# Patient Record
Sex: MALE | Race: WHITE | NOT HISPANIC OR LATINO | Employment: OTHER | URBAN - METROPOLITAN AREA
[De-identification: names, ages, dates, MRNs, and addresses within clinical notes are randomized per-mention and may not be internally consistent; named-entity substitution may affect disease eponyms.]

---

## 2018-01-26 LAB
BASOPHILS NFR BLD: 0.1 K/UL (ref 0–0.2)
BASOPHILS NFR BLD: 0.6 %
BUN SERPL-MCNC: 17 MG/DL (ref 8–20)
CALCIUM SERPL-MCNC: 8.9 MG/DL (ref 7.7–10.4)
CHLORIDE: 98 MMOL/L (ref 98–110)
CO2 SERPL-SCNC: 28.2 MMOL/L (ref 22.8–31.6)
CREATININE: 0.95 MG/DL (ref 0.6–1.4)
EOSINOPHIL NFR BLD: 0.6 K/UL (ref 0–0.7)
EOSINOPHIL NFR BLD: 3.6 %
ERYTHROCYTE [DISTWIDTH] IN BLOOD BY AUTOMATED COUNT: 12.8 % (ref 11.7–14.9)
GLUCOSE: 241 MG/DL (ref 70–99)
GRAN #: 10.1 K/UL (ref 1.4–6.5)
GRAN%: 63.6 %
HCT VFR BLD AUTO: 35.5 % (ref 39–55)
HGB BLD-MCNC: 12.1 G/DL (ref 14–16)
IMMATURE GRANS (ABS): 0.1 K/UL (ref 0–1)
IMMATURE GRANULOCYTES: 0.5 %
LYMPH #: 3.8 K/UL (ref 1.2–3.4)
LYMPH%: 23.7 %
MCH RBC QN AUTO: 29.7 PG (ref 25–35)
MCHC RBC AUTO-ENTMCNC: 34.1 G/DL (ref 31–36)
MCV RBC AUTO: 87.2 FL (ref 80–100)
MONO #: 1.3 K/UL (ref 0.1–0.6)
MONO%: 8 %
NUCLEATED RBCS: 0 %
PLATELET # BLD AUTO: 273 K/UL (ref 140–440)
PMV BLD AUTO: 9.4 FL (ref 8.8–12.7)
POTASSIUM SERPL-SCNC: 4.2 MMOL/L (ref 3.5–5)
RBC # BLD AUTO: 4.07 M/UL (ref 4.3–5.9)
SODIUM: 134 MMOL/L (ref 134–144)
WBC # BLD AUTO: 15.9 K/UL (ref 5–10)

## 2018-01-29 ENCOUNTER — TELEPHONE (OUTPATIENT)
Dept: ORTHOPEDICS | Facility: CLINIC | Age: 43
End: 2018-01-29

## 2018-01-29 NOTE — TELEPHONE ENCOUNTER
Called patient back advised of fee for office visit, and option to pay 1/3 of the fee, patient stated he shouldn't have to pay anything, and asked to cancel his appointment.

## 2018-01-29 NOTE — TELEPHONE ENCOUNTER
----- Message from Mindy Pack sent at 1/29/2018  8:12 AM CST -----  Contact: PATIENT  PATIENT UNABLE TO PAY $200 TODAY, WAS WANTING TO KNOW WHAT TO DO OR IF HE COULD MAKE PAYMENTS. PLEASE CALL HIM AT # [phone]

## 2018-09-28 ENCOUNTER — HOSPITAL ENCOUNTER (EMERGENCY)
Facility: HOSPITAL | Age: 43
Discharge: HOME | End: 2018-09-28
Attending: EMERGENCY MEDICINE

## 2018-09-28 VITALS
HEIGHT: 69 IN | TEMPERATURE: 98 F | OXYGEN SATURATION: 100 % | WEIGHT: 240 LBS | BODY MASS INDEX: 35.55 KG/M2 | DIASTOLIC BLOOD PRESSURE: 88 MMHG | SYSTOLIC BLOOD PRESSURE: 167 MMHG | RESPIRATION RATE: 16 BRPM | HEART RATE: 77 BPM

## 2018-09-28 DIAGNOSIS — M54.12 CERVICAL RADICULOPATHY: Primary | ICD-10-CM

## 2018-09-28 DIAGNOSIS — M62.838 TRAPEZIUS MUSCLE SPASM: ICD-10-CM

## 2018-09-28 PROCEDURE — 96372 THER/PROPH/DIAG INJ SC/IM: CPT

## 2018-09-28 PROCEDURE — 63600175 PHARM REV CODE 636 W HCPCS: Performed by: PHYSICIAN ASSISTANT

## 2018-09-28 PROCEDURE — 99283 EMERGENCY DEPT VISIT LOW MDM: CPT | Mod: 25

## 2018-09-28 RX ORDER — LISINOPRIL AND HYDROCHLOROTHIAZIDE 10; 12.5 MG/1; MG/1
1 TABLET ORAL DAILY
Status: ON HOLD | COMMUNITY
End: 2019-02-06 | Stop reason: HOSPADM

## 2018-09-28 RX ORDER — DICLOFENAC SODIUM 75 MG/1
75 TABLET, DELAYED RELEASE ORAL 2 TIMES DAILY PRN
Qty: 30 TABLET | Refills: 0 | Status: ON HOLD | OUTPATIENT
Start: 2018-09-28 | End: 2019-02-06 | Stop reason: HOSPADM

## 2018-09-28 RX ORDER — LIDOCAINE 50 MG/G
1 PATCH TOPICAL DAILY
Qty: 30 PATCH | Refills: 0 | Status: ON HOLD | OUTPATIENT
Start: 2018-09-28 | End: 2019-02-06 | Stop reason: HOSPADM

## 2018-09-28 RX ORDER — CYCLOBENZAPRINE HCL 10 MG
10 TABLET ORAL 3 TIMES DAILY PRN
Qty: 12 TABLET | Refills: 0 | Status: SHIPPED | OUTPATIENT
Start: 2018-09-28 | End: 2018-10-02

## 2018-09-28 RX ORDER — INSULIN GLARGINE 100 [IU]/ML
55 INJECTION, SOLUTION SUBCUTANEOUS DAILY
Status: ON HOLD | COMMUNITY
End: 2019-02-06 | Stop reason: HOSPADM

## 2018-09-28 RX ORDER — ORPHENADRINE CITRATE 30 MG/ML
60 INJECTION INTRAMUSCULAR; INTRAVENOUS
Status: COMPLETED | OUTPATIENT
Start: 2018-09-28 | End: 2018-09-28

## 2018-09-28 RX ORDER — KETOROLAC TROMETHAMINE 30 MG/ML
30 INJECTION, SOLUTION INTRAMUSCULAR; INTRAVENOUS
Status: COMPLETED | OUTPATIENT
Start: 2018-09-28 | End: 2018-09-28

## 2018-09-28 RX ADMIN — KETOROLAC TROMETHAMINE 30 MG: 30 INJECTION, SOLUTION INTRAMUSCULAR at 10:09

## 2018-09-28 RX ADMIN — ORPHENADRINE CITRATE 60 MG: 30 INJECTION INTRAMUSCULAR; INTRAVENOUS at 10:09

## 2018-09-28 NOTE — ED NOTES
Pt. complains of neck pain.  Equal hand  and strength to the upper extremeties.  Denies numbness now on the left arm but does complain of tingling (pins and needles)

## 2018-09-28 NOTE — ED PROVIDER NOTES
"Shai Date: 9/28/2018    SCRIBE #1 NOTE: ICarmen, am scribing for, and in the presence of, Angelina Helms PA-C.       History     Chief Complaint   Patient presents with    Neck Pain     left arm numbness / chronic        Time seen by provider: 10:00 AM on 09/28/2018    Gideon Ross is a 42 y.o. male with DM and HTN who presents to the ED with an onset of constant left-sided neck pain and intermittent left arm numbness and tingling sensations that have been present for a "while". He has taken Ibuprofen to help with the pain, but hasn't any taken any medication prior to arriving to the ED.  He explains he often drops items out of his left hand secondary to the numbness and tingling sensations. The patient denies headache, fever, back pain or any other symptoms at this time. He denies any recent injuries or falls. The pt hasn't seen any previous doctors to assess his symptoms. No pertinent SHx noted. No known drug allergies noted.      The history is provided by the patient.     Review of patient's allergies indicates:  No Known Allergies  Past Medical History:   Diagnosis Date    Diabetes mellitus     Hypertension      Past Surgical History:   Procedure Laterality Date    SKIN GRAFT       No family history on file.  Social History     Tobacco Use    Smoking status: Current Every Day Smoker     Packs/day: 1.00     Types: Cigarettes   Substance Use Topics    Alcohol use: Yes    Drug use: No     Review of Systems   Constitutional: Negative for chills and fever.   HENT: Negative for facial swelling and trouble swallowing.    Eyes: Negative for discharge.   Respiratory: Negative for cough, chest tightness, shortness of breath and wheezing.    Cardiovascular: Negative for chest pain and palpitations.   Gastrointestinal: Negative for abdominal pain, diarrhea, nausea and vomiting.   Genitourinary: Negative for dysuria and hematuria.   Musculoskeletal: Positive for neck pain (Constant). Negative " for arthralgias, back pain, joint swelling, myalgias and neck stiffness.   Skin: Negative for color change, pallor, rash and wound.   Neurological: Positive for numbness (Intermittent). Negative for dizziness, syncope, weakness, light-headedness and headaches.        Positive for intermittent tingling sensation.    Hematological: Does not bruise/bleed easily.   Psychiatric/Behavioral: The patient is not nervous/anxious.        Physical Exam     Initial Vitals [09/28/18 0939]   BP Pulse Resp Temp SpO2   (!) 167/88 77 16 98.2 °F (36.8 °C) 100 %      MAP       --         Physical Exam    Nursing note and vitals reviewed.  Constitutional: He appears well-developed and well-nourished. He is not diaphoretic. No distress.   HENT:   Head: Normocephalic and atraumatic.   Right Ear: External ear normal.   Left Ear: External ear normal.   Nose: Nose normal.   Mouth/Throat: Oropharynx is clear and moist.   Eyes: Conjunctivae and EOM are normal. Pupils are equal, round, and reactive to light.   Neck: Normal range of motion. Neck supple.   Cardiovascular: Normal rate, regular rhythm, normal heart sounds and intact distal pulses. Exam reveals no gallop and no friction rub.    No murmur heard.  Pulmonary/Chest: Breath sounds normal. No respiratory distress. He has no wheezes. He has no rhonchi. He has no rales.   Abdominal: Soft. He exhibits no distension and no mass. There is no tenderness.   Musculoskeletal: Normal range of motion. He exhibits tenderness. He exhibits no edema.   TTP noted to left cervical paraspinal muscles, extending into left trapezius with spasm noted.  No midline cervical spinous process tenderness noted.  No decreased ROM, decreased strength or loss of sensation to bilateral upper extremities.  Palpable 2+ radial pulses.    Lymphadenopathy:     He has no cervical adenopathy.   Neurological: He is alert and oriented to person, place, and time. He has normal strength. No cranial nerve deficit or sensory  deficit.   No focal neurological deficits noted.  Cranial nerves III-XII grossly intact.  Equal, rapid alternating movements noted to bilateral upper and lower extremities.      Skin: Skin is warm and dry. No rash and no abscess noted. No erythema.   Psychiatric: He has a normal mood and affect.         ED Course   Procedures  Labs Reviewed - No data to display       Imaging Results    None          Medical Decision Making:   History:   Old Medical Records: I decided to obtain old medical records.  Differential Diagnosis:   Fracture  Dislocation  Sprain  Contusion  Strain  Spasm           APC / Resident Notes:   Symptoms are most consistent with cervical radiculopathy.  Low suspicion for acute fracture or compression at this time and no need for emergent MRI.  He is given a dose of IM Toradol, Norflex and Lidoderm patch here in the ED.  He will be discharged home with a prescription for Motrin and Flexeril.  He is encouraged to follow-up with a PCP and/or pain management for re-evaluation and further treatment options.  He voices understanding and is agreeable to the plan.  She is given specific return precautions.          Scribe Attestation:   Scribe #1: I performed the above scribed service and the documentation accurately describes the services I performed. I attest to the accuracy of the note.    Attending Attestation:     Physician Attestation Statement for NP/PA:   I discussed this assessment and plan of this patient with the NP/PA, but I did not personally examine the patient. The face to face encounter was performed by the NP/PA.          I, Angelina Helms PA-C, personally performed the services described in this documentation. All medical record entries made by the scribe were at my direction and in my presence.  I have reviewed the chart and agree that the record reflects my personal performance and is accurate and complete. Angelina Helms PA-C.  12:08 PM 09/28/2018          ED Course as of Sep 28  1158   Fri Sep 28, 2018   0955 BP: (!) 167/88 [EF]   0955 Temp: 98.2 °F (36.8 °C) [EF]   0955 Temp src: Oral [EF]   0955 Pulse: 77 [EF]   0955 Resp: 16 [EF]   0955 SpO2: 100 % [EF]      ED Course User Index  [EF] Kin Rogers MD     Clinical Impression:   The primary encounter diagnosis was Cervical radiculopathy. A diagnosis of Trapezius muscle spasm was also pertinent to this visit.      Disposition:   Disposition: Discharged  Condition: Stable                        Angelina Helms PA-C  09/28/18 1208       Kin Rogers MD  09/28/18 5488

## 2019-01-31 ENCOUNTER — HOSPITAL ENCOUNTER (INPATIENT)
Facility: HOSPITAL | Age: 44
LOS: 6 days | Discharge: HOME | DRG: 561 | End: 2019-02-06
Attending: PHYSICAL MEDICINE & REHABILITATION | Admitting: PHYSICAL MEDICINE & REHABILITATION
Payer: MEDICAID

## 2019-01-31 DIAGNOSIS — Z89.612 S/P AKA (ABOVE KNEE AMPUTATION) UNILATERAL, LEFT: ICD-10-CM

## 2019-01-31 LAB — POCT GLUCOSE: 219 MG/DL (ref 70–110)

## 2019-01-31 PROCEDURE — 12800000 HC REHAB SEMI-PRIVATE ROOM

## 2019-01-31 PROCEDURE — 63600175 PHARM REV CODE 636 W HCPCS: Performed by: PHYSICAL MEDICINE & REHABILITATION

## 2019-01-31 PROCEDURE — S5571 INSULIN DISPOS PEN 3 ML: HCPCS | Performed by: PHYSICAL MEDICINE & REHABILITATION

## 2019-01-31 PROCEDURE — 25000003 PHARM REV CODE 250: Performed by: PHYSICAL MEDICINE & REHABILITATION

## 2019-01-31 RX ORDER — GABAPENTIN 300 MG/1
600 CAPSULE ORAL 3 TIMES DAILY
Status: DISCONTINUED | OUTPATIENT
Start: 2019-01-31 | End: 2019-02-06 | Stop reason: HOSPADM

## 2019-01-31 RX ORDER — LISINOPRIL 40 MG/1
40 TABLET ORAL DAILY
Status: DISCONTINUED | OUTPATIENT
Start: 2019-02-01 | End: 2019-02-06 | Stop reason: HOSPADM

## 2019-01-31 RX ORDER — INSULIN ASPART 100 [IU]/ML
15 INJECTION, SOLUTION INTRAVENOUS; SUBCUTANEOUS
Status: DISCONTINUED | OUTPATIENT
Start: 2019-02-01 | End: 2019-02-01

## 2019-01-31 RX ORDER — DIAZEPAM 5 MG/1
5 TABLET ORAL EVERY 12 HOURS PRN
Status: DISCONTINUED | OUTPATIENT
Start: 2019-01-31 | End: 2019-02-06 | Stop reason: HOSPADM

## 2019-01-31 RX ORDER — ASPIRIN 81 MG/1
81 TABLET ORAL DAILY
Status: DISCONTINUED | OUTPATIENT
Start: 2019-02-01 | End: 2019-02-06 | Stop reason: HOSPADM

## 2019-01-31 RX ORDER — POLYETHYLENE GLYCOL 3350 17 G/17G
17 POWDER, FOR SOLUTION ORAL DAILY
Status: DISCONTINUED | OUTPATIENT
Start: 2019-02-01 | End: 2019-02-01

## 2019-01-31 RX ORDER — IBUPROFEN 200 MG
1 TABLET ORAL DAILY
Status: DISCONTINUED | OUTPATIENT
Start: 2019-02-01 | End: 2019-02-06 | Stop reason: HOSPADM

## 2019-01-31 RX ORDER — OXYCODONE HYDROCHLORIDE 5 MG/1
10 TABLET ORAL EVERY 6 HOURS PRN
Status: DISCONTINUED | OUTPATIENT
Start: 2019-01-31 | End: 2019-02-01

## 2019-01-31 RX ORDER — TRAMADOL HYDROCHLORIDE 50 MG/1
50 TABLET ORAL EVERY 6 HOURS
Status: DISPENSED | OUTPATIENT
Start: 2019-01-31 | End: 2019-02-05

## 2019-01-31 RX ORDER — ATORVASTATIN CALCIUM 40 MG/1
80 TABLET, FILM COATED ORAL DAILY
Status: DISCONTINUED | OUTPATIENT
Start: 2019-02-01 | End: 2019-02-06 | Stop reason: HOSPADM

## 2019-01-31 RX ORDER — CARVEDILOL 6.25 MG/1
6.25 TABLET ORAL 2 TIMES DAILY
Status: DISCONTINUED | OUTPATIENT
Start: 2019-01-31 | End: 2019-02-06 | Stop reason: HOSPADM

## 2019-01-31 RX ORDER — HYDROCHLOROTHIAZIDE 12.5 MG/1
50 TABLET ORAL DAILY
Status: DISCONTINUED | OUTPATIENT
Start: 2019-02-01 | End: 2019-02-06 | Stop reason: HOSPADM

## 2019-01-31 RX ORDER — ENOXAPARIN SODIUM 100 MG/ML
40 INJECTION SUBCUTANEOUS NIGHTLY
Status: DISCONTINUED | OUTPATIENT
Start: 2019-01-31 | End: 2019-02-06 | Stop reason: HOSPADM

## 2019-01-31 RX ORDER — QUETIAPINE FUMARATE 100 MG/1
100 TABLET, FILM COATED ORAL NIGHTLY
Status: DISCONTINUED | OUTPATIENT
Start: 2019-01-31 | End: 2019-02-06 | Stop reason: HOSPADM

## 2019-01-31 RX ORDER — BACLOFEN 10 MG/1
10 TABLET ORAL 3 TIMES DAILY
Status: DISCONTINUED | OUTPATIENT
Start: 2019-01-31 | End: 2019-02-06 | Stop reason: HOSPADM

## 2019-01-31 RX ORDER — AMLODIPINE BESYLATE 5 MG/1
10 TABLET ORAL DAILY
Status: DISCONTINUED | OUTPATIENT
Start: 2019-02-01 | End: 2019-02-06 | Stop reason: HOSPADM

## 2019-01-31 RX ADMIN — QUETIAPINE FUMARATE 100 MG: 100 TABLET ORAL at 09:01

## 2019-01-31 RX ADMIN — DIAZEPAM 5 MG: 5 TABLET ORAL at 09:01

## 2019-01-31 RX ADMIN — TRAMADOL HYDROCHLORIDE 50 MG: 50 TABLET, FILM COATED ORAL at 06:01

## 2019-01-31 RX ADMIN — BACLOFEN 10 MG: 10 TABLET ORAL at 09:01

## 2019-01-31 RX ADMIN — ENOXAPARIN SODIUM 40 MG: 100 INJECTION SUBCUTANEOUS at 09:01

## 2019-01-31 RX ADMIN — INSULIN DETEMIR 70 UNITS: 100 INJECTION, SOLUTION SUBCUTANEOUS at 09:01

## 2019-01-31 RX ADMIN — CARVEDILOL 6.25 MG: 6.25 TABLET, FILM COATED ORAL at 09:01

## 2019-01-31 RX ADMIN — OXYCODONE HYDROCHLORIDE 10 MG: 5 TABLET ORAL at 06:01

## 2019-01-31 RX ADMIN — GABAPENTIN 600 MG: 300 CAPSULE ORAL at 09:01

## 2019-02-01 LAB
ALBUMIN SERPL BCP-MCNC: 3 G/DL
ALP SERPL-CCNC: 96 U/L
ALT SERPL W/O P-5'-P-CCNC: 26 U/L
ANION GAP SERPL CALC-SCNC: 10 MMOL/L
AST SERPL-CCNC: 19 U/L
BILIRUB SERPL-MCNC: 0.3 MG/DL
BILIRUB UR QL STRIP: NEGATIVE
BUN SERPL-MCNC: 25 MG/DL
CALCIUM SERPL-MCNC: 9.7 MG/DL
CHLORIDE SERPL-SCNC: 99 MMOL/L
CLARITY UR: CLEAR
CO2 SERPL-SCNC: 26 MMOL/L
COLOR UR: YELLOW
CREAT SERPL-MCNC: 1 MG/DL
ERYTHROCYTE [DISTWIDTH] IN BLOOD BY AUTOMATED COUNT: 15.6 %
EST. GFR  (AFRICAN AMERICAN): >60 ML/MIN/1.73 M^2
EST. GFR  (NON AFRICAN AMERICAN): >60 ML/MIN/1.73 M^2
GLUCOSE SERPL-MCNC: 238 MG/DL
GLUCOSE UR QL STRIP: NEGATIVE
HCT VFR BLD AUTO: 27.1 %
HGB BLD-MCNC: 8.9 G/DL
HGB UR QL STRIP: NEGATIVE
KETONES UR QL STRIP: NEGATIVE
LEUKOCYTE ESTERASE UR QL STRIP: NEGATIVE
MCH RBC QN AUTO: 28 PG
MCHC RBC AUTO-ENTMCNC: 32.8 G/DL
MCV RBC AUTO: 85 FL
NITRITE UR QL STRIP: NEGATIVE
PH UR STRIP: 6 [PH] (ref 5–8)
PLATELET # BLD AUTO: 712 K/UL
PMV BLD AUTO: 6.7 FL
POCT GLUCOSE: 191 MG/DL (ref 70–110)
POCT GLUCOSE: 220 MG/DL (ref 70–110)
POCT GLUCOSE: 302 MG/DL (ref 70–110)
POCT GLUCOSE: 76 MG/DL (ref 70–110)
POTASSIUM SERPL-SCNC: 4.8 MMOL/L
PROT SERPL-MCNC: 7.5 G/DL
PROT UR QL STRIP: NEGATIVE
RBC # BLD AUTO: 3.17 M/UL
SODIUM SERPL-SCNC: 135 MMOL/L
SP GR UR STRIP: 1.01 (ref 1–1.03)
URN SPEC COLLECT METH UR: NORMAL
UROBILINOGEN UR STRIP-ACNC: NEGATIVE EU/DL
WBC # BLD AUTO: 18.2 K/UL

## 2019-02-01 PROCEDURE — 63600175 PHARM REV CODE 636 W HCPCS: Performed by: PHYSICAL MEDICINE & REHABILITATION

## 2019-02-01 PROCEDURE — 97116 GAIT TRAINING THERAPY: CPT

## 2019-02-01 PROCEDURE — 25000003 PHARM REV CODE 250: Performed by: PHYSICAL MEDICINE & REHABILITATION

## 2019-02-01 PROCEDURE — 97110 THERAPEUTIC EXERCISES: CPT

## 2019-02-01 PROCEDURE — 12800000 HC REHAB SEMI-PRIVATE ROOM

## 2019-02-01 PROCEDURE — 36415 COLL VENOUS BLD VENIPUNCTURE: CPT

## 2019-02-01 PROCEDURE — 97530 THERAPEUTIC ACTIVITIES: CPT

## 2019-02-01 PROCEDURE — 80053 COMPREHEN METABOLIC PANEL: CPT

## 2019-02-01 PROCEDURE — 81003 URINALYSIS AUTO W/O SCOPE: CPT

## 2019-02-01 PROCEDURE — 97162 PT EVAL MOD COMPLEX 30 MIN: CPT

## 2019-02-01 PROCEDURE — 97165 OT EVAL LOW COMPLEX 30 MIN: CPT

## 2019-02-01 PROCEDURE — 85027 COMPLETE CBC AUTOMATED: CPT

## 2019-02-01 RX ORDER — OXYCODONE HYDROCHLORIDE 5 MG/1
5 TABLET ORAL EVERY 6 HOURS PRN
Status: DISCONTINUED | OUTPATIENT
Start: 2019-02-01 | End: 2019-02-02

## 2019-02-01 RX ORDER — POLYETHYLENE GLYCOL 3350 17 G/17G
17 POWDER, FOR SOLUTION ORAL DAILY PRN
Status: DISCONTINUED | OUTPATIENT
Start: 2019-02-02 | End: 2019-02-06 | Stop reason: HOSPADM

## 2019-02-01 RX ORDER — CIPROFLOXACIN 250 MG/1
500 TABLET, FILM COATED ORAL EVERY 12 HOURS
Status: COMPLETED | OUTPATIENT
Start: 2019-02-01 | End: 2019-02-05

## 2019-02-01 RX ORDER — INSULIN ASPART 100 [IU]/ML
18 INJECTION, SOLUTION INTRAVENOUS; SUBCUTANEOUS
Status: DISCONTINUED | OUTPATIENT
Start: 2019-02-01 | End: 2019-02-06 | Stop reason: HOSPADM

## 2019-02-01 RX ADMIN — CIPROFLOXACIN HYDROCHLORIDE 500 MG: 250 TABLET, FILM COATED ORAL at 09:02

## 2019-02-01 RX ADMIN — DIAZEPAM 5 MG: 5 TABLET ORAL at 10:02

## 2019-02-01 RX ADMIN — LISINOPRIL 40 MG: 40 TABLET ORAL at 10:02

## 2019-02-01 RX ADMIN — OXYCODONE HYDROCHLORIDE 10 MG: 5 TABLET ORAL at 12:02

## 2019-02-01 RX ADMIN — ENOXAPARIN SODIUM 40 MG: 100 INJECTION SUBCUTANEOUS at 09:02

## 2019-02-01 RX ADMIN — BACLOFEN 10 MG: 10 TABLET ORAL at 10:02

## 2019-02-01 RX ADMIN — GABAPENTIN 600 MG: 300 CAPSULE ORAL at 03:02

## 2019-02-01 RX ADMIN — HYDROCHLOROTHIAZIDE 50 MG: 12.5 TABLET ORAL at 10:02

## 2019-02-01 RX ADMIN — CARVEDILOL 6.25 MG: 6.25 TABLET, FILM COATED ORAL at 10:02

## 2019-02-01 RX ADMIN — TRAMADOL HYDROCHLORIDE 50 MG: 50 TABLET, FILM COATED ORAL at 06:02

## 2019-02-01 RX ADMIN — CIPROFLOXACIN HYDROCHLORIDE 500 MG: 250 TABLET, FILM COATED ORAL at 10:02

## 2019-02-01 RX ADMIN — BACLOFEN 10 MG: 10 TABLET ORAL at 09:02

## 2019-02-01 RX ADMIN — BACLOFEN 10 MG: 10 TABLET ORAL at 04:02

## 2019-02-01 RX ADMIN — OXYCODONE HYDROCHLORIDE 5 MG: 5 TABLET ORAL at 03:02

## 2019-02-01 RX ADMIN — INSULIN ASPART 18 UNITS: 100 INJECTION, SOLUTION INTRAVENOUS; SUBCUTANEOUS at 10:02

## 2019-02-01 RX ADMIN — ATORVASTATIN CALCIUM 80 MG: 40 TABLET, FILM COATED ORAL at 10:02

## 2019-02-01 RX ADMIN — DIAZEPAM 5 MG: 5 TABLET ORAL at 09:02

## 2019-02-01 RX ADMIN — TRAMADOL HYDROCHLORIDE 50 MG: 50 TABLET, FILM COATED ORAL at 12:02

## 2019-02-01 RX ADMIN — TRAMADOL HYDROCHLORIDE 50 MG: 50 TABLET, FILM COATED ORAL at 08:02

## 2019-02-01 RX ADMIN — OXYCODONE HYDROCHLORIDE 5 MG: 5 TABLET ORAL at 10:02

## 2019-02-01 RX ADMIN — GABAPENTIN 600 MG: 300 CAPSULE ORAL at 10:02

## 2019-02-01 RX ADMIN — AMLODIPINE BESYLATE 10 MG: 5 TABLET ORAL at 10:02

## 2019-02-01 RX ADMIN — INSULIN ASPART 18 UNITS: 100 INJECTION, SOLUTION INTRAVENOUS; SUBCUTANEOUS at 05:02

## 2019-02-01 RX ADMIN — ASPIRIN 81 MG: 81 TABLET, COATED ORAL at 10:02

## 2019-02-01 RX ADMIN — CARVEDILOL 6.25 MG: 6.25 TABLET, FILM COATED ORAL at 09:02

## 2019-02-01 RX ADMIN — OXYCODONE HYDROCHLORIDE 10 MG: 5 TABLET ORAL at 06:02

## 2019-02-01 RX ADMIN — GABAPENTIN 600 MG: 300 CAPSULE ORAL at 09:02

## 2019-02-01 RX ADMIN — QUETIAPINE FUMARATE 100 MG: 100 TABLET ORAL at 09:02

## 2019-02-01 NOTE — H&P
PHYSICAL MEDICINE AND REHABILITATION POST ADMISSION EVALUATION    ATTENDING PHYSICIAN:  Wolfgang Vogt MD    PHYSICIAN ADMISSION UPDATE AND COMMENTS REGARDING PREADMISSION SCREENING STATUS:    There are no substantial changes between the preadmission assessment and the   patient's current status.    APPROPRIATENESS FOR ADMISSION TO INPATIENT REHABILITATION FACILITY:  The   patient's condition is sufficiently stable to allow active participation in an   intensive inpatient rehabilitation program.  The patient would benefit from   inpatient rehabilitation due to the followin.  Three hours of therapy at least 5 days per week.    PLAN FOR COMMUNITY DISCHARGE:  The patient has good family support.  The patient   is motivated and willing to participate.  The patient is cognitively intact.    The patient has good premorbid functional status and there is a need for   interdisciplinary inpatient rehabilitation provided by a physician with rehab   focus nursing and need for PT and OT.    REHAB DIAGNOSIS:  Status post left AKA.    IMPAIRMENTS AND DISABILITIES:  Inability to ambulate safely and inability to   manage self ADLs.    REHABILITATION PRECAUTIONS AND RESTRICTIONS:  Fall.    POSSIBLE BARRIERS TO PATIENT'S PROGRESS AND DISCHARGE:  The patient's progress   may be impaired by the followin.  Poorly controlled diabetes.  2.  History of hypertension.  3.  Obesity.  4.  Pain.  5.  Gait disorder as a result of recent amputation.  6.  Surgical incision management.    ACTIVE PROBLEM LIST AND POTENTIAL COMPLICATION FROM PATIENT'S MEDICAL CONDITION   AND PLAN TO PREVENT AND ADDRESS THESE:  The patient remains at risk for fall,   DVT as well as pressure ulcer; however, likelihood of above will be decreased as   the patient will be engaged in therapeutic activities.    This patient's medical complexity requires close physician supervision and   24-hour rehabilitation nursing care to address the etiologic diagnosis,  which is   further complicated by following comorbidities:  1.  Anemia.  2.  Hypertension.  3.  Poorly controlled diabetes.  4.  Obesity.  5.  Gait disorder as a result of amputee.  6.  Pain.  7.  Leukocytosis with unknown etiology.    FUNCTIONAL GOALS TO BE ACHIEVED:  Standby assist to modified independent.    The patient requires intensive inpatient rehabilitation with care and treatment   by following disciplines:  1.  Medical supervision.  2.  A 24-hour rehabilitation nursing.  3.  Physical therapy.  4.  Occupational therapy.    PLAN OF CARE:  The interdisciplinary team will work collaboratively to address   the patient's problem as identified on the individualized interdisciplinary plan   of care.  The plan of care will be initiated and reviewed on a regular basis to   ensure that all appropriate concerns are being addressed throughout the entire   rehab stay.    The patient's expected level of improvement with inpatient rehabilitation   admission is good.    ANTICIPATED DESTINATION POST-DISCHARGE FROM INPATIENT REHABILITATION:  Home with   family.    INTERDISCIPLINARY CARE PLAN:  Reviewed and there are no changes indicated at   this time.    ESTIMATED LENGTH OF STAY:  Approximately 10 to 14 days.      AV/IN  dd: 02/01/2019 08:47:22 (CST)  td: 02/01/2019 11:56:45 (CST)  Doc ID   #[id number]  Job ID #[id number]    CC:

## 2019-02-01 NOTE — PLAN OF CARE
Problem: Adult Inpatient Plan of Care  Goal: Plan of Care Review  Outcome: Ongoing (interventions implemented as appropriate)  Valium effective. Calm and sleeping on rounds. Requested to be awaken for pain meds. Given. Scheduled ultram and 10mg of oxycodone.  Able to turn self in bed. Urinal given. Encouraged to call for any assist. Cont poc

## 2019-02-01 NOTE — PLAN OF CARE
Problem: Physical Therapy Goal  Goal: Physical Therapy Goal  Goals to be met by: 2019     Patient will increase functional independence with mobility by performin). Supine to sit with Modified [place]  2). Sit to supine with Modified [place]  3). Sit to stand transfer with Modified [place]  4). Bed to chair transfer with Modified [place]   5). Gait  x  > 150 feet with Modified [place] using Rolling Walker.   6). Wheelchair propulsion x > 300 feet with Modified [place]     Outcome: Ongoing (interventions implemented as appropriate)  Initial Evaluation completed.

## 2019-02-01 NOTE — NURSING
Pt with severe anxiety. Tearful and crying out loud. Stating cant stay here because he needs to get back to work or otherwise he will be loosing everything he has worked for. Earlier noted ambulating hallway with walker and girlfriend at his side. Explanation given to call for assist and not to do this by himself but with assist from staff. Noted dressing to left aka. Staples in place. Also noted staples to r leg. ([place] vein). Dry and healing well. Pt obese and has multiple tattoos  Did not see  Incision to left aka due to dressing. Pt also a little tachy. Encouraged to drink fluids.  Snacks given to prevent hypoglycemia. Got 70 units of detemir. No s/s given.  Encouraged to call for any assist.

## 2019-02-01 NOTE — PT/OT/SLP EVAL
"PhysicalTherapy   Evaluation    Gideon Ross   MRN: [id number]     PT Received On: 02/01/19  PT Start Time: 0905   2:05  PT Stop Time: 1000   2:40  PT Total Time (min): 90 min       Billable Minutes:  Evaluation 50, Gait [id number] and Therapeutic Exercise 30  Total Minutes: 90    Diagnosis:  -s/p L AKA    Past Medical History:   Diagnosis Date    Diabetes mellitus     Hypertension       Past Surgical History:   Procedure Laterality Date    SKIN GRAFT         Referring physician: Torie  Date referred to PT: 02/01/2019    General Precautions: Standard, fall  Orthopedic Precautions: LLE non weight bearing   Braces:   in place for p.m. Session    Patient History:  Lives With: significant other  Living Arrangements: house  Home Layout: Able to live on 1st floor  Equipment Currently Used at Home: none      Previous Level of Function:  Ambulation Skills: independent  Transfer Skills: independent  ADL Skills: independent  Work/Leisure Activity: independent(worked as cook)    Subjective:  Communicated with nurse and MD prior to session.    Patient goals: STG: ' go home"; LTG: get prosthesis, return to work  Family goals: none stated    Pain/Comfort  Pain Rating 1: 5/10  Location - Side 1: Left  Location 1: thigh  Pain Addressed 1: Reposition, Distraction, Cessation of Activity  Pain Rating Post-Intervention 1: 5/10    Objective:  Patient found seated in w/c, cooperative.       Cognitive Exam: A + O x 4    Upper Extremity Range of Motion:  Refer to OT evaluation for UE ROM.    Upper Extremity Strength:  Refer to OT evaluation for UE strength.    Lower Extremity Range of Motion:  Right Lower Extremity: WFL  Left Lower Extremity: WFL    Lower Extremity Strength:  Right Lower Extremity: WFL  Left Lower Extremity: ~ 3-/5     Functional Mobility:    Bed Mobility :   Supine to sit: Standby Assistance with siderail   Sit to supine: Standby Assistance   Rolling: Standby Assistance with siderail     Transfers:  Sit to " "stand:Contact Guard Assistance with No Assistive Device, Rolling Walker and Grab bars  x 8  Bed <> Chair:  Stand Pivot with Contact Guard Assistance with No Assistive Device to/from bed    Wheelchair: 200' with SBA and cues to lock brakes    Gait: 75' x 2, 90' x 2, 150' x 1 with RW with CGA and cues    Stairs: ascend/descend 6 4" steps with rails with min assist and cues      Balance:    Static Stand: FAIR+: Takes MINIMAL challenges from all directions with walker  Dynamic stand: FAIR: Needs CONTACT GUARD during gait with walker    Endurance deficit:  Mod limited    Additional Treatment:  Gait training with walker  STANDIN way hip arom x 10 reps x 2 sets each, emphasizing extension to stretch hip flexors  SciFit StepOne: L 3.0 x 10 min    Patient left up in chair with call button in reach and SO present.    Assessment:  Gideon Ross is a 43 y.o. male with a medical diagnosis of s/p left AKA. He presents with limited functional mobility. Will benefit from PT to address above deficits.    Rehab potential is good.    Activity tolerance: Fair    Plan: plan care intiated, bed mobility initiated, transfer training iniated, gait training, balance training, strengthening, neuromuscular re-education and wheelchair management/propulsion, preprosthetic training.    Discharge recommendations: other (see comments)(Home)     Equipment recommendations: other (see comments)(TBD)    GOALS:   Multidisciplinary Problems     Physical Therapy Goals        Problem: Physical Therapy Goal    Goal Priority Disciplines Outcome Goal Variances Interventions   Physical Therapy Goal     PT, PT/OT Ongoing (interventions implemented as appropriate)     Description:  Goals to be met by: 2019     Patient will increase functional independence with mobility by performin). Supine to sit with Modified [place]  2). Sit to supine with Modified [place]  3). Sit to stand transfer with Modified [place]  4). Bed to chair " transfer with Modified [place]   5). Gait  x  > 150 feet with Modified [place] using Rolling Walker.   6). Wheelchair propulsion x > 300 feet with Modified [place]                       PLAN:    Patient to be seen daily to address the above listed problems via gait training, therapeutic activities, therapeutic exercises, neuromuscular re-education, wheelchair management/training  Plan of Care expires: 02/16/19  Plan of Care reviewed with: patient          Joo HENDERSON Bhanu, PT 2/1/2019

## 2019-02-01 NOTE — H&P
"DATE OF ADMISSION:  01/31/2019.    ATTENDING PHYSICIAN:  Wolfgang Vogt M.D.    REFERRING FACILITY:  [facility], [place], [place].    CHIEF COMPLAINT:  "Unable to take care of myself."    HISTORY OF PRESENT ILLNESS:  Mr. Ross is a pleasant 43-year-old gentleman who   was initially admitted to [facility] in [place] on   01/05/2019 with a chief complaint of left leg pain.  The patient was admitted   for critical limb ischemia, the patient's workup included evaluation by multiple   disciplines including vascular surgeon whom had undergone bypass to left lower   extremity and soon after revealed a failed left lower extremity bypass, which   resulted in performing left BKA and soon after several days after his left BKA   essentially 12 days post of initial BKA.  The patient also had further   complications associated with his stump, which resulted in return to OR and   subsequent left AKA.  During the course of this hospitalization, the patient   also with experience of significant agitations and anxiety and depression and   suicidal ideation, which resulted in evaluation by psychiatrist.  As the   patient's acute medical issues were stabilized, the patient's medical record was   reviewed by Dr. Ruffin and recommended intensive inhouse rehabilitation prior to   consideration discharge to home.  Therefore, the patient was admitted to   [facility] inpatient rehab as the patient is residence of [place], [place].    PAST MEDICAL HISTORY:  Significant for poorly controlled diabetes as well as   hypertension.    PAST SURGICAL HISTORY:  Significant for left cardiac catheterization on   01/07/2019 and angiogram left lower extremity on 01/11/2019.    SOCIAL HISTORY:  The patient is single, works as a cook, he has a GED.    SUBSTANCE ABUSE HISTORY:  Significant for tobacco 1 pack per day and drinks one   to two beers every two week.  The patient denies recreational drug " use.    FAMILY HISTORY:  The patient has denied family history of anxiety, depression,   bipolar or boy or schizophrenia or suicidal attempt.    REVIEW OF SYSTEMS:  Denies chest pain, shortness of breath, abdominal   discomfort.    ALLERGIES:  No known drug allergies.    MEDICATIONS:  Amlodipine 10 mg 1 p.o. daily, aspirin 81 mg 1 p.o. daily,   atorvastatin 80 mg 1 p.o. daily, baclofen 10 mg p.o. t.i.d., carvedilol 6.25 mg   1 p.o. b.i.d., Lovenox 40 mg subQ at bedtime, gabapentin 600 mg p.o. t.i.d.,   hydrochlorothiazide 50 mg 1 p.o. daily, insulin aspartate 15 units subQ t.i.d.,   insulin detemir 7 units subQ b.i.d., lisinopril 40 mg p.o. daily, Nicoderm 1   patch Transderm 14 mg per 24 hour, polyethylene glycol 17 g p.o. daily,   quetiapine 100 mg p.o. at bedtime, tramadol 50 mg p.o. q. 6 hours, diazepam 5 mg   q. 12 hours p.r.n. anxiety and oxycodone 5 mg q. 6 hours p.r.n. pain.    LABORATORIES:  CBC on 02/01/2019 includes white blood cells of 18.2, hemoglobin   8.9, hematocrit 27.1 and platelets 712.  CMP pending, 01/31/2019 UA is negative.    PHYSICAL EXAMINATION:  VITAL SIGNS:  Temperature is 97.9, pulse is 115, respiration is 18, systolic   blood pressure is 137, diastolic blood pressure 76 and pulse ox is 100.  GENERAL:  Calm, cooperative, pleasant, in no acute distress.  HEENT:  Normocephalic, atraumatic.  Extraocular muscles are intact.  Sclerae is   nonicteric.  NECK:  Supple.  Throat is clear.  LUNGS:  Clear to auscultation bilateral.  HEART:  Regular rate and rhythm.  No murmur or gallops noted.  ABDOMEN:  Soft, obese, nontender, nondistended.  Positive bowel sounds.  EXTREMITIES:  No clubbing or cyanosis is noted.  SKIN:  Surgical incision involving left stump and medial left thigh are intact.    Surgical incision involving entire right lower extremity from his distal ankle   all the way to his groin are again dry, intact with staple in presents and   mildly erythematosus as he is essentially 2  weeks postop.  MANUAL MUSCLE STRENGTH:  Bilateral upper extremities are 5/5.  Right lower   extremity ankle dorsiflexion and plantar flexion is 3/5, knee flexion and   extension is 3+/5, straight leg raising is 3/5 and left hip flexion and   extension is 2+/5.  GAIT:  Deferred.  GENITAL:  Deferred.  RECTAL:  Deferred.    ASSESSMENT:  1.  Status post left AKA.  2.  Status post failed left BKA.  3.  Status post failed left lower extremity bypass.  4.  History of peripheral vascular disease.  5.  History of lower limb ischemia.  6.  History of hypertension.  7.  History of diabetes.  8.  History of tobacco abuse.  9.  History of anxiety and depression.    PLAN:  PT, OT eval, treat as indicated.  Social Service to arrange for post   discharge planning.  Rehab to nursing with emphasis on bowel and bladder   management, skin care and fall precautions.    PROGNOSIS:  Good.    ESTIMATED LENGTH OF STAY:  Approximately 10 to 14 days.  Additionally, we will   arrange for removal of every other staple from right lower extremity as well as   from left medial thigh.      BLAIR/NY  dd: 02/01/2019 08:42:23 (CST)  td: 02/01/2019 12:13:08 (CST)  Doc ID   #[id number]  Job ID #[id number]    CC:

## 2019-02-01 NOTE — PROGRESS NOTES
Spoke with patient at bedside to complete social assessment.  Patient provided he was independent w/adls, he was active and working and driving.  He reports he lives with his fiancee in a single story home with no steps to enter.  He states she is on disability but she drives and is able to assist hi at home.  Patient states he has blood glucose monitoring supplies at home and that he has received assistance for that through [email or web address] Application.  DME patient has is a [employer] and his grandmother has a walker he can use.  Requested patient to have family bring it so therapy can evaluate.  Denies HH.  States he has no PCP, but [facility] referred him to their primary care clinic.  Provided patient a copy of follow up appointments that were sent with his discharge papers from [place].  Pharmacy used is [facility] in [place].   Provided patient with information on applying for disability per his request.  Also provided patient with information on SNAP benefits.  Case managemant will continue to assist with discharge planning and needs.

## 2019-02-01 NOTE — PT/OT/SLP EVAL
Occupational Therapy  Evaluation    Gideon Ross   MRN: [id number]   Admitting Diagnosis: L AKA    OT Date of Treatment: 02/01/19   OT Start Time: 1008  OT Stop Time: 1255 (90 mins total)  OT Total Time (min): 90 mins total    Billable Minutes:  Evaluation 75 and Therapeutic Activity 15  Total Minutes: 90    Diagnosis: L AKA     Past Medical History:   Diagnosis Date    Diabetes mellitus     Hypertension       Past Surgical History:   Procedure Laterality Date    SKIN GRAFT         Referring physician: Torie  Date referred to OT: 2/1/19    General Precautions: Standard, fall  Orthopedic Precautions: (L AKA NWB)  Braces: N/A    Spiritual, Cultural Beliefs, Mormon Practices, Values that Affect Care: no     Patient History:  Living Environment  Lives With: significant other  Living Arrangements: house  Home Accessibility: other (see comments)(Threshold entrance and walk-in shower with small lip and glass door entry)  Home Layout: Able to live on 1st floor  Equipment Currently Used at Home: none    Prior level of function:    Bed Mobility/Transfers: independent  Grooming: independent  Bathing: independent  Upper Body Dressing: independent  Lower Body Dressing: independent  Toileting: independent  Homemaking Responsibilities: (Yes- shares with SO)  Driving License: Yes  Mode of Transportation: Car  Occupation: Full time employment  Type of Occupation:      Dominant hand: right    Subjective:  Communicated with nurse prior to session.    Chief Complaint: Pain of left residual limb and phantom limb pain   Patient/Family stated goals: Pain control     Pain/Comfort  Pain Rating 1: (unrated pain of L residual limb)  Pain Addressed 1: Reposition, Distraction, Cessation of Activity, Nurse notified, Other (see comments)(nurse providing pain medication - pt crying and yelling out in pain; however, pt stopped crying and yelling out approximately 10 mins after pain medication given)  Pain Rating Post-Intervention 1:  0/10    Objective:  Patient found with: bed alarm    Cognitive Exam:  Oriented to: Person, Place, Time and Situation  Follows Commands/attention: Follows multistep  commands  Communication: clear/fluent  Memory:  No Deficits noted  Safety awareness/insight to disability: impaired - pt somewhat impulsive  Coping skills/emotional control: Appropriate to situation    Visual/perceptual:  Intact    Physical Exam:  Postural examination/scapula alignment:    -       Rounded shoulders  Skin integrity: R LE incision and L residual limb staples intact  Edema: Mild R LE and Moderate L residual limb    Sensation:      -       Intact    Upper Extremity Range of Motion:  Right Upper Extremity: WFL  Left Upper Extremity: WFL    Upper Extremity Strength:  Right Upper Extremity: 4 to 5/5  Left Upper Extremity: 4 to 5/5   Strength: 5/5    Fine motor coordination:      -       Intact    Gross motor coordination: WFL    Functional Mobility:  Bed Mobility:   Supine to sit: Supervision or Set-up Assistance   Sit to supine: Supervision or Set-up Assistance   Rolling: Supervision or Set-up Assistance   Scooting: Supervision or Set-up Assistance    Transfers:  Sit to stand: Stand-by Assistance with Grab bars - verbal instruction for safety awareness (locking w/c brakes)  Bed <> Chair: Stand Pivot with Stand-by Assistance with No Assistive Device    Toilet Transfer:  Pt Stand Pivot with Contact Guard Assistance with Grab bars    Tub bench transfer Stand Pivot with Contact Guard Assistance with Grab bars - pt significant other present during shower transfer - OTR instructing on correct/safe transfer techniques as well as verbal instruction provided for transfer in home environment as pt with small lip to enter; SO reports there are 2 corner built in seats but that she owns a shower chair - OTR recommending use of shower chair - SO verbalizes/demonstrates understanding when appropriate    Feeding:  Modified Independent once Set-up with  meal     Grooming:  Set-up/Supervision     Bathing:  Patient performed bathing with Contact Guard Assistance with grab bar, Handheld shower head and tub bench at Shower    UE Dressing:  Supervision to don/doff t shirt from seated position    LE Dressing:  Pt don/doffed underwear and long pants with CGA to maintain midline in standing with unilateral UE support   Pt don/doffed R sock and tennis shoe with Set-up of items from both EOB and wheelchair    Toileting:  SBA from toilet    Additional Treatment:  - OTR providing education regarding OT role/POC, Inpatient Rehab Therapy Services and schedule as well as safety awareness, use of bed/wheelchair alarms, calling for assistance.   - OTR providing education regarding correct transfer sequence and techniques with proper hand placement, use of DME/AE, and adaptive techniques how to increase Knott with self-care tasks  - OTR also providing education/instruction regarding pain management and timing pain medication with therapy schedule - pt verbalizes understanding     Patient left up in chair with all lines intact, call button in reach and nurse notified    Assessment:  Gideon Ross is a 43 y.o. male with a medical diagnosis of L AKA and presents with performance deficits of physical skills including impaired balance, mobility, strength, dexterity, fine motor coordination, gross motor coordination and endurance.  These performance deficits have resulted in activity limitations including, but not limited to: bed mobility, transfers, ambulating short distances, navigating around obstacles during ambulation, transitional movement patterns ( kneeling, bending, reaching), upper body dressing, lower body dressing, grooming, toileting, bathing and carrying objects.   Pt's role as independent adult working full time as a  has been significantly affected.  Patient will benefit from skilled OT services to maximize level of independence with deficits listed  above.    Rehab potential is good    Activity tolerance: Good    Discharge recommendations: other (see comments)(home)     Equipment recommendations: other (see comments)(TBD; likely wheelchair, shower chair (SO already owns one) )     GOALS:   Multidisciplinary Problems     Occupational Therapy Goals        Problem: Occupational Therapy Goal    Goal Priority Disciplines Outcome Interventions   Occupational Therapy Goal     OT, PT/OT     Description:  Goals to be met by: 2/14/19     Patient will increase functional independence with ADLs by performing:    UE Dressing with [place].  LE Dressing with Modified [place] with DME/AE as needed.  Grooming while seated with Modified [place].  Toileting from toilet with Modified [place] for hygiene and clothing management with DME as needed.   Bathing from shower chair/bench with Modified [place] with DME/AE as needed.  Toilet transfer to toilet with Modified [place] with DME as needed.                      PLAN: Patient to be seen 6 x/week to address the above listed problems via self-care/home management, community/work re-entry, therapeutic activities, therapeutic exercises, therapeutic groups, wheelchair management/training  Plan of Care expires: 02/14/19  Plan of Care reviewed with: patient, significant other    FIGUEROA Kenny LOTR  02/01/2019

## 2019-02-02 LAB
BASOPHILS # BLD AUTO: 0.1 K/UL
BASOPHILS NFR BLD: 0.5 %
DIFFERENTIAL METHOD: ABNORMAL
EOSINOPHIL # BLD AUTO: 0.8 K/UL
EOSINOPHIL NFR BLD: 4.9 %
ERYTHROCYTE [DISTWIDTH] IN BLOOD BY AUTOMATED COUNT: 15.3 %
HCT VFR BLD AUTO: 27.7 %
HGB BLD-MCNC: 9 G/DL
LYMPHOCYTES # BLD AUTO: 3.1 K/UL
LYMPHOCYTES NFR BLD: 18.8 %
MCH RBC QN AUTO: 28 PG
MCHC RBC AUTO-ENTMCNC: 32.6 G/DL
MCV RBC AUTO: 86 FL
MONOCYTES # BLD AUTO: 1.4 K/UL
MONOCYTES NFR BLD: 8.6 %
NEUTROPHILS # BLD AUTO: 11 K/UL
NEUTROPHILS NFR BLD: 67.2 %
PLATELET # BLD AUTO: 726 K/UL
PMV BLD AUTO: 6.5 FL
POCT GLUCOSE: 199 MG/DL (ref 70–110)
POCT GLUCOSE: 267 MG/DL (ref 70–110)
POCT GLUCOSE: 338 MG/DL (ref 70–110)
POCT GLUCOSE: 65 MG/DL (ref 70–110)
RBC # BLD AUTO: 3.23 M/UL
WBC # BLD AUTO: 16.3 K/UL

## 2019-02-02 PROCEDURE — 97110 THERAPEUTIC EXERCISES: CPT

## 2019-02-02 PROCEDURE — 85025 COMPLETE CBC W/AUTO DIFF WBC: CPT

## 2019-02-02 PROCEDURE — 97535 SELF CARE MNGMENT TRAINING: CPT

## 2019-02-02 PROCEDURE — 25000003 PHARM REV CODE 250: Performed by: PHYSICAL MEDICINE & REHABILITATION

## 2019-02-02 PROCEDURE — 36415 COLL VENOUS BLD VENIPUNCTURE: CPT

## 2019-02-02 PROCEDURE — 12800000 HC REHAB SEMI-PRIVATE ROOM

## 2019-02-02 PROCEDURE — 63600175 PHARM REV CODE 636 W HCPCS: Performed by: PHYSICAL MEDICINE & REHABILITATION

## 2019-02-02 PROCEDURE — 97116 GAIT TRAINING THERAPY: CPT

## 2019-02-02 RX ORDER — OXYCODONE HYDROCHLORIDE 5 MG/1
5 TABLET ORAL EVERY 4 HOURS PRN
Status: DISCONTINUED | OUTPATIENT
Start: 2019-02-02 | End: 2019-02-03

## 2019-02-02 RX ADMIN — OXYCODONE HYDROCHLORIDE 5 MG: 5 TABLET ORAL at 12:02

## 2019-02-02 RX ADMIN — OXYCODONE HYDROCHLORIDE 5 MG: 5 TABLET ORAL at 08:02

## 2019-02-02 RX ADMIN — GABAPENTIN 600 MG: 300 CAPSULE ORAL at 09:02

## 2019-02-02 RX ADMIN — CIPROFLOXACIN HYDROCHLORIDE 500 MG: 250 TABLET, FILM COATED ORAL at 08:02

## 2019-02-02 RX ADMIN — ASPIRIN 81 MG: 81 TABLET, COATED ORAL at 09:02

## 2019-02-02 RX ADMIN — QUETIAPINE FUMARATE 100 MG: 100 TABLET ORAL at 08:02

## 2019-02-02 RX ADMIN — BACLOFEN 10 MG: 10 TABLET ORAL at 02:02

## 2019-02-02 RX ADMIN — ATORVASTATIN CALCIUM 80 MG: 40 TABLET, FILM COATED ORAL at 09:02

## 2019-02-02 RX ADMIN — INSULIN ASPART 18 UNITS: 100 INJECTION, SOLUTION INTRAVENOUS; SUBCUTANEOUS at 09:02

## 2019-02-02 RX ADMIN — LISINOPRIL 40 MG: 40 TABLET ORAL at 09:02

## 2019-02-02 RX ADMIN — TRAMADOL HYDROCHLORIDE 50 MG: 50 TABLET, FILM COATED ORAL at 05:02

## 2019-02-02 RX ADMIN — TRAMADOL HYDROCHLORIDE 50 MG: 50 TABLET, FILM COATED ORAL at 12:02

## 2019-02-02 RX ADMIN — CARVEDILOL 6.25 MG: 6.25 TABLET, FILM COATED ORAL at 09:02

## 2019-02-02 RX ADMIN — GABAPENTIN 600 MG: 300 CAPSULE ORAL at 02:02

## 2019-02-02 RX ADMIN — BACLOFEN 10 MG: 10 TABLET ORAL at 09:02

## 2019-02-02 RX ADMIN — CARVEDILOL 6.25 MG: 6.25 TABLET, FILM COATED ORAL at 08:02

## 2019-02-02 RX ADMIN — ENOXAPARIN SODIUM 40 MG: 100 INJECTION SUBCUTANEOUS at 08:02

## 2019-02-02 RX ADMIN — TRAMADOL HYDROCHLORIDE 50 MG: 50 TABLET, FILM COATED ORAL at 01:02

## 2019-02-02 RX ADMIN — OXYCODONE HYDROCHLORIDE 5 MG: 5 TABLET ORAL at 04:02

## 2019-02-02 RX ADMIN — GABAPENTIN 600 MG: 300 CAPSULE ORAL at 08:02

## 2019-02-02 RX ADMIN — BACLOFEN 10 MG: 10 TABLET ORAL at 08:02

## 2019-02-02 RX ADMIN — TRAMADOL HYDROCHLORIDE 50 MG: 50 TABLET, FILM COATED ORAL at 06:02

## 2019-02-02 RX ADMIN — HYDROCHLOROTHIAZIDE 50 MG: 12.5 TABLET ORAL at 09:02

## 2019-02-02 RX ADMIN — OXYCODONE HYDROCHLORIDE 5 MG: 5 TABLET ORAL at 05:02

## 2019-02-02 RX ADMIN — CIPROFLOXACIN HYDROCHLORIDE 500 MG: 250 TABLET, FILM COATED ORAL at 09:02

## 2019-02-02 RX ADMIN — INSULIN ASPART 18 UNITS: 100 INJECTION, SOLUTION INTRAVENOUS; SUBCUTANEOUS at 04:02

## 2019-02-02 NOTE — PT/OT/SLP PROGRESS
Physical Therapy         Treatment        Gideon Ross   MRN: [id number]     PT Received On: 19  Total Time (min): 90        Billable Minutes:  Gait [id number] and Therapeutic Exercise 65  Total Minutes: 90    Treatment Type: Treatment  PT/PTA: PT     PTA Visit Number: 0       General Precautions: Standard, fall  Orthopedic Precautions: Orthopedic Precautions : LLE non weight bearing     Subjective:  Communicated with nurse prior to session.    Pain/Comfort  Pain Rating 1: 5/10  Location - Side 1: Left  Location 1: thigh  Pain Addressed 1: Reposition, Distraction, Cessation of Activity  Pain Rating Post-Intervention 1: 5/10    Objective:  Patient found seated in w/c, with LLE  in place.      Functional Mobility:     Transfer Training:  Sit to stand:Contact Guard Assistance with Rolling Walker and Grab bars  x 10    Gait Trainin' x 2, 125' x 2, 75' x 2 with RW with CGA and cues    Additional Treatment:  STANDIN way hip arom x 40 reps x 2 sets each, emphasizing extension to stretch hip flexors  SciFit StepOne: L 3.0 x 11 min x 1, 16 min x 1  SEATED: RLE: LAQ and hip flexion with 5# x 25 reps each    Activity Tolerance:  Patient somewhat limited by fatigue and Patient limited by pain.    Patient left up in chair with call button in reach.    Rehab potential is good.    Activity tolerance: Good    Discharge recommendations: Discharge Facility/Level of Care Needs: other (see comments)(Home)     Equipment recommendations: Equipment Needed After Discharge: other (see comments)(TBD)     GOALS:   Multidisciplinary Problems     Physical Therapy Goals        Problem: Physical Therapy Goal    Goal Priority Disciplines Outcome Goal Variances Interventions   Physical Therapy Goal     PT, PT/OT Ongoing (interventions implemented as appropriate)     Description:  Goals to be met by: 2019     Patient will increase functional independence with mobility by performin). Supine to sit with Modified  [place]  2). Sit to supine with Modified [place]  3). Sit to stand transfer with Modified [place]  4). Bed to chair transfer with Modified [place]   5). Gait  x  > 150 feet with Modified [place] using Rolling Walker.   6). Wheelchair propulsion x > 300 feet with Modified [place]                       PLAN:    Patient to be seen daily  to address the above listed problems via gait training, therapeutic activities, therapeutic exercises, neuromuscular re-education, wheelchair management/training  Plan of Care expires: 02/16/19  Plan of Care reviewed with: patient         Joo T Bhanu, PT 2/2/2019

## 2019-02-02 NOTE — PLAN OF CARE
Problem: Occupational Therapy Goal  Goal: Occupational Therapy Goal  Goals to be met by: 2/14/19     Patient will increase functional independence with ADLs by performing:    UE Dressing with [place].  LE Dressing with Modified [place] with DME/AE as needed.  Grooming while seated with Modified [place].  Toileting from toilet with Modified [place] for hygiene and clothing management with DME as needed.   Bathing from shower chair/bench with Modified [place] with DME/AE as needed.  Toilet transfer to toilet with Modified [place] with DME as needed.     Outcome: Ongoing (interventions implemented as appropriate)  Goals remain appropriate.

## 2019-02-02 NOTE — PT/OT/SLP PROGRESS
Occupational Therapy  Treatment    Gideon Ross   MRN: [id number]   Admitting Diagnosis: L AKA    OT Date of Treatment: 02/02/19   Total Time (min): 90 min      Billable Minutes:  Self Care/Home Management 45 and Therapeutic Exercise 45  Total Minutes: 90    General Precautions: Standard, fall  Orthopedic Precautions: (L AKA)  Braces: N/A    Spiritual, Cultural Beliefs, Evangelical Practices, Values that Affect Care: no    Subjective:  Communicated with nurse prior to session.    Pain/Comfort  Pain Rating 1: 4/10  Location - Side 1: Left  Location - Orientation 1: generalized  Location 1: thigh  Pain Addressed 1: Distraction  Pain Rating Post-Intervention 1: 8/10(after donning  after shower.)    Objective:       Functional Mobility:  Bed Mobility:   Supine to sit: Activity did not occur   Sit to supine: Standby Assistance   Rolling: Standby Assistance   Scooting: Supervision or Set-up Assistance    Transfer Training:   Sit to stand:Stand-by Assistance with No Assistive Device   Bed <> Chair:  Stand Pivot with Stand-by Assistance with Grab bars   W/c <> shower chair SBA      Bathing:  Patient performed bathing with Minimal Assistance with grab bar, Handheld shower head and shower chair at Shower.    UE Dressing:  Patient performed UE Dressing with  Set-up Assistance.  LE Dressing:  Patient performed don/doffed adult brief with Stand-by Assistance and Patient performed don/doffed pants with Stand-by Assistance  Pt required total assist to bryan stump  after shower.  ADDITIONAL TREATMENT:    Weighted pulleys 50-60 pounds 3 sets 12 all planes. Isolated triceps with 12# dumbbell 3 sets 12.    Balance:   Static Sit: NORMAL: No deviations seen in posture held statically  Dynamic Sit:  NORMAL: No deviations seen in posture held dynamically  Static Stand: GOOD: Takes MODERATE challenges from all directions    Patient left HOB elevated with all lines intact, call button in reach and fiancee  present    ASSESSMENT:  Gideon Ross is a 43 y.o. male with a medical diagnosis of L AA and presents with performance deficits of physical skills including impaired balance, mobility, strength, gross motor coordination and endurance.  These performance deficits have resulted in activity limitations including, but not limited to: bed mobility, transfers, ambulating short distances, navigating around obstacles during ambulation, transitional movement patterns ( kneeling, bending, reaching),  lower body dressing, toileting, and bathing.   Pt's role as employed and independent adult has been significantly affected.  Patient will benefit from skilled OT services to maximize level of independence with deficits listed above.  .    Rehab potential is good    Activity tolerance: Good    Discharge recommendations: (home)     Equipment recommendations: (tbd)     GOALS:   Multidisciplinary Problems     Occupational Therapy Goals        Problem: Occupational Therapy Goal    Goal Priority Disciplines Outcome Interventions   Occupational Therapy Goal     OT, PT/OT Ongoing (interventions implemented as appropriate)    Description:  Goals to be met by: 2/14/19     Patient will increase functional independence with ADLs by performing:    UE Dressing with [place].  LE Dressing with Modified [place] with DME/AE as needed.  Grooming while seated with Modified [place].  Toileting from toilet with Modified [place] for hygiene and clothing management with DME as needed.   Bathing from shower chair/bench with Modified [place] with DME/AE as needed.  Toilet transfer to toilet with Modified [place] with DME as needed.                      Plan:  Patient to be seen 6 x/week to address the above listed problems via self-care/home management, community/work re-entry, therapeutic activities, therapeutic exercises, therapeutic groups, wheelchair management/training, sensory integration  Plan of Care expires:  02/14/19  Plan of Care reviewed with: patient, significant other         CARMELO Dee  02/02/2019

## 2019-02-02 NOTE — PLAN OF CARE
Problem: Adult Inpatient Plan of Care  Goal: Plan of Care Review  Outcome: Ongoing (interventions implemented as appropriate)  Patient is alert and oriented, he is trying to be pleasant, c/o severe pain at times, crying and moaning, pain medicine given as ordered on MAR per MD, ultram 50mg every 6 hours and Oxycodone 5mg every six hours, not often enough to prevent patients constant incisional pain and phantom pain. Patient also  Has Valium 5mg ordered for anxiety, but his is ordered every 12 hours.  Medications given as noted on patients MAR will continue to monitor  And observe.  Patient has stated that he doesn't want to live if the pain continues to hurt so bad, he can't stand it.

## 2019-02-02 NOTE — PLAN OF CARE
Problem: Adult Inpatient Plan of Care  Goal: Plan of Care Review  Outcome: Ongoing (interventions implemented as appropriate)  AAOx3 tolerating therapy well stump  in place incisions well approximated and healing staples intact LEI no signs of infection noted

## 2019-02-03 LAB
POCT GLUCOSE: 161 MG/DL (ref 70–110)
POCT GLUCOSE: 218 MG/DL (ref 70–110)
POCT GLUCOSE: 334 MG/DL (ref 70–110)
POCT GLUCOSE: 337 MG/DL (ref 70–110)

## 2019-02-03 PROCEDURE — 25000003 PHARM REV CODE 250: Performed by: PHYSICAL MEDICINE & REHABILITATION

## 2019-02-03 PROCEDURE — 97110 THERAPEUTIC EXERCISES: CPT

## 2019-02-03 PROCEDURE — 63600175 PHARM REV CODE 636 W HCPCS: Performed by: PHYSICAL MEDICINE & REHABILITATION

## 2019-02-03 PROCEDURE — 12800000 HC REHAB SEMI-PRIVATE ROOM

## 2019-02-03 PROCEDURE — 97116 GAIT TRAINING THERAPY: CPT

## 2019-02-03 RX ORDER — NORTRIPTYLINE HYDROCHLORIDE 25 MG/1
25 CAPSULE ORAL 3 TIMES DAILY
Status: DISCONTINUED | OUTPATIENT
Start: 2019-02-03 | End: 2019-02-06 | Stop reason: HOSPADM

## 2019-02-03 RX ORDER — OXYCODONE HYDROCHLORIDE 5 MG/1
5 TABLET ORAL EVERY 6 HOURS PRN
Status: DISCONTINUED | OUTPATIENT
Start: 2019-02-03 | End: 2019-02-06 | Stop reason: HOSPADM

## 2019-02-03 RX ADMIN — OXYCODONE HYDROCHLORIDE 5 MG: 5 TABLET ORAL at 04:02

## 2019-02-03 RX ADMIN — CARVEDILOL 6.25 MG: 6.25 TABLET, FILM COATED ORAL at 09:02

## 2019-02-03 RX ADMIN — HYDROCHLOROTHIAZIDE 50 MG: 12.5 TABLET ORAL at 10:02

## 2019-02-03 RX ADMIN — ASPIRIN 81 MG: 81 TABLET, COATED ORAL at 10:02

## 2019-02-03 RX ADMIN — OXYCODONE HYDROCHLORIDE 5 MG: 5 TABLET ORAL at 10:02

## 2019-02-03 RX ADMIN — ENOXAPARIN SODIUM 40 MG: 100 INJECTION SUBCUTANEOUS at 09:02

## 2019-02-03 RX ADMIN — TRAMADOL HYDROCHLORIDE 50 MG: 50 TABLET, FILM COATED ORAL at 05:02

## 2019-02-03 RX ADMIN — GABAPENTIN 600 MG: 300 CAPSULE ORAL at 10:02

## 2019-02-03 RX ADMIN — CARVEDILOL 6.25 MG: 6.25 TABLET, FILM COATED ORAL at 10:02

## 2019-02-03 RX ADMIN — CIPROFLOXACIN HYDROCHLORIDE 500 MG: 250 TABLET, FILM COATED ORAL at 10:02

## 2019-02-03 RX ADMIN — AMLODIPINE BESYLATE 10 MG: 5 TABLET ORAL at 10:02

## 2019-02-03 RX ADMIN — NORTRIPTYLINE HYDROCHLORIDE 25 MG: 25 CAPSULE ORAL at 09:02

## 2019-02-03 RX ADMIN — QUETIAPINE FUMARATE 100 MG: 100 TABLET ORAL at 09:02

## 2019-02-03 RX ADMIN — ATORVASTATIN CALCIUM 80 MG: 40 TABLET, FILM COATED ORAL at 10:02

## 2019-02-03 RX ADMIN — BACLOFEN 10 MG: 10 TABLET ORAL at 09:02

## 2019-02-03 RX ADMIN — GABAPENTIN 600 MG: 300 CAPSULE ORAL at 09:02

## 2019-02-03 RX ADMIN — OXYCODONE HYDROCHLORIDE 5 MG: 5 TABLET ORAL at 03:02

## 2019-02-03 RX ADMIN — INSULIN ASPART 18 UNITS: 100 INJECTION, SOLUTION INTRAVENOUS; SUBCUTANEOUS at 07:02

## 2019-02-03 RX ADMIN — TRAMADOL HYDROCHLORIDE 50 MG: 50 TABLET, FILM COATED ORAL at 12:02

## 2019-02-03 RX ADMIN — DIAZEPAM 5 MG: 5 TABLET ORAL at 05:02

## 2019-02-03 RX ADMIN — INSULIN ASPART 18 UNITS: 100 INJECTION, SOLUTION INTRAVENOUS; SUBCUTANEOUS at 05:02

## 2019-02-03 RX ADMIN — OXYCODONE HYDROCHLORIDE 5 MG: 5 TABLET ORAL at 12:02

## 2019-02-03 RX ADMIN — BACLOFEN 10 MG: 10 TABLET ORAL at 03:02

## 2019-02-03 RX ADMIN — CIPROFLOXACIN HYDROCHLORIDE 500 MG: 250 TABLET, FILM COATED ORAL at 09:02

## 2019-02-03 RX ADMIN — INSULIN ASPART 18 UNITS: 100 INJECTION, SOLUTION INTRAVENOUS; SUBCUTANEOUS at 12:02

## 2019-02-03 RX ADMIN — LISINOPRIL 40 MG: 40 TABLET ORAL at 10:02

## 2019-02-03 RX ADMIN — BACLOFEN 10 MG: 10 TABLET ORAL at 10:02

## 2019-02-03 RX ADMIN — GABAPENTIN 600 MG: 300 CAPSULE ORAL at 03:02

## 2019-02-03 RX ADMIN — DIAZEPAM 5 MG: 5 TABLET ORAL at 07:02

## 2019-02-03 RX ADMIN — TRAMADOL HYDROCHLORIDE 50 MG: 50 TABLET, FILM COATED ORAL at 09:02

## 2019-02-03 NOTE — PROGRESS NOTES
[facility]  Physical Medicine & Rehab  Progress Note    Patient Name: Gideon Ross  MRN: [id number]  Patient Class: IP- Rehab   Admission Date: 1/31/2019  Length of Stay: 2 days  Attending Physician: Wolfgang Vogt MD    Subjective:     Principal Problem: s/p left  BKA    Interval History: 2/2/2019:  Patient is seen for follow-up rehab evaluation and recommendations: pt is 43 y.o male s/p left BKA, participating with therapy    HPI, Past Medical, Family, and Social History remains the same as documented in the initial encounter.    Scheduled Medications:    amLODIPine  10 mg Oral Daily    aspirin  81 mg Oral Daily    atorvastatin  80 mg Oral Daily    baclofen  10 mg Oral TID    carvedilol  6.25 mg Oral BID    ciprofloxacin HCl  500 mg Oral Q12H    enoxaparin  40 mg Subcutaneous QHS    gabapentin  600 mg Oral TID    hydroCHLOROthiazide  50 mg Oral Daily    insulin aspart U-100  18 Units Subcutaneous TIDWM    insulin detemir U-100  35 Units Subcutaneous BID    lisinopril  40 mg Oral Daily    nicotine  1 patch Transdermal Daily    quetiapine  100 mg Oral QHS    traMADol  50 mg Oral Q6H       PRN Medications: diazePAM, oxyCODONE, polyethylene glycol    Review of Systems   Constitutional: Negative.    HENT: Negative.    Eyes: Negative.    Respiratory: Negative.    Cardiovascular: Negative.    Gastrointestinal: Negative.    Endocrine: Negative.    Genitourinary: Negative.    Musculoskeletal: Negative.    Skin: Negative.    Allergic/Immunologic: Negative.    Neurological: Negative.    Hematological: Negative.    Psychiatric/Behavioral: Negative.      Objective:     Vital Signs (Most Recent):  Temp: 99.3 °F (37.4 °C) (02/02/19 1943)  Pulse: (!) 120 (02/02/19 1943)  Resp: 18 (02/02/19 1943)  BP: 134/77 (02/02/19 1943)  SpO2: (!) 94 % (02/02/19 0924)    Vital Signs (24h Range):  Temp:  [96.7 °F (35.9 °C)-99.3 °F (37.4 °C)] 99.3 °F (37.4 °C)  Pulse:  [100-128] 120  Resp:  [18] 18  SpO2:  [94  %-98 %] 94 %  BP: (134-139)/(66-77) 134/77     Physical Exam   Constitutional: He is oriented to person, place, and time. He appears well-developed and well-nourished. No distress.   HENT:   Head: Normocephalic and atraumatic.   Right Ear: External ear normal.   Left Ear: External ear normal.   Nose: Nose normal.   Mouth/Throat: Oropharynx is clear and moist. No oropharyngeal exudate.   Eyes: Conjunctivae and EOM are normal. Pupils are equal, round, and reactive to light. Right eye exhibits no discharge. Left eye exhibits no discharge. No scleral icterus.   Neck: Normal range of motion. Neck supple. No JVD present. No tracheal deviation present. No thyromegaly present.   Cardiovascular: Normal rate, regular rhythm, normal heart sounds and intact distal pulses. Exam reveals no gallop and no friction rub.   No murmur heard.  Pulmonary/Chest: Effort normal and breath sounds normal. No stridor. No respiratory distress. He has no wheezes. He has no rales. He exhibits no tenderness.   Abdominal: Soft. Bowel sounds are normal. He exhibits no distension. There is no tenderness. There is no rebound and no guarding.   Genitourinary:   Genitourinary Comments: deferred   Musculoskeletal: Normal range of motion. He exhibits edema ( right foreleg). He exhibits no tenderness or deformity.   Lymphadenopathy:     He has no cervical adenopathy.   Neurological: He is alert and oriented to person, place, and time. No cranial nerve deficit. He exhibits normal muscle tone. Coordination normal.   Skin: No rash noted. He is not diaphoretic. No erythema. No pallor.   Mild erythema perincision   Psychiatric: He has a normal mood and affect. His behavior is normal.   Anxiety improved with current med     NEUROLOGICAL EXAMINATION:     MENTAL STATUS   Oriented to person, place, and time.     CRANIAL NERVES     CN III, IV, VI   Pupils are equal, round, and reactive to light.  Extraocular motions are normal.       Assessment/Plan:      Gideon  Cody is a 43 y.o. male admitted to inpatient rehabilitation on 1/31/2019 for <principal problem not specified> with impaired mobility and ADLs. Patient remains appropriate for PT, OT, and as required Speech therapy. Patient continues to require 24 hour nursing care as well as daily Physician assessment.    Active Diagnoses:    Diagnosis Date Noted POA    S/P AKA (above knee amputation) unilateral, left [Z89.612] 01/31/2019 Not Applicable      Problems Resolved During this Admission:     S/p left BKA, cont therapy  Right Leg edema, apply teds  Leg pain, dec frequency of pain med  Anxiety managed with current med  HTN, vitals noted, cont current med  Anticoagulation, cont asa   DVT prophylaxis, cont sq Lovenox  DM, accu check noted, cont current med  Leukocytosis, monitor cbc , cont cipro  DISCHARGE PLANNING:  Tentative Discharge Date:     No future appointments.    Wolfgang Vogt MD  Department of Physical Medicine & Rehab  [facility]

## 2019-02-03 NOTE — PLAN OF CARE
Problem: Fall Injury Risk  Goal: Absence of Fall and Fall-Related Injury  Outcome: Ongoing (interventions implemented as appropriate)  No incident of injury or fall.

## 2019-02-03 NOTE — PLAN OF CARE
Problem: Adult Inpatient Plan of Care  Goal: Plan of Care Review  Outcome: Ongoing (interventions implemented as appropriate)  Pain is fair to poor in control. Much phantom pains, anxious and labile emotionally. Slept well despite. Safety maintained. No other incident or issues overnight.

## 2019-02-03 NOTE — PT/OT/SLP PROGRESS
"Physical Therapy         Treatment        Gideon Ross   MRN: [id number]     PT Received On: 19  Total Time (min): 45        Billable Minutes:  Gait Training 15 and Therapeutic Exercise 30  Total Minutes: 45    Treatment Type: Treatment  PT/PTA: PT     PTA Visit Number: 0       General Precautions: Standard, fall  Orthopedic Precautions: Orthopedic Precautions : LLE non weight bearing     Subjective:  Communicated with nurse prior to session.    Pain/Comfort  Pain Rating 1: 10  Location - Side 1: Left  Location 1: ("nub")  Pain Addressed 1: Reposition, Nurse notified, Distraction, Cessation of Activity  Pain Rating Post-Intervention 1: 8/10    Objective:  Patient found seated in w/c, in mild distress appparently due to pain.       Functional Mobility:    Transfer Training:  Sit to stand:Contact Guard Assistance with Rolling Walker   x 7  Bed <> Chair:  Squat Pivot with Contact Guard Assistance with No Assistive Device to/from [employer] stepper    Wheelchair Training: mod I    Gait Trainin' x 2, 125' x 1 with RW with min assist on one occasion due to LOB (pt using standard size rolling walker today, had been using oversized previously)    Additional Treatment:  STANDIN way hip arom x 20 reps, emphasizing extension to stretch hip flexors  SciFit StepOne: L 3.0 x 11 min x 1, 16 min x 1  SEATED: RLE: LAQ and hip flexion with 5#, R knee flexion with blue tband, x 25 reps each     Activity Tolerance:  Patient limited by pain    Patient left up in chair with call button in reach.  .  Rehab potential is good.    Activity tolerance: Fair    Discharge recommendations: Discharge Facility/Level of Care Needs: other (see comments)(Home)     Equipment recommendations: Equipment Needed After Discharge: other (see comments)(TBD)     GOALS:   Multidisciplinary Problems     Physical Therapy Goals        Problem: Physical Therapy Goal    Goal Priority Disciplines Outcome Goal Variances Interventions   Physical Therapy Goal "     PT, PT/OT Ongoing (interventions implemented as appropriate)     Description:  Goals to be met by: 2019     Patient will increase functional independence with mobility by performin). Supine to sit with Modified [place]  2). Sit to supine with Modified [place]  3). Sit to stand transfer with Modified [place]  4). Bed to chair transfer with Modified [place]   5). Gait  x  > 150 feet with Modified [place] using Rolling Walker.   6). Wheelchair propulsion x > 300 feet with Modified [place]                       PLAN:    Patient to be seen daily  to address the above listed problems via gait training, therapeutic activities, therapeutic exercises, neuromuscular re-education, wheelchair management/training  Plan of Care expires: 19  Plan of Care reviewed with: patient         Joo Drummond, PT 2/3/2019

## 2019-02-04 LAB
BASOPHILS # BLD AUTO: 0.4 K/UL
BASOPHILS NFR BLD: 3 %
DIFFERENTIAL METHOD: ABNORMAL
EOSINOPHIL # BLD AUTO: 0.7 K/UL
EOSINOPHIL NFR BLD: 5.2 %
ERYTHROCYTE [DISTWIDTH] IN BLOOD BY AUTOMATED COUNT: 15.3 %
HCT VFR BLD AUTO: 29.4 %
HGB BLD-MCNC: 9.8 G/DL
LYMPHOCYTES # BLD AUTO: 3.4 K/UL
LYMPHOCYTES NFR BLD: 24.6 %
MCH RBC QN AUTO: 28.3 PG
MCHC RBC AUTO-ENTMCNC: 33.3 G/DL
MCV RBC AUTO: 85 FL
MONOCYTES # BLD AUTO: 1.2 K/UL
MONOCYTES NFR BLD: 8.9 %
NEUTROPHILS # BLD AUTO: 8.1 K/UL
NEUTROPHILS NFR BLD: 58.3 %
PLATELET # BLD AUTO: 658 K/UL
PMV BLD AUTO: 6.9 FL
POCT GLUCOSE: 117 MG/DL (ref 70–110)
POCT GLUCOSE: 241 MG/DL (ref 70–110)
POCT GLUCOSE: 268 MG/DL (ref 70–110)
POCT GLUCOSE: 379 MG/DL (ref 70–110)
POCT GLUCOSE: 48 MG/DL (ref 70–110)
POCT GLUCOSE: 71 MG/DL (ref 70–110)
RBC # BLD AUTO: 3.46 M/UL
WBC # BLD AUTO: 13.9 K/UL

## 2019-02-04 PROCEDURE — 97110 THERAPEUTIC EXERCISES: CPT

## 2019-02-04 PROCEDURE — 97116 GAIT TRAINING THERAPY: CPT

## 2019-02-04 PROCEDURE — 85025 COMPLETE CBC W/AUTO DIFF WBC: CPT

## 2019-02-04 PROCEDURE — 12800000 HC REHAB SEMI-PRIVATE ROOM

## 2019-02-04 PROCEDURE — 25000003 PHARM REV CODE 250: Performed by: PHYSICAL MEDICINE & REHABILITATION

## 2019-02-04 PROCEDURE — 97535 SELF CARE MNGMENT TRAINING: CPT

## 2019-02-04 PROCEDURE — 63600175 PHARM REV CODE 636 W HCPCS: Performed by: PHYSICAL MEDICINE & REHABILITATION

## 2019-02-04 PROCEDURE — 36415 COLL VENOUS BLD VENIPUNCTURE: CPT

## 2019-02-04 RX ADMIN — INSULIN ASPART 18 UNITS: 100 INJECTION, SOLUTION INTRAVENOUS; SUBCUTANEOUS at 08:02

## 2019-02-04 RX ADMIN — TRAMADOL HYDROCHLORIDE 50 MG: 50 TABLET, FILM COATED ORAL at 06:02

## 2019-02-04 RX ADMIN — BACLOFEN 10 MG: 10 TABLET ORAL at 02:02

## 2019-02-04 RX ADMIN — GABAPENTIN 600 MG: 300 CAPSULE ORAL at 02:02

## 2019-02-04 RX ADMIN — DIAZEPAM 5 MG: 5 TABLET ORAL at 10:02

## 2019-02-04 RX ADMIN — BACLOFEN 10 MG: 10 TABLET ORAL at 08:02

## 2019-02-04 RX ADMIN — OXYCODONE HYDROCHLORIDE 5 MG: 5 TABLET ORAL at 03:02

## 2019-02-04 RX ADMIN — INSULIN ASPART 18 UNITS: 100 INJECTION, SOLUTION INTRAVENOUS; SUBCUTANEOUS at 11:02

## 2019-02-04 RX ADMIN — AMLODIPINE BESYLATE 10 MG: 5 TABLET ORAL at 08:02

## 2019-02-04 RX ADMIN — ENOXAPARIN SODIUM 40 MG: 100 INJECTION SUBCUTANEOUS at 08:02

## 2019-02-04 RX ADMIN — NORTRIPTYLINE HYDROCHLORIDE 25 MG: 25 CAPSULE ORAL at 08:02

## 2019-02-04 RX ADMIN — ASPIRIN 81 MG: 81 TABLET, COATED ORAL at 08:02

## 2019-02-04 RX ADMIN — GABAPENTIN 600 MG: 300 CAPSULE ORAL at 08:02

## 2019-02-04 RX ADMIN — OXYCODONE HYDROCHLORIDE 5 MG: 5 TABLET ORAL at 10:02

## 2019-02-04 RX ADMIN — QUETIAPINE FUMARATE 100 MG: 100 TABLET ORAL at 08:02

## 2019-02-04 RX ADMIN — LISINOPRIL 40 MG: 40 TABLET ORAL at 08:02

## 2019-02-04 RX ADMIN — CARVEDILOL 6.25 MG: 6.25 TABLET, FILM COATED ORAL at 08:02

## 2019-02-04 RX ADMIN — INSULIN ASPART 18 UNITS: 100 INJECTION, SOLUTION INTRAVENOUS; SUBCUTANEOUS at 05:02

## 2019-02-04 RX ADMIN — NORTRIPTYLINE HYDROCHLORIDE 25 MG: 25 CAPSULE ORAL at 02:02

## 2019-02-04 RX ADMIN — ATORVASTATIN CALCIUM 80 MG: 40 TABLET, FILM COATED ORAL at 08:02

## 2019-02-04 RX ADMIN — CIPROFLOXACIN HYDROCHLORIDE 500 MG: 250 TABLET, FILM COATED ORAL at 08:02

## 2019-02-04 RX ADMIN — OXYCODONE HYDROCHLORIDE 5 MG: 5 TABLET ORAL at 01:02

## 2019-02-04 RX ADMIN — HYDROCHLOROTHIAZIDE 50 MG: 12.5 TABLET ORAL at 08:02

## 2019-02-04 RX ADMIN — OXYCODONE HYDROCHLORIDE 5 MG: 5 TABLET ORAL at 08:02

## 2019-02-04 RX ADMIN — TRAMADOL HYDROCHLORIDE 50 MG: 50 TABLET, FILM COATED ORAL at 11:02

## 2019-02-04 RX ADMIN — TRAMADOL HYDROCHLORIDE 50 MG: 50 TABLET, FILM COATED ORAL at 05:02

## 2019-02-04 NOTE — PT/OT/SLP PROGRESS
"Physical Therapy         Treatment        Gideon Ross   MRN: [id number]     PT Received On: 19  Total Time (min): 90        Billable Minutes:  Gait [id number] and Therapeutic Exercise 70  Total Minutes: 90    Treatment Type: Treatment  PT/PTA: PT     PTA Visit Number: 0       General Precautions: Standard, fall  Orthopedic Precautions: Orthopedic Precautions : LLE non weight bearing     Subjective:  Communicated with nurse prior to session.    Pain/Comfort  Pain Rating 1: 5/10  Location - Side 1: Right  Location 1: thigh  Pain Addressed 1: Reposition, Distraction, Cessation of Activity, Pre-medicate for activity  Pain Rating Post-Intervention 1: 510    Objective:  Patient found seated in w/c, cooperative.       Functional Mobility:  Bed Mobility:   Supine to sit: Modified Independent   Sit to supine: Modified Independent   Rolling: Modified Independent     Transfer Training:  Sit to stand:Stand-by Assistance with No Assistive Device, Rolling Walker and Grab bars  x 10  Bed <> Chair:  Stand Pivot with Stand-by Assistance with No Assistive Device and Rolling Walker to/from    Wheelchair Training: > 300' mod I    Gait Trainin' x 1, 160' x 1, 100' x 1, with RW with SBA    Stair Training: ascend/descend 4 6" steps x 2 with SBA    Additional Treatment:  STANDIN way hip arom x 30 reps x 2 sets each, emphasizing extension to stretch hip flexors  SciFit StepOne: L 4.5 x 10 min x 1, 15 min x 1  SEATED: RLE: LAQ and hip flexion with 5#, R knee flexion with blue tband, x 30 reps x 2 sets each    Reassessed function and LE strength.    Activity Tolerance:  Patient limited by fatigue    Patient left up in chair with call button in reach.    Rehab potential is good.    Activity tolerance: Fair    Discharge recommendations: Discharge Facility/Level of Care Needs: other (see comments)(Home)     Equipment recommendations: Equipment Needed After Discharge: other (see comments)(TBD)     GOALS:   Multidisciplinary " Problems     Physical Therapy Goals        Problem: Physical Therapy Goal    Goal Priority Disciplines Outcome Goal Variances Interventions   Physical Therapy Goal     PT, PT/OT Ongoing (interventions implemented as appropriate)     Description:  Goals to be met by: 2019     Patient will increase functional independence with mobility by performin). Supine to sit with Modified [place]  2). Sit to supine with Modified [place]  3). Sit to stand transfer with Modified [place]  4). Bed to chair transfer with Modified [place]   5). Gait  x  > 150 feet with Modified [place] using Rolling Walker.   6). Wheelchair propulsion x > 300 feet with Modified [place]                       PLAN:    Patient to be seen daily  to address the above listed problems via gait training, therapeutic activities, therapeutic exercises, neuromuscular re-education  Plan of Care expires: 19  Plan of Care reviewed with: patient         Joo SANTIAGO Drummond, PT 2019

## 2019-02-04 NOTE — PATIENT CARE CONFERENCE
Overall Plan of Care      Gideon Ross   MRN: [id number]  Admit Date/Time: 1/31/2019  5:12 PM   Admitting Diagnosis: s/p left AKA  Estimated Length of Stay (days): 10-14 days     1.  Medical Prognosis:     I have reviewed each team member's Treatment Plan and the current list of barriers/impairments and limitations for this patient.  I approve the goals and recommendations outlined in the Transdisciplinary Plan of Care     1. Assessment:      Medical Prognosis       [ x  ]  Improvement expected in admitting condition, with associated             Functional improvement       [   ] Medical condition is chronic or deteriorating, but the patient          Should have functional improvement       [   ]  Other:        Medical/Nursing Interventions:    [ x ]  Fall Prevention Program                                 [ x]  Adequate Nutrition/Hydration    [ x ]  Monitor Skin Condition                                   [ x ]  Adaptive Equipment    [ x ]  Implement Rehab Therapy Goals                  [x  ]  Bowel and Bladder Program    [ x ]  Monitor Surgical Site Healing                         [  ]  Manage Swallowing disorder    [  ]  Manage Swallowing Disorder                         [ x ]  Pain Management    [ x ]  Effectiveness of Medications                          [x  ]  Patient / Family Education    [  ]  Manage Risk of UTI ([place]ber Care)                   [  x]  Diabetic Teaching, Blood Sugar                                                                                      Checks / Insulin Administration    [  ]  Peg Tube Feeds                                             [ x ]  Frequent Vitals/Neuro                                                                                       Assessments and/or Changes     [  ] Trach Care/Education           [  ]  Ostomy Care/Education      Therapy Plan:    Physical Therapy:  Intensity (hrs/day): 1-1.5  Frequency (day/wk:) 7  Estimated Discharge Date: 02/16/19     PT Treatment  Plan:  Plan: gait training, therapeutic activities, therapeutic exercises, neuromuscular re-education, wheelchair management/training      Occupational Therapy:  Intensity (hrs/day): 1-1.5  Frequency (day/wk:) 7  Estimated Discharge Date:  02/14/19    OT Treatment Plan:  OT Plan: self-care/home management, community/work re-entry, therapeutic activities, therapeutic exercises, therapeutic groups, wheelchair management/training, sensory integration    SLP:  Intensity (hrs/day): 1-1.5  Frequency (day/wk:) 5  Estimated Discharge Date:      SLP Treatment Plan:  SLP Plan:        Therapy Recommendations:    Therapy Discharge Recommendations: (home)     Therapy Equipment Recommendations: (tbd)          Anticipated Functional Outcome:    [  ]  Independent    [x  ]  Modified Independent    [  ]  Requiring Supervision / Assistance      Discharge Planning:    Discharge Disposition:    [  ]  Home with Home Health    [x  ]  Home with Oupatient    [  ]  Assisted Living Facility      Team Goal:    To enable the patient's safe return to the home or a community based environment upon discharge.       Wolfgang Vogt MD  02/04/2019  8:49 AM

## 2019-02-04 NOTE — PLAN OF CARE
Problem: Occupational Therapy Goal  Goal: Occupational Therapy Goal  Goals to be met by: 2/14/19     Patient will increase functional independence with ADLs by performing:    UE Dressing with [place].  LE Dressing with Modified [place] with DME/AE as needed.  Grooming while seated with Modified [place].  Toileting from toilet with Modified [place] for hygiene and clothing management with DME as needed.   Bathing from shower chair/bench with Modified [place] with DME/AE as needed.  Toilet transfer to toilet with Modified [place] with DME as needed.     Outcome: Ongoing (interventions implemented as appropriate)  Goals remain appropriate.

## 2019-02-04 NOTE — PROGRESS NOTES
[facility]  Physical Medicine & Rehab  Progress Note    Patient Name: Gideon Ross  MRN: [id number]  Patient Class: IP- Rehab   Admission Date: 1/31/2019  Length of Stay: 4 days  Attending Physician: Wolfgang Vogt MD    Subjective:     Principal Problem: s/p left AKA    Interval History: 2/4/2019:  Patient is seen for follow-up rehab evaluation and recommendations: pt is 43 y.o male s/p  Left AKA, participating with therapy    HPI, Past Medical, Family, and Social History remains the same as documented in the initial encounter.    Scheduled Medications:    amLODIPine  10 mg Oral Daily    aspirin  81 mg Oral Daily    atorvastatin  80 mg Oral Daily    baclofen  10 mg Oral TID    carvedilol  6.25 mg Oral BID    ciprofloxacin HCl  500 mg Oral Q12H    enoxaparin  40 mg Subcutaneous QHS    gabapentin  600 mg Oral TID    hydroCHLOROthiazide  50 mg Oral Daily    insulin aspart U-100  18 Units Subcutaneous TIDWM    insulin detemir U-100  35 Units Subcutaneous BID    lisinopril  40 mg Oral Daily    nicotine  1 patch Transdermal Daily    nortriptyline  25 mg Oral TID    quetiapine  100 mg Oral QHS    traMADol  50 mg Oral Q6H       PRN Medications: diazePAM, oxyCODONE, polyethylene glycol    Review of Systems   Constitutional: Negative.    HENT: Negative.    Eyes: Negative.    Respiratory: Negative.    Cardiovascular: Negative.    Gastrointestinal: Negative.    Endocrine: Negative.    Genitourinary: Negative.    Musculoskeletal: Negative.    Skin: Negative.    Allergic/Immunologic: Negative.    Neurological: Negative.    Hematological: Negative.    Psychiatric/Behavioral: Negative.      Objective:     Vital Signs (Most Recent):  Temp: 97.6 °F (36.4 °C) (02/04/19 0606)  Pulse: (!) 123 (02/04/19 0812)  Resp: 20 (02/04/19 0606)  BP: 136/80 (02/04/19 0812)  SpO2: 100 % (02/04/19 0606)    Vital Signs (24h Range):  Temp:  [97.6 °F (36.4 °C)-98.6 °F (37 °C)] 97.6 °F (36.4 °C)  Pulse:  [109-123]  123  Resp:  [20] 20  SpO2:  [100 %] 100 %  BP: (129-138)/(68-82) 136/80     Physical Exam   Constitutional: He is oriented to person, place, and time. He appears well-developed and well-nourished. No distress.   HENT:   Head: Normocephalic and atraumatic.   Right Ear: External ear normal.   Left Ear: External ear normal.   Nose: Nose normal.   Mouth/Throat: Oropharynx is clear and moist. No oropharyngeal exudate.   Eyes: Conjunctivae and EOM are normal. Pupils are equal, round, and reactive to light. Right eye exhibits no discharge. Left eye exhibits no discharge. No scleral icterus.   Neck: Normal range of motion. Neck supple. No JVD present. No tracheal deviation present. No thyromegaly present.   Cardiovascular: Normal rate, regular rhythm, normal heart sounds and intact distal pulses. Exam reveals no gallop and no friction rub.   No murmur heard.  Pulmonary/Chest: Effort normal and breath sounds normal. No stridor. No respiratory distress. He has no wheezes. He has no rales. He exhibits no tenderness.   Abdominal: Soft. Bowel sounds are normal. He exhibits no distension. There is no tenderness. There is no rebound and no guarding.   Genitourinary:   Genitourinary Comments: deferred   Musculoskeletal: Normal range of motion. He exhibits no edema, tenderness or deformity.   Lymphadenopathy:     He has no cervical adenopathy.   Neurological: He is alert and oriented to person, place, and time. No cranial nerve deficit or sensory deficit. He exhibits normal muscle tone. Coordination normal.   Skin: No rash noted. He is not diaphoretic. No erythema. No pallor.   Incision stable    Psychiatric: He has a normal mood and affect. His behavior is normal.     NEUROLOGICAL EXAMINATION:     MENTAL STATUS   Oriented to person, place, and time.     CRANIAL NERVES     CN III, IV, VI   Pupils are equal, round, and reactive to light.  Extraocular motions are normal.       Assessment/Plan:      Gideon Ross is a 43 y.o. male  admitted to inpatient rehabilitation on 1/31/2019 for <principal problem not specified> with impaired mobility and ADLs. Patient remains appropriate for PT, OT, and as required Speech therapy. Patient continues to require 24 hour nursing care as well as daily Physician assessment.    Active Diagnoses:    Diagnosis Date Noted POA    S/P AKA (above knee amputation) unilateral, left [Z89.612] 01/31/2019 Not Applicable      Problems Resolved During this Admission:     S/p left AKA, cont therapy  Pain, managed with current med  HTN, vitals noted, cont current med  DVT prophylaxis, cont sq Lovenox  DM, accu check noted, cont current med    DISCHARGE PLANNING:  Tentative Discharge Date:     No future appointments.    Wolfgang Vogt MD  Department of Physical Medicine & Rehab  [facility]

## 2019-02-04 NOTE — PLAN OF CARE
Problem: Adult Inpatient Plan of Care  Goal: Plan of Care Review  Pain is better but still poorly controlled. Slept some, but still overall sleepless. No other issues or events. Safety maintained.

## 2019-02-04 NOTE — PROGRESS NOTES
[facility]  Physical Medicine & Rehab  Progress Note    Patient Name: Gideon Ross  MRN: [id number]  Patient Class: IP- Rehab   Admission Date: 1/31/2019  Length of Stay: 3 days  Attending Physician: Wolfgang Vogt MD    Subjective:     Principal Problem: s/p right AKA    Interval History: 2/3/2019:  Patient is seen for follow-up rehab evaluation and recommendations: pt is 43 y.o male s/p right AKA, participating with therapy    HPI, Past Medical, Family, and Social History remains the same as documented in the initial encounter.    Scheduled Medications:    amLODIPine  10 mg Oral Daily    aspirin  81 mg Oral Daily    atorvastatin  80 mg Oral Daily    baclofen  10 mg Oral TID    carvedilol  6.25 mg Oral BID    ciprofloxacin HCl  500 mg Oral Q12H    enoxaparin  40 mg Subcutaneous QHS    gabapentin  600 mg Oral TID    hydroCHLOROthiazide  50 mg Oral Daily    insulin aspart U-100  18 Units Subcutaneous TIDWM    insulin detemir U-100  35 Units Subcutaneous BID    lisinopril  40 mg Oral Daily    nicotine  1 patch Transdermal Daily    nortriptyline  25 mg Oral TID    quetiapine  100 mg Oral QHS    traMADol  50 mg Oral Q6H       PRN Medications: diazePAM, oxyCODONE, polyethylene glycol    Review of Systems   Constitutional: Negative.    HENT: Negative.    Eyes: Negative.    Respiratory: Negative.    Cardiovascular: Negative.    Gastrointestinal: Negative.    Endocrine: Negative.    Genitourinary: Negative.    Musculoskeletal: Negative.         Phantom pain   Skin: Negative.    Allergic/Immunologic: Negative.    Neurological: Negative.    Hematological: Negative.    Psychiatric/Behavioral: Negative.      Objective:     Vital Signs (Most Recent):  Temp: 98.5 °F (36.9 °C) (02/03/19 0757)  Pulse: (!) 113 (02/03/19 0757)  Resp: 17 (02/03/19 0757)  BP: (!) 141/76 (02/03/19 0757)  SpO2: 97 % (02/03/19 0757)    Vital Signs (24h Range):  Temp:  [98.5 °F (36.9 °C)-99.3 °F (37.4 °C)] 98.5 °F (36.9  °C)  Pulse:  [113-120] 113  Resp:  [17-18] 17  SpO2:  [97 %-99 %] 97 %  BP: (134-141)/(76-78) 141/76     Physical Exam   Constitutional: He is oriented to person, place, and time. He appears well-developed and well-nourished. No distress.   HENT:   Head: Normocephalic and atraumatic.   Right Ear: External ear normal.   Left Ear: External ear normal.   Nose: Nose normal.   Mouth/Throat: No oropharyngeal exudate.   Eyes: Conjunctivae and EOM are normal. Pupils are equal, round, and reactive to light. Right eye exhibits no discharge. Left eye exhibits no discharge. No scleral icterus.   Neck: Normal range of motion. Neck supple. No JVD present. No tracheal deviation present. No thyromegaly present.   Cardiovascular: Normal rate, regular rhythm, normal heart sounds and intact distal pulses. Exam reveals no gallop and no friction rub.   No murmur heard.  Pulmonary/Chest: Effort normal and breath sounds normal. No stridor. No respiratory distress. He has no wheezes. He has no rales. He exhibits no tenderness.   Abdominal: Soft. Bowel sounds are normal. He exhibits no distension. There is no tenderness. There is no rebound and no guarding.   Genitourinary:   Genitourinary Comments: deferred   Musculoskeletal: Normal range of motion. He exhibits no edema, tenderness or deformity.   Lymphadenopathy:     He has no cervical adenopathy.   Neurological: He is alert and oriented to person, place, and time. No cranial nerve deficit. He exhibits normal muscle tone. Coordination normal.   Skin: No rash noted. He is not diaphoretic. No erythema. No pallor.   Psychiatric: He has a normal mood and affect. His behavior is normal.     NEUROLOGICAL EXAMINATION:     MENTAL STATUS   Oriented to person, place, and time.     CRANIAL NERVES     CN III, IV, VI   Pupils are equal, round, and reactive to light.  Extraocular motions are normal.       Assessment/Plan:      Gideon Ross is a 43 y.o. male admitted to inpatient rehabilitation on  1/31/2019 for <principal problem not specified> with impaired mobility and ADLs. Patient remains appropriate for PT, OT, and as required Speech therapy. Patient continues to require 24 hour nursing care as well as daily Physician assessment.    Active Diagnoses:    Diagnosis Date Noted POA    S/P AKA (above knee amputation) unilateral, left [Z89.612] 01/31/2019 Not Applicable      Problems Resolved During this Admission:     S/p right AKA, cont therapy  Phantom pain, initiae [place]  HTN, vitals noted, cont current med  DM, accuc noted, cont current med  DVT prophylaxis, cont sq Lovenox  Pain, managed with current med      DISCHARGE PLANNING:  Tentative Discharge Date:     No future appointments.    Wolfgang Vogt MD  Department of Physical Medicine & Rehab  [facility]

## 2019-02-04 NOTE — PROGRESS NOTES
Patient had scheduled dose of insulin, then refused to eat lunch. Patient became sweaty and feeling bad. Blood sugar 48. Given juices and cereal with milk and sugar. Blood sugar re-checked with result of 71. Patient feeling better now. Dr. Vogt notified  Of this. New orders noted.

## 2019-02-04 NOTE — PT/OT/SLP PROGRESS
Occupational Therapy  Treatment    Gideon Ross   MRN: [id number]   Admitting Diagnosis: L AKA        OT Date of Treatment: 02/04/19   Total Time (min): 90 min      Billable Minutes:  Self Care/Home Management 55 and Therapeutic Exercise 35  Total Minutes: 90    General Precautions: Standard, fall  Orthopedic Precautions: LLE non weight bearing  Braces: N/A    Spiritual, Cultural Beliefs, Episcopal Practices, Values that Affect Care: no    Subjective:  Communicated with nurse Pat prior to session.    Pain/Comfort  Pain Rating 1: 0/10  Pain Rating Post-Intervention 1: 0/10    Objective:  Patient seated in w/c in room with family present.    Functional Mobility:  Bed Mobility:   Supine to sit: Activity did not occur   Sit to supine: Modified Independent   Rolling: Activity did not occur   Scooting: Modified Independent    Transfer Training:  Wheelchair > Bed Transfer: Supervision via stand pivot with no AD  Shower Bench Transfer: Supervision via stand pivot using grab bar     Grooming:  Patient performed shaving with Modified Independent at sitting at sink.    Bathing:  Patient performed bathing with Supervision or Set-up Assistance with grab bar, Handheld shower head and shower chair at Shower.    UE Dressing:  Patient donned/doffed shirt with [place] while seated.      LE Dressing:  Patient don/doffed socks with Modified [place] while seated in w/c.  Patient doffed shoes with Modified [place] while seated in w/c.  Patient doffed shorts while seated on shower chair with Supervision using grab bar for balance. Pt also donned shorts in w/c with CGA. Minimal LOB noted when pt attempted to don pants in standing position requiring min A to regain balance.        Balance:   Static Sit: GOOD: Takes MODERATE challenges from all directions  Dynamic Sit:  GOOD-: Incosistently Maintains balance through MODERATE excursions of active trunk movement,     Static Stand: FAIR: Maintains without assist but unable to take  "challenges  Dynamic stand: FAIR: Needs CONTACT GUARD during gait    Additional Treatment:  Patient performed BUE excercises while seated in w/c using pulley weight machine in the gym:  *Bicep curls - 3 x 10 reps @ 30lbs.  *Horizontal Adduction(across midline) - 3 x 10 reps @ 10lbs.  *Shldr flex/ext - 3 x 10 @ 10lbs.    While seated on shower chair, pt c/o of feeling "clammy" and stating his blood sugar was low. Upon completing shower, dressing, and returning to EOB, RN checked pt's blood sugar. He was at a 48. Pt was provided cereal with sugar, well as rice pudding to increase blood sugar. Pt began to feel better after several minutes.       Patient left supine with call button in reach and nurse notified    ASSESSMENT:  Gideon Ross is a 43 y.o. male. Pt demonstrated good activity tolerance with UBE and increased independence with showering, dressing, and transfers. At this time, pt will still require supervision when standing to perform LB dressing as pt's demonstrates minimal LOB during this activity. Pt is modified independent with hygiene tasks at sink. Pt is progressing very well with therapy.      Rehab potential is good    Activity tolerance: Good    Discharge recommendations: (home)     Equipment recommendations: (tbd)     GOALS:   Multidisciplinary Problems     Occupational Therapy Goals        Problem: Occupational Therapy Goal    Goal Priority Disciplines Outcome Interventions   Occupational Therapy Goal     OT, PT/OT Ongoing (interventions implemented as appropriate)    Description:  Goals to be met by: 2/14/19     Patient will increase functional independence with ADLs by performing:    UE Dressing with [place].  LE Dressing with Modified [place] with DME/AE as needed.  Grooming while seated with Modified [place].  Toileting from toilet with Modified [place] for hygiene and clothing management with DME as needed.   Bathing from shower chair/bench with Modified [place] with " DME/AE as needed.  Toilet transfer to toilet with Modified [place] with DME as needed.                      Plan:  Patient to be seen 6 x/week to address the above listed problems via self-care/home management, community/work re-entry, therapeutic activities, therapeutic exercises, wheelchair management/training, sensory integration  Plan of Care expires: 02/14/19  Plan of Care reviewed with: patient         Abdias VARELA Kevin, OT  02/04/2019

## 2019-02-05 PROBLEM — E66.9 OBESITY: Status: ACTIVE | Noted: 2019-02-05

## 2019-02-05 LAB
POCT GLUCOSE: 177 MG/DL (ref 70–110)
POCT GLUCOSE: 225 MG/DL (ref 70–110)
POCT GLUCOSE: 245 MG/DL (ref 70–110)
POCT GLUCOSE: 93 MG/DL (ref 70–110)

## 2019-02-05 PROCEDURE — 63600175 PHARM REV CODE 636 W HCPCS: Performed by: PHYSICAL MEDICINE & REHABILITATION

## 2019-02-05 PROCEDURE — 97530 THERAPEUTIC ACTIVITIES: CPT

## 2019-02-05 PROCEDURE — 97110 THERAPEUTIC EXERCISES: CPT

## 2019-02-05 PROCEDURE — 12800000 HC REHAB SEMI-PRIVATE ROOM

## 2019-02-05 PROCEDURE — 97116 GAIT TRAINING THERAPY: CPT

## 2019-02-05 PROCEDURE — 25000003 PHARM REV CODE 250: Performed by: PHYSICAL MEDICINE & REHABILITATION

## 2019-02-05 RX ADMIN — CARVEDILOL 6.25 MG: 6.25 TABLET, FILM COATED ORAL at 10:02

## 2019-02-05 RX ADMIN — ENOXAPARIN SODIUM 40 MG: 100 INJECTION SUBCUTANEOUS at 08:02

## 2019-02-05 RX ADMIN — DIAZEPAM 5 MG: 5 TABLET ORAL at 04:02

## 2019-02-05 RX ADMIN — TRAMADOL HYDROCHLORIDE 50 MG: 50 TABLET, FILM COATED ORAL at 11:02

## 2019-02-05 RX ADMIN — NORTRIPTYLINE HYDROCHLORIDE 25 MG: 25 CAPSULE ORAL at 10:02

## 2019-02-05 RX ADMIN — OXYCODONE HYDROCHLORIDE 5 MG: 5 TABLET ORAL at 07:02

## 2019-02-05 RX ADMIN — BACLOFEN 10 MG: 10 TABLET ORAL at 08:02

## 2019-02-05 RX ADMIN — INSULIN ASPART 18 UNITS: 100 INJECTION, SOLUTION INTRAVENOUS; SUBCUTANEOUS at 04:02

## 2019-02-05 RX ADMIN — GABAPENTIN 600 MG: 300 CAPSULE ORAL at 08:02

## 2019-02-05 RX ADMIN — GABAPENTIN 600 MG: 300 CAPSULE ORAL at 03:02

## 2019-02-05 RX ADMIN — HYDROCHLOROTHIAZIDE 50 MG: 12.5 TABLET ORAL at 10:02

## 2019-02-05 RX ADMIN — QUETIAPINE FUMARATE 100 MG: 100 TABLET ORAL at 08:02

## 2019-02-05 RX ADMIN — NORTRIPTYLINE HYDROCHLORIDE 25 MG: 25 CAPSULE ORAL at 03:02

## 2019-02-05 RX ADMIN — ASPIRIN 81 MG: 81 TABLET, COATED ORAL at 08:02

## 2019-02-05 RX ADMIN — INSULIN ASPART 18 UNITS: 100 INJECTION, SOLUTION INTRAVENOUS; SUBCUTANEOUS at 08:02

## 2019-02-05 RX ADMIN — TRAMADOL HYDROCHLORIDE 50 MG: 50 TABLET, FILM COATED ORAL at 06:02

## 2019-02-05 RX ADMIN — CIPROFLOXACIN HYDROCHLORIDE 500 MG: 250 TABLET, FILM COATED ORAL at 08:02

## 2019-02-05 RX ADMIN — ATORVASTATIN CALCIUM 80 MG: 40 TABLET, FILM COATED ORAL at 08:02

## 2019-02-05 RX ADMIN — OXYCODONE HYDROCHLORIDE 5 MG: 5 TABLET ORAL at 08:02

## 2019-02-05 RX ADMIN — CARVEDILOL 6.25 MG: 6.25 TABLET, FILM COATED ORAL at 08:02

## 2019-02-05 RX ADMIN — LISINOPRIL 40 MG: 40 TABLET ORAL at 10:02

## 2019-02-05 RX ADMIN — NORTRIPTYLINE HYDROCHLORIDE 25 MG: 25 CAPSULE ORAL at 08:02

## 2019-02-05 RX ADMIN — AMLODIPINE BESYLATE 10 MG: 5 TABLET ORAL at 10:02

## 2019-02-05 RX ADMIN — BACLOFEN 10 MG: 10 TABLET ORAL at 03:02

## 2019-02-05 NOTE — PT/OT/SLP PROGRESS
Occupational Therapy  Treatment    Gideon Ross   MRN: [id number]   Admitting Diagnosis: L AKA    OT Date of Treatment: 02/05/19   Total Time (min): 105 min      Billable Minutes:  Therapeutic Activity 25 and Therapeutic Exercise 80  Total Minutes: 105    General Precautions: Standard, fall  Orthopedic Precautions: N/A  Braces: N/A    Spiritual, Cultural Beliefs, Gnosticism Practices, Values that Affect Care: no    Subjective:  Communicated with nursing prior to session.    Pain/Comfort  Pain Rating 1: 0/10    Objective:   Patient was found sitting in his wheelchair near the nursing station.     Transfer Training:   Sit to stand:Supervision or Set-up Assistance with Grab bars and using an elevated table surface for support during standing time. .    Balance:   Dynamic stand: GOOD: Needs SUPERVISION only during gait and able to self right with moderate LOB    Additional Treatment:  BUE exercising completed as follows: Bilat José Miguel used for 4 sets of 5 mins each using 8 lbs, rest breaks given, 8 lbs thera-bar used in all planes with 225 reps completed, 150 reps completed on the green flex bar.  Standing activity completed at an elevated table surface, challenged to assemble an over sized puzzle, longest standing time was 15 mins.    Patient left up in chair with all lines intact, chair alarm on and nursing notified    ASSESSMENT:  Gideon Ross is a 43 y.o. male with a medical diagnosis of L AKA.    Rehab potential is excellent    Activity tolerance: Excellent      GOALS:   Multidisciplinary Problems     Occupational Therapy Goals        Problem: Occupational Therapy Goal    Goal Priority Disciplines Outcome Interventions   Occupational Therapy Goal     OT, PT/OT Ongoing (interventions implemented as appropriate)    Description:  Goals to be met by: 2/14/19     Patient will increase functional independence with ADLs by performing:    UE Dressing with [place].  LE Dressing with Modified [place] with DME/AE as  needed.  Grooming while seated with Modified [place].  Toileting from toilet with Modified [place] for hygiene and clothing management with DME as needed.   Bathing from shower chair/bench with Modified [place] with DME/AE as needed.  Toilet transfer to toilet with Modified [place] with DME as needed.                      Plan:  Patient to be seen 6 x/week to address the above listed problems via self-care/home management, community/work re-entry, therapeutic activities, therapeutic exercises, wheelchair management/training, sensory integration  Plan of Care expires: 02/14/19  Plan of Care reviewed with: patient         Artie LEI Khalil  02/05/2019

## 2019-02-05 NOTE — PT/OT/SLP PROGRESS
"Physical Therapy         Treatment/D/C Summary        Gideon Ross   MRN: [id number]     PT Received On: 19  Total Time (min): 90        Billable Minutes:  Gait [id number], Therapeutic Activity 10 and Therapeutic Exercise 60  Total Minutes: 90    Treatment Type: Treatment  PT/PTA: PT     PTA Visit Number: 0       General Precautions: Standard, fall  Orthopedic Precautions: Orthopedic Precautions : LLE non weight bearing     Subjective:  Communicated with nurse prior to session.    Pain/Comfort  Pain Rating 1: 5/10  Location - Side 1: Left  Location 1: thigh  Pain Addressed 1: Reposition, Distraction, Cessation of Activity  Pain Rating Post-Intervention 1: 510    Objective:  Patient found seated in w/c. Patient found with: (LLE  in place)    Functional Mobility:  Bed Mobility:   Supine to sit: Modified Independent   Sit to supine: Modified Independent   Rolling: Modified Independent     Transfer Training:  Sit to stand:Modified Independent with Rolling Walker and Grab bars  x 10  Bed <> Chair:  Stand Pivot with Modified Independent with No Assistive Device to/from  Car Transfer:  Stand Pivot with Modified Independent with No Assistive Device to/from    Wheelchair Training: > 300' mod I    Gait Trainin' with RW with mod I    Stair Training: ascend/descend 4 6" steps with SBA     ascend/descend4" curb with RW with SBA and cues    Additional Treatment:  STANDIN way hip arom x 30 reps x 2 sets each, emphasizing extension to stretch hip flexors  SciFit StepOne: L 4.5 x 15 min   SEATED: RLE: LAQ and hip flexion with 5#, R knee flexion with blue tband, x 30 reps each    Pt instructed to cont 2-3/daily above exercise program, emphasize abductor strengthening, and prevent hip flexion conracture, begin prone lying as regan.    Activity Tolerance:  Patient somewhat limited by fatigue and Patient limited by pain.    Patient left up in chair with call button in reach.    Rehab potential is good.    Activity " tolerance: Good    Discharge recommendations: Discharge Facility/Level of Care Needs: other (see comments)(Home) outpatient PT    Equipment recommendations: wheelchair, rolling walker for short distances only    GOALS:   Multidisciplinary Problems     Physical Therapy Goals        Problem: Physical Therapy Goal    Goal Priority Disciplines Outcome Goal Variances Interventions   Physical Therapy Goal     PT, PT/OT Ongoing (interventions implemented as appropriate)     Description:  Goals to be met by: 2019     Patient will increase functional independence with mobility by performin). Supine to sit with Modified Tama  2). Sit to supine with Modified Tama  3). Sit to stand transfer with Modified Tama  4). Bed to chair transfer with Modified Tama   5). Gait  x  > 150 feet with Modified Tama using Rolling Walker.   6). Wheelchair propulsion x > 300 feet with Modified Tama     ALL GOALS ACHIEVED                      PLAN:  Review HEP, safety.    Plan of Care expires: 19  Plan of Care reviewed with: patient         Joo HENDERSON Bhanu, PT 2019

## 2019-02-05 NOTE — CONSULTS
"  [facility]  Adult Nutrition  Consult Note    SUMMARY     Recommendations: 1) Continue consistent carbohydrate diet (4-5 carb choices) 2) Add Beneprotein, tid   Intervention: Modified carbohydrate diet.  Nutrition education.   Goals: Pt will consume >=85% EEN during admit  Nutrition Goal Status: new  Communication of RD Recs: discussed on rounds(POC)    Reason for Assessment    Reason For Assessment: consult  Diagnosis: diabetes diagnosis/complications(Debility s/p left AKA)  Relevant Medical History: DM, HTN  Interdisciplinary Rounds: attended  General Information Comments: Good appetite. Reports weight loss PTA.  No weight hx except 19 just before left AKA.  NFPE reveals mild fat loss in upper arm. Obese with no other muscle/fat loss noted. Education re diabetic nutrition and protein needs provided.  Pt receptive. Explained protein supplements.    Nutrition Discharge Planning: Diabetic 2000 calorie diet    Nutrition Risk Screen    Nutrition Risk Screen: no indicators present    Nutrition/Diet History    Patient Reported Diet/Restrictions/Preferences: general  Typical Food/Fluid Intake: 1 meal a day, sometimes 2.  Works as a cook in a hamburger restaurant.  Food Preferences: sweets, fruit juice (states he knows he needs to cut back or omit these items)  Spiritual, Cultural Beliefs, Holiness Practices, Values that Affect Care: no  Food Allergies: NKFA(or intolerances)  Factors Affecting Nutritional Intake: None identified at this time    Anthropometrics    Temp: 96.5 °F (35.8 °C)  Height Method: Stated  Height: 5' 9"  Height (inches): 69 in  Weight Method: Bed Scale  Weight: 108.9 kg (240 lb 1.3 oz)(Last available weight was prior to AKA. )  Weight (lb): 240.08 lb  Ideal Body Weight (IBW), Male: 160 lb  % Ideal Body Weight, Male (lb): 150.05 lb  BMI (Calculated): 35.5  Usual Body Weight (UBW), k.2 kg(19 prior to AKA)  % Usual Body Weight: 98.14  % Weight Change From Usual Weight: " -2.07 %  Amputation %: (Adjusted ; Adjusted current weight s/p  kg; BMI 39)       Lab/Procedures/Meds    Pertinent Labs Reviewed: reviewed  Pertinent Labs Comments: POCT 48, 71, 379, 268.  No A1c available. Albumin 3.0, WBC 13.9  Pertinent Medications Reviewed: reviewed  Pertinent Medications Comments: DM, HTN    Physical Findings/Assessment         Estimated/Assessed Needs    Weight Used For Calorie Calculations: 108.9 kg (240 lb 1.3 oz)  Energy Calorie Requirements (kcal): 1984 (No AF with obesity)  Energy Need Method: Ray-St Jeor  Protein Requirements:  (obesity, healing needs)  Weight Used For Protein Calculations: 108.9 kg (240 lb 1.3 oz)     Estimated Fluid Requirement Method: RDA Method(or per MD)  RDA Method (mL): 1984  CHO Requirement: 45-50% EEN      Nutrition Prescription Ordered    Current Diet Order: Consistent Carbohydrate, cardiac    Evaluation of Received Nutrient/Fluid Intake    Energy Calories Required: meeting needs  Protein Required: not meeting needs  Fluid Required: not meeting needs  Tolerance: tolerating  % Intake of Estimated Energy Needs: 75 - 100 %  % Meal Intake: 75 - 100 %    Nutrition Risk    Level of Risk/Frequency of Follow-up: (1 x wkly)     Assessment and Plan    Obesity    Contributing Nutrition Diagnosis  Excessive oral intake    Related to (etiology):   Lack of monitoring food intake    Signs and Symptoms (as evidenced by):   BMI adjusted for AKA 39    Interventions/Recommendations (treatment strategy):  1) Consistent carb diet limiting calories 2) Diabetic nutrition education provided    Nutrition Diagnosis Status:   New            Monitor and Evaluation    Food and Nutrient Intake: energy intake  Food and Nutrient Adminstration: diet order  Anthropometric Measurements: weight, weight change  Biochemical Data, Medical Tests and Procedures: glucose/endocrine profile, inflammatory profile  Nutrition-Focused Physical Findings: overall appearance, skin      Malnutrition Assessment  Malnutrition Type: (none identified)  Skin (Micronutrient): (Robles score 19, incision)                       Subcutaneous Fat Loss (Final Summary): well nourished  Muscle Loss Evaluation (Final Summary): well nourished         Nutrition Follow-Up  yes

## 2019-02-05 NOTE — ASSESSMENT & PLAN NOTE
Contributing Nutrition Diagnosis  Excessive oral intake    Related to (etiology):   Lack of monitoring food intake    Signs and Symptoms (as evidenced by):   BMI adjusted for AKA 39    Interventions/Recommendations (treatment strategy):  1) Consistent carb diet limiting calories 2) Diabetic nutrition education provided    Nutrition Diagnosis Status:   New

## 2019-02-05 NOTE — NURSING
Patient up in wheelchair. Stated pain woke him. Offered prn pain medication . Stated he wants something for anxiety, patient tearful . Prn Valium given. Will continue to monitor.

## 2019-02-05 NOTE — PLAN OF CARE
Problem: Adult Inpatient Plan of Care  Goal: Plan of Care Review  Outcome: Ongoing (interventions implemented as appropriate)  Plan of care reviewed with patient. Patient AA&O . Complained of phantom pain during night. Medicated for pain twice this shift with scheduled & prn pain medication , moderate relief obtained. L. AKA with stump  in place. R leg incision with staples clean, dry & intact. HS bg 268, covered with scheduled Levemir as per orders, no ss ordered at this time.Transferred from bed to wheelchair with standby assistance, tolerated well. Remains free from falls/injury. Instructed to call for assistance as needed during night. Verbalized understanding. Call light in reach. Bed alarm in use. Bed in low & locked position.

## 2019-02-05 NOTE — PROGRESS NOTES
Team conference attended and patient care discussed.  Met with patient and significant other to update on progress and discharge date.  Patient's preference for OP is [facility] in [place].  Referral sent to them awaiting response if patient's insurance is accepted.  Reviewed medications with patient to determine his prescription needs.  Patient requested assistance with setting up appointment with PCP.  First preference is Dr. Riggins.  Appointment scheduled with Dr. Riggins for 2/27/19 at 10:30.      DME order for w/c sent to [facility].  Will follow for discharge planning.

## 2019-02-05 NOTE — PROGRESS NOTES
Spoke with Rivka at [facility], w/c order received.  Order is processing.    Spoke with Nena at [facility], she they can accept patient however patient will have to pay up front and then send bill to insurance co.  Informed patient and he requested to have outpatient set up here at [facility].  Referral made to [facility] for PT, appointment scheduled for 2/12/19 at 10:00am.

## 2019-02-05 NOTE — PLAN OF CARE
Verbalizes willingness and ability to better manage diabetes now that he has medicaid.  Written information given on diabetes self management and resources available.

## 2019-02-05 NOTE — PROGRESS NOTES
[facility]  Physical Medicine & Rehab  Progress Note    Patient Name: Gideon Ross  MRN: [id number]  Patient Class: IP- Rehab   Admission Date: 1/31/2019  Length of Stay: 5 days  Attending Physician: Wolfgang Vogt MD    Subjective:     Principal Problem: s/p left AKA  Interval History: 2/5/2019:  Patient is seen for follow-up rehab evaluation and recommendations: pt is 43 y.o male ith dx of left AKA, participating with therapy    HPI, Past Medical, Family, and Social History remains the same as documented in the initial encounter.    Scheduled Medications:    amLODIPine  10 mg Oral Daily    aspirin  81 mg Oral Daily    atorvastatin  80 mg Oral Daily    baclofen  10 mg Oral TID    carvedilol  6.25 mg Oral BID    ciprofloxacin HCl  500 mg Oral Q12H    enoxaparin  40 mg Subcutaneous QHS    gabapentin  600 mg Oral TID    hydroCHLOROthiazide  50 mg Oral Daily    insulin aspart U-100  18 Units Subcutaneous TIDWM    insulin detemir U-100  35 Units Subcutaneous BID    lisinopril  40 mg Oral Daily    nicotine  1 patch Transdermal Daily    nortriptyline  25 mg Oral TID    quetiapine  100 mg Oral QHS    traMADol  50 mg Oral Q6H       PRN Medications: diazePAM, oxyCODONE, polyethylene glycol    Review of Systems   Constitutional: Negative.    HENT: Negative.    Eyes: Negative.    Respiratory: Negative.    Cardiovascular: Negative.    Gastrointestinal: Negative.    Endocrine: Negative.    Genitourinary: Negative.    Musculoskeletal: Negative.    Skin: Negative.    Allergic/Immunologic: Negative.    Neurological: Negative.    Hematological: Negative.    Psychiatric/Behavioral: Negative.      Objective:     Vital Signs (Most Recent):  Temp: 97 °F (36.1 °C) (02/05/19 0436)  Pulse: 110 (02/05/19 0436)  Resp: 18 (02/05/19 0436)  BP: 132/72 (02/05/19 0436)  SpO2: 100 % (02/05/19 0436)    Vital Signs (24h Range):  Temp:  [96 °F (35.6 °C)-97 °F (36.1 °C)] 97 °F (36.1 °C)  Pulse:  [110-126]  110  Resp:  [18] 18  SpO2:  [99 %-100 %] 100 %  BP: (124-132)/(71-72) 132/72     Physical Exam   Constitutional: He is oriented to person, place, and time. He appears well-developed and well-nourished. No distress.   HENT:   Head: Normocephalic and atraumatic.   Right Ear: External ear normal.   Left Ear: External ear normal.   Nose: Nose normal.   Mouth/Throat: Oropharynx is clear and moist. No oropharyngeal exudate.   Eyes: Conjunctivae and EOM are normal. Pupils are equal, round, and reactive to light. Right eye exhibits no discharge. Left eye exhibits no discharge. No scleral icterus.   Neck: Normal range of motion. Neck supple. No JVD present. No tracheal deviation present. No thyromegaly present.   Cardiovascular: Normal rate, regular rhythm, normal heart sounds and intact distal pulses. Exam reveals no gallop and no friction rub.   No murmur heard.  Pulmonary/Chest: Effort normal and breath sounds normal. No stridor. No respiratory distress. He has no wheezes. He has no rales. He exhibits no tenderness.   Abdominal: Soft. Bowel sounds are normal. He exhibits no distension. There is no tenderness. There is no rebound and no guarding.   Genitourinary:   Genitourinary Comments: deferred   Musculoskeletal: Normal range of motion. He exhibits no edema, tenderness or deformity.   Lymphadenopathy:     He has no cervical adenopathy.   Neurological: He is alert and oriented to person, place, and time. No cranial nerve deficit. He exhibits normal muscle tone. Coordination normal.   Skin: No rash noted. He is not diaphoretic. No erythema. No pallor.   Psychiatric: He has a normal mood and affect. His behavior is normal.     NEUROLOGICAL EXAMINATION:     MENTAL STATUS   Oriented to person, place, and time.     CRANIAL NERVES     CN III, IV, VI   Pupils are equal, round, and reactive to light.  Extraocular motions are normal.       Assessment/Plan:      Gideon Ross is a 43 y.o. male admitted to inpatient rehabilitation  on 1/31/2019 for <principal problem not specified> with impaired mobility and ADLs. Patient remains appropriate for PT, OT, and as required Speech therapy. Patient continues to require 24 hour nursing care as well as daily Physician assessment.    Active Diagnoses:    Diagnosis Date Noted POA    S/P AKA (above knee amputation) unilateral, left [Z89.612] 01/31/2019 Not Applicable      Problems Resolved During this Admission:     S/p right AKA, cont therapy  HTN, vitals noted, cont current med  DM, accu check noted, cont current med  DVT prophylaxis, cont sq Lovenox  Check lab   DISCHARGE PLANNING:  Tentative Discharge Date:     No future appointments.    Wolfgang Vogt MD  Department of Physical Medicine & Rehab  [facility]

## 2019-02-05 NOTE — PATIENT CARE CONFERENCE
Weekly Staffing Report      Date Admitted: 1/31/2019 :   Staffing Date: 2/5/2019     Patient Active Problem List   Diagnosis    S/P AKA (above knee amputation) unilateral, left          Team Members Present:  Physician Team Member: Dr Vogt  Nursing Team Member: Yaya Vasquez RN  Case Management Team Member: Disha Galaviz RN  PT Team Member: Joo Drummond PT  OT Team Member: Tiffany García OT  Other (Discipline and Name): Cleopatra Ventura RD    Nursing  Skin: Intact, incisions healing   Bowel: Continent  Bladder:continent  Last BM: 2-2-19  Diet: consistent carb   Appetite: good   Pain Level: 5/10    Physical Therapy  Supine to Sit:  modified independent  Sit to Stand: modified independent  Gait: 150-175 feet standy by assistance Rolling walker  Wheelchair Mobility: > 300 feet modified independent  ROM: within normal limits  Stairs:4 six inch steps  standy by assistance with 2 rails   Strength: left 3-/5, right wnl     Occupational Therapy  Feeding: modified independent  Grooming:modified independent  UED: independent  LED: standy by assistance  Bathing:contact guard   Toileting:contact guard  Toilet Transfer:  standy by assistance  Tub Transfer:  standy by assistance  Strength: bilateral 4 to 5/5          Summary of Progress:  good    Barriers to Progress/Discharge:      Tolerates 3 hours of therapy: Yes    Comments:      Estimated Length of Stay: 2-6-19

## 2019-02-06 VITALS
OXYGEN SATURATION: 100 % | WEIGHT: 240.06 LBS | BODY MASS INDEX: 35.56 KG/M2 | HEIGHT: 69 IN | SYSTOLIC BLOOD PRESSURE: 134 MMHG | DIASTOLIC BLOOD PRESSURE: 71 MMHG | HEART RATE: 112 BPM | TEMPERATURE: 98 F | RESPIRATION RATE: 18 BRPM

## 2019-02-06 LAB
ALBUMIN SERPL BCP-MCNC: 3.3 G/DL
ALP SERPL-CCNC: 96 U/L
ALT SERPL W/O P-5'-P-CCNC: 28 U/L
ANION GAP SERPL CALC-SCNC: 9 MMOL/L
AST SERPL-CCNC: 23 U/L
BASOPHILS # BLD AUTO: 0.2 K/UL
BASOPHILS NFR BLD: 1.2 %
BILIRUB SERPL-MCNC: 0.4 MG/DL
BUN SERPL-MCNC: 36 MG/DL
CALCIUM SERPL-MCNC: 9.8 MG/DL
CHLORIDE SERPL-SCNC: 99 MMOL/L
CO2 SERPL-SCNC: 25 MMOL/L
CREAT SERPL-MCNC: 1.1 MG/DL
DIFFERENTIAL METHOD: ABNORMAL
EOSINOPHIL # BLD AUTO: 0.7 K/UL
EOSINOPHIL NFR BLD: 5.4 %
ERYTHROCYTE [DISTWIDTH] IN BLOOD BY AUTOMATED COUNT: 15.6 %
EST. GFR  (AFRICAN AMERICAN): >60 ML/MIN/1.73 M^2
EST. GFR  (NON AFRICAN AMERICAN): >60 ML/MIN/1.73 M^2
GLUCOSE SERPL-MCNC: 282 MG/DL
HCT VFR BLD AUTO: 28.9 %
HGB BLD-MCNC: 9.6 G/DL
LYMPHOCYTES # BLD AUTO: 2.6 K/UL
LYMPHOCYTES NFR BLD: 18.6 %
MCH RBC QN AUTO: 28.6 PG
MCHC RBC AUTO-ENTMCNC: 33.3 G/DL
MCV RBC AUTO: 86 FL
MONOCYTES # BLD AUTO: 1.3 K/UL
MONOCYTES NFR BLD: 9.4 %
NEUTROPHILS # BLD AUTO: 9.1 K/UL
NEUTROPHILS NFR BLD: 65.4 %
PLATELET # BLD AUTO: 566 K/UL
PMV BLD AUTO: 7.1 FL
POCT GLUCOSE: 122 MG/DL (ref 70–110)
POCT GLUCOSE: 291 MG/DL (ref 70–110)
POTASSIUM SERPL-SCNC: 4.6 MMOL/L
PROT SERPL-MCNC: 7.5 G/DL
RBC # BLD AUTO: 3.36 M/UL
SODIUM SERPL-SCNC: 133 MMOL/L
WBC # BLD AUTO: 13.9 K/UL

## 2019-02-06 PROCEDURE — 25000003 PHARM REV CODE 250: Performed by: PHYSICAL MEDICINE & REHABILITATION

## 2019-02-06 PROCEDURE — 80053 COMPREHEN METABOLIC PANEL: CPT

## 2019-02-06 PROCEDURE — 36415 COLL VENOUS BLD VENIPUNCTURE: CPT

## 2019-02-06 PROCEDURE — 85025 COMPLETE CBC W/AUTO DIFF WBC: CPT

## 2019-02-06 RX ORDER — OXYCODONE HYDROCHLORIDE 5 MG/1
5 TABLET ORAL EVERY 12 HOURS PRN
Qty: 30 TABLET | Refills: 0 | Status: SHIPPED | OUTPATIENT
Start: 2019-02-06

## 2019-02-06 RX ORDER — DIAZEPAM 5 MG/1
5 TABLET ORAL EVERY 12 HOURS PRN
Qty: 30 TABLET | Refills: 0 | Status: SHIPPED | OUTPATIENT
Start: 2019-02-06 | End: 2019-03-08

## 2019-02-06 RX ORDER — NORTRIPTYLINE HYDROCHLORIDE 25 MG/1
25 CAPSULE ORAL 3 TIMES DAILY
Qty: 30 CAPSULE | Refills: 1 | Status: SHIPPED | OUTPATIENT
Start: 2019-02-06 | End: 2020-02-06

## 2019-02-06 RX ORDER — GABAPENTIN 300 MG/1
600 CAPSULE ORAL 3 TIMES DAILY
Qty: 90 CAPSULE | Refills: 1 | Status: SHIPPED | OUTPATIENT
Start: 2019-02-06 | End: 2020-02-06

## 2019-02-06 RX ORDER — CARVEDILOL 6.25 MG/1
6.25 TABLET ORAL 2 TIMES DAILY
Qty: 60 TABLET | Refills: 1 | Status: SHIPPED | OUTPATIENT
Start: 2019-02-06 | End: 2020-02-06

## 2019-02-06 RX ORDER — LISINOPRIL 40 MG/1
40 TABLET ORAL DAILY
Qty: 30 TABLET | Refills: 1 | Status: SHIPPED | OUTPATIENT
Start: 2019-02-06 | End: 2020-02-06

## 2019-02-06 RX ORDER — HYDROCHLOROTHIAZIDE 50 MG/1
50 TABLET ORAL DAILY
Qty: 30 TABLET | Refills: 1 | Status: SHIPPED | OUTPATIENT
Start: 2019-02-06 | End: 2020-02-06

## 2019-02-06 RX ORDER — BACLOFEN 10 MG/1
10 TABLET ORAL 3 TIMES DAILY
Qty: 90 TABLET | Refills: 1 | Status: SHIPPED | OUTPATIENT
Start: 2019-02-06 | End: 2020-02-06

## 2019-02-06 RX ORDER — INSULIN ASPART 100 [IU]/ML
18 INJECTION, SOLUTION INTRAVENOUS; SUBCUTANEOUS 3 TIMES DAILY
Qty: 10 ML | Refills: 1 | Status: SHIPPED | OUTPATIENT
Start: 2019-02-06 | End: 2020-02-06

## 2019-02-06 RX ORDER — POLYETHYLENE GLYCOL 3350 17 G/17G
17 POWDER, FOR SOLUTION ORAL DAILY PRN
Qty: 30 EACH | Refills: 1 | Status: SHIPPED | OUTPATIENT
Start: 2019-02-06

## 2019-02-06 RX ORDER — ASPIRIN 81 MG/1
81 TABLET ORAL DAILY
Qty: 30 TABLET | Refills: 1 | Status: SHIPPED | OUTPATIENT
Start: 2019-02-06 | End: 2020-02-06

## 2019-02-06 RX ORDER — AMLODIPINE BESYLATE 10 MG/1
10 TABLET ORAL DAILY
Qty: 30 TABLET | Refills: 1 | Status: SHIPPED | OUTPATIENT
Start: 2019-02-06 | End: 2020-02-06

## 2019-02-06 RX ORDER — IBUPROFEN 200 MG
1 TABLET ORAL DAILY
Qty: 15 PATCH | Refills: 0 | Status: SHIPPED | OUTPATIENT
Start: 2019-02-06

## 2019-02-06 RX ORDER — ATORVASTATIN CALCIUM 80 MG/1
80 TABLET, FILM COATED ORAL DAILY
Qty: 30 TABLET | Refills: 1 | Status: SHIPPED | OUTPATIENT
Start: 2019-02-06 | End: 2020-02-06

## 2019-02-06 RX ORDER — QUETIAPINE FUMARATE 100 MG/1
100 TABLET, FILM COATED ORAL NIGHTLY
Qty: 30 TABLET | Refills: 1 | Status: SHIPPED | OUTPATIENT
Start: 2019-02-06 | End: 2020-02-06

## 2019-02-06 RX ADMIN — CARVEDILOL 6.25 MG: 6.25 TABLET, FILM COATED ORAL at 09:02

## 2019-02-06 RX ADMIN — HYDROCHLOROTHIAZIDE 50 MG: 12.5 TABLET ORAL at 09:02

## 2019-02-06 RX ADMIN — GABAPENTIN 600 MG: 300 CAPSULE ORAL at 09:02

## 2019-02-06 RX ADMIN — ATORVASTATIN CALCIUM 80 MG: 40 TABLET, FILM COATED ORAL at 09:02

## 2019-02-06 RX ADMIN — AMLODIPINE BESYLATE 10 MG: 5 TABLET ORAL at 09:02

## 2019-02-06 RX ADMIN — OXYCODONE HYDROCHLORIDE 5 MG: 5 TABLET ORAL at 09:02

## 2019-02-06 RX ADMIN — INSULIN ASPART 18 UNITS: 100 INJECTION, SOLUTION INTRAVENOUS; SUBCUTANEOUS at 09:02

## 2019-02-06 RX ADMIN — LISINOPRIL 40 MG: 40 TABLET ORAL at 09:02

## 2019-02-06 RX ADMIN — NORTRIPTYLINE HYDROCHLORIDE 25 MG: 25 CAPSULE ORAL at 09:02

## 2019-02-06 RX ADMIN — DIAZEPAM 5 MG: 5 TABLET ORAL at 06:02

## 2019-02-06 RX ADMIN — OXYCODONE HYDROCHLORIDE 5 MG: 5 TABLET ORAL at 02:02

## 2019-02-06 RX ADMIN — BACLOFEN 10 MG: 10 TABLET ORAL at 09:02

## 2019-02-06 RX ADMIN — ASPIRIN 81 MG: 81 TABLET, COATED ORAL at 09:02

## 2019-02-06 NOTE — PLAN OF CARE
Problem: Adult Inpatient Plan of Care  Goal: Plan of Care Review  Outcome: Ongoing (interventions implemented as appropriate)  Patient awake/alert. Medicated x 2 this shift for pain. Free from falls. Plan of care continued

## 2019-02-06 NOTE — PROGRESS NOTES
Call received from Ramy at [facility]pe has been approved and will be delivered to patient's hospital room today.  She has noted the request to deliver by noon.

## 2019-02-06 NOTE — PROGRESS NOTES
Reviewed follow up appointments with patient and significant other.  Reviewed outpatient PT appointment, PCP appointment, neuro appointment at [facility] and need to schedule follow up with vascular surgeon at [facility]. Patient and significant other verbalized understanding and patient stated he would schedule his appointment with vascular surgeon, he did not want case management to schedule for him.

## 2019-02-06 NOTE — DISCHARGE INSTRUCTIONS
You have been referred to [facility] for Physical Therapy.  Your first appointment is on Tuesday 2/12/19 at 10:00am.  Please arrive 15 minutes early for your first appointment.  Their contact number is [phone].    [facility] will deliver your wheelchair.  Their contact number is [phone].      Call and make appointment with [facility] vascular surgery for follow up appointment today, notify vascular department immediately for any redness swelling drainage or fever. Take medications to physician appointments, smoking cessation encouraged,

## 2019-02-07 NOTE — DISCHARGE SUMMARY
DATE OF ADMISSION:  01/31/2019    DATE OF DISCHARGE:  02/06/2019    ATTENDING PHYSICIAN:  Dr. Vogt.    ADMISSION DIAGNOSES:  1.  Status post left below knee amputation.  2.  Status post failed left below knee amputation.  3.  Status post failed lower extremity bypass.  4.  History of peripheral vascular disease.  5.  History of lower limb ischemia.  6.  History of hypertension.  7.  History of diabetes.  8.  History of tobacco abuse.  9.  History of anxiety.  10.  History of depression.    DISCHARGE DIAGNOSES:  As above.    CONDITION:  Stable.    ACTIVITY:  As tolerated, fall precaution.    HOSPITAL COURSE:  Mr. Ross is a pleasant 43-year-old gentleman whom during   the course of this hospitalization has overall remained motivated, has   participated in all aspects of his therapy and has made significant progress,   although the patient has the opportunity to stay for further inpatient   rehabilitation; however, he dismissed that, he wished to return to his community   environment at home as he is ambitious to get back with his life.  However, the   patient will be arranged for outpatient therapy as he continues to benefit from   further treatment.  Otherwise, his surgical incision is healing well, half of   every other incisions were removed.  The patient declined removal of the   remaining staples as he intends to follow up with surgeon for further evaluation   and removal of the remaining staples.  He is continent of bowel and bladder.    His diet is consistent of carb.  His appetite is good.  Pain is 5/10.  He is at   modified independent for supine to sit, modified independent for sit to stand.    He is ambulating 150-175 feet at standby assist with use of rolling walker.  He   is able to propel his wheelchair for greater than 300 feet at modified   independent level.  His range of motion is within normal limits. He is able to   negotiate four 6 inches steps at standby assist with use of 2 rails.  His    strengths for left lower extremity is 3-/5, for right lower extremity is within   normal limits.  He is at modified independent for feeding and grooming,   independent for upper body dressing, standby assist for lower body dressing,   contact guard assist for bathing and toileting, standby assist for toilet   transfer and tub transfer.  His strengths for bilateral upper extremities are 4   to 5/5.    CURRENT MEDICATIONS:  Include amlodipine 10 mg 1 p.o. daily, aspirin 81 mg 1   p.o. daily, atorvastatin 80 mg 1 p.o. daily, baclofen 10 mg one p.o. t.i.d.,   carvedilol 6.25 mg 1 p.o. b.i.d., gabapentin 600 mg 1 p.o. t.i.d.,   hydrochlorothiazide 50 mg 1 p.o. daily, insulin aspart 18 units subQ t.i.d.,   insulin detemir 35 units subQ b.i.d., lisinopril 40 mg 1 p.o. daily, Nicoderm 14   mg for 24 hour 1 patch transdermal daily, nortriptyline 25 mg 1 p.o. t.i.d.,   quetiapine 100 mg one p.o. at bedtime, diazepam 5 mg p.o. q. 12 hours p.r.n.   anxiety, oxycodone 5 mg 1 p.o. q. 12 hours p.r.n. pain and polyethylene glycol   17 g p.o. daily p.r.n. constipation.    FOLLOWUP:  1.  Primary care M.D.  2.  Surgeon.  3.  Endocrine.  D/c destination : home with family    AV/HN  dd: 02/06/2019 09:21:02 (CST)  td: 02/06/2019 19:28:17 (CST)  Doc ID   #[id number]  Job ID #[id number]    CC:

## 2019-02-27 ENCOUNTER — HOSPITAL ENCOUNTER (EMERGENCY)
Facility: HOSPITAL | Age: 44
Discharge: HOME | End: 2019-02-27
Attending: EMERGENCY MEDICINE
Payer: MEDICAID

## 2019-02-27 VITALS
SYSTOLIC BLOOD PRESSURE: 126 MMHG | BODY MASS INDEX: 35.44 KG/M2 | WEIGHT: 240 LBS | OXYGEN SATURATION: 100 % | DIASTOLIC BLOOD PRESSURE: 63 MMHG | TEMPERATURE: 99 F | HEART RATE: 100 BPM | RESPIRATION RATE: 18 BRPM

## 2019-02-27 DIAGNOSIS — G89.18 POSTOPERATIVE PAIN: Primary | ICD-10-CM

## 2019-02-27 PROCEDURE — 99283 EMERGENCY DEPT VISIT LOW MDM: CPT

## 2019-02-27 RX ORDER — CEPHALEXIN 500 MG/1
500 CAPSULE ORAL 4 TIMES DAILY
Qty: 28 CAPSULE | Refills: 0 | Status: SHIPPED | OUTPATIENT
Start: 2019-02-27 | End: 2019-03-06

## 2019-02-27 NOTE — ED PROVIDER NOTES
Encounter Date: 2/27/2019    SCRIBE #1 NOTE: I, Cally Alejo, am scribing for, and in the presence of, Dr. Joseph.       History     Chief Complaint   Patient presents with    Surgical wound opening up       Time seen by provider: 2:12 PM on 02/27/2019    Gideon Ross is a 43 y.o. male with a SHx of a recent AKA who presents to the ED complaining of a surgical wound infection. Pt states that he fell on his surgical staples 4-5 days ago. Pt notes that the wound is becoming more painful and has developed a smell. He saw his PCP, Dr. Riggins, who referred him to the ED for antibiotics. According to vascular surgery note by Dr. Amaro 1 week ago (2/19/2019), pt initially presented to [facility] with ischemia to his lower left extremity and was treated with thrombolysis, which was complicated by an embolectomy, followed up by a feb-tibial bypass which subsequently thrombosed requiring a BKA which did not heal, followed by an AKA. Pt is scheduled for a surgical follow-up in 2 weeks. The patient presently denies fever, or any other symptoms at this time. Other PMHx includes DM and HTN. Pt is a current smoker.      The history is provided by the patient and medical records.     Review of patient's allergies indicates:  No Known Allergies  Past Medical History:   Diagnosis Date    Diabetes mellitus     Hypertension      Past Surgical History:   Procedure Laterality Date    SKIN GRAFT       History reviewed. No pertinent family history.  Social History     Tobacco Use    Smoking status: Current Every Day Smoker     Packs/day: 1.00     Types: Cigarettes   Substance Use Topics    Alcohol use: Yes    Drug use: No     Review of Systems   Constitutional: Negative for fever.   HENT: Negative for sore throat.    Respiratory: Negative for shortness of breath.    Cardiovascular: Negative for chest pain.   Gastrointestinal: Negative for nausea.   Genitourinary: Negative for dysuria.   Musculoskeletal: Negative for back pain.    Skin: Positive for wound (surgical wound, LLE). Negative for rash.   Neurological: Negative for weakness.   Hematological: Does not bruise/bleed easily.       Physical Exam     Initial Vitals [02/27/19 1346]   BP Pulse Resp Temp SpO2   126/63 100 18 98.8 °F (37.1 °C) 100 %      MAP       --         Physical Exam    Nursing note and vitals reviewed.  Constitutional: He appears well-developed and well-nourished. He is not diaphoretic. No distress.   HENT:   Head: Normocephalic and atraumatic.   Mouth/Throat: Oropharynx is clear and moist.   Eyes: Conjunctivae are normal.   Neck: Neck supple.   Cardiovascular: Normal rate, regular rhythm, normal heart sounds and intact distal pulses. Exam reveals no gallop and no friction rub.    No murmur heard.  Pulmonary/Chest: Breath sounds normal. He has no wheezes. He has no rhonchi. He has no rales.   Abdominal: Soft. He exhibits no distension. There is no tenderness.   Musculoskeletal: Normal range of motion. He exhibits no tenderness.   Intact stapled left AKA.   Neurological: He is alert and oriented to person, place, and time.   Skin: No rash noted. There is erythema.   Focal area of erythema overlying steri-strips with intact staples. No overlying drainage. No tenderness, crepius, or fluctuance.          ED Course   Procedures  Labs Reviewed - No data to display       Imaging Results    None          Medical Decision Making:   History:   Old Medical Records: I decided to obtain old medical records.            Scribe Attestation:   Scribe #1: I performed the above scribed service and the documentation accurately describes the services I performed. I attest to the accuracy of the note.    I, Dr. Delfino Joseph, personally performed the services described in this documentation. All medical record entries made by the scribe were at my direction and in my presence.  I have reviewed the chart and agree that the record reflects my personal performance and is accurate and  complete. Delfino Joseph MD.  10:29 PM 02/27/2019    Gideon Ross is a 43 y.o. male presenting with postoperative pain to intact left AKA site.  There is no sign of dehiscence as suggested in triage note.  He has minimal erythema and no drainage.  Possibility of infection discussed here.  Patient did leave against medical advice prior to my ability to talk to his surgeons who stated they would coordinate close follow up by calling him.  I had already discussed close follow-up this week with prescription for antibiotics pending close follow-up as I had ultimately discussed with the surgeon after his departure.    Advised patient that they need to remain for further workup the.  Patient refuses to remain.  Patient is alert and oriented to person, place, and situation.  I explained the risks of worsening condition possibly leading to death in layman's terms to the patient.  Patient voices these risks back to me.   Patient is not altered and is not under the influence.  Patient is welcome to return to the hospital at any time if he chooses to do so.  I will discharge the patient AGAINST MEDICAL ADVICE.        ED Course as of Feb 27 2230 Wed Feb 27, 2019   1423 Awaiting return call from vascular surgery [facility] in [place], Dr. Beltran team.  [MR]   1507 Patient updated after AMA process regarding close f/u d/w vascular surgery to be coordinated.  Patient already has departed.  Agree with d/c and abx for now pending follow up.  No requests for additional ED tests or transfer.  [MR]      ED Course User Index  [MR] Delfino Joseph MD     Clinical Impression:       ICD-10-CM ICD-9-CM   1. Postoperative pain G89.18 338.18         Disposition:   Disposition: AMA                        Delfino Joseph MD  02/27/19 2230

## 2019-02-27 NOTE — DISCHARGE INSTRUCTIONS
It is possible although unclear if your wound is infected.  You have chosen to leave before your evaluation is complete.

## 2019-02-27 NOTE — ED NOTES
Pt presents to ED POV from home with c/o infection to his surgical wound. Pt reports that he had a left above the knee amputation done at [facility] on 1/17/19. Pt now reports redness and foul smell. Redness noted. Pt is AAOx4. Skin warm, dry to touch. Respirations even, nonlabored. NAD noted. Pt is calm, cooperative. Family at bedside.

## 2019-02-27 NOTE — LETTER
Patient: ANSHUL Ross  YOB: 1975  Date: 2/27/2019 Time: 3:01 PM  Location: [facility]    Leaving the [facility] Against Medical Advice    Chart #:[id number]    This will certify that I, the undersigned,    ______________________________________________________________________    A patient in the above named medical center, having requested discharge and removal from the medical [place] against the advice of my attending physician(s), hereby release [facility], its physicians, officers and employees, severally and individually, from any and all liability of any nature whatsoever for any injury or harm or complication of any kind that may result directly or indirectly, by reason of my terminating my stay as a patient at [facility] and my departure from [facility], and hereby waive any and all rights of action I may now have or later acquire as a result of my voluntary departure from [facility] and the termination of my stay as a patient therein.    This release is made with the full knowledge of the danger that may result from the action which I am taking.      Date:_______________________                         ___________________________                                                                                    Patient/Legal Representative    Witness:        ____________________________                          ___________________________  Nurse                                                                        Physician

## 2019-02-27 NOTE — ED NOTES
AMA form signed by pt, myself, and Dr. Joseph. Risks of leaving AMA explained to pt by Dr. Joseph.

## 2019-08-06 ENCOUNTER — HOSPITAL ENCOUNTER (EMERGENCY)
Facility: HOSPITAL | Age: 44
Discharge: OTHER INSTITUTION | End: 2019-08-07
Attending: EMERGENCY MEDICINE
Payer: MEDICAID

## 2019-08-06 DIAGNOSIS — R60.9 SWELLING: ICD-10-CM

## 2019-08-06 DIAGNOSIS — I70.90 ARTERIAL OCCLUSION: Primary | ICD-10-CM

## 2019-08-06 PROCEDURE — 96372 THER/PROPH/DIAG INJ SC/IM: CPT | Mod: 59

## 2019-08-06 PROCEDURE — 99284 EMERGENCY DEPT VISIT MOD MDM: CPT | Mod: 25

## 2019-08-06 PROCEDURE — 96365 THER/PROPH/DIAG IV INF INIT: CPT

## 2019-08-06 PROCEDURE — 96375 TX/PRO/DX INJ NEW DRUG ADDON: CPT

## 2019-08-07 VITALS
BODY MASS INDEX: 37.03 KG/M2 | RESPIRATION RATE: 18 BRPM | OXYGEN SATURATION: 97 % | DIASTOLIC BLOOD PRESSURE: 60 MMHG | HEIGHT: 69 IN | SYSTOLIC BLOOD PRESSURE: 111 MMHG | TEMPERATURE: 98 F | HEART RATE: 104 BPM | WEIGHT: 250 LBS

## 2019-08-07 LAB
ALBUMIN SERPL BCP-MCNC: 4.1 G/DL (ref 3.5–5.2)
ALP SERPL-CCNC: 93 U/L (ref 55–135)
ALT SERPL W/O P-5'-P-CCNC: 37 U/L (ref 10–44)
ANION GAP SERPL CALC-SCNC: 10 MMOL/L (ref 8–16)
AST SERPL-CCNC: 19 U/L (ref 10–40)
BASOPHILS # BLD AUTO: 0.11 K/UL (ref 0–0.2)
BASOPHILS NFR BLD: 0.8 % (ref 0–1.9)
BILIRUB SERPL-MCNC: 0.6 MG/DL (ref 0.1–1)
BUN SERPL-MCNC: 33 MG/DL (ref 6–20)
CALCIUM SERPL-MCNC: 9.4 MG/DL (ref 8.7–10.5)
CHLORIDE SERPL-SCNC: 101 MMOL/L (ref 95–110)
CO2 SERPL-SCNC: 22 MMOL/L (ref 23–29)
CREAT SERPL-MCNC: 1.2 MG/DL (ref 0.5–1.4)
DIFFERENTIAL METHOD: ABNORMAL
EOSINOPHIL # BLD AUTO: 0.7 K/UL (ref 0–0.5)
EOSINOPHIL NFR BLD: 5 % (ref 0–8)
ERYTHROCYTE [DISTWIDTH] IN BLOOD BY AUTOMATED COUNT: 14.4 % (ref 11.5–14.5)
EST. GFR  (AFRICAN AMERICAN): >60 ML/MIN/1.73 M^2
EST. GFR  (NON AFRICAN AMERICAN): >60 ML/MIN/1.73 M^2
GLUCOSE SERPL-MCNC: 179 MG/DL (ref 70–110)
HCT VFR BLD AUTO: 38.5 % (ref 40–54)
HGB BLD-MCNC: 13 G/DL (ref 14–18)
IMM GRANULOCYTES # BLD AUTO: 0.07 K/UL (ref 0–0.04)
IMM GRANULOCYTES NFR BLD AUTO: 0.5 % (ref 0–0.5)
INR PPP: 1
LYMPHOCYTES # BLD AUTO: 4 K/UL (ref 1–4.8)
LYMPHOCYTES NFR BLD: 29.4 % (ref 18–48)
MCH RBC QN AUTO: 29.3 PG (ref 27–31)
MCHC RBC AUTO-ENTMCNC: 33.8 G/DL (ref 32–36)
MCV RBC AUTO: 87 FL (ref 82–98)
MONOCYTES # BLD AUTO: 1.2 K/UL (ref 0.3–1)
MONOCYTES NFR BLD: 8.8 % (ref 4–15)
NEUTROPHILS # BLD AUTO: 7.6 K/UL (ref 1.8–7.7)
NEUTROPHILS NFR BLD: 55.5 % (ref 38–73)
NRBC BLD-RTO: 0 /100 WBC
PLATELET # BLD AUTO: 305 K/UL (ref 150–350)
PMV BLD AUTO: 10 FL (ref 9.2–12.9)
POTASSIUM SERPL-SCNC: 3.9 MMOL/L (ref 3.5–5.1)
PROT SERPL-MCNC: 8 G/DL (ref 6–8.4)
PROTHROMBIN TIME: 12.5 SEC (ref 11.7–14)
RBC # BLD AUTO: 4.44 M/UL (ref 4.6–6.2)
SODIUM SERPL-SCNC: 133 MMOL/L (ref 136–145)
WBC # BLD AUTO: 13.72 K/UL (ref 3.9–12.7)

## 2019-08-07 PROCEDURE — 63600175 PHARM REV CODE 636 W HCPCS: Performed by: EMERGENCY MEDICINE

## 2019-08-07 PROCEDURE — 85025 COMPLETE CBC W/AUTO DIFF WBC: CPT

## 2019-08-07 PROCEDURE — 80053 COMPREHEN METABOLIC PANEL: CPT

## 2019-08-07 PROCEDURE — 85610 PROTHROMBIN TIME: CPT

## 2019-08-07 RX ORDER — ACETAMINOPHEN 10 MG/ML
1000 INJECTION, SOLUTION INTRAVENOUS ONCE
Status: DISCONTINUED | OUTPATIENT
Start: 2019-08-07 | End: 2019-08-07

## 2019-08-07 RX ORDER — ACETAMINOPHEN 10 MG/ML
1000 INJECTION, SOLUTION INTRAVENOUS EVERY 8 HOURS
Status: DISCONTINUED | OUTPATIENT
Start: 2019-08-07 | End: 2019-08-07

## 2019-08-07 RX ORDER — MORPHINE SULFATE 4 MG/ML
4 INJECTION, SOLUTION INTRAMUSCULAR; INTRAVENOUS
Status: COMPLETED | OUTPATIENT
Start: 2019-08-07 | End: 2019-08-07

## 2019-08-07 RX ORDER — ENOXAPARIN SODIUM 100 MG/ML
1 INJECTION SUBCUTANEOUS
Status: COMPLETED | OUTPATIENT
Start: 2019-08-07 | End: 2019-08-07

## 2019-08-07 RX ORDER — ACETAMINOPHEN 10 MG/ML
1000 INJECTION, SOLUTION INTRAVENOUS ONCE
Status: COMPLETED | OUTPATIENT
Start: 2019-08-07 | End: 2019-08-07

## 2019-08-07 RX ADMIN — MORPHINE SULFATE 4 MG: 4 INJECTION INTRAVENOUS at 01:08

## 2019-08-07 RX ADMIN — ACETAMINOPHEN 1000 MG: 10 INJECTION, SOLUTION INTRAVENOUS at 12:08

## 2019-08-07 RX ADMIN — ENOXAPARIN SODIUM 110 MG: 30 INJECTION SUBCUTANEOUS at 01:08

## 2019-08-07 NOTE — ED PROVIDER NOTES
Encounter Date: 8/6/2019       History     Chief Complaint   Patient presents with    Leg Pain    Numbness     43-year-old male presents complaining of lower extremity swelling on the right as well as pain patient describes pain as sharp and rates it as 8/10 patient localizes pain to the calf and lower leg, patient has a history of blood clots, patient is status post above the Accutane should at the left leg, patient lost this leg secondary to extensive blood clots.  Patient has a history of diabetes and hypertension patient has no other acute complaints at this time        Review of patient's allergies indicates:  No Known Allergies  Past Medical History:   Diagnosis Date    Diabetes mellitus     Hypertension      Past Surgical History:   Procedure Laterality Date    SKIN GRAFT       No family history on file.  Social History     Tobacco Use    Smoking status: Current Every Day Smoker     Packs/day: 1.00     Types: Cigarettes   Substance Use Topics    Alcohol use: Yes    Drug use: No     Review of Systems   Constitutional: Negative for fever.   HENT: Negative for congestion, rhinorrhea, sore throat and trouble swallowing.    Eyes: Negative for visual disturbance.   Respiratory: Negative for cough, chest tightness, shortness of breath and wheezing.    Cardiovascular: Negative for chest pain, palpitations and leg swelling.   Gastrointestinal: Negative for abdominal distention, abdominal pain, constipation, diarrhea, nausea and vomiting.   Genitourinary: Negative for difficulty urinating, dysuria, flank pain and frequency.   Musculoskeletal: Negative for arthralgias, back pain, joint swelling and neck pain.        Lower leg pain on the right   Skin: Negative for color change and rash.   Neurological: Negative for dizziness, syncope, speech difficulty, weakness, numbness and headaches.   All other systems reviewed and are negative.      Physical Exam     Initial Vitals [08/06/19 2128]   BP Pulse Resp Temp SpO2    128/74 (!) 116 18 98.3 °F (36.8 °C) 99 %      MAP       --         Physical Exam    Nursing note and vitals reviewed.  Constitutional: He appears well-developed and well-nourished. He is not diaphoretic. No distress.   HENT:   Head: Normocephalic and atraumatic.   Right Ear: External ear normal.   Left Ear: External ear normal.   Nose: Nose normal.   Mouth/Throat: Oropharynx is clear and moist. No oropharyngeal exudate.   Eyes: Conjunctivae and EOM are normal. Pupils are equal, round, and reactive to light. Right eye exhibits no discharge. Left eye exhibits no discharge. No scleral icterus.   Neck: Normal range of motion. Neck supple. No thyromegaly present. No tracheal deviation present. No JVD present.   Cardiovascular: Normal rate, regular rhythm, normal heart sounds and intact distal pulses. Exam reveals no gallop and no friction rub.    No murmur heard.  Pulmonary/Chest: Breath sounds normal. No stridor. No respiratory distress. He has no wheezes. He has no rhonchi. He has no rales. He exhibits no tenderness.   Abdominal: Soft. Bowel sounds are normal. He exhibits no distension and no mass. There is no tenderness. There is no rebound and no guarding.   Musculoskeletal: Normal range of motion. He exhibits tenderness. He exhibits no edema.   Patient has tenderness and swelling to the right lower extremity, 1+ pulses, full range of motion noted, cap refill less 3 sec   Lymphadenopathy:     He has no cervical adenopathy.   Neurological: He is alert and oriented to person, place, and time. He has normal strength and normal reflexes. He displays normal reflexes. No cranial nerve deficit or sensory deficit.   Skin: Skin is warm and dry. No rash noted. No erythema.         ED Course   Procedures  Labs Reviewed   CBC W/ AUTO DIFFERENTIAL - Abnormal; Notable for the following components:       Result Value    WBC 13.72 (*)     RBC 4.44 (*)     Hemoglobin 13.0 (*)     Hematocrit 38.5 (*)     Immature Grans (Abs) 0.07  (*)     Mono # 1.2 (*)     Eos # 0.7 (*)     All other components within normal limits   COMPREHENSIVE METABOLIC PANEL - Abnormal; Notable for the following components:    Sodium 133 (*)     CO2 22 (*)     Glucose 179 (*)     BUN, Bld 33 (*)     All other components within normal limits   PROTIME-INR          Imaging Results          US Lower Extremity Veins Right (In process)                US Lower Extremity Arteries Right (In process)                  Medical Decision Making:   History:   Old Medical Records: I decided to obtain old medical records.  Initial Assessment:   Emergent evaluation of a 43 year old male presenting with right lower extremity swelling and pain differential diagnosis includes DVT, infection, soft tissue injury              Attending Attestation:             Attending ED Notes:   Patient found to have occlusions of multiple arterial vessels in the right lower extremity, posterior tibial artery is patent and patient does have 1+ pulses noted here, patient's pain is controlled patient is started on a course of Lovenox and patient consult it for transfer to [facility] where his surgeons are.  Patient accepted for transfer by doctor Nix.               Clinical Impression:       ICD-10-CM ICD-9-CM   1. Arterial occlusion I70.90 444.9   2. Swelling R60.9 782.3                                Tomi Sandoval MD  08/07/19 0317

## 2019-08-07 NOTE — ED NOTES
"Pt demanding to go outside to smoke, pt informed he is not allowed to go outside w/ IV in place pt demand to remove his IV, pt educated on the importance to stop smoking and that smoking is causing the blood clots, pt is agitated does not want to listen instead yelling at nurse stating "They will not take my other leg!" MD informed  "

## 2019-08-07 NOTE — ED NOTES
"42 y/o M c/o pain and numbness to radiating down R leg, reporting "it feeling the same as feeling that caused him to have his other leg amputated" pt denies headache, blurred vision, fever, chills, N/V/D  "

## 2019-09-27 ENCOUNTER — LAB VISIT (OUTPATIENT)
Dept: LAB | Facility: HOSPITAL | Age: 44
End: 2019-09-27
Attending: INTERNAL MEDICINE
Payer: MEDICAID

## 2019-09-27 DIAGNOSIS — I43 DILATED CARDIOMYOPATHY SECONDARY TO ELECTROLYTE DEFICIENCY: Primary | ICD-10-CM

## 2019-09-27 DIAGNOSIS — E87.8 DILATED CARDIOMYOPATHY SECONDARY TO ELECTROLYTE DEFICIENCY: Primary | ICD-10-CM

## 2019-09-27 LAB
ALBUMIN SERPL BCP-MCNC: 4.1 G/DL (ref 3.5–5.2)
ALP SERPL-CCNC: 107 U/L (ref 55–135)
ALT SERPL W/O P-5'-P-CCNC: 36 U/L (ref 10–44)
ANION GAP SERPL CALC-SCNC: 10 MMOL/L (ref 8–16)
AST SERPL-CCNC: 17 U/L (ref 10–40)
BASOPHILS # BLD AUTO: 0.1 K/UL (ref 0–0.2)
BASOPHILS NFR BLD: 0.8 % (ref 0–1.9)
BILIRUB SERPL-MCNC: 0.4 MG/DL (ref 0.1–1)
BUN SERPL-MCNC: 23 MG/DL (ref 6–20)
CALCIUM SERPL-MCNC: 9.9 MG/DL (ref 8.7–10.5)
CHLORIDE SERPL-SCNC: 102 MMOL/L (ref 95–110)
CHOLEST SERPL-MCNC: 122 MG/DL (ref 120–199)
CHOLEST/HDLC SERPL: 5.3 {RATIO} (ref 2–5)
CO2 SERPL-SCNC: 23 MMOL/L (ref 23–29)
CREAT SERPL-MCNC: 1.1 MG/DL (ref 0.5–1.4)
DIFFERENTIAL METHOD: ABNORMAL
EOSINOPHIL # BLD AUTO: 0.5 K/UL (ref 0–0.5)
EOSINOPHIL NFR BLD: 4 % (ref 0–8)
ERYTHROCYTE [DISTWIDTH] IN BLOOD BY AUTOMATED COUNT: 13.9 % (ref 11.5–14.5)
EST. GFR  (AFRICAN AMERICAN): >60 ML/MIN/1.73 M^2
EST. GFR  (NON AFRICAN AMERICAN): >60 ML/MIN/1.73 M^2
ESTIMATED AVG GLUCOSE: 189 MG/DL (ref 68–131)
GLUCOSE SERPL-MCNC: 77 MG/DL (ref 70–110)
HBA1C MFR BLD HPLC: 8.2 % (ref 4–5.6)
HCT VFR BLD AUTO: 34.4 % (ref 40–54)
HDLC SERPL-MCNC: 23 MG/DL (ref 40–75)
HDLC SERPL: 18.9 % (ref 20–50)
HGB BLD-MCNC: 11.5 G/DL (ref 14–18)
IMM GRANULOCYTES # BLD AUTO: 0.1 K/UL (ref 0–0.04)
LDLC SERPL CALC-MCNC: 70.2 MG/DL (ref 63–159)
LYMPHOCYTES # BLD AUTO: 4.2 K/UL (ref 1–4.8)
LYMPHOCYTES NFR BLD: 31.8 % (ref 18–48)
MCH RBC QN AUTO: 29.7 PG (ref 27–31)
MCHC RBC AUTO-ENTMCNC: 33.4 G/DL (ref 32–36)
MCV RBC AUTO: 89 FL (ref 82–98)
MONOCYTES # BLD AUTO: 1.4 K/UL (ref 0.3–1)
MONOCYTES NFR BLD: 10.4 % (ref 4–15)
NEUTROPHILS # BLD AUTO: 6.9 K/UL (ref 1.8–7.7)
NEUTROPHILS NFR BLD: 52.2 % (ref 38–73)
NONHDLC SERPL-MCNC: 99 MG/DL
NRBC BLD-RTO: 0 /100 WBC
PLATELET # BLD AUTO: 331 K/UL (ref 150–350)
PMV BLD AUTO: 9.9 FL (ref 9.2–12.9)
POTASSIUM SERPL-SCNC: 4 MMOL/L (ref 3.5–5.1)
PROT SERPL-MCNC: 8.1 G/DL (ref 6–8.4)
RBC # BLD AUTO: 3.87 M/UL (ref 4.6–6.2)
SODIUM SERPL-SCNC: 135 MMOL/L (ref 136–145)
TRIGL SERPL-MCNC: 144 MG/DL (ref 30–150)
WBC # BLD AUTO: 13.09 K/UL (ref 3.9–12.7)

## 2019-09-27 PROCEDURE — 80061 LIPID PANEL: CPT

## 2019-09-27 PROCEDURE — 80074 ACUTE HEPATITIS PANEL: CPT

## 2019-09-27 PROCEDURE — 85025 COMPLETE CBC W/AUTO DIFF WBC: CPT

## 2019-09-27 PROCEDURE — 83036 HEMOGLOBIN GLYCOSYLATED A1C: CPT

## 2019-09-27 PROCEDURE — 36415 COLL VENOUS BLD VENIPUNCTURE: CPT

## 2019-09-27 PROCEDURE — 82043 UR ALBUMIN QUANTITATIVE: CPT

## 2019-09-27 PROCEDURE — 80053 COMPREHEN METABOLIC PANEL: CPT

## 2019-09-28 LAB
ALBUMIN/CREAT UR: 5.2 UG/MG (ref 0–30)
CREAT UR-MCNC: 115 MG/DL (ref 23–375)
MICROALBUMIN UR DL<=1MG/L-MCNC: 6 UG/ML

## 2019-09-30 LAB
HAV IGM SERPL QL IA: NEGATIVE
HBV CORE IGM SERPL QL IA: NEGATIVE
HBV SURFACE AG SERPL QL IA: NEGATIVE
HCV AB SERPL QL IA: POSITIVE

## 2020-02-09 ENCOUNTER — HOSPITAL ENCOUNTER (EMERGENCY)
Facility: HOSPITAL | Age: 45
Discharge: ANOTHER HOSPITAL | End: 2020-02-09
Attending: EMERGENCY MEDICINE
Payer: MEDICAID

## 2020-02-09 VITALS
BODY MASS INDEX: 34.07 KG/M2 | RESPIRATION RATE: 17 BRPM | OXYGEN SATURATION: 97 % | TEMPERATURE: 99 F | SYSTOLIC BLOOD PRESSURE: 120 MMHG | HEART RATE: 129 BPM | HEIGHT: 69 IN | WEIGHT: 230 LBS | DIASTOLIC BLOOD PRESSURE: 58 MMHG

## 2020-02-09 DIAGNOSIS — R73.9 HYPERGLYCEMIA: ICD-10-CM

## 2020-02-09 DIAGNOSIS — T81.49XA WOUND INFECTION AFTER SURGERY: Primary | ICD-10-CM

## 2020-02-09 LAB
ANION GAP SERPL CALC-SCNC: 13 MMOL/L (ref 8–16)
B-OH-BUTYR BLD STRIP-SCNC: 0.1 MMOL/L (ref 0–0.5)
BASOPHILS # BLD AUTO: 0.08 K/UL (ref 0–0.2)
BASOPHILS NFR BLD: 0.3 % (ref 0–1.9)
BUN SERPL-MCNC: 28 MG/DL (ref 6–20)
CALCIUM SERPL-MCNC: 9.9 MG/DL (ref 8.7–10.5)
CHLORIDE SERPL-SCNC: 92 MMOL/L (ref 95–110)
CO2 SERPL-SCNC: 23 MMOL/L (ref 23–29)
CREAT SERPL-MCNC: 1.5 MG/DL (ref 0.5–1.4)
CRP SERPL-MCNC: 135.6 MG/L (ref 0–8.2)
DIFFERENTIAL METHOD: ABNORMAL
EOSINOPHIL # BLD AUTO: 0.3 K/UL (ref 0–0.5)
EOSINOPHIL NFR BLD: 1.1 % (ref 0–8)
ERYTHROCYTE [DISTWIDTH] IN BLOOD BY AUTOMATED COUNT: 14.1 % (ref 11.5–14.5)
EST. GFR  (AFRICAN AMERICAN): >60 ML/MIN/1.73 M^2
EST. GFR  (NON AFRICAN AMERICAN): 57 ML/MIN/1.73 M^2
GLUCOSE SERPL-MCNC: 567 MG/DL (ref 70–110)
HCT VFR BLD AUTO: 33.1 % (ref 40–54)
HGB BLD-MCNC: 10.8 G/DL (ref 14–18)
IMM GRANULOCYTES # BLD AUTO: 0.15 K/UL (ref 0–0.04)
LYMPHOCYTES # BLD AUTO: 2.9 K/UL (ref 1–4.8)
LYMPHOCYTES NFR BLD: 12.1 % (ref 18–48)
MCH RBC QN AUTO: 27.5 PG (ref 27–31)
MCHC RBC AUTO-ENTMCNC: 32.6 G/DL (ref 32–36)
MCV RBC AUTO: 84 FL (ref 82–98)
MONOCYTES # BLD AUTO: 2.6 K/UL (ref 0.3–1)
MONOCYTES NFR BLD: 10.8 % (ref 4–15)
NEUTROPHILS # BLD AUTO: 18.2 K/UL (ref 1.8–7.7)
NEUTROPHILS NFR BLD: 75.1 % (ref 38–73)
NRBC BLD-RTO: 0 /100 WBC
PLATELET # BLD AUTO: 437 K/UL (ref 150–350)
PMV BLD AUTO: 10.3 FL (ref 9.2–12.9)
POCT GLUCOSE: 477 MG/DL (ref 70–110)
POTASSIUM SERPL-SCNC: 4.9 MMOL/L (ref 3.5–5.1)
RBC # BLD AUTO: 3.93 M/UL (ref 4.6–6.2)
SODIUM SERPL-SCNC: 128 MMOL/L (ref 136–145)
WBC # BLD AUTO: 24.23 K/UL (ref 3.9–12.7)

## 2020-02-09 PROCEDURE — 80048 BASIC METABOLIC PNL TOTAL CA: CPT

## 2020-02-09 PROCEDURE — 99285 EMERGENCY DEPT VISIT HI MDM: CPT | Mod: 25

## 2020-02-09 PROCEDURE — 36415 COLL VENOUS BLD VENIPUNCTURE: CPT

## 2020-02-09 PROCEDURE — 82962 GLUCOSE BLOOD TEST: CPT

## 2020-02-09 PROCEDURE — 85025 COMPLETE CBC W/AUTO DIFF WBC: CPT

## 2020-02-09 PROCEDURE — 63600175 PHARM REV CODE 636 W HCPCS: Performed by: EMERGENCY MEDICINE

## 2020-02-09 PROCEDURE — 25000003 PHARM REV CODE 250: Performed by: EMERGENCY MEDICINE

## 2020-02-09 PROCEDURE — 82010 KETONE BODYS QUAN: CPT

## 2020-02-09 PROCEDURE — 96365 THER/PROPH/DIAG IV INF INIT: CPT

## 2020-02-09 PROCEDURE — 96366 THER/PROPH/DIAG IV INF ADDON: CPT

## 2020-02-09 PROCEDURE — 86140 C-REACTIVE PROTEIN: CPT

## 2020-02-09 PROCEDURE — 96375 TX/PRO/DX INJ NEW DRUG ADDON: CPT

## 2020-02-09 RX ORDER — MORPHINE SULFATE 10 MG/ML
10 INJECTION INTRAMUSCULAR; INTRAVENOUS; SUBCUTANEOUS
Status: COMPLETED | OUTPATIENT
Start: 2020-02-09 | End: 2020-02-09

## 2020-02-09 RX ORDER — MORPHINE SULFATE ORAL SOLUTION 10 MG/5ML
15 SOLUTION ORAL
Status: COMPLETED | OUTPATIENT
Start: 2020-02-09 | End: 2020-02-09

## 2020-02-09 RX ORDER — SODIUM CHLORIDE 9 MG/ML
1000 INJECTION, SOLUTION INTRAVENOUS
Status: COMPLETED | OUTPATIENT
Start: 2020-02-09 | End: 2020-02-09

## 2020-02-09 RX ORDER — VANCOMYCIN HCL IN 5 % DEXTROSE 1G/250ML
1000 PLASTIC BAG, INJECTION (ML) INTRAVENOUS
Status: COMPLETED | OUTPATIENT
Start: 2020-02-09 | End: 2020-02-09

## 2020-02-09 RX ADMIN — SODIUM CHLORIDE 1000 ML: 0.9 INJECTION, SOLUTION INTRAVENOUS at 09:02

## 2020-02-09 RX ADMIN — MORPHINE SULFATE 10 MG: 10 INJECTION, SOLUTION INTRAMUSCULAR; INTRAVENOUS at 10:02

## 2020-02-09 RX ADMIN — PIPERACILLIN AND TAZOBACTAM 4.5 G: 4; .5 INJECTION, POWDER, LYOPHILIZED, FOR SOLUTION INTRAVENOUS; PARENTERAL at 09:02

## 2020-02-09 RX ADMIN — MORPHINE SULFATE 15 MG: 10 SOLUTION ORAL at 07:02

## 2020-02-09 RX ADMIN — VANCOMYCIN HYDROCHLORIDE 1000 MG: 1 INJECTION, POWDER, LYOPHILIZED, FOR SOLUTION INTRAVENOUS at 09:02

## 2020-02-09 RX ADMIN — INSULIN HUMAN 10 UNITS: 100 INJECTION, SOLUTION PARENTERAL at 09:02

## 2020-02-10 NOTE — ED NOTES
Gideon Ross now accepted in transfer to [facility] ED by Dr. Palomino; call report to:  [phone].  Transfer Center arranging ASAP transport.

## 2020-02-10 NOTE — ED PROVIDER NOTES
Encounter Date: 2/9/2020    SCRIBE #1 NOTE: IJodi, am scribing for, and in the presence of, Dr. Vazquez.       History     Chief Complaint   Patient presents with    Leg Pain     S/P BKA 1 month at [facility].  No F/U.  Pain at Amputation site      2/9/2020  7:24 PM     The patient is a 44 y.o. male  who presents with leg pain. Pt had a right BKA 1 month ago at [facility]. Patient c/o severe, sharp right calf pain at the site of his amputation site which has been persistent for the past 5 days. Pt reports increasing pressure when he sits. Unable to sleep due to pain. He denies any other pain. No fevers, chills, nausea or vomiting. Pt ran out of his pain medication, 5 mg percocet, 1 week ago. He reports he had to double on his medication because the recommended dosage was not working for him because he was receiving dilaudid during his hospital admission. Pt had his follow up appointment with orthopedist last week but did not go because he did not have a ride. Pt states he is still smoking. Pt needs an updated teatnus shot. PMHx of DM, HTN. SHx of skin graft.    The history is provided by the patient.     Review of patient's allergies indicates:  No Known Allergies  Past Medical History:   Diagnosis Date    Diabetes mellitus     Hypertension      Past Surgical History:   Procedure Laterality Date    SKIN GRAFT       History reviewed. No pertinent family history.  Social History     Tobacco Use    Smoking status: Current Every Day Smoker     Packs/day: 1.00     Types: Cigarettes   Substance Use Topics    Alcohol use: Yes    Drug use: No     Review of Systems   Constitutional: Negative for appetite change, chills and fever.   HENT: Negative for congestion, rhinorrhea and sore throat.    Respiratory: Negative for cough and shortness of breath.    Cardiovascular: Negative for chest pain.   Gastrointestinal: Negative for abdominal pain, diarrhea, nausea and vomiting.   Genitourinary: Negative  for dysuria.   Musculoskeletal: Positive for arthralgias. Negative for back pain and myalgias.   Skin: Negative for rash.   Neurological: Negative for weakness and numbness.   Hematological: Does not bruise/bleed easily.       Physical Exam     Initial Vitals [02/09/20 1906]   BP Pulse Resp Temp SpO2   (!) 120/58 (!) 129 17 98.8 °F (37.1 °C) 97 %      MAP       --         Physical Exam    Nursing note and vitals reviewed.  Constitutional: No distress.   HENT:   Head: Normocephalic and atraumatic.   Mouth/Throat: Mucous membranes are normal.   Eyes: EOM are normal. Pupils are equal, round, and reactive to light.   Neck: Normal range of motion.   Cardiovascular: Regular rhythm and normal heart sounds. Tachycardia present.  Exam reveals no gallop and no friction rub.    No murmur heard.  Pulmonary/Chest: Breath sounds normal. He has no wheezes. He has no rhonchi. He has no rales.   Musculoskeletal: He exhibits no edema.   Left AKA.   Neurological: He is alert and oriented to person, place, and time.   Skin: Skin is warm and dry. No rash and no abscess noted.   Purulent drainage at the site of the stable marks of the right medial stub with mild calor, mild erythema, fluctuance and TTP.   Psychiatric: He has a normal mood and affect.         ED Course   Procedures  Labs Reviewed   BASIC METABOLIC PANEL - Abnormal; Notable for the following components:       Result Value    Sodium 128 (*)     Chloride 92 (*)     Glucose 567 (*)     BUN, Bld 28 (*)     Creatinine 1.5 (*)     eGFR if non  57 (*)     All other components within normal limits    Narrative:     Glucose  critical result(s) called and verbal readback obtained from   Eric Lopez RN. by MAR 02/09/2020 20:26   CBC W/ AUTO DIFFERENTIAL - Abnormal; Notable for the following components:    WBC 24.23 (*)     RBC 3.93 (*)     Hemoglobin 10.8 (*)     Hematocrit 33.1 (*)     Platelets 437 (*)     Gran # (ANC) 18.2 (*)     Immature Grans (Abs) 0.15 (*)      Mono # 2.6 (*)     Gran% 75.1 (*)     Lymph% 12.1 (*)     All other components within normal limits   C-REACTIVE PROTEIN - Abnormal; Notable for the following components:    .6 (*)     All other components within normal limits   BETA - HYDROXYBUTYRATE, SERUM          Imaging Results    None          Medical Decision Making:   History:   Old Medical Records: I decided to obtain old medical records.  Clinical Tests:   Lab Tests: Reviewed and Ordered  ED Management:  44-year-old male presents with a 3 day history of worsening stump pain 3 weeks status post BKA.  There is purulent drainage from the staples with fluctuance concerning for possible abscess.  He has marked leukocytosis with WBC of 11227.  He also has poorly controlled diabetes with a glucose of 560 without ketosis.  He is transferred to [facility] for evaluation by the surgical team for consideration of surgical drainage.            Scribe Attestation:   Scribe #1: I performed the above scribed service and the documentation accurately describes the services I performed. I attest to the accuracy of the note.     I, Dr. Marcelo Vazquez III, personally performed the services described in this documentation. All medical record entries made by the scribe were at my direction and in my presence.  I have reviewed the chart and agree that the record reflects my personal performance and is accurate and complete                      Clinical Impression:       ICD-10-CM ICD-9-CM   1. Wound infection after surgery T81.49XA 998.59   2. Hyperglycemia R73.9 790.29         Disposition:   Disposition: Transferred  Condition: Stable                     Marcelo Vazquez III, MD  02/09/20 4388

## 2020-05-19 DIAGNOSIS — B18.2 CHRONIC VIRAL HEPATITIS C: Primary | ICD-10-CM

## 2020-05-27 ENCOUNTER — HOSPITAL ENCOUNTER (OUTPATIENT)
Dept: RADIOLOGY | Facility: HOSPITAL | Age: 45
Discharge: HOME | End: 2020-05-27
Attending: SPECIALIST
Payer: MEDICAID

## 2020-05-27 DIAGNOSIS — B18.2 CHRONIC VIRAL HEPATITIS C: ICD-10-CM

## 2020-05-27 PROCEDURE — 76700 US EXAM ABDOM COMPLETE: CPT | Mod: TC,PO

## 2022-07-19 ENCOUNTER — HOSPITAL ENCOUNTER (EMERGENCY)
Facility: HOSPITAL | Age: 47
Discharge: HOME | End: 2022-07-20
Attending: EMERGENCY MEDICINE
Payer: MEDICAID

## 2022-07-19 DIAGNOSIS — R10.9 RIGHT FLANK PAIN: Primary | ICD-10-CM

## 2022-07-19 DIAGNOSIS — R73.9 HYPERGLYCEMIA: ICD-10-CM

## 2022-07-19 LAB
ALBUMIN SERPL BCP-MCNC: 4.3 G/DL (ref 3.5–5.2)
ALLENS TEST: ABNORMAL
ALP SERPL-CCNC: 150 U/L (ref 55–135)
ALT SERPL W/O P-5'-P-CCNC: 24 U/L (ref 10–44)
ANION GAP SERPL CALC-SCNC: 13 MMOL/L (ref 8–16)
AST SERPL-CCNC: 16 U/L (ref 10–40)
B-OH-BUTYR BLD STRIP-SCNC: 0.1 MMOL/L (ref 0–0.5)
BACTERIA #/AREA URNS HPF: NORMAL /HPF
BASOPHILS # BLD AUTO: 0.09 K/UL (ref 0–0.2)
BASOPHILS NFR BLD: 0.7 % (ref 0–1.9)
BILIRUB SERPL-MCNC: 1 MG/DL (ref 0.1–1)
BILIRUB UR QL STRIP: NEGATIVE
BUN SERPL-MCNC: 24 MG/DL (ref 6–20)
CALCIUM SERPL-MCNC: 10 MG/DL (ref 8.7–10.5)
CHLORIDE SERPL-SCNC: 83 MMOL/L (ref 95–110)
CLARITY UR: CLEAR
CO2 SERPL-SCNC: 27 MMOL/L (ref 23–29)
COLOR UR: YELLOW
CREAT SERPL-MCNC: 1 MG/DL (ref 0.5–1.4)
DELSYS: ABNORMAL
DIFFERENTIAL METHOD: ABNORMAL
EOSINOPHIL # BLD AUTO: 0.2 K/UL (ref 0–0.5)
EOSINOPHIL NFR BLD: 1.2 % (ref 0–8)
ERYTHROCYTE [DISTWIDTH] IN BLOOD BY AUTOMATED COUNT: 15.2 % (ref 11.5–14.5)
EST. GFR  (AFRICAN AMERICAN): >60 ML/MIN/1.73 M^2
EST. GFR  (NON AFRICAN AMERICAN): >60 ML/MIN/1.73 M^2
FIO2: 21
GLUCOSE SERPL-MCNC: 483 MG/DL (ref 70–110)
GLUCOSE SERPL-MCNC: 496 MG/DL (ref 70–110)
GLUCOSE SERPL-MCNC: 585 MG/DL (ref 70–110)
GLUCOSE UR QL STRIP: ABNORMAL
HCO3 UR-SCNC: 31.1 MMOL/L (ref 24–28)
HCT VFR BLD AUTO: 46.5 % (ref 40–54)
HCT VFR BLD CALC: 46 %PCV (ref 36–54)
HGB BLD-MCNC: 16.4 G/DL (ref 14–18)
HGB UR QL STRIP: NEGATIVE
IMM GRANULOCYTES # BLD AUTO: 0.07 K/UL (ref 0–0.04)
IMM GRANULOCYTES NFR BLD AUTO: 0.6 % (ref 0–0.5)
KETONES UR QL STRIP: NEGATIVE
LEUKOCYTE ESTERASE UR QL STRIP: NEGATIVE
LYMPHOCYTES # BLD AUTO: 2.6 K/UL (ref 1–4.8)
LYMPHOCYTES NFR BLD: 21.2 % (ref 18–48)
MCH RBC QN AUTO: 27.1 PG (ref 27–31)
MCHC RBC AUTO-ENTMCNC: 35.3 G/DL (ref 32–36)
MCV RBC AUTO: 77 FL (ref 82–98)
MICROSCOPIC COMMENT: NORMAL
MODE: ABNORMAL
MONOCYTES # BLD AUTO: 1.1 K/UL (ref 0.3–1)
MONOCYTES NFR BLD: 9.3 % (ref 4–15)
NEUTROPHILS # BLD AUTO: 8.1 K/UL (ref 1.8–7.7)
NEUTROPHILS NFR BLD: 67 % (ref 38–73)
NITRITE UR QL STRIP: NEGATIVE
NRBC BLD-RTO: 0 /100 WBC
PCO2 BLDA: 42.4 MMHG (ref 35–45)
PH SMN: 7.47 [PH] (ref 7.35–7.45)
PH UR STRIP: 6 [PH] (ref 5–8)
PLATELET # BLD AUTO: 372 K/UL (ref 150–450)
PMV BLD AUTO: 9.8 FL (ref 9.2–12.9)
PO2 BLDA: 28 MMHG (ref 40–60)
POC BE: 7 MMOL/L
POC IONIZED CALCIUM: 1.21 MMOL/L (ref 1.06–1.42)
POC SATURATED O2: 58 % (ref 95–100)
POC TCO2: 32 MMOL/L (ref 24–29)
POTASSIUM BLD-SCNC: 4.3 MMOL/L (ref 3.5–5.1)
POTASSIUM SERPL-SCNC: 4 MMOL/L (ref 3.5–5.1)
PROT SERPL-MCNC: 9.2 G/DL (ref 6–8.4)
PROT UR QL STRIP: ABNORMAL
RBC # BLD AUTO: 6.06 M/UL (ref 4.6–6.2)
RBC #/AREA URNS HPF: 1 /HPF (ref 0–4)
SAMPLE: ABNORMAL
SITE: ABNORMAL
SODIUM BLD-SCNC: 124 MMOL/L (ref 136–145)
SODIUM SERPL-SCNC: 123 MMOL/L (ref 136–145)
SP GR UR STRIP: <=1.005 (ref 1–1.03)
SP02: 100
SQUAMOUS #/AREA URNS HPF: 1 /HPF
URN SPEC COLLECT METH UR: ABNORMAL
UROBILINOGEN UR STRIP-ACNC: NEGATIVE EU/DL
WBC # BLD AUTO: 12.03 K/UL (ref 3.9–12.7)
YEAST URNS QL MICRO: NORMAL

## 2022-07-19 PROCEDURE — 82010 KETONE BODYS QUAN: CPT | Performed by: NURSE PRACTITIONER

## 2022-07-19 PROCEDURE — 96360 HYDRATION IV INFUSION INIT: CPT

## 2022-07-19 PROCEDURE — 25000003 PHARM REV CODE 250: Performed by: STUDENT IN AN ORGANIZED HEALTH CARE EDUCATION/TRAINING PROGRAM

## 2022-07-19 PROCEDURE — 82330 ASSAY OF CALCIUM: CPT

## 2022-07-19 PROCEDURE — 82962 GLUCOSE BLOOD TEST: CPT

## 2022-07-19 PROCEDURE — C9399 UNCLASSIFIED DRUGS OR BIOLOG: HCPCS | Performed by: STUDENT IN AN ORGANIZED HEALTH CARE EDUCATION/TRAINING PROGRAM

## 2022-07-19 PROCEDURE — 85014 HEMATOCRIT: CPT

## 2022-07-19 PROCEDURE — 99284 EMERGENCY DEPT VISIT MOD MDM: CPT | Mod: 25

## 2022-07-19 PROCEDURE — 96372 THER/PROPH/DIAG INJ SC/IM: CPT | Mod: 59 | Performed by: STUDENT IN AN ORGANIZED HEALTH CARE EDUCATION/TRAINING PROGRAM

## 2022-07-19 PROCEDURE — 81001 URINALYSIS AUTO W/SCOPE: CPT | Performed by: NURSE PRACTITIONER

## 2022-07-19 PROCEDURE — 99900031 HC PATIENT EDUCATION (STAT)

## 2022-07-19 PROCEDURE — 84132 ASSAY OF SERUM POTASSIUM: CPT

## 2022-07-19 PROCEDURE — 96361 HYDRATE IV INFUSION ADD-ON: CPT

## 2022-07-19 PROCEDURE — 85025 COMPLETE CBC W/AUTO DIFF WBC: CPT | Performed by: NURSE PRACTITIONER

## 2022-07-19 PROCEDURE — 63600175 PHARM REV CODE 636 W HCPCS: Performed by: STUDENT IN AN ORGANIZED HEALTH CARE EDUCATION/TRAINING PROGRAM

## 2022-07-19 PROCEDURE — 84295 ASSAY OF SERUM SODIUM: CPT | Mod: 59

## 2022-07-19 PROCEDURE — 82803 BLOOD GASES ANY COMBINATION: CPT

## 2022-07-19 PROCEDURE — 93010 ELECTROCARDIOGRAM REPORT: CPT | Mod: ,,, | Performed by: SPECIALIST

## 2022-07-19 PROCEDURE — 80053 COMPREHEN METABOLIC PANEL: CPT | Performed by: NURSE PRACTITIONER

## 2022-07-19 PROCEDURE — 99900035 HC TECH TIME PER 15 MIN (STAT)

## 2022-07-19 PROCEDURE — 93010 EKG 12-LEAD: ICD-10-PCS | Mod: ,,, | Performed by: SPECIALIST

## 2022-07-19 PROCEDURE — 94761 N-INVAS EAR/PLS OXIMETRY MLT: CPT

## 2022-07-19 PROCEDURE — 93005 ELECTROCARDIOGRAM TRACING: CPT | Performed by: SPECIALIST

## 2022-07-19 RX ORDER — DIAZEPAM 5 MG/1
5 TABLET ORAL
Status: COMPLETED | OUTPATIENT
Start: 2022-07-19 | End: 2022-07-19

## 2022-07-19 RX ADMIN — SODIUM CHLORIDE, SODIUM LACTATE, POTASSIUM CHLORIDE, AND CALCIUM CHLORIDE 1000 ML: .6; .31; .03; .02 INJECTION, SOLUTION INTRAVENOUS at 10:07

## 2022-07-19 RX ADMIN — INSULIN DETEMIR 35 UNITS: 100 INJECTION, SOLUTION SUBCUTANEOUS at 11:07

## 2022-07-19 RX ADMIN — SODIUM CHLORIDE, SODIUM LACTATE, POTASSIUM CHLORIDE, AND CALCIUM CHLORIDE 1000 ML: .6; .31; .03; .02 INJECTION, SOLUTION INTRAVENOUS at 08:07

## 2022-07-19 RX ADMIN — DIAZEPAM 5 MG: 5 TABLET ORAL at 09:07

## 2022-07-19 RX ADMIN — POTASSIUM BICARBONATE 20 MEQ: 391 TABLET, EFFERVESCENT ORAL at 09:07

## 2022-07-19 RX ADMIN — DIAZEPAM 5 MG: 5 TABLET ORAL at 08:07

## 2022-07-20 VITALS
DIASTOLIC BLOOD PRESSURE: 96 MMHG | WEIGHT: 220 LBS | SYSTOLIC BLOOD PRESSURE: 136 MMHG | OXYGEN SATURATION: 100 % | BODY MASS INDEX: 32.49 KG/M2 | HEART RATE: 130 BPM | TEMPERATURE: 98 F | RESPIRATION RATE: 19 BRPM

## 2022-07-20 LAB — GLUCOSE SERPL-MCNC: 270 MG/DL (ref 70–110)

## 2022-07-20 PROCEDURE — 82962 GLUCOSE BLOOD TEST: CPT

## 2022-07-20 RX ORDER — METHOCARBAMOL 750 MG/1
1500 TABLET, FILM COATED ORAL 3 TIMES DAILY
Qty: 30 TABLET | Refills: 0 | Status: SHIPPED | OUTPATIENT
Start: 2022-07-20 | End: 2022-07-25

## 2022-07-20 NOTE — FIRST PROVIDER EVALUATION
Medical screening exam completed.  I have conducted a focused provider triage encounter, findings are as follows:    Brief history of present illness:  Left flank pain, hyperglycemia at home (> 500). Denies urinary symptoms. Has not taken medications in over one year.     Vitals:    07/19/22 1911   BP: (!) 130/101   BP Location: Left arm   Patient Position: Sitting   Pulse: (!) 139   Resp: 18   Temp: 98.3 °F (36.8 °C)   TempSrc: Oral   SpO2: 100%   Weight: 99.8 kg (220 lb)       Pertinent physical exam:  In no distress, glucose 585    Brief workup plan:  Labs    Preliminary workup initiated; this workup will be continued and followed by the physician or advanced practice provider that is assigned to the patient when roomed.

## 2022-07-20 NOTE — RESPIRATORY THERAPY
07/19/22 2046   PRE-TX-O2   O2 Device (Oxygen Therapy) room air   Pulse Oximetry Type Continuous   $ Pulse Oximetry - Multiple Charge Pulse Oximetry - Multiple   Education   $ Education 15 min   Labs   $ Was an ABG obtained? ISTAT - Blood gas;ISTAT - Calcium;Venous Line;ISTAT - Hematocrit;ISTAT - Potassium;ISTAT - Sodium   $ Labs Tech Time 15 min   Critical Value Communication   Date Result Received 07/19/22   Time Result Received 2046   Resulting Department of Critical Value RESP   Who communicated critical value from resulting department? BMS   Critical Test #1 GLUCOSE   Critical Test #1 Result 486   Name of Notified Physician/Designee Dr. Lares   Date Notified 07/19/22   Time Notified 2046   Read Back Verification Yes   Respiratory Evaluation   $ Care Plan Tech Time 15 min

## 2022-07-20 NOTE — ED NOTES
Adult Physical Assessment  LOC: Gideon Ross, 46 y.o. male verified via two identifiers.  The patient is awake, alert, oriented and speaking appropriately at this time.  APPEARANCE: Patient resting comfortably and appears to be in no acute distress at this time. Patient is clean and well groomed, patient's clothing is properly fastened.  SKIN:The skin is warm and dry, color consistent with ethnicity, patient has normal skin turgor and moist mucus membranes, skin sores noted throughout bilateral extremities.  MUSCULOSKELETAL: Patient moving all extremities well. Bilateral amputee.   RESPIRATORY: Airway is open and patent, respirations are spontaneous, patient has a normal effort and rate, no accessory muscle use noted.  CARDIAC: Patient has a normal rate and rhythm, no periphreal edema noted in any extremity, capillary refill < 3 seconds in all extremities  ABDOMEN: Soft and non tender to palpation, no abdominal distention noted. Bowel sounds present in all four quadrants.  NEUROLOGIC: Eyes open spontaneously, behavior appropriate to situation, follows commands, facial expression symmetrical, bilateral hand grasp equal and even, purposeful motor response noted, normal sensation in all extremities when touched with a finger.  GENITOURINARY: R flank pain. Denies any other symptoms. States that he is concerned about being in kidney failure due to his uncontrolled diabetes and chronic meth use.

## 2022-07-20 NOTE — ED PROVIDER NOTES
Encounter Date: 7/19/2022       History     Chief Complaint   Patient presents with    Hyperglycemia     Non compliant with meds    Flank Pain     Right side     HPI   46-year-old man with a history of diabetes, hypertension, bilateral leg amputations related to some peripheral vascular disease presents for evaluation of right flank pain that began when he bent over and sneezed.  Experienced a spasming type pain. denies fever, chills, HA, changes in vision, CP, SOB, other abdominal pain, n/v, change in bowel or bladder habits, blood in urine or stool.  Reports not taking care of himself including not using his insulin appropriately over the past week.  Reports using some methamphetamine earlier today.  Smokes, no alcohol, drug use as above    Review of patient's allergies indicates:  No Known Allergies  Past Medical History:   Diagnosis Date    Diabetes mellitus     Hypertension      Past Surgical History:   Procedure Laterality Date    SKIN GRAFT       No family history on file.  Social History     Tobacco Use    Smoking status: Current Every Day Smoker     Packs/day: 1.00     Types: Cigarettes   Substance Use Topics    Alcohol use: Yes    Drug use: No     Review of Systems   Constitutional: Negative for chills and fever.   HENT: Negative for congestion and sore throat.    Respiratory: Negative for shortness of breath.    Cardiovascular: Negative for chest pain and palpitations.   Gastrointestinal: Negative for nausea and vomiting.   Genitourinary: Positive for flank pain. Negative for difficulty urinating and dysuria.   Musculoskeletal: Positive for back pain. Negative for myalgias.   Skin: Positive for wound. Negative for rash.   Neurological: Negative for weakness and headaches.       Physical Exam     Initial Vitals [07/19/22 1911]   BP Pulse Resp Temp SpO2   (!) 130/101 (!) 139 18 98.3 °F (36.8 °C) 100 %      MAP       --         Physical Exam    Nursing note reviewed.  Constitutional: He appears  well-developed and well-nourished.   Anxious appearing man in bed   HENT:   Head: Normocephalic and atraumatic.   Eyes: Right eye exhibits no discharge. Left eye exhibits no discharge.   Neck: No tracheal deviation present.   Cardiovascular: Regular rhythm.   Tachycardic   Pulmonary/Chest: Breath sounds normal. No stridor. No respiratory distress.   Abdominal: Abdomen is soft. Bowel sounds are normal. There is no abdominal tenderness.   Musculoskeletal:      Comments: Bilateral lower extremity amputations    He has an area of point tenderness in his right lumbar area     Neurological: He is alert and oriented to person, place, and time.   Skin: Skin is warm and dry.   Chronic well-healing wounds on bilateral legs and right shoulder         ED Course   Procedures  Labs Reviewed   CBC W/ AUTO DIFFERENTIAL - Abnormal; Notable for the following components:       Result Value    MCV 77 (*)     RDW 15.2 (*)     Immature Granulocytes 0.6 (*)     Gran # (ANC) 8.1 (*)     Immature Grans (Abs) 0.07 (*)     Mono # 1.1 (*)     All other components within normal limits   COMPREHENSIVE METABOLIC PANEL - Abnormal; Notable for the following components:    Sodium 123 (*)     Chloride 83 (*)     Glucose 496 (*)     BUN 24 (*)     Total Protein 9.2 (*)     Alkaline Phosphatase 150 (*)     All other components within normal limits    Narrative:     GLUCOSE AND SODIUM   critical result(s) called and verbal readback   obtained from MORIAH CASTRO RN ER. by [facility] 07/19/2022 21:26  GLUCOSE AND CALCIUM   critical result(s) called and verbal readback   obtained from MORIAH CASTRO RN ER. by [facility] 07/19/2022 21:25   POCT GLUCOSE - Abnormal; Notable for the following components:    POC Glucose 585 (*)     All other components within normal limits   ISTAT PROCEDURE - Abnormal; Notable for the following components:    POC PH 7.473 (*)     POC PO2 28 (*)     POC HCO3 31.1 (*)     POC SATURATED O2 58 (*)     POC Glucose 483 (*)     POC Sodium 124 (*)      POC TCO2 32 (*)     All other components within normal limits   BETA - HYDROXYBUTYRATE, SERUM   URINALYSIS, REFLEX TO URINE CULTURE   POCT GLUCOSE MONITORING CONTINUOUS          Imaging Results          X-Ray Chest AP Portable (Final result)  Result time 07/19/22 19:57:50    Final result by Nima Whitten MD (07/19/22 19:57:50)                 Narrative:    Chest single view    Clinical data:Hyperglycemia    FINDINGS: AP view of the chest demonstrates no cardiac, pulmonary, or osseous abnormalities.    IMPRESSION:  1. Normal chest single view.    Electronically signed by:  Nima Whitten MD  7/19/2022 7:57 PM CDT Workstation: [id number]                               Medications   lactated ringers bolus 1,000 mL (1,000 mLs Intravenous New Bag 7/19/22 2047)   diazePAM tablet 5 mg (5 mg Oral Given 7/19/22 2046)     Medical Decision Making:   History:   Old Medical Records: I decided to obtain old medical records.  Old Records Summarized: records from clinic visits and records from previous admission(s).       <> Summary of Records: Prior abscesses and amputations, poorly controlled diabetic  Initial Assessment:   46-year-old male with poorly controlled diabetes and methamphetamine use presents for evaluation of right flank pain that sounds very musculoskeletal.  Tachycardia, otherwise normal vitals.  Anxious and tremulous appears.  Physical exam is most consistent with muscle spasm.  Differential Diagnosis:   Arrhythmia, metabolic derangement, endocrine derangement including DKA, medication noncompliance, tox.  Do not feel as flank pain represents acute intra-abdominal pathology requiring advanced imaging at this time based on history and physical  Independently Interpreted Test(s):   I have ordered and independently interpreted X-rays - see summary below.       <> Summary of X-Ray Reading(s): No focal consolidation or pneumothorax  I have ordered and independently interpreted EKG Reading(s) - see summary  below       <> Summary of EKG Reading(s): Sinus, approximately 130, normal axis, normal intervals, no STEMI, no prior EKGs available for viewing the system  Clinical Tests:   Lab Tests: Ordered and Reviewed       <> Summary of Lab: Corrected sodium is 133, he is hyperglycemic but he is not acidotic, he does not have a gap, pH 7.47, beta hydroxy negative, white blood cell count of 12, no anemia.  Radiological Study: Ordered and Reviewed  Medical Tests: Ordered and Reviewed  ED Management:  Valium given for symptom control of muscle spasm as well as methamphetamine use, he has been rehydrated, after his potassium came back normal he was given some potassium and given his home dose of long-acting insulin.  He reports that he is always tachycardic, his tachycardia has improved with fluids but he remains tachycardic in the 110s.  He feels better.  He will get his home insulin, send urine and recheck blood glucose  William Phillips, HO3  U-             ED Course as of 07/19/22 2201 Tue Jul 19, 2022 2134 Corrected sodium 133 by Fransisca [IC]      ED Course User Index  [IC] William Phillips MD             Clinical Impression:   Final diagnoses:  [R73.9] Hyperglycemia                 William Phillips MD  Resident  07/19/22 2791

## 2023-02-28 DIAGNOSIS — G54.6 PHANTOM LIMB PAIN: Primary | ICD-10-CM

## 2023-04-28 DIAGNOSIS — Z96.659 PRESENCE OF ARTIFICIAL KNEE JOINT: Primary | ICD-10-CM

## 2023-05-22 ENCOUNTER — CLINICAL SUPPORT (OUTPATIENT)
Dept: REHABILITATION | Facility: HOSPITAL | Age: 48
End: 2023-05-22
Payer: MEDICAID

## 2023-05-22 DIAGNOSIS — Z89.612 S/P AKA (ABOVE KNEE AMPUTATION) UNILATERAL, LEFT: Primary | ICD-10-CM

## 2023-05-22 DIAGNOSIS — Z89.511 S/P BELOW KNEE AMPUTATION, RIGHT: ICD-10-CM

## 2023-05-22 PROCEDURE — 97161 PT EVAL LOW COMPLEX 20 MIN: CPT | Mod: PN

## 2023-05-22 NOTE — PATIENT INSTRUCTIONS
Triceps Activities: Push-Ups        Wheelchair in locked position, child pushes down on armrests to lift bottom off seat.  Lean on wall with straightened arms. Bend arms and touch nose to wall. Do not move feet.Stomach should not touch wall.Push away by straightening arms. Keep trunk straight. Do not let fingers point inward.    Copyright © I. All rights reserved.      (Home) Extension: Hip        With support under abdomen, tighten stomach. Lift right leg in line with body. Do not hyperextend. Alternate legs.  Repeat ____ times per set. Do ____ sets per session. Do ____ sessions per week.

## 2023-05-22 NOTE — PLAN OF CARE
"[facility]   Physical Therapy Initial Evaluation      Name: Gideon Ross  Clinic Number: [id number]    Therapy Diagnosis:   Encounter Diagnosis   Name Primary?    S/P AKA (above knee amputation) unilateral, left Yes        Physician: Jolanta Downey NP    Physician Orders: PT Eval and Treat   Medical Diagnosis from Referral: Z96.659 (ICD-10-CM) - Presence of artificial knee joint  Evaluation Date: 5/22/2023  Authorization Period Expiration: 12/31/23  Plan of Care Expiration: 8/7/23    Visit # / Visits authorized: 1/ 99   FOTO: 49/100    Precautions: Standard and Fall     Time In: 1115  Time Out: 1200  Total Appointment Time (timed & untimed codes): 45 minutes    Subjective     Date of onset: 4/28/23     (referral)    History of current condition - Gideon reports: vascular disease to lower legs that result in amputation of the left leg and then the right leg 2 years ago. No prosthetic fitted until 2 months ago. Referred to therapy for training.    Falls: a month ago. Hang up on the carpet while trying to use the artificial limbs.    Imaging: : none recent.    Prior Therapy: yes  Social History: lives with ad.   Occupation: used to be a cook  DME:  RW, wheelchair  Prior Level of Function: on wheelchair since after amputations.  Current Level of Function: wheelchair level independence with problem reaching far  and high, non-ambulatory    Pain:  Current 5/10, worst 8/10, best 0/10   Location: low back    Description: shooting, sharp  Aggravating Factors: doing a lot of activity  Easing Factors: lying down    Patients goals: "to be walking again."     Medical History:   Past Medical History:   Diagnosis Date    Diabetes mellitus     Hypertension        Surgical History:   Gideon Ross  has a past surgical history that includes Skin graft.    Medications:   Gideon has a current medication list which includes the following prescription(s): amlodipine, aspirin, atorvastatin, baclofen, " "carvedilol, diazepam, gabapentin, hydrochlorothiazide, insulin aspart u-100, insulin detemir u-100 (levemir), lisinopril, nicotine, nortriptyline, oxycodone, polyethylene glycol, quetiapine, and sofosbuvir-velpatasvir.    Allergies:   Review of patient's allergies indicates:  No Known Allergies     Objective    Posture: good sitting balance  Palpation: no tenderness or edema  Sensation: + "phantom limb"  Range of Motion: WFL  no joint limitations  Strength: 5/5 gross strength except:  Left hip flexion 4/5      extension 4+/5  Flexibility: Hip extension 20 deg  Gait: non-ambulatory  Analysis: able to take steps in //bars using bilateral prosthesis.  Bed Mobility: Independent  Transfers: modified independent scoot transfers  Balance: good dynamic sitting.    Limitation/Restriction for FOTO amputation Survey    Therapist reviewed FOTO scores for Gideon Ross on 5/22/2023.   FOTO documents entered into [employer] - see Media section.    Limitation Score: 51%         Treatment     Total Treatment time (time-based codes) separate from Evaluation: 15 minutes     Gideon received the treatments listed below:      therapeutic exercises to develop strength and establish home program for 15 minutes including:  Prone lying hip extension  Bridging on bolster  Resisted hip adduction, internal rotation   Armchair push ups      Patient Education and Home Exercises     Education provided:   - Discussed Plan of care; Home exercises instructions    Written Home Exercises Provided: yes. Exercises were reviewed and Gideon was able to demonstrate them prior to the end of the session.  Gideon demonstrated good  understanding of the education provided. See EMR under Patient Instructions for exercises provided during therapy sessions.    Assessment     Gideon is a 47 y.o. male referred to outpatient Physical Therapy with a medical diagnosis of presence of artificial limbs. Patient presents with bilateral loss of lower limbs: R BKA/L AKA, " non-ambulatory, recent fall. Good potential for ambulation with prosthesis. Will benefit from prosthetic training.     Patient prognosis is Good.   Patient will benefit from skilled outpatient Physical Therapy to address the deficits stated above and in the chart below, provide patient /family education, and to maximize patientt's level of independence.     Plan of care discussed with patient: Yes  Patient's spiritual, cultural and educational needs considered and patient is agreeable to the plan of care and goals as stated below:     Anticipated Barriers for therapy: transportation    Medical Necessity is demonstrated by the following  History  Co-morbidities and personal factors that may impact the plan of care [x] LOW: no personal factors / co-morbidities  [] MODERATE: 1-2 personal factors / co-morbidities  [] HIGH: 3+ personal factors / co-morbidities    Moderate / High Support Documentation: coping style, transportation     Examination  Body Structures and Functions, activity limitations and participation restrictions that may impact the plan of care [x] LOW: addressing 1-2 elements  [] MODERATE: 3+ elements  [] HIGH: 4+ elements (please support below)    Moderate / High Support Documentation: N/A     Clinical Presentation [x] LOW: stable  [] MODERATE: Evolving  [] HIGH: Unstable     Decision Making/ Complexity Score: low       Goals:  Short Term Goals: 3 weeks   Patient will report compliance with safety precautions, prosthetic wear and home program.  2.   Patient will ambulate with both prosthesis with min A // bars.  3.   Patient will acquire a better prosthetic fit of the socket.    Long Term Goals: 10 weeks   Patient will be modified independent with RW ambulation 50 ft household level.  Patient will tolerate prosthetic wear >4 hours.  Patient will be independent with donning/doffing prosthesis.  Patient will tolerate standing activities > 30 minutes on prosthesis.    Plan     Plan of care Certification:  5/22/2023 to 8/7/23.    Outpatient Physical Therapy 2 times weekly for 10 weeks to include the following interventions: Gait Training, Manual Therapy, Neuromuscular Re-ed, Therapeutic Activities, and Therapeutic Exercise.     Hoang Cantrell, PT

## 2023-05-31 ENCOUNTER — TELEPHONE (OUTPATIENT)
Dept: REHABILITATION | Facility: HOSPITAL | Age: 48
End: 2023-05-31
Payer: MEDICAID

## 2023-06-07 ENCOUNTER — TELEPHONE (OUTPATIENT)
Dept: REHABILITATION | Facility: HOSPITAL | Age: 48
End: 2023-06-07
Payer: MEDICAID

## 2023-06-12 ENCOUNTER — CLINICAL SUPPORT (OUTPATIENT)
Dept: REHABILITATION | Facility: HOSPITAL | Age: 48
End: 2023-06-12
Payer: MEDICAID

## 2023-06-12 DIAGNOSIS — Z89.511 S/P BELOW KNEE AMPUTATION, RIGHT: Primary | ICD-10-CM

## 2023-06-12 DIAGNOSIS — Z96.659 ARTIFICIAL KNEE JOINT PRESENT, UNSPECIFIED LATERALITY: ICD-10-CM

## 2023-06-12 DIAGNOSIS — Z89.612 S/P AKA (ABOVE KNEE AMPUTATION) UNILATERAL, LEFT: ICD-10-CM

## 2023-06-12 PROCEDURE — 97110 THERAPEUTIC EXERCISES: CPT | Mod: PN

## 2023-06-12 NOTE — PATIENT INSTRUCTIONS
Bridging      Modified: Use bolster under thigh and lift bottoms up.  Hold _2-5___ seconds. Repeat _20___ times. Do ____ sessions per day.    Copyright © I. All rights reserved.      Pelvic Squeeze / Protected Hip: Adduction / Abduction        Modified :keep both hip in neutral and squeeze pillow or ball.  Hold  3-5___ seconds. Repeat _20__ times. Do __1-2_ sessions per day.    Copyright © I. All rights reserved.      Hip Internal / External Rotation, Hip / Knee Straight        Rotate left leg inward using hip motion, knee straight.  Repeat sequence ____ times per session. Do ____ sessions per week.

## 2023-06-12 NOTE — PROGRESS NOTES
"[facility]   Physical Therapy Treatment Note      Name: Gideon Ross  Clinic Number: [id number]    Therapy Diagnosis:   Encounter Diagnoses   Name Primary?    S/P below knee amputation, right Yes    Artificial knee joint present, unspecified laterality     S/P AKA (above knee amputation) unilateral, left      Physician: Jolanta Downey NP    Visit Date: 6/12/2023     Physician Orders: PT Eval and Treat   Medical Diagnosis from Referral: Z96.659 (ICD-10-CM) - Presence of artificial knee joint  Evaluation Date: 5/22/2023  Authorization Period Expiration: 12/31/23  Plan of Care Expiration: 8/7/23     Visit # / Visits authorized: 1/12     Precautions: Standard and Fall     Time In: 1404  Time Out: 1440  Total Billable Time: 36 minutes    Subjective     Pt reports: that he has a lot of things going on about him. "My mind is not right"..  He was compliant with home exercise program.  Response to previous treatment: on initial visit.  Functional change: unable to fit on right prosthesis.    Pain: 5/10  Location: right knee      Objective      Objective Measures updated at progress report unless specified.     Treatment     Gideon received the treatments listed below:      therapeutic exercises/procedures to develop strength, flexibility, and function for 35 minutes including:  Iliopsoas stretch  ARROM B hip adduction/internal rotation with manual resist  Bridging with bolster underneath thigh 20  Donning/doffing prosthesis both residual limbs.  Standing/weight bearing  Ace wrap stump shrinking right residual limb.    Patient Education and Home Exercises       Education provided:   -Ace bandaging  Home exercises to residual limb  Stump desensitization.    Written Home Exercises Provided: yes. Exercises were reviewed and Gideon was able to demonstrate them prior to the end of the session.  Gideon demonstrated good  understanding of the education provided. See EMR under Patient Instructions for " exercises provided during therapy sessions    Assessment     Gideon unable to make the pin click in place to right stump. Admits having no opportunities to keep them on at home. Appears to have tissue bulk makes the fit uncomfortable and tight. Not able to tolerate the weight bearing from increased pain.  Instructed on wearing  and if not use bandaging to keep stump shrunk.  Tolerated Rx fait    Gideon Is not progressing well towards his goals.   Pt prognosis is Good.     Pt will continue to benefit from skilled outpatient physical therapy to address the deficits listed in the problem list box on initial evaluation, provide pt/family education and to maximize pt's level of independence in the home and community environment.     Pt's spiritual, cultural and educational needs considered and pt agreeable to plan of care and goals.     Anticipated barriers to physical therapy: no follow through at home.    Short Term Goals: 3 weeks   Patient will report compliance with safety precautions, prosthetic wear and home program.  2.   Patient will ambulate with both prosthesis with min A // bars.  3.   Patient will acquire a better prosthetic fit of the socket.     Long Term Goals: 10 weeks   Patient will be modified independent with RW ambulation 50 ft household level.  Patient will tolerate prosthetic wear >4 hours.  Patient will be independent with donning/doffing prosthesis.  Patient will tolerate standing activities > 30 minutes on prosthesis    Plan     Prosthetic training  Stump care  Gait training  Continue Plan of care.    Hoang Cantrell, PT

## 2023-06-19 ENCOUNTER — TELEPHONE (OUTPATIENT)
Dept: REHABILITATION | Facility: HOSPITAL | Age: 48
End: 2023-06-19
Payer: MEDICAID

## 2023-06-21 ENCOUNTER — CLINICAL SUPPORT (OUTPATIENT)
Dept: REHABILITATION | Facility: HOSPITAL | Age: 48
End: 2023-06-21
Payer: MEDICAID

## 2023-06-21 DIAGNOSIS — Z89.511 S/P BELOW KNEE AMPUTATION, RIGHT: Primary | ICD-10-CM

## 2023-06-21 PROCEDURE — 97110 THERAPEUTIC EXERCISES: CPT | Mod: PN,CQ

## 2023-06-21 NOTE — PROGRESS NOTES
[facility]   Physical Therapy Treatment Note      Name: Gideon Ross  Clinic Number: [id number]    Therapy Diagnosis:   Encounter Diagnosis   Name Primary?    S/P below knee amputation, right Yes     Physician: Jolanta Downey NP    Visit Date: 6/21/2023     Physician Orders: PT Eval and Treat   Medical Diagnosis from Referral: Z96.659 (ICD-10-CM) - Presence of artificial knee joint  Evaluation Date: 5/22/2023  Authorization Period Expiration: 12/31/23  Plan of Care Expiration: 8/7/23    Visit # / Visits authorized: 2/12     Precautions: Standard and Fall     Time In: 1310 (late arrival)  Time Out: 1345  Total Billable Time: 35 minutes    Subjective     Pt reports: pain in Right residual limb that increases with weight bear, reports unable to put on prosthesis correctly.  He was compliant with home exercise program.  Response to previous treatment: no problems stated  Functional change: ongoing    Pain: 8/10  Location: right knee      Objective      Objective Measures updated at progress report unless specified.     Treatment     Gideon received the treatments listed below:      therapeutic exercises/procedures to develop strength, and function for 15 minutes including:    Supine:  Hip flexion 20 times Right Left   Side lying hip abduction 20 times Right Left with verbal cues / tactile cues for proper alignment    Prone hip extension 20 times Right Left with verbal cues / tactile cues for proper alignment    Prosthetic training to develop independence with donning/doffing and mobility for 20 minutes:    Donning Bilateral prosthesis seated with moderate assistance to provide force     Donning of Left prosthesis with stand x1 and assist for alignment    Donning of prosthesis Right Left with verbal cues to use a solid surface with knees extended to allow for force and alignment (x2)    [place] Bilateral prosthesis independently    Gait in parallel bars 12 feet x2     Gait from parallel  bars with rolling walker contact guard assistance 5 feet to total assist after loss of balance, stood with contact guard assistance with rolling walker until Wheelchair was brought to allow for sitting with assist for direction.     Patient Education and Home Exercises       Education provided:   - Patient provided with verbal and demonstrative instruction for all activities performed in today's session.   - Patient instructed to make an appointment with prosthetist to address issues of pain of Right residual limb with weight bear   - Practice putting prosthetics on and leaving on for prolonged period before taking off.    Written Home Exercises Provided: Patient instructed to cont prior HEP. See EMR under Patient Instructions for exercises provided during therapy sessions    Assessment     Gideon provided fair/good participation and effort during today's session with treatment focused on prosthetic training and lower extremity exercises with proper form. Gideon tolerated repetitive donning and doffing of Bilateral prosthesis with problem solving to allow for independent donning and was able to get prosthesis to click into place with knee extended and pushing into a solid surface for Right and Left. Gideon stated he could use the doorframe at home for this purpose.  Gideon did not tolerate standing and had loss of balance after walking a short distance with rolling walker and stated increased pain in Right residual limb caused him to lose his balance. Gideon was  able to maintain balance with assistance to contact guard assistance before chair was retrieved and was returned safely to Wheelchair.    Gideon Is progressing towards his goals.   Pt prognosis is Good.     Pt will continue to benefit from skilled outpatient physical therapy to address the deficits listed in the problem list box on initial evaluation, provide pt/family education and to maximize pt's level of independence in the home and community environment.      Pt's spiritual, cultural and educational needs considered and pt agreeable to plan of care and goals.     Anticipated barriers to physical therapy: no follow through at home.    Short Term Goals: 3 weeks   Patient will report compliance with safety precautions, prosthetic wear and home program. (Ongoing)  2.   Patient will ambulate with both prosthesis with min A // bars. (Progressing)  3.   Patient will acquire a better prosthetic fit of the socket. (Ongoing)     Long Term Goals: 10 weeks   Patient will be modified independent with RW ambulation 50 ft household level. (Ongoing)  Patient will tolerate prosthetic wear >4 hours. (Ongoing)  Patient will be independent with donning/doffing prosthesis. (Ongoing)  Patient will tolerate standing activities > 30 minutes on prosthesis (ongoing)    Plan     Prosthetic training  Stump care  Gait training  Continue Plan of care.    Charlette Hendrix, PTA

## 2023-06-28 ENCOUNTER — CLINICAL SUPPORT (OUTPATIENT)
Dept: REHABILITATION | Facility: HOSPITAL | Age: 48
End: 2023-06-28
Payer: MEDICAID

## 2023-06-28 ENCOUNTER — DOCUMENTATION ONLY (OUTPATIENT)
Dept: REHABILITATION | Facility: HOSPITAL | Age: 48
End: 2023-06-28
Payer: MEDICAID

## 2023-06-28 DIAGNOSIS — Z89.511 S/P BELOW KNEE AMPUTATION, RIGHT: Primary | ICD-10-CM

## 2023-06-28 PROCEDURE — 97110 THERAPEUTIC EXERCISES: CPT | Mod: PN,CQ

## 2023-06-28 NOTE — PROGRESS NOTES
PT/PTA met face to face to discuss pt's treatment plan and progress towards established goals. Pt will be seen by a physical therapist minimally every 6th visit or every 30 days.    Charlette Hendrix PTA

## 2023-06-28 NOTE — PROGRESS NOTES
"[facility]   Physical Therapy Treatment Note      Name: Gideon Ross  Clinic Number: [id number]    Therapy Diagnosis:   Encounter Diagnosis   Name Primary?    S/P below knee amputation, right Yes     Physician: Jolanta Downey NP    Visit Date: 6/28/2023     Physician Orders: PT Eval and Treat   Medical Diagnosis from Referral: Z96.659 (ICD-10-CM) - Presence of artificial knee joint  Evaluation Date: 5/22/2023  Authorization Period Expiration: 12/31/23  Plan of Care Expiration: 8/7/23  Visit # / Visits authorized: 3/12     Precautions: Standard and Fall     Time In: 1315 (late arrival)  Time Out: 1345  Total Billable Time: 30 minutes    Subjective     Pt reports: Doing better today, went to prosthetist where corrections were made to Right prosthesis. Patient reporting Bilateral Lower Extremities "are holding fluid". Patient reports having stump  for Bilateral residual limbs, reports was taught how to wrap with ace wrap and "can just leave sleeves on".  He reports was compliant with home exercise program where family agreed, reported donning prosthesis where family disagreed  Response to previous treatment: no problems stated  Functional change: ongoing    Pain: 0/10  Location: Not Applicable     Objective      Objective Measures updated at progress report unless specified.     Treatment     Gideon received the treatments listed below:      therapeutic exercises/procedures to develop strength, and function for 15 minutes including:    Prone:  Hip extension 2 sets of 20 Right Left   Hip abduction 2 sets of 20 Right Left with verbal cues / tactile cues to maintain prone position    therapeutic activities to improve functional performance for 15  minutes, including:    Donning Bilateral prosthesis with minimal assistance and verbal cues for increased independence, unable to secure prosthesis    Parallel bars:  Sit to stand moderate assistance with moderate assistance and patient " using Bilateral Upper Extremities   Weight shifting Right Left to secure prosthesis where Right prosthesis was able to click and Left unable  Sit to stand with Bilateral Upper Extremities and Right weight bear minimal assistance for attempt to secure Left prosthesis without success  Doffed Bilateral prosthesis standby assistance     Wheelchair <> Mat supervision with supervision for set up    Wheelchair mobility 2x130 feet Modified Independent     Patient Education and Home Exercises       Education provided:   - Patient provided with verbal and demonstrative instruction for all activities performed in today's session.    - Wear Bilateral stump  or wrap Bilateral residual limbs with ace wrap as per instructed to keep Bilateral Lower Extremities from holding fluid    Written Home Exercises Provided: Patient instructed to cont prior HEP. See EMR under Patient Instructions for exercises provided during therapy sessions    Assessment     Gideon provided fair participation and effort during today's session with treatment focused on Bilateral Lower Extremity strengthening and mobility after unable to use prosthesis today for ambulation.  Gideon was upset about inability to get prosthesis on and was instructed in daily wear of Bilateral  or wrapping with ace wrap if not going to wear sleeves, patient reported understanding.    Gideon Is progressing towards his goals.   Pt prognosis is Good.     Pt will continue to benefit from skilled outpatient physical therapy to address the deficits listed in the problem list box on initial evaluation, provide pt/family education and to maximize pt's level of independence in the home and community environment.     Pt's spiritual, cultural and educational needs considered and pt agreeable to plan of care and goals.     Anticipated barriers to physical therapy: no follow through at home.    Short Term Goals: 3 weeks   Patient will report compliance with safety precautions,  prosthetic wear and home program. (Ongoing)  2.   Patient will ambulate with both prosthesis with min A // bars. (Progressing)  3.   Patient will acquire a better prosthetic fit of the socket. (Ongoing)     Long Term Goals: 10 weeks   Patient will be modified independent with RW ambulation 50 ft household level. (Ongoing)  Patient will tolerate prosthetic wear >4 hours. (Ongoing)  Patient will be independent with donning/doffing prosthesis. (Ongoing)  Patient will tolerate standing activities > 30 minutes on prosthesis (ongoing)    Plan     Continue with plan of care for ambulation with rolling walker and Bilateral lower extremity prosthesis.    Charlette Hendrix, PTA

## 2023-07-03 ENCOUNTER — TELEPHONE (OUTPATIENT)
Dept: REHABILITATION | Facility: HOSPITAL | Age: 48
End: 2023-07-03
Payer: MEDICAID

## 2023-07-05 ENCOUNTER — TELEPHONE (OUTPATIENT)
Dept: REHABILITATION | Facility: HOSPITAL | Age: 48
End: 2023-07-05
Payer: MEDICAID

## 2023-07-11 ENCOUNTER — DOCUMENTATION ONLY (OUTPATIENT)
Dept: REHABILITATION | Facility: HOSPITAL | Age: 48
End: 2023-07-11
Payer: MEDICAID

## 2023-07-12 ENCOUNTER — TELEPHONE (OUTPATIENT)
Dept: REHABILITATION | Facility: HOSPITAL | Age: 48
End: 2023-07-12
Payer: MEDICAID

## 2023-07-12 NOTE — TELEPHONE ENCOUNTER
Called regarding missed visit, Tricia stated Gideon was hospitalized that she would let Gideon know to call the  about the rest of his schedule.

## 2023-07-17 ENCOUNTER — CLINICAL SUPPORT (OUTPATIENT)
Dept: REHABILITATION | Facility: HOSPITAL | Age: 48
End: 2023-07-17
Payer: MEDICAID

## 2023-07-17 DIAGNOSIS — Z89.511 S/P BELOW KNEE AMPUTATION, RIGHT: Primary | ICD-10-CM

## 2023-07-17 DIAGNOSIS — R26.9 GAIT DIFFICULTY: ICD-10-CM

## 2023-07-17 DIAGNOSIS — Z89.612 S/P AKA (ABOVE KNEE AMPUTATION) UNILATERAL, LEFT: ICD-10-CM

## 2023-07-17 PROBLEM — Z96.659 ARTIFICIAL KNEE JOINT PRESENT: Status: ACTIVE | Noted: 2023-07-17

## 2023-07-17 PROCEDURE — 97110 THERAPEUTIC EXERCISES: CPT | Mod: PN

## 2023-07-17 NOTE — PROGRESS NOTES
[facility]   Physical Therapy Treatment Note      Name: Gideon Ross  Clinic Number: [id number]    Therapy Diagnosis:   Encounter Diagnoses   Name Primary?    S/P below knee amputation, right Yes    Gait difficulty     S/P AKA (above knee amputation) unilateral, left      Physician: Jolanta Downey NP    Visit Date: 7/17/2023     Physician Orders: PT Eval and Treat   Medical Diagnosis from Referral: Z96.659 (ICD-10-CM) - Presence of artificial knee joint  Evaluation Date: 5/22/2023  Authorization Period Expiration: 12/31/23  Plan of Care Expiration: 8/7/23     Visit # / Visits authorized: 4/12  (5)     Precautions: Standard and Fall     Time In: 1301  Time Out: 1345  Total Billable Time: 44 minutes    Subjective     Pt reports: right shoulder pain ; Right stump end pain on weight bearing   He was compliant with home exercise program.  Response to previous treatment: unable to use prosthesis to fit for ambulation  Functional change:ambulates with RW    Pain: a .5/10        b. 6/10  Location:  a. right shoulder;      b. R stump on on weight bearing    Objective      Presents to facility donning both prostheses,    Treatment     Gideon received the treatments listed below:      therapeutic exercises/procedures to develop strength, flexibility, and function for 44 minutes including:  Iliopsoas stretch  ARROM B hip adduction/internal rotation with manual resist  Bridging with bolster underneath thigh 20  Standing/weight bearing  Ambulated // bars 8 ft; 16ft x 2  Ambulated with RW 65 ft    Patient Education and Home Exercises       Education provided:   Stump desensitization.    Written Home Exercises Provided: yes. Exercises were reviewed and Gideon was able to demonstrate them prior to the end of the session.  Gideon demonstrated good  understanding of the education provided. See EMR under Patient Instructions for exercises provided during therapy sessions    Assessment     Gideon increased  pain after weight bearing to right stump end. Noted loose prosthesis right stump.  Patient ks doing much better this visit.  Tolerated Rx well    Gideon Is not progressing well towards his goals.   Pt prognosis is Good.     Pt will continue to benefit from skilled outpatient physical therapy to address the deficits listed in the problem list box on initial evaluation, provide pt/family education and to maximize pt's level of independence in the home and community environment.     Pt's spiritual, cultural and educational needs considered and pt agreeable to plan of care and goals.     Anticipated barriers to physical therapy: no follow through at home.    Short Term Goals:   Patient will report compliance with safety precautions, prosthetic wear and home program. (Ongoing)  2.   Patient will ambulate with both prosthesis with min A // bars. (Met)  3.   Patient will acquire a better prosthetic fit of the socket. (Ongoing)     Long Term Goals: 10 weeks   Patient will be modified independent with RW ambulation 50 ft household level. (Ongoing)  Patient will tolerate prosthetic wear >4 hours. (Ongoing)  Patient will be independent with donning/doffing prosthesis. (Ongoing)  Patient will tolerate standing activities > 30 minutes on prosthesis   (ongoing)    Plan     Prosthetic training  Stump care  Gait training  Continue Plan of care.    Hoang Cantrell, PT

## 2023-07-31 ENCOUNTER — CLINICAL SUPPORT (OUTPATIENT)
Dept: REHABILITATION | Facility: HOSPITAL | Age: 48
End: 2023-07-31
Payer: MEDICAID

## 2023-07-31 DIAGNOSIS — R26.9 GAIT DIFFICULTY: ICD-10-CM

## 2023-07-31 DIAGNOSIS — Z89.511 S/P BELOW KNEE AMPUTATION, RIGHT: Primary | ICD-10-CM

## 2023-07-31 DIAGNOSIS — Z89.612 S/P AKA (ABOVE KNEE AMPUTATION) UNILATERAL, LEFT: ICD-10-CM

## 2023-07-31 PROCEDURE — 97110 THERAPEUTIC EXERCISES: CPT | Mod: PN

## 2023-07-31 NOTE — PROGRESS NOTES
[facility]  Physical Therapy Discharge Note    Name: Gideon Ross  [facility] Number: [id number]    Therapy Diagnosis:   Encounter Diagnoses   Name Primary?    S/P below knee amputation, right Yes    Gait difficulty     S/P AKA (above knee amputation) unilateral, left      Physician: Jolanta Downey NP    Physician Orders: PT eval and treat  Medical Diagnosis: Z96.659 (ICD-10-CM) - Presence of artificial knee joint  Evaluation Date: 5/22/23      Date of Last visit: 7/31/23  Total Visits Received: 6    Today's Visit:  Time IN: 1305  Time Out: 1345  Total time in minutes: 40    Treatment: Thera Ex 40  Includes:  Crutch mat push ups 20  Iliopsoas stretch  Bridging using bolster on thigh 20  Hip adduction manual resist 20/20  Hip flexion manual resist 10/10  Hip internal rotation manual resist 20/20  Ambulated with prosthetic limb 40 ft using RW.  Reviewed Home exercises.      ASSESSMENT    Patient seen x 6 visits in therapy. Had made good progress the last few sessions.. Had been missing visit secondary to transport problem. Prosthesis need readjustment and patient does not want to continue in therapy until readjusted. Request to DC from therapy and wants to pursue ambulation with family support.    Discharge reason: Patient requested discharge    Discharge FOTO Score: 57/100  Goals:  Short Term Goals:   Patient will report compliance with safety precautions, prosthetic wear and home program. (Met)  2.   Patient will ambulate with both prosthesis with min A // bars. (Met)  3.   Patient will acquire a better prosthetic fit of the socket. (Unmet- need follow up with prosthetist)     Long Term Goals:   Patient will be modified independent with RW ambulation 50 ft household level. (Unmet-needs CGA)  Patient will tolerate prosthetic wear >4 hours.(Reportedly met)  Patient will be independent with donning/doffing prosthesis.(Met)  Patient will tolerate standing activities > 30 minutes on  prosthesis. (Partially met)       PLAN   This patient is discharged from Physical Therapy  Wants to continue independently with family support at home.    Hoang Cantrell, PT

## 2023-08-08 ENCOUNTER — TELEPHONE (OUTPATIENT)
Dept: FAMILY MEDICINE | Facility: CLINIC | Age: 48
End: 2023-08-08
Payer: MEDICAID

## 2023-08-08 NOTE — TELEPHONE ENCOUNTER
----- Message from Delfino Herrera sent at 8/8/2023  1:40 PM CDT -----  Regarding: appt  Type:  Sooner Apoointment Request      Name of Caller:pt    When is the first available appointment?dept booked    Symptoms:      Best Call Back Number:[phone]      Additional Information: pt is looking to est care. please call to discuss.

## 2023-10-09 ENCOUNTER — TELEPHONE (OUTPATIENT)
Dept: HEMATOLOGY/ONCOLOGY | Facility: CLINIC | Age: 48
End: 2023-10-09
Payer: MEDICAID

## 2023-10-09 NOTE — NURSING
Returned pt's debra's call. Pt is needing to get set up to see a gastrologist. Informed pt's wife this is the cancer center, but I could forward the call the the [facility] in [place], and provide her with the main [facility] number to schedule. Pt's finance stated they wanted [place], provided her with [facility] [place] number [phone] to get scheduled.

## 2023-10-09 NOTE — TELEPHONE ENCOUNTER
----- Message from Ronald Herrmann sent at 10/9/2023 11:40 AM CDT -----  Type: Need Medical Advice   Who Called: Debra mckeon patient  Best callback number: [phone]  Additional Information: debra of patient called to schedule an appointment for the patient to see a gastro Dr. He has an appointment scheduled in Jan but its too far away for the appointment and the travel   Please call to further assist, Thanks.

## 2023-10-11 ENCOUNTER — TELEPHONE (OUTPATIENT)
Dept: GASTROENTEROLOGY | Facility: CLINIC | Age: 48
End: 2023-10-11
Payer: MEDICAID

## 2025-01-09 ENCOUNTER — HOSPITAL ENCOUNTER (INPATIENT)
Facility: HOSPITAL | Age: 50
LOS: 17 days | Discharge: HOME | DRG: 291 | End: 2025-01-26
Attending: EMERGENCY MEDICINE | Admitting: STUDENT IN AN ORGANIZED HEALTH CARE EDUCATION/TRAINING PROGRAM
Payer: MEDICAID

## 2025-01-09 ENCOUNTER — CLINICAL SUPPORT (OUTPATIENT)
Dept: CARDIOLOGY | Facility: HOSPITAL | Age: 50
End: 2025-01-09
Attending: INTERNAL MEDICINE
Payer: MEDICAID

## 2025-01-09 DIAGNOSIS — F11.20 HEROIN ADDICTION: ICD-10-CM

## 2025-01-09 DIAGNOSIS — I50.21 ACUTE SYSTOLIC CONGESTIVE HEART FAILURE: ICD-10-CM

## 2025-01-09 DIAGNOSIS — R07.9 CHEST PAIN: Primary | ICD-10-CM

## 2025-01-09 DIAGNOSIS — I21.4 NSTEMI (NON-ST ELEVATED MYOCARDIAL INFARCTION): ICD-10-CM

## 2025-01-09 DIAGNOSIS — I50.9 CONGESTIVE HEART FAILURE, UNSPECIFIED HF CHRONICITY, UNSPECIFIED HEART FAILURE TYPE: ICD-10-CM

## 2025-01-09 DIAGNOSIS — Z13.6 SCREENING FOR CARDIOVASCULAR CONDITION: ICD-10-CM

## 2025-01-09 DIAGNOSIS — Z89.612 S/P AKA (ABOVE KNEE AMPUTATION) UNILATERAL, LEFT: ICD-10-CM

## 2025-01-09 PROBLEM — R79.89 ELEVATED TROPONIN: Status: ACTIVE | Noted: 2025-01-09

## 2025-01-09 PROBLEM — N50.89 SCROTAL EDEMA: Status: ACTIVE | Noted: 2025-01-09

## 2025-01-09 PROBLEM — E11.9 DIABETES MELLITUS: Status: ACTIVE | Noted: 2025-01-09

## 2025-01-09 LAB
ALBUMIN SERPL BCP-MCNC: 2.5 G/DL (ref 3.5–5.2)
ALP SERPL-CCNC: 94 U/L (ref 55–135)
ALT SERPL W/O P-5'-P-CCNC: 10 U/L (ref 10–44)
AMPHET+METHAMPHET UR QL: ABNORMAL
ANION GAP SERPL CALC-SCNC: 6 MMOL/L (ref 8–16)
AORTIC ROOT ANNULUS: 2.5 CM
AORTIC VALVE CUSP SEPERATION: 2 CM
APICAL FOUR CHAMBER EJECTION FRACTION: 35 %
APICAL TWO CHAMBER EJECTION FRACTION: 12 %
APTT PPP: 28.4 SEC (ref 21–32)
APTT PPP: 45.2 SEC (ref 21–32)
AST SERPL-CCNC: 17 U/L (ref 10–40)
AV INDEX (PROSTH): 0.83
AV MEAN GRADIENT: 2 MMHG
AV PEAK GRADIENT: 2.6 MMHG
AV VALVE AREA BY VELOCITY RATIO: 2.7 CM²
AV VALVE AREA: 2.6 CM²
AV VELOCITY RATIO: 0.88
BACTERIA #/AREA URNS HPF: ABNORMAL /HPF
BARBITURATES UR QL SCN>200 NG/ML: NEGATIVE
BASOPHILS # BLD AUTO: 0.08 K/UL (ref 0–0.2)
BASOPHILS NFR BLD: 0.8 % (ref 0–1.9)
BENZODIAZ UR QL SCN>200 NG/ML: NEGATIVE
BILIRUB SERPL-MCNC: 0.7 MG/DL (ref 0.1–1)
BILIRUB UR QL STRIP: NEGATIVE
BNP SERPL-MCNC: 3449 PG/ML (ref 0–99)
BUN SERPL-MCNC: 22 MG/DL (ref 6–20)
BZE UR QL SCN: NEGATIVE
CALCIUM SERPL-MCNC: 7.7 MG/DL (ref 8.7–10.5)
CANNABINOIDS UR QL SCN: ABNORMAL
CHLORIDE SERPL-SCNC: 106 MMOL/L (ref 95–110)
CLARITY UR: ABNORMAL
CO2 SERPL-SCNC: 24 MMOL/L (ref 23–29)
COLOR UR: YELLOW
CREAT SERPL-MCNC: 1 MG/DL (ref 0.5–1.4)
CREAT UR-MCNC: 157.6 MG/DL (ref 23–375)
CV ECHO LV RWT: 0.45 CM
DIFFERENTIAL METHOD BLD: ABNORMAL
DOP CALC AO PEAK VEL: 0.8 M/S
DOP CALC AO VTI: 11.1 CM
DOP CALC LVOT AREA: 3.1 CM2
DOP CALC LVOT DIAMETER: 2 CM
DOP CALC LVOT PEAK VEL: 0.7 M/S
DOP CALC LVOT STROKE VOLUME: 28.9 CM3
DOP CALC MV VTI: 18.7 CM
DOP CALCLVOT PEAK VEL VTI: 9.2 CM
E WAVE DECELERATION TIME: 151 MSEC
E/E' RATIO: 16.5 M/S
ECHO LV POSTERIOR WALL: 1.3 CM (ref 0.6–1.1)
EOSINOPHIL # BLD AUTO: 0.7 K/UL (ref 0–0.5)
EOSINOPHIL NFR BLD: 6.4 % (ref 0–8)
ERYTHROCYTE [DISTWIDTH] IN BLOOD BY AUTOMATED COUNT: 14.6 % (ref 11.5–14.5)
EST. GFR  (NO RACE VARIABLE): >60 ML/MIN/1.73 M^2
FRACTIONAL SHORTENING: 8.6 % (ref 28–44)
GLUCOSE SERPL-MCNC: 185 MG/DL (ref 70–110)
GLUCOSE UR QL STRIP: ABNORMAL
HCT VFR BLD AUTO: 37.6 % (ref 40–54)
HGB BLD-MCNC: 11.9 G/DL (ref 14–18)
HGB UR QL STRIP: ABNORMAL
HYALINE CASTS #/AREA URNS LPF: 4 /LPF
IMM GRANULOCYTES # BLD AUTO: 0.04 K/UL (ref 0–0.04)
IMM GRANULOCYTES NFR BLD AUTO: 0.4 % (ref 0–0.5)
INR PPP: 1.1 (ref 0.8–1.2)
INTERVENTRICULAR SEPTUM: 1 CM (ref 0.6–1.1)
IVC DIAMETER: 2.41 CM
KETONES UR QL STRIP: NEGATIVE
LDH SERPL L TO P-CCNC: 1.03 MMOL/L (ref 0.5–2.2)
LEFT INTERNAL DIMENSION IN SYSTOLE: 5.3 CM (ref 2.1–4)
LEFT VENTRICLE DIASTOLIC VOLUME: 166 ML
LEFT VENTRICLE END DIASTOLIC VOLUME APICAL 2 CHAMBER: 130 ML
LEFT VENTRICLE END DIASTOLIC VOLUME APICAL 4 CHAMBER: 138 ML
LEFT VENTRICLE SYSTOLIC VOLUME: 137 ML
LEFT VENTRICULAR INTERNAL DIMENSION IN DIASTOLE: 5.8 CM (ref 3.5–6)
LEFT VENTRICULAR MASS: 280.4 G
LEUKOCYTE ESTERASE UR QL STRIP: NEGATIVE
LV LATERAL E/E' RATIO: 12.38 M/S
LV SEPTAL E/E' RATIO: 24.75 M/S
LVED V (TEICH): 166 ML
LVES V (TEICH): 137 ML
LVOT MG: 1 MMHG
LVOT MV: 0.46 CM/S
LYMPHOCYTES # BLD AUTO: 1.7 K/UL (ref 1–4.8)
LYMPHOCYTES NFR BLD: 15.8 % (ref 18–48)
MAGNESIUM SERPL-MCNC: 1.7 MG/DL (ref 1.6–2.6)
MCH RBC QN AUTO: 26.4 PG (ref 27–31)
MCHC RBC AUTO-ENTMCNC: 31.6 G/DL (ref 32–36)
MCV RBC AUTO: 84 FL (ref 82–98)
MICROSCOPIC COMMENT: ABNORMAL
MONOCYTES # BLD AUTO: 0.9 K/UL (ref 0.3–1)
MONOCYTES NFR BLD: 8.8 % (ref 4–15)
MV MEAN GRADIENT: 3 MMHG
MV PEAK E VEL: 0.99 M/S
MV PEAK GRADIENT: 4 MMHG
MV VALVE AREA BY CONTINUITY EQUATION: 1.54 CM2
NEUTROPHILS # BLD AUTO: 7.1 K/UL (ref 1.8–7.7)
NEUTROPHILS NFR BLD: 67.8 % (ref 38–73)
NITRITE UR QL STRIP: NEGATIVE
NRBC BLD-RTO: 0 /100 WBC
OHS LV EJECTION FRACTION SIMPSONS BIPLANE MOD: 25 %
OPIATES UR QL SCN: ABNORMAL
PCP UR QL SCN>25 NG/ML: NEGATIVE
PH UR STRIP: 6 [PH] (ref 5–8)
PISA MRMAX VEL: 3.53 M/S
PISA TR MAX VEL: 3.18 M/S
PLATELET # BLD AUTO: 251 K/UL (ref 150–450)
PMV BLD AUTO: 10.3 FL (ref 9.2–12.9)
POCT GLUCOSE: 163 MG/DL (ref 70–110)
POCT GLUCOSE: 174 MG/DL (ref 70–110)
POTASSIUM SERPL-SCNC: 3.7 MMOL/L (ref 3.5–5.1)
PROT SERPL-MCNC: 5.5 G/DL (ref 6–8.4)
PROT UR QL STRIP: ABNORMAL
PROTHROMBIN TIME: 12.3 SEC (ref 9–12.5)
PV MV: 0.69 M/S
PV PEAK GRADIENT: 4 MMHG
PV PEAK VELOCITY: 0.98 M/S
RA PRESSURE ESTIMATED: 15 MMHG
RBC # BLD AUTO: 4.5 M/UL (ref 4.6–6.2)
RBC #/AREA URNS HPF: 7 /HPF (ref 0–4)
RV TB RVSP: 18 MMHG
RV TISSUE DOPPLER FREE WALL SYSTOLIC VELOCITY 1 (APICAL 4 CHAMBER VIEW): 7.62 CM/S
SAMPLE: NORMAL
SODIUM SERPL-SCNC: 136 MMOL/L (ref 136–145)
SP GR UR STRIP: 1.02 (ref 1–1.03)
SQUAMOUS #/AREA URNS HPF: 0 /HPF
TDI LATERAL: 0.08 M/S
TDI SEPTAL: 0.04 M/S
TDI: 0.06 M/S
TOXICOLOGY INFORMATION: ABNORMAL
TR MAX PG: 40 MMHG
TRICUSPID ANNULAR PLANE SYSTOLIC EXCURSION: 1.73 CM
TROPONIN I SERPL HS-MCNC: 3343 PG/ML (ref 0–14.9)
TROPONIN I SERPL HS-MCNC: 3369.7 PG/ML (ref 0–14.9)
TV REST PULMONARY ARTERY PRESSURE: 55 MMHG
URN SPEC COLLECT METH UR: ABNORMAL
UROBILINOGEN UR STRIP-ACNC: ABNORMAL EU/DL
WBC # BLD AUTO: 10.51 K/UL (ref 3.9–12.7)
WBC #/AREA URNS HPF: 4 /HPF (ref 0–5)

## 2025-01-09 PROCEDURE — 63600175 PHARM REV CODE 636 W HCPCS: Performed by: STUDENT IN AN ORGANIZED HEALTH CARE EDUCATION/TRAINING PROGRAM

## 2025-01-09 PROCEDURE — 87040 BLOOD CULTURE FOR BACTERIA: CPT | Mod: 59 | Performed by: EMERGENCY MEDICINE

## 2025-01-09 PROCEDURE — 25000003 PHARM REV CODE 250: Performed by: EMERGENCY MEDICINE

## 2025-01-09 PROCEDURE — 94761 N-INVAS EAR/PLS OXIMETRY MLT: CPT

## 2025-01-09 PROCEDURE — 36415 COLL VENOUS BLD VENIPUNCTURE: CPT | Performed by: EMERGENCY MEDICINE

## 2025-01-09 PROCEDURE — 93005 ELECTROCARDIOGRAM TRACING: CPT | Performed by: INTERNAL MEDICINE

## 2025-01-09 PROCEDURE — 81001 URINALYSIS AUTO W/SCOPE: CPT | Performed by: EMERGENCY MEDICINE

## 2025-01-09 PROCEDURE — 25000003 PHARM REV CODE 250: Performed by: STUDENT IN AN ORGANIZED HEALTH CARE EDUCATION/TRAINING PROGRAM

## 2025-01-09 PROCEDURE — 93010 ELECTROCARDIOGRAM REPORT: CPT | Mod: ,,, | Performed by: INTERNAL MEDICINE

## 2025-01-09 PROCEDURE — 83880 ASSAY OF NATRIURETIC PEPTIDE: CPT | Performed by: EMERGENCY MEDICINE

## 2025-01-09 PROCEDURE — 84484 ASSAY OF TROPONIN QUANT: CPT | Performed by: EMERGENCY MEDICINE

## 2025-01-09 PROCEDURE — 83735 ASSAY OF MAGNESIUM: CPT | Performed by: EMERGENCY MEDICINE

## 2025-01-09 PROCEDURE — 85025 COMPLETE CBC W/AUTO DIFF WBC: CPT | Performed by: EMERGENCY MEDICINE

## 2025-01-09 PROCEDURE — 63600175 PHARM REV CODE 636 W HCPCS: Performed by: EMERGENCY MEDICINE

## 2025-01-09 PROCEDURE — 21000000 HC CCU ICU ROOM CHARGE

## 2025-01-09 PROCEDURE — 80053 COMPREHEN METABOLIC PANEL: CPT | Performed by: EMERGENCY MEDICINE

## 2025-01-09 PROCEDURE — 85610 PROTHROMBIN TIME: CPT | Performed by: EMERGENCY MEDICINE

## 2025-01-09 PROCEDURE — 80307 DRUG TEST PRSMV CHEM ANLYZR: CPT | Performed by: EMERGENCY MEDICINE

## 2025-01-09 PROCEDURE — 85730 THROMBOPLASTIN TIME PARTIAL: CPT | Performed by: EMERGENCY MEDICINE

## 2025-01-09 PROCEDURE — 93306 TTE W/DOPPLER COMPLETE: CPT

## 2025-01-09 PROCEDURE — 99223 1ST HOSP IP/OBS HIGH 75: CPT | Mod: ,,, | Performed by: INTERNAL MEDICINE

## 2025-01-09 PROCEDURE — 85730 THROMBOPLASTIN TIME PARTIAL: CPT | Mod: 91 | Performed by: STUDENT IN AN ORGANIZED HEALTH CARE EDUCATION/TRAINING PROGRAM

## 2025-01-09 RX ORDER — IBUPROFEN 200 MG
24 TABLET ORAL
Status: DISCONTINUED | OUTPATIENT
Start: 2025-01-09 | End: 2025-01-26 | Stop reason: HOSPADM

## 2025-01-09 RX ORDER — CARVEDILOL 3.12 MG/1
3.12 TABLET ORAL 2 TIMES DAILY
Status: DISCONTINUED | OUTPATIENT
Start: 2025-01-09 | End: 2025-01-09

## 2025-01-09 RX ORDER — MORPHINE SULFATE 4 MG/ML
4 INJECTION, SOLUTION INTRAMUSCULAR; INTRAVENOUS
Status: COMPLETED | OUTPATIENT
Start: 2025-01-09 | End: 2025-01-09

## 2025-01-09 RX ORDER — IBUPROFEN 200 MG
16 TABLET ORAL
Status: DISCONTINUED | OUTPATIENT
Start: 2025-01-09 | End: 2025-01-26 | Stop reason: HOSPADM

## 2025-01-09 RX ORDER — LANOLIN ALCOHOL/MO/W.PET/CERES
800 CREAM (GRAM) TOPICAL
Status: DISCONTINUED | OUTPATIENT
Start: 2025-01-09 | End: 2025-01-26 | Stop reason: HOSPADM

## 2025-01-09 RX ORDER — HYDROCODONE BITARTRATE AND ACETAMINOPHEN 5; 325 MG/1; MG/1
1 TABLET ORAL EVERY 6 HOURS PRN
Status: DISCONTINUED | OUTPATIENT
Start: 2025-01-09 | End: 2025-01-09

## 2025-01-09 RX ORDER — GLUCAGON 1 MG
1 KIT INJECTION
Status: DISCONTINUED | OUTPATIENT
Start: 2025-01-09 | End: 2025-01-26 | Stop reason: HOSPADM

## 2025-01-09 RX ORDER — ASPIRIN 325 MG
325 TABLET ORAL
Status: COMPLETED | OUTPATIENT
Start: 2025-01-09 | End: 2025-01-09

## 2025-01-09 RX ORDER — FUROSEMIDE 10 MG/ML
40 INJECTION INTRAMUSCULAR; INTRAVENOUS EVERY 12 HOURS
Status: DISCONTINUED | OUTPATIENT
Start: 2025-01-09 | End: 2025-01-10

## 2025-01-09 RX ORDER — ACETAMINOPHEN 325 MG/1
650 TABLET ORAL EVERY 4 HOURS PRN
Status: DISCONTINUED | OUTPATIENT
Start: 2025-01-09 | End: 2025-01-10

## 2025-01-09 RX ORDER — SODIUM,POTASSIUM PHOSPHATES 280-250MG
2 POWDER IN PACKET (EA) ORAL
Status: DISCONTINUED | OUTPATIENT
Start: 2025-01-09 | End: 2025-01-26 | Stop reason: HOSPADM

## 2025-01-09 RX ORDER — GABAPENTIN 300 MG/1
600 CAPSULE ORAL 3 TIMES DAILY
Status: DISCONTINUED | OUTPATIENT
Start: 2025-01-09 | End: 2025-01-12

## 2025-01-09 RX ORDER — LISINOPRIL 20 MG/1
40 TABLET ORAL DAILY
Status: DISCONTINUED | OUTPATIENT
Start: 2025-01-09 | End: 2025-01-09

## 2025-01-09 RX ORDER — DIAZEPAM 5 MG/1
5 TABLET ORAL EVERY 12 HOURS PRN
Status: DISCONTINUED | OUTPATIENT
Start: 2025-01-09 | End: 2025-01-18

## 2025-01-09 RX ORDER — NALOXONE HCL 0.4 MG/ML
0.02 VIAL (ML) INJECTION
Status: DISCONTINUED | OUTPATIENT
Start: 2025-01-09 | End: 2025-01-26 | Stop reason: HOSPADM

## 2025-01-09 RX ORDER — LISINOPRIL 10 MG/1
10 TABLET ORAL DAILY
Status: DISCONTINUED | OUTPATIENT
Start: 2025-01-10 | End: 2025-01-10

## 2025-01-09 RX ORDER — ONDANSETRON HYDROCHLORIDE 2 MG/ML
4 INJECTION, SOLUTION INTRAVENOUS EVERY 6 HOURS PRN
Status: DISCONTINUED | OUTPATIENT
Start: 2025-01-09 | End: 2025-01-26 | Stop reason: HOSPADM

## 2025-01-09 RX ORDER — BACLOFEN 5 MG/1
10 TABLET ORAL 3 TIMES DAILY
Status: DISCONTINUED | OUTPATIENT
Start: 2025-01-09 | End: 2025-01-12

## 2025-01-09 RX ORDER — INSULIN GLARGINE 100 [IU]/ML
30 INJECTION, SOLUTION SUBCUTANEOUS NIGHTLY
Status: ON HOLD | COMMUNITY
End: 2025-01-26 | Stop reason: HOSPADM

## 2025-01-09 RX ORDER — MICONAZOLE NITRATE 2 G/100G
POWDER TOPICAL 2 TIMES DAILY
Status: DISCONTINUED | OUTPATIENT
Start: 2025-01-09 | End: 2025-01-26 | Stop reason: HOSPADM

## 2025-01-09 RX ORDER — ACETAMINOPHEN 325 MG/1
650 TABLET ORAL EVERY 8 HOURS PRN
Status: DISCONTINUED | OUTPATIENT
Start: 2025-01-09 | End: 2025-01-10

## 2025-01-09 RX ORDER — ALUMINUM HYDROXIDE, MAGNESIUM HYDROXIDE, AND SIMETHICONE 1200; 120; 1200 MG/30ML; MG/30ML; MG/30ML
30 SUSPENSION ORAL 4 TIMES DAILY PRN
Status: DISCONTINUED | OUTPATIENT
Start: 2025-01-09 | End: 2025-01-26 | Stop reason: HOSPADM

## 2025-01-09 RX ORDER — ONDANSETRON HYDROCHLORIDE 2 MG/ML
4 INJECTION, SOLUTION INTRAVENOUS
Status: COMPLETED | OUTPATIENT
Start: 2025-01-09 | End: 2025-01-09

## 2025-01-09 RX ORDER — ATORVASTATIN CALCIUM 40 MG/1
80 TABLET, FILM COATED ORAL DAILY
Status: DISCONTINUED | OUTPATIENT
Start: 2025-01-09 | End: 2025-01-26 | Stop reason: HOSPADM

## 2025-01-09 RX ORDER — MORPHINE SULFATE 2 MG/ML
1 INJECTION, SOLUTION INTRAMUSCULAR; INTRAVENOUS EVERY 6 HOURS PRN
Status: DISCONTINUED | OUTPATIENT
Start: 2025-01-09 | End: 2025-01-10

## 2025-01-09 RX ORDER — SODIUM CHLORIDE 0.9 % (FLUSH) 0.9 %
10 SYRINGE (ML) INJECTION EVERY 12 HOURS PRN
Status: DISCONTINUED | OUTPATIENT
Start: 2025-01-09 | End: 2025-01-26 | Stop reason: HOSPADM

## 2025-01-09 RX ORDER — MUPIROCIN 20 MG/G
OINTMENT TOPICAL 2 TIMES DAILY
Status: COMPLETED | OUTPATIENT
Start: 2025-01-09 | End: 2025-01-14

## 2025-01-09 RX ORDER — HYDROCODONE BITARTRATE AND ACETAMINOPHEN 10; 325 MG/1; MG/1
1 TABLET ORAL EVERY 6 HOURS PRN
Status: DISCONTINUED | OUTPATIENT
Start: 2025-01-09 | End: 2025-01-10

## 2025-01-09 RX ORDER — ASPIRIN 81 MG/1
81 TABLET ORAL DAILY
Status: DISCONTINUED | OUTPATIENT
Start: 2025-01-10 | End: 2025-01-26 | Stop reason: HOSPADM

## 2025-01-09 RX ORDER — TALC
6 POWDER (GRAM) TOPICAL NIGHTLY PRN
Status: DISCONTINUED | OUTPATIENT
Start: 2025-01-09 | End: 2025-01-26 | Stop reason: HOSPADM

## 2025-01-09 RX ORDER — HEPARIN SODIUM,PORCINE/D5W 25000/250
0-40 INTRAVENOUS SOLUTION INTRAVENOUS CONTINUOUS
Status: DISCONTINUED | OUTPATIENT
Start: 2025-01-09 | End: 2025-01-10

## 2025-01-09 RX ORDER — SODIUM CHLORIDE 0.9 % (FLUSH) 0.9 %
10 SYRINGE (ML) INJECTION
Status: DISCONTINUED | OUTPATIENT
Start: 2025-01-09 | End: 2025-01-26 | Stop reason: HOSPADM

## 2025-01-09 RX ORDER — HYDROCHLOROTHIAZIDE 25 MG/1
25 TABLET ORAL DAILY
Status: DISCONTINUED | OUTPATIENT
Start: 2025-01-10 | End: 2025-01-10

## 2025-01-09 RX ORDER — AMLODIPINE BESYLATE 5 MG/1
10 TABLET ORAL DAILY
Status: DISCONTINUED | OUTPATIENT
Start: 2025-01-09 | End: 2025-01-09

## 2025-01-09 RX ORDER — FUROSEMIDE 10 MG/ML
40 INJECTION INTRAMUSCULAR; INTRAVENOUS
Status: COMPLETED | OUTPATIENT
Start: 2025-01-09 | End: 2025-01-09

## 2025-01-09 RX ORDER — INSULIN ASPART 100 [IU]/ML
0-5 INJECTION, SOLUTION INTRAVENOUS; SUBCUTANEOUS
Status: DISCONTINUED | OUTPATIENT
Start: 2025-01-09 | End: 2025-01-10

## 2025-01-09 RX ORDER — CARVEDILOL 3.12 MG/1
6.25 TABLET ORAL 2 TIMES DAILY
Status: DISCONTINUED | OUTPATIENT
Start: 2025-01-09 | End: 2025-01-09

## 2025-01-09 RX ORDER — AMOXICILLIN 250 MG
1 CAPSULE ORAL DAILY PRN
Status: DISCONTINUED | OUTPATIENT
Start: 2025-01-09 | End: 2025-01-26 | Stop reason: HOSPADM

## 2025-01-09 RX ORDER — HYDROCHLOROTHIAZIDE 25 MG/1
50 TABLET ORAL DAILY
Status: DISCONTINUED | OUTPATIENT
Start: 2025-01-09 | End: 2025-01-09

## 2025-01-09 RX ADMIN — ATORVASTATIN CALCIUM 80 MG: 40 TABLET, FILM COATED ORAL at 02:01

## 2025-01-09 RX ADMIN — MUPIROCIN 1 G: 20 OINTMENT TOPICAL at 09:01

## 2025-01-09 RX ADMIN — FUROSEMIDE 40 MG: 10 INJECTION, SOLUTION INTRAMUSCULAR; INTRAVENOUS at 07:01

## 2025-01-09 RX ADMIN — MORPHINE SULFATE 1 MG: 2 INJECTION, SOLUTION INTRAMUSCULAR; INTRAVENOUS at 11:01

## 2025-01-09 RX ADMIN — MORPHINE SULFATE 4 MG: 4 INJECTION, SOLUTION INTRAMUSCULAR; INTRAVENOUS at 07:01

## 2025-01-09 RX ADMIN — MICONAZOLE NITRATE: 20 POWDER TOPICAL at 11:01

## 2025-01-09 RX ADMIN — GABAPENTIN 600 MG: 300 CAPSULE ORAL at 02:01

## 2025-01-09 RX ADMIN — BACLOFEN 10 MG: 10 TABLET ORAL at 09:01

## 2025-01-09 RX ADMIN — VANCOMYCIN HYDROCHLORIDE 2000 MG: 500 INJECTION, POWDER, LYOPHILIZED, FOR SOLUTION INTRAVENOUS at 10:01

## 2025-01-09 RX ADMIN — MORPHINE SULFATE 1 MG: 2 INJECTION, SOLUTION INTRAMUSCULAR; INTRAVENOUS at 02:01

## 2025-01-09 RX ADMIN — GABAPENTIN 600 MG: 300 CAPSULE ORAL at 09:01

## 2025-01-09 RX ADMIN — FUROSEMIDE 40 MG: 10 INJECTION, SOLUTION INTRAMUSCULAR; INTRAVENOUS at 09:01

## 2025-01-09 RX ADMIN — AMLODIPINE BESYLATE 10 MG: 5 TABLET ORAL at 02:01

## 2025-01-09 RX ADMIN — PIPERACILLIN SODIUM AND TAZOBACTAM SODIUM 3.38 G: 3; .375 INJECTION, POWDER, FOR SOLUTION INTRAVENOUS at 09:01

## 2025-01-09 RX ADMIN — HYDROCHLOROTHIAZIDE 50 MG: 25 TABLET ORAL at 02:01

## 2025-01-09 RX ADMIN — ONDANSETRON 4 MG: 2 INJECTION INTRAMUSCULAR; INTRAVENOUS at 07:01

## 2025-01-09 RX ADMIN — BACLOFEN 10 MG: 10 TABLET ORAL at 02:01

## 2025-01-09 RX ADMIN — HEPARIN SODIUM 12 UNITS/KG/HR: 10000 INJECTION, SOLUTION INTRAVENOUS at 10:01

## 2025-01-09 RX ADMIN — DIAZEPAM 5 MG: 5 TABLET ORAL at 04:01

## 2025-01-09 RX ADMIN — ASPIRIN 325 MG ORAL TABLET 325 MG: 325 PILL ORAL at 10:01

## 2025-01-09 RX ADMIN — LISINOPRIL 40 MG: 20 TABLET ORAL at 02:01

## 2025-01-09 NOTE — ED PROVIDER NOTES
Encounter Date: 1/9/2025       History     Chief Complaint   Patient presents with    Testicle Pain     And swelling x1 week with scrotal discharge      Patient presents complaining of testicular pain and swelling.  Patient has noticed increased pain and swelling for the last 1 week as well as foul smell.  Patient has a history of bilateral above-the-knee and below-the-knee amputation.  He is a diabetic.  At the worst symptoms are moderate.  Patient complains of mild shortness of breath no chest pain.      Review of patient's allergies indicates:  No Known Allergies  Past Medical History:   Diagnosis Date    Diabetes mellitus     Hypertension      Past Surgical History:   Procedure Laterality Date    SKIN GRAFT       No family history on file.  Social History     Tobacco Use    Smoking status: Every Day     Current packs/day: 1.00     Types: Cigarettes   Substance Use Topics    Alcohol use: Yes    Drug use: No     Review of Systems   All other systems reviewed and are negative.      Physical Exam     Initial Vitals [01/09/25 0645]   BP Pulse Resp Temp SpO2   129/77 98 20 97.6 °F (36.4 °C) 95 %      MAP       --         Physical Exam    Nursing note and vitals reviewed.  Constitutional: He appears well-developed and well-nourished. He is not diaphoretic. No distress.   Pleasant, polite.   HENT:   Head: Normocephalic and atraumatic.   Eyes: EOM are normal.   Neck: Neck supple.   Normal range of motion.  Cardiovascular:  Normal rate, regular rhythm and normal heart sounds.           Pulmonary/Chest: No respiratory distress.   Abdominal:   There is bilateral fluid and fluid wave in the abdomen   Genitourinary:    Genitourinary Comments: Testicular exam reveals bilateral edema that is markedly impressive measuring the size of a softball.  There is mild surrounding erythema.  There is a foul-smelling odor.     Musculoskeletal:      Cervical back: Normal range of motion and neck supple.      Comments: There is bilateral  below-the-knee amputation and above-the-knee amputation     Neurological: He is alert.   Skin: Skin is warm and dry.   Psychiatric: He has a normal mood and affect. His behavior is normal. Judgment and thought content normal.         ED Course   Procedures  Labs Reviewed   CBC W/ AUTO DIFFERENTIAL - Abnormal       Result Value    WBC 10.51      RBC 4.50 (*)     Hemoglobin 11.9 (*)     Hematocrit 37.6 (*)     MCV 84      MCH 26.4 (*)     MCHC 31.6 (*)     RDW 14.6 (*)     Platelets 251      MPV 10.3      Immature Granulocytes 0.4      Gran # (ANC) 7.1      Immature Grans (Abs) 0.04      Lymph # 1.7      Mono # 0.9      Eos # 0.7 (*)     Baso # 0.08      nRBC 0      Gran % 67.8      Lymph % 15.8 (*)     Mono % 8.8      Eosinophil % 6.4      Basophil % 0.8      Differential Method Automated     COMPREHENSIVE METABOLIC PANEL - Abnormal    Sodium 136      Potassium 3.7      Chloride 106      CO2 24      Glucose 185 (*)     BUN 22 (*)     Creatinine 1.0      Calcium 7.7 (*)     Total Protein 5.5 (*)     Albumin 2.5 (*)     Total Bilirubin 0.7      Alkaline Phosphatase 94      AST 17      ALT 10      eGFR >60.0      Anion Gap 6 (*)    TROPONIN I HIGH SENSITIVITY - Abnormal    Troponin I High Sensitivity 3343.0 (*)    B-TYPE NATRIURETIC PEPTIDE - Abnormal    BNP 3,449 (*)    CULTURE, BLOOD   CULTURE, BLOOD   MAGNESIUM    Magnesium 1.7     URINALYSIS, REFLEX TO URINE CULTURE   TROPONIN I HIGH SENSITIVITY   APTT   PROTIME-INR   ISTAT LACTATE    POC Lactate 1.03      Sample VENOUS     POCT LACTATE        ECG Results              EKG 12-lead (In process)        Collection Time Result Time QRS Duration [facility] QTC Calculation    01/09/25 07:39:44 01/09/25 08:33:52 124 457                     In process by Interface, Lab In [facility] (01/09/25 08:33:56)                   Narrative:    Test Reason : R07.9,    Vent. Rate : 104 BPM     Atrial Rate : 104 BPM     P-R Int : 168 ms          QRS Dur : 124 ms      QT Int : 348 ms       P-R-T  Axes :  68 104  57 degrees    QTcB Int : 457 ms    Sinus tachycardia  Right bundle branch block  Abnormal ECG  No previous ECGs available    Referred By: AAAREFERRAL SELF           Confirmed By:                                   Imaging Results              X-Ray Chest AP Portable (Final result)  Result time 01/09/25 08:09:27      Final result by Manuel Snow MD (01/09/25 08:09:27)                   Impression:      Top-normal size heart and findings of mild pulmonary vascular congestion/trace interstitial edema.      Electronically signed by: Manuel Snow  Date:    01/09/2025  Time:    08:09               Narrative:    CLINICAL HISTORY:  ([id number])48 y/o  (1975) M    Chest Pain;    TECHNIQUE:  (A#[id number], exam time 1/9/2025 7:54)    XR CHEST AP PORTABLE [id number]    COMPARISON:  Radiograph from 07/19/2022    FINDINGS:  Mild perihilar pulmonary vascular prominence with slightly increased central hilar interstitial lung markings bilaterally suggestive of mild pulmonary vascular congestion/trace edema. No pneumothorax is identified. The heart is top normal in size.  Osseous structures show degenerative changes in the spine. The visualized upper abdomen is unremarkable.                                       Medications   piperacillin-tazobactam (ZOSYN) 3.375 g in D5W 100 mL IVPB (MB+) (has no administration in time range)   vancomycin - pharmacy to dose (has no administration in time range)   miconazole NITRATE 2 % top powder (has no administration in time range)   aspirin tablet 325 mg (has no administration in time range)   vancomycin (VANCOCIN) 2,000 mg in 0.9% NaCl 500 mL IVPB (has no administration in time range)   heparin 25,000 units in dextrose 5% (100 units/ml) IV bolus from bag LOW INTENSITY nomogram - [facility] (has no administration in time range)   heparin 25,000 units in dextrose 5% 250 mL (100 units/mL) infusion LOW INTENSITY nomogram - [facility] (has no administration in time range)   heparin  25,000 units in dextrose 5% (100 units/ml) IV bolus from bag LOW INTENSITY nomogram - [facility] (has no administration in time range)   heparin 25,000 units in dextrose 5% (100 units/ml) IV bolus from bag LOW INTENSITY nomogram - [facility] (has no administration in time range)   furosemide injection 40 mg (40 mg Intravenous Given 1/9/25 0743)   morphine injection 4 mg (4 mg Intravenous Given 1/9/25 0744)   ondansetron injection 4 mg (4 mg Intravenous Given 1/9/25 0743)     Medical Decision Making  Patient presenting with scrotal edema    Considerations include volume overload, congestive heart failure, scrotal cellulitis, acute kidney injury, dehydration, electrolyte abnormalities, renal failure    MDM    Patient presents for emergent evaluation of acute scrotal edema that poses a possible threat to life and/or bodily function.    In the ED patient found to have acute congestive heart failure, NSTEMI.     Amount and/or complexity of data reviewed  I ordered labs and personally reviewed them.  Labs significant for high sensitivity troponin greater than 3000 and BNP greater than 3000.    I ordered X-rays and personally reviewed them and reviewed the radiologist interpretation.  Xray significant for pulmonary edema present.    I ordered EKG and personally reviewed it.  EKG significant for regular rate and rhythm with T-wave changes with no ST elevation.        I did review prior medical records.    Social determinants of health - none    Admission MDM and Risk  I discussed the patient presentation labs, ekg, X-rays, CT findings with the consultant(s) hospitalist and cardiologist who recommends heparin Dr. Workman   Patient was managed in the ED with IV Lasix, morphine, Zofran, antibiotics, heparin.    The response to treatment was unchanged.    Shared decision-making with the patient regarding diagnosis, treatment, and plan was well received.    Patient required emergent consultation to hospitalist for admission.    Attending  Critical Care:   Critical Care Times:   Direct Patient Care (initial evaluation, reassessments, and time considering the case)................................................................10 minutes.   Additional History from reviewing old medical records or taking additional history from the family, EMS, PCP, etc.......................10 minutes.   Ordering, Reviewing, and Interpreting Diagnostic Studies...............................................................................................................10 minutes.   Documentation..................................................................................................................................................................................5 minutes.   ==============================================================  · Total Critical Care Time - exclusive of procedural time: 35 minutes.  ==============================================================  Critical care was necessary to treat or prevent imminent or life-threatening deterioration of the following conditions:  NSTEMI.   Critical care was time spent personally by me on the following activities: obtaining history from patient or relative, examination of patient, review of x-rays / CT sent with the patient, ordering lab, x-rays, and/or EKG, development of treatment plan with patient or relative, ordering and performing treatments and interventions, interpretation of cardiac measurements and re-evaluation of patient's condition.   Critical Care Condition: potentially life-threatening     Amount and/or Complexity of Data Reviewed  Labs: ordered. Decision-making details documented in ED Course.  Radiology: ordered.    Risk  OTC drugs.  Prescription drug management.  Decision regarding hospitalization.               ED Course as of 01/09/25 0836   Thu Jan 09, 2025   0820 BNP(!): 3,449 [AP]   0821 WBC: 10.51 [AP]   0821 Hemoglobin(!): 11.9 [AP]   0821 Hematocrit(!): 37.6 [AP]   0821  Calcium(!): 7.7 [AP]   0821 BUN(!): 22 [AP]   0821 PROTEIN TOTAL(!): 5.5 [AP]   0821 Albumin(!): 2.5 [AP]   0821 Glucose(!): 185 [AP]   0821 Sodium: 136 [AP]   0821 Potassium: 3.7 [AP]   0821 Chloride: 106 [AP]   0821 CO2: 24 [AP]   0821 Anion Gap(!): 6 [AP]   0821 eGFR: >60.0 [AP]   0821 ALT: 10 [AP]   0821 AST: 17 [AP]   0821 ALP: 94 [AP]   0822 Troponin I High Sensitivity(!!): 3343.0 [AP]   0822 BNP(!): 3,449 [AP]      ED Course User Index  [AP] Toim Seymour MD                           Clinical Impression:  Final diagnoses:  [R07.9] Chest pain (Primary)  [Z13.6] Screening for cardiovascular condition  [I50.9] Congestive heart failure, unspecified HF chronicity, unspecified heart failure type  [I21.4] NSTEMI (non-ST elevated myocardial infarction)          ED Disposition Condition    Admit Stable                Tomi Seymour MD  01/09/25 0898

## 2025-01-09 NOTE — ASSESSMENT & PLAN NOTE
BNP 3500  Has a diagnosis of heart failure, no echo on file  Echo pending  No pulmonary vascular congestion at this time, no shortness of breath  Bilateral lower extremity edema to the abdomen   Significant scrotal edema  Cardiology has seen and evaluated the patient, appreciate their recommendations   Continue Lasix diuresis

## 2025-01-09 NOTE — HPI
Mr. Ross is a 49 yom w/pmh significant for PID status post bilateral above-the-knee amputations, diabetes mellitus who presented for significant bilateral lower extremity edema and scrotal swelling, as well as intractable pain secondary to scrotal swelling.  Labs largely unremarkable.  BNP 3500, troponin 3300.  Patient was started on IV diuresis and pain control.  Cardiology was consulted.  Patient was initially started on heparin infusion.  On exam he has significant pain and tenderness due to significant scrotal swelling.   done

## 2025-01-09 NOTE — PLAN OF CARE
Dc assessment completed at bedside. Pt confirms demographics- lives with significant other Tricia who will provide transportation home at [place]. Pt states only dme used is a shower chair. Pt confirms he is a diabetic.     [facility] - Emergency Dept  Initial Discharge Assessment       Primary Care Provider: Jennifer, Primary Doctor    Admission Diagnosis: Chest pain [R07.9]    Admission Date: 1/9/2025  Expected Discharge Date: 1/10/2025    Transition of Care Barriers: None    Payor: MEDICAID / Plan: [employer] [facility]) / Product Type: Managed Medicaid /     Extended Emergency Contact Information  Primary Emergency Contact: Tricia Lanier  Address: [id number] [place] [address] [id number] [place] [address]  Home Phone: [phone]  Mobile Phone: [phone]  Relation: Significant other  Preferred language: English   needed? No  Secondary Emergency Contact: Jolanta Ross   [place]  Mobile Phone: [phone]  Relation: Mother    Discharge Plan A: Home  Discharge Plan B: Home with family      [facility] [place] - [address] [facility]  [address] [facility] [place] [address]  Phone: [phone] Fax: [phone]      Initial Assessment (most recent)       Adult Discharge Assessment - 01/09/25 1545          Discharge Assessment    Assessment Type Discharge Planning Assessment     Confirmed/corrected address, phone number and insurance Yes     Confirmed Demographics Correct on Facesheet     Source of Information patient     Communicated KATHERINE with patient/caregiver Date not available/Unable to determine     Reason For Admission chest pain     People in Home significant other     Do you expect to return to your current living situation? Yes     Do you have help at home or someone to help you manage your care at home? Yes     Who are your caregiver(s) and their phone number(s)? sig other     Prior to hospitilization cognitive status:  Alert/Oriented     Current cognitive status: Alert/Oriented     Equipment Currently Used at Home shower chair     Readmission within 30 days? No     Patient currently being followed by outpatient case management? No     Do you currently have service(s) that help you manage your care at home? No     Do you take prescription medications? Yes     Do you have prescription coverage? Yes     Coverage medicaid     Do you have any problems affording any of your prescribed medications? No     Is the patient taking medications as prescribed? yes     Who is going to help you get home at discharge? sig other- michael     How do you get to doctors appointments? family or friend will provide     Are you on dialysis? No     Do you take coumadin? No     Discharge Plan A Home     Discharge Plan B Home with family     DME Needed Upon Discharge  none     Discharge Plan discussed with: Patient     Transition of Care Barriers None

## 2025-01-09 NOTE — Clinical Note
Diagnosis: Chest pain [099825]   Future Attending Provider: MAGUE BURNETT [[id number]]   Place in Observation: [facility] [5702]   Special Needs:: No Special Needs [1]

## 2025-01-09 NOTE — CONSULTS
[facility] - Emergency Dept  Department of Cardiology  Consult Note      PATIENT NAME: Gideon Ross    MRN: [id number]  TODAY'S DATE: 01/09/2025  ADMIT DATE: 1/9/2025                          CONSULT REQUESTED BY: Wayne Jones DO    SUBJECTIVE     PRINCIPAL PROBLEM: <principal problem not specified>    HPI:    Patient is a 49-year-old male who presented to the emergency room with complaints of testicular pain and swelling over the past 1 week.  Patient is a known diabetic and has a history of PID with bilateral above the knee amputations.  Patient also had some complaints of mild shortness of breath but denied any chest pain.  Patient was noted to have significantly elevated high sensitivity troponin level on arrival.  High sensitivity troponin level was 3343 on arrival and is now 3369.  Patient was started on heparin drip.  Patient does have a known history of drug abuse and is positive for opiates, amphetamine, and marijuana metabolites in his UDS.  Patient reports his last use of methamphetamine was a couple of weeks ago.        REASON FOR CONSULT:  From Hospitalist H&P: Patient presents complaining of testicular pain and swelling.  Patient has noticed increased pain and swelling for the last 1 week as well as foul smell.  Patient has a history of bilateral above-the-knee and below-the-knee amputation.  He is a diabetic.  At the worst symptoms are moderate.  Patient complains of mild shortness of breath no chest pain.            Review of patient's allergies indicates:  No Known Allergies    Past Medical History:   Diagnosis Date    Diabetes mellitus     Hypertension      Past Surgical History:   Procedure Laterality Date    SKIN GRAFT       Social History     Tobacco Use    Smoking status: Every Day     Current packs/day: 1.00     Types: Cigarettes   Substance Use Topics    Alcohol use: Yes    Drug use: No        REVIEW OF SYSTEMS  Per HPI    OBJECTIVE     VITAL SIGNS (Most Recent)  Temp: 97.6  °F (36.4 °C) (01/09/25 0645)  Pulse: 98 (01/09/25 0645)  Resp: 20 (01/09/25 0744)  BP: 129/77 (01/09/25 0645)  SpO2: 95 % (01/09/25 0645)    VENTILATION STATUS  Resp: 20 (01/09/25 0744)  SpO2: 95 % (01/09/25 0645)           I & O (Last 24H):  Intake/Output Summary (Last 24 hours) at 1/9/2025 1201  Last data filed at 1/9/2025 1006  Gross per 24 hour   Intake 101.01 ml   Output --   Net 101.01 ml       WEIGHTS  Wt Readings from Last 1 Encounters:   01/09/25 0824 108.9 kg (240 lb)       PHYSICAL EXAM  CONSTITUTIONAL: No fever, no chills  HEENT: Normocephalic, atraumatic,pupils reactive to light                 NECK:  No JVD no carotid bruit  CVS: S1S2+, RRR  LUNGS: Clear  ABDOMEN: Soft, NT, BS+  EXTREMITIES: No cyanosis, edema  : No valle catheter  NEURO: AAO X 3  PSY: Normal affect      HOME MEDICATIONS:  No current facility-administered medications on file prior to encounter.     Current Outpatient Medications on File Prior to Encounter   Medication Sig Dispense Refill    amLODIPine (NORVASC) 10 MG tablet Take 1 tablet (10 mg total) by mouth once daily. 30 tablet 1    aspirin (ECOTRIN) 81 MG EC tablet Take 1 tablet (81 mg total) by mouth once daily. 30 tablet 1    atorvastatin (LIPITOR) 80 MG tablet Take 1 tablet (80 mg total) by mouth once daily. 30 tablet 1    baclofen (LIORESAL) 10 MG tablet Take 1 tablet (10 mg total) by mouth 3 (three) times daily. 90 tablet 1    carvedilol (COREG) 6.25 MG tablet Take 1 tablet (6.25 mg total) by mouth 2 (two) times daily. 60 tablet 1    diazePAM (VALIUM) 5 MG tablet Take 1 tablet (5 mg total) by mouth every 12 (twelve) hours as needed for Anxiety (anxiety.). 30 tablet 0    gabapentin (NEURONTIN) 300 MG capsule Take 2 capsules (600 mg total) by mouth 3 (three) times daily. 90 capsule 1    hydroCHLOROthiazide (HYDRODIURIL) 50 MG tablet Take 1 tablet (50 mg total) by mouth once daily. 30 tablet 1    insulin aspart U-100 (NOVOLOG) 100 unit/mL InPn pen Inject 18 Units into the skin  3 (three) times daily. (Patient taking differently: Inject into the skin 3 (three) times daily.) 10 mL 1    insulin detemir U-100 (LEVEMIR FLEXTOUCH) 100 unit/mL (3 mL) SubQ InPn pen Inject 35 Units into the skin 2 (two) times daily. 10 mL 1    insulin glargine U-100, Lantus, 100 unit/mL injection Inject 30 Units into the skin every evening. (Patient not taking: Reported on 1/9/2025)      lisinopril (PRINIVIL,ZESTRIL) 40 MG tablet Take 1 tablet (40 mg total) by mouth once daily. 30 tablet 1    nicotine (NICODERM CQ) 14 mg/24 hr Place 1 patch onto the skin once daily. (Patient not taking: Reported on 1/9/2025) 15 patch 0    nortriptyline (PAMELOR) 25 MG capsule Take 1 capsule (25 mg total) by mouth 3 (three) times daily. 30 capsule 1    oxyCODONE (ROXICODONE) 5 MG immediate release tablet Take 1 tablet (5 mg total) by mouth every 12 (twelve) hours as needed. (Patient not taking: Reported on 1/9/2025) 30 tablet 0    polyethylene glycol (GLYCOLAX) 17 gram PwPk Take 17 g by mouth daily as needed. (Patient not taking: Reported on 1/9/2025) 30 each 1    QUEtiapine (SEROQUEL) 100 MG Tab Take 1 tablet (100 mg total) by mouth every evening. 30 tablet 1    sofosbuvir-velpatasvir (EPCLUSA) 400-100 mg Tab Take 1 tablet daily. (Patient not taking: Reported on 1/9/2025) 28 tablet 2       SCHEDULED MEDS:   miconazole NITRATE 2 %   Topical (Top) BID    vancomycin (VANCOCIN) IV (PEDS and ADULTS)  20 mg/kg Intravenous Once       CONTINUOUS INFUSIONS:   heparin (porcine) in D5W  0-40 Units/kg/hr Intravenous Continuous 13.1 mL/hr at 01/09/25 1050 12 Units/kg/hr at 01/09/25 1050       PRN MEDS:  Current Facility-Administered Medications:     acetaminophen, 650 mg, Oral, Q8H PRN    acetaminophen, 650 mg, Oral, Q4H PRN    aluminum-magnesium hydroxide-simethicone, 30 mL, Oral, QID PRN    dextrose 50%, 12.5 g, Intravenous, PRN    dextrose 50%, 25 g, Intravenous, PRN    glucagon (human recombinant), 1 mg, Intramuscular, PRN    glucose, 16 g,  "Oral, PRN    glucose, 24 g, Oral, PRN    heparin (PORCINE), 36.7 Units/kg, Intravenous, PRN    heparin (PORCINE), 30 Units/kg, Intravenous, PRN    HYDROcodone-acetaminophen, 1 tablet, Oral, Q6H PRN    insulin aspart U-100, 0-5 Units, Subcutaneous, QID (AC + HS) PRN    magnesium oxide, 800 mg, Oral, PRN    magnesium oxide, 800 mg, Oral, PRN    melatonin, 6 mg, Oral, Nightly PRN    naloxone, 0.02 mg, Intravenous, PRN    ondansetron, 4 mg, Intravenous, Q6H PRN    potassium bicarbonate, 35 mEq, Oral, PRN    potassium bicarbonate, 50 mEq, Oral, PRN    potassium bicarbonate, 60 mEq, Oral, PRN    potassium, sodium phosphates, 2 packet, Oral, PRN    potassium, sodium phosphates, 2 packet, Oral, PRN    potassium, sodium phosphates, 2 packet, Oral, PRN    senna-docusate 8.6-50 mg, 1 tablet, Oral, Daily PRN    sodium chloride 0.9%, 10 mL, Intravenous, Q12H PRN    Pharmacy to dose Vancomycin consult, , , Once **AND** vancomycin - pharmacy to dose, , Intravenous, pharmacy to manage frequency    LABS AND DIAGNOSTICS     CBC LAST 3 DAYS  Recent Labs   Lab 01/09/25  0709   WBC 10.51   RBC 4.50*   HGB 11.9*   HCT 37.6*   MCV 84   MCH 26.4*   MCHC 31.6*   RDW 14.6*      MPV 10.3   GRAN 67.8  7.1   LYMPH 15.8*  1.7   MONO 8.8  0.9   BASO 0.08   NRBC 0       COAGULATION LAST 3 DAYS  Recent Labs   Lab 01/09/25  0918   INR 1.1   APTT 28.4       CHEMISTRY LAST 3 DAYS  Recent Labs   Lab 01/09/25  0709      K 3.7      CO2 24   ANIONGAP 6*   BUN 22*   CREATININE 1.0   *   CALCIUM 7.7*   MG 1.7   ALBUMIN 2.5*   PROT 5.5*   ALKPHOS 94   ALT 10   AST 17   BILITOT 0.7       CARDIAC PROFILE LAST 3 DAYS  Recent Labs   Lab 01/09/25  0709 01/09/25  0918   BNP 3,449*  --    TROPONINIHS 3343.0* 3369.7*       ENDOCRINE LAST 3 DAYS  No results for input(s): "TSH", "PROCAL" in the last 168 hours.    LAST ARTERIAL BLOOD GAS  ABG  No results for input(s): "PH", "PO2", "PCO2", "HCO3", "BE" in the last 168 hours.    LAST 7 DAYS " MICROBIOLOGY   Microbiology Results (last 7 days)       Procedure Component Value Units Date/Time    Blood culture x two cultures. Draw prior to antibiotics. [[id number]] Collected: 01/09/25 0859    Order Status: Sent Specimen: Blood Updated: 01/09/25 0930    Blood culture x two cultures. Draw prior to antibiotics. [[id number]] Collected: 01/09/25 0859    Order Status: Sent Specimen: Blood Updated: 01/09/25 0930            MOST RECENT IMAGING  X-Ray Chest AP Portable  Narrative: CLINICAL HISTORY:  ([id number])50 y/o  (1975) M    Chest Pain;    TECHNIQUE:  (A#[id number], exam time 1/9/2025 7:54)    XR CHEST AP PORTABLE [id number]    COMPARISON:  Radiograph from 07/19/2022    FINDINGS:  Mild perihilar pulmonary vascular prominence with slightly increased central hilar interstitial lung markings bilaterally suggestive of mild pulmonary vascular congestion/trace edema. No pneumothorax is identified. The heart is top normal in size.  Osseous structures show degenerative changes in the spine. The visualized upper abdomen is unremarkable.  Impression: Top-normal size heart and findings of mild pulmonary vascular congestion/trace interstitial edema.    Electronically signed by: Manuel Snow  Date:    01/09/2025  Time:    08:09      ECHOCARDIOGRAM RESULTS (last 5)  No results found for this or any previous visit.      CURRENT/PREVIOUS VISIT EKG  Results for orders placed or performed during the hospital encounter of 01/09/25   EKG 12-lead    Collection Time: 01/09/25  7:39 AM   Result Value Ref Range    QRS Duration 124 ms    OHS QTC Calculation 457 ms    Narrative    Test Reason : R07.9,    Vent. Rate : 104 BPM     Atrial Rate : 104 BPM     P-R Int : 168 ms          QRS Dur : 124 ms      QT Int : 348 ms       P-R-T Axes :  68 104  57 degrees    QTcB Int : 457 ms    Sinus tachycardia  Right bundle branch block  Abnormal ECG  No previous ECGs available    Referred By: AAAREFERRAL SELF           Confirmed By:             ASSESSMENT/PLAN:     There are no active hospital problems to display for this patient.      ASSESSMENT & PLAN:     Elevated troponin  HF with unknown EF  Testicular pain and swelling   Type II DM  + drug abuse: amphetamines, marijuana  PAD  Hx of bilateral AKA      RECOMMENDATIONS:    Troponin levels are significantly elevated but flat. No chest pain reported. Can change to lovenox for now.   Echo pending.   Agree with diuresis. Strict I&O. Trend labs.   Advised on the concern for certain medications that can interact with amphetamines. Patient verbalized understanding.   Further recommendations based on hospital course.         Bhumi Sanchez NP  Date of Service: 01/09/2025  12:01 PM

## 2025-01-09 NOTE — PLAN OF CARE
Received patient from ED4. Bedside report received. Heparin infusing to left FA peripheral IV site. Patient denies chest pain.

## 2025-01-09 NOTE — ASSESSMENT & PLAN NOTE
Trop 3300  No chest pain or shortness of breath   Likely secondary to congestive heart failure  Was initially on heparin infusion, has been transitioned to Lovenox

## 2025-01-09 NOTE — PLAN OF CARE
Problem: Adult Inpatient Plan of Care  Goal: Plan of Care Review  Outcome: Progressing  Goal: Patient-Specific Goal (Individualized)  Outcome: Progressing  Goal: Absence of Hospital-Acquired Illness or Injury  Outcome: Progressing  Goal: Optimal Comfort and Wellbeing  Outcome: Progressing  Goal: Readiness for Transition of Care  Outcome: Progressing     Problem: Heart Failure  Goal: Optimal Coping  Outcome: Progressing  Goal: Optimal Cardiac Output  Outcome: Progressing  Goal: Stable Heart Rate and Rhythm  Outcome: Progressing  Goal: Optimal Functional Ability  Outcome: Progressing  Goal: Fluid and Electrolyte Balance  Outcome: Progressing  Goal: Improved Oral Intake  Outcome: Progressing  Goal: Effective Oxygenation and Ventilation  Outcome: Progressing  Goal: Effective Breathing Pattern During Sleep  Outcome: Progressing     Problem: Fall Injury Risk  Goal: Absence of Fall and Fall-Related Injury  Outcome: Progressing     Problem: Urinary Retention  Goal: Effective Urinary Elimination  Outcome: Progressing         Plan of care and expectation from staff discussed with patient. Patient received PRN pain medication and PO diazepam for anxiety. Relief obtained to a tolerable level. Patient on heparin continuous infusion. Aptt sent to lab, pending results. Weber care done. Patient has 4+ edema to penis and scrotum. Bedside echo done. Patient verbalizes understanding on fluid restriction. Wound care consult pending for scrotum and buttock wounds. Cardiology consult completed.

## 2025-01-09 NOTE — ASSESSMENT & PLAN NOTE
"Patient's FSGs are controlled on current medication regimen.  Last A1c reviewed-   Lab Results   Component Value Date    HGBA1C 8.2 (H) 09/27/2019     Most recent fingerstick glucose reviewed- No results for input(s): "POCTGLUCOSE" in the last 24 hours.  Current correctional scale  Low  Maintain anti-hyperglycemic dose as follows-   Antihyperglycemics (From admission, onward)      Start     Stop Route Frequency Ordered    01/09/25 0959  insulin aspart U-100 pen 0-5 Units         -- SubQ Before meals & nightly PRN 01/09/25 0906          Hold Oral hypoglycemics while patient is in the hospital.  "

## 2025-01-09 NOTE — SUBJECTIVE & OBJECTIVE
Past Medical History:   Diagnosis Date    Diabetes mellitus     Hypertension        Past Surgical History:   Procedure Laterality Date    SKIN GRAFT         Review of patient's allergies indicates:  No Known Allergies    No current facility-administered medications on file prior to encounter.     Current Outpatient Medications on File Prior to Encounter   Medication Sig    amLODIPine (NORVASC) 10 MG tablet Take 1 tablet (10 mg total) by mouth once daily.    aspirin (ECOTRIN) 81 MG EC tablet Take 1 tablet (81 mg total) by mouth once daily.    atorvastatin (LIPITOR) 80 MG tablet Take 1 tablet (80 mg total) by mouth once daily.    baclofen (LIORESAL) 10 MG tablet Take 1 tablet (10 mg total) by mouth 3 (three) times daily.    carvedilol (COREG) 6.25 MG tablet Take 1 tablet (6.25 mg total) by mouth 2 (two) times daily.    diazePAM (VALIUM) 5 MG tablet Take 1 tablet (5 mg total) by mouth every 12 (twelve) hours as needed for Anxiety (anxiety.).    gabapentin (NEURONTIN) 300 MG capsule Take 2 capsules (600 mg total) by mouth 3 (three) times daily.    hydroCHLOROthiazide (HYDRODIURIL) 50 MG tablet Take 1 tablet (50 mg total) by mouth once daily.    insulin aspart U-100 (NOVOLOG) 100 unit/mL InPn pen Inject 18 Units into the skin 3 (three) times daily. (Patient taking differently: Inject into the skin 3 (three) times daily.)    insulin detemir U-100 (LEVEMIR FLEXTOUCH) 100 unit/mL (3 mL) SubQ InPn pen Inject 35 Units into the skin 2 (two) times daily.    insulin glargine U-100, Lantus, 100 unit/mL injection Inject 30 Units into the skin every evening. (Patient not taking: Reported on 1/9/2025)    lisinopril (PRINIVIL,ZESTRIL) 40 MG tablet Take 1 tablet (40 mg total) by mouth once daily.    nicotine (NICODERM CQ) 14 mg/24 hr Place 1 patch onto the skin once daily. (Patient not taking: Reported on 1/9/2025)    nortriptyline (PAMELOR) 25 MG capsule Take 1 capsule (25 mg total) by mouth 3 (three) times daily.    oxyCODONE  (ROXICODONE) 5 MG immediate release tablet Take 1 tablet (5 mg total) by mouth every 12 (twelve) hours as needed. (Patient not taking: Reported on 1/9/2025)    polyethylene glycol (GLYCOLAX) 17 gram PwPk Take 17 g by mouth daily as needed. (Patient not taking: Reported on 1/9/2025)    QUEtiapine (SEROQUEL) 100 MG Tab Take 1 tablet (100 mg total) by mouth every evening.    sofosbuvir-velpatasvir (EPCLUSA) 400-100 mg Tab Take 1 tablet daily. (Patient not taking: Reported on 1/9/2025)     Family History    None       Tobacco Use    Smoking status: Every Day     Current packs/day: 1.00     Types: Cigarettes    Smokeless tobacco: Not on file   Substance and Sexual Activity    Alcohol use: Yes    Drug use: No    Sexual activity: Not on file     Review of Systems   Constitutional:  Positive for unexpected weight change.   Respiratory:  Negative for shortness of breath.    Cardiovascular:  Positive for leg swelling. Negative for chest pain.   Gastrointestinal:  Positive for abdominal distention.   Genitourinary:  Positive for scrotal swelling and testicular pain.   All other systems reviewed and are negative.    Objective:     Vital Signs (Most Recent):  Temp: 98 °F (36.7 °C) (01/09/25 1500)  Pulse: 105 (01/09/25 1521)  Resp: 14 (01/09/25 1521)  BP: 106/66 (01/09/25 1500)  SpO2: 97 % (01/09/25 1521) Vital Signs (24h Range):  Temp:  [97.6 °F (36.4 °C)-98 °F (36.7 °C)] 98 °F (36.7 °C)  Pulse:  [] 105  Resp:  [14-20] 14  SpO2:  [95 %-100 %] 97 %  BP: (106-135)/(66-85) 106/66     Weight: 108.9 kg (240 lb)  Body mass index is 35.44 kg/m².     Physical Exam  Vitals and nursing note reviewed.   Constitutional:       Appearance: He is well-developed. He is ill-appearing.   HENT:      Head: Normocephalic and atraumatic.      Right Ear: External ear normal.      Left Ear: External ear normal.      Nose: Nose normal.   Eyes:      Extraocular Movements: EOM normal.      Conjunctiva/sclera: Conjunctivae normal.      Pupils: Pupils  are equal, round, and reactive to light.   Cardiovascular:      Rate and Rhythm: Normal rate and regular rhythm.      Heart sounds: Normal heart sounds.   Pulmonary:      Effort: Pulmonary effort is normal.      Breath sounds: Normal breath sounds.   Abdominal:      General: Bowel sounds are normal.      Palpations: Abdomen is soft.      Comments: Abdominal edema   Genitourinary:     Comments: Scrotal edema  Musculoskeletal:         General: Swelling present. Normal range of motion.      Cervical back: Normal range of motion and neck supple.   Skin:     General: Skin is dry.      Capillary Refill: Capillary refill takes 2 to 3 seconds.   Neurological:      Mental Status: He is alert and oriented to person, place, and time.   Psychiatric:         Behavior: Behavior normal.         Thought Content: Thought content normal.         Judgment: Judgment normal.              CRANIAL NERVES     CN III, IV, VI   Pupils are equal, round, and reactive to light.  Extraocular motions are normal.        Significant Labs: All pertinent labs within the past 24 hours have been reviewed.  CBC:   Recent Labs   Lab 01/09/25  0709   WBC 10.51   HGB 11.9*   HCT 37.6*        CMP:   Recent Labs   Lab 01/09/25  0709      K 3.7      CO2 24   *   BUN 22*   CREATININE 1.0   CALCIUM 7.7*   PROT 5.5*   ALBUMIN 2.5*   BILITOT 0.7   ALKPHOS 94   AST 17   ALT 10   ANIONGAP 6*     Cardiac Markers:   Recent Labs   Lab 01/09/25  0709   BNP 3,449*       Significant Imaging: I have reviewed all pertinent imaging results/findings within the past 24 hours.  Imaging Results              X-Ray Chest AP Portable (Final result)  Result time 01/09/25 08:09:27      Final result by Manuel Snow MD (01/09/25 08:09:27)                   Impression:      Top-normal size heart and findings of mild pulmonary vascular congestion/trace interstitial edema.      Electronically signed by: Manuel  Edy  Date:    01/09/2025  Time:    08:09               Narrative:    CLINICAL HISTORY:  ([id number] y/o  (1975) M    Chest Pain;    TECHNIQUE:  (A#[id number], exam time 1/9/2025 7:54)    XR CHEST AP PORTABLE [id number]    COMPARISON:  Radiograph from 07/19/2022    FINDINGS:  Mild perihilar pulmonary vascular prominence with slightly increased central hilar interstitial lung markings bilaterally suggestive of mild pulmonary vascular congestion/trace edema. No pneumothorax is identified. The heart is top normal in size.  Osseous structures show degenerative changes in the spine. The visualized upper abdomen is unremarkable.

## 2025-01-09 NOTE — H&P
[facility] - Emergency Dept  Hospital Medicine  History & Physical    Patient Name: Gideon Ross  MRN: [id number]  Patient Class: IP- Inpatient  Admission Date: 1/9/2025  Attending Physician: Wayne Jones DO  Primary Care Provider: Jennifer, Primary Doctor         Patient information was obtained from patient and ER records.     Subjective:     Principal Problem:Scrotal edema    Chief Complaint:   Chief Complaint   Patient presents with    Testicle Pain     And swelling x1 week with scrotal discharge         HPI: Mr. Ross is a 49 yom w/pmh significant for PID status post bilateral above-the-knee amputations, diabetes mellitus who presented for significant bilateral lower extremity edema and scrotal swelling, as well as intractable pain secondary to scrotal swelling.  Labs largely unremarkable.  BNP 3500, troponin 3300.  Patient was started on IV diuresis and pain control.  Cardiology was consulted.  Patient was initially started on heparin infusion.  On exam he has significant pain and tenderness due to significant scrotal swelling.    Past Medical History:   Diagnosis Date    Diabetes mellitus     Hypertension        Past Surgical History:   Procedure Laterality Date    SKIN GRAFT         Review of patient's allergies indicates:  No Known Allergies    No current facility-administered medications on file prior to encounter.     Current Outpatient Medications on File Prior to Encounter   Medication Sig    amLODIPine (NORVASC) 10 MG tablet Take 1 tablet (10 mg total) by mouth once daily.    aspirin (ECOTRIN) 81 MG EC tablet Take 1 tablet (81 mg total) by mouth once daily.    atorvastatin (LIPITOR) 80 MG tablet Take 1 tablet (80 mg total) by mouth once daily.    baclofen (LIORESAL) 10 MG tablet Take 1 tablet (10 mg total) by mouth 3 (three) times daily.    carvedilol (COREG) 6.25 MG tablet Take 1 tablet (6.25 mg total) by mouth 2 (two) times daily.    diazePAM (VALIUM) 5 MG tablet Take 1 tablet (5 mg  total) by mouth every 12 (twelve) hours as needed for Anxiety (anxiety.).    gabapentin (NEURONTIN) 300 MG capsule Take 2 capsules (600 mg total) by mouth 3 (three) times daily.    hydroCHLOROthiazide (HYDRODIURIL) 50 MG tablet Take 1 tablet (50 mg total) by mouth once daily.    insulin aspart U-100 (NOVOLOG) 100 unit/mL InPn pen Inject 18 Units into the skin 3 (three) times daily. (Patient taking differently: Inject into the skin 3 (three) times daily.)    insulin detemir U-100 (LEVEMIR FLEXTOUCH) 100 unit/mL (3 mL) SubQ InPn pen Inject 35 Units into the skin 2 (two) times daily.    insulin glargine U-100, Lantus, 100 unit/mL injection Inject 30 Units into the skin every evening. (Patient not taking: Reported on 1/9/2025)    lisinopril (PRINIVIL,ZESTRIL) 40 MG tablet Take 1 tablet (40 mg total) by mouth once daily.    nicotine (NICODERM CQ) 14 mg/24 hr Place 1 patch onto the skin once daily. (Patient not taking: Reported on 1/9/2025)    nortriptyline (PAMELOR) 25 MG capsule Take 1 capsule (25 mg total) by mouth 3 (three) times daily.    oxyCODONE (ROXICODONE) 5 MG immediate release tablet Take 1 tablet (5 mg total) by mouth every 12 (twelve) hours as needed. (Patient not taking: Reported on 1/9/2025)    polyethylene glycol (GLYCOLAX) 17 gram PwPk Take 17 g by mouth daily as needed. (Patient not taking: Reported on 1/9/2025)    QUEtiapine (SEROQUEL) 100 MG Tab Take 1 tablet (100 mg total) by mouth every evening.    sofosbuvir-velpatasvir (EPCLUSA) 400-100 mg Tab Take 1 tablet daily. (Patient not taking: Reported on 1/9/2025)     Family History    None       Tobacco Use    Smoking status: Every Day     Current packs/day: 1.00     Types: Cigarettes    Smokeless tobacco: Not on file   Substance and Sexual Activity    Alcohol use: Yes    Drug use: No    Sexual activity: Not on file     Review of Systems   Constitutional:  Positive for unexpected weight change.   Respiratory:  Negative for shortness of breath.     Cardiovascular:  Positive for leg swelling. Negative for chest pain.   Gastrointestinal:  Positive for abdominal distention.   Genitourinary:  Positive for scrotal swelling and testicular pain.   All other systems reviewed and are negative.    Objective:     Vital Signs (Most Recent):  Temp: 98 °F (36.7 °C) (01/09/25 1500)  Pulse: 105 (01/09/25 1521)  Resp: 14 (01/09/25 1521)  BP: 106/66 (01/09/25 1500)  SpO2: 97 % (01/09/25 1521) Vital Signs (24h Range):  Temp:  [97.6 °F (36.4 °C)-98 °F (36.7 °C)] 98 °F (36.7 °C)  Pulse:  [] 105  Resp:  [14-20] 14  SpO2:  [95 %-100 %] 97 %  BP: (106-135)/(66-85) 106/66     Weight: 108.9 kg (240 lb)  Body mass index is 35.44 kg/m².     Physical Exam  Vitals and nursing note reviewed.   Constitutional:       Appearance: He is well-developed. He is ill-appearing.   HENT:      Head: Normocephalic and atraumatic.      Right Ear: External ear normal.      Left Ear: External ear normal.      Nose: Nose normal.   Eyes:      Extraocular Movements: EOM normal.      Conjunctiva/sclera: Conjunctivae normal.      Pupils: Pupils are equal, round, and reactive to light.   Cardiovascular:      Rate and Rhythm: Normal rate and regular rhythm.      Heart sounds: Normal heart sounds.   Pulmonary:      Effort: Pulmonary effort is normal.      Breath sounds: Normal breath sounds.   Abdominal:      General: Bowel sounds are normal.      Palpations: Abdomen is soft.      Comments: Abdominal edema   Genitourinary:     Comments: Scrotal edema  Musculoskeletal:         General: Swelling present. Normal range of motion.      Cervical back: Normal range of motion and neck supple.   Skin:     General: Skin is dry.      Capillary Refill: Capillary refill takes 2 to 3 seconds.   Neurological:      Mental Status: He is alert and oriented to person, place, and time.   Psychiatric:         Behavior: Behavior normal.         Thought Content: Thought content normal.         Judgment: Judgment normal.               CRANIAL NERVES     CN III, IV, VI   Pupils are equal, round, and reactive to light.  Extraocular motions are normal.        Significant Labs: All pertinent labs within the past 24 hours have been reviewed.  CBC:   Recent Labs   Lab 01/09/25  0709   WBC 10.51   HGB 11.9*   HCT 37.6*        CMP:   Recent Labs   Lab 01/09/25  0709      K 3.7      CO2 24   *   BUN 22*   CREATININE 1.0   CALCIUM 7.7*   PROT 5.5*   ALBUMIN 2.5*   BILITOT 0.7   ALKPHOS 94   AST 17   ALT 10   ANIONGAP 6*     Cardiac Markers:   Recent Labs   Lab 01/09/25  0709   BNP 3,449*       Significant Imaging: I have reviewed all pertinent imaging results/findings within the past 24 hours.  Imaging Results              X-Ray Chest AP Portable (Final result)  Result time 01/09/25 08:09:27      Final result by Manuel Snow MD (01/09/25 08:09:27)                   Impression:      Top-normal size heart and findings of mild pulmonary vascular congestion/trace interstitial edema.      Electronically signed by: Manuel Snow  Date:    01/09/2025  Time:    08:09               Narrative:    CLINICAL HISTORY:  ([id number])50 y/o  (1975) M    Chest Pain;    TECHNIQUE:  (A#[id number], exam time 1/9/2025 7:54)    XR CHEST AP PORTABLE [id number]    COMPARISON:  Radiograph from 07/19/2022    FINDINGS:  Mild perihilar pulmonary vascular prominence with slightly increased central hilar interstitial lung markings bilaterally suggestive of mild pulmonary vascular congestion/trace edema. No pneumothorax is identified. The heart is top normal in size.  Osseous structures show degenerative changes in the spine. The visualized upper abdomen is unremarkable.                                      Assessment/Plan:     * Scrotal edema  BNP 3500  Has a diagnosis of heart failure, no echo on file  Echo pending  No pulmonary vascular congestion at this time, no shortness of breath  Bilateral lower extremity edema to the abdomen  "  Significant scrotal edema  Cardiology has seen and evaluated the patient, appreciate their recommendations   Continue Lasix diuresis    Diabetes mellitus  Patient's FSGs are controlled on current medication regimen.  Last A1c reviewed-   Lab Results   Component Value Date    HGBA1C 8.2 (H) 09/27/2019     Most recent fingerstick glucose reviewed- No results for input(s): "POCTGLUCOSE" in the last 24 hours.  Current correctional scale  Low  Maintain anti-hyperglycemic dose as follows-   Antihyperglycemics (From admission, onward)      Start     Stop Route Frequency Ordered    01/09/25 0959  insulin aspart U-100 pen 0-5 Units         -- SubQ Before meals & nightly PRN 01/09/25 0906          Hold Oral hypoglycemics while patient is in the hospital.    Elevated troponin  Trop 3300  No chest pain or shortness of breath   Likely secondary to congestive heart failure  Was initially on heparin infusion, has been transitioned to Lovenox        VTE Risk Mitigation (From admission, onward)           Ordered     IP VTE HIGH RISK PATIENT  Once         01/09/25 1326     Place sequential compression device  Until discontinued         01/09/25 1326     heparin 25,000 units in dextrose 5% (100 units/ml) IV bolus from bag LOW INTENSITY nomogram - [facility]  As needed (PRN)        Question:  Heparin Infusion Adjustment (DO NOT MODIFY ANSWER)  Answer:  \\[facility]\epic\Images\Pharmacy\HeparinInfusions\heparin LOW INTENSITY nomogram for [facility] [id number].pdf    01/09/25 0832     heparin 25,000 units in dextrose 5% (100 units/ml) IV bolus from bag LOW INTENSITY nomogram - [facility]  As needed (PRN)        Question:  Heparin Infusion Adjustment (DO NOT MODIFY ANSWER)  Answer:  \[employer]\epic\Images\Pharmacy\HeparinInfusions\heparin LOW INTENSITY nomogram for [facility] [id number].pdf    01/09/25 0832     Reason for no Mechanical VTE Prophylaxis  Once        Question:  Reasons:  Answer:  Physician Provided (leave comment)  Comment:  already on heparin infusion "    01/09/25 0906     heparin 25,000 units in dextrose 5% 250 mL (100 units/mL) infusion LOW INTENSITY nomogram - [facility]  Continuous        Question:  Begin at (units/kg/hr)  Answer:  12 01/09/25 0832                                    Wayne Jones DO  Department of Hospital Medicine  [facility] - Emergency Dept

## 2025-01-10 PROBLEM — I50.21 ACUTE SYSTOLIC CONGESTIVE HEART FAILURE: Status: ACTIVE | Noted: 2025-01-10

## 2025-01-10 PROBLEM — N17.9 AKI (ACUTE KIDNEY INJURY): Status: ACTIVE | Noted: 2025-01-10

## 2025-01-10 PROBLEM — F11.90 OPIOID USE DISORDER: Status: ACTIVE | Noted: 2025-01-10

## 2025-01-10 LAB
ALBUMIN SERPL BCP-MCNC: 2.3 G/DL (ref 3.5–5.2)
ALP SERPL-CCNC: 86 U/L (ref 55–135)
ALT SERPL W/O P-5'-P-CCNC: 8 U/L (ref 10–44)
ANION GAP SERPL CALC-SCNC: 5 MMOL/L (ref 8–16)
APTT PPP: 32.2 SEC (ref 21–32)
APTT PPP: 49.4 SEC (ref 21–32)
AST SERPL-CCNC: 10 U/L (ref 10–40)
BASOPHILS # BLD AUTO: 0.08 K/UL (ref 0–0.2)
BASOPHILS # BLD AUTO: 0.08 K/UL (ref 0–0.2)
BASOPHILS NFR BLD: 0.7 % (ref 0–1.9)
BASOPHILS NFR BLD: 0.7 % (ref 0–1.9)
BILIRUB SERPL-MCNC: 0.4 MG/DL (ref 0.1–1)
BUN SERPL-MCNC: 28 MG/DL (ref 6–20)
CALCIUM SERPL-MCNC: 7.5 MG/DL (ref 8.7–10.5)
CHLORIDE SERPL-SCNC: 106 MMOL/L (ref 95–110)
CO2 SERPL-SCNC: 26 MMOL/L (ref 23–29)
CREAT SERPL-MCNC: 1.6 MG/DL (ref 0.5–1.4)
DIFFERENTIAL METHOD BLD: ABNORMAL
DIFFERENTIAL METHOD BLD: ABNORMAL
EOSINOPHIL # BLD AUTO: 0.9 K/UL (ref 0–0.5)
EOSINOPHIL # BLD AUTO: 0.9 K/UL (ref 0–0.5)
EOSINOPHIL NFR BLD: 7.6 % (ref 0–8)
EOSINOPHIL NFR BLD: 7.6 % (ref 0–8)
ERYTHROCYTE [DISTWIDTH] IN BLOOD BY AUTOMATED COUNT: 14.7 % (ref 11.5–14.5)
ERYTHROCYTE [DISTWIDTH] IN BLOOD BY AUTOMATED COUNT: 14.7 % (ref 11.5–14.5)
EST. GFR  (NO RACE VARIABLE): 53.1 ML/MIN/1.73 M^2
ESTIMATED AVG GLUCOSE: 169 MG/DL (ref 68–131)
GLUCOSE SERPL-MCNC: 157 MG/DL (ref 70–110)
HBA1C MFR BLD: 7.5 % (ref 4.5–6.2)
HCT VFR BLD AUTO: 33.1 % (ref 40–54)
HCT VFR BLD AUTO: 33.1 % (ref 40–54)
HGB BLD-MCNC: 10.5 G/DL (ref 14–18)
HGB BLD-MCNC: 10.5 G/DL (ref 14–18)
IMM GRANULOCYTES # BLD AUTO: 0.05 K/UL (ref 0–0.04)
IMM GRANULOCYTES # BLD AUTO: 0.05 K/UL (ref 0–0.04)
IMM GRANULOCYTES NFR BLD AUTO: 0.4 % (ref 0–0.5)
IMM GRANULOCYTES NFR BLD AUTO: 0.4 % (ref 0–0.5)
LYMPHOCYTES # BLD AUTO: 1.9 K/UL (ref 1–4.8)
LYMPHOCYTES # BLD AUTO: 1.9 K/UL (ref 1–4.8)
LYMPHOCYTES NFR BLD: 16.7 % (ref 18–48)
LYMPHOCYTES NFR BLD: 16.7 % (ref 18–48)
MAGNESIUM SERPL-MCNC: 1.6 MG/DL (ref 1.6–2.6)
MCH RBC QN AUTO: 26.6 PG (ref 27–31)
MCH RBC QN AUTO: 26.6 PG (ref 27–31)
MCHC RBC AUTO-ENTMCNC: 31.7 G/DL (ref 32–36)
MCHC RBC AUTO-ENTMCNC: 31.7 G/DL (ref 32–36)
MCV RBC AUTO: 84 FL (ref 82–98)
MCV RBC AUTO: 84 FL (ref 82–98)
MONOCYTES # BLD AUTO: 1.3 K/UL (ref 0.3–1)
MONOCYTES # BLD AUTO: 1.3 K/UL (ref 0.3–1)
MONOCYTES NFR BLD: 10.9 % (ref 4–15)
MONOCYTES NFR BLD: 10.9 % (ref 4–15)
NEUTROPHILS # BLD AUTO: 7.4 K/UL (ref 1.8–7.7)
NEUTROPHILS # BLD AUTO: 7.4 K/UL (ref 1.8–7.7)
NEUTROPHILS NFR BLD: 63.7 % (ref 38–73)
NEUTROPHILS NFR BLD: 63.7 % (ref 38–73)
NRBC BLD-RTO: 0 /100 WBC
NRBC BLD-RTO: 0 /100 WBC
PLATELET # BLD AUTO: 244 K/UL (ref 150–450)
PLATELET # BLD AUTO: 244 K/UL (ref 150–450)
PMV BLD AUTO: 10.4 FL (ref 9.2–12.9)
PMV BLD AUTO: 10.4 FL (ref 9.2–12.9)
POCT GLUCOSE: 150 MG/DL (ref 70–110)
POCT GLUCOSE: 152 MG/DL (ref 70–110)
POCT GLUCOSE: 163 MG/DL (ref 70–110)
POCT GLUCOSE: 176 MG/DL (ref 70–110)
POCT GLUCOSE: 195 MG/DL (ref 70–110)
POTASSIUM SERPL-SCNC: 3.9 MMOL/L (ref 3.5–5.1)
PROT SERPL-MCNC: 4.8 G/DL (ref 6–8.4)
RBC # BLD AUTO: 3.95 M/UL (ref 4.6–6.2)
RBC # BLD AUTO: 3.95 M/UL (ref 4.6–6.2)
SODIUM SERPL-SCNC: 137 MMOL/L (ref 136–145)
WBC # BLD AUTO: 11.65 K/UL (ref 3.9–12.7)
WBC # BLD AUTO: 11.65 K/UL (ref 3.9–12.7)

## 2025-01-10 PROCEDURE — 12000002 HC ACUTE/MED SURGE SEMI-PRIVATE ROOM

## 2025-01-10 PROCEDURE — 80053 COMPREHEN METABOLIC PANEL: CPT | Performed by: STUDENT IN AN ORGANIZED HEALTH CARE EDUCATION/TRAINING PROGRAM

## 2025-01-10 PROCEDURE — 63600175 PHARM REV CODE 636 W HCPCS: Performed by: EMERGENCY MEDICINE

## 2025-01-10 PROCEDURE — 21400001 HC TELEMETRY ROOM

## 2025-01-10 PROCEDURE — 85025 COMPLETE CBC W/AUTO DIFF WBC: CPT | Performed by: EMERGENCY MEDICINE

## 2025-01-10 PROCEDURE — 25000003 PHARM REV CODE 250: Performed by: STUDENT IN AN ORGANIZED HEALTH CARE EDUCATION/TRAINING PROGRAM

## 2025-01-10 PROCEDURE — 99900031 HC PATIENT EDUCATION (STAT)

## 2025-01-10 PROCEDURE — 99233 SBSQ HOSP IP/OBS HIGH 50: CPT | Mod: ,,, | Performed by: INTERNAL MEDICINE

## 2025-01-10 PROCEDURE — 83036 HEMOGLOBIN GLYCOSYLATED A1C: CPT | Performed by: STUDENT IN AN ORGANIZED HEALTH CARE EDUCATION/TRAINING PROGRAM

## 2025-01-10 PROCEDURE — 27000221 HC OXYGEN, UP TO 24 HOURS

## 2025-01-10 PROCEDURE — 36415 COLL VENOUS BLD VENIPUNCTURE: CPT | Performed by: STUDENT IN AN ORGANIZED HEALTH CARE EDUCATION/TRAINING PROGRAM

## 2025-01-10 PROCEDURE — 85730 THROMBOPLASTIN TIME PARTIAL: CPT | Performed by: EMERGENCY MEDICINE

## 2025-01-10 PROCEDURE — 63600175 PHARM REV CODE 636 W HCPCS: Performed by: STUDENT IN AN ORGANIZED HEALTH CARE EDUCATION/TRAINING PROGRAM

## 2025-01-10 PROCEDURE — 94799 UNLISTED PULMONARY SVC/PX: CPT

## 2025-01-10 PROCEDURE — 25000003 PHARM REV CODE 250: Performed by: INTERNAL MEDICINE

## 2025-01-10 PROCEDURE — 82962 GLUCOSE BLOOD TEST: CPT

## 2025-01-10 PROCEDURE — 94761 N-INVAS EAR/PLS OXIMETRY MLT: CPT

## 2025-01-10 PROCEDURE — 83735 ASSAY OF MAGNESIUM: CPT | Performed by: STUDENT IN AN ORGANIZED HEALTH CARE EDUCATION/TRAINING PROGRAM

## 2025-01-10 PROCEDURE — 99900035 HC TECH TIME PER 15 MIN (STAT)

## 2025-01-10 PROCEDURE — 99222 1ST HOSP IP/OBS MODERATE 55: CPT | Mod: AF,HB,, | Performed by: NEUROMUSCULOSKELETAL MEDICINE & OMM

## 2025-01-10 RX ORDER — OXYCODONE AND ACETAMINOPHEN 5; 325 MG/1; MG/1
1 TABLET ORAL EVERY 4 HOURS PRN
Status: DISCONTINUED | OUTPATIENT
Start: 2025-01-10 | End: 2025-01-26 | Stop reason: HOSPADM

## 2025-01-10 RX ORDER — HYDRALAZINE HYDROCHLORIDE 20 MG/ML
5 INJECTION INTRAMUSCULAR; INTRAVENOUS EVERY 6 HOURS PRN
Status: DISCONTINUED | OUTPATIENT
Start: 2025-01-10 | End: 2025-01-26 | Stop reason: HOSPADM

## 2025-01-10 RX ORDER — BUPRENORPHINE AND NALOXONE 8; 2 MG/1; MG/1
1 FILM, SOLUBLE BUCCAL; SUBLINGUAL 2 TIMES DAILY
Status: DISCONTINUED | OUTPATIENT
Start: 2025-01-10 | End: 2025-01-26 | Stop reason: HOSPADM

## 2025-01-10 RX ORDER — MORPHINE SULFATE 4 MG/ML
4 INJECTION, SOLUTION INTRAMUSCULAR; INTRAVENOUS EVERY 4 HOURS PRN
Status: DISCONTINUED | OUTPATIENT
Start: 2025-01-10 | End: 2025-01-16

## 2025-01-10 RX ORDER — BUPRENORPHINE AND NALOXONE 8; 2 MG/1; MG/1
1 FILM, SOLUBLE BUCCAL; SUBLINGUAL DAILY
Status: DISCONTINUED | OUTPATIENT
Start: 2025-01-10 | End: 2025-01-10

## 2025-01-10 RX ORDER — INSULIN ASPART 100 [IU]/ML
0-10 INJECTION, SOLUTION INTRAVENOUS; SUBCUTANEOUS
Status: DISCONTINUED | OUTPATIENT
Start: 2025-01-10 | End: 2025-01-26 | Stop reason: HOSPADM

## 2025-01-10 RX ORDER — OXYCODONE AND ACETAMINOPHEN 10; 325 MG/1; MG/1
1 TABLET ORAL EVERY 4 HOURS PRN
Status: DISCONTINUED | OUTPATIENT
Start: 2025-01-10 | End: 2025-01-26 | Stop reason: HOSPADM

## 2025-01-10 RX ORDER — ACETAMINOPHEN 325 MG/1
650 TABLET ORAL EVERY 4 HOURS PRN
Status: DISCONTINUED | OUTPATIENT
Start: 2025-01-10 | End: 2025-01-26 | Stop reason: HOSPADM

## 2025-01-10 RX ORDER — ENOXAPARIN SODIUM 150 MG/ML
1 INJECTION SUBCUTANEOUS EVERY 12 HOURS
Status: DISCONTINUED | OUTPATIENT
Start: 2025-01-10 | End: 2025-01-22

## 2025-01-10 RX ADMIN — MICONAZOLE NITRATE: 20 POWDER TOPICAL at 08:01

## 2025-01-10 RX ADMIN — DIAZEPAM 5 MG: 5 TABLET ORAL at 09:01

## 2025-01-10 RX ADMIN — HYDROCHLOROTHIAZIDE 25 MG: 25 TABLET ORAL at 09:01

## 2025-01-10 RX ADMIN — ENOXAPARIN SODIUM 120 MG: 120 INJECTION SUBCUTANEOUS at 09:01

## 2025-01-10 RX ADMIN — BACLOFEN 10 MG: 10 TABLET ORAL at 09:01

## 2025-01-10 RX ADMIN — HEPARIN SODIUM 14 UNITS/KG/HR: 10000 INJECTION, SOLUTION INTRAVENOUS at 01:01

## 2025-01-10 RX ADMIN — Medication 800 MG: at 01:01

## 2025-01-10 RX ADMIN — BACLOFEN 10 MG: 10 TABLET ORAL at 03:01

## 2025-01-10 RX ADMIN — ATORVASTATIN CALCIUM 80 MG: 40 TABLET, FILM COATED ORAL at 09:01

## 2025-01-10 RX ADMIN — ASPIRIN 81 MG: 81 TABLET, COATED ORAL at 09:01

## 2025-01-10 RX ADMIN — MUPIROCIN 1 G: 20 OINTMENT TOPICAL at 08:01

## 2025-01-10 RX ADMIN — MORPHINE SULFATE 4 MG: 4 INJECTION, SOLUTION INTRAMUSCULAR; INTRAVENOUS at 01:01

## 2025-01-10 RX ADMIN — MORPHINE SULFATE 1 MG: 2 INJECTION, SOLUTION INTRAMUSCULAR; INTRAVENOUS at 05:01

## 2025-01-10 RX ADMIN — HYDROCODONE BITARTRATE AND ACETAMINOPHEN 1 TABLET: 10; 325 TABLET ORAL at 09:01

## 2025-01-10 RX ADMIN — BUPRENORPHINE AND NALOXONE 1 FILM: 8; 2 FILM BUCCAL; SUBLINGUAL at 08:01

## 2025-01-10 RX ADMIN — FUROSEMIDE 40 MG: 10 INJECTION, SOLUTION INTRAMUSCULAR; INTRAVENOUS at 09:01

## 2025-01-10 RX ADMIN — GABAPENTIN 600 MG: 300 CAPSULE ORAL at 03:01

## 2025-01-10 RX ADMIN — HYDROCODONE BITARTRATE AND ACETAMINOPHEN 1 TABLET: 10; 325 TABLET ORAL at 01:01

## 2025-01-10 RX ADMIN — BUPRENORPHINE AND NALOXONE 1 FILM: 8; 2 FILM BUCCAL; SUBLINGUAL at 03:01

## 2025-01-10 RX ADMIN — GABAPENTIN 600 MG: 300 CAPSULE ORAL at 09:01

## 2025-01-10 RX ADMIN — OXYCODONE HYDROCHLORIDE AND ACETAMINOPHEN 1 TABLET: 10; 325 TABLET ORAL at 03:01

## 2025-01-10 RX ADMIN — MUPIROCIN 1 G: 20 OINTMENT TOPICAL at 09:01

## 2025-01-10 RX ADMIN — BACLOFEN 10 MG: 10 TABLET ORAL at 08:01

## 2025-01-10 RX ADMIN — GABAPENTIN 600 MG: 300 CAPSULE ORAL at 08:01

## 2025-01-10 RX ADMIN — OXYCODONE HYDROCHLORIDE AND ACETAMINOPHEN 1 TABLET: 10; 325 TABLET ORAL at 09:01

## 2025-01-10 RX ADMIN — LISINOPRIL 10 MG: 10 TABLET ORAL at 09:01

## 2025-01-10 RX ADMIN — MICONAZOLE NITRATE: 20 POWDER TOPICAL at 09:01

## 2025-01-10 RX ADMIN — MORPHINE SULFATE 1 MG: 2 INJECTION, SOLUTION INTRAMUSCULAR; INTRAVENOUS at 11:01

## 2025-01-10 RX ADMIN — Medication 800 MG: at 09:01

## 2025-01-10 RX ADMIN — ENOXAPARIN SODIUM 120 MG: 120 INJECTION SUBCUTANEOUS at 08:01

## 2025-01-10 NOTE — ASSESSMENT & PLAN NOTE
Patient's FSGs are controlled on current medication regimen.  Last A1c reviewed-   Lab Results   Component Value Date    HGBA1C 8.2 (H) 09/27/2019     Most recent fingerstick glucose reviewed-   Recent Labs   Lab 01/09/25  2311 01/10/25  0617 01/10/25  0831 01/10/25  1131   POCTGLUCOSE 163* 163* 150* 152*     Current correctional scale  Low  Maintain anti-hyperglycemic dose as follows-   Antihyperglycemics (From admission, onward)    Start     Stop Route Frequency Ordered    01/10/25 1515  insulin aspart U-100 pen 0-10 Units         -- SubQ Before meals & nightly PRN 01/10/25 1415        Hold Oral hypoglycemics while patient is in the hospital.

## 2025-01-10 NOTE — ASSESSMENT & PLAN NOTE
MARLENE is likely due to pre-renal azotemia due to intravascular volume depletion. Baseline creatinine is  1 . Most recent creatinine and eGFR are listed below.  Recent Labs     01/09/25  0709 01/10/25  0721   CREATININE 1.0 1.6*   EGFRNORACEVR >60.0 53.1*      Plan  - MARLENE is worsening. Will adjust treatment as follows:  Hold diuretic  - Avoid nephrotoxins and renally dose meds for GFR listed above  - Monitor urine output, serial BMP, and adjust therapy as needed

## 2025-01-10 NOTE — ASSESSMENT & PLAN NOTE
Patient has Systolic (HFrEF) heart failure that is Acute. On presentation their CHF was decompensated. Evidence of decompensated CHF on presentation includes: edema. The etiology of their decompensation is likely substance abuse . Most recent BNP and echo results are listed below.  Recent Labs     01/09/25  0709   BNP 3,449*     Latest ECHO  Results for orders placed during the hospital encounter of 01/09/25    Echo    Interpretation Summary    Left Ventricle: The left ventricle is moderately dilated. Mildly increased wall thickness. There is mild asymmetric hypertrophy. Severe global hypokinesis present. There is severely reduced systolic function with a visually estimated ejection fraction of 25 - 30%.    Right Ventricle: Moderate right ventricular enlargement. Systolic function is mildly reduced.    Left Atrium: Left atrium is mildly dilated.    Tricuspid Valve: There is mild regurgitation.    IVC/SVC: Elevated venous pressure at 15 mmHg.    Current Heart Failure Medications       Plan  - Monitor strict I&Os and daily weights.    - Place on telemetry  - Low sodium diet  - Place on fluid restriction of 1.5 L.   - Cardiology has been consulted  - The patient's volume status is stable but not at their baseline as indicated by edema  Hold diuresis given MARLENE, resume diuresis once creatinine normal.  If kidneys do not normalize or worsen might need right heart catheterization per Cardiology  Hold lisinopril and hydrochlorothiazide

## 2025-01-10 NOTE — SUBJECTIVE & OBJECTIVE
Interval History:  Patient seen and examined this morning, continues to report scrotal swelling and discomfort.  Creatinine up today 1.6.      Review of Systems   Constitutional:  Negative for chills and diaphoresis.   Respiratory:  Negative for shortness of breath.    Cardiovascular:  Positive for leg swelling.   Genitourinary:  Positive for scrotal swelling.     Objective:     Vital Signs (Most Recent):  Temp: 98.1 °F (36.7 °C) (01/10/25 1100)  Pulse: 98 (01/10/25 1100)  Resp: 20 (01/10/25 1344)  BP: 138/73 (01/10/25 1100)  SpO2: 97 % (01/10/25 1100) Vital Signs (24h Range):  Temp:  [97.7 °F (36.5 °C)-98.7 °F (37.1 °C)] 98.1 °F (36.7 °C)  Pulse:  [] 98  Resp:  [13-24] 20  SpO2:  [89 %-100 %] 97 %  BP: (106-148)/(64-89) 138/73     Weight: 115.4 kg (254 lb 6.4 oz)  Body mass index is 36.5 kg/m².    Intake/Output Summary (Last 24 hours) at 1/10/2025 1418  Last data filed at 1/10/2025 1107  Gross per 24 hour   Intake 613.62 ml   Output 2500 ml   Net -1886.38 ml         Physical Exam  Constitutional:       General: He is in acute distress.   Cardiovascular:      Rate and Rhythm: Regular rhythm. Tachycardia present.   Pulmonary:      Effort: No respiratory distress.      Breath sounds: No wheezing.   Abdominal:      Palpations: Abdomen is soft.   Genitourinary:     Comments: Scrotal swelling  Musculoskeletal:      Right lower leg: Edema present.      Left lower leg: Edema present.      Comments: Bilateral AKA   Neurological:      General: No focal deficit present.      Mental Status: He is alert.             Significant Labs: All pertinent labs within the past 24 hours have been reviewed.  CBC:   Recent Labs   Lab 01/09/25  0709 01/10/25  0721   WBC 10.51 11.65  11.65   HGB 11.9* 10.5*  10.5*   HCT 37.6* 33.1*  33.1*    244  244     CMP:   Recent Labs   Lab 01/09/25  0709 01/10/25  0721    137   K 3.7 3.9    106   CO2 24 26   * 157*   BUN 22* 28*   CREATININE 1.0 1.6*   CALCIUM 7.7*  7.5*   PROT 5.5* 4.8*   ALBUMIN 2.5* 2.3*   BILITOT 0.7 0.4   ALKPHOS 94 86   AST 17 10   ALT 10 8*   ANIONGAP 6* 5*       Significant Imaging: I have reviewed all pertinent imaging results/findings within the past 24 hours.

## 2025-01-10 NOTE — ASSESSMENT & PLAN NOTE
Secondary to acute decompensated heart failure  Hold diuretics given MARLENE  Elevate scrotum  Follow up ultrasound

## 2025-01-10 NOTE — CONSULTS
"[facility]  Adult Nutrition   Consult Note (Initial Assessment)     SUMMARY     Recommendations  Add double protein portion all meals.   Attached fluid restriction diet education for print at discharge.   Encourage pt to monitor fluid intake.     Communication of RD Recs:  (plan of care)      Nutrition Goals: Meal consumption of >75% by RD follow up.  Nutrition Goal Status: new      Nutrition Interventions: Fluid modified diet, Sodium modified diet, Content related nutrition education, and Collaboration and Referral of Nutrition Care    Nutrition Diagnosis PES Statement: Food- and nutrition-related knowledge deficit related to lack of prior education as evidenced by statements per pt.      Nutrition Diagnosis Status:   New      Dietitian Rounds Brief  48 yo m admitted with scrotal edema. Pmhx: DM2, HTN, AKA. Wound & substance abuse. Pt endorses he is hungry. Pt agreeable to double protein portions. Pt reports strong desires for coke but ok with coke zero. Encouraged pt to monitor fluid intake. LBM 1/10.      Nutrition Related Social Determinants of Health:   SDOH: Unable to assess at this time.       Malnutrition Assessment  Pt does not meet criteria at this time.      Diet order:   Current Diet Order: Low sodium, 2 gm, 1500 mL       Evaluation of Received Nutrient/Fluid Intake  % Intake of Estimated Energy Needs: 75 - 100 %  % Meal Intake: 75 - 100 %      Intake/Output Summary (Last 24 hours) at 1/10/2025 1440  Last data filed at 1/10/2025 1107  Gross per 24 hour   Intake 613.62 ml   Output 2500 ml   Net -1886.38 ml        Anthropometrics  Temp: 98.1 °F (36.7 °C)  Height Method: Stated  Height: 5' 10" (177.8 cm)  Height (inches): 70 in  Weight Method: Bed Scale  Weight: 115.4 kg (254 lb 6.4 oz)  Weight (lb): 254.4 lb  Ideal Body Weight (IBW), Male: 166 lb  % Ideal Body Weight, Male (lb): 153.25 %  BMI (Calculated): 36.5  BMI Grade: 35 - 39.9 - obesity - grade II       Estimated/Assessed Needs  Weight " Used For Calorie Calculations: 115.4 kg (254 lb 6.6 oz)  Energy Calorie Requirements (kcal): 3503-2251 kcal daily  Energy Need Method: Kcal/kg (20-25)  Protein Requirements:  gm daily  Weight Used For Protein Calculations: 115.4 kg (254 lb 6.6 oz) (0.8-1.0 gm/kg)  Fluid Requirements (mL): 1 mL/kcal or per MD  Estimated Fluid Requirement Method: RDA Method  RDA Method (mL): 2308  CHO Requirement: 288 gm daily      Reason for Assessment  Reason For Assessment: consult (For education on fluid and salt restriction)  Diagnosis:  (scrotal edema)  Nutrition Discharge Planning: Pt to discharge on a low sodium diet with 1500 mL fluid restriction.    Final diagnoses:  [R07.9] Chest pain (Primary)  [Z13.6] Screening for cardiovascular condition  [I50.9] Congestive heart failure, unspecified HF chronicity, unspecified heart failure type  [I21.4] NSTEMI (non-ST elevated myocardial infarction)     Past Medical History:   Diagnosis Date    Diabetes mellitus     Hypertension         Nutrition/Diet History  Spiritual, Cultural Beliefs, [employer] Practices, Values that Affect Care: no  Food Allergies: NKFA  Factors Affecting Nutritional Intake: chewing difficulties/inability to chew food (no teeth)    Nutrition Risk Screen  MST Score: 0  Have you recently lost weight without trying?: No  Weight loss score: 0  Have you been eating poorly because of a decreased appetite?: No  Appetite score: 0       Weight History:  Wt Readings from Last 10 Encounters:   01/09/25 115.4 kg (254 lb 6.4 oz)   07/19/22 99.8 kg (220 lb)   02/09/20 104.3 kg (230 lb)   08/06/19 113.4 kg (250 lb)   02/27/19 108.9 kg (240 lb)   02/05/19 108.9 kg (240 lb 1.3 oz)   09/28/18 108.9 kg (240 lb)   06/03/18 106.6 kg (235 lb)   02/19/15 108.9 kg (240 lb)   09/02/14 108.9 kg (240 lb)        Lab/Procedures/Meds: Pertinent Labs/Meds Reviewed    Medications:Pertinent Medications Reviewed  Scheduled Meds:   aspirin  81 mg Oral Daily    atorvastatin  80 mg Oral Daily     baclofen  10 mg Oral TID    buprenorphine-naloxone 8-2 mg  1 Film Sublingual BID    enoxparin  1 mg/kg Subcutaneous Q12H (treatment, non-standard time)    gabapentin  600 mg Oral TID    miconazole NITRATE 2 %   Topical (Top) BID    mupirocin   Nasal BID       Labs: Pertinent Labs Reviewed  Clinical Chemistry:  Recent Labs   Lab 01/09/25  0709 01/10/25  0721    137   K 3.7 3.9    106   CO2 24 26   * 157*   BUN 22* 28*   CREATININE 1.0 1.6*   CALCIUM 7.7* 7.5*   PROT 5.5* 4.8*   ALBUMIN 2.5* 2.3*   BILITOT 0.7 0.4   ALKPHOS 94 86   AST 17 10   ALT 10 8*   ANIONGAP 6* 5*   MG 1.7 1.6       CBC:   Recent Labs   Lab 01/10/25  0721   WBC 11.65  11.65   RBC 3.95*  3.95*   HGB 10.5*  10.5*   HCT 33.1*  33.1*     244   MCV 84  84   MCH 26.6*  26.6*   MCHC 31.7*  31.7*       Cardiac Profile:  Recent Labs   Lab 01/09/25  0709   BNP 3,449*       Diabetes:  Recent Labs   Lab 01/10/25  0617 01/10/25  0831 01/10/25  1131   POCTGLUCOSE 163* 150* 152*       Monitor and Evaluation  Food and Nutrient Intake: energy intake  Food and Nutrient Adminstration: diet order  Knowledge/Beliefs/Attitudes: beliefs and attitudes  Physical Activity and Function: nutrition-related ADLs and IADLs  Anthropometric Measurements: weight change, weight  Biochemical Data, Medical Tests and Procedures: electrolyte and renal panel, gastrointestinal profile, inflammatory profile, glucose/endocrine profile, lipid profile  Nutrition-Focused Physical Findings: overall appearance       Nutrition Risk  Level of Risk/Frequency of Follow-up:  (weekly)       Nutrition Follow-Up  RD Follow-up?: Yes

## 2025-01-10 NOTE — PROGRESS NOTES
[facility]   Department of Cardiology  Progress Note      PATIENT NAME: Gideon Ross    MRN: [id number]  TODAY'S DATE: 01/10/2025  ADMIT DATE: 1/9/2025                          CONSULT REQUESTED BY: Liane Schmidt MD    SUBJECTIVE     PRINCIPAL PROBLEM: Scrotal edema    01/10/2025    Patient seen resting in bed this morning.  He was very upset.  He reports that he does use heroin on a daily basis and believes that he is likely detoxing currently.  Patient is not very optimistic for his outcome.    HPI:    Patient is a 49-year-old male who presented to the emergency room with complaints of testicular pain and swelling over the past 1 week.  Patient is a known diabetic and has a history of PID with bilateral above the knee amputations.  Patient also had some complaints of mild shortness of breath but denied any chest pain.  Patient was noted to have significantly elevated high sensitivity troponin level on arrival.  High sensitivity troponin level was 3343 on arrival and is now 3369.  Patient was started on heparin drip.  Patient does have a known history of drug abuse and is positive for opiates, amphetamine, and marijuana metabolites in his UDS.  Patient reports his last use of methamphetamine was a couple of weeks ago.        REASON FOR CONSULT:  From Hospitalist H&P: Patient presents complaining of testicular pain and swelling.  Patient has noticed increased pain and swelling for the last 1 week as well as foul smell.  Patient has a history of bilateral above-the-knee and below-the-knee amputation.  He is a diabetic.  At the worst symptoms are moderate.  Patient complains of mild shortness of breath no chest pain.            Review of patient's allergies indicates:  No Known Allergies    Past Medical History:   Diagnosis Date    Diabetes mellitus     Hypertension      Past Surgical History:   Procedure Laterality Date    SKIN GRAFT       Social History     Tobacco Use    Smoking status: Every Day      Current packs/day: 1.00     Types: Cigarettes   Substance Use Topics    Alcohol use: Yes    Drug use: No        REVIEW OF SYSTEMS  Per HPI    OBJECTIVE     VITAL SIGNS (Most Recent)  Temp: 98.1 °F (36.7 °C) (01/10/25 0430)  Pulse: 90 (01/10/25 0701)  Resp: 13 (01/10/25 0701)  BP: 116/70 (01/10/25 0701)  SpO2: 98 % (01/10/25 0701)    VENTILATION STATUS  Resp: 13 (01/10/25 0701)  SpO2: 98 % (01/10/25 0701)           I & O (Last 24H):  Intake/Output Summary (Last 24 hours) at 1/10/2025 0900  Last data filed at 1/10/2025 0430  Gross per 24 hour   Intake 821.98 ml   Output 2250 ml   Net -1428.02 ml       WEIGHTS  Wt Readings from Last 1 Encounters:   01/09/25 2239 115.4 kg (254 lb 6.4 oz)   01/09/25 0824 108.9 kg (240 lb)       PHYSICAL EXAM  CONSTITUTIONAL: No fever, no chills  HEENT: Normocephalic, atraumatic,pupils reactive to light                 NECK:  No JVD no carotid bruit  CVS: S1S2+, RRR  LUNGS: Clear  ABDOMEN: Soft, NT, BS+  EXTREMITIES: No cyanosis, edema  : No valle catheter  NEURO: AAO X 3  PSY: Normal affect      HOME MEDICATIONS:  No current facility-administered medications on file prior to encounter.     Current Outpatient Medications on File Prior to Encounter   Medication Sig Dispense Refill    amLODIPine (NORVASC) 10 MG tablet Take 1 tablet (10 mg total) by mouth once daily. 30 tablet 1    aspirin (ECOTRIN) 81 MG EC tablet Take 1 tablet (81 mg total) by mouth once daily. 30 tablet 1    atorvastatin (LIPITOR) 80 MG tablet Take 1 tablet (80 mg total) by mouth once daily. 30 tablet 1    baclofen (LIORESAL) 10 MG tablet Take 1 tablet (10 mg total) by mouth 3 (three) times daily. 90 tablet 1    carvedilol (COREG) 6.25 MG tablet Take 1 tablet (6.25 mg total) by mouth 2 (two) times daily. 60 tablet 1    diazePAM (VALIUM) 5 MG tablet Take 1 tablet (5 mg total) by mouth every 12 (twelve) hours as needed for Anxiety (anxiety.). 30 tablet 0    gabapentin (NEURONTIN) 300 MG capsule Take 2 capsules (600 mg  total) by mouth 3 (three) times daily. 90 capsule 1    hydroCHLOROthiazide (HYDRODIURIL) 50 MG tablet Take 1 tablet (50 mg total) by mouth once daily. 30 tablet 1    insulin aspart U-100 (NOVOLOG) 100 unit/mL InPn pen Inject 18 Units into the skin 3 (three) times daily. (Patient taking differently: Inject into the skin 3 (three) times daily.) 10 mL 1    insulin detemir U-100 (LEVEMIR FLEXTOUCH) 100 unit/mL (3 mL) SubQ InPn pen Inject 35 Units into the skin 2 (two) times daily. 10 mL 1    insulin glargine U-100, Lantus, 100 unit/mL injection Inject 30 Units into the skin every evening. (Patient not taking: Reported on 1/9/2025)      lisinopril (PRINIVIL,ZESTRIL) 40 MG tablet Take 1 tablet (40 mg total) by mouth once daily. 30 tablet 1    nicotine (NICODERM CQ) 14 mg/24 hr Place 1 patch onto the skin once daily. (Patient not taking: Reported on 1/9/2025) 15 patch 0    nortriptyline (PAMELOR) 25 MG capsule Take 1 capsule (25 mg total) by mouth 3 (three) times daily. 30 capsule 1    oxyCODONE (ROXICODONE) 5 MG immediate release tablet Take 1 tablet (5 mg total) by mouth every 12 (twelve) hours as needed. (Patient not taking: Reported on 1/9/2025) 30 tablet 0    polyethylene glycol (GLYCOLAX) 17 gram PwPk Take 17 g by mouth daily as needed. (Patient not taking: Reported on 1/9/2025) 30 each 1    QUEtiapine (SEROQUEL) 100 MG Tab Take 1 tablet (100 mg total) by mouth every evening. 30 tablet 1    sofosbuvir-velpatasvir (EPCLUSA) 400-100 mg Tab Take 1 tablet daily. (Patient not taking: Reported on 1/9/2025) 28 tablet 2       SCHEDULED MEDS:   aspirin  81 mg Oral Daily    atorvastatin  80 mg Oral Daily    baclofen  10 mg Oral TID    enoxparin  1 mg/kg Subcutaneous Q12H (treatment, non-standard time)    furosemide (LASIX) injection  40 mg Intravenous Q12H    gabapentin  600 mg Oral TID    hydroCHLOROthiazide  25 mg Oral Daily    lisinopriL  10 mg Oral Daily    miconazole NITRATE 2 %   Topical (Top) BID    mupirocin   Nasal  BID       CONTINUOUS INFUSIONS:        PRN MEDS:  Current Facility-Administered Medications:     acetaminophen, 650 mg, Oral, Q8H PRN    acetaminophen, 650 mg, Oral, Q4H PRN    aluminum-magnesium hydroxide-simethicone, 30 mL, Oral, QID PRN    dextrose 50%, 12.5 g, Intravenous, PRN    dextrose 50%, 25 g, Intravenous, PRN    diazePAM, 5 mg, Oral, Q12H PRN    glucagon (human recombinant), 1 mg, Intramuscular, PRN    glucose, 16 g, Oral, PRN    glucose, 24 g, Oral, PRN    HYDROcodone-acetaminophen, 1 tablet, Oral, Q6H PRN    insulin aspart U-100, 0-5 Units, Subcutaneous, QID (AC + HS) PRN    magnesium oxide, 800 mg, Oral, PRN    magnesium oxide, 800 mg, Oral, PRN    melatonin, 6 mg, Oral, Nightly PRN    morphine, 1 mg, Intravenous, Q6H PRN    naloxone, 0.02 mg, Intravenous, PRN    ondansetron, 4 mg, Intravenous, Q6H PRN    potassium bicarbonate, 35 mEq, Oral, PRN    potassium bicarbonate, 50 mEq, Oral, PRN    potassium bicarbonate, 60 mEq, Oral, PRN    potassium, sodium phosphates, 2 packet, Oral, PRN    potassium, sodium phosphates, 2 packet, Oral, PRN    potassium, sodium phosphates, 2 packet, Oral, PRN    senna-docusate 8.6-50 mg, 1 tablet, Oral, Daily PRN    sodium chloride 0.9%, 10 mL, Intravenous, Q12H PRN    sodium chloride 0.9%, 10 mL, Intravenous, PRN    LABS AND DIAGNOSTICS     CBC LAST 3 DAYS  Recent Labs   Lab 01/09/25  0709 01/10/25  0721   WBC 10.51 11.65  11.65   RBC 4.50* 3.95*  3.95*   HGB 11.9* 10.5*  10.5*   HCT 37.6* 33.1*  33.1*   MCV 84 84  84   MCH 26.4* 26.6*  26.6*   MCHC 31.6* 31.7*  31.7*   RDW 14.6* 14.7*  14.7*    244  244   MPV 10.3 10.4  10.4   GRAN 67.8  7.1 63.7  63.7  7.4  7.4   LYMPH 15.8*  1.7 16.7*  16.7*  1.9  1.9   MONO 8.8  0.9 10.9  10.9  1.3*  1.3*   BASO 0.08 0.08  0.08   NRBC 0 0  0       COAGULATION LAST 3 DAYS  Recent Labs   Lab 01/09/25  0918 01/09/25  1659 01/09/25  0749 01/10/25  0721   INR 1.1  --   --   --    APTT 28.4 45.2* 32.2* 49.4*  "      CHEMISTRY LAST 3 DAYS  Recent Labs   Lab 01/09/25  0709 01/10/25  0721    137   K 3.7 3.9    106   CO2 24 26   ANIONGAP 6* 5*   BUN 22* 28*   CREATININE 1.0 1.6*   * 157*   CALCIUM 7.7* 7.5*   MG 1.7 1.6   ALBUMIN 2.5* 2.3*   PROT 5.5* 4.8*   ALKPHOS 94 86   ALT 10 8*   AST 17 10   BILITOT 0.7 0.4       CARDIAC PROFILE LAST 3 DAYS  Recent Labs   Lab 01/09/25  0709 01/09/25  0918   BNP 3,449*  --    TROPONINIHS 3343.0* 3369.7*       ENDOCRINE LAST 3 DAYS  No results for input(s): "TSH", "PROCAL" in the last 168 hours.    LAST ARTERIAL BLOOD GAS  ABG  No results for input(s): "PH", "PO2", "PCO2", "HCO3", "BE" in the last 168 hours.    LAST 7 DAYS MICROBIOLOGY   Microbiology Results (last 7 days)       Procedure Component Value Units Date/Time    Clostridium difficile EIA [[id number]] Collected: 01/09/25 2223    Order Status: Canceled Specimen: Stool     Blood culture x two cultures. Draw prior to antibiotics. [[id number]] Collected: 01/09/25 0859    Order Status: Completed Specimen: Blood Updated: 01/09/25 1558     Blood Culture, Routine No Growth to date    Narrative:      Aerobic and anaerobic    Blood culture x two cultures. Draw prior to antibiotics. [[id number]] Collected: 01/09/25 0859    Order Status: Completed Specimen: Blood Updated: 01/09/25 1558     Blood Culture, Routine No Growth to date    Narrative:      Aerobic and anaerobic            MOST RECENT IMAGING  Echo    Left Ventricle: The left ventricle is moderately dilated. Mildly   increased wall thickness. There is mild asymmetric hypertrophy. Severe   global hypokinesis present. There is severely reduced systolic function   with a visually estimated ejection fraction of 25 - 30%.    Right Ventricle: Moderate right ventricular enlargement. Systolic   function is mildly reduced.    Left Atrium: Left atrium is mildly dilated.    Tricuspid Valve: There is mild regurgitation.    IVC/SVC: Elevated venous pressure at 15 mmHg.  X-Ray " Chest AP Portable  Narrative: CLINICAL HISTORY:  ([id number] y/o  (1975) M    Chest Pain;    TECHNIQUE:  (A#[id number], exam time 1/9/2025 7:54)    XR CHEST AP PORTABLE [id number]    COMPARISON:  Radiograph from 07/19/2022    FINDINGS:  Mild perihilar pulmonary vascular prominence with slightly increased central hilar interstitial lung markings bilaterally suggestive of mild pulmonary vascular congestion/trace edema. No pneumothorax is identified. The heart is top normal in size.  Osseous structures show degenerative changes in the spine. The visualized upper abdomen is unremarkable.  Impression: Top-normal size heart and findings of mild pulmonary vascular congestion/trace interstitial edema.    Electronically signed by: Manuel Snow  Date:    01/09/2025  Time:    08:09      ECHOCARDIOGRAM RESULTS (last 5)  No results found for this or any previous visit.      CURRENT/PREVIOUS VISIT EKG  Results for orders placed or performed during the hospital encounter of 01/09/25   EKG 12-lead    Collection Time: 01/09/25  7:39 AM   Result Value Ref Range    QRS Duration 124 ms    [facility] QTC Calculation 457 ms    Narrative    Test Reason : R07.9,    Vent. Rate : 104 BPM     Atrial Rate : 104 BPM     P-R Int : 168 ms          QRS Dur : 124 ms      QT Int : 348 ms       P-R-T Axes :  68 104  57 degrees    QTcB Int : 457 ms    Sinus tachycardia  Right bundle branch block  Abnormal ECG  No previous ECGs available    Referred By: AAAREFERRAL SELF           Confirmed By:            ASSESSMENT/PLAN:     Active Hospital Problems    Diagnosis    *Scrotal edema    Elevated troponin    Diabetes mellitus       ASSESSMENT & PLAN:     Elevated troponin  Acute HFrEF 25-30%  Testicular pain and swelling   Type II DM  + drug abuse: amphetamines, marijuana  PAD  Hx of bilateral AKA      RECOMMENDATIONS:    Patient with reduced ejection fraction 25-30%.  Recommend maximizing goal directed medical therapy as patient can tolerate.  Unfortunately  he was not very forthcoming about his recent methamphetamine use but has been warned of the potential risk of medication interactions and methamphetamines.  Continue to diurese with furosemide 40 mg IV q.12 hours.  Strict I&O.  Trend labs.  Renal function slightly bumped.  Blood pressure stable.  Will try to avoid inotropes if possible but he may require them for optimization.  Recommend life vest at discharge.  I have reached out to addiction medicine to see if they may be of help for resources as the patient has been advised it is prudent for him to completely cease any illicit drug use.  Patient states he feels as though he is currently experiencing heroin detox as he has been without heroin for 2 days.  Further recommendations based on hospital course.        Bhumi Sanchez NP  Date of Service: 01/10/2025  12:01 PM

## 2025-01-10 NOTE — CONSULTS
Bilateral hands small dry scabs, tattos hands are very dry rough    Bilateral buttock small open stage 2, small openings on each buttock, left buttock approx. 1cm round, x 2 pink wound bed,  red peeling  fold right gluteal small stage 2, patient unable to stay on side for long, increases pain in scrotum being on side.                  Scratches on legs, side of abdomen,         Right stump dry abrasion, thick callus in scarred healed incision.  patient states he pull a piece of dry skin, tattoos on each side of healed incision.  Skin is very dry and flakey bilateral stumps.             Cleaned bilateral groin and scrotum Triad applied nurse assisted   Scrotum red edema, painful, small ulcer at the base of scrotum, posterior scrotum denuded red raw.  Complete skin assessment.

## 2025-01-10 NOTE — PLAN OF CARE
Problem: Infection  Goal: Absence of Infection Signs and Symptoms  Outcome: Progressing     Problem: Adult Inpatient Plan of Care  Goal: Plan of Care Review  Outcome: Progressing  Goal: Patient-Specific Goal (Individualized)  Outcome: Progressing  Goal: Absence of Hospital-Acquired Illness or Injury  Outcome: Progressing  Goal: Optimal Comfort and Wellbeing  Outcome: Progressing  Goal: Readiness for Transition of Care  Outcome: Progressing     Problem: Fall Injury Risk  Goal: Absence of Fall and Fall-Related Injury  Outcome: Progressing     Problem: Heart Failure  Goal: Optimal Coping  Outcome: Progressing  Goal: Optimal Cardiac Output  Outcome: Progressing  Goal: Stable Heart Rate and Rhythm  Outcome: Progressing  Goal: Optimal Functional Ability  Outcome: Progressing  Goal: Fluid and Electrolyte Balance  Outcome: Progressing  Goal: Improved Oral Intake  Outcome: Progressing  Goal: Effective Oxygenation and Ventilation  Outcome: Progressing  Goal: Effective Breathing Pattern During Sleep  Outcome: Progressing     Problem: Urinary Retention  Goal: Effective Urinary Elimination  Outcome: Progressing     Problem: Diabetes Comorbidity  Goal: Blood Glucose Level Within Targeted Range  Outcome: Progressing     Problem: Wound  Goal: Optimal Coping  Outcome: Progressing  Goal: Optimal Functional Ability  Outcome: Progressing  Goal: Absence of Infection Signs and Symptoms  Outcome: Progressing  Goal: Improved Oral Intake  Outcome: Progressing  Goal: Optimal Pain Control and Function  Outcome: Progressing  Goal: Skin Health and Integrity  Outcome: Progressing  Goal: Optimal Wound Healing  Outcome: Progressing

## 2025-01-10 NOTE — PLAN OF CARE
Problem: Infection  Goal: Absence of Infection Signs and Symptoms  Outcome: Progressing     Problem: Adult Inpatient Plan of Care  Goal: Plan of Care Review  Outcome: Progressing  Goal: Patient-Specific Goal (Individualized)  Outcome: Progressing  Goal: Absence of Hospital-Acquired Illness or Injury  Outcome: Progressing  Goal: Optimal Comfort and Wellbeing  Outcome: Progressing  Goal: Readiness for Transition of Care  Outcome: Progressing     Problem: Fall Injury Risk  Goal: Absence of Fall and Fall-Related Injury  Outcome: Progressing     Problem: Heart Failure  Goal: Optimal Coping  Outcome: Progressing  Goal: Optimal Cardiac Output  Outcome: Progressing  Goal: Stable Heart Rate and Rhythm  Outcome: Progressing  Goal: Optimal Functional Ability  Outcome: Progressing  Goal: Fluid and Electrolyte Balance  Outcome: Progressing  Goal: Improved Oral Intake  Outcome: Progressing  Goal: Effective Oxygenation and Ventilation  Outcome: Progressing  Goal: Effective Breathing Pattern During Sleep  Outcome: Progressing     Problem: Urinary Retention  Goal: Effective Urinary Elimination  Outcome: Progressing     Problem: Diabetes Comorbidity  Goal: Blood Glucose Level Within Targeted Range  Outcome: Progressing     Problem: Wound  Goal: Optimal Coping  Outcome: Progressing  Goal: Optimal Functional Ability  Outcome: Progressing  Goal: Absence of Infection Signs and Symptoms  Outcome: Progressing  Goal: Improved Oral Intake  Outcome: Progressing  Goal: Optimal Pain Control and Function  Outcome: Progressing  Goal: Skin Health and Integrity  Outcome: Progressing  Goal: Optimal Wound Healing  Outcome: Progressing     Problem: Acute Kidney Injury/Impairment  Goal: Fluid and Electrolyte Balance  Outcome: Progressing  Goal: Improved Oral Intake  Outcome: Progressing  Goal: Effective Renal Function  Outcome: Progressing

## 2025-01-10 NOTE — PLAN OF CARE
Problem: Wound  Goal: Improved Oral Intake  Intervention: Promote and Optimize Oral Intake  Flowsheets (Taken 1/10/2025 2787)  Nutrition Interventions:   diet adjusted   supplemental foods provided

## 2025-01-10 NOTE — PROGRESS NOTES
[facility] Medicine  Progress Note    Patient Name: Gideon Ross  MRN: [id number]  Patient Class: IP- Inpatient   Admission Date: 1/9/2025  Length of Stay: 1 days  Attending Physician: Liane cShmidt MD  Primary Care Provider: Jennifer, Primary Doctor        Subjective     Principal Problem:Acute systolic congestive heart failure        HPI:  Mr. Ross is a 49 yom w/pmh significant for PID status post bilateral above-the-knee amputations, diabetes mellitus who presented for significant bilateral lower extremity edema and scrotal swelling, as well as intractable pain secondary to scrotal swelling.  Labs largely unremarkable.  BNP 3500, troponin 3300.  Patient was started on IV diuresis and pain control.  Cardiology was consulted.  Patient was initially started on heparin infusion.  On exam he has significant pain and tenderness due to significant scrotal swelling.    Overview/Hospital Course:  Patient with history of peripheral artery disease status post bilateral AKA, diabetes, substance abuse presents to the emergency room with complaints of lower extremity and scrotal edema.  BNP and troponin elevated.  Cardiology consulted.  Initially started on heparin drip but then transitioned to Lovenox.  Echocardiogram showed EF 25%.  Addiction Medicine consulted for heroin use.  Started on Suboxone.  Diuretics held given MARLENE.  Needs LifeVest on discharge.    Interval History:  Patient seen and examined this morning, continues to report scrotal swelling and discomfort.  Creatinine up today 1.6.      Review of Systems   Constitutional:  Negative for chills and diaphoresis.   Respiratory:  Negative for shortness of breath.    Cardiovascular:  Positive for leg swelling.   Genitourinary:  Positive for scrotal swelling.     Objective:     Vital Signs (Most Recent):  Temp: 98.1 °F (36.7 °C) (01/10/25 1100)  Pulse: 98 (01/10/25 1100)  Resp: 20 (01/10/25 1344)  BP: 138/73 (01/10/25 1100)  SpO2: 97 % (01/10/25 1100)  Vital Signs (24h Range):  Temp:  [97.7 °F (36.5 °C)-98.7 °F (37.1 °C)] 98.1 °F (36.7 °C)  Pulse:  [] 98  Resp:  [13-24] 20  SpO2:  [89 %-100 %] 97 %  BP: (106-148)/(64-89) 138/73     Weight: 115.4 kg (254 lb 6.4 oz)  Body mass index is 36.5 kg/m².    Intake/Output Summary (Last 24 hours) at 1/10/2025 1418  Last data filed at 1/10/2025 1107  Gross per 24 hour   Intake 613.62 ml   Output 2500 ml   Net -1886.38 ml         Physical Exam  Constitutional:       General: He is in acute distress.   Cardiovascular:      Rate and Rhythm: Regular rhythm. Tachycardia present.   Pulmonary:      Effort: No respiratory distress.      Breath sounds: No wheezing.   Abdominal:      Palpations: Abdomen is soft.   Genitourinary:     Comments: Scrotal swelling  Musculoskeletal:      Right lower leg: Edema present.      Left lower leg: Edema present.      Comments: Bilateral AKA   Neurological:      General: No focal deficit present.      Mental Status: He is alert.             Significant Labs: All pertinent labs within the past 24 hours have been reviewed.  CBC:   Recent Labs   Lab 01/09/25  0709 01/10/25  0721   WBC 10.51 11.65  11.65   HGB 11.9* 10.5*  10.5*   HCT 37.6* 33.1*  33.1*    244  244     CMP:   Recent Labs   Lab 01/09/25  0709 01/10/25  0721    137   K 3.7 3.9    106   CO2 24 26   * 157*   BUN 22* 28*   CREATININE 1.0 1.6*   CALCIUM 7.7* 7.5*   PROT 5.5* 4.8*   ALBUMIN 2.5* 2.3*   BILITOT 0.7 0.4   ALKPHOS 94 86   AST 17 10   ALT 10 8*   ANIONGAP 6* 5*       Significant Imaging: I have reviewed all pertinent imaging results/findings within the past 24 hours.    Assessment and Plan     * Acute systolic congestive heart failure  Patient has Systolic (HFrEF) heart failure that is Acute. On presentation their CHF was decompensated. Evidence of decompensated CHF on presentation includes: edema. The etiology of their decompensation is likely substance abuse . Most recent BNP and echo results  are listed below.  Recent Labs     01/09/25  0709   BNP 3,449*     Latest ECHO  Results for orders placed during the hospital encounter of 01/09/25    Echo    Interpretation Summary    Left Ventricle: The left ventricle is moderately dilated. Mildly increased wall thickness. There is mild asymmetric hypertrophy. Severe global hypokinesis present. There is severely reduced systolic function with a visually estimated ejection fraction of 25 - 30%.    Right Ventricle: Moderate right ventricular enlargement. Systolic function is mildly reduced.    Left Atrium: Left atrium is mildly dilated.    Tricuspid Valve: There is mild regurgitation.    IVC/SVC: Elevated venous pressure at 15 mmHg.    Current Heart Failure Medications       Plan  - Monitor strict I&Os and daily weights.    - Place on telemetry  - Low sodium diet  - Place on fluid restriction of 1.5 L.   - Cardiology has been consulted  - The patient's volume status is stable but not at their baseline as indicated by edema  Hold diuresis given MARLENE, resume diuresis once creatinine normal.  If kidneys do not normalize or worsen might need right heart catheterization per Cardiology  Hold lisinopril and hydrochlorothiazide    Opioid use disorder  Addiction medicine consulted   We will start patient on Suboxone  Per Addiction medicine:   For first dose of buprenorphine (at least 18 hours since last use of fentanyl) give one or two 8 mg strips (8-16 mg total).  Wait 1 hour and then...  If mild withdrawal, give another 8 mg strip.  If more significant withdrawal, give two more 8 mg strips.  If relaxed or calm, do not give any more.  Wait 1-2 more hours...  If still have withdrawal symptoms, take another 8 mg strip  If relaxed or calm, do not take any more  Try not to give more than 32 mg (4 strips) on day one.  On day 2, give 8 mg BID   Continue taking 8 mg BID until seeing me in clinic.    MARLENE (acute kidney injury)  MARLENE is likely due to pre-renal azotemia due to  intravascular volume depletion. Baseline creatinine is  1 . Most recent creatinine and eGFR are listed below.  Recent Labs     01/09/25  0709 01/10/25  0721   CREATININE 1.0 1.6*   EGFRNORACEVR >60.0 53.1*      Plan  - MARLENE is worsening. Will adjust treatment as follows:  Hold diuretic  - Avoid nephrotoxins and renally dose meds for GFR listed above  - Monitor urine output, serial BMP, and adjust therapy as needed    Diabetes mellitus  Patient's FSGs are controlled on current medication regimen.  Last A1c reviewed-   Lab Results   Component Value Date    HGBA1C 8.2 (H) 09/27/2019     Most recent fingerstick glucose reviewed-   Recent Labs   Lab 01/09/25  2311 01/10/25  0617 01/10/25  0831 01/10/25  1131   POCTGLUCOSE 163* 163* 150* 152*     Current correctional scale  Low  Maintain anti-hyperglycemic dose as follows-   Antihyperglycemics (From admission, onward)      Start     Stop Route Frequency Ordered    01/10/25 1515  insulin aspart U-100 pen 0-10 Units         -- SubQ Before meals & nightly PRN 01/10/25 1415          Hold Oral hypoglycemics while patient is in the hospital.    Elevated troponin  Troponin elevated but flat, cardiology following  No chest pain or shortness of breath   Likely secondary to congestive heart failure  Continue Lovenox      Scrotal edema  Secondary to acute decompensated heart failure  Hold diuretics given MARLENE  Elevate scrotum  Follow up ultrasound      VTE Risk Mitigation (From admission, onward)           Ordered     enoxaparin injection 120 mg  Every 12 hours         01/10/25 0724     IP VTE HIGH RISK PATIENT  Once         01/09/25 1326     Place sequential compression device  Until discontinued         01/09/25 1326     Reason for no Mechanical VTE Prophylaxis  Once        Question:  Reasons:  Answer:  Physician Provided (leave comment)  Comment:  already on heparin infusion    01/09/25 0906                    Discharge Planning   KATHERINE: 1/12/2025     Code Status: Full Code   Medical  Readiness for Discharge Date:   Discharge Plan A: Home                        Liane Schmidt MD  Department of Hospital Medicine   [facility]

## 2025-01-10 NOTE — ASSESSMENT & PLAN NOTE
Troponin elevated but flat, cardiology following  No chest pain or shortness of breath   Likely secondary to congestive heart failure  Continue Lovenox

## 2025-01-10 NOTE — HOSPITAL COURSE
Patient with history of peripheral artery disease status post bilateral AKA, diabetes, substance abuse presents to the emergency room with complaints of lower extremity and scrotal edema.  BNP and troponin elevated.  Cardiology consulted.  Initially started on heparin drip but then transitioned to Lovenox.  Echocardiogram showed EF 25%.  Addiction Medicine consulted for heroin use.  Started on Suboxone and reports improvement in symptoms.  Diuretics held  when he developed MARLENE.  He was started on dobutamine on 1/11/25, but overnight about 0200 he had Vtach about 50 beats and dobutamine turned off. He has a catheter for strict I&O with MARLENE, but is describing dysuria, there is purulent drainage at the end of the catheter so UA was obtained with concerns of UTI and pt placed on rocephin, catheter removed and he has been voiding. Urine culture with NGTD - will complete 3 days of Rocephin.  Nephrology has been consulted. Titrating lasix as tolerated along with albumin.  Cr up trending. Weber was placed by urology on 1/17. Patient is diuresing well on Lasix drip and transitioned to torsemide.  Patient was discharged with LifeVest.  Patient was discharged with Suboxone and referral was placed to Dr. Eric Langston.  Patient was also discharged with torsemide b.i.d..  Recommended to follow-up with all specialty including Cardiology, Nephrology and, pain management

## 2025-01-10 NOTE — CONSULTS
Inpatient Encounter    Addiction Medicine Consultation    SHO Langston DO  Board Certified - Addiction Medicine  Board Certified - Neuromusculoskeletal Medicine and [facility]    Date of Service: 01/10/2025     Assessment & Plan      ASSESSMENT / PLAN      Clinical Impression and Recommendations      Opioid withdrawal, uncomplicated  Recommend rotating patient onto Suboxone to treat both withdrawal and substance use disorder. See below for induction recommendations.  Consider adjuncts such as clonidine for restlessness, agitation, anxiety and other sympathetic symptoms. Benzodiazepines useful in short term only.  Most withdrawal symptoms should alleviate with Suboxone.    Opioid use disorder, Severe, Dependence  Rotate patient onto Suboxone. Will have patient follow up with me as outpatient after discharge. Patient also needs PCP after discharge.  Likely fentanyl in system since pure heroin is virtually non-existent.  Patient would do well with residential treatment but he is unwilling at present.  History of IVDU recommend checking for HIV, HCV, HBV if not already done so.    RECOMMENDATIONS FOR BUPRENORPHINE (SUBOXONE) for fentanyl users:    Note: if precipitated withdrawal presents, immediately give more buprenorphine.    Wait at least 18 hours since last use of fentanyl before taking first dose of buprenorphine.  For first dose of buprenorphine (at least 18 hours since last use of fentanyl) give one or two 8 mg strips (8-16 mg total).  Wait 1 hour and then...  If mild withdrawal, give another 8 mg strip.  If more significant withdrawal, give two more 8 mg strips.  If relaxed or calm, do not give any more.  Wait 1-2 more hours...  If still have withdrawal symptoms, take another 8 mg strip  If relaxed or calm, do not take any more  Try not to give more than 32 mg (4 strips) on day one.  On day 2, give 8 mg BID   Continue taking 8 mg BID until seeing me in clinic.    Feel free to adapt buprenorphine induction to your  "comfort.         I will follow the patient peripherally.    Thank you for allowing me to participate in the care of this patient.       Subjective      SUBJECTIVE     History of Present Illness      Patient seen and examined. Addiction medicine was consulted for opioid use.    Review of systems performed and found unremarkable except as stated below.     01/10/2025   HPI per primary service:  Mr. Ross is a 49 yom w/pmh significant for PID status post bilateral above-the-knee amputations, diabetes mellitus who presented for significant bilateral lower extremity edema and scrotal swelling, as well as intractable pain secondary to scrotal swelling. Labs largely unremarkable. BNP 3500, troponin 3300. Patient was started on IV diuresis and pain control. Cardiology was consulted. Patient was initially started on heparin infusion. On exam he has significant pain and tenderness due to significant scrotal swelling.     Addiction Medicine Consult (01/10/2025):  Patient evaluated for opioid use and stimulant use.     Imminent risks: The patient is not intoxicated. Last reported substance use was 2 days ago The patient is currently in withdrawal. COWS score at 1030 AM was 9. Patient admits to history of IV drug use. Last reported use of IV drugs was "years ago". The patient has significant medical problems that need to be managed in the acute setting.    Initiation and pattern of substance use: Starting using heroin regularly about 5 years ago. States he doesn't use meth but he figures it is frequently mixed in his supply. Patient is aware that his heroin likely has fentanyl in it. Patient prefers to smoke heroin. He doesn't like needles any more. He uses it multiple times per day. He has t    Effects/consequences of substance use: Uses it for depression, he assumes a lot of his health problems are due to heroin use and possible previous IVDU.    Previous treatment attempts include: intensive outpatient treatment (IOP) . " "Patient did not finish treatment. He is open to outpatient treatment with me. If that doesn't work he is willing to discuss residential programs.    Periods of abstinence: "not in a long time"    Patient's stated goal: Abstinence. Motivational interviewing provided. Patient wants to be on suboxone.         Objective      OBJECTIVE     COWS  01/10/2025    Clinical Opioid  Withdrawal Scale    This scale is for symptom management and monitoring only and is not a diagnostic tool.  Resting Pulse Rate 1 - pulse rate      Sweating 1 - subjective report of chills or flushing     Restlessness 1 - reports difficulty sitting still, but is able to do so     Pupil Size 0 - pupils pinned or normal size for room light     Bone or Joint Aches 1 - mild diffuse discomfort     Runny nose or tearing 2 - nose running or tearing     GI Upset 0 - no GI symptoms     Tremor 0 - no tremor     Yawning 1 - yawning once or twice during assessment     Anxiety or Irritability 2 - patient obviously irritable or anxious     Gooseflesh skin 0 - skin is smooth     TOTAL SCORE: 9     INTERPRETATION: (5-12) Score suggests mild opioid withdrawal.        DSM-5 Substance Use Disorder Criteria      01/10/2025     IMPAIRED CONTROL  [x] Often take in larger amounts or over a longer period of time than was intended.    [x] Persistent desire or unsuccessful efforts to cut down or control use.    [x] Great deal of time spent in activities necessary to obtain substance, use, or recover from effects.    [x] Craving/strong desire for substance or urge to use.    SOCIAL IMPAIRMENT  [] Use resulting in failure to fulfill major role obligations at home, work or school.    [] Social, occupational, recreational activities decreased because of use.    [x] Continued use despite having persistent or recurrent social or interpersonal problems cause or exacerbated by the substance.    RISKY USE  [x] Recurrent use in situations in which it is physically " hazardous.    [x] Use despite physical or psychological problems that are likely to have been caused or exacerbated by the substance.    PHYSICAL DEPENDENCE  [x] Tolerance, as defined by either of the following:  A need for markedly increased amounts of substance to achieve intoxication or desired effect.  -OR-   A markedly diminished effect with continued use of the same amount of substance.    [x] Withdrawal, as manifested by the following:  The characteristic withdrawal syndrome for substance.  -AND-  Substance is taken to relieve or avoid withdrawal symptoms.      KOKO CRITERIA DETERMINED BY:   [x] Patient report  [x] Clinical Impression  [] Collateral History    INTERPRETATION:  [] Mild (1-3)   [] Moderate 4-5)  [x] Severe (>=6)  [] Criteria not met  [] Physical dependence without evidence of KOKO.      Prescription Monitoring Program     The PDMP was checked today and there is no concern regarding current controlled substance prescription.       Physical Exam     Vitals:    01/10/25 0900 01/10/25 0913 01/10/25 1100 01/10/25 1154   BP: 121/78  138/73    Pulse: 90  98    Resp: 13 20 20 20   Temp:   98.1 °F (36.7 °C)    TempSrc:   Oral    SpO2: 99%  97%    Weight:       Height:            Physical Exam  Constitutional:       Appearance: Normal appearance. He is ill-appearing.   HENT:      Head: Normocephalic and atraumatic.      Nose: Rhinorrhea present.   Eyes:      General: No scleral icterus.     Extraocular Movements: Extraocular movements intact.      Conjunctiva/sclera: Conjunctivae normal.      Pupils: Pupils are equal, round, and reactive to light.   Pulmonary:      Effort: Pulmonary effort is normal. No respiratory distress.   Neurological:      Mental Status: He is alert and oriented to person, place, and time.   Psychiatric:         Attention and Perception: Attention normal. He does not perceive auditory or visual hallucinations.         Mood and Affect: Affect normal. Mood is anxious and depressed.          Speech: Speech normal.         Behavior: Behavior normal. Behavior is cooperative.            Labs (auto-populated)     POC Urine Drug Screen (*)   Toxicology Results in Chart      AMP * AMP *  (*)   BAR * BAR *  (*)   BUP * BUP *  (*)   BZO * BZO Negative (1/9/2025)   RAEGAN * RAEGAN Negative (1/9/2025)   METH * METH *  (*)   MOR * MOR Presumptive Positive (A) (1/9/2025)   MTD * MTD *  (*)   OXY * OXY *  (*)   THC * THC *  (*)      Fentanyl *  (*)    Alcohol Level *  (*)     Recent CBC, CMP, and other routine labs     WBC 11.65; 11.65;  (1/10/2025)  (1/10/2025)   RBC    3.95; 3.95 (L);  (L) (1/10/2025) K 3.9 (1/10/2025)   HGB 10.5; 10.5 (L);  (L) (1/10/2025) Cl 106 (1/10/2025)   HCT 33.1; 33.1 (L);  (L) (1/10/2025) CO2 26 (1/10/2025)   MCV 84; 84;  (1/10/2025) Glu 157 (H) (1/10/2025)   MCH 26.6; 26.6 (L);  (L) (1/10/2025) BUN 28 (H) (1/10/2025)   MCHC 31.7; 31.7 (L);  (L) (1/10/2025) Cr 1.6 (H) (1/10/2025)   RDW 14.7; 14.7 (H);  (H) (1/10/2025) Ca 7.5 (L) (1/10/2025)   ; 244;  (1/10/2025)AST Alb 2.3 (L) (1/10/2025)   MPV 10.4; 10.4;  (1/10/2025) TBili 0.4 (1/10/2025)   ANC 7.4; 7.4; 63.7; 63.7; ; ;  (1/10/2025) AlkPho 86 (1/10/2025)   ESR *  (*) AST 10 (1/10/2025)   CRP *  (*) ALT 8 (L) (1/10/2025)      CPK *  (*) GFR 53.1 (A) (1/10/2025)   HgA1c *  (*) TSH *  (*)   Trop I *  (*) FT4 *  (*)   Chol *  (*) uHCG *  (*)   TGs *  (*) HCV *  (*)   HDL *  (*) HIV *  (*)   LDL *  (*) HBV sAB *  (*)   ABDULLAHI *  (*) HBV sAG *  (*)   LIP *  (*) RPR *  (*)   INR 1.1 (1/9/2025) B1 *  (*)   GGT *  (*) B9 *  (*)   NH4 *  (*) B12 *  (*)   PETH *  (*) Vit D *  (*)            NOTE: Portions of this documentation were dictated using voice recognition software and  may contain dictation related errors in spelling / grammar / syntax not discovered on text review.              SHO Langston DO    Board Certified, Addiction Medicine  Board Certified, Neuromusculoskeletal Medicine and [facility]  Department of Psychiatry, [facility]      Method of Encounter   IN PERSON visit in Hospital   Type of Encounter   Inpatient or Observation, Initial encounter   E/M Code (+/- modifier)   49559: Inpatient or Observation, Initial, Level 2 (55-74 minutes)     Separate from E/M same visit   None   Total Mins  (01/10/2025) I spent a total of 55 minutes evaluating and managing the patient on the day of the service. This includes time reviewing the patient's chart, performing a history and physical examination, patient education and counseling, documentation, and care coordination.

## 2025-01-10 NOTE — CARE UPDATE
01/09/25 2103   Patient Assessment/Suction   Level of Consciousness (AVPU) alert   Respiratory Effort Unlabored   Expansion/Accessory Muscles/Retractions no use of accessory muscles   All Lung Fields Breath Sounds clear   Rhythm/Pattern, Respiratory no shortness of breath reported   Cough Frequency no cough   PRE-TX-O2   Device (Oxygen Therapy) room air   SpO2 (!) 94 %   Pulse Oximetry Type Continuous   $ Pulse Oximetry - Multiple Charge Pulse Oximetry - Multiple   Pulse 107   Resp 16   Positioning   Head of Bed (HOB) Positioning HOB elevated;HOB at 30 degrees

## 2025-01-10 NOTE — ASSESSMENT & PLAN NOTE
Addiction medicine consulted   We will start patient on Suboxone  Per Addiction medicine:   For first dose of buprenorphine (at least 18 hours since last use of fentanyl) give one or two 8 mg strips (8-16 mg total).  Wait 1 hour and then...  If mild withdrawal, give another 8 mg strip.  If more significant withdrawal, give two more 8 mg strips.  If relaxed or calm, do not give any more.  Wait 1-2 more hours...  If still have withdrawal symptoms, take another 8 mg strip  If relaxed or calm, do not take any more  Try not to give more than 32 mg (4 strips) on day one.  On day 2, give 8 mg BID   Continue taking 8 mg BID until seeing me in clinic.

## 2025-01-11 LAB
ALBUMIN SERPL BCP-MCNC: 2.3 G/DL (ref 3.5–5.2)
ALP SERPL-CCNC: 88 U/L (ref 55–135)
ALT SERPL W/O P-5'-P-CCNC: 7 U/L (ref 10–44)
ANION GAP SERPL CALC-SCNC: 8 MMOL/L (ref 8–16)
AST SERPL-CCNC: 9 U/L (ref 10–40)
BASOPHILS # BLD AUTO: 0.09 K/UL (ref 0–0.2)
BASOPHILS NFR BLD: 0.8 % (ref 0–1.9)
BILIRUB SERPL-MCNC: 0.3 MG/DL (ref 0.1–1)
BUN SERPL-MCNC: 34 MG/DL (ref 6–20)
CALCIUM SERPL-MCNC: 7.3 MG/DL (ref 8.7–10.5)
CHLORIDE SERPL-SCNC: 108 MMOL/L (ref 95–110)
CO2 SERPL-SCNC: 25 MMOL/L (ref 23–29)
CREAT SERPL-MCNC: 2.2 MG/DL (ref 0.5–1.4)
DIFFERENTIAL METHOD BLD: ABNORMAL
EOSINOPHIL # BLD AUTO: 0.9 K/UL (ref 0–0.5)
EOSINOPHIL NFR BLD: 7.2 % (ref 0–8)
ERYTHROCYTE [DISTWIDTH] IN BLOOD BY AUTOMATED COUNT: 14.8 % (ref 11.5–14.5)
EST. GFR  (NO RACE VARIABLE): 36.3 ML/MIN/1.73 M^2
GLUCOSE SERPL-MCNC: 154 MG/DL (ref 70–110)
HCT VFR BLD AUTO: 33.8 % (ref 40–54)
HGB BLD-MCNC: 10.4 G/DL (ref 14–18)
IMM GRANULOCYTES # BLD AUTO: 0.02 K/UL (ref 0–0.04)
IMM GRANULOCYTES NFR BLD AUTO: 0.2 % (ref 0–0.5)
LYMPHOCYTES # BLD AUTO: 2.2 K/UL (ref 1–4.8)
LYMPHOCYTES NFR BLD: 18.9 % (ref 18–48)
MAGNESIUM SERPL-MCNC: 1.7 MG/DL (ref 1.6–2.6)
MCH RBC QN AUTO: 26.3 PG (ref 27–31)
MCHC RBC AUTO-ENTMCNC: 30.8 G/DL (ref 32–36)
MCV RBC AUTO: 86 FL (ref 82–98)
MONOCYTES # BLD AUTO: 1.3 K/UL (ref 0.3–1)
MONOCYTES NFR BLD: 10.7 % (ref 4–15)
NEUTROPHILS # BLD AUTO: 7.3 K/UL (ref 1.8–7.7)
NEUTROPHILS NFR BLD: 62.2 % (ref 38–73)
NRBC BLD-RTO: 0 /100 WBC
PLATELET # BLD AUTO: 249 K/UL (ref 150–450)
PMV BLD AUTO: 10.5 FL (ref 9.2–12.9)
POCT GLUCOSE: 134 MG/DL (ref 70–110)
POCT GLUCOSE: 140 MG/DL (ref 70–110)
POCT GLUCOSE: 153 MG/DL (ref 70–110)
POCT GLUCOSE: 160 MG/DL (ref 70–110)
POTASSIUM SERPL-SCNC: 4 MMOL/L (ref 3.5–5.1)
PROT SERPL-MCNC: 5 G/DL (ref 6–8.4)
RBC # BLD AUTO: 3.95 M/UL (ref 4.6–6.2)
SODIUM SERPL-SCNC: 141 MMOL/L (ref 136–145)
WBC # BLD AUTO: 11.76 K/UL (ref 3.9–12.7)

## 2025-01-11 PROCEDURE — 27000221 HC OXYGEN, UP TO 24 HOURS

## 2025-01-11 PROCEDURE — 99900035 HC TECH TIME PER 15 MIN (STAT)

## 2025-01-11 PROCEDURE — 21400001 HC TELEMETRY ROOM

## 2025-01-11 PROCEDURE — 99900031 HC PATIENT EDUCATION (STAT)

## 2025-01-11 PROCEDURE — 94761 N-INVAS EAR/PLS OXIMETRY MLT: CPT

## 2025-01-11 PROCEDURE — 12000002 HC ACUTE/MED SURGE SEMI-PRIVATE ROOM

## 2025-01-11 PROCEDURE — 25000003 PHARM REV CODE 250: Performed by: STUDENT IN AN ORGANIZED HEALTH CARE EDUCATION/TRAINING PROGRAM

## 2025-01-11 PROCEDURE — 36415 COLL VENOUS BLD VENIPUNCTURE: CPT | Performed by: STUDENT IN AN ORGANIZED HEALTH CARE EDUCATION/TRAINING PROGRAM

## 2025-01-11 PROCEDURE — 80053 COMPREHEN METABOLIC PANEL: CPT | Performed by: STUDENT IN AN ORGANIZED HEALTH CARE EDUCATION/TRAINING PROGRAM

## 2025-01-11 PROCEDURE — 99233 SBSQ HOSP IP/OBS HIGH 50: CPT | Mod: ,,, | Performed by: GENERAL PRACTICE

## 2025-01-11 PROCEDURE — 63600175 PHARM REV CODE 636 W HCPCS: Performed by: STUDENT IN AN ORGANIZED HEALTH CARE EDUCATION/TRAINING PROGRAM

## 2025-01-11 PROCEDURE — 83735 ASSAY OF MAGNESIUM: CPT | Performed by: STUDENT IN AN ORGANIZED HEALTH CARE EDUCATION/TRAINING PROGRAM

## 2025-01-11 PROCEDURE — 63600175 PHARM REV CODE 636 W HCPCS: Performed by: NURSE PRACTITIONER

## 2025-01-11 PROCEDURE — 85025 COMPLETE CBC W/AUTO DIFF WBC: CPT | Performed by: STUDENT IN AN ORGANIZED HEALTH CARE EDUCATION/TRAINING PROGRAM

## 2025-01-11 RX ORDER — DOBUTAMINE HYDROCHLORIDE 400 MG/100ML
2.5 INJECTION INTRAVENOUS CONTINUOUS
Status: DISCONTINUED | OUTPATIENT
Start: 2025-01-11 | End: 2025-01-12

## 2025-01-11 RX ADMIN — GABAPENTIN 600 MG: 300 CAPSULE ORAL at 08:01

## 2025-01-11 RX ADMIN — BUPRENORPHINE AND NALOXONE 1 FILM: 8; 2 FILM BUCCAL; SUBLINGUAL at 08:01

## 2025-01-11 RX ADMIN — OXYCODONE HYDROCHLORIDE AND ACETAMINOPHEN 1 TABLET: 10; 325 TABLET ORAL at 08:01

## 2025-01-11 RX ADMIN — ENOXAPARIN SODIUM 120 MG: 120 INJECTION SUBCUTANEOUS at 09:01

## 2025-01-11 RX ADMIN — MICONAZOLE NITRATE: 20 POWDER TOPICAL at 08:01

## 2025-01-11 RX ADMIN — DOBUTAMINE HYDROCHLORIDE 2.5 MCG/KG/MIN: 400 INJECTION INTRAVENOUS at 04:01

## 2025-01-11 RX ADMIN — ASPIRIN 81 MG: 81 TABLET, COATED ORAL at 08:01

## 2025-01-11 RX ADMIN — ENOXAPARIN SODIUM 120 MG: 120 INJECTION SUBCUTANEOUS at 08:01

## 2025-01-11 RX ADMIN — DIAZEPAM 5 MG: 5 TABLET ORAL at 07:01

## 2025-01-11 RX ADMIN — MUPIROCIN 1 G: 20 OINTMENT TOPICAL at 08:01

## 2025-01-11 RX ADMIN — ATORVASTATIN CALCIUM 80 MG: 40 TABLET, FILM COATED ORAL at 08:01

## 2025-01-11 RX ADMIN — BACLOFEN 10 MG: 10 TABLET ORAL at 08:01

## 2025-01-11 RX ADMIN — MICONAZOLE NITRATE: 20 POWDER TOPICAL at 09:01

## 2025-01-11 RX ADMIN — MUPIROCIN: 20 OINTMENT TOPICAL at 09:01

## 2025-01-11 NOTE — PROGRESS NOTES
[facility] Medicine  Progress Note    Patient Name: Gideon Ross  MRN: [id number]  Patient Class: IP- Inpatient   Admission Date: 1/9/2025  Length of Stay: 2 days  Attending Physician: Wayne Jones DO  Primary Care Provider: Jennifer, Primary Doctor        Subjective     Principal Problem:Acute systolic congestive heart failure        HPI:  Mr. Ross is a 49 yom w/pmh significant for PID status post bilateral above-the-knee amputations, diabetes mellitus who presented for significant bilateral lower extremity edema and scrotal swelling, as well as intractable pain secondary to scrotal swelling.  Labs largely unremarkable.  BNP 3500, troponin 3300.  Patient was started on IV diuresis and pain control.  Cardiology was consulted.  Patient was initially started on heparin infusion.  On exam he has significant pain and tenderness due to significant scrotal swelling.    Overview/Hospital Course:  Patient with history of peripheral artery disease status post bilateral AKA, diabetes, substance abuse presents to the emergency room with complaints of lower extremity and scrotal edema.  BNP and troponin elevated.  Cardiology consulted.  Initially started on heparin drip but then transitioned to Lovenox.  Echocardiogram showed EF 25%.  Addiction Medicine consulted for heroin use.  Started on Suboxone and reports improvement in symptoms.  Diuretics held given MARLENE.  Needs LifeVest on discharge.    Interval History: renal function worsening, still overloaded. D/w cardiology and regimen will be adjusted as per their recs.    Review of Systems   Constitutional:  Positive for fatigue.   Respiratory:  Positive for shortness of breath.    Genitourinary:  Positive for scrotal swelling.   Neurological:  Positive for tremors.     Objective:     Vital Signs (Most Recent):  Temp: 98.6 °F (37 °C) (01/11/25 1231)  Pulse: 94 (01/11/25 1231)  Resp: 18 (01/11/25 0844)  BP: 100/63 (01/11/25 1231)  SpO2: 98 % (01/11/25  1231) Vital Signs (24h Range):  Temp:  [97.9 °F (36.6 °C)-98.6 °F (37 °C)] 98.6 °F (37 °C)  Pulse:  [] 94  Resp:  [11-20] 18  SpO2:  [96 %-99 %] 98 %  BP: (100-164)/(63-91) 100/63     Weight: 115.4 kg (254 lb 6.4 oz)  Body mass index is 36.5 kg/m².    Intake/Output Summary (Last 24 hours) at 1/11/2025 1551  Last data filed at 1/11/2025 1328  Gross per 24 hour   Intake 820 ml   Output 1025 ml   Net -205 ml         Physical Exam  Vitals and nursing note reviewed.   Constitutional:       Appearance: He is obese. He is ill-appearing.      Comments: Sleeping on arrival, awakened to voice and gentle touch   HENT:      Head: Normocephalic and atraumatic.      Nose: Nose normal.      Mouth/Throat:      Mouth: Mucous membranes are moist.      Pharynx: Oropharynx is clear.   Eyes:      Conjunctiva/sclera: Conjunctivae normal.      Pupils: Pupils are equal, round, and reactive to light.   Cardiovascular:      Rate and Rhythm: Normal rate and regular rhythm.   Pulmonary:      Breath sounds: Rhonchi and rales present.   Abdominal:      General: Bowel sounds are normal.      Palpations: Abdomen is soft.   Genitourinary:     Comments: Scrotal edema  Musculoskeletal:         General: Deformity present.      Cervical back: Normal range of motion and neck supple.      Comments: Right BKA and left AKA   Skin:     General: Skin is warm and dry.      Capillary Refill: Capillary refill takes 2 to 3 seconds.      Coloration: Skin is pale.   Neurological:      Mental Status: He is oriented to person, place, and time. Mental status is at baseline.      Comments: Intention tremors/jerks   Psychiatric:      Comments: Tearful, then angry and pushed his food away when he thought it didn't have meat, but explained it had 2 pieces of chicken then said oh I though that was rice.             Significant Labs: All pertinent labs within the past 24 hours have been reviewed.  CBC:   Recent Labs   Lab 01/10/25  0721 01/11/25  0509   WBC 11.65   11.65 11.76   HGB 10.5*  10.5* 10.4*   HCT 33.1*  33.1* 33.8*     244 249     CMP:   Recent Labs   Lab 01/10/25  0721 01/11/25  0509    141   K 3.9 4.0    108   CO2 26 25   * 154*   BUN 28* 34*   CREATININE 1.6* 2.2*   CALCIUM 7.5* 7.3*   PROT 4.8* 5.0*   ALBUMIN 2.3* 2.3*   BILITOT 0.4 0.3   ALKPHOS 86 88   AST 10 9*   ALT 8* 7*   ANIONGAP 5* 8       Significant Imaging: I have reviewed all pertinent imaging results/findings within the past 24 hours.  US Scrotum And Testicles    Result Date: 1/10/2025  EXAMINATION: US SCROTUM AND TESTICLES CLINICAL HISTORY: swelling; COMPARISON: None FINDINGS: Right testicle measures 27 x 41 x 31 mm, and left testicle measures 27 x 34 x 26 mm.  Bilateral testicles contain no intratesticular mass.  Mild heterogeneous echogenicity of left testicle is evident while homogeneous echogenicity of right testicle is noted.  The testicles contain normal vascular flow on color and spectral Doppler imaging. Bilateral epididymi are normal and without hyperemia. No hernia identified in the inguinal soft tissues with Valsalva maneuver. Diffuse scrotal soft tissue swelling/edema is present.     1. Diffuse scrotal soft tissue swelling/edema. 2. Slightly heterogeneous echogenicity of left testicle without hyperemia to suggest orchitis. Electronically signed by: Fredy Trejo Date:    01/10/2025 Time:    16:16    Echo    Result Date: 1/9/2025    Left Ventricle: The left ventricle is moderately dilated. Mildly increased wall thickness. There is mild asymmetric hypertrophy. Severe global hypokinesis present. There is severely reduced systolic function with a visually estimated ejection fraction of 25 - 30%.   Right Ventricle: Moderate right ventricular enlargement. Systolic function is mildly reduced.   Left Atrium: Left atrium is mildly dilated.   Tricuspid Valve: There is mild regurgitation.   IVC/SVC: Elevated venous pressure at 15 mmHg.     X-Ray Chest AP  Portable    Result Date: 1/9/2025  CLINICAL HISTORY: ([id number])48 y/o  (1975) M Chest Pain; TECHNIQUE: (A#[id number], exam time 1/9/2025 7:54) XR CHEST AP PORTABLE [id number] COMPARISON: Radiograph from 07/19/2022 FINDINGS: Mild perihilar pulmonary vascular prominence with slightly increased central hilar interstitial lung markings bilaterally suggestive of mild pulmonary vascular congestion/trace edema. No pneumothorax is identified. The heart is top normal in size.  Osseous structures show degenerative changes in the spine. The visualized upper abdomen is unremarkable.     Top-normal size heart and findings of mild pulmonary vascular congestion/trace interstitial edema. Electronically signed by: Manuel Snow Date:    01/09/2025 Time:    08:09       Assessment and Plan     * Acute systolic congestive heart failure  Patient has Systolic (HFrEF) heart failure that is Acute. On presentation their CHF was decompensated. Evidence of decompensated CHF on presentation includes: edema. The etiology of their decompensation is likely substance abuse . Most recent BNP and echo results are listed below.  Recent Labs     01/09/25  0709   BNP 3,449*       Latest ECHO  Results for orders placed during the hospital encounter of 01/09/25    Echo    Interpretation Summary    Left Ventricle: The left ventricle is moderately dilated. Mildly increased wall thickness. There is mild asymmetric hypertrophy. Severe global hypokinesis present. There is severely reduced systolic function with a visually estimated ejection fraction of 25 - 30%.    Right Ventricle: Moderate right ventricular enlargement. Systolic function is mildly reduced.    Left Atrium: Left atrium is mildly dilated.    Tricuspid Valve: There is mild regurgitation.    IVC/SVC: Elevated venous pressure at 15 mmHg.    Current Heart Failure Medications  hydrALAZINE injection 5 mg, Every 6 hours PRN, Intravenous  DOBUtamine 1000 mg in D5W 250 mL infusion, Continuous,  Intravenous    Plan  - Monitor strict I&Os and daily weights.    - Place on telemetry  - Low sodium diet  - Place on fluid restriction of 1.5 L.   - Cardiology has been consulted  - The patient's volume status is stable but not at their baseline as indicated by edema  Hold diuresis given MARLENE, resume diuresis once creatinine normal.  If kidneys do not normalize or worsen might need right heart catheterization per Cardiology  Hold lisinopril and hydrochlorothiazide    Opioid use disorder  Addiction medicine consulted   We will start patient on Suboxone  Per Addiction medicine:   For first dose of buprenorphine (at least 18 hours since last use of fentanyl) give one or two 8 mg strips (8-16 mg total).  Wait 1 hour and then...  If mild withdrawal, give another 8 mg strip.  If more significant withdrawal, give two more 8 mg strips.  If relaxed or calm, do not give any more.  Wait 1-2 more hours...  If still have withdrawal symptoms, take another 8 mg strip  If relaxed or calm, do not take any more  Try not to give more than 32 mg (4 strips) on day one.  On day 2, give 8 mg BID   Continue taking 8 mg BID until seeing me in clinic.    MARLENE (acute kidney injury)  MARLENE is likely due to pre-renal azotemia due to intravascular volume depletion. Baseline creatinine is  1 . Most recent creatinine and eGFR are listed below.  Recent Labs     01/09/25  0709 01/10/25  0721 01/11/25  0509   CREATININE 1.0 1.6* 2.2*   EGFRNORACEVR >60.0 53.1* 36.3*        Plan  - MARLENE is worsening. Will adjust treatment as follows:  Hold diuretic  - Avoid nephrotoxins and renally dose meds for GFR listed above  - Monitor urine output, serial BMP, and adjust therapy as needed    Diabetes mellitus  Patient's FSGs are controlled on current medication regimen.  Last A1c reviewed-   Lab Results   Component Value Date    HGBA1C 7.5 (H) 01/10/2025     Most recent fingerstick glucose reviewed-   Recent Labs   Lab 01/10/25  2004 01/11/25  0749 01/11/25  1235    POCTGLUCOSE 176* 153* 160*       Current correctional scale  Low  Maintain anti-hyperglycemic dose as follows-   Antihyperglycemics (From admission, onward)      Start     Stop Route Frequency Ordered    01/10/25 1515  insulin aspart U-100 pen 0-10 Units         -- SubQ Before meals & nightly PRN 01/10/25 1415          Hold Oral hypoglycemics while patient is in the hospital.    Elevated troponin  Troponin elevated but flat, cardiology following  No chest pain or shortness of breath   Likely secondary to congestive heart failure  Continue Lovenox      Scrotal edema  Secondary to acute decompensated heart failure  Hold diuretics given MARLENE  Elevate scrotum  Follow up ultrasound      VTE Risk Mitigation (From admission, onward)           Ordered     enoxaparin injection 120 mg  Every 12 hours         01/10/25 0724     IP VTE HIGH RISK PATIENT  Once         01/09/25 1326     Place sequential compression device  Until discontinued         01/09/25 1326     Reason for no Mechanical VTE Prophylaxis  Once        Question:  Reasons:  Answer:  Physician Provided (leave comment)  Comment:  already on heparin infusion    01/09/25 0906                    Discharge Planning   KATHERINE: 1/13/2025     Code Status: Full Code   Medical Readiness for Discharge Date:   Discharge Plan A: Home   Discharge Delays: None known at this time                    Mendy Campos NP  Department of Hospital Medicine   [facility]

## 2025-01-11 NOTE — SUBJECTIVE & OBJECTIVE
Interval History: renal function worsening, still overloaded. D/w cardiology and regimen will be adjusted as per their recs.    Review of Systems   Constitutional:  Positive for fatigue.   Respiratory:  Positive for shortness of breath.    Genitourinary:  Positive for scrotal swelling.   Neurological:  Positive for tremors.     Objective:     Vital Signs (Most Recent):  Temp: 98.6 °F (37 °C) (01/11/25 1231)  Pulse: 94 (01/11/25 1231)  Resp: 18 (01/11/25 0844)  BP: 100/63 (01/11/25 1231)  SpO2: 98 % (01/11/25 1231) Vital Signs (24h Range):  Temp:  [97.9 °F (36.6 °C)-98.6 °F (37 °C)] 98.6 °F (37 °C)  Pulse:  [] 94  Resp:  [11-20] 18  SpO2:  [96 %-99 %] 98 %  BP: (100-164)/(63-91) 100/63     Weight: 115.4 kg (254 lb 6.4 oz)  Body mass index is 36.5 kg/m².    Intake/Output Summary (Last 24 hours) at 1/11/2025 1551  Last data filed at 1/11/2025 1328  Gross per 24 hour   Intake 820 ml   Output 1025 ml   Net -205 ml         Physical Exam  Vitals and nursing note reviewed.   Constitutional:       Appearance: He is obese. He is ill-appearing.      Comments: Sleeping on arrival, awakened to voice and gentle touch   HENT:      Head: Normocephalic and atraumatic.      Nose: Nose normal.      Mouth/Throat:      Mouth: Mucous membranes are moist.      Pharynx: Oropharynx is clear.   Eyes:      Conjunctiva/sclera: Conjunctivae normal.      Pupils: Pupils are equal, round, and reactive to light.   Cardiovascular:      Rate and Rhythm: Normal rate and regular rhythm.   Pulmonary:      Breath sounds: Rhonchi and rales present.   Abdominal:      General: Bowel sounds are normal.      Palpations: Abdomen is soft.   Genitourinary:     Comments: Scrotal edema  Musculoskeletal:         General: Deformity present.      Cervical back: Normal range of motion and neck supple.      Comments: Right BKA and left AKA   Skin:     General: Skin is warm and dry.      Capillary Refill: Capillary refill takes 2 to 3 seconds.      Coloration:  Skin is pale.   Neurological:      Mental Status: He is oriented to person, place, and time. Mental status is at baseline.      Comments: Intention tremors/jerks   Psychiatric:      Comments: Tearful, then angry and pushed his food away when he thought it didn't have meat, but explained it had 2 pieces of chicken then said oh I though that was rice.             Significant Labs: All pertinent labs within the past 24 hours have been reviewed.  CBC:   Recent Labs   Lab 01/10/25  0721 01/11/25  0509   WBC 11.65  11.65 11.76   HGB 10.5*  10.5* 10.4*   HCT 33.1*  33.1* 33.8*     244 249     CMP:   Recent Labs   Lab 01/10/25  0721 01/11/25  0509    141   K 3.9 4.0    108   CO2 26 25   * 154*   BUN 28* 34*   CREATININE 1.6* 2.2*   CALCIUM 7.5* 7.3*   PROT 4.8* 5.0*   ALBUMIN 2.3* 2.3*   BILITOT 0.4 0.3   ALKPHOS 86 88   AST 10 9*   ALT 8* 7*   ANIONGAP 5* 8       Significant Imaging: I have reviewed all pertinent imaging results/findings within the past 24 hours.  US Scrotum And Testicles    Result Date: 1/10/2025  EXAMINATION: US SCROTUM AND TESTICLES CLINICAL HISTORY: swelling; COMPARISON: None FINDINGS: Right testicle measures 27 x 41 x 31 mm, and left testicle measures 27 x 34 x 26 mm.  Bilateral testicles contain no intratesticular mass.  Mild heterogeneous echogenicity of left testicle is evident while homogeneous echogenicity of right testicle is noted.  The testicles contain normal vascular flow on color and spectral Doppler imaging. Bilateral epididymi are normal and without hyperemia. No hernia identified in the inguinal soft tissues with Valsalva maneuver. Diffuse scrotal soft tissue swelling/edema is present.     1. Diffuse scrotal soft tissue swelling/edema. 2. Slightly heterogeneous echogenicity of left testicle without hyperemia to suggest orchitis. Electronically signed by: Fredy Trejo Date:    01/10/2025 Time:    16:16    Echo    Result Date: 1/9/2025    Left Ventricle: The  left ventricle is moderately dilated. Mildly increased wall thickness. There is mild asymmetric hypertrophy. Severe global hypokinesis present. There is severely reduced systolic function with a visually estimated ejection fraction of 25 - 30%.   Right Ventricle: Moderate right ventricular enlargement. Systolic function is mildly reduced.   Left Atrium: Left atrium is mildly dilated.   Tricuspid Valve: There is mild regurgitation.   IVC/SVC: Elevated venous pressure at 15 mmHg.     X-Ray Chest AP Portable    Result Date: 1/9/2025  CLINICAL HISTORY: ([id number])48 y/o  (1975) M Chest Pain; TECHNIQUE: (A#[id number], exam time 1/9/2025 7:54) XR CHEST AP PORTABLE [id number] COMPARISON: Radiograph from 07/19/2022 FINDINGS: Mild perihilar pulmonary vascular prominence with slightly increased central hilar interstitial lung markings bilaterally suggestive of mild pulmonary vascular congestion/trace edema. No pneumothorax is identified. The heart is top normal in size.  Osseous structures show degenerative changes in the spine. The visualized upper abdomen is unremarkable.     Top-normal size heart and findings of mild pulmonary vascular congestion/trace interstitial edema. Electronically signed by: Manuel Snow Date:    01/09/2025 Time:    08:09

## 2025-01-11 NOTE — ASSESSMENT & PLAN NOTE
MARLENE is likely due to pre-renal azotemia due to intravascular volume depletion. Baseline creatinine is  1 . Most recent creatinine and eGFR are listed below.  Recent Labs     01/09/25  0709 01/10/25  0721 01/11/25  0509   CREATININE 1.0 1.6* 2.2*   EGFRNORACEVR >60.0 53.1* 36.3*        Plan  - MARLENE is worsening. Will adjust treatment as follows:  Hold diuretic  - Avoid nephrotoxins and renally dose meds for GFR listed above  - Monitor urine output, serial BMP, and adjust therapy as needed

## 2025-01-11 NOTE — CARE UPDATE
01/11/25 0836   PRE-TX-O2   Device (Oxygen Therapy) nasal cannula   $ Is the patient on Low Flow Oxygen? Yes   Flow (L/min) (Oxygen Therapy) 1   SpO2 98 %   Pulse Oximetry Type Intermittent   $ Pulse Oximetry - Multiple Charge Pulse Oximetry - Multiple   Pulse 101   Resp 15   Respiratory Evaluation   $ Care Plan Tech Time 15 min

## 2025-01-11 NOTE — ASSESSMENT & PLAN NOTE
Patient's FSGs are controlled on current medication regimen.  Last A1c reviewed-   Lab Results   Component Value Date    HGBA1C 7.5 (H) 01/10/2025     Most recent fingerstick glucose reviewed-   Recent Labs   Lab 01/10/25  2004 01/11/25  0749 01/11/25  1235   POCTGLUCOSE 176* 153* 160*       Current correctional scale  Low  Maintain anti-hyperglycemic dose as follows-   Antihyperglycemics (From admission, onward)    Start     Stop Route Frequency Ordered    01/10/25 1515  insulin aspart U-100 pen 0-10 Units         -- SubQ Before meals & nightly PRN 01/10/25 1415        Hold Oral hypoglycemics while patient is in the hospital.

## 2025-01-11 NOTE — PLAN OF CARE
Problem: Infection  Goal: Absence of Infection Signs and Symptoms  Outcome: Progressing     Problem: Adult Inpatient Plan of Care  Goal: Plan of Care Review  Outcome: Progressing  Goal: Patient-Specific Goal (Individualized)  Outcome: Progressing  Goal: Absence of Hospital-Acquired Illness or Injury  Outcome: Progressing  Goal: Optimal Comfort and Wellbeing  Outcome: Progressing  Goal: Readiness for Transition of Care  Outcome: Progressing     Problem: Fall Injury Risk  Goal: Absence of Fall and Fall-Related Injury  Outcome: Progressing     Problem: Heart Failure  Goal: Optimal Coping  Outcome: Progressing  Goal: Optimal Cardiac Output  Outcome: Progressing  Goal: Stable Heart Rate and Rhythm  Outcome: Progressing  Goal: Optimal Functional Ability  Outcome: Progressing  Goal: Fluid and Electrolyte Balance  Outcome: Progressing  Goal: Improved Oral Intake  Outcome: Progressing  Goal: Effective Oxygenation and Ventilation  Outcome: Progressing  Goal: Effective Breathing Pattern During Sleep  Outcome: Progressing     Problem: Urinary Retention  Goal: Effective Urinary Elimination  Outcome: Progressing     Problem: Diabetes Comorbidity  Goal: Blood Glucose Level Within Targeted Range  Outcome: Progressing     Problem: Wound  Goal: Optimal Coping  Outcome: Progressing  Goal: Optimal Functional Ability  Outcome: Progressing  Goal: Absence of Infection Signs and Symptoms  Outcome: Progressing  Goal: Improved Oral Intake  Outcome: Progressing  Goal: Optimal Pain Control and Function  Outcome: Progressing  Goal: Skin Health and Integrity  Outcome: Progressing  Goal: Optimal Wound Healing  Outcome: Progressing     Problem: Acute Kidney Injury/Impairment  Goal: Fluid and Electrolyte Balance  Outcome: Progressing  Goal: Improved Oral Intake  Outcome: Progressing  Goal: Effective Renal Function  Outcome: Progressing     Problem: Skin Injury Risk Increased  Goal: Skin Health and Integrity  Outcome: Progressing

## 2025-01-11 NOTE — NURSING TRANSFER
Nursing Transfer Note      1/10/2025   9:34 PM    Nurse giving handoff:Jacqueline  Nurse receiving handoff:Giulia    Reason patient is being transferred: decrease level of care    Transfer To:   [facility]  Transfer via bed    Transfer with  to O2, cardiac monitoring    Transported by nurse    Transfer Vital Signs:  Blood Pressure:159/63  Heart Rate:97  O2:98  Temperature:98.4  Respirations:14    Telemetry: Box Number    Order for Tele Monitor? Yes    Additional Lines: Oxygen and Weber Catheter    Medicines sent: no    Any special needs or follow-up needed: no    Patient belongings transferred with patient: Yes    Chart send with patient: Yes    Notified: patient stated he will call family    Patient reassessed at: 1/10/25 @2125  1  Upon arrival to floor:

## 2025-01-11 NOTE — PROGRESS NOTES
Count includes the [facility]   Department of Cardiology  Progress Note      PATIENT NAME: Gideon Ross    MRN: [id number]  TODAY'S DATE: 01/11/2025  ADMIT DATE: 1/9/2025                          CONSULT REQUESTED BY: Wayne Jones DO    SUBJECTIVE     PRINCIPAL PROBLEM: Acute systolic congestive heart failure    01/11/2025    Patient very drowsy when seen today.  No acute distress noted.  He was asking for pain medicine when awakened.  Patient with worsened renal function today.    1/10/25    Patient seen resting in bed this morning.  He was very upset.  He reports that he does use heroin on a daily basis and believes that he is likely detoxing currently.  Patient is not very optimistic for his outcome.    HPI:    Patient is a 49-year-old male who presented to the emergency room with complaints of testicular pain and swelling over the past 1 week.  Patient is a known diabetic and has a history of PID with bilateral above the knee amputations.  Patient also had some complaints of mild shortness of breath but denied any chest pain.  Patient was noted to have significantly elevated high sensitivity troponin level on arrival.  High sensitivity troponin level was 3343 on arrival and is now 3369.  Patient was started on heparin drip.  Patient does have a known history of drug abuse and is positive for opiates, amphetamine, and marijuana metabolites in his UDS.  Patient reports his last use of methamphetamine was a couple of weeks ago.        REASON FOR CONSULT:  From Hospitalist H&P: Patient presents complaining of testicular pain and swelling.  Patient has noticed increased pain and swelling for the last 1 week as well as foul smell.  Patient has a history of bilateral above-the-knee and below-the-knee amputation.  He is a diabetic.  At the worst symptoms are moderate.  Patient complains of mild shortness of breath no chest pain.            Review of patient's allergies indicates:  No Known Allergies    Past Medical  History:   Diagnosis Date    Diabetes mellitus     Hypertension      Past Surgical History:   Procedure Laterality Date    SKIN GRAFT       Social History     Tobacco Use    Smoking status: Every Day     Current packs/day: 1.00     Types: Cigarettes   Substance Use Topics    Alcohol use: Yes    Drug use: No        REVIEW OF SYSTEMS  Per HPI    OBJECTIVE     VITAL SIGNS (Most Recent)  Temp: 98.6 °F (37 °C) (01/11/25 1231)  Pulse: 94 (01/11/25 1231)  Resp: 18 (01/11/25 0844)  BP: 100/63 (01/11/25 1231)  SpO2: 98 % (01/11/25 1231)    VENTILATION STATUS  Resp: 18 (01/11/25 0844)  SpO2: 98 % (01/11/25 1231)           I & O (Last 24H):  Intake/Output Summary (Last 24 hours) at 1/11/2025 1639  Last data filed at 1/11/2025 1328  Gross per 24 hour   Intake 820 ml   Output 1025 ml   Net -205 ml       WEIGHTS  Wt Readings from Last 1 Encounters:   01/09/25 2239 115.4 kg (254 lb 6.4 oz)   01/09/25 0824 108.9 kg (240 lb)       PHYSICAL EXAM  CONSTITUTIONAL: No fever, no chills  HEENT: Normocephalic, atraumatic,pupils reactive to light                 NECK:  No JVD no carotid bruit  CVS: S1S2+, RRR  LUNGS: Clear  ABDOMEN: Soft, NT, BS+  EXTREMITIES: No cyanosis, edema  : No valle catheter  NEURO: AAO X 3  PSY: Normal affect      HOME MEDICATIONS:  No current facility-administered medications on file prior to encounter.     Current Outpatient Medications on File Prior to Encounter   Medication Sig Dispense Refill    amLODIPine (NORVASC) 10 MG tablet Take 1 tablet (10 mg total) by mouth once daily. 30 tablet 1    aspirin (ECOTRIN) 81 MG EC tablet Take 1 tablet (81 mg total) by mouth once daily. 30 tablet 1    atorvastatin (LIPITOR) 80 MG tablet Take 1 tablet (80 mg total) by mouth once daily. 30 tablet 1    baclofen (LIORESAL) 10 MG tablet Take 1 tablet (10 mg total) by mouth 3 (three) times daily. 90 tablet 1    carvedilol (COREG) 6.25 MG tablet Take 1 tablet (6.25 mg total) by mouth 2 (two) times daily. 60 tablet 1    diazePAM  (VALIUM) 5 MG tablet Take 1 tablet (5 mg total) by mouth every 12 (twelve) hours as needed for Anxiety (anxiety.). 30 tablet 0    gabapentin (NEURONTIN) 300 MG capsule Take 2 capsules (600 mg total) by mouth 3 (three) times daily. 90 capsule 1    hydroCHLOROthiazide (HYDRODIURIL) 50 MG tablet Take 1 tablet (50 mg total) by mouth once daily. 30 tablet 1    insulin aspart U-100 (NOVOLOG) 100 unit/mL InPn pen Inject 18 Units into the skin 3 (three) times daily. (Patient taking differently: Inject into the skin 3 (three) times daily.) 10 mL 1    insulin detemir U-100 (LEVEMIR FLEXTOUCH) 100 unit/mL (3 mL) SubQ InPn pen Inject 35 Units into the skin 2 (two) times daily. 10 mL 1    insulin glargine U-100, Lantus, 100 unit/mL injection Inject 30 Units into the skin every evening. (Patient not taking: Reported on 1/9/2025)      lisinopril (PRINIVIL,ZESTRIL) 40 MG tablet Take 1 tablet (40 mg total) by mouth once daily. 30 tablet 1    nicotine (NICODERM CQ) 14 mg/24 hr Place 1 patch onto the skin once daily. (Patient not taking: Reported on 1/9/2025) 15 patch 0    nortriptyline (PAMELOR) 25 MG capsule Take 1 capsule (25 mg total) by mouth 3 (three) times daily. 30 capsule 1    oxyCODONE (ROXICODONE) 5 MG immediate release tablet Take 1 tablet (5 mg total) by mouth every 12 (twelve) hours as needed. (Patient not taking: Reported on 1/9/2025) 30 tablet 0    polyethylene glycol (GLYCOLAX) 17 gram PwPk Take 17 g by mouth daily as needed. (Patient not taking: Reported on 1/9/2025) 30 each 1    QUEtiapine (SEROQUEL) 100 MG Tab Take 1 tablet (100 mg total) by mouth every evening. 30 tablet 1    sofosbuvir-velpatasvir (EPCLUSA) 400-100 mg Tab Take 1 tablet daily. (Patient not taking: Reported on 1/9/2025) 28 tablet 2       SCHEDULED MEDS:   aspirin  81 mg Oral Daily    atorvastatin  80 mg Oral Daily    baclofen  10 mg Oral TID    buprenorphine-naloxone 8-2 mg  1 Film Sublingual BID    enoxparin  1 mg/kg Subcutaneous Q12H (treatment,  non-standard time)    gabapentin  600 mg Oral TID    miconazole NITRATE 2 %   Topical (Top) BID    mupirocin   Nasal BID       CONTINUOUS INFUSIONS:   DOBUTamine IV infusion (non-titrating)  2.5 mcg/kg/min Intravenous Continuous 4.3 mL/hr at 01/11/25 1621 2.5 mcg/kg/min at 01/11/25 1621         PRN MEDS:  Current Facility-Administered Medications:     acetaminophen, 650 mg, Oral, Q4H PRN    aluminum-magnesium hydroxide-simethicone, 30 mL, Oral, QID PRN    dextrose 50%, 12.5 g, Intravenous, PRN    dextrose 50%, 25 g, Intravenous, PRN    diazePAM, 5 mg, Oral, Q12H PRN    glucagon (human recombinant), 1 mg, Intramuscular, PRN    glucose, 16 g, Oral, PRN    glucose, 24 g, Oral, PRN    hydrALAZINE, 5 mg, Intravenous, Q6H PRN    insulin aspart U-100, 0-10 Units, Subcutaneous, QID (AC + HS) PRN    magnesium oxide, 800 mg, Oral, PRN    magnesium oxide, 800 mg, Oral, PRN    melatonin, 6 mg, Oral, Nightly PRN    morphine, 4 mg, Intravenous, Q4H PRN    naloxone, 0.02 mg, Intravenous, PRN    ondansetron, 4 mg, Intravenous, Q6H PRN    oxyCODONE-acetaminophen, 1 tablet, Oral, Q4H PRN    oxyCODONE-acetaminophen, 1 tablet, Oral, Q4H PRN    potassium bicarbonate, 35 mEq, Oral, PRN    potassium bicarbonate, 50 mEq, Oral, PRN    potassium bicarbonate, 60 mEq, Oral, PRN    potassium, sodium phosphates, 2 packet, Oral, PRN    potassium, sodium phosphates, 2 packet, Oral, PRN    potassium, sodium phosphates, 2 packet, Oral, PRN    senna-docusate 8.6-50 mg, 1 tablet, Oral, Daily PRN    sodium chloride 0.9%, 10 mL, Intravenous, Q12H PRN    sodium chloride 0.9%, 10 mL, Intravenous, PRN    LABS AND DIAGNOSTICS     CBC LAST 3 DAYS  Recent Labs   Lab 01/09/25  0709 01/10/25  0721 01/11/25  0509   WBC 10.51 11.65  11.65 11.76   RBC 4.50* 3.95*  3.95* 3.95*   HGB 11.9* 10.5*  10.5* 10.4*   HCT 37.6* 33.1*  33.1* 33.8*   MCV 84 84  84 86   MCH 26.4* 26.6*  26.6* 26.3*   MCHC 31.6* 31.7*  31.7* 30.8*   RDW 14.6* 14.7*  14.7* 14.8*   PLT  "251 244  244 249   MPV 10.3 10.4  10.4 10.5   GRAN 67.8  7.1 63.7  63.7  7.4  7.4 62.2  7.3   LYMPH 15.8*  1.7 16.7*  16.7*  1.9  1.9 18.9  2.2   MONO 8.8  0.9 10.9  10.9  1.3*  1.3* 10.7  1.3*   BASO 0.08 0.08  0.08 0.09   NRBC 0 0  0 0       COAGULATION LAST 3 DAYS  Recent Labs   Lab 01/09/25  0918 01/09/25  1659 01/09/25  2359 01/10/25  0721   INR 1.1  --   --   --    APTT 28.4 45.2* 32.2* 49.4*       CHEMISTRY LAST 3 DAYS  Recent Labs   Lab 01/09/25  0709 01/10/25  0721 01/11/25  0509    137 141   K 3.7 3.9 4.0    106 108   CO2 24 26 25   ANIONGAP 6* 5* 8   BUN 22* 28* 34*   CREATININE 1.0 1.6* 2.2*   * 157* 154*   CALCIUM 7.7* 7.5* 7.3*   MG 1.7 1.6 1.7   ALBUMIN 2.5* 2.3* 2.3*   PROT 5.5* 4.8* 5.0*   ALKPHOS 94 86 88   ALT 10 8* 7*   AST 17 10 9*   BILITOT 0.7 0.4 0.3       CARDIAC PROFILE LAST 3 DAYS  Recent Labs   Lab 01/09/25  0709 01/09/25  0918   BNP 3,449*  --    TROPONINIHS 3343.0* 3369.7*       ENDOCRINE LAST 3 DAYS  No results for input(s): "TSH", "PROCAL" in the last 168 hours.    LAST ARTERIAL BLOOD GAS  ABG  No results for input(s): "PH", "PO2", "PCO2", "HCO3", "BE" in the last 168 hours.    LAST 7 DAYS MICROBIOLOGY   Microbiology Results (last 7 days)       Procedure Component Value Units Date/Time    Blood culture x two cultures. Draw prior to antibiotics. [[id number]] Collected: 01/09/25 0859    Order Status: Completed Specimen: Blood Updated: 01/11/25 1032     Blood Culture, Routine No Growth to date      No Growth to date      No Growth to date    Narrative:      Aerobic and anaerobic    Blood culture x two cultures. Draw prior to antibiotics. [[id number]] Collected: 01/09/25 0859    Order Status: Completed Specimen: Blood Updated: 01/11/25 1032     Blood Culture, Routine No Growth to date      No Growth to date      No Growth to date    Narrative:      Aerobic and anaerobic    Clostridium difficile EIA [[id number]] Collected: 01/09/25 2223    Order " Status: Canceled Specimen: Stool             MOST RECENT IMAGING  US Scrotum And Testicles  Narrative: EXAMINATION:  US SCROTUM AND TESTICLES    CLINICAL HISTORY:  swelling;    COMPARISON:  None    FINDINGS:  Right testicle measures 27 x 41 x 31 mm, and left testicle measures 27 x 34 x 26 mm.  Bilateral testicles contain no intratesticular mass.  Mild heterogeneous echogenicity of left testicle is evident while homogeneous echogenicity of right testicle is noted.  The testicles contain normal vascular flow on color and spectral Doppler imaging.    Bilateral epididymi are normal and without hyperemia.    No hernia identified in the inguinal soft tissues with Valsalva maneuver.    Diffuse scrotal soft tissue swelling/edema is present.  Impression: 1. Diffuse scrotal soft tissue swelling/edema.  2. Slightly heterogeneous echogenicity of left testicle without hyperemia to suggest orchitis.    Electronically signed by: Fredy Trejo  Date:    01/10/2025  Time:    16:16      ECHOCARDIOGRAM RESULTS (last 5)  No results found for this or any previous visit.      CURRENT/PREVIOUS VISIT EKG  Results for orders placed or performed during the hospital encounter of 01/09/25   EKG 12-lead    Collection Time: 01/09/25  7:39 AM   Result Value Ref Range    QRS Duration 124 ms    OHS QTC Calculation 457 ms    Narrative    Test Reason : R07.9,    Vent. Rate : 104 BPM     Atrial Rate : 104 BPM     P-R Int : 168 ms          QRS Dur : 124 ms      QT Int : 348 ms       P-R-T Axes :  68 104  57 degrees    QTcB Int : 457 ms    Sinus tachycardia  Right bundle branch block  Abnormal ECG  No previous ECGs available    Referred By: AAAREFERRAL SELF           Confirmed By:            ASSESSMENT/PLAN:     Active Hospital Problems    Diagnosis    *Acute systolic congestive heart failure    MARLENE (acute kidney injury)    Opioid use disorder    Scrotal edema    Elevated troponin    Diabetes mellitus       ASSESSMENT & PLAN:     Elevated troponin  Acute  HFrEF 25-30%  Testicular pain and swelling   Type II DM  + drug abuse: amphetamines, marijuana  PAD  Hx of bilateral AKA      RECOMMENDATIONS:    Diuresis currently on hold.  Will start dobutamine drip at 2.5 mcg/kg/min. Monitor for worsened ectopy.   Consider renal consult if creatinine worsens.   Strict I&O.  Trend labs.  Recommend life vest at discharge.  Patient needs close monitoring.         Bhumi Sanchez NP  Date of Service: 01/11/2025  12:01 PM    I have personally interviewed and examined the patient, I have reviewed the Nurse Practitioner's history and physical, assessment, and plan. I have personally evaluated the patient at bedside and agree with the findings and made appropriate changes as necessary in recommendations.    PATIENT IS SLEEPING LETHARGIC REQUESTING PAIN MEDICATION  CREATININE IS ELEVATED WILL START A DOBUTAMINE DRIP AT LOW-DOSE  Kingsley Mosley MD  Department of Cardiology  [facility] hospital  01/11/2025 5:15 PM

## 2025-01-11 NOTE — CARE UPDATE
01/10/25 2012   Patient Assessment/Suction   Level of Consciousness (AVPU) alert   Respiratory Effort Normal   Expansion/Accessory Muscles/Retractions no use of accessory muscles   All Lung Fields Breath Sounds clear   Rhythm/Pattern, Respiratory unlabored   Cough Frequency no cough   Skin Integrity   $ Wound Care Tech Time 15 min   Area Observed Left;Right;Behind ear;Cheek;Nares   Skin Appearance without discoloration   PRE-TX-O2   Device (Oxygen Therapy) nasal cannula   $ Is the patient on Low Flow Oxygen? Yes   Flow (L/min) (Oxygen Therapy) 2   SpO2 99 %   Pulse Oximetry Type Continuous   $ Pulse Oximetry - Multiple Charge Pulse Oximetry - Multiple   Pulse 102   Resp 16   Education   $ Education Oxygen;15 min   Respiratory Evaluation   $ Care Plan Tech Time 15 min

## 2025-01-11 NOTE — PLAN OF CARE
01/11/25 1438   Post-Acute Status   Post-Acute Authorization HME   HME Status (!) Pending Delivery   Discharge Delays None known at this time   Discharge Plan   Discharge Plan A Home     Spoke with Marie with [employer], she verbalized that he was approved and will be getting fitted on Monday 1/13.

## 2025-01-11 NOTE — ASSESSMENT & PLAN NOTE
Patient has Systolic (HFrEF) heart failure that is Acute. On presentation their CHF was decompensated. Evidence of decompensated CHF on presentation includes: edema. The etiology of their decompensation is likely substance abuse . Most recent BNP and echo results are listed below.  Recent Labs     01/09/25  0709   BNP 3,449*       Latest ECHO  Results for orders placed during the hospital encounter of 01/09/25    Echo    Interpretation Summary    Left Ventricle: The left ventricle is moderately dilated. Mildly increased wall thickness. There is mild asymmetric hypertrophy. Severe global hypokinesis present. There is severely reduced systolic function with a visually estimated ejection fraction of 25 - 30%.    Right Ventricle: Moderate right ventricular enlargement. Systolic function is mildly reduced.    Left Atrium: Left atrium is mildly dilated.    Tricuspid Valve: There is mild regurgitation.    IVC/SVC: Elevated venous pressure at 15 mmHg.    Current Heart Failure Medications  hydrALAZINE injection 5 mg, Every 6 hours PRN, Intravenous  DOBUtamine 1000 mg in D5W 250 mL infusion, Continuous, Intravenous    Plan  - Monitor strict I&Os and daily weights.    - Place on telemetry  - Low sodium diet  - Place on fluid restriction of 1.5 L.   - Cardiology has been consulted  - The patient's volume status is stable but not at their baseline as indicated by edema  Hold diuresis given MARLENE, resume diuresis once creatinine normal.  If kidneys do not normalize or worsen might need right heart catheterization per Cardiology  Hold lisinopril and hydrochlorothiazide

## 2025-01-12 PROBLEM — T83.511A URINARY TRACT INFECTION ASSOCIATED WITH INDWELLING URETHRAL CATHETER: Status: ACTIVE | Noted: 2025-01-12

## 2025-01-12 PROBLEM — N39.0 URINARY TRACT INFECTION ASSOCIATED WITH INDWELLING URETHRAL CATHETER: Status: ACTIVE | Noted: 2025-01-12

## 2025-01-12 LAB
ALBUMIN SERPL BCP-MCNC: 2.4 G/DL (ref 3.5–5.2)
ALP SERPL-CCNC: 82 U/L (ref 55–135)
ALT SERPL W/O P-5'-P-CCNC: 7 U/L (ref 10–44)
AMORPH CRY URNS QL MICRO: ABNORMAL
ANION GAP SERPL CALC-SCNC: 6 MMOL/L (ref 8–16)
AST SERPL-CCNC: 9 U/L (ref 10–40)
BACTERIA #/AREA URNS HPF: ABNORMAL /HPF
BASOPHILS # BLD AUTO: 0.07 K/UL (ref 0–0.2)
BASOPHILS NFR BLD: 0.5 % (ref 0–1.9)
BILIRUB SERPL-MCNC: 0.4 MG/DL (ref 0.1–1)
BILIRUB UR QL STRIP: NEGATIVE
BUN SERPL-MCNC: 35 MG/DL (ref 6–20)
CALCIUM SERPL-MCNC: 7.8 MG/DL (ref 8.7–10.5)
CHLORIDE SERPL-SCNC: 106 MMOL/L (ref 95–110)
CLARITY UR: ABNORMAL
CO2 SERPL-SCNC: 26 MMOL/L (ref 23–29)
COLOR UR: ABNORMAL
CREAT SERPL-MCNC: 2.2 MG/DL (ref 0.5–1.4)
DIFFERENTIAL METHOD BLD: ABNORMAL
EOSINOPHIL # BLD AUTO: 0.9 K/UL (ref 0–0.5)
EOSINOPHIL NFR BLD: 6.3 % (ref 0–8)
ERYTHROCYTE [DISTWIDTH] IN BLOOD BY AUTOMATED COUNT: 14.8 % (ref 11.5–14.5)
EST. GFR  (NO RACE VARIABLE): 36.3 ML/MIN/1.73 M^2
GLUCOSE SERPL-MCNC: 129 MG/DL (ref 70–110)
GLUCOSE UR QL STRIP: NEGATIVE
HCT VFR BLD AUTO: 34.7 % (ref 40–54)
HGB BLD-MCNC: 10.7 G/DL (ref 14–18)
HGB UR QL STRIP: ABNORMAL
HYALINE CASTS #/AREA URNS LPF: 0 /LPF
IMM GRANULOCYTES # BLD AUTO: 0.05 K/UL (ref 0–0.04)
IMM GRANULOCYTES NFR BLD AUTO: 0.4 % (ref 0–0.5)
KETONES UR QL STRIP: NEGATIVE
LEUKOCYTE ESTERASE UR QL STRIP: ABNORMAL
LYMPHOCYTES # BLD AUTO: 1.7 K/UL (ref 1–4.8)
LYMPHOCYTES NFR BLD: 12.9 % (ref 18–48)
MAGNESIUM SERPL-MCNC: 1.8 MG/DL (ref 1.6–2.6)
MCH RBC QN AUTO: 26.1 PG (ref 27–31)
MCHC RBC AUTO-ENTMCNC: 30.8 G/DL (ref 32–36)
MCV RBC AUTO: 85 FL (ref 82–98)
MICROSCOPIC COMMENT: ABNORMAL
MONOCYTES # BLD AUTO: 1.4 K/UL (ref 0.3–1)
MONOCYTES NFR BLD: 10.2 % (ref 4–15)
NEUTROPHILS # BLD AUTO: 9.4 K/UL (ref 1.8–7.7)
NEUTROPHILS NFR BLD: 69.7 % (ref 38–73)
NITRITE UR QL STRIP: NEGATIVE
NRBC BLD-RTO: 0 /100 WBC
PH UR STRIP: 6 [PH] (ref 5–8)
PLATELET # BLD AUTO: 260 K/UL (ref 150–450)
PMV BLD AUTO: 11 FL (ref 9.2–12.9)
POCT GLUCOSE: 157 MG/DL (ref 70–110)
POCT GLUCOSE: 171 MG/DL (ref 70–110)
POCT GLUCOSE: 211 MG/DL (ref 70–110)
POCT GLUCOSE: 212 MG/DL (ref 70–110)
POTASSIUM SERPL-SCNC: 4.1 MMOL/L (ref 3.5–5.1)
PROT SERPL-MCNC: 5.2 G/DL (ref 6–8.4)
PROT UR QL STRIP: ABNORMAL
RBC # BLD AUTO: 4.1 M/UL (ref 4.6–6.2)
RBC #/AREA URNS HPF: >100 /HPF (ref 0–4)
SODIUM SERPL-SCNC: 138 MMOL/L (ref 136–145)
SP GR UR STRIP: 1.01 (ref 1–1.03)
SQUAMOUS #/AREA URNS HPF: 0 /HPF
URN SPEC COLLECT METH UR: ABNORMAL
UROBILINOGEN UR STRIP-ACNC: NEGATIVE EU/DL
WBC # BLD AUTO: 13.53 K/UL (ref 3.9–12.7)
WBC #/AREA URNS HPF: 18 /HPF (ref 0–5)

## 2025-01-12 PROCEDURE — 93005 ELECTROCARDIOGRAM TRACING: CPT | Performed by: GENERAL PRACTICE

## 2025-01-12 PROCEDURE — 87491 CHLMYD TRACH DNA AMP PROBE: CPT | Performed by: NURSE PRACTITIONER

## 2025-01-12 PROCEDURE — 83735 ASSAY OF MAGNESIUM: CPT | Performed by: STUDENT IN AN ORGANIZED HEALTH CARE EDUCATION/TRAINING PROGRAM

## 2025-01-12 PROCEDURE — 99900031 HC PATIENT EDUCATION (STAT)

## 2025-01-12 PROCEDURE — 80053 COMPREHEN METABOLIC PANEL: CPT | Performed by: STUDENT IN AN ORGANIZED HEALTH CARE EDUCATION/TRAINING PROGRAM

## 2025-01-12 PROCEDURE — 63600175 PHARM REV CODE 636 W HCPCS: Performed by: INTERNAL MEDICINE

## 2025-01-12 PROCEDURE — 27000221 HC OXYGEN, UP TO 24 HOURS

## 2025-01-12 PROCEDURE — 81001 URINALYSIS AUTO W/SCOPE: CPT | Performed by: NURSE PRACTITIONER

## 2025-01-12 PROCEDURE — 99900035 HC TECH TIME PER 15 MIN (STAT)

## 2025-01-12 PROCEDURE — 99233 SBSQ HOSP IP/OBS HIGH 50: CPT | Mod: ,,, | Performed by: GENERAL PRACTICE

## 2025-01-12 PROCEDURE — 21400001 HC TELEMETRY ROOM

## 2025-01-12 PROCEDURE — 94761 N-INVAS EAR/PLS OXIMETRY MLT: CPT

## 2025-01-12 PROCEDURE — 93010 ELECTROCARDIOGRAM REPORT: CPT | Mod: ,,, | Performed by: GENERAL PRACTICE

## 2025-01-12 PROCEDURE — 25000003 PHARM REV CODE 250: Performed by: STUDENT IN AN ORGANIZED HEALTH CARE EDUCATION/TRAINING PROGRAM

## 2025-01-12 PROCEDURE — 25000003 PHARM REV CODE 250: Performed by: NURSE PRACTITIONER

## 2025-01-12 PROCEDURE — 85025 COMPLETE CBC W/AUTO DIFF WBC: CPT | Performed by: STUDENT IN AN ORGANIZED HEALTH CARE EDUCATION/TRAINING PROGRAM

## 2025-01-12 PROCEDURE — 12000002 HC ACUTE/MED SURGE SEMI-PRIVATE ROOM

## 2025-01-12 PROCEDURE — 87086 URINE CULTURE/COLONY COUNT: CPT | Performed by: NURSE PRACTITIONER

## 2025-01-12 PROCEDURE — 63600175 PHARM REV CODE 636 W HCPCS: Performed by: NURSE PRACTITIONER

## 2025-01-12 PROCEDURE — 63600175 PHARM REV CODE 636 W HCPCS: Performed by: STUDENT IN AN ORGANIZED HEALTH CARE EDUCATION/TRAINING PROGRAM

## 2025-01-12 RX ORDER — MAGNESIUM SULFATE HEPTAHYDRATE 40 MG/ML
2 INJECTION, SOLUTION INTRAVENOUS ONCE
Status: COMPLETED | OUTPATIENT
Start: 2025-01-12 | End: 2025-01-12

## 2025-01-12 RX ORDER — GABAPENTIN 300 MG/1
300 CAPSULE ORAL 2 TIMES DAILY
Status: DISCONTINUED | OUTPATIENT
Start: 2025-01-12 | End: 2025-01-26 | Stop reason: HOSPADM

## 2025-01-12 RX ORDER — CEFTRIAXONE 2 G/1
2 INJECTION, POWDER, FOR SOLUTION INTRAMUSCULAR; INTRAVENOUS
Status: DISCONTINUED | OUTPATIENT
Start: 2025-01-12 | End: 2025-01-14

## 2025-01-12 RX ORDER — BACLOFEN 5 MG/1
5 TABLET ORAL 2 TIMES DAILY
Status: DISCONTINUED | OUTPATIENT
Start: 2025-01-12 | End: 2025-01-26 | Stop reason: HOSPADM

## 2025-01-12 RX ORDER — TAMSULOSIN HYDROCHLORIDE 0.4 MG/1
0.4 CAPSULE ORAL DAILY
Status: DISCONTINUED | OUTPATIENT
Start: 2025-01-12 | End: 2025-01-26 | Stop reason: HOSPADM

## 2025-01-12 RX ADMIN — MAGNESIUM SULFATE HEPTAHYDRATE 2 G: 40 INJECTION, SOLUTION INTRAVENOUS at 03:01

## 2025-01-12 RX ADMIN — INSULIN ASPART 4 UNITS: 100 INJECTION, SOLUTION INTRAVENOUS; SUBCUTANEOUS at 12:01

## 2025-01-12 RX ADMIN — GABAPENTIN 300 MG: 300 CAPSULE ORAL at 09:01

## 2025-01-12 RX ADMIN — MUPIROCIN 1 G: 20 OINTMENT TOPICAL at 08:01

## 2025-01-12 RX ADMIN — DIAZEPAM 5 MG: 5 TABLET ORAL at 08:01

## 2025-01-12 RX ADMIN — BACLOFEN 5 MG: 5 TABLET ORAL at 09:01

## 2025-01-12 RX ADMIN — ASPIRIN 81 MG: 81 TABLET, COATED ORAL at 09:01

## 2025-01-12 RX ADMIN — MICONAZOLE NITRATE: 20 POWDER TOPICAL at 08:01

## 2025-01-12 RX ADMIN — MICONAZOLE NITRATE: 20 POWDER TOPICAL at 11:01

## 2025-01-12 RX ADMIN — ATORVASTATIN CALCIUM 80 MG: 40 TABLET, FILM COATED ORAL at 09:01

## 2025-01-12 RX ADMIN — MORPHINE SULFATE 4 MG: 4 INJECTION, SOLUTION INTRAMUSCULAR; INTRAVENOUS at 02:01

## 2025-01-12 RX ADMIN — MORPHINE SULFATE 4 MG: 4 INJECTION, SOLUTION INTRAMUSCULAR; INTRAVENOUS at 04:01

## 2025-01-12 RX ADMIN — ENOXAPARIN SODIUM 120 MG: 120 INJECTION SUBCUTANEOUS at 08:01

## 2025-01-12 RX ADMIN — GABAPENTIN 300 MG: 300 CAPSULE ORAL at 08:01

## 2025-01-12 RX ADMIN — BACLOFEN 5 MG: 5 TABLET ORAL at 08:01

## 2025-01-12 RX ADMIN — MORPHINE SULFATE 4 MG: 4 INJECTION, SOLUTION INTRAMUSCULAR; INTRAVENOUS at 08:01

## 2025-01-12 RX ADMIN — BUPRENORPHINE AND NALOXONE 1 FILM: 8; 2 FILM BUCCAL; SUBLINGUAL at 09:01

## 2025-01-12 RX ADMIN — CEFTRIAXONE 2 G: 2 INJECTION, POWDER, FOR SOLUTION INTRAMUSCULAR; INTRAVENOUS at 03:01

## 2025-01-12 RX ADMIN — ENOXAPARIN SODIUM 120 MG: 120 INJECTION SUBCUTANEOUS at 09:01

## 2025-01-12 RX ADMIN — OXYCODONE HYDROCHLORIDE AND ACETAMINOPHEN 1 TABLET: 10; 325 TABLET ORAL at 02:01

## 2025-01-12 RX ADMIN — INSULIN ASPART 2 UNITS: 100 INJECTION, SOLUTION INTRAVENOUS; SUBCUTANEOUS at 09:01

## 2025-01-12 RX ADMIN — TAMSULOSIN HYDROCHLORIDE 0.4 MG: 0.4 CAPSULE ORAL at 11:01

## 2025-01-12 RX ADMIN — MUPIROCIN 1 G: 20 OINTMENT TOPICAL at 09:01

## 2025-01-12 RX ADMIN — MORPHINE SULFATE 4 MG: 4 INJECTION, SOLUTION INTRAMUSCULAR; INTRAVENOUS at 12:01

## 2025-01-12 NOTE — ASSESSMENT & PLAN NOTE
Catheter in place for severe acute decompensated heart failure and need for strict I&O with concern of cardiorenal syndrome   Start rocephin  Follow culture  Change catheter

## 2025-01-12 NOTE — SUBJECTIVE & OBJECTIVE
Interval History:  flomax added, valle will need to be changed. Added rocephin. Could not tolerate dobutamine. Scrotum is still quite enlarged. Significant anasarca    Review of Systems   Constitutional:  Positive for fatigue.   Respiratory:  Positive for shortness of breath.    Genitourinary:  Positive for scrotal swelling.   Neurological:  Positive for tremors.     Objective:     Vital Signs (Most Recent):  Temp: 98.1 °F (36.7 °C) (01/12/25 1106)  Pulse: 104 (01/12/25 1106)  Resp: 20 (01/12/25 1211)  BP: 129/71 (01/12/25 1106)  SpO2: 98 % (01/12/25 0753) Vital Signs (24h Range):  Temp:  [97.5 °F (36.4 °C)-98.6 °F (37 °C)] 98.1 °F (36.7 °C)  Pulse:  [] 104  Resp:  [15-20] 20  SpO2:  [96 %-98 %] 98 %  BP: (122-148)/(60-73) 129/71     Weight: 115.4 kg (254 lb 6.4 oz)  Body mass index is 36.5 kg/m².    Intake/Output Summary (Last 24 hours) at 1/12/2025 1510  Last data filed at 1/12/2025 0400  Gross per 24 hour   Intake 500 ml   Output 2350 ml   Net -1850 ml         Physical Exam  Vitals and nursing note reviewed.   Constitutional:       Appearance: He is obese. He is ill-appearing.      Comments: Sleeping on arrival, awakened to voice and gentle touch   HENT:      Head: Normocephalic and atraumatic.      Nose: Nose normal.      Mouth/Throat:      Mouth: Mucous membranes are moist.      Pharynx: Oropharynx is clear.   Eyes:      Conjunctiva/sclera: Conjunctivae normal.      Pupils: Pupils are equal, round, and reactive to light.   Cardiovascular:      Rate and Rhythm: Normal rate and regular rhythm.   Pulmonary:      Breath sounds: Rhonchi and rales present.   Abdominal:      General: Bowel sounds are normal.      Palpations: Abdomen is soft.   Genitourinary:     Comments: Scrotal edema  Musculoskeletal:         General: Deformity present.      Cervical back: Normal range of motion and neck supple.      Comments: Right BKA and left AKA   Skin:     General: Skin is warm and dry.      Capillary Refill: Capillary  refill takes 2 to 3 seconds.      Coloration: Skin is pale.   Neurological:      Mental Status: He is oriented to person, place, and time. Mental status is at baseline.      Comments: Intention tremors/jerks   Psychiatric:      Comments: Tearful, then angry and pushed his food away when he thought it didn't have meat, but explained it had 2 pieces of chicken then said oh I though that was rice.             Significant Labs: All pertinent labs within the past 24 hours have been reviewed.  CBC:   Recent Labs   Lab 01/11/25  0509 01/12/25 0207   WBC 11.76 13.53*   HGB 10.4* 10.7*   HCT 33.8* 34.7*    260     CMP:   Recent Labs   Lab 01/11/25  0509 01/12/25 0207    138   K 4.0 4.1    106   CO2 25 26   * 129*   BUN 34* 35*   CREATININE 2.2* 2.2*   CALCIUM 7.3* 7.8*   PROT 5.0* 5.2*   ALBUMIN 2.3* 2.4*   BILITOT 0.3 0.4   ALKPHOS 88 82   AST 9* 9*   ALT 7* 7*   ANIONGAP 8 6*       Significant Imaging: I have reviewed all pertinent imaging results/findings within the past 24 hours.  US Scrotum And Testicles    Result Date: 1/10/2025  EXAMINATION: US SCROTUM AND TESTICLES CLINICAL HISTORY: swelling; COMPARISON: None FINDINGS: Right testicle measures 27 x 41 x 31 mm, and left testicle measures 27 x 34 x 26 mm.  Bilateral testicles contain no intratesticular mass.  Mild heterogeneous echogenicity of left testicle is evident while homogeneous echogenicity of right testicle is noted.  The testicles contain normal vascular flow on color and spectral Doppler imaging. Bilateral epididymi are normal and without hyperemia. No hernia identified in the inguinal soft tissues with Valsalva maneuver. Diffuse scrotal soft tissue swelling/edema is present.     1. Diffuse scrotal soft tissue swelling/edema. 2. Slightly heterogeneous echogenicity of left testicle without hyperemia to suggest orchitis. Electronically signed by: Fredy Trejo Date:    01/10/2025 Time:    16:16    Echo    Result Date: 1/9/2025    Left  Ventricle: The left ventricle is moderately dilated. Mildly increased wall thickness. There is mild asymmetric hypertrophy. Severe global hypokinesis present. There is severely reduced systolic function with a visually estimated ejection fraction of 25 - 30%.   Right Ventricle: Moderate right ventricular enlargement. Systolic function is mildly reduced.   Left Atrium: Left atrium is mildly dilated.   Tricuspid Valve: There is mild regurgitation.   IVC/SVC: Elevated venous pressure at 15 mmHg.     X-Ray Chest AP Portable    Result Date: 1/9/2025  CLINICAL HISTORY: ([id number])48 y/o  (1975) M Chest Pain; TECHNIQUE: (A#[id number], exam time 1/9/2025 7:54) XR CHEST AP PORTABLE [id number] COMPARISON: Radiograph from 07/19/2022 FINDINGS: Mild perihilar pulmonary vascular prominence with slightly increased central hilar interstitial lung markings bilaterally suggestive of mild pulmonary vascular congestion/trace edema. No pneumothorax is identified. The heart is top normal in size.  Osseous structures show degenerative changes in the spine. The visualized upper abdomen is unremarkable.     Top-normal size heart and findings of mild pulmonary vascular congestion/trace interstitial edema. Electronically signed by: Manuel Snow Date:    01/09/2025 Time:    08:09

## 2025-01-12 NOTE — PROGRESS NOTES
[facility]  Progress Note    Patient Name: Gideon Ross  MRN: [id number]  Patient Class: IP- Inpatient   Admission Date: 1/9/2025  Length of Stay: 3 days  Attending Physician: Wayne Jones DO  Primary Care Provider: Jennifer, Primary Doctor        Subjective     Principal Problem:Acute systolic congestive heart failure        HPI:  Mr. Ross is a 49 yom w/pmh significant for PID status post bilateral above-the-knee amputations, diabetes mellitus who presented for significant bilateral lower extremity edema and scrotal swelling, as well as intractable pain secondary to scrotal swelling.  Labs largely unremarkable.  BNP 3500, troponin 3300.  Patient was started on IV diuresis and pain control.  Cardiology was consulted.  Patient was initially started on heparin infusion.  On exam he has significant pain and tenderness due to significant scrotal swelling.    Overview/Hospital Course:  Patient with history of peripheral artery disease status post bilateral AKA, diabetes, substance abuse presents to the emergency room with complaints of lower extremity and scrotal edema.  BNP and troponin elevated.  Cardiology consulted.  Initially started on heparin drip but then transitioned to Lovenox.  Echocardiogram showed EF 25%.  Addiction Medicine consulted for heroin use.  Started on Suboxone and reports improvement in symptoms.  Diuretics held given MARLENE.  Needs LifeVest on discharge. He was started on dobutamine on 1/11/25, but overnight about 0200 he had Vtach about 50 beats and dobutamine turned off. He has a catheter for strict I&O with MARLENE, but is describing dysuria, there is purulent drainage at the end of the catheter so UA was obtained with concerns of UTI and pt placed on rocephin and recommendations for catheter exchange giving continued MARLENE and need for strict I&O with decompensated heart failure.     Interval History:  flomax added, valle will need to be changed. Added rocephin. Could  not tolerate dobutamine. Scrotum is still quite enlarged. Significant anasarca    Review of Systems   Constitutional:  Positive for fatigue.   Respiratory:  Positive for shortness of breath.    Genitourinary:  Positive for scrotal swelling.   Neurological:  Positive for tremors.     Objective:     Vital Signs (Most Recent):  Temp: 98.1 °F (36.7 °C) (01/12/25 1106)  Pulse: 104 (01/12/25 1106)  Resp: 20 (01/12/25 1211)  BP: 129/71 (01/12/25 1106)  SpO2: 98 % (01/12/25 0753) Vital Signs (24h Range):  Temp:  [97.5 °F (36.4 °C)-98.6 °F (37 °C)] 98.1 °F (36.7 °C)  Pulse:  [] 104  Resp:  [15-20] 20  SpO2:  [96 %-98 %] 98 %  BP: (122-148)/(60-73) 129/71     Weight: 115.4 kg (254 lb 6.4 oz)  Body mass index is 36.5 kg/m².    Intake/Output Summary (Last 24 hours) at 1/12/2025 1510  Last data filed at 1/12/2025 0400  Gross per 24 hour   Intake 500 ml   Output 2350 ml   Net -1850 ml         Physical Exam  Vitals and nursing note reviewed.   Constitutional:       Appearance: He is obese. He is ill-appearing.      Comments: Sleeping on arrival, awakened to voice and gentle touch   HENT:      Head: Normocephalic and atraumatic.      Nose: Nose normal.      Mouth/Throat:      Mouth: Mucous membranes are moist.      Pharynx: Oropharynx is clear.   Eyes:      Conjunctiva/sclera: Conjunctivae normal.      Pupils: Pupils are equal, round, and reactive to light.   Cardiovascular:      Rate and Rhythm: Normal rate and regular rhythm.   Pulmonary:      Breath sounds: Rhonchi and rales present.   Abdominal:      General: Bowel sounds are normal.      Palpations: Abdomen is soft.   Genitourinary:     Comments: Scrotal edema  Musculoskeletal:         General: Deformity present.      Cervical back: Normal range of motion and neck supple.      Comments: Right BKA and left AKA   Skin:     General: Skin is warm and dry.      Capillary Refill: Capillary refill takes 2 to 3 seconds.      Coloration: Skin is pale.   Neurological:      Mental  Status: He is oriented to person, place, and time. Mental status is at baseline.      Comments: Intention tremors/jerks   Psychiatric:      Comments: Tearful, then angry and pushed his food away when he thought it didn't have meat, but explained it had 2 pieces of chicken then said oh I though that was rice.             Significant Labs: All pertinent labs within the past 24 hours have been reviewed.  CBC:   Recent Labs   Lab 01/11/25  0509 01/12/25  0207   WBC 11.76 13.53*   HGB 10.4* 10.7*   HCT 33.8* 34.7*    260     CMP:   Recent Labs   Lab 01/11/25  0509 01/12/25  0207    138   K 4.0 4.1    106   CO2 25 26   * 129*   BUN 34* 35*   CREATININE 2.2* 2.2*   CALCIUM 7.3* 7.8*   PROT 5.0* 5.2*   ALBUMIN 2.3* 2.4*   BILITOT 0.3 0.4   ALKPHOS 88 82   AST 9* 9*   ALT 7* 7*   ANIONGAP 8 6*       Significant Imaging: I have reviewed all pertinent imaging results/findings within the past 24 hours.  US Scrotum And Testicles    Result Date: 1/10/2025  EXAMINATION: US SCROTUM AND TESTICLES CLINICAL HISTORY: swelling; COMPARISON: None FINDINGS: Right testicle measures 27 x 41 x 31 mm, and left testicle measures 27 x 34 x 26 mm.  Bilateral testicles contain no intratesticular mass.  Mild heterogeneous echogenicity of left testicle is evident while homogeneous echogenicity of right testicle is noted.  The testicles contain normal vascular flow on color and spectral Doppler imaging. Bilateral epididymi are normal and without hyperemia. No hernia identified in the inguinal soft tissues with Valsalva maneuver. Diffuse scrotal soft tissue swelling/edema is present.     1. Diffuse scrotal soft tissue swelling/edema. 2. Slightly heterogeneous echogenicity of left testicle without hyperemia to suggest orchitis. Electronically signed by: Fredy Trejo Date:    01/10/2025 Time:    16:16    Echo    Result Date: 1/9/2025    Left Ventricle: The left ventricle is moderately dilated. Mildly increased wall thickness.  "There is mild asymmetric hypertrophy. Severe global hypokinesis present. There is severely reduced systolic function with a visually estimated ejection fraction of 25 - 30%.   Right Ventricle: Moderate right ventricular enlargement. Systolic function is mildly reduced.   Left Atrium: Left atrium is mildly dilated.   Tricuspid Valve: There is mild regurgitation.   IVC/SVC: Elevated venous pressure at 15 mmHg.     X-Ray Chest AP Portable    Result Date: 1/9/2025  CLINICAL HISTORY: ([id number])48 y/o  (1975) M Chest Pain; TECHNIQUE: (A#[id number], exam time 1/9/2025 7:54) XR CHEST AP PORTABLE [id number] COMPARISON: Radiograph from 07/19/2022 FINDINGS: Mild perihilar pulmonary vascular prominence with slightly increased central hilar interstitial lung markings bilaterally suggestive of mild pulmonary vascular congestion/trace edema. No pneumothorax is identified. The heart is top normal in size.  Osseous structures show degenerative changes in the spine. The visualized upper abdomen is unremarkable.     Top-normal size heart and findings of mild pulmonary vascular congestion/trace interstitial edema. Electronically signed by: Manuel Snow Date:    01/09/2025 Time:    08:09       Assessment and Plan     * Acute systolic congestive heart failure  Patient has Systolic (HFrEF) heart failure that is Acute. On presentation their CHF was decompensated. Evidence of decompensated CHF on presentation includes: edema. The etiology of their decompensation is likely substance abuse . Most recent BNP and echo results are listed below.  No results for input(s): "BNP" in the last 72 hours.    Latest ECHO  Results for orders placed during the hospital encounter of 01/09/25    Echo    Interpretation Summary    Left Ventricle: The left ventricle is moderately dilated. Mildly increased wall thickness. There is mild asymmetric hypertrophy. Severe global hypokinesis present. There is severely reduced systolic function with a visually " estimated ejection fraction of 25 - 30%.    Right Ventricle: Moderate right ventricular enlargement. Systolic function is mildly reduced.    Left Atrium: Left atrium is mildly dilated.    Tricuspid Valve: There is mild regurgitation.    IVC/SVC: Elevated venous pressure at 15 mmHg.    Current Heart Failure Medications  hydrALAZINE injection 5 mg, Every 6 hours PRN, Intravenous    Plan  - Monitor strict I&Os and daily weights.    - Place on telemetry  - Low sodium diet  - Place on fluid restriction of 1.5 L.   - Cardiology has been consulted  - The patient's volume status is stable but not at their baseline as indicated by edema  Hold diuresis given MARLENE, resume diuresis once creatinine normal.  If kidneys do not normalize or worsen might need right heart catheterization per Cardiology  Hold lisinopril and hydrochlorothiazide  -Could not tolerate dobutamine d/t ectopy     Urinary tract infection associated with indwelling urethral catheter  Catheter in place for severe acute decompensated heart failure and need for strict I&O with concern of cardiorenal syndrome   Start rocephin  Follow culture  Change catheter        Opioid use disorder  Addiction medicine consulted   We will start patient on Suboxone  Per Addiction medicine:   For first dose of buprenorphine (at least 18 hours since last use of fentanyl) give one or two 8 mg strips (8-16 mg total).  Wait 1 hour and then...  If mild withdrawal, give another 8 mg strip.  If more significant withdrawal, give two more 8 mg strips.  If relaxed or calm, do not give any more.  Wait 1-2 more hours...  If still have withdrawal symptoms, take another 8 mg strip  If relaxed or calm, do not take any more  Try not to give more than 32 mg (4 strips) on day one.  On day 2, give 8 mg BID   Continue taking 8 mg BID until seeing me in clinic.    MARLENE (acute kidney injury)  MARLENE is likely due to pre-renal azotemia due to intravascular volume depletion. Baseline creatinine is  1 . Most  recent creatinine and eGFR are listed below.  Recent Labs     01/10/25  0721 01/11/25  0509 01/12/25  0207   CREATININE 1.6* 2.2* 2.2*   EGFRNORACEVR 53.1* 36.3* 36.3*        Plan  - MARLENE is worsening. Will adjust treatment as follows:  Hold diuretic  - Avoid nephrotoxins and renally dose meds for GFR listed above  - Monitor urine output, serial BMP, and adjust therapy as needed    Diabetes mellitus  Patient's FSGs are controlled on current medication regimen.  Last A1c reviewed-   Lab Results   Component Value Date    HGBA1C 7.5 (H) 01/10/2025     Most recent fingerstick glucose reviewed-   Recent Labs   Lab 01/10/25  2004 01/11/25  0749 01/11/25  1235   POCTGLUCOSE 176* 153* 160*       Current correctional scale  Low  Maintain anti-hyperglycemic dose as follows-   Antihyperglycemics (From admission, onward)      Start     Stop Route Frequency Ordered    01/10/25 1515  insulin aspart U-100 pen 0-10 Units         -- SubQ Before meals & nightly PRN 01/10/25 1415          Hold Oral hypoglycemics while patient is in the hospital.    Elevated troponin  Troponin elevated but flat, cardiology following  No chest pain or shortness of breath   Likely secondary to congestive heart failure  Continue Lovenox      Scrotal edema  Secondary to acute decompensated heart failure  Hold diuretics given MARLENE  Elevate scrotum  Follow up ultrasound      VTE Risk Mitigation (From admission, onward)           Ordered     enoxaparin injection 120 mg  Every 12 hours         01/10/25 0724     IP VTE HIGH RISK PATIENT  Once         01/09/25 1326     Place sequential compression device  Until discontinued         01/09/25 1326     Reason for no Mechanical VTE Prophylaxis  Once        Question:  Reasons:  Answer:  Physician Provided (leave comment)  Comment:  already on heparin infusion    01/09/25 0906                    Discharge Planning   KATHERINE: 1/17/2025     Code Status: Full Code   Medical Readiness for Discharge Date:   Discharge Plan A:  Home   Discharge Delays: None known at this time                    Mendy Campos NP  Department of Hospital Medicine   [facility]

## 2025-01-12 NOTE — ASSESSMENT & PLAN NOTE
MARLENE is likely due to pre-renal azotemia due to intravascular volume depletion. Baseline creatinine is  1 . Most recent creatinine and eGFR are listed below.  Recent Labs     01/10/25  0721 01/11/25  0509 01/12/25  0207   CREATININE 1.6* 2.2* 2.2*   EGFRNORACEVR 53.1* 36.3* 36.3*        Plan  - MARLENE is worsening. Will adjust treatment as follows:  Hold diuretic  - Avoid nephrotoxins and renally dose meds for GFR listed above  - Monitor urine output, serial BMP, and adjust therapy as needed

## 2025-01-12 NOTE — PROGRESS NOTES
Formerly [facility]   Department of Cardiology  Progress Note      PATIENT NAME: Gideon Ross    MRN: [id number]  TODAY'S DATE: 01/12/2025  ADMIT DATE: 1/9/2025                          CONSULT REQUESTED BY: Wyane Jones DO SUBJECTIVE     PRINCIPAL PROBLEM: Acute systolic congestive heart failure    01/12/2025    Patient seen sitting up bed with no distress noted. Breathing is stable. Family/friends at bedside. Stable on telemetry but mildly tachycardic. Dobutamine was held due to ectopy on telemetry.     1/11/25    Patient very drowsy when seen today.  No acute distress noted.  He was asking for pain medicine when awakened.  Patient with worsened renal function today.    1/10/25    Patient seen resting in bed this morning.  He was very upset.  He reports that he does use heroin on a daily basis and believes that he is likely detoxing currently.  Patient is not very optimistic for his outcome.    HPI:    Patient is a 49-year-old male who presented to the emergency room with complaints of testicular pain and swelling over the past 1 week.  Patient is a known diabetic and has a history of PID with bilateral above the knee amputations.  Patient also had some complaints of mild shortness of breath but denied any chest pain.  Patient was noted to have significantly elevated high sensitivity troponin level on arrival.  High sensitivity troponin level was 3343 on arrival and is now 3369.  Patient was started on heparin drip.  Patient does have a known history of drug abuse and is positive for opiates, amphetamine, and marijuana metabolites in his UDS.  Patient reports his last use of methamphetamine was a couple of weeks ago.        REASON FOR CONSULT:  From Hospitalist H&P: Patient presents complaining of testicular pain and swelling.  Patient has noticed increased pain and swelling for the last 1 week as well as foul smell.  Patient has a history of bilateral above-the-knee and below-the-knee amputation.  He  is a diabetic.  At the worst symptoms are moderate.  Patient complains of mild shortness of breath no chest pain.            Review of patient's allergies indicates:  No Known Allergies    Past Medical History:   Diagnosis Date    Diabetes mellitus     Hypertension      Past Surgical History:   Procedure Laterality Date    SKIN GRAFT       Social History     Tobacco Use    Smoking status: Every Day     Current packs/day: 1.00     Types: Cigarettes   Substance Use Topics    Alcohol use: Yes    Drug use: No        REVIEW OF SYSTEMS  Per HPI    OBJECTIVE     VITAL SIGNS (Most Recent)  Temp: 98.1 °F (36.7 °C) (01/12/25 1106)  Pulse: 104 (01/12/25 1106)  Resp: 20 (01/12/25 1211)  BP: 129/71 (01/12/25 1106)  SpO2: 98 % (01/12/25 0753)    VENTILATION STATUS  Resp: 20 (01/12/25 1211)  SpO2: 98 % (01/12/25 0753)           I & O (Last 24H):  Intake/Output Summary (Last 24 hours) at 1/12/2025 1524  Last data filed at 1/12/2025 0400  Gross per 24 hour   Intake 500 ml   Output 2350 ml   Net -1850 ml       WEIGHTS  Wt Readings from Last 1 Encounters:   01/09/25 2239 115.4 kg (254 lb 6.4 oz)   01/09/25 0824 108.9 kg (240 lb)       PHYSICAL EXAM  CONSTITUTIONAL: No fever, no chills  HEENT: Normocephalic, atraumatic,pupils reactive to light                 NECK:  No JVD no carotid bruit  CVS: S1S2+, RRR  LUNGS: Clear  ABDOMEN: Soft, NT, BS+  EXTREMITIES: No cyanosis, edema  : No valle catheter  NEURO: AAO X 3  PSY: Normal affect      HOME MEDICATIONS:  No current facility-administered medications on file prior to encounter.     Current Outpatient Medications on File Prior to Encounter   Medication Sig Dispense Refill    amLODIPine (NORVASC) 10 MG tablet Take 1 tablet (10 mg total) by mouth once daily. 30 tablet 1    aspirin (ECOTRIN) 81 MG EC tablet Take 1 tablet (81 mg total) by mouth once daily. 30 tablet 1    atorvastatin (LIPITOR) 80 MG tablet Take 1 tablet (80 mg total) by mouth once daily. 30 tablet 1    baclofen (LIORESAL)  10 MG tablet Take 1 tablet (10 mg total) by mouth 3 (three) times daily. 90 tablet 1    carvedilol (COREG) 6.25 MG tablet Take 1 tablet (6.25 mg total) by mouth 2 (two) times daily. 60 tablet 1    diazePAM (VALIUM) 5 MG tablet Take 1 tablet (5 mg total) by mouth every 12 (twelve) hours as needed for Anxiety (anxiety.). 30 tablet 0    gabapentin (NEURONTIN) 300 MG capsule Take 2 capsules (600 mg total) by mouth 3 (three) times daily. 90 capsule 1    hydroCHLOROthiazide (HYDRODIURIL) 50 MG tablet Take 1 tablet (50 mg total) by mouth once daily. 30 tablet 1    insulin aspart U-100 (NOVOLOG) 100 unit/mL InPn pen Inject 18 Units into the skin 3 (three) times daily. (Patient taking differently: Inject into the skin 3 (three) times daily.) 10 mL 1    insulin detemir U-100 (LEVEMIR FLEXTOUCH) 100 unit/mL (3 mL) SubQ InPn pen Inject 35 Units into the skin 2 (two) times daily. 10 mL 1    insulin glargine U-100, Lantus, 100 unit/mL injection Inject 30 Units into the skin every evening. (Patient not taking: Reported on 1/9/2025)      lisinopril (PRINIVIL,ZESTRIL) 40 MG tablet Take 1 tablet (40 mg total) by mouth once daily. 30 tablet 1    nicotine (NICODERM CQ) 14 mg/24 hr Place 1 patch onto the skin once daily. (Patient not taking: Reported on 1/9/2025) 15 patch 0    nortriptyline (PAMELOR) 25 MG capsule Take 1 capsule (25 mg total) by mouth 3 (three) times daily. 30 capsule 1    oxyCODONE (ROXICODONE) 5 MG immediate release tablet Take 1 tablet (5 mg total) by mouth every 12 (twelve) hours as needed. (Patient not taking: Reported on 1/9/2025) 30 tablet 0    polyethylene glycol (GLYCOLAX) 17 gram PwPk Take 17 g by mouth daily as needed. (Patient not taking: Reported on 1/9/2025) 30 each 1    QUEtiapine (SEROQUEL) 100 MG Tab Take 1 tablet (100 mg total) by mouth every evening. 30 tablet 1    sofosbuvir-velpatasvir (EPCLUSA) 400-100 mg Tab Take 1 tablet daily. (Patient not taking: Reported on 1/9/2025) 28 tablet 2        SCHEDULED MEDS:   aspirin  81 mg Oral Daily    atorvastatin  80 mg Oral Daily    baclofen  5 mg Oral BID    buprenorphine-naloxone 8-2 mg  1 Film Sublingual BID    cefTRIAXone (Rocephin) IV (PEDS and ADULTS)  2 g Intravenous Q24H    enoxparin  1 mg/kg Subcutaneous Q12H (treatment, non-standard time)    gabapentin  300 mg Oral BID    miconazole NITRATE 2 %   Topical (Top) BID    mupirocin   Nasal BID    tamsulosin  0.4 mg Oral Daily       CONTINUOUS INFUSIONS:          PRN MEDS:  Current Facility-Administered Medications:     acetaminophen, 650 mg, Oral, Q4H PRN    aluminum-magnesium hydroxide-simethicone, 30 mL, Oral, QID PRN    dextrose 50%, 12.5 g, Intravenous, PRN    dextrose 50%, 25 g, Intravenous, PRN    diazePAM, 5 mg, Oral, Q12H PRN    glucagon (human recombinant), 1 mg, Intramuscular, PRN    glucose, 16 g, Oral, PRN    glucose, 24 g, Oral, PRN    hydrALAZINE, 5 mg, Intravenous, Q6H PRN    insulin aspart U-100, 0-10 Units, Subcutaneous, QID (AC + HS) PRN    magnesium oxide, 800 mg, Oral, PRN    magnesium oxide, 800 mg, Oral, PRN    melatonin, 6 mg, Oral, Nightly PRN    morphine, 4 mg, Intravenous, Q4H PRN    naloxone, 0.02 mg, Intravenous, PRN    ondansetron, 4 mg, Intravenous, Q6H PRN    oxyCODONE-acetaminophen, 1 tablet, Oral, Q4H PRN    oxyCODONE-acetaminophen, 1 tablet, Oral, Q4H PRN    potassium bicarbonate, 35 mEq, Oral, PRN    potassium bicarbonate, 50 mEq, Oral, PRN    potassium bicarbonate, 60 mEq, Oral, PRN    potassium, sodium phosphates, 2 packet, Oral, PRN    potassium, sodium phosphates, 2 packet, Oral, PRN    potassium, sodium phosphates, 2 packet, Oral, PRN    senna-docusate 8.6-50 mg, 1 tablet, Oral, Daily PRN    sodium chloride 0.9%, 10 mL, Intravenous, Q12H PRN    sodium chloride 0.9%, 10 mL, Intravenous, PRN    LABS AND DIAGNOSTICS     CBC LAST 3 DAYS  Recent Labs   Lab 01/10/25  0721 01/11/25  0509 01/12/25  0207   WBC 11.65  11.65 11.76 13.53*   RBC 3.95*  3.95* 3.95* 4.10*   HGB  "10.5*  10.5* 10.4* 10.7*   HCT 33.1*  33.1* 33.8* 34.7*   MCV 84  84 86 85   MCH 26.6*  26.6* 26.3* 26.1*   MCHC 31.7*  31.7* 30.8* 30.8*   RDW 14.7*  14.7* 14.8* 14.8*     244 249 260   MPV 10.4  10.4 10.5 11.0   GRAN 63.7  63.7  7.4  7.4 62.2  7.3 69.7  9.4*   LYMPH 16.7*  16.7*  1.9  1.9 18.9  2.2 12.9*  1.7   MONO 10.9  10.9  1.3*  1.3* 10.7  1.3* 10.2  1.4*   BASO 0.08  0.08 0.09 0.07   NRBC 0  0 0 0       COAGULATION LAST 3 DAYS  Recent Labs   Lab 01/09/25  0918 01/09/25  1659 01/09/25  2359 01/10/25  0721   INR 1.1  --   --   --    APTT 28.4 45.2* 32.2* 49.4*       CHEMISTRY LAST 3 DAYS  Recent Labs   Lab 01/10/25  0721 01/11/25  0509 01/12/25  0207    141 138   K 3.9 4.0 4.1    108 106   CO2 26 25 26   ANIONGAP 5* 8 6*   BUN 28* 34* 35*   CREATININE 1.6* 2.2* 2.2*   * 154* 129*   CALCIUM 7.5* 7.3* 7.8*   MG 1.6 1.7 1.8   ALBUMIN 2.3* 2.3* 2.4*   PROT 4.8* 5.0* 5.2*   ALKPHOS 86 88 82   ALT 8* 7* 7*   AST 10 9* 9*   BILITOT 0.4 0.3 0.4       CARDIAC PROFILE LAST 3 DAYS  Recent Labs   Lab 01/09/25  0709 01/09/25  0918   BNP 3,449*  --    TROPONINIHS 3343.0* 3369.7*       ENDOCRINE LAST 3 DAYS  No results for input(s): "TSH", "PROCAL" in the last 168 hours.    LAST ARTERIAL BLOOD GAS  ABG  No results for input(s): "PH", "PO2", "PCO2", "HCO3", "BE" in the last 168 hours.    LAST 7 DAYS MICROBIOLOGY   Microbiology Results (last 7 days)       Procedure Component Value Units Date/Time    Urine culture [[id number]] Collected: 01/12/25 1055    Order Status: No result Specimen: Urine Updated: 01/12/25 1341    Blood culture x two cultures. Draw prior to antibiotics. [[id number]] Collected: 01/09/25 0859    Order Status: Completed Specimen: Blood Updated: 01/12/25 1032     Blood Culture, Routine No Growth to date      No Growth to date      No Growth to date      No Growth to date    Narrative:      Aerobic and anaerobic    Blood culture x two cultures. Draw prior to " antibiotics. [[id number]] Collected: 01/09/25 0859    Order Status: Completed Specimen: Blood Updated: 01/12/25 1032     Blood Culture, Routine No Growth to date      No Growth to date      No Growth to date      No Growth to date    Narrative:      Aerobic and anaerobic    Clostridium difficile EIA [[id number]] Collected: 01/09/25 2223    Order Status: Canceled Specimen: Stool             MOST RECENT IMAGING  US Retroperitoneal Complete  Narrative: EXAMINATION:  US RETROPERITONEAL COMPLETE    CLINICAL HISTORY:  quinton, evaluate for hydronephrosis;    TECHNIQUE:  Grayscale and color Doppler sonographic analysis of the kidneys and retroperitoneum was performed.    FINDINGS:  Comparison to prior exams.  The exam is limited by poor acoustic window due to patient body habitus, and subsequent poor resolution.  The right kidney measures 12.6 cm in length, with normal cortical thickness and parenchymal echotexture, without echogenic calculi or hydronephrosis.    The left kidney measures 10.5 cm in length and has normal cortical thickness and parenchymal echotexture, without echogenic calculi or hydronephrosis.    The urinary bladder is collapsed and not evaluated.  The visualized abdominal aorta, aortic bifurcation, common iliac artery origins, and IVC are normal.  Impression: Limited exam, with no sonographic evidence of medical or surgical renal disease.    Electronically signed by: Eliseo Jo  Date:    01/12/2025  Time:    09:15      ECHOCARDIOGRAM RESULTS (last 5)  No results found for this or any previous visit.      CURRENT/PREVIOUS VISIT EKG  Results for orders placed or performed during the hospital encounter of 01/09/25   EKG 12-lead    Collection Time: 01/12/25  2:01 AM   Result Value Ref Range    QRS Duration 136 ms    [facility] QTC Calculation 511 ms    Narrative    Test Reason : R07.9,    Vent. Rate : 111 BPM     Atrial Rate : 111 BPM     P-R Int : 140 ms          QRS Dur : 136 ms      QT Int : 376 ms       P-R-T Axes  :  61 148   1 degrees    QTcB Int : 511 ms    Sinus tachycardia with occasional Premature ventricular complexes  Right bundle branch block  Left posterior fascicular block   Bifascicular block   Possible Inferior infarct ,age undetermined  Abnormal ECG  No previous ECGs available    Referred By: AAAREFERRAL SELF           Confirmed By:            ASSESSMENT/PLAN:     Active Hospital Problems    Diagnosis    *Acute systolic congestive heart failure    Urinary tract infection associated with indwelling urethral catheter    MARLENE (acute kidney injury)    Opioid use disorder    Scrotal edema    Elevated troponin    Diabetes mellitus       ASSESSMENT & PLAN:     Elevated troponin  Acute HFrEF 25-30%  Testicular pain and swelling   Type II DM  + drug abuse: amphetamines, marijuana  PAD  Hx of bilateral AKA      RECOMMENDATIONS:    Consult nephrology as he did not tolerate dobutamine and creatinine remains elevated.   He has diuresed some since admission but is noted to have salty snacks at bedside. Discussed low sodium diet recommendations.   Monitor closely.     Bhumi Sanchez NP  Date of Service: 01/12/2025  12:01 PM    I have personally interviewed and examined the patient, I have reviewed the Nurse Practitioner's history and physical, assessment, and plan. I have personally evaluated the patient at bedside and agree with the findings and made appropriate changes as necessary in recommendations.    Patient seen and evaluated  May need Lasix drip and albumin  Agree with Nephrology consult    Kingsley Mosley MD  Department of Cardiology  [facility]  01/12/2025 5:15 PM

## 2025-01-12 NOTE — CARE UPDATE
01/11/25 2110   Patient Assessment/Suction   Level of Consciousness (AVPU) alert   Respiratory Effort Normal;Unlabored   Expansion/Accessory Muscles/Retractions no use of accessory muscles   PRE-TX-O2   Device (Oxygen Therapy) nasal cannula   $ Is the patient on Low Flow Oxygen? Yes   Flow (L/min) (Oxygen Therapy) 2   SpO2 96 %   Pulse Oximetry Type Intermittent   $ Pulse Oximetry - Multiple Charge Pulse Oximetry - Multiple   Pulse 100   Resp 18   Positioning   Body Position position changed independently   Head of Bed (HOB) Positioning HOB elevated   Positioning/Transfer Devices pillows;in use   Education   $ Education Oxygen;15 min

## 2025-01-12 NOTE — ASSESSMENT & PLAN NOTE
"Patient has Systolic (HFrEF) heart failure that is Acute. On presentation their CHF was decompensated. Evidence of decompensated CHF on presentation includes: edema. The etiology of their decompensation is likely substance abuse . Most recent BNP and echo results are listed below.  No results for input(s): "BNP" in the last 72 hours.    Latest ECHO  Results for orders placed during the hospital encounter of 01/09/25    Echo    Interpretation Summary    Left Ventricle: The left ventricle is moderately dilated. Mildly increased wall thickness. There is mild asymmetric hypertrophy. Severe global hypokinesis present. There is severely reduced systolic function with a visually estimated ejection fraction of 25 - 30%.    Right Ventricle: Moderate right ventricular enlargement. Systolic function is mildly reduced.    Left Atrium: Left atrium is mildly dilated.    Tricuspid Valve: There is mild regurgitation.    IVC/SVC: Elevated venous pressure at 15 mmHg.    Current Heart Failure Medications  hydrALAZINE injection 5 mg, Every 6 hours PRN, Intravenous    Plan  - Monitor strict I&Os and daily weights.    - Place on telemetry  - Low sodium diet  - Place on fluid restriction of 1.5 L.   - Cardiology has been consulted  - The patient's volume status is stable but not at their baseline as indicated by edema  Hold diuresis given MARLENE, resume diuresis once creatinine normal.  If kidneys do not normalize or worsen might need right heart catheterization per Cardiology  Hold lisinopril and hydrochlorothiazide  -Could not tolerate dobutamine d/t ectopy   "

## 2025-01-12 NOTE — NURSING
Patients skin is taunt      pin point areas to buttock/perineum with oozing of sero-sang fluid .  Scrotum with 4+ edema.  New mepilex applied to sacrum.

## 2025-01-12 NOTE — CARE UPDATE
01/12/25 0753   PRE-TX-O2   Device (Oxygen Therapy) nasal cannula   $ Is the patient on Low Flow Oxygen? Yes   Flow (L/min) (Oxygen Therapy) 2.5   SpO2 98 %   Pulse Oximetry Type Intermittent   $ Pulse Oximetry - Multiple Charge Pulse Oximetry - Multiple   Pulse 95   Resp 15   Respiratory Evaluation   $ Care Plan Tech Time 15 min

## 2025-01-13 LAB
ALBUMIN SERPL BCP-MCNC: 2.5 G/DL (ref 3.5–5.2)
ALP SERPL-CCNC: 101 U/L (ref 55–135)
ALT SERPL W/O P-5'-P-CCNC: 8 U/L (ref 10–44)
ANION GAP SERPL CALC-SCNC: 7 MMOL/L (ref 8–16)
AST SERPL-CCNC: 10 U/L (ref 10–40)
BASOPHILS # BLD AUTO: 0.06 K/UL (ref 0–0.2)
BASOPHILS NFR BLD: 0.5 % (ref 0–1.9)
BILIRUB SERPL-MCNC: 0.3 MG/DL (ref 0.1–1)
BUN SERPL-MCNC: 43 MG/DL (ref 6–20)
CALCIUM SERPL-MCNC: 7.6 MG/DL (ref 8.7–10.5)
CHLORIDE SERPL-SCNC: 103 MMOL/L (ref 95–110)
CO2 SERPL-SCNC: 24 MMOL/L (ref 23–29)
CREAT SERPL-MCNC: 2.5 MG/DL (ref 0.5–1.4)
DIFFERENTIAL METHOD BLD: ABNORMAL
EOSINOPHIL # BLD AUTO: 1.3 K/UL (ref 0–0.5)
EOSINOPHIL NFR BLD: 10.5 % (ref 0–8)
ERYTHROCYTE [DISTWIDTH] IN BLOOD BY AUTOMATED COUNT: 14.9 % (ref 11.5–14.5)
EST. GFR  (NO RACE VARIABLE): 31.1 ML/MIN/1.73 M^2
GLUCOSE SERPL-MCNC: 217 MG/DL (ref 70–110)
HCT VFR BLD AUTO: 35 % (ref 40–54)
HGB BLD-MCNC: 10.6 G/DL (ref 14–18)
IMM GRANULOCYTES # BLD AUTO: 0.06 K/UL (ref 0–0.04)
IMM GRANULOCYTES NFR BLD AUTO: 0.5 % (ref 0–0.5)
LYMPHOCYTES # BLD AUTO: 1.9 K/UL (ref 1–4.8)
LYMPHOCYTES NFR BLD: 15.3 % (ref 18–48)
MAGNESIUM SERPL-MCNC: 2.1 MG/DL (ref 1.6–2.6)
MCH RBC QN AUTO: 25.7 PG (ref 27–31)
MCHC RBC AUTO-ENTMCNC: 30.3 G/DL (ref 32–36)
MCV RBC AUTO: 85 FL (ref 82–98)
MONOCYTES # BLD AUTO: 1.4 K/UL (ref 0.3–1)
MONOCYTES NFR BLD: 11.2 % (ref 4–15)
NEUTROPHILS # BLD AUTO: 7.7 K/UL (ref 1.8–7.7)
NEUTROPHILS NFR BLD: 62 % (ref 38–73)
NRBC BLD-RTO: 0 /100 WBC
OHS QRS DURATION: 124 MS
OHS QTC CALCULATION: 457 MS
PLATELET # BLD AUTO: 241 K/UL (ref 150–450)
PMV BLD AUTO: 10.5 FL (ref 9.2–12.9)
POCT GLUCOSE: 173 MG/DL (ref 70–110)
POCT GLUCOSE: 208 MG/DL (ref 70–110)
POCT GLUCOSE: 243 MG/DL (ref 70–110)
POCT GLUCOSE: 268 MG/DL (ref 70–110)
POTASSIUM SERPL-SCNC: 4.3 MMOL/L (ref 3.5–5.1)
PROT SERPL-MCNC: 5.6 G/DL (ref 6–8.4)
RBC # BLD AUTO: 4.13 M/UL (ref 4.6–6.2)
SODIUM SERPL-SCNC: 134 MMOL/L (ref 136–145)
WBC # BLD AUTO: 12.43 K/UL (ref 3.9–12.7)

## 2025-01-13 PROCEDURE — 99232 SBSQ HOSP IP/OBS MODERATE 35: CPT | Mod: AF,HB,, | Performed by: NEUROMUSCULOSKELETAL MEDICINE & OMM

## 2025-01-13 PROCEDURE — 99900031 HC PATIENT EDUCATION (STAT)

## 2025-01-13 PROCEDURE — 21400001 HC TELEMETRY ROOM

## 2025-01-13 PROCEDURE — 83735 ASSAY OF MAGNESIUM: CPT | Performed by: STUDENT IN AN ORGANIZED HEALTH CARE EDUCATION/TRAINING PROGRAM

## 2025-01-13 PROCEDURE — 80053 COMPREHEN METABOLIC PANEL: CPT | Performed by: STUDENT IN AN ORGANIZED HEALTH CARE EDUCATION/TRAINING PROGRAM

## 2025-01-13 PROCEDURE — 94761 N-INVAS EAR/PLS OXIMETRY MLT: CPT

## 2025-01-13 PROCEDURE — 99221 1ST HOSP IP/OBS SF/LOW 40: CPT | Mod: ,,, | Performed by: FAMILY MEDICINE

## 2025-01-13 PROCEDURE — 63600175 PHARM REV CODE 636 W HCPCS: Performed by: NURSE PRACTITIONER

## 2025-01-13 PROCEDURE — 63600175 PHARM REV CODE 636 W HCPCS: Performed by: STUDENT IN AN ORGANIZED HEALTH CARE EDUCATION/TRAINING PROGRAM

## 2025-01-13 PROCEDURE — 85025 COMPLETE CBC W/AUTO DIFF WBC: CPT | Performed by: STUDENT IN AN ORGANIZED HEALTH CARE EDUCATION/TRAINING PROGRAM

## 2025-01-13 PROCEDURE — 25000003 PHARM REV CODE 250: Performed by: STUDENT IN AN ORGANIZED HEALTH CARE EDUCATION/TRAINING PROGRAM

## 2025-01-13 PROCEDURE — 25000003 PHARM REV CODE 250: Performed by: NURSE PRACTITIONER

## 2025-01-13 RX ADMIN — MORPHINE SULFATE 4 MG: 4 INJECTION, SOLUTION INTRAMUSCULAR; INTRAVENOUS at 09:01

## 2025-01-13 RX ADMIN — MORPHINE SULFATE 4 MG: 4 INJECTION, SOLUTION INTRAMUSCULAR; INTRAVENOUS at 12:01

## 2025-01-13 RX ADMIN — BACLOFEN 5 MG: 5 TABLET ORAL at 08:01

## 2025-01-13 RX ADMIN — MORPHINE SULFATE 4 MG: 4 INJECTION, SOLUTION INTRAMUSCULAR; INTRAVENOUS at 05:01

## 2025-01-13 RX ADMIN — MICONAZOLE NITRATE: 20 POWDER TOPICAL at 12:01

## 2025-01-13 RX ADMIN — TAMSULOSIN HYDROCHLORIDE 0.4 MG: 0.4 CAPSULE ORAL at 08:01

## 2025-01-13 RX ADMIN — ENOXAPARIN SODIUM 120 MG: 120 INJECTION SUBCUTANEOUS at 08:01

## 2025-01-13 RX ADMIN — BUPRENORPHINE AND NALOXONE 1 FILM: 8; 2 FILM BUCCAL; SUBLINGUAL at 08:01

## 2025-01-13 RX ADMIN — ATORVASTATIN CALCIUM 80 MG: 40 TABLET, FILM COATED ORAL at 08:01

## 2025-01-13 RX ADMIN — MUPIROCIN 1 G: 20 OINTMENT TOPICAL at 08:01

## 2025-01-13 RX ADMIN — MORPHINE SULFATE 4 MG: 4 INJECTION, SOLUTION INTRAMUSCULAR; INTRAVENOUS at 01:01

## 2025-01-13 RX ADMIN — DIAZEPAM 5 MG: 5 TABLET ORAL at 08:01

## 2025-01-13 RX ADMIN — MICONAZOLE NITRATE: 20 POWDER TOPICAL at 09:01

## 2025-01-13 RX ADMIN — CEFTRIAXONE 2 G: 2 INJECTION, POWDER, FOR SOLUTION INTRAMUSCULAR; INTRAVENOUS at 05:01

## 2025-01-13 RX ADMIN — INSULIN ASPART 6 UNITS: 100 INJECTION, SOLUTION INTRAVENOUS; SUBCUTANEOUS at 05:01

## 2025-01-13 RX ADMIN — INSULIN ASPART 4 UNITS: 100 INJECTION, SOLUTION INTRAVENOUS; SUBCUTANEOUS at 08:01

## 2025-01-13 RX ADMIN — ASPIRIN 81 MG: 81 TABLET, COATED ORAL at 08:01

## 2025-01-13 RX ADMIN — GABAPENTIN 300 MG: 300 CAPSULE ORAL at 08:01

## 2025-01-13 RX ADMIN — MORPHINE SULFATE 4 MG: 4 INJECTION, SOLUTION INTRAMUSCULAR; INTRAVENOUS at 08:01

## 2025-01-13 NOTE — NURSING
Weber D/C'd per order.     Bleeding noted from urethra.    AVERY Campos NP notified.  Will continue to watch

## 2025-01-13 NOTE — CONSULTS
Nephrology Consult Note        Patient Name: Gideon Ross  MRN: [id number]    Patient Class: IP- Inpatient   Admission Date: 1/9/2025  Length of Stay: 4 days  Date of Service: 1/13/2025    Attending Physician: Pee Bolton MD  Primary Care Provider: Jennifer, Primary Doctor    Reason for Consult: marlene    SUBJECTIVE:     HPI: 49M with PID, bilateral BKA, DM presented for significant bilateral lower extremity edema, scrotal swelling, intractable pain. Labs largely unremarkable other than BNP 3,500, troponin 3,300. Patient was started on IV diuretics and pain control. Cardiology was consulted. Initially started on heparin drip but then transitioned to Lovenox. Echocardiogram showed EF 25%. Addiction Medicine consulted for heroin use. Started on Suboxone and reports improvement in symptoms. Diuretics held 2/2 worsening sCr and MARLENE. He was started on dobutamine on 1/11/25, but was stopped 2/2 tachycardia. He has a catheter for strict I&O with MARLENE, but is describing dysuria, there is purulent drainage at the end of the catheter so UA was obtained with concerns of UTI and pt placed on rocephin and recommendations for catheter exchange giving continued MARLENE and need for strict I&O with decompensated heart failure.      Review of Systems:  Constitutional:  Negative for chills, fever, malaise/fatigue and weight loss.   HENT:  Negative for hearing loss and nosebleeds.    Eyes:  Negative for blurred vision, double vision and photophobia.   Respiratory:  Negative for cough, shortness of breath and wheezing.    Cardiovascular:  Negative for chest pain, palpitations and leg swelling.   Gastrointestinal:  Negative for abdominal pain, constipation, diarrhea, heartburn, nausea and vomiting.   Genitourinary:  Negative for dysuria, frequency and urgency.   Musculoskeletal:  Negative for falls, joint pain and myalgias.   Skin:  Negative for itching and rash.   Neurological:  Negative for dizziness, speech change, focal weakness, loss of  consciousness and headaches.   Endo/Heme/Allergies:  Does not bruise/bleed easily.   Psychiatric/Behavioral:  Negative for depression and substance abuse. The patient is not nervous/anxious.      ASSESSMENT/PLAN:     MARLENE 2/2 ATN due to intravascular volume depletion from diuretics  Hypoalbuminemia with dependent edema due to third-spacing and immobility  HFrEF  PAD  DM  Anemia  CKD stage 2, sCr 1 om 1/9/2025  No NSAIDs, ACEI/ARB, IV contrast or other nephrotoxins.  Keep MAP > 60, SBP > 100.  Dose meds for GFR < 30 ml/min.  Renal diet - low K, low phos, low sodium.  Optimize nutrition, add protein supplement.  Judicious diuretic use, edema will not resolve with diuretics alone.  Hgb and HCT are acceptable. Monitor for now.    Thank you for allowing us to participate in the care of your patient!   We will follow the patient and provide recommendations as needed.         Laboratory:  Recent Labs   Lab 01/11/25  0509 01/12/25  0207 01/13/25  0520    138 134*   K 4.0 4.1 4.3    106 103   CO2 25 26 24   BUN 34* 35* 43*   CREATININE 2.2* 2.2* 2.5*   * 129* 217*       Recent Labs   Lab 01/11/25  0509 01/12/25  0207 01/13/25  0520   CALCIUM 7.3* 7.8* 7.6*   ALBUMIN 2.3* 2.4* 2.5*   MG 1.7 1.8 2.1             Recent Labs   Lab 01/11/25  0749 01/11/25  1235 01/11/25  1652 01/11/25  1943 01/12/25  0728 01/12/25  1200 01/12/25  1713 01/12/25  2008 01/13/25  0750 01/13/25  1126   POCTGLUCOSE 153* 160* 134* 140* 171* 212* 157* 211* 208* 173*       Recent Labs   Lab 01/10/25  0721   Hemoglobin A1C 7.5 H       Recent Labs   Lab 01/11/25  0509 01/12/25  0207 01/13/25  0520   WBC 11.76 13.53* 12.43   HGB 10.4* 10.7* 10.6*   HCT 33.8* 34.7* 35.0*    260 241   MCV 86 85 85   MCHC 30.8* 30.8* 30.3*   MONO 10.7  1.3* 10.2  1.4* 11.2  1.4*   EOSINOPHIL 7.2 6.3 10.5*       Recent Labs   Lab 01/11/25  0509 01/12/25  0207 01/13/25  0520   BILITOT 0.3 0.4 0.3   PROT 5.0* 5.2* 5.6*   ALBUMIN 2.3* 2.4* 2.5*   ALKPHOS  88 82 101   ALT 7* 7* 8*   AST 9* 9* 10       Recent Labs   Lab 07/19/22  2316 01/09/25  0743 01/12/25  1055   Color, UA Yellow Yellow Hudson A   Appearance, UA Clear Hazy A Hazy A   pH, UA 6.0 6.0 6.0   Specific Gravity, UA <=1.005 1.025 1.010   Protein, UA Trace A 3+ A 1+ A   Glucose, UA 4+ A Trace A Negative   Ketones, UA Negative Negative Negative   Urobilinogen, UA Negative 2.0-3.0 A Negative   Bilirubin (UA) Negative Negative Negative   Occult Blood UA Negative 2+ A 3+ A   Nitrite, UA Negative Negative Negative   RBC, UA 1 7 H >100 H   WBC, UA  --  4 18 H   Bacteria None Occasional Rare   Hyaline Casts, UA  --  4 A 0       Recent Labs   Lab 07/19/22  2046 01/09/25  0707   POC PH 7.473 H  --    POC PCO2 42.4  --    POC HCO3 31.1 H  --    POC PO2 28 L  --    POC SATURATED O2 58 L  --    POC BE 7  --    Sample VENOUS VENOUS       Microbiology Results (last 7 days)       Procedure Component Value Units Date/Time    Blood culture x two cultures. Draw prior to antibiotics. [[id number]] Collected: 01/09/25 0859    Order Status: Completed Specimen: Blood Updated: 01/13/25 1032     Blood Culture, Routine No Growth to date      No Growth to date      No Growth to date      No Growth to date      No Growth to date    Narrative:      Aerobic and anaerobic    Blood culture x two cultures. Draw prior to antibiotics. [[id number]] Collected: 01/09/25 0859    Order Status: Completed Specimen: Blood Updated: 01/13/25 1032     Blood Culture, Routine No Growth to date      No Growth to date      No Growth to date      No Growth to date      No Growth to date    Narrative:      Aerobic and anaerobic    Urine culture [[id number]] Collected: 01/12/25 1055    Order Status: Completed Specimen: Urine Updated: 01/13/25 0715     Urine Culture, Routine No growth to date    Narrative:      Specimen Source->Urine    Clostridium difficile EIA [[id number]] Collected: 01/09/25 2223    Order Status: Canceled Specimen: Stool             Review  of patient's allergies indicates:  No Known Allergies    Outpatient meds:  No current facility-administered medications on file prior to encounter.     Current Outpatient Medications on File Prior to Encounter   Medication Sig Dispense Refill    amLODIPine (NORVASC) 10 MG tablet Take 1 tablet (10 mg total) by mouth once daily. 30 tablet 1    aspirin (ECOTRIN) 81 MG EC tablet Take 1 tablet (81 mg total) by mouth once daily. 30 tablet 1    atorvastatin (LIPITOR) 80 MG tablet Take 1 tablet (80 mg total) by mouth once daily. 30 tablet 1    baclofen (LIORESAL) 10 MG tablet Take 1 tablet (10 mg total) by mouth 3 (three) times daily. 90 tablet 1    carvedilol (COREG) 6.25 MG tablet Take 1 tablet (6.25 mg total) by mouth 2 (two) times daily. 60 tablet 1    diazePAM (VALIUM) 5 MG tablet Take 1 tablet (5 mg total) by mouth every 12 (twelve) hours as needed for Anxiety (anxiety.). 30 tablet 0    gabapentin (NEURONTIN) 300 MG capsule Take 2 capsules (600 mg total) by mouth 3 (three) times daily. 90 capsule 1    hydroCHLOROthiazide (HYDRODIURIL) 50 MG tablet Take 1 tablet (50 mg total) by mouth once daily. 30 tablet 1    insulin aspart U-100 (NOVOLOG) 100 unit/mL InPn pen Inject 18 Units into the skin 3 (three) times daily. (Patient taking differently: Inject into the skin 3 (three) times daily.) 10 mL 1    insulin detemir U-100 (LEVEMIR FLEXTOUCH) 100 unit/mL (3 mL) SubQ InPn pen Inject 35 Units into the skin 2 (two) times daily. 10 mL 1    insulin glargine U-100, Lantus, 100 unit/mL injection Inject 30 Units into the skin every evening. (Patient not taking: Reported on 1/9/2025)      lisinopril (PRINIVIL,ZESTRIL) 40 MG tablet Take 1 tablet (40 mg total) by mouth once daily. 30 tablet 1    nicotine (NICODERM CQ) 14 mg/24 hr Place 1 patch onto the skin once daily. (Patient not taking: Reported on 1/9/2025) 15 patch 0    nortriptyline (PAMELOR) 25 MG capsule Take 1 capsule (25 mg total) by mouth 3 (three) times daily. 30 capsule  1    oxyCODONE (ROXICODONE) 5 MG immediate release tablet Take 1 tablet (5 mg total) by mouth every 12 (twelve) hours as needed. (Patient not taking: Reported on 1/9/2025) 30 tablet 0    polyethylene glycol (GLYCOLAX) 17 gram PwPk Take 17 g by mouth daily as needed. (Patient not taking: Reported on 1/9/2025) 30 each 1    QUEtiapine (SEROQUEL) 100 MG Tab Take 1 tablet (100 mg total) by mouth every evening. 30 tablet 1    sofosbuvir-velpatasvir (EPCLUSA) 400-100 mg Tab Take 1 tablet daily. (Patient not taking: Reported on 1/9/2025) 28 tablet 2       Scheduled meds:   aspirin  81 mg Oral Daily    atorvastatin  80 mg Oral Daily    baclofen  5 mg Oral BID    buprenorphine-naloxone 8-2 mg  1 Film Sublingual BID    cefTRIAXone (Rocephin) IV (PEDS and ADULTS)  2 g Intravenous Q24H    enoxparin  1 mg/kg Subcutaneous Q12H (treatment, non-standard time)    gabapentin  300 mg Oral BID    miconazole NITRATE 2 %   Topical (Top) BID    mupirocin   Nasal BID    tamsulosin  0.4 mg Oral Daily       Infusions:      PRN meds:    Current Facility-Administered Medications:     acetaminophen, 650 mg, Oral, Q4H PRN    aluminum-magnesium hydroxide-simethicone, 30 mL, Oral, QID PRN    dextrose 50%, 12.5 g, Intravenous, PRN    dextrose 50%, 25 g, Intravenous, PRN    diazePAM, 5 mg, Oral, Q12H PRN    glucagon (human recombinant), 1 mg, Intramuscular, PRN    glucose, 16 g, Oral, PRN    glucose, 24 g, Oral, PRN    hydrALAZINE, 5 mg, Intravenous, Q6H PRN    insulin aspart U-100, 0-10 Units, Subcutaneous, QID (AC + HS) PRN    magnesium oxide, 800 mg, Oral, PRN    magnesium oxide, 800 mg, Oral, PRN    melatonin, 6 mg, Oral, Nightly PRN    morphine, 4 mg, Intravenous, Q4H PRN    naloxone, 0.02 mg, Intravenous, PRN    ondansetron, 4 mg, Intravenous, Q6H PRN    oxyCODONE-acetaminophen, 1 tablet, Oral, Q4H PRN    oxyCODONE-acetaminophen, 1 tablet, Oral, Q4H PRN    potassium bicarbonate, 35 mEq, Oral, PRN    potassium bicarbonate, 50 mEq, Oral, PRN     potassium bicarbonate, 60 mEq, Oral, PRN    potassium, sodium phosphates, 2 packet, Oral, PRN    potassium, sodium phosphates, 2 packet, Oral, PRN    potassium, sodium phosphates, 2 packet, Oral, PRN    senna-docusate 8.6-50 mg, 1 tablet, Oral, Daily PRN    sodium chloride 0.9%, 10 mL, Intravenous, Q12H PRN    sodium chloride 0.9%, 10 mL, Intravenous, PRN    Past Medical History:   Diagnosis Date    Diabetes mellitus     Hypertension      Past Surgical History:   Procedure Laterality Date    SKIN GRAFT       No family history on file.  Social History     Tobacco Use    Smoking status: Every Day     Current packs/day: 1.00     Types: Cigarettes   Substance Use Topics    Alcohol use: Yes    Drug use: No       OBJECTIVE:     Vital Signs and IO:  Temp:  [98 °F (36.7 °C)-98.8 °F (37.1 °C)]   Pulse:  []   Resp:  [17-20]   BP: (111-159)/()   SpO2:  [94 %-97 %]   I/O last 3 completed shifts:  In: 1740 [P.O.:1740]  Out: 2310 [Urine:2310]  Wt Readings from Last 5 Encounters:   01/09/25 115.4 kg (254 lb 6.4 oz)   07/19/22 99.8 kg (220 lb)   02/09/20 104.3 kg (230 lb)   08/06/19 113.4 kg (250 lb)   02/27/19 108.9 kg (240 lb)     Body mass index is 36.5 kg/m².    Physical Exam  Constitutional:       General: Patient is not in acute distress.     Appearance: Patient is well-developed. She is not diaphoretic.   HENT:      Head: Normocephalic and atraumatic.      Mouth/Throat: Mucous membranes are moist.   Eyes:      General: No scleral icterus.     Pupils: Pupils are equal, round, and reactive to light.   Cardiovascular:      Rate and Rhythm: Normal rate and regular rhythm.   Pulmonary:      Effort: Pulmonary effort is normal. No respiratory distress.      Breath sounds: No stridor.   Abdominal:      General: There is no distension.      Palpations: Abdomen is soft.   Musculoskeletal:         General: No deformity. Normal range of motion.      Cervical back: Neck supple.   Skin:     General: Skin is warm and dry.       Findings: No rash present. No erythema.   Neurological:      Mental Status: Patient is alert and oriented to person, place, and time.      Cranial Nerves: No cranial nerve deficit.   Psychiatric:         Behavior: Behavior normal.          Patient care time was spent personally by me on the following activities:     Obtaining a history.  Examination of patient.  Providing medical care at the patients bedside.  Developing a treatment plan with patient or surrogate and bedside caregivers.  Ordering and reviewing laboratory studies, radiographic studies, pulse oximetry.  Ordering and performing treatments and interventions.  Evaluation of patient's response to treatment.  Discussions with consultants while on the unit and immediately available to the patient.  Re-evaluation of the patient's condition.  Documentation in the medical record.     Ubaldo Petersen MD    [facility]  [address] [place] [id number]    [phone] - tel  [phone] - fax    1/13/2025

## 2025-01-13 NOTE — PROGRESS NOTES
[facility]   Department of Cardiology  Progress Note      PATIENT NAME: Gideon Ross    MRN: [id number]  TODAY'S DATE: 01/13/2025  ADMIT DATE: 1/9/2025                          CONSULT REQUESTED BY: Pee Bolton MD    SUBJECTIVE     PRINCIPAL PROBLEM: Acute systolic congestive heart failure    01/13/2025  Resting in bed. NAD. RA.  Reports being thirsty.  Eating canned sausages.  Continues to report scrotal edema. Occasional PVCs noted on tele. Brief NSVT.  Not on dobutrex; Nephrology is following.    1/12/25    Patient seen sitting up bed with no distress noted. Breathing is stable. Family/friends at bedside. Stable on telemetry but mildly tachycardic. Dobutamine was held due to ectopy on telemetry.     1/11/25    Patient very drowsy when seen today.  No acute distress noted.  He was asking for pain medicine when awakened.  Patient with worsened renal function today.    1/10/25    Patient seen resting in bed this morning.  He was very upset.  He reports that he does use heroin on a daily basis and believes that he is likely detoxing currently.  Patient is not very optimistic for his outcome.    HPI:    Patient is a 49-year-old male who presented to the emergency room with complaints of testicular pain and swelling over the past 1 week.  Patient is a known diabetic and has a history of PID with bilateral above the knee amputations.  Patient also had some complaints of mild shortness of breath but denied any chest pain.  Patient was noted to have significantly elevated high sensitivity troponin level on arrival.  High sensitivity troponin level was 3343 on arrival and is now 3369.  Patient was started on heparin drip.  Patient does have a known history of drug abuse and is positive for opiates, amphetamine, and marijuana metabolites in his UDS.  Patient reports his last use of methamphetamine was a couple of weeks ago.        REASON FOR CONSULT:  From Hospitalist H&P: Patient presents complaining of  testicular pain and swelling.  Patient has noticed increased pain and swelling for the last 1 week as well as foul smell.  Patient has a history of bilateral above-the-knee and below-the-knee amputation.  He is a diabetic.  At the worst symptoms are moderate.  Patient complains of mild shortness of breath no chest pain.            Review of patient's allergies indicates:  No Known Allergies    Past Medical History:   Diagnosis Date    Diabetes mellitus     Hypertension      Past Surgical History:   Procedure Laterality Date    SKIN GRAFT       Social History     Tobacco Use    Smoking status: Every Day     Current packs/day: 1.00     Types: Cigarettes   Substance Use Topics    Alcohol use: Yes    Drug use: No        REVIEW OF SYSTEMS  Per HPI    OBJECTIVE     VITAL SIGNS (Most Recent)  Temp: 97.7 °F (36.5 °C) (01/13/25 1545)  Pulse: 101 (01/13/25 1545)  Resp: 19 (01/13/25 1545)  BP: (!) 150/92 (01/13/25 1545)  SpO2: 98 % (01/13/25 1545)    VENTILATION STATUS  Resp: 19 (01/13/25 1545)  SpO2: 98 % (01/13/25 1545)           I & O (Last 24H):  Intake/Output Summary (Last 24 hours) at 1/13/2025 1616  Last data filed at 1/13/2025 0905  Gross per 24 hour   Intake 1140 ml   Output 660 ml   Net 480 ml       WEIGHTS  Wt Readings from Last 1 Encounters:   01/09/25 2239 115.4 kg (254 lb 6.4 oz)   01/09/25 0824 108.9 kg (240 lb)       PHYSICAL EXAM  CONSTITUTIONAL: No fever, no chills obese  HEENT: Normocephalic, atraumatic,pupils reactive to light                 NECK:  No JVD no carotid bruit  CVS: S1S2+, RRR  LUNGS: crackles lower lobes; RA  ABDOMEN: Soft, NT, BS+  EXTREMITIES: No cyanosis, edema; right BKA and Left AKA  : No valle catheter  NEURO: AAO X 3  PSY: Normal affect      HOME MEDICATIONS:  No current facility-administered medications on file prior to encounter.     Current Outpatient Medications on File Prior to Encounter   Medication Sig Dispense Refill    amLODIPine (NORVASC) 10 MG tablet Take 1 tablet (10 mg  total) by mouth once daily. 30 tablet 1    aspirin (ECOTRIN) 81 MG EC tablet Take 1 tablet (81 mg total) by mouth once daily. 30 tablet 1    atorvastatin (LIPITOR) 80 MG tablet Take 1 tablet (80 mg total) by mouth once daily. 30 tablet 1    baclofen (LIORESAL) 10 MG tablet Take 1 tablet (10 mg total) by mouth 3 (three) times daily. 90 tablet 1    carvedilol (COREG) 6.25 MG tablet Take 1 tablet (6.25 mg total) by mouth 2 (two) times daily. 60 tablet 1    diazePAM (VALIUM) 5 MG tablet Take 1 tablet (5 mg total) by mouth every 12 (twelve) hours as needed for Anxiety (anxiety.). 30 tablet 0    gabapentin (NEURONTIN) 300 MG capsule Take 2 capsules (600 mg total) by mouth 3 (three) times daily. 90 capsule 1    hydroCHLOROthiazide (HYDRODIURIL) 50 MG tablet Take 1 tablet (50 mg total) by mouth once daily. 30 tablet 1    insulin aspart U-100 (NOVOLOG) 100 unit/mL InPn pen Inject 18 Units into the skin 3 (three) times daily. (Patient taking differently: Inject into the skin 3 (three) times daily.) 10 mL 1    insulin detemir U-100 (LEVEMIR FLEXTOUCH) 100 unit/mL (3 mL) SubQ InPn pen Inject 35 Units into the skin 2 (two) times daily. 10 mL 1    insulin glargine U-100, Lantus, 100 unit/mL injection Inject 30 Units into the skin every evening. (Patient not taking: Reported on 1/9/2025)      lisinopril (PRINIVIL,ZESTRIL) 40 MG tablet Take 1 tablet (40 mg total) by mouth once daily. 30 tablet 1    nicotine (NICODERM CQ) 14 mg/24 hr Place 1 patch onto the skin once daily. (Patient not taking: Reported on 1/9/2025) 15 patch 0    nortriptyline (PAMELOR) 25 MG capsule Take 1 capsule (25 mg total) by mouth 3 (three) times daily. 30 capsule 1    oxyCODONE (ROXICODONE) 5 MG immediate release tablet Take 1 tablet (5 mg total) by mouth every 12 (twelve) hours as needed. (Patient not taking: Reported on 1/9/2025) 30 tablet 0    polyethylene glycol (GLYCOLAX) 17 gram PwPk Take 17 g by mouth daily as needed. (Patient not taking: Reported on  1/9/2025) 30 each 1    QUEtiapine (SEROQUEL) 100 MG Tab Take 1 tablet (100 mg total) by mouth every evening. 30 tablet 1    sofosbuvir-velpatasvir (EPCLUSA) 400-100 mg Tab Take 1 tablet daily. (Patient not taking: Reported on 1/9/2025) 28 tablet 2       SCHEDULED MEDS:   aspirin  81 mg Oral Daily    atorvastatin  80 mg Oral Daily    baclofen  5 mg Oral BID    buprenorphine-naloxone 8-2 mg  1 Film Sublingual BID    cefTRIAXone (Rocephin) IV (PEDS and ADULTS)  2 g Intravenous Q24H    enoxparin  1 mg/kg Subcutaneous Q12H (treatment, non-standard time)    gabapentin  300 mg Oral BID    miconazole NITRATE 2 %   Topical (Top) BID    mupirocin   Nasal BID    tamsulosin  0.4 mg Oral Daily       CONTINUOUS INFUSIONS:          PRN MEDS:  Current Facility-Administered Medications:     acetaminophen, 650 mg, Oral, Q4H PRN    aluminum-magnesium hydroxide-simethicone, 30 mL, Oral, QID PRN    dextrose 50%, 12.5 g, Intravenous, PRN    dextrose 50%, 25 g, Intravenous, PRN    diazePAM, 5 mg, Oral, Q12H PRN    glucagon (human recombinant), 1 mg, Intramuscular, PRN    glucose, 16 g, Oral, PRN    glucose, 24 g, Oral, PRN    hydrALAZINE, 5 mg, Intravenous, Q6H PRN    insulin aspart U-100, 0-10 Units, Subcutaneous, QID (AC + HS) PRN    magnesium oxide, 800 mg, Oral, PRN    magnesium oxide, 800 mg, Oral, PRN    melatonin, 6 mg, Oral, Nightly PRN    morphine, 4 mg, Intravenous, Q4H PRN    naloxone, 0.02 mg, Intravenous, PRN    ondansetron, 4 mg, Intravenous, Q6H PRN    oxyCODONE-acetaminophen, 1 tablet, Oral, Q4H PRN    oxyCODONE-acetaminophen, 1 tablet, Oral, Q4H PRN    potassium bicarbonate, 35 mEq, Oral, PRN    potassium bicarbonate, 50 mEq, Oral, PRN    potassium bicarbonate, 60 mEq, Oral, PRN    potassium, sodium phosphates, 2 packet, Oral, PRN    potassium, sodium phosphates, 2 packet, Oral, PRN    potassium, sodium phosphates, 2 packet, Oral, PRN    senna-docusate 8.6-50 mg, 1 tablet, Oral, Daily PRN    sodium chloride 0.9%, 10 mL,  "Intravenous, Q12H PRN    sodium chloride 0.9%, 10 mL, Intravenous, PRN    LABS AND DIAGNOSTICS     CBC LAST 3 DAYS  Recent Labs   Lab 01/11/25  0509 01/12/25  0207 01/13/25  0520   WBC 11.76 13.53* 12.43   RBC 3.95* 4.10* 4.13*   HGB 10.4* 10.7* 10.6*   HCT 33.8* 34.7* 35.0*   MCV 86 85 85   MCH 26.3* 26.1* 25.7*   MCHC 30.8* 30.8* 30.3*   RDW 14.8* 14.8* 14.9*    260 241   MPV 10.5 11.0 10.5   GRAN 62.2  7.3 69.7  9.4* 62.0  7.7   LYMPH 18.9  2.2 12.9*  1.7 15.3*  1.9   MONO 10.7  1.3* 10.2  1.4* 11.2  1.4*   BASO 0.09 0.07 0.06   NRBC 0 0 0       COAGULATION LAST 3 DAYS  Recent Labs   Lab 01/09/25  0918 01/09/25  1659 01/09/25  2359 01/10/25  0721   INR 1.1  --   --   --    APTT 28.4 45.2* 32.2* 49.4*       CHEMISTRY LAST 3 DAYS  Recent Labs   Lab 01/11/25  0509 01/12/25  0207 01/13/25  0520    138 134*   K 4.0 4.1 4.3    106 103   CO2 25 26 24   ANIONGAP 8 6* 7*   BUN 34* 35* 43*   CREATININE 2.2* 2.2* 2.5*   * 129* 217*   CALCIUM 7.3* 7.8* 7.6*   MG 1.7 1.8 2.1   ALBUMIN 2.3* 2.4* 2.5*   PROT 5.0* 5.2* 5.6*   ALKPHOS 88 82 101   ALT 7* 7* 8*   AST 9* 9* 10   BILITOT 0.3 0.4 0.3       CARDIAC PROFILE LAST 3 DAYS  Recent Labs   Lab 01/09/25  0709 01/09/25  0918   BNP 3,449*  --    TROPONINIHS 3343.0* 3369.7*       ENDOCRINE LAST 3 DAYS  No results for input(s): "TSH", "PROCAL" in the last 168 hours.    LAST ARTERIAL BLOOD GAS  ABG  No results for input(s): "PH", "PO2", "PCO2", "HCO3", "BE" in the last 168 hours.    LAST 7 DAYS MICROBIOLOGY   Microbiology Results (last 7 days)       Procedure Component Value Units Date/Time    Blood culture x two cultures. Draw prior to antibiotics. [[id number]] Collected: 01/09/25 0859    Order Status: Completed Specimen: Blood Updated: 01/13/25 1032     Blood Culture, Routine No Growth to date      No Growth to date      No Growth to date      No Growth to date      No Growth to date    Narrative:      Aerobic and anaerobic    Blood culture x " two cultures. Draw prior to antibiotics. [[id number]] Collected: 01/09/25 0859    Order Status: Completed Specimen: Blood Updated: 01/13/25 1032     Blood Culture, Routine No Growth to date      No Growth to date      No Growth to date      No Growth to date      No Growth to date    Narrative:      Aerobic and anaerobic    Urine culture [[id number]] Collected: 01/12/25 1055    Order Status: Completed Specimen: Urine Updated: 01/13/25 0715     Urine Culture, Routine No growth to date    Narrative:      Specimen Source->Urine    Clostridium difficile EIA [[id number]] Collected: 01/09/25 2223    Order Status: Canceled Specimen: Stool             MOST RECENT IMAGING  US Retroperitoneal Complete  Narrative: EXAMINATION:  US RETROPERITONEAL COMPLETE    CLINICAL HISTORY:  quinton, evaluate for hydronephrosis;    TECHNIQUE:  Grayscale and color Doppler sonographic analysis of the kidneys and retroperitoneum was performed.    FINDINGS:  Comparison to prior exams.  The exam is limited by poor acoustic window due to patient body habitus, and subsequent poor resolution.  The right kidney measures 12.6 cm in length, with normal cortical thickness and parenchymal echotexture, without echogenic calculi or hydronephrosis.    The left kidney measures 10.5 cm in length and has normal cortical thickness and parenchymal echotexture, without echogenic calculi or hydronephrosis.    The urinary bladder is collapsed and not evaluated.  The visualized abdominal aorta, aortic bifurcation, common iliac artery origins, and IVC are normal.  Impression: Limited exam, with no sonographic evidence of medical or surgical renal disease.    Electronically signed by: Eliseo Jo  Date:    01/12/2025  Time:    09:15      ECHOCARDIOGRAM RESULTS (last 5)  No results found for this or any previous visit.      CURRENT/PREVIOUS VISIT EKG  Results for orders placed or performed during the hospital encounter of 01/09/25   EKG 12-lead    Collection Time: 01/12/25   2:01 AM   Result Value Ref Range    QRS Duration 136 ms    [facility] QTC Calculation 511 ms    Narrative    Test Reason : R07.9,    Vent. Rate : 111 BPM     Atrial Rate : 111 BPM     P-R Int : 140 ms          QRS Dur : 136 ms      QT Int : 376 ms       P-R-T Axes :  61 148   1 degrees    QTcB Int : 511 ms    Sinus tachycardia with occasional Premature ventricular complexes  Right bundle branch block  Left posterior fascicular block   Bifascicular block   Possible Inferior infarct ,age undetermined  Abnormal ECG  No previous ECGs available    Referred By: AAAREFERRAL SELF           Confirmed By:            ASSESSMENT/PLAN:     Active Hospital Problems    Diagnosis    *Acute systolic congestive heart failure    Urinary tract infection associated with indwelling urethral catheter    MARLENE (acute kidney injury)    Opioid use disorder    Scrotal edema    Elevated troponin    Diabetes mellitus       ASSESSMENT & PLAN:     Elevated troponin  Acute HFrEF 25-30%  Testicular pain and swelling   Type II DM  + drug abuse: amphetamines, marijuana  PAD  Hx of bilateral AKA      RECOMMENDATIONS:    Nephrology consulted.  Creatinine continues to worsen. Lasix on hold. Cannot tolerate dobutamine gtt due to NSVT- 50+ beats.  Will defer to Nephrology for management of diuresis.   Continues to have salty snacks at bedside. Discussed low sodium diet recommendations. Can allow 1.8 L fluid restriction.  Monitor closely.     Nina Alejandre NP  Date of Service: 01/13/2025      I have personally interviewed and examined the patient, I have reviewed the Nurse Practitioner's history and physical, assessment, and plan. I have personally evaluated the patient at bedside and agree with the findings and made appropriate changes as necessary in recommendations.    Rec renal consult ultrafiltration  mirinone  Kingsley Mosley MD  Department of Cardiology  [facility]  01/13/2025

## 2025-01-13 NOTE — ASSESSMENT & PLAN NOTE
MARLENE is likely due to pre-renal azotemia due to intravascular volume depletion. Baseline creatinine is  1 . Most recent creatinine and eGFR are listed below.  Recent Labs     01/11/25  0509 01/12/25  0207 01/13/25  0520   CREATININE 2.2* 2.2* 2.5*   EGFRNORACEVR 36.3* 36.3* 31.1*        Plan  - MARLENE is worsening. Will adjust treatment as follows:  Hold diuretic  - Avoid nephrotoxins and renally dose meds for GFR listed above  - Monitor urine output, serial BMP, and adjust therapy as needed  - nephrology consulted

## 2025-01-13 NOTE — PROGRESS NOTES
[facility] Medicine  Progress Note    Patient Name: Gideon Ross  MRN: [id number]  Patient Class: IP- Inpatient   Admission Date: 1/9/2025  Length of Stay: 4 days  Attending Physician: Pee Bolton MD  Primary Care Provider: Jennifer, Primary Doctor        Subjective     Principal Problem:Acute systolic congestive heart failure        HPI:  Mr. Ross is a 49 yom w/pmh significant for PID status post bilateral above-the-knee amputations, diabetes mellitus who presented for significant bilateral lower extremity edema and scrotal swelling, as well as intractable pain secondary to scrotal swelling.  Labs largely unremarkable.  BNP 3500, troponin 3300.  Patient was started on IV diuresis and pain control.  Cardiology was consulted.  Patient was initially started on heparin infusion.  On exam he has significant pain and tenderness due to significant scrotal swelling.    Overview/Hospital Course:  Patient with history of peripheral artery disease status post bilateral AKA, diabetes, substance abuse presents to the emergency room with complaints of lower extremity and scrotal edema.  BNP and troponin elevated.  Cardiology consulted.  Initially started on heparin drip but then transitioned to Lovenox.  Echocardiogram showed EF 25%.  Addiction Medicine consulted for heroin use.  Started on Suboxone and reports improvement in symptoms.  Diuretics held given MARLENE.  Needs LifeVest on discharge. He was started on dobutamine on 1/11/25, but overnight about 0200 he had Vtach about 50 beats and dobutamine turned off. He has a catheter for strict I&O with MARLENE, but is describing dysuria, there is purulent drainage at the end of the catheter so UA was obtained with concerns of UTI and pt placed on rocephin, catheter removed and he has been voiding. Urine culture with NGTD. Nephrology has been consulted.     Interval History:  flomax added, valle will need to be changed. Added rocephin. Could not tolerate  dobutamine. Scrotum is still quite enlarged. Significant anasarca    Review of Systems   Constitutional:  Positive for fatigue.   Respiratory:  Positive for shortness of breath.    Genitourinary:  Positive for scrotal swelling.   Neurological:  Positive for tremors.     Objective:     Vital Signs (Most Recent):  Temp: 98.8 °F (37.1 °C) (01/13/25 1124)  Pulse: 93 (01/13/25 1423)  Resp: 20 (01/13/25 1423)  BP: 133/87 (01/13/25 1124)  SpO2: 95 % (01/13/25 1423) Vital Signs (24h Range):  Temp:  [98 °F (36.7 °C)-98.8 °F (37.1 °C)] 98.8 °F (37.1 °C)  Pulse:  [] 93  Resp:  [17-20] 20  SpO2:  [95 %-97 %] 95 %  BP: (133-159)/() 133/87     Weight: 115.4 kg (254 lb 6.4 oz)  Body mass index is 36.5 kg/m².    Intake/Output Summary (Last 24 hours) at 1/13/2025 1534  Last data filed at 1/13/2025 0905  Gross per 24 hour   Intake 1140 ml   Output 660 ml   Net 480 ml         Physical Exam  Vitals and nursing note reviewed.   Constitutional:       Appearance: He is obese. He is ill-appearing.      Comments: Sleeping on arrival, awakened to voice and gentle touch   HENT:      Head: Normocephalic and atraumatic.      Nose: Nose normal.      Mouth/Throat:      Mouth: Mucous membranes are moist.      Pharynx: Oropharynx is clear.   Eyes:      Conjunctiva/sclera: Conjunctivae normal.      Pupils: Pupils are equal, round, and reactive to light.   Cardiovascular:      Rate and Rhythm: Normal rate and regular rhythm.   Pulmonary:      Breath sounds: Rhonchi and rales present.   Abdominal:      General: Bowel sounds are normal.      Palpations: Abdomen is soft.   Genitourinary:     Comments: Scrotal edema  Musculoskeletal:         General: Deformity present.      Cervical back: Normal range of motion and neck supple.      Comments: Right BKA and left AKA   Skin:     General: Skin is warm and dry.      Capillary Refill: Capillary refill takes 2 to 3 seconds.      Coloration: Skin is pale.   Neurological:      Mental Status: He is  oriented to person, place, and time. Mental status is at baseline.      Comments: Intention tremors/jerks   Psychiatric:      Comments: Tearful when talking about his heart failure             Significant Labs: All pertinent labs within the past 24 hours have been reviewed.  CBC:   Recent Labs   Lab 01/12/25 0207 01/13/25  0520   WBC 13.53* 12.43   HGB 10.7* 10.6*   HCT 34.7* 35.0*    241     CMP:   Recent Labs   Lab 01/12/25  0207 01/13/25  0520    134*   K 4.1 4.3    103   CO2 26 24   * 217*   BUN 35* 43*   CREATININE 2.2* 2.5*   CALCIUM 7.8* 7.6*   PROT 5.2* 5.6*   ALBUMIN 2.4* 2.5*   BILITOT 0.4 0.3   ALKPHOS 82 101   AST 9* 10   ALT 7* 8*   ANIONGAP 6* 7*       Significant Imaging: I have reviewed all pertinent imaging results/findings within the past 24 hours.  US Scrotum And Testicles    Result Date: 1/10/2025  EXAMINATION: US SCROTUM AND TESTICLES CLINICAL HISTORY: swelling; COMPARISON: None FINDINGS: Right testicle measures 27 x 41 x 31 mm, and left testicle measures 27 x 34 x 26 mm.  Bilateral testicles contain no intratesticular mass.  Mild heterogeneous echogenicity of left testicle is evident while homogeneous echogenicity of right testicle is noted.  The testicles contain normal vascular flow on color and spectral Doppler imaging. Bilateral epididymi are normal and without hyperemia. No hernia identified in the inguinal soft tissues with Valsalva maneuver. Diffuse scrotal soft tissue swelling/edema is present.     1. Diffuse scrotal soft tissue swelling/edema. 2. Slightly heterogeneous echogenicity of left testicle without hyperemia to suggest orchitis. Electronically signed by: Fredy Trejo Date:    01/10/2025 Time:    16:16    Echo    Result Date: 1/9/2025    Left Ventricle: The left ventricle is moderately dilated. Mildly increased wall thickness. There is mild asymmetric hypertrophy. Severe global hypokinesis present. There is severely reduced systolic function with a  "visually estimated ejection fraction of 25 - 30%.   Right Ventricle: Moderate right ventricular enlargement. Systolic function is mildly reduced.   Left Atrium: Left atrium is mildly dilated.   Tricuspid Valve: There is mild regurgitation.   IVC/SVC: Elevated venous pressure at 15 mmHg.     X-Ray Chest AP Portable    Result Date: 1/9/2025  CLINICAL HISTORY: ([id number])50 y/o  (1975) M Chest Pain; TECHNIQUE: (A#[id number], exam time 1/9/2025 7:54) XR CHEST AP PORTABLE [id number] COMPARISON: Radiograph from 07/19/2022 FINDINGS: Mild perihilar pulmonary vascular prominence with slightly increased central hilar interstitial lung markings bilaterally suggestive of mild pulmonary vascular congestion/trace edema. No pneumothorax is identified. The heart is top normal in size.  Osseous structures show degenerative changes in the spine. The visualized upper abdomen is unremarkable.     Top-normal size heart and findings of mild pulmonary vascular congestion/trace interstitial edema. Electronically signed by: Manuel Snow Date:    01/09/2025 Time:    08:09       Assessment and Plan     * Acute systolic congestive heart failure  Patient has Systolic (HFrEF) heart failure that is Acute. On presentation their CHF was decompensated. Evidence of decompensated CHF on presentation includes: edema. The etiology of their decompensation is likely substance abuse . Most recent BNP and echo results are listed below.  No results for input(s): "BNP" in the last 72 hours.    Latest ECHO  Results for orders placed during the hospital encounter of 01/09/25    Echo    Interpretation Summary    Left Ventricle: The left ventricle is moderately dilated. Mildly increased wall thickness. There is mild asymmetric hypertrophy. Severe global hypokinesis present. There is severely reduced systolic function with a visually estimated ejection fraction of 25 - 30%.    Right Ventricle: Moderate right ventricular enlargement. Systolic function is mildly " reduced.    Left Atrium: Left atrium is mildly dilated.    Tricuspid Valve: There is mild regurgitation.    IVC/SVC: Elevated venous pressure at 15 mmHg.    Current Heart Failure Medications  hydrALAZINE injection 5 mg, Every 6 hours PRN, Intravenous    Plan  - Monitor strict I&Os and daily weights.    - Place on telemetry  - Low sodium diet  - Place on fluid restriction of 1.5 L.   - Cardiology has been consulted  - The patient's volume status is stable but not at their baseline as indicated by edema  Hold diuresis given MARLENE, resume diuresis once creatinine normal.  If kidneys do not normalize or worsen might need right heart catheterization per Cardiology  Hold lisinopril and hydrochlorothiazide  -Could not tolerate dobutamine d/t ectopy     Urinary tract infection associated with indwelling urethral catheter  Catheter in place for severe acute decompensated heart failure and need for strict I&O with concern of cardiorenal syndrome   Start rocephin  Follow culture  Change catheter        Opioid use disorder  Addiction medicine consulted   We will start patient on Suboxone  Per Addiction medicine:   For first dose of buprenorphine (at least 18 hours since last use of fentanyl) give one or two 8 mg strips (8-16 mg total).  Wait 1 hour and then...  If mild withdrawal, give another 8 mg strip.  If more significant withdrawal, give two more 8 mg strips.  If relaxed or calm, do not give any more.  Wait 1-2 more hours...  If still have withdrawal symptoms, take another 8 mg strip  If relaxed or calm, do not take any more  Try not to give more than 32 mg (4 strips) on day one.  On day 2, give 8 mg BID   Continue taking 8 mg BID until seeing me in clinic.    MARLENE (acute kidney injury)  MARLENE is likely due to pre-renal azotemia due to intravascular volume depletion. Baseline creatinine is  1 . Most recent creatinine and eGFR are listed below.  Recent Labs     01/11/25  0509 01/12/25  0207 01/13/25  0520   CREATININE 2.2* 2.2*  2.5*   EGFRNORACEVR 36.3* 36.3* 31.1*        Plan  - MARLENE is worsening. Will adjust treatment as follows:  Hold diuretic  - Avoid nephrotoxins and renally dose meds for GFR listed above  - Monitor urine output, serial BMP, and adjust therapy as needed  - nephrology consulted    Diabetes mellitus  Patient's FSGs are controlled on current medication regimen.  Last A1c reviewed-   Lab Results   Component Value Date    HGBA1C 7.5 (H) 01/10/2025     Most recent fingerstick glucose reviewed-   Recent Labs   Lab 01/10/25  2004 01/11/25  0749 01/11/25  1235   POCTGLUCOSE 176* 153* 160*       Current correctional scale  Low  Maintain anti-hyperglycemic dose as follows-   Antihyperglycemics (From admission, onward)      Start     Stop Route Frequency Ordered    01/10/25 1515  insulin aspart U-100 pen 0-10 Units         -- SubQ Before meals & nightly PRN 01/10/25 1415          Hold Oral hypoglycemics while patient is in the hospital.    Elevated troponin  Troponin elevated but flat, cardiology following  No chest pain or shortness of breath   Likely secondary to congestive heart failure  Continue Lovenox      Scrotal edema  Secondary to acute decompensated heart failure  Hold diuretics given MARLENE  Elevate scrotum  Follow up ultrasound      VTE Risk Mitigation (From admission, onward)           Ordered     enoxaparin injection 120 mg  Every 12 hours         01/10/25 0724     IP VTE HIGH RISK PATIENT  Once         01/09/25 1326     Place sequential compression device  Until discontinued         01/09/25 1326     Reason for no Mechanical VTE Prophylaxis  Once        Question:  Reasons:  Answer:  Physician Provided (leave comment)  Comment:  already on heparin infusion    01/09/25 0906                    Discharge Planning   KATHERINE: 1/17/2025     Code Status: Full Code   Medical Readiness for Discharge Date:   Discharge Plan A: Home   Discharge Delays: None known at this time                    Mendy Campos NP  Department of  [facility] Medicine   Critical access hospital

## 2025-01-13 NOTE — PROGRESS NOTES
Inpatient Encounter    Addiction Medicine Consultation - Progress Note    SHO Langston DO  Board Certified - Addiction Medicine  Board Certified - Neuromusculoskeletal Medicine and [facility]    Date of Service: 01/13/2025     Assessment & Plan      ASSESSMENT / PLAN      Clinical Impression and Recommendations      Opioid use disorder, Severe, with acute pain  Continue buprenorphine. Consider increasing dose to 8 mg TID to help with patient's cravings and pain. Typical dosing for fentanyl users is 16-24 mg. Can go high as 32 mg per day safely but usually only needed if acute or chronic pain is part of the clinical picture.  Patient states he has refused evening dose of buprenorphine because he was told he couldn't have both buprenorphine and morphine. Note that: buprenorphine will likely block most of the effect of any short acting full agonist so it can still be considered appropriate to continue with PRN opioid medication, even with buprenorphine on board.   Continue to monitor for oversedation if giving concurrently with benzodiazepines.    Strategies for pain management for patients on buprenorphine include:   Split dosing with increased buprenorphine. Example: a patient taking 8 mg TID at home may need 8 mg TID scheduled with 4 mg Q 4-6 hr PRN pain. Monitor for over sedation and respiratory depression if pairing with benzodiazepines.  Decrease buprenorphine and add full opioid agonist. Example: For a patient taking 8 mg TID at home, the dose can be decreased to 4 mg daily and then full opioid agonists can be used to treat pain. Please note that due to both receptor blockade by buprenorphine and cross tolerance the patient may require higher opioid doses than general population. Titrate agonist to adequate pain control without oversedation. After acute pain managed patient can restart regular dose of buprenorphine.         I will follow the patient peripherally.    Thank you for allowing me to participate in the  "care of this patient.       Subjective      SUBJECTIVE      Patient seen and examined. Addiction medicine was consulted for OUD.    Review of systems performed and found unremarkable except as stated below.     01/13/2025   Overall patient doing well and not having any withdrawal symptoms. Patient states he has refused evening dose of buprenorphine on occasion because it doesn't help with pain and the morphine does. He states he was told he couldn't have both.  I think part his want for morphine is pain, but also likely some psychological dependence and possibly the need to titrate buprenorphine to a higher dose. I discussed with the patient that the buprenorphine is actually an opioid and can help with pain. He does not want to keep using opioids any longer. He plans to follow up with me as an outpatient and stay on buprenorphine. He admits that he has been using fentanyl and did not call it heroin today. He also admits to an overdose in the past. He is upset and states "I don't know why I did this to myself". I briefly discussed how substances can change our thinking and behavior and that we would discuss it more after he gets out of the hospital.      01/10/2025   HPI per primary service:  Mr. Ross is a 49 yom w/pmh significant for PID status post bilateral above-the-knee amputations, diabetes mellitus who presented for significant bilateral lower extremity edema and scrotal swelling, as well as intractable pain secondary to scrotal swelling. Labs largely unremarkable. BNP 3500, troponin 3300. Patient was started on IV diuresis and pain control. Cardiology was consulted. Patient was initially started on heparin infusion. On exam he has significant pain and tenderness due to significant scrotal swelling.      Addiction Medicine Consult (01/10/2025):  Patient evaluated for opioid use and stimulant use.      Imminent risks: The patient is not intoxicated. Last reported substance use was 2 days ago The patient is " "currently in withdrawal. COWS score at 1030 AM was 9. Patient admits to history of IV drug use. Last reported use of IV drugs was "years ago". The patient has significant medical problems that need to be managed in the acute setting.     Initiation and pattern of substance use: Starting using heroin regularly about 5 years ago. States he doesn't use meth but he figures it is frequently mixed in his supply. Patient is aware that his heroin likely has fentanyl in it. Patient prefers to smoke heroin. He doesn't like needles any more. He uses it multiple times per day. He has t     Effects/consequences of substance use: Uses it for depression, he assumes a lot of his health problems are due to heroin use and possible previous IVDU.     Previous treatment attempts include: intensive outpatient treatment (IOP) . Patient did not finish treatment. He is open to outpatient treatment with me. If that doesn't work he is willing to discuss residential programs.     Periods of abstinence: "not in a long time"     Patient's stated goal: Abstinence. Motivational interviewing provided. Patient wants to be on suboxone.          Objective      OBJECTIVE     Physical Exam     Vitals:    01/13/25 0343 01/13/25 0749 01/13/25 0809 01/13/25 1124   BP: 138/74 (!) 153/106  133/87   BP Location:  Right arm     Patient Position:  Lying     Pulse: 98 98  99   Resp: 18 18 20    Temp: 98 °F (36.7 °C) 98 °F (36.7 °C)  98.8 °F (37.1 °C)   TempSrc: Oral Oral  Oral   SpO2: 97% 97%  97%   Weight:       Height:            Physical Exam  Constitutional:       General: He is not in acute distress.     Appearance: Normal appearance. He is ill-appearing.   HENT:      Head: Normocephalic and atraumatic.   Eyes:      General: No scleral icterus.     Conjunctiva/sclera: Conjunctivae normal.      Pupils: Pupils are equal, round, and reactive to light.   Pulmonary:      Effort: Pulmonary effort is normal. No respiratory distress.   Neurological:      Mental " Status: He is alert and oriented to person, place, and time.   Psychiatric:         Attention and Perception: Attention normal.         Mood and Affect: Mood and affect normal.         Speech: Speech normal.         Behavior: Behavior normal. Behavior is cooperative.         Thought Content: Thought content normal.         Judgment: Judgment normal.          Labs (auto-populated)     POC Urine Drug Screen (*)   Toxicology Results in Chart      AMP * AMP *  (*)   BAR * BAR *  (*)   BUP * BUP *  (*)   BZO * BZO Negative (1/9/2025)   RAEGAN * RAEGAN Negative (1/9/2025)   METH * METH *  (*)   MOR * MOR Presumptive Positive (A) (1/9/2025)   MTD * MTD *  (*)   OXY * OXY *  (*)   THC * THC *  (*)      Fentanyl *  (*)    Alcohol Level *  (*)     Recent CBC, CMP, and other routine labs     WBC 12.43 (1/13/2025)  (L) (1/13/2025)   RBC    4.13 (L) (1/13/2025) K 4.3 (1/13/2025)   HGB 10.6 (L) (1/13/2025) Cl 103 (1/13/2025)   HCT 35.0 (L) (1/13/2025) CO2 24 (1/13/2025)   MCV 85 (1/13/2025) Glu 217 (H) (1/13/2025)   MCH 25.7 (L) (1/13/2025) BUN 43 (H) (1/13/2025)   MCHC 30.3 (L) (1/13/2025) Cr 2.5 (H) (1/13/2025)   RDW 14.9 (H) (1/13/2025) Ca 7.6 (L) (1/13/2025)    (1/13/2025)AST Alb 2.5 (L) (1/13/2025)   MPV 10.5 (1/13/2025) TBili 0.3 (1/13/2025)   ANC 7.7; 62.0;  (1/13/2025) AlkPho 101 (1/13/2025)   ESR *  (*) AST 10 (1/13/2025)   CRP *  (*) ALT 8 (L) (1/13/2025)      CPK *  (*) GFR 31.1 (A) (1/13/2025)   HgA1c 7.5 (H) (1/10/2025) TSH *  (*)   Trop I *  (*) FT4 *  (*)   Chol *  (*) uHCG *  (*)   TGs *  (*) HCV *  (*)   HDL *  (*) HIV *  (*)   LDL *  (*) HBV sAB *  (*)   ABDULLAHI *  (*) HBV sAG *  (*)   LIP *  (*) RPR *  (*)   INR 1.1 (1/9/2025) B1 *  (*)   GGT *  (*) B9 *  (*)   NH4 *  (*) B12 *  (*)   PETH *  (*) Vit D *  (*)            NOTE: Portions of this documentation were dictated using voice recognition software and  may contain dictation related errors in spelling / grammar / syntax not discovered on text review.               SHO Langston DO    Board Certified, Addiction Medicine  Board Certified, Neuromusculoskeletal Medicine and [facility]  Department of Psychiatry, [facility]      Method of Encounter   IN PERSON visit in Hospital   Type of Encounter   Inpatient or Observation, Subsequent encounter   E/M Code (+/- modifier)   31969: Inpatient or Observation, Subsequent, Level 2 (35-49 minutes)     Separate from E/M same visit   None   Total Mins  (01/13/2025) I spent a total of 40 minutes evaluating and managing the patient on the day of the service. This includes time reviewing the patient's chart, performing a history and physical examination, patient education and counseling, documentation, and care coordination.

## 2025-01-13 NOTE — SUBJECTIVE & OBJECTIVE
Interval History:  flomax added, valle will need to be changed. Added rocephin. Could not tolerate dobutamine. Scrotum is still quite enlarged. Significant anasarca    Review of Systems   Constitutional:  Positive for fatigue.   Respiratory:  Positive for shortness of breath.    Genitourinary:  Positive for scrotal swelling.   Neurological:  Positive for tremors.     Objective:     Vital Signs (Most Recent):  Temp: 98.8 °F (37.1 °C) (01/13/25 1124)  Pulse: 93 (01/13/25 1423)  Resp: 20 (01/13/25 1423)  BP: 133/87 (01/13/25 1124)  SpO2: 95 % (01/13/25 1423) Vital Signs (24h Range):  Temp:  [98 °F (36.7 °C)-98.8 °F (37.1 °C)] 98.8 °F (37.1 °C)  Pulse:  [] 93  Resp:  [17-20] 20  SpO2:  [95 %-97 %] 95 %  BP: (133-159)/() 133/87     Weight: 115.4 kg (254 lb 6.4 oz)  Body mass index is 36.5 kg/m².    Intake/Output Summary (Last 24 hours) at 1/13/2025 1534  Last data filed at 1/13/2025 0905  Gross per 24 hour   Intake 1140 ml   Output 660 ml   Net 480 ml         Physical Exam  Vitals and nursing note reviewed.   Constitutional:       Appearance: He is obese. He is ill-appearing.      Comments: Sleeping on arrival, awakened to voice and gentle touch   HENT:      Head: Normocephalic and atraumatic.      Nose: Nose normal.      Mouth/Throat:      Mouth: Mucous membranes are moist.      Pharynx: Oropharynx is clear.   Eyes:      Conjunctiva/sclera: Conjunctivae normal.      Pupils: Pupils are equal, round, and reactive to light.   Cardiovascular:      Rate and Rhythm: Normal rate and regular rhythm.   Pulmonary:      Breath sounds: Rhonchi and rales present.   Abdominal:      General: Bowel sounds are normal.      Palpations: Abdomen is soft.   Genitourinary:     Comments: Scrotal edema  Musculoskeletal:         General: Deformity present.      Cervical back: Normal range of motion and neck supple.      Comments: Right BKA and left AKA   Skin:     General: Skin is warm and dry.      Capillary Refill: Capillary  refill takes 2 to 3 seconds.      Coloration: Skin is pale.   Neurological:      Mental Status: He is oriented to person, place, and time. Mental status is at baseline.      Comments: Intention tremors/jerks   Psychiatric:      Comments: Tearful when talking about his heart failure             Significant Labs: All pertinent labs within the past 24 hours have been reviewed.  CBC:   Recent Labs   Lab 01/12/25 0207 01/13/25  0520   WBC 13.53* 12.43   HGB 10.7* 10.6*   HCT 34.7* 35.0*    241     CMP:   Recent Labs   Lab 01/12/25  0207 01/13/25  0520    134*   K 4.1 4.3    103   CO2 26 24   * 217*   BUN 35* 43*   CREATININE 2.2* 2.5*   CALCIUM 7.8* 7.6*   PROT 5.2* 5.6*   ALBUMIN 2.4* 2.5*   BILITOT 0.4 0.3   ALKPHOS 82 101   AST 9* 10   ALT 7* 8*   ANIONGAP 6* 7*       Significant Imaging: I have reviewed all pertinent imaging results/findings within the past 24 hours.  US Scrotum And Testicles    Result Date: 1/10/2025  EXAMINATION: US SCROTUM AND TESTICLES CLINICAL HISTORY: swelling; COMPARISON: None FINDINGS: Right testicle measures 27 x 41 x 31 mm, and left testicle measures 27 x 34 x 26 mm.  Bilateral testicles contain no intratesticular mass.  Mild heterogeneous echogenicity of left testicle is evident while homogeneous echogenicity of right testicle is noted.  The testicles contain normal vascular flow on color and spectral Doppler imaging. Bilateral epididymi are normal and without hyperemia. No hernia identified in the inguinal soft tissues with Valsalva maneuver. Diffuse scrotal soft tissue swelling/edema is present.     1. Diffuse scrotal soft tissue swelling/edema. 2. Slightly heterogeneous echogenicity of left testicle without hyperemia to suggest orchitis. Electronically signed by: Fredy Trejo Date:    01/10/2025 Time:    16:16    Echo    Result Date: 1/9/2025    Left Ventricle: The left ventricle is moderately dilated. Mildly increased wall thickness. There is mild asymmetric  hypertrophy. Severe global hypokinesis present. There is severely reduced systolic function with a visually estimated ejection fraction of 25 - 30%.   Right Ventricle: Moderate right ventricular enlargement. Systolic function is mildly reduced.   Left Atrium: Left atrium is mildly dilated.   Tricuspid Valve: There is mild regurgitation.   IVC/SVC: Elevated venous pressure at 15 mmHg.     X-Ray Chest AP Portable    Result Date: 1/9/2025  CLINICAL HISTORY: ([id number])50 y/o  (1975) M Chest Pain; TECHNIQUE: (A#[id number], exam time 1/9/2025 7:54) XR CHEST AP PORTABLE [id number] COMPARISON: Radiograph from 07/19/2022 FINDINGS: Mild perihilar pulmonary vascular prominence with slightly increased central hilar interstitial lung markings bilaterally suggestive of mild pulmonary vascular congestion/trace edema. No pneumothorax is identified. The heart is top normal in size.  Osseous structures show degenerative changes in the spine. The visualized upper abdomen is unremarkable.     Top-normal size heart and findings of mild pulmonary vascular congestion/trace interstitial edema. Electronically signed by: Manuel Snow Date:    01/09/2025 Time:    08:09

## 2025-01-14 LAB
ALBUMIN SERPL BCP-MCNC: 2.6 G/DL (ref 3.5–5.2)
ALP SERPL-CCNC: 113 U/L (ref 55–135)
ALT SERPL W/O P-5'-P-CCNC: 10 U/L (ref 10–44)
ANION GAP SERPL CALC-SCNC: 7 MMOL/L (ref 8–16)
AST SERPL-CCNC: 13 U/L (ref 10–40)
BACTERIA BLD CULT: NORMAL
BACTERIA BLD CULT: NORMAL
BASOPHILS # BLD AUTO: 0.06 K/UL (ref 0–0.2)
BASOPHILS NFR BLD: 0.6 % (ref 0–1.9)
BILIRUB SERPL-MCNC: 0.2 MG/DL (ref 0.1–1)
BUN SERPL-MCNC: 45 MG/DL (ref 6–20)
CALCIUM SERPL-MCNC: 7.6 MG/DL (ref 8.7–10.5)
CHLORIDE SERPL-SCNC: 104 MMOL/L (ref 95–110)
CO2 SERPL-SCNC: 23 MMOL/L (ref 23–29)
CREAT SERPL-MCNC: 2.4 MG/DL (ref 0.5–1.4)
DIFFERENTIAL METHOD BLD: ABNORMAL
EOSINOPHIL # BLD AUTO: 1.2 K/UL (ref 0–0.5)
EOSINOPHIL NFR BLD: 11.4 % (ref 0–8)
ERYTHROCYTE [DISTWIDTH] IN BLOOD BY AUTOMATED COUNT: 14.7 % (ref 11.5–14.5)
EST. GFR  (NO RACE VARIABLE): 32.7 ML/MIN/1.73 M^2
GLUCOSE SERPL-MCNC: 217 MG/DL (ref 70–110)
HCT VFR BLD AUTO: 32.7 % (ref 40–54)
HGB BLD-MCNC: 10.1 G/DL (ref 14–18)
IMM GRANULOCYTES # BLD AUTO: 0.05 K/UL (ref 0–0.04)
IMM GRANULOCYTES NFR BLD AUTO: 0.5 % (ref 0–0.5)
LYMPHOCYTES # BLD AUTO: 1.8 K/UL (ref 1–4.8)
LYMPHOCYTES NFR BLD: 17.1 % (ref 18–48)
MAGNESIUM SERPL-MCNC: 2 MG/DL (ref 1.6–2.6)
MCH RBC QN AUTO: 26.3 PG (ref 27–31)
MCHC RBC AUTO-ENTMCNC: 30.9 G/DL (ref 32–36)
MCV RBC AUTO: 85 FL (ref 82–98)
MONOCYTES # BLD AUTO: 1.3 K/UL (ref 0.3–1)
MONOCYTES NFR BLD: 11.8 % (ref 4–15)
NEUTROPHILS # BLD AUTO: 6.3 K/UL (ref 1.8–7.7)
NEUTROPHILS NFR BLD: 58.6 % (ref 38–73)
NRBC BLD-RTO: 0 /100 WBC
PLATELET # BLD AUTO: 251 K/UL (ref 150–450)
PMV BLD AUTO: 10.8 FL (ref 9.2–12.9)
POCT GLUCOSE: 158 MG/DL (ref 70–110)
POCT GLUCOSE: 207 MG/DL (ref 70–110)
POCT GLUCOSE: 208 MG/DL (ref 70–110)
POCT GLUCOSE: 212 MG/DL (ref 70–110)
POTASSIUM SERPL-SCNC: 4.4 MMOL/L (ref 3.5–5.1)
PROT SERPL-MCNC: 5.7 G/DL (ref 6–8.4)
RBC # BLD AUTO: 3.84 M/UL (ref 4.6–6.2)
SODIUM SERPL-SCNC: 134 MMOL/L (ref 136–145)
WBC # BLD AUTO: 10.76 K/UL (ref 3.9–12.7)

## 2025-01-14 PROCEDURE — 83735 ASSAY OF MAGNESIUM: CPT | Performed by: STUDENT IN AN ORGANIZED HEALTH CARE EDUCATION/TRAINING PROGRAM

## 2025-01-14 PROCEDURE — 82962 GLUCOSE BLOOD TEST: CPT

## 2025-01-14 PROCEDURE — 25000003 PHARM REV CODE 250: Performed by: STUDENT IN AN ORGANIZED HEALTH CARE EDUCATION/TRAINING PROGRAM

## 2025-01-14 PROCEDURE — 86703 HIV-1/HIV-2 1 RESULT ANTBDY: CPT | Performed by: INTERNAL MEDICINE

## 2025-01-14 PROCEDURE — 21400001 HC TELEMETRY ROOM

## 2025-01-14 PROCEDURE — 63600175 PHARM REV CODE 636 W HCPCS: Performed by: NURSE PRACTITIONER

## 2025-01-14 PROCEDURE — 85025 COMPLETE CBC W/AUTO DIFF WBC: CPT | Performed by: STUDENT IN AN ORGANIZED HEALTH CARE EDUCATION/TRAINING PROGRAM

## 2025-01-14 PROCEDURE — 87340 HEPATITIS B SURFACE AG IA: CPT | Performed by: INTERNAL MEDICINE

## 2025-01-14 PROCEDURE — 63600175 PHARM REV CODE 636 W HCPCS: Performed by: GENERAL PRACTICE

## 2025-01-14 PROCEDURE — 99233 SBSQ HOSP IP/OBS HIGH 50: CPT | Mod: ,,, | Performed by: GENERAL PRACTICE

## 2025-01-14 PROCEDURE — 63600175 PHARM REV CODE 636 W HCPCS: Performed by: STUDENT IN AN ORGANIZED HEALTH CARE EDUCATION/TRAINING PROGRAM

## 2025-01-14 PROCEDURE — 25000003 PHARM REV CODE 250: Performed by: NURSE PRACTITIONER

## 2025-01-14 PROCEDURE — 36415 COLL VENOUS BLD VENIPUNCTURE: CPT | Performed by: STUDENT IN AN ORGANIZED HEALTH CARE EDUCATION/TRAINING PROGRAM

## 2025-01-14 PROCEDURE — 87536 HIV-1 QUANT&REVRSE TRNSCRPJ: CPT | Performed by: INTERNAL MEDICINE

## 2025-01-14 PROCEDURE — P9047 ALBUMIN (HUMAN), 25%, 50ML: HCPCS | Performed by: GENERAL PRACTICE

## 2025-01-14 PROCEDURE — 80053 COMPREHEN METABOLIC PANEL: CPT | Performed by: STUDENT IN AN ORGANIZED HEALTH CARE EDUCATION/TRAINING PROGRAM

## 2025-01-14 PROCEDURE — 99900031 HC PATIENT EDUCATION (STAT)

## 2025-01-14 PROCEDURE — 94761 N-INVAS EAR/PLS OXIMETRY MLT: CPT

## 2025-01-14 RX ORDER — ALBUMIN HUMAN 250 G/1000ML
25 SOLUTION INTRAVENOUS EVERY 12 HOURS
Status: DISCONTINUED | OUTPATIENT
Start: 2025-01-14 | End: 2025-01-17

## 2025-01-14 RX ORDER — CEFTRIAXONE 2 G/1
2 INJECTION, POWDER, FOR SOLUTION INTRAMUSCULAR; INTRAVENOUS
Status: COMPLETED | OUTPATIENT
Start: 2025-01-15 | End: 2025-01-15

## 2025-01-14 RX ORDER — FUROSEMIDE 10 MG/ML
40 INJECTION INTRAMUSCULAR; INTRAVENOUS EVERY 12 HOURS
Status: DISCONTINUED | OUTPATIENT
Start: 2025-01-14 | End: 2025-01-15

## 2025-01-14 RX ADMIN — GABAPENTIN 300 MG: 300 CAPSULE ORAL at 09:01

## 2025-01-14 RX ADMIN — BACLOFEN 5 MG: 5 TABLET ORAL at 09:01

## 2025-01-14 RX ADMIN — BUPRENORPHINE AND NALOXONE 1 FILM: 8; 2 FILM BUCCAL; SUBLINGUAL at 09:01

## 2025-01-14 RX ADMIN — MORPHINE SULFATE 4 MG: 4 INJECTION, SOLUTION INTRAMUSCULAR; INTRAVENOUS at 12:01

## 2025-01-14 RX ADMIN — MORPHINE SULFATE 4 MG: 4 INJECTION, SOLUTION INTRAMUSCULAR; INTRAVENOUS at 06:01

## 2025-01-14 RX ADMIN — CEFTRIAXONE 2 G: 2 INJECTION, POWDER, FOR SOLUTION INTRAMUSCULAR; INTRAVENOUS at 03:01

## 2025-01-14 RX ADMIN — MORPHINE SULFATE 4 MG: 4 INJECTION, SOLUTION INTRAMUSCULAR; INTRAVENOUS at 09:01

## 2025-01-14 RX ADMIN — MICONAZOLE NITRATE: 20 POWDER TOPICAL at 09:01

## 2025-01-14 RX ADMIN — MORPHINE SULFATE 4 MG: 4 INJECTION, SOLUTION INTRAMUSCULAR; INTRAVENOUS at 02:01

## 2025-01-14 RX ADMIN — ENOXAPARIN SODIUM 120 MG: 120 INJECTION SUBCUTANEOUS at 09:01

## 2025-01-14 RX ADMIN — FUROSEMIDE 40 MG: 10 INJECTION, SOLUTION INTRAMUSCULAR; INTRAVENOUS at 11:01

## 2025-01-14 RX ADMIN — SENNOSIDES AND DOCUSATE SODIUM 1 TABLET: 50; 8.6 TABLET ORAL at 09:01

## 2025-01-14 RX ADMIN — FUROSEMIDE 40 MG: 10 INJECTION, SOLUTION INTRAMUSCULAR; INTRAVENOUS at 09:01

## 2025-01-14 RX ADMIN — INSULIN ASPART 4 UNITS: 100 INJECTION, SOLUTION INTRAVENOUS; SUBCUTANEOUS at 09:01

## 2025-01-14 RX ADMIN — ATORVASTATIN CALCIUM 80 MG: 40 TABLET, FILM COATED ORAL at 09:01

## 2025-01-14 RX ADMIN — MORPHINE SULFATE 4 MG: 4 INJECTION, SOLUTION INTRAMUSCULAR; INTRAVENOUS at 04:01

## 2025-01-14 RX ADMIN — ALBUMIN (HUMAN) 25 G: 12.5 SOLUTION INTRAVENOUS at 11:01

## 2025-01-14 RX ADMIN — INSULIN ASPART 4 UNITS: 100 INJECTION, SOLUTION INTRAVENOUS; SUBCUTANEOUS at 04:01

## 2025-01-14 RX ADMIN — DIAZEPAM 5 MG: 5 TABLET ORAL at 09:01

## 2025-01-14 RX ADMIN — ASPIRIN 81 MG: 81 TABLET, COATED ORAL at 09:01

## 2025-01-14 RX ADMIN — INSULIN ASPART 4 UNITS: 100 INJECTION, SOLUTION INTRAVENOUS; SUBCUTANEOUS at 12:01

## 2025-01-14 RX ADMIN — MUPIROCIN 1 G: 20 OINTMENT TOPICAL at 09:01

## 2025-01-14 RX ADMIN — TAMSULOSIN HYDROCHLORIDE 0.4 MG: 0.4 CAPSULE ORAL at 09:01

## 2025-01-14 NOTE — CARE UPDATE
01/14/25 0836   Patient Assessment/Suction   Level of Consciousness (AVPU) alert   Respiratory Effort Unlabored   Expansion/Accessory Muscles/Retractions no use of accessory muscles   Rhythm/Pattern, Respiratory no shortness of breath reported   Cough Frequency no cough   PRE-TX-O2   Device (Oxygen Therapy) room air   SpO2 97 %   Pulse Oximetry Type Intermittent   $ Pulse Oximetry - Multiple Charge Pulse Oximetry - Multiple   Pulse 92   Resp 16   Positioning HOB elevated 90 degrees   Education   $ Education Oxygen;15 min

## 2025-01-14 NOTE — CONSULTS
Chief complaint:  Testicle Pain (And swelling x1 week with scrotal discharge )      HPI:  Gideon Ross is a 49 y.o. male presenting with Stage 2 pressure ulcers to the bilateral buttocks, Scrotum denuded and  Scrotal swelling that were POA. Pt has some pain but is manageable. No other complaints today    PMH:  As per HPI and below:  Past Medical History:   Diagnosis Date    Diabetes mellitus     Hypertension        Social History     Socioeconomic History    Marital status: Significant Other   Tobacco Use    Smoking status: Every Day     Current packs/day: 1.00     Types: Cigarettes   Substance and Sexual Activity    Alcohol use: Yes    Drug use: No     Social Drivers of Health     Financial Resource Strain: Low Risk  (1/10/2025)    Overall Financial Resource Strain (CARDIA)     Difficulty of Paying Living Expenses: Not hard at all   Food Insecurity: Patient Unable To Answer (1/10/2025)    Hunger Vital Sign     Worried About Running Out of Food in the Last Year: Patient unable to answer     Ran Out of Food in the Last Year: Patient unable to answer   Transportation Needs: No Transportation Needs (1/10/2025)    TRANSPORTATION NEEDS     Transportation : No   Stress: No Stress Concern Present (1/10/2025)    [employer] [place] [employer] - Occupational Stress Questionnaire     Feeling of Stress : Not at all   Housing Stability: Low Risk  (1/10/2025)    Housing Stability Vital Sign     Unable to Pay for Housing in the Last Year: No     Homeless in the Last Year: No       Past Surgical History:   Procedure Laterality Date    SKIN GRAFT         No family history on file.    Review of patient's allergies indicates:  No Known Allergies    No current facility-administered medications on file prior to encounter.     Current Outpatient Medications on File Prior to Encounter   Medication Sig Dispense Refill    amLODIPine (NORVASC) 10 MG tablet Take 1 tablet (10 mg total) by mouth once daily. 30 tablet 1    aspirin  (ECOTRIN) 81 MG EC tablet Take 1 tablet (81 mg total) by mouth once daily. 30 tablet 1    atorvastatin (LIPITOR) 80 MG tablet Take 1 tablet (80 mg total) by mouth once daily. 30 tablet 1    baclofen (LIORESAL) 10 MG tablet Take 1 tablet (10 mg total) by mouth 3 (three) times daily. 90 tablet 1    carvedilol (COREG) 6.25 MG tablet Take 1 tablet (6.25 mg total) by mouth 2 (two) times daily. 60 tablet 1    diazePAM (VALIUM) 5 MG tablet Take 1 tablet (5 mg total) by mouth every 12 (twelve) hours as needed for Anxiety (anxiety.). 30 tablet 0    gabapentin (NEURONTIN) 300 MG capsule Take 2 capsules (600 mg total) by mouth 3 (three) times daily. 90 capsule 1    hydroCHLOROthiazide (HYDRODIURIL) 50 MG tablet Take 1 tablet (50 mg total) by mouth once daily. 30 tablet 1    insulin aspart U-100 (NOVOLOG) 100 unit/mL InPn pen Inject 18 Units into the skin 3 (three) times daily. (Patient taking differently: Inject into the skin 3 (three) times daily.) 10 mL 1    insulin detemir U-100 (LEVEMIR FLEXTOUCH) 100 unit/mL (3 mL) SubQ InPn pen Inject 35 Units into the skin 2 (two) times daily. 10 mL 1    insulin glargine U-100, Lantus, 100 unit/mL injection Inject 30 Units into the skin every evening. (Patient not taking: Reported on 1/9/2025)      lisinopril (PRINIVIL,ZESTRIL) 40 MG tablet Take 1 tablet (40 mg total) by mouth once daily. 30 tablet 1    nicotine (NICODERM CQ) 14 mg/24 hr Place 1 patch onto the skin once daily. (Patient not taking: Reported on 1/9/2025) 15 patch 0    nortriptyline (PAMELOR) 25 MG capsule Take 1 capsule (25 mg total) by mouth 3 (three) times daily. 30 capsule 1    oxyCODONE (ROXICODONE) 5 MG immediate release tablet Take 1 tablet (5 mg total) by mouth every 12 (twelve) hours as needed. (Patient not taking: Reported on 1/9/2025) 30 tablet 0    polyethylene glycol (GLYCOLAX) 17 gram PwPk Take 17 g by mouth daily as needed. (Patient not taking: Reported on 1/9/2025) 30 each 1    QUEtiapine (SEROQUEL) 100 MG  "Tab Take 1 tablet (100 mg total) by mouth every evening. 30 tablet 1    sofosbuvir-velpatasvir (EPCLUSA) 400-100 mg Tab Take 1 tablet daily. (Patient not taking: Reported on 2025) 28 tablet 2       ROS: As per HPI and below:  Pertinent items are noted in HPI.      Physical Exam:     Vitals:    25 1250 25 1423 25 1545 25 1718   BP:   (!) 150/92    BP Location:   Right arm    Patient Position:   Lying    Pulse:  93 101    Resp: 20 20 19 20   Temp:   97.7 °F (36.5 °C)    TempSrc:   Oral    SpO2:  95% 98%    Weight:       Height:           BP  Min: 100/63  Max: 164/70  Temp  Av.1 °F (36.7 °C)  Min: 97.5 °F (36.4 °C)  Max: 98.8 °F (37.1 °C)  Pulse  Av.9  Min: 90  Max: 111  Resp  Av.2  Min: 11  Max: 24  SpO2  Av.4 %  Min: 89 %  Max: 100 %  Height  Av' 10" (177.8 cm)  Min: 5' 10" (177.8 cm)  Max: 5' 10" (177.8 cm)  Weight  Av.1 kg (247 lb 3.2 oz)  Min: 108.9 kg (240 lb)  Max: 115.4 kg (254 lb 6.4 oz)    Body mass index is 36.5 kg/m².          General:             Well developed, well nourished, no apparent distress  HEENT:              NCAT, no JVD, mucous membranes moist, EOM intact  Cardiovascular:  Regular rate and rhythm, normal S1, normal S2, No murmurs, rubs, or gallops  Respiratory:        Normal breath sounds, no wheezes, no rales, no rhonchi  Abdomen:           Bowel sounds present, non tender, non distended, no masses, no hepatojugular reflux  Extremities:        No clubbing, no cyanosis, no edema  Vascular:            2+ b/l radial.  Peripheral pulses intact.  No carotid bruits.  Neurological:      No focal deficits  Skin:                   No obvious rashes or erythema, Stage 2 pressure ulcers to the bilateral buttocks, Scrotum denuded and  Scrotal swelling                Lab Results   Component Value Date    WBC 12.43 2025    HGB 10.6 (L) 2025    HCT 35.0 (L) 2025    MCV 85 2025     2025     Lab Results   Component " "Value Date    CHOL 122 09/27/2019     Lab Results   Component Value Date    HDL 23 (L) 09/27/2019     Lab Results   Component Value Date    LDLCALC 70.2 09/27/2019     Lab Results   Component Value Date    TRIG 144 09/27/2019     Lab Results   Component Value Date    CHOLHDL 18.9 (L) 09/27/2019     CMP  Recent Labs   Lab 01/13/25  0520   *   CALCIUM 7.6*   ALBUMIN 2.5*   PROT 5.6*   *   K 4.3   CO2 24      BUN 43*   CREATININE 2.5*   ALKPHOS 101   ALT 8*   AST 10   BILITOT 0.3      No results found for: "TSH"        Assessment and Recommendations       Diagnoses:    Stage 2 pressure ulcers to the bilateral buttocks  Scrotum denuded   Scrotal swelling    Plan:  Triad to the bilateral buttocks, sacrum and scrotum q shift    Complexity:    moderate    "

## 2025-01-14 NOTE — PROGRESS NOTES
[facility]   Department of Cardiology  Progress Note      PATIENT NAME: Gideon Ross    MRN: [id number]  TODAY'S DATE: 01/14/2025  ADMIT DATE: 1/9/2025                          CONSULT REQUESTED BY: Pee Bolton MD    SUBJECTIVE     PRINCIPAL PROBLEM: Acute systolic congestive heart failure    01/14/2025  Resting in bed. Complaints of thirst.  Wheezing present.  Remains edematous; no further events of NSVT on tele    1/13/25    Resting in bed. NAD. RA.  Reports being thirsty.  Eating canned sausages.  Continues to report scrotal edema. Occasional PVCs noted on tele. Brief NSVT.  Not on dobutrex; Nephrology is following.    1/12/25    Patient seen sitting up bed with no distress noted. Breathing is stable. Family/friends at bedside. Stable on telemetry but mildly tachycardic. Dobutamine was held due to ectopy on telemetry.     1/11/25    Patient very drowsy when seen today.  No acute distress noted.  He was asking for pain medicine when awakened.  Patient with worsened renal function today.    1/10/25    Patient seen resting in bed this morning.  He was very upset.  He reports that he does use heroin on a daily basis and believes that he is likely detoxing currently.  Patient is not very optimistic for his outcome.    HPI:    Patient is a 49-year-old male who presented to the emergency room with complaints of testicular pain and swelling over the past 1 week.  Patient is a known diabetic and has a history of PID with bilateral above the knee amputations.  Patient also had some complaints of mild shortness of breath but denied any chest pain.  Patient was noted to have significantly elevated high sensitivity troponin level on arrival.  High sensitivity troponin level was 3343 on arrival and is now 3369.  Patient was started on heparin drip.  Patient does have a known history of drug abuse and is positive for opiates, amphetamine, and marijuana metabolites in his UDS.  Patient reports his last use of  methamphetamine was a couple of weeks ago.        REASON FOR CONSULT:  From Hospitalist H&P: Patient presents complaining of testicular pain and swelling.  Patient has noticed increased pain and swelling for the last 1 week as well as foul smell.  Patient has a history of bilateral above-the-knee and below-the-knee amputation.  He is a diabetic.  At the worst symptoms are moderate.  Patient complains of mild shortness of breath no chest pain.            Review of patient's allergies indicates:  No Known Allergies    Past Medical History:   Diagnosis Date    Diabetes mellitus     Hypertension      Past Surgical History:   Procedure Laterality Date    SKIN GRAFT       Social History     Tobacco Use    Smoking status: Every Day     Current packs/day: 1.00     Types: Cigarettes   Substance Use Topics    Alcohol use: Yes    Drug use: No        REVIEW OF SYSTEMS  Per HPI    OBJECTIVE     VITAL SIGNS (Most Recent)  Temp: 97.4 °F (36.3 °C) (01/14/25 1600)  Pulse: 100 (01/14/25 1600)  Resp: 19 (01/14/25 1600)  BP: 117/67 (01/14/25 1600)  SpO2: 98 % (01/14/25 1600)    VENTILATION STATUS  Resp: 19 (01/14/25 1600)  SpO2: 98 % (01/14/25 1600)           I & O (Last 24H):  Intake/Output Summary (Last 24 hours) at 1/14/2025 1610  Last data filed at 1/14/2025 0939  Gross per 24 hour   Intake 720 ml   Output 100 ml   Net 620 ml       WEIGHTS  Wt Readings from Last 1 Encounters:   01/14/25 0400 127.1 kg (280 lb 3.2 oz)   01/09/25 2239 115.4 kg (254 lb 6.4 oz)   01/09/25 0824 108.9 kg (240 lb)       PHYSICAL EXAM  CONSTITUTIONAL: No fever, no chills obese  HEENT: Normocephalic, atraumatic,pupils reactive to light                 NECK:  No JVD no carotid bruit  CVS: S1S2+, RRR  LUNGS: crackles lower lobes; RA  ABDOMEN: Soft, NT, BS+  EXTREMITIES: No cyanosis, edema; right BKA and Left AKA  : No valle catheter  NEURO: AAO X 3  PSY: Normal affect      HOME MEDICATIONS:  No current facility-administered medications on file prior to  encounter.     Current Outpatient Medications on File Prior to Encounter   Medication Sig Dispense Refill    amLODIPine (NORVASC) 10 MG tablet Take 1 tablet (10 mg total) by mouth once daily. 30 tablet 1    aspirin (ECOTRIN) 81 MG EC tablet Take 1 tablet (81 mg total) by mouth once daily. 30 tablet 1    atorvastatin (LIPITOR) 80 MG tablet Take 1 tablet (80 mg total) by mouth once daily. 30 tablet 1    baclofen (LIORESAL) 10 MG tablet Take 1 tablet (10 mg total) by mouth 3 (three) times daily. 90 tablet 1    carvedilol (COREG) 6.25 MG tablet Take 1 tablet (6.25 mg total) by mouth 2 (two) times daily. 60 tablet 1    diazePAM (VALIUM) 5 MG tablet Take 1 tablet (5 mg total) by mouth every 12 (twelve) hours as needed for Anxiety (anxiety.). 30 tablet 0    gabapentin (NEURONTIN) 300 MG capsule Take 2 capsules (600 mg total) by mouth 3 (three) times daily. 90 capsule 1    hydroCHLOROthiazide (HYDRODIURIL) 50 MG tablet Take 1 tablet (50 mg total) by mouth once daily. 30 tablet 1    insulin aspart U-100 (NOVOLOG) 100 unit/mL InPn pen Inject 18 Units into the skin 3 (three) times daily. (Patient taking differently: Inject into the skin 3 (three) times daily.) 10 mL 1    insulin detemir U-100 (LEVEMIR FLEXTOUCH) 100 unit/mL (3 mL) SubQ InPn pen Inject 35 Units into the skin 2 (two) times daily. 10 mL 1    insulin glargine U-100, Lantus, 100 unit/mL injection Inject 30 Units into the skin every evening. (Patient not taking: Reported on 1/9/2025)      lisinopril (PRINIVIL,ZESTRIL) 40 MG tablet Take 1 tablet (40 mg total) by mouth once daily. 30 tablet 1    nicotine (NICODERM CQ) 14 mg/24 hr Place 1 patch onto the skin once daily. (Patient not taking: Reported on 1/9/2025) 15 patch 0    nortriptyline (PAMELOR) 25 MG capsule Take 1 capsule (25 mg total) by mouth 3 (three) times daily. 30 capsule 1    oxyCODONE (ROXICODONE) 5 MG immediate release tablet Take 1 tablet (5 mg total) by mouth every 12 (twelve) hours as needed. (Patient  not taking: Reported on 1/9/2025) 30 tablet 0    polyethylene glycol (GLYCOLAX) 17 gram PwPk Take 17 g by mouth daily as needed. (Patient not taking: Reported on 1/9/2025) 30 each 1    QUEtiapine (SEROQUEL) 100 MG Tab Take 1 tablet (100 mg total) by mouth every evening. 30 tablet 1    sofosbuvir-velpatasvir (EPCLUSA) 400-100 mg Tab Take 1 tablet daily. (Patient not taking: Reported on 1/9/2025) 28 tablet 2       SCHEDULED MEDS:   albumin human 25%  25 g Intravenous Q12H    aspirin  81 mg Oral Daily    atorvastatin  80 mg Oral Daily    baclofen  5 mg Oral BID    buprenorphine-naloxone 8-2 mg  1 Film Sublingual BID    cefTRIAXone (Rocephin) IV (PEDS and ADULTS)  2 g Intravenous Q24H    enoxparin  1 mg/kg Subcutaneous Q12H (treatment, non-standard time)    furosemide (LASIX) injection  40 mg Intravenous Q12H    gabapentin  300 mg Oral BID    miconazole NITRATE 2 %   Topical (Top) BID    tamsulosin  0.4 mg Oral Daily       CONTINUOUS INFUSIONS:          PRN MEDS:  Current Facility-Administered Medications:     acetaminophen, 650 mg, Oral, Q4H PRN    aluminum-magnesium hydroxide-simethicone, 30 mL, Oral, QID PRN    dextrose 50%, 12.5 g, Intravenous, PRN    dextrose 50%, 25 g, Intravenous, PRN    diazePAM, 5 mg, Oral, Q12H PRN    glucagon (human recombinant), 1 mg, Intramuscular, PRN    glucose, 16 g, Oral, PRN    glucose, 24 g, Oral, PRN    hydrALAZINE, 5 mg, Intravenous, Q6H PRN    insulin aspart U-100, 0-10 Units, Subcutaneous, QID (AC + HS) PRN    magnesium oxide, 800 mg, Oral, PRN    magnesium oxide, 800 mg, Oral, PRN    melatonin, 6 mg, Oral, Nightly PRN    morphine, 4 mg, Intravenous, Q4H PRN    naloxone, 0.02 mg, Intravenous, PRN    ondansetron, 4 mg, Intravenous, Q6H PRN    oxyCODONE-acetaminophen, 1 tablet, Oral, Q4H PRN    oxyCODONE-acetaminophen, 1 tablet, Oral, Q4H PRN    potassium bicarbonate, 35 mEq, Oral, PRN    potassium bicarbonate, 50 mEq, Oral, PRN    potassium bicarbonate, 60 mEq, Oral, PRN     "potassium, sodium phosphates, 2 packet, Oral, PRN    potassium, sodium phosphates, 2 packet, Oral, PRN    potassium, sodium phosphates, 2 packet, Oral, PRN    senna-docusate 8.6-50 mg, 1 tablet, Oral, Daily PRN    sodium chloride 0.9%, 10 mL, Intravenous, Q12H PRN    sodium chloride 0.9%, 10 mL, Intravenous, PRN    LABS AND DIAGNOSTICS     CBC LAST 3 DAYS  Recent Labs   Lab 01/12/25  0207 01/13/25  0520 01/14/25  0521   WBC 13.53* 12.43 10.76   RBC 4.10* 4.13* 3.84*   HGB 10.7* 10.6* 10.1*   HCT 34.7* 35.0* 32.7*   MCV 85 85 85   MCH 26.1* 25.7* 26.3*   MCHC 30.8* 30.3* 30.9*   RDW 14.8* 14.9* 14.7*    241 251   MPV 11.0 10.5 10.8   GRAN 69.7  9.4* 62.0  7.7 58.6  6.3   LYMPH 12.9*  1.7 15.3*  1.9 17.1*  1.8   MONO 10.2  1.4* 11.2  1.4* 11.8  1.3*   BASO 0.07 0.06 0.06   NRBC 0 0 0       COAGULATION LAST 3 DAYS  Recent Labs   Lab 01/09/25  0918 01/09/25  1659 01/09/25  2359 01/10/25  0721   INR 1.1  --   --   --    APTT 28.4 45.2* 32.2* 49.4*       CHEMISTRY LAST 3 DAYS  Recent Labs   Lab 01/12/25  0207 01/13/25  0520 01/14/25  0521    134* 134*   K 4.1 4.3 4.4    103 104   CO2 26 24 23   ANIONGAP 6* 7* 7*   BUN 35* 43* 45*   CREATININE 2.2* 2.5* 2.4*   * 217* 217*   CALCIUM 7.8* 7.6* 7.6*   MG 1.8 2.1 2.0   ALBUMIN 2.4* 2.5* 2.6*   PROT 5.2* 5.6* 5.7*   ALKPHOS 82 101 113   ALT 7* 8* 10   AST 9* 10 13   BILITOT 0.4 0.3 0.2       CARDIAC PROFILE LAST 3 DAYS  Recent Labs   Lab 01/09/25  0709 01/09/25  0918   BNP 3,449*  --    TROPONINIHS 3343.0* 3369.7*       ENDOCRINE LAST 3 DAYS  No results for input(s): "TSH", "PROCAL" in the last 168 hours.    LAST ARTERIAL BLOOD GAS  ABG  No results for input(s): "PH", "PO2", "PCO2", "HCO3", "BE" in the last 168 hours.    LAST 7 DAYS MICROBIOLOGY   Microbiology Results (last 7 days)       Procedure Component Value Units Date/Time    Blood culture x two cultures. Draw prior to antibiotics. [[id number]] Collected: 01/09/25 0859    Order Status: " Completed Specimen: Blood Updated: 01/14/25 1032     Blood Culture, Routine No growth after 5 days.    Narrative:      Aerobic and anaerobic    Blood culture x two cultures. Draw prior to antibiotics. [[id number]] Collected: 01/09/25 0859    Order Status: Completed Specimen: Blood Updated: 01/14/25 1032     Blood Culture, Routine No growth after 5 days.    Narrative:      Aerobic and anaerobic    Urine culture [[id number]] Collected: 01/12/25 1055    Order Status: Completed Specimen: Urine Updated: 01/14/25 0648     Urine Culture, Routine No growth to date    Narrative:      Specimen Source->Urine    Clostridium difficile EIA [[id number]] Collected: 01/09/25 2223    Order Status: Canceled Specimen: Stool             MOST RECENT IMAGING  US Retroperitoneal Complete  Narrative: EXAMINATION:  US RETROPERITONEAL COMPLETE    CLINICAL HISTORY:  quinton, evaluate for hydronephrosis;    TECHNIQUE:  Grayscale and color Doppler sonographic analysis of the kidneys and retroperitoneum was performed.    FINDINGS:  Comparison to prior exams.  The exam is limited by poor acoustic window due to patient body habitus, and subsequent poor resolution.  The right kidney measures 12.6 cm in length, with normal cortical thickness and parenchymal echotexture, without echogenic calculi or hydronephrosis.    The left kidney measures 10.5 cm in length and has normal cortical thickness and parenchymal echotexture, without echogenic calculi or hydronephrosis.    The urinary bladder is collapsed and not evaluated.  The visualized abdominal aorta, aortic bifurcation, common iliac artery origins, and IVC are normal.  Impression: Limited exam, with no sonographic evidence of medical or surgical renal disease.    Electronically signed by: Eliseo Jo  Date:    01/12/2025  Time:    09:15      ECHOCARDIOGRAM RESULTS (last 5)  No results found for this or any previous visit.      CURRENT/PREVIOUS VISIT EKG  Results for orders placed or performed during the  hospital encounter of 01/09/25   EKG 12-lead    Collection Time: 01/12/25  2:01 AM   Result Value Ref Range    QRS Duration 136 ms    [facility] QTC Calculation 511 ms    Narrative    Test Reason : R07.9,    Vent. Rate : 111 BPM     Atrial Rate : 111 BPM     P-R Int : 140 ms          QRS Dur : 136 ms      QT Int : 376 ms       P-R-T Axes :  61 148   1 degrees    QTcB Int : 511 ms    Sinus tachycardia with occasional Premature ventricular complexes  Right bundle branch block  Left posterior fascicular block   Bifascicular block   Possible Inferior infarct ,age undetermined  Abnormal ECG  No previous ECGs available    Referred By: AAAREFERRAL SELF           Confirmed By:            ASSESSMENT/PLAN:     Active Hospital Problems    Diagnosis    *Acute systolic congestive heart failure    Urinary tract infection associated with indwelling urethral catheter    MARLENE (acute kidney injury)    Opioid use disorder    Scrotal edema    Elevated troponin    Diabetes mellitus       ASSESSMENT & PLAN:     Elevated troponin  Acute HFrEF 25-30%  Testicular pain and swelling   Type II DM  + drug abuse: amphetamines, marijuana  PAD  Hx of bilateral AKA      RECOMMENDATIONS:    Nephrology consulted. Cannot tolerate dobutamine gtt due to NSVT- 50+ beats.  Discussed with Nephrology- lasix 40 mg BID and albumin 25 g BID.  Closely monitor renal function.  Continues to have salty snacks at bedside. Discussed low sodium diet recommendations. Can allow 1.8 L fluid restriction.  Monitor closely.     Nina Alejandre NP  Date of Service: 01/14/2025      I have personally interviewed and examined the patient, I have reviewed the Nurse Practitioner's history and physical, assessment, and plan. I have personally evaluated the patient at bedside and agree with the findings and made appropriate changes as necessary in recommendations.    Discussed with nephrology will try Lasix and albumin increase up as tolerated  Kingsley Mosley MD  Department of  Cardiology  [facility]  01/14/2025

## 2025-01-14 NOTE — SUBJECTIVE & OBJECTIVE
Interval History:  No acute events overnight.     Review of Systems   Constitutional:  Positive for fatigue.   Respiratory:  Positive for shortness of breath.    Genitourinary:  Positive for scrotal swelling.   Neurological:  Positive for tremors.     Objective:     Vital Signs (Most Recent):  Temp: 97.4 °F (36.3 °C) (01/14/25 1600)  Pulse: 100 (01/14/25 1600)  Resp: 19 (01/14/25 1600)  BP: 117/67 (01/14/25 1600)  SpO2: 98 % (01/14/25 1600) Vital Signs (24h Range):  Temp:  [97.4 °F (36.3 °C)-98.8 °F (37.1 °C)] 97.4 °F (36.3 °C)  Pulse:  [] 100  Resp:  [16-22] 19  SpO2:  [95 %-98 %] 98 %  BP: (117-139)/(64-83) 117/67     Weight: 127.1 kg (280 lb 3.2 oz)  Body mass index is 40.2 kg/m².    Intake/Output Summary (Last 24 hours) at 1/14/2025 1623  Last data filed at 1/14/2025 0939  Gross per 24 hour   Intake 720 ml   Output 100 ml   Net 620 ml         Physical Exam  Vitals and nursing note reviewed.   Constitutional:       Appearance: He is obese. He is ill-appearing.   HENT:      Head: Normocephalic and atraumatic.      Nose: Nose normal.      Mouth/Throat:      Mouth: Mucous membranes are moist.      Pharynx: Oropharynx is clear.   Eyes:      Conjunctiva/sclera: Conjunctivae normal.      Pupils: Pupils are equal, round, and reactive to light.   Cardiovascular:      Rate and Rhythm: Normal rate and regular rhythm.   Pulmonary:      Breath sounds: Rhonchi and rales present.   Abdominal:      General: Bowel sounds are normal.      Palpations: Abdomen is soft.   Genitourinary:     Comments: Scrotal edema  Musculoskeletal:         General: Deformity present.      Cervical back: Normal range of motion and neck supple.      Comments: Right BKA and left AKA   Skin:     General: Skin is warm and dry.      Capillary Refill: Capillary refill takes 2 to 3 seconds.      Coloration: Skin is pale.   Neurological:      Mental Status: He is alert and oriented to person, place, and time. Mental status is at baseline.    Psychiatric:      Comments: Tearful when talking about his heart failure             Significant Labs: All pertinent labs within the past 24 hours have been reviewed.  CBC:   Recent Labs   Lab 01/13/25  0520 01/14/25  0521   WBC 12.43 10.76   HGB 10.6* 10.1*   HCT 35.0* 32.7*    251     CMP:   Recent Labs   Lab 01/13/25  0520 01/14/25  0521   * 134*   K 4.3 4.4    104   CO2 24 23   * 217*   BUN 43* 45*   CREATININE 2.5* 2.4*   CALCIUM 7.6* 7.6*   PROT 5.6* 5.7*   ALBUMIN 2.5* 2.6*   BILITOT 0.3 0.2   ALKPHOS 101 113   AST 10 13   ALT 8* 10   ANIONGAP 7* 7*       Significant Imaging: I have reviewed all pertinent imaging results/findings within the past 24 hours.  US Scrotum And Testicles    Result Date: 1/10/2025  EXAMINATION: US SCROTUM AND TESTICLES CLINICAL HISTORY: swelling; COMPARISON: None FINDINGS: Right testicle measures 27 x 41 x 31 mm, and left testicle measures 27 x 34 x 26 mm.  Bilateral testicles contain no intratesticular mass.  Mild heterogeneous echogenicity of left testicle is evident while homogeneous echogenicity of right testicle is noted.  The testicles contain normal vascular flow on color and spectral Doppler imaging. Bilateral epididymi are normal and without hyperemia. No hernia identified in the inguinal soft tissues with Valsalva maneuver. Diffuse scrotal soft tissue swelling/edema is present.     1. Diffuse scrotal soft tissue swelling/edema. 2. Slightly heterogeneous echogenicity of left testicle without hyperemia to suggest orchitis. Electronically signed by: Fredy Trejo Date:    01/10/2025 Time:    16:16    Echo    Result Date: 1/9/2025    Left Ventricle: The left ventricle is moderately dilated. Mildly increased wall thickness. There is mild asymmetric hypertrophy. Severe global hypokinesis present. There is severely reduced systolic function with a visually estimated ejection fraction of 25 - 30%.   Right Ventricle: Moderate right ventricular enlargement.  Systolic function is mildly reduced.   Left Atrium: Left atrium is mildly dilated.   Tricuspid Valve: There is mild regurgitation.   IVC/SVC: Elevated venous pressure at 15 mmHg.     X-Ray Chest AP Portable    Result Date: 1/9/2025  CLINICAL HISTORY: ([id number] y/o  (1975) M Chest Pain; TECHNIQUE: (A#[id number], exam time 1/9/2025 7:54) XR CHEST AP PORTABLE [id number] COMPARISON: Radiograph from 07/19/2022 FINDINGS: Mild perihilar pulmonary vascular prominence with slightly increased central hilar interstitial lung markings bilaterally suggestive of mild pulmonary vascular congestion/trace edema. No pneumothorax is identified. The heart is top normal in size.  Osseous structures show degenerative changes in the spine. The visualized upper abdomen is unremarkable.     Top-normal size heart and findings of mild pulmonary vascular congestion/trace interstitial edema. Electronically signed by: Manuel Snow Date:    01/09/2025 Time:    08:09

## 2025-01-14 NOTE — PROGRESS NOTES
Nephrology Consult Note        Patient Name: Gideon Ross  MRN: [id number]    Patient Class: IP- Inpatient   Admission Date: 1/9/2025  Length of Stay: 5 days  Date of Service: 1/14/2025    Attending Physician: Pee Bolton MD  Primary Care Provider: Jennifer, Primary Doctor    Reason for Consult: marlene    SUBJECTIVE:     HPI: 49M with PID, bilateral BKA, DM presented for significant bilateral lower extremity edema, scrotal swelling, intractable pain. Labs largely unremarkable other than BNP 3,500, troponin 3,300. Patient was started on IV diuretics and pain control. Cardiology was consulted. Initially started on heparin drip but then transitioned to Lovenox. Echocardiogram showed EF 25%. Addiction Medicine consulted for heroin use. Started on Suboxone and reports improvement in symptoms. Diuretics held 2/2 worsening sCr and MARLENE. He was started on dobutamine on 1/11/25, but was stopped 2/2 tachycardia. He has a catheter for strict I&O with MARLENE, but is describing dysuria, there is purulent drainage at the end of the catheter so UA was obtained with concerns of UTI and pt placed on rocephin and recommendations for catheter exchange giving continued MARLENE and need for strict I&O with decompensated heart failure.      1/14 Agree with diuretics and albumin, will consider escalation or ultrafiltration. Not sure how acute the HF is. Consider palliative eval as well.    ASSESSMENT/PLAN:     MARLENE 2/2 ATN due to intravascular volume depletion from diuretics  Hypoalbuminemia with dependent edema due to third-spacing and immobility  HFrEF ? etiology and duration  PAD  DM  Anemia  CKD stage 2, sCr 1 om 1/9/2025  No NSAIDs, ACEI/ARB, IV contrast or other nephrotoxins.  Keep MAP > 60, SBP > 100.  Dose meds for GFR < 30 ml/min.  Renal diet - low K, low phos, low sodium.  Optimize nutrition, added protein supplement.  Judicious diuretic use, edema will not resolve with diuretics alone.  Hgb and HCT are acceptable. Monitor for now.    Thank  you for allowing us to participate in the care of your patient!   We will follow the patient and provide recommendations as needed.         Laboratory:  Recent Labs   Lab 01/12/25  0207 01/13/25  0520 01/14/25  0521    134* 134*   K 4.1 4.3 4.4    103 104   CO2 26 24 23   BUN 35* 43* 45*   CREATININE 2.2* 2.5* 2.4*   * 217* 217*       Recent Labs   Lab 01/12/25  0207 01/13/25  0520 01/14/25  0521   CALCIUM 7.8* 7.6* 7.6*   ALBUMIN 2.4* 2.5* 2.6*   MG 1.8 2.1 2.0             Recent Labs   Lab 01/11/25  1943 01/12/25  0728 01/12/25  1200 01/12/25  1713 01/12/25  2008 01/13/25  0750 01/13/25  1126 01/13/25  1547 01/13/25  1941 01/14/25  0731   POCTGLUCOSE 140* 171* 212* 157* 211* 208* 173* 268* 243* 212*       Recent Labs   Lab 01/10/25  0721   Hemoglobin A1C 7.5 H       Recent Labs   Lab 01/12/25 0207 01/13/25  0520 01/14/25  0521   WBC 13.53* 12.43 10.76   HGB 10.7* 10.6* 10.1*   HCT 34.7* 35.0* 32.7*    241 251   MCV 85 85 85   MCHC 30.8* 30.3* 30.9*   MONO 10.2  1.4* 11.2  1.4* 11.8  1.3*   EOSINOPHIL 6.3 10.5* 11.4*       Recent Labs   Lab 01/12/25 0207 01/13/25  0520 01/14/25  0521   BILITOT 0.4 0.3 0.2   PROT 5.2* 5.6* 5.7*   ALBUMIN 2.4* 2.5* 2.6*   ALKPHOS 82 101 113   ALT 7* 8* 10   AST 9* 10 13       Recent Labs   Lab 07/19/22  2316 01/09/25  0743 01/12/25  1055   Color, UA Yellow Yellow [place] A   Appearance, UA Clear Hazy A Hazy A   pH, UA 6.0 6.0 6.0   Specific Gravity, UA <=1.005 1.025 1.010   Protein, UA Trace A 3+ A 1+ A   Glucose, UA 4+ A Trace A Negative   Ketones, UA Negative Negative Negative   Urobilinogen, UA Negative 2.0-3.0 A Negative   Bilirubin (UA) Negative Negative Negative   Occult Blood UA Negative 2+ A 3+ A   Nitrite, UA Negative Negative Negative   RBC, UA 1 7 H >100 H   WBC, UA  --  4 18 H   Bacteria None Occasional Rare   Hyaline Casts, UA  --  4 A 0       Recent Labs   Lab 07/19/22 2046 01/09/25  0707   POC PH 7.473 H  --    POC PCO2 42.4  --    POC  HCO3 31.1 H  --    POC PO2 28 L  --    POC SATURATED O2 58 L  --    POC BE 7  --    Sample VENOUS VENOUS       Microbiology Results (last 7 days)       Procedure Component Value Units Date/Time    Blood culture x two cultures. Draw prior to antibiotics. [[id number]] Collected: 01/09/25 0859    Order Status: Completed Specimen: Blood Updated: 01/14/25 1032     Blood Culture, Routine No growth after 5 days.    Narrative:      Aerobic and anaerobic    Blood culture x two cultures. Draw prior to antibiotics. [[id number]] Collected: 01/09/25 0859    Order Status: Completed Specimen: Blood Updated: 01/14/25 1032     Blood Culture, Routine No growth after 5 days.    Narrative:      Aerobic and anaerobic    Urine culture [[id number]] Collected: 01/12/25 1055    Order Status: Completed Specimen: Urine Updated: 01/14/25 0648     Urine Culture, Routine No growth to date    Narrative:      Specimen Source->Urine    Clostridium difficile EIA [[id number]] Collected: 01/09/25 2223    Order Status: Canceled Specimen: Stool             Review of patient's allergies indicates:  No Known Allergies    Outpatient meds:  No current facility-administered medications on file prior to encounter.     Current Outpatient Medications on File Prior to Encounter   Medication Sig Dispense Refill    amLODIPine (NORVASC) 10 MG tablet Take 1 tablet (10 mg total) by mouth once daily. 30 tablet 1    aspirin (ECOTRIN) 81 MG EC tablet Take 1 tablet (81 mg total) by mouth once daily. 30 tablet 1    atorvastatin (LIPITOR) 80 MG tablet Take 1 tablet (80 mg total) by mouth once daily. 30 tablet 1    baclofen (LIORESAL) 10 MG tablet Take 1 tablet (10 mg total) by mouth 3 (three) times daily. 90 tablet 1    carvedilol (COREG) 6.25 MG tablet Take 1 tablet (6.25 mg total) by mouth 2 (two) times daily. 60 tablet 1    diazePAM (VALIUM) 5 MG tablet Take 1 tablet (5 mg total) by mouth every 12 (twelve) hours as needed for Anxiety (anxiety.). 30 tablet 0     gabapentin (NEURONTIN) 300 MG capsule Take 2 capsules (600 mg total) by mouth 3 (three) times daily. 90 capsule 1    hydroCHLOROthiazide (HYDRODIURIL) 50 MG tablet Take 1 tablet (50 mg total) by mouth once daily. 30 tablet 1    insulin aspart U-100 (NOVOLOG) 100 unit/mL InPn pen Inject 18 Units into the skin 3 (three) times daily. (Patient taking differently: Inject into the skin 3 (three) times daily.) 10 mL 1    insulin detemir U-100 (LEVEMIR FLEXTOUCH) 100 unit/mL (3 mL) SubQ InPn pen Inject 35 Units into the skin 2 (two) times daily. 10 mL 1    insulin glargine U-100, Lantus, 100 unit/mL injection Inject 30 Units into the skin every evening. (Patient not taking: Reported on 1/9/2025)      lisinopril (PRINIVIL,ZESTRIL) 40 MG tablet Take 1 tablet (40 mg total) by mouth once daily. 30 tablet 1    nicotine (NICODERM CQ) 14 mg/24 hr Place 1 patch onto the skin once daily. (Patient not taking: Reported on 1/9/2025) 15 patch 0    nortriptyline (PAMELOR) 25 MG capsule Take 1 capsule (25 mg total) by mouth 3 (three) times daily. 30 capsule 1    oxyCODONE (ROXICODONE) 5 MG immediate release tablet Take 1 tablet (5 mg total) by mouth every 12 (twelve) hours as needed. (Patient not taking: Reported on 1/9/2025) 30 tablet 0    polyethylene glycol (GLYCOLAX) 17 gram PwPk Take 17 g by mouth daily as needed. (Patient not taking: Reported on 1/9/2025) 30 each 1    QUEtiapine (SEROQUEL) 100 MG Tab Take 1 tablet (100 mg total) by mouth every evening. 30 tablet 1    sofosbuvir-velpatasvir (EPCLUSA) 400-100 mg Tab Take 1 tablet daily. (Patient not taking: Reported on 1/9/2025) 28 tablet 2       Scheduled meds:   albumin human 25%  25 g Intravenous Q12H    aspirin  81 mg Oral Daily    atorvastatin  80 mg Oral Daily    baclofen  5 mg Oral BID    buprenorphine-naloxone 8-2 mg  1 Film Sublingual BID    cefTRIAXone (Rocephin) IV (PEDS and ADULTS)  2 g Intravenous Q24H    enoxparin  1 mg/kg Subcutaneous Q12H (treatment, non-standard time)     furosemide (LASIX) injection  40 mg Intravenous Q12H    gabapentin  300 mg Oral BID    miconazole NITRATE 2 %   Topical (Top) BID    tamsulosin  0.4 mg Oral Daily       Infusions:      PRN meds:    Current Facility-Administered Medications:     acetaminophen, 650 mg, Oral, Q4H PRN    aluminum-magnesium hydroxide-simethicone, 30 mL, Oral, QID PRN    dextrose 50%, 12.5 g, Intravenous, PRN    dextrose 50%, 25 g, Intravenous, PRN    diazePAM, 5 mg, Oral, Q12H PRN    glucagon (human recombinant), 1 mg, Intramuscular, PRN    glucose, 16 g, Oral, PRN    glucose, 24 g, Oral, PRN    hydrALAZINE, 5 mg, Intravenous, Q6H PRN    insulin aspart U-100, 0-10 Units, Subcutaneous, QID (AC + HS) PRN    magnesium oxide, 800 mg, Oral, PRN    magnesium oxide, 800 mg, Oral, PRN    melatonin, 6 mg, Oral, Nightly PRN    morphine, 4 mg, Intravenous, Q4H PRN    naloxone, 0.02 mg, Intravenous, PRN    ondansetron, 4 mg, Intravenous, Q6H PRN    oxyCODONE-acetaminophen, 1 tablet, Oral, Q4H PRN    oxyCODONE-acetaminophen, 1 tablet, Oral, Q4H PRN    potassium bicarbonate, 35 mEq, Oral, PRN    potassium bicarbonate, 50 mEq, Oral, PRN    potassium bicarbonate, 60 mEq, Oral, PRN    potassium, sodium phosphates, 2 packet, Oral, PRN    potassium, sodium phosphates, 2 packet, Oral, PRN    potassium, sodium phosphates, 2 packet, Oral, PRN    senna-docusate 8.6-50 mg, 1 tablet, Oral, Daily PRN    sodium chloride 0.9%, 10 mL, Intravenous, Q12H PRN    sodium chloride 0.9%, 10 mL, Intravenous, PRN    Past Medical History:   Diagnosis Date    Diabetes mellitus     Hypertension      Past Surgical History:   Procedure Laterality Date    SKIN GRAFT       No family history on file.  Social History     Tobacco Use    Smoking status: Every Day     Current packs/day: 1.00     Types: Cigarettes   Substance Use Topics    Alcohol use: Yes    Drug use: No       OBJECTIVE:     Vital Signs and IO:  Temp:  [97.5 °F (36.4 °C)-98.8 °F (37.1 °C)]   Pulse:  []   Resp:   [16-20]   BP: (123-150)/(64-92)   SpO2:  [95 %-98 %]   I/O last 3 completed shifts:  In: 2100 [P.O.:2100]  Out: 760 [Urine:760]  Wt Readings from Last 5 Encounters:   01/14/25 127.1 kg (280 lb 3.2 oz)   07/19/22 99.8 kg (220 lb)   02/09/20 104.3 kg (230 lb)   08/06/19 113.4 kg (250 lb)   02/27/19 108.9 kg (240 lb)     Body mass index is 40.2 kg/m².    Physical Exam  Constitutional:       General: Patient is not in acute distress.     Appearance: Patient is well-developed. She is not diaphoretic.   HENT:      Head: Normocephalic and atraumatic.      Mouth/Throat: Mucous membranes are moist.   Eyes:      General: No scleral icterus.     Pupils: Pupils are equal, round, and reactive to light.   Cardiovascular:      Rate and Rhythm: Normal rate and regular rhythm.   Pulmonary:      Effort: Pulmonary effort is normal. No respiratory distress.      Breath sounds: No stridor.   Abdominal:      General: There is no distension.      Palpations: Abdomen is soft.   Musculoskeletal:         General: No deformity. Normal range of motion.      Cervical back: Neck supple.   Skin:     General: Skin is warm and dry.      Findings: No rash present. No erythema.   Neurological:      Mental Status: Patient is alert and oriented to person, place, and time.      Cranial Nerves: No cranial nerve deficit.   Psychiatric:         Behavior: Behavior normal.          Patient care time was spent personally by me on the following activities:     Obtaining a history.  Examination of patient.  Providing medical care at the patients bedside.  Developing a treatment plan with patient or surrogate and bedside caregivers.  Ordering and reviewing laboratory studies, radiographic studies, pulse oximetry.  Ordering and performing treatments and interventions.  Evaluation of patient's response to treatment.  Discussions with consultants while on the unit and immediately available to the patient.  Re-evaluation of the patient's condition.  Documentation  in the medical record.     Ubaldo Petersen MD    [facility]  [address] [place] [id number]    [phone] - tel  [phone] - fax    1/14/2025

## 2025-01-14 NOTE — ASSESSMENT & PLAN NOTE
Secondary to acute decompensated heart failure  Elevate scrotum  Follow up ultrasound  Diuresing as per Nephrology recommendation

## 2025-01-14 NOTE — PLAN OF CARE
Problem: Adult Inpatient Plan of Care  Goal: Plan of Care Review  Outcome: Progressing  Goal: Patient-Specific Goal (Individualized)  Outcome: Progressing  Goal: Absence of Hospital-Acquired Illness or Injury  Outcome: Progressing  Goal: Optimal Comfort and Wellbeing  Outcome: Progressing  Goal: Readiness for Transition of Care  Outcome: Progressing     Problem: Fall Injury Risk  Goal: Absence of Fall and Fall-Related Injury  Outcome: Progressing     Problem: Heart Failure  Goal: Optimal Coping  Outcome: Progressing  Goal: Optimal Cardiac Output  Outcome: Progressing  Goal: Stable Heart Rate and Rhythm  Outcome: Progressing  Goal: Optimal Functional Ability  Outcome: Progressing  Goal: Fluid and Electrolyte Balance  Outcome: Progressing  Goal: Improved Oral Intake  Outcome: Progressing  Goal: Effective Oxygenation and Ventilation  Outcome: Progressing  Goal: Effective Breathing Pattern During Sleep  Outcome: Progressing     Problem: Urinary Retention  Goal: Effective Urinary Elimination  Outcome: Progressing     Problem: Diabetes Comorbidity  Goal: Blood Glucose Level Within Targeted Range  Outcome: Progressing     Problem: Wound  Goal: Optimal Coping  Outcome: Progressing  Goal: Optimal Functional Ability  Outcome: Progressing  Goal: Absence of Infection Signs and Symptoms  Outcome: Progressing  Goal: Improved Oral Intake  Outcome: Progressing  Goal: Optimal Pain Control and Function  Outcome: Progressing  Goal: Skin Health and Integrity  Outcome: Progressing  Goal: Optimal Wound Healing  Outcome: Progressing     Problem: Acute Kidney Injury/Impairment  Goal: Fluid and Electrolyte Balance  Outcome: Progressing  Goal: Improved Oral Intake  Outcome: Progressing  Goal: Effective Renal Function  Outcome: Progressing     Problem: Skin Injury Risk Increased  Goal: Skin Health and Integrity  Outcome: Progressing

## 2025-01-14 NOTE — PLAN OF CARE
[facility]  Discharge Reassessment    Primary Care Provider: No, Primary Doctor    Expected Discharge Date: 1/17/2025    Pt was unable to tolerate dobutamine. Needs diuresis, lasix added today. Nephrology consulted for possible ultrafiltration. CM following for DC planning when appropriate.     Reassessment (most recent)       Discharge Reassessment - 01/14/25 1151          Discharge Reassessment    Assessment Type Discharge Planning Reassessment     Did the patient's condition or plan change since previous assessment? No     Discharge Plan discussed with: Patient     Communicated KATHERINE with patient/caregiver Date not available/Unable to determine     Why the patient remains in the hospital Requires continued medical care

## 2025-01-14 NOTE — PROGRESS NOTES
Formerly [facility], [facility] Medicine  Progress Note    Patient Name: Gideon Ross  MRN: [id number]  Patient Class: IP- Inpatient   Admission Date: 1/9/2025  Length of Stay: 5 days  Attending Physician: Pee Bolton MD  Primary Care Provider: Jennifer, Primary Doctor        Subjective     Principal Problem:Acute systolic congestive heart failure        HPI:  Mr. Ross is a 49 yom w/pmh significant for PID status post bilateral above-the-knee amputations, diabetes mellitus who presented for significant bilateral lower extremity edema and scrotal swelling, as well as intractable pain secondary to scrotal swelling.  Labs largely unremarkable.  BNP 3500, troponin 3300.  Patient was started on IV diuresis and pain control.  Cardiology was consulted.  Patient was initially started on heparin infusion.  On exam he has significant pain and tenderness due to significant scrotal swelling.    Overview/Hospital Course:  Patient with history of peripheral artery disease status post bilateral AKA, diabetes, substance abuse presents to the emergency room with complaints of lower extremity and scrotal edema.  BNP and troponin elevated.  Cardiology consulted.  Initially started on heparin drip but then transitioned to Lovenox.  Echocardiogram showed EF 25%.  Addiction Medicine consulted for heroin use.  Started on Suboxone and reports improvement in symptoms.  Diuretics held  when he developed MARLENE.  Needs LifeVest on discharge. He was started on dobutamine on 1/11/25, but overnight about 0200 he had Vtach about 50 beats and dobutamine turned off. He has a catheter for strict I&O with MARLENE, but is describing dysuria, there is purulent drainage at the end of the catheter so UA was obtained with concerns of UTI and pt placed on rocephin, catheter removed and he has been voiding. Urine culture with NGTD - will complete 3 days of Rocephin.  Nephrology has been consulted.  Nephrology recommends intermittent IV Lasix for now on  1/14.    Interval History:  No acute events overnight.     Review of Systems   Constitutional:  Positive for fatigue.   Respiratory:  Positive for shortness of breath.    Genitourinary:  Positive for scrotal swelling.   Neurological:  Positive for tremors.     Objective:     Vital Signs (Most Recent):  Temp: 97.4 °F (36.3 °C) (01/14/25 1600)  Pulse: 100 (01/14/25 1600)  Resp: 19 (01/14/25 1600)  BP: 117/67 (01/14/25 1600)  SpO2: 98 % (01/14/25 1600) Vital Signs (24h Range):  Temp:  [97.4 °F (36.3 °C)-98.8 °F (37.1 °C)] 97.4 °F (36.3 °C)  Pulse:  [] 100  Resp:  [16-22] 19  SpO2:  [95 %-98 %] 98 %  BP: (117-139)/(64-83) 117/67     Weight: 127.1 kg (280 lb 3.2 oz)  Body mass index is 40.2 kg/m².    Intake/Output Summary (Last 24 hours) at 1/14/2025 1623  Last data filed at 1/14/2025 0939  Gross per 24 hour   Intake 720 ml   Output 100 ml   Net 620 ml         Physical Exam  Vitals and nursing note reviewed.   Constitutional:       Appearance: He is obese. He is ill-appearing.   HENT:      Head: Normocephalic and atraumatic.      Nose: Nose normal.      Mouth/Throat:      Mouth: Mucous membranes are moist.      Pharynx: Oropharynx is clear.   Eyes:      Conjunctiva/sclera: Conjunctivae normal.      Pupils: Pupils are equal, round, and reactive to light.   Cardiovascular:      Rate and Rhythm: Normal rate and regular rhythm.   Pulmonary:      Breath sounds: Rhonchi and rales present.   Abdominal:      General: Bowel sounds are normal.      Palpations: Abdomen is soft.   Genitourinary:     Comments: Scrotal edema  Musculoskeletal:         General: Deformity present.      Cervical back: Normal range of motion and neck supple.      Comments: Right BKA and left AKA   Skin:     General: Skin is warm and dry.      Capillary Refill: Capillary refill takes 2 to 3 seconds.      Coloration: Skin is pale.   Neurological:      Mental Status: He is alert and oriented to person, place, and time. Mental status is at baseline.    Psychiatric:      Comments: Tearful when talking about his heart failure             Significant Labs: All pertinent labs within the past 24 hours have been reviewed.  CBC:   Recent Labs   Lab 01/13/25  0520 01/14/25  0521   WBC 12.43 10.76   HGB 10.6* 10.1*   HCT 35.0* 32.7*    251     CMP:   Recent Labs   Lab 01/13/25  0520 01/14/25  0521   * 134*   K 4.3 4.4    104   CO2 24 23   * 217*   BUN 43* 45*   CREATININE 2.5* 2.4*   CALCIUM 7.6* 7.6*   PROT 5.6* 5.7*   ALBUMIN 2.5* 2.6*   BILITOT 0.3 0.2   ALKPHOS 101 113   AST 10 13   ALT 8* 10   ANIONGAP 7* 7*       Significant Imaging: I have reviewed all pertinent imaging results/findings within the past 24 hours.  US Scrotum And Testicles    Result Date: 1/10/2025  EXAMINATION: US SCROTUM AND TESTICLES CLINICAL HISTORY: swelling; COMPARISON: None FINDINGS: Right testicle measures 27 x 41 x 31 mm, and left testicle measures 27 x 34 x 26 mm.  Bilateral testicles contain no intratesticular mass.  Mild heterogeneous echogenicity of left testicle is evident while homogeneous echogenicity of right testicle is noted.  The testicles contain normal vascular flow on color and spectral Doppler imaging. Bilateral epididymi are normal and without hyperemia. No hernia identified in the inguinal soft tissues with Valsalva maneuver. Diffuse scrotal soft tissue swelling/edema is present.     1. Diffuse scrotal soft tissue swelling/edema. 2. Slightly heterogeneous echogenicity of left testicle without hyperemia to suggest orchitis. Electronically signed by: Fredy Trejo Date:    01/10/2025 Time:    16:16    Echo    Result Date: 1/9/2025    Left Ventricle: The left ventricle is moderately dilated. Mildly increased wall thickness. There is mild asymmetric hypertrophy. Severe global hypokinesis present. There is severely reduced systolic function with a visually estimated ejection fraction of 25 - 30%.   Right Ventricle: Moderate right ventricular enlargement.  "Systolic function is mildly reduced.   Left Atrium: Left atrium is mildly dilated.   Tricuspid Valve: There is mild regurgitation.   IVC/SVC: Elevated venous pressure at 15 mmHg.     X-Ray Chest AP Portable    Result Date: 1/9/2025  CLINICAL HISTORY: ([id number])50 y/o  (1975) M Chest Pain; TECHNIQUE: (A#[id number], exam time 1/9/2025 7:54) XR CHEST AP PORTABLE [id number] COMPARISON: Radiograph from 07/19/2022 FINDINGS: Mild perihilar pulmonary vascular prominence with slightly increased central hilar interstitial lung markings bilaterally suggestive of mild pulmonary vascular congestion/trace edema. No pneumothorax is identified. The heart is top normal in size.  Osseous structures show degenerative changes in the spine. The visualized upper abdomen is unremarkable.     Top-normal size heart and findings of mild pulmonary vascular congestion/trace interstitial edema. Electronically signed by: Manuel Snow Date:    01/09/2025 Time:    08:09       Assessment and Plan     * Acute systolic congestive heart failure  Patient has Systolic (HFrEF) heart failure that is Acute. On presentation their CHF was decompensated. Evidence of decompensated CHF on presentation includes: edema. The etiology of their decompensation is likely substance abuse . Most recent BNP and echo results are listed below.  No results for input(s): "BNP" in the last 72 hours.    Latest ECHO  Results for orders placed during the hospital encounter of 01/09/25    Echo    Interpretation Summary    Left Ventricle: The left ventricle is moderately dilated. Mildly increased wall thickness. There is mild asymmetric hypertrophy. Severe global hypokinesis present. There is severely reduced systolic function with a visually estimated ejection fraction of 25 - 30%.    Right Ventricle: Moderate right ventricular enlargement. Systolic function is mildly reduced.    Left Atrium: Left atrium is mildly dilated.    Tricuspid Valve: There is mild regurgitation.    " IVC/SVC: Elevated venous pressure at 15 mmHg.    Current Heart Failure Medications  hydrALAZINE injection 5 mg, Every 6 hours PRN, Intravenous    Plan  - Monitor strict I&Os and daily weights.    - Place on telemetry  - Low sodium diet  - Place on fluid restriction of 1.5 L.   - Cardiology has been consulted  - The patient's volume status is stable but not at their baseline as indicated by edema  Hold diuresis given MARLENE, resume diuresis once creatinine normal.  If kidneys do not normalize or worsen might need right heart catheterization per Cardiology  Hold lisinopril and hydrochlorothiazide  -Could not tolerate dobutamine d/t ectopy     Urinary tract infection associated with indwelling urethral catheter  Catheter in place for severe acute decompensated heart failure and need for strict I&O with concern of cardiorenal syndrome   Start rocephin  Follow culture  Change catheter        Opioid use disorder  Addiction medicine consulted   We will start patient on Suboxone  Per Addiction medicine:   For first dose of buprenorphine (at least 18 hours since last use of fentanyl) give one or two 8 mg strips (8-16 mg total).  Wait 1 hour and then...  If mild withdrawal, give another 8 mg strip.  If more significant withdrawal, give two more 8 mg strips.  If relaxed or calm, do not give any more.  Wait 1-2 more hours...  If still have withdrawal symptoms, take another 8 mg strip  If relaxed or calm, do not take any more  Try not to give more than 32 mg (4 strips) on day one.  On day 2, give 8 mg BID   Continue taking 8 mg BID until seeing me in clinic.    MARLENE (acute kidney injury)  MARLENE is likely due to pre-renal azotemia due to intravascular volume depletion. Baseline creatinine is  1 . Most recent creatinine and eGFR are listed below.  Recent Labs     01/11/25  0509 01/12/25  0207 01/13/25  0520   CREATININE 2.2* 2.2* 2.5*   EGFRNORACEVR 36.3* 36.3* 31.1*        Plan  - MARLENE is worsening. Will adjust treatment as follows:   Hold diuretic  - Avoid nephrotoxins and renally dose meds for GFR listed above  - Monitor urine output, serial BMP, and adjust therapy as needed  - nephrology consulted    Diabetes mellitus  Patient's FSGs are controlled on current medication regimen.  Last A1c reviewed-   Lab Results   Component Value Date    HGBA1C 7.5 (H) 01/10/2025     Most recent fingerstick glucose reviewed-   Recent Labs   Lab 01/10/25  2004 01/11/25  0749 01/11/25  1235   POCTGLUCOSE 176* 153* 160*       Current correctional scale  Low  Maintain anti-hyperglycemic dose as follows-   Antihyperglycemics (From admission, onward)      Start     Stop Route Frequency Ordered    01/10/25 1515  insulin aspart U-100 pen 0-10 Units         -- SubQ Before meals & nightly PRN 01/10/25 1415          Hold Oral hypoglycemics while patient is in the hospital.    Elevated troponin  Troponin elevated but flat, cardiology following  No chest pain or shortness of breath   Likely secondary to congestive heart failure  Continue Lovenox      Scrotal edema  Secondary to acute decompensated heart failure  Elevate scrotum  Follow up ultrasound  Diuresing as per Nephrology recommendation      VTE Risk Mitigation (From admission, onward)           Ordered     enoxaparin injection 120 mg  Every 12 hours         01/10/25 0724     IP VTE HIGH RISK PATIENT  Once         01/09/25 1326     Place sequential compression device  Until discontinued         01/09/25 1326     Reason for no Mechanical VTE Prophylaxis  Once        Question:  Reasons:  Answer:  Physician Provided (leave comment)  Comment:  already on heparin infusion    01/09/25 0906                    Discharge Planning   KATHERINE: 1/17/2025     Code Status: Full Code   Medical Readiness for Discharge Date:   Discharge Plan A: Home   Discharge Delays: None known at this time                    Mendy Campos NP  Department of Hospital Medicine   [facility]

## 2025-01-15 LAB
ALBUMIN SERPL BCP-MCNC: 2.8 G/DL (ref 3.5–5.2)
ALP SERPL-CCNC: 140 U/L (ref 55–135)
ALT SERPL W/O P-5'-P-CCNC: 12 U/L (ref 10–44)
ANION GAP SERPL CALC-SCNC: 7 MMOL/L (ref 8–16)
AST SERPL-CCNC: 14 U/L (ref 10–40)
BACTERIA UR CULT: NO GROWTH
BASOPHILS # BLD AUTO: 0.07 K/UL (ref 0–0.2)
BASOPHILS NFR BLD: 0.6 % (ref 0–1.9)
BILIRUB SERPL-MCNC: 0.2 MG/DL (ref 0.1–1)
BUN SERPL-MCNC: 53 MG/DL (ref 6–20)
CALCIUM SERPL-MCNC: 7.8 MG/DL (ref 8.7–10.5)
CHLAMYDIA, AMPLIFIED DNA: NEGATIVE
CHLORIDE SERPL-SCNC: 104 MMOL/L (ref 95–110)
CO2 SERPL-SCNC: 24 MMOL/L (ref 23–29)
CREAT SERPL-MCNC: 2.3 MG/DL (ref 0.5–1.4)
DIFFERENTIAL METHOD BLD: ABNORMAL
EOSINOPHIL # BLD AUTO: 1.1 K/UL (ref 0–0.5)
EOSINOPHIL NFR BLD: 9.8 % (ref 0–8)
ERYTHROCYTE [DISTWIDTH] IN BLOOD BY AUTOMATED COUNT: 14.7 % (ref 11.5–14.5)
EST. GFR  (NO RACE VARIABLE): 34.4 ML/MIN/1.73 M^2
GLUCOSE SERPL-MCNC: 140 MG/DL (ref 70–110)
HCT VFR BLD AUTO: 30.8 % (ref 40–54)
HGB BLD-MCNC: 9.5 G/DL (ref 14–18)
HIV1+2 IGG SERPL QL IA.RAPID: NEGATIVE
IMM GRANULOCYTES # BLD AUTO: 0.05 K/UL (ref 0–0.04)
IMM GRANULOCYTES NFR BLD AUTO: 0.5 % (ref 0–0.5)
LYMPHOCYTES # BLD AUTO: 1.8 K/UL (ref 1–4.8)
LYMPHOCYTES NFR BLD: 15.9 % (ref 18–48)
MAGNESIUM SERPL-MCNC: 1.9 MG/DL (ref 1.6–2.6)
MCH RBC QN AUTO: 26.1 PG (ref 27–31)
MCHC RBC AUTO-ENTMCNC: 30.8 G/DL (ref 32–36)
MCV RBC AUTO: 85 FL (ref 82–98)
MONOCYTES # BLD AUTO: 1.5 K/UL (ref 0.3–1)
MONOCYTES NFR BLD: 13.6 % (ref 4–15)
N GONORRHOEAE, AMPLIFIED DNA: NEGATIVE
NEUTROPHILS # BLD AUTO: 6.6 K/UL (ref 1.8–7.7)
NEUTROPHILS NFR BLD: 59.6 % (ref 38–73)
NRBC BLD-RTO: 0 /100 WBC
PLATELET # BLD AUTO: 232 K/UL (ref 150–450)
PMV BLD AUTO: 10.7 FL (ref 9.2–12.9)
POCT GLUCOSE: 130 MG/DL (ref 70–110)
POCT GLUCOSE: 133 MG/DL (ref 70–110)
POCT GLUCOSE: 156 MG/DL (ref 70–110)
POCT GLUCOSE: 251 MG/DL (ref 70–110)
POTASSIUM SERPL-SCNC: 4.7 MMOL/L (ref 3.5–5.1)
PROT SERPL-MCNC: 5.8 G/DL (ref 6–8.4)
RBC # BLD AUTO: 3.64 M/UL (ref 4.6–6.2)
SODIUM SERPL-SCNC: 135 MMOL/L (ref 136–145)
WBC # BLD AUTO: 11.03 K/UL (ref 3.9–12.7)

## 2025-01-15 PROCEDURE — 25000003 PHARM REV CODE 250: Performed by: STUDENT IN AN ORGANIZED HEALTH CARE EDUCATION/TRAINING PROGRAM

## 2025-01-15 PROCEDURE — P9047 ALBUMIN (HUMAN), 25%, 50ML: HCPCS | Performed by: GENERAL PRACTICE

## 2025-01-15 PROCEDURE — C1751 CATH, INF, PER/CENT/MIDLINE: HCPCS

## 2025-01-15 PROCEDURE — 25000003 PHARM REV CODE 250: Performed by: FAMILY MEDICINE

## 2025-01-15 PROCEDURE — 83735 ASSAY OF MAGNESIUM: CPT | Performed by: STUDENT IN AN ORGANIZED HEALTH CARE EDUCATION/TRAINING PROGRAM

## 2025-01-15 PROCEDURE — 63600175 PHARM REV CODE 636 W HCPCS: Performed by: STUDENT IN AN ORGANIZED HEALTH CARE EDUCATION/TRAINING PROGRAM

## 2025-01-15 PROCEDURE — 20000000 HC ICU ROOM

## 2025-01-15 PROCEDURE — 85025 COMPLETE CBC W/AUTO DIFF WBC: CPT | Performed by: STUDENT IN AN ORGANIZED HEALTH CARE EDUCATION/TRAINING PROGRAM

## 2025-01-15 PROCEDURE — 63600175 PHARM REV CODE 636 W HCPCS: Performed by: GENERAL PRACTICE

## 2025-01-15 PROCEDURE — 36410 VNPNXR 3YR/> PHY/QHP DX/THER: CPT

## 2025-01-15 PROCEDURE — 94761 N-INVAS EAR/PLS OXIMETRY MLT: CPT

## 2025-01-15 PROCEDURE — 99233 SBSQ HOSP IP/OBS HIGH 50: CPT | Mod: ,,, | Performed by: GENERAL PRACTICE

## 2025-01-15 PROCEDURE — 36415 COLL VENOUS BLD VENIPUNCTURE: CPT | Performed by: STUDENT IN AN ORGANIZED HEALTH CARE EDUCATION/TRAINING PROGRAM

## 2025-01-15 PROCEDURE — 63600175 PHARM REV CODE 636 W HCPCS: Performed by: NURSE PRACTITIONER

## 2025-01-15 PROCEDURE — 80053 COMPREHEN METABOLIC PANEL: CPT | Performed by: STUDENT IN AN ORGANIZED HEALTH CARE EDUCATION/TRAINING PROGRAM

## 2025-01-15 PROCEDURE — 99900035 HC TECH TIME PER 15 MIN (STAT)

## 2025-01-15 PROCEDURE — 76937 US GUIDE VASCULAR ACCESS: CPT

## 2025-01-15 PROCEDURE — 25000003 PHARM REV CODE 250: Performed by: NURSE PRACTITIONER

## 2025-01-15 PROCEDURE — 99233 SBSQ HOSP IP/OBS HIGH 50: CPT | Mod: ,,, | Performed by: FAMILY MEDICINE

## 2025-01-15 PROCEDURE — 27000221 HC OXYGEN, UP TO 24 HOURS

## 2025-01-15 RX ORDER — FUROSEMIDE 10 MG/ML
60 INJECTION INTRAMUSCULAR; INTRAVENOUS EVERY 8 HOURS
Status: DISCONTINUED | OUTPATIENT
Start: 2025-01-15 | End: 2025-01-17

## 2025-01-15 RX ADMIN — FUROSEMIDE 60 MG: 10 INJECTION, SOLUTION INTRAMUSCULAR; INTRAVENOUS at 03:01

## 2025-01-15 RX ADMIN — BUPRENORPHINE AND NALOXONE 1 FILM: 8; 2 FILM BUCCAL; SUBLINGUAL at 09:01

## 2025-01-15 RX ADMIN — MICONAZOLE NITRATE: 20 POWDER TOPICAL at 08:01

## 2025-01-15 RX ADMIN — ALBUMIN (HUMAN) 25 G: 12.5 SOLUTION INTRAVENOUS at 09:01

## 2025-01-15 RX ADMIN — MORPHINE SULFATE 4 MG: 4 INJECTION, SOLUTION INTRAMUSCULAR; INTRAVENOUS at 08:01

## 2025-01-15 RX ADMIN — ENOXAPARIN SODIUM 120 MG: 120 INJECTION SUBCUTANEOUS at 08:01

## 2025-01-15 RX ADMIN — MORPHINE SULFATE 4 MG: 4 INJECTION, SOLUTION INTRAMUSCULAR; INTRAVENOUS at 09:01

## 2025-01-15 RX ADMIN — BACLOFEN 5 MG: 5 TABLET ORAL at 08:01

## 2025-01-15 RX ADMIN — TAMSULOSIN HYDROCHLORIDE 0.4 MG: 0.4 CAPSULE ORAL at 09:01

## 2025-01-15 RX ADMIN — OXYCODONE HYDROCHLORIDE AND ACETAMINOPHEN 1 TABLET: 10; 325 TABLET ORAL at 05:01

## 2025-01-15 RX ADMIN — CEFTRIAXONE 2 G: 2 INJECTION, POWDER, FOR SOLUTION INTRAMUSCULAR; INTRAVENOUS at 03:01

## 2025-01-15 RX ADMIN — BACLOFEN 5 MG: 5 TABLET ORAL at 09:01

## 2025-01-15 RX ADMIN — DIAZEPAM 5 MG: 5 TABLET ORAL at 12:01

## 2025-01-15 RX ADMIN — MICONAZOLE NITRATE: 20 POWDER TOPICAL at 09:01

## 2025-01-15 RX ADMIN — ASPIRIN 81 MG: 81 TABLET, COATED ORAL at 09:01

## 2025-01-15 RX ADMIN — MORPHINE SULFATE 4 MG: 4 INJECTION, SOLUTION INTRAMUSCULAR; INTRAVENOUS at 02:01

## 2025-01-15 RX ADMIN — WHITE PETROLATUM 41 % TOPICAL OINTMENT: at 09:01

## 2025-01-15 RX ADMIN — FUROSEMIDE 60 MG: 10 INJECTION, SOLUTION INTRAMUSCULAR; INTRAVENOUS at 08:01

## 2025-01-15 RX ADMIN — ATORVASTATIN CALCIUM 80 MG: 40 TABLET, FILM COATED ORAL at 09:01

## 2025-01-15 RX ADMIN — ALBUMIN (HUMAN) 25 G: 12.5 SOLUTION INTRAVENOUS at 08:01

## 2025-01-15 RX ADMIN — GABAPENTIN 300 MG: 300 CAPSULE ORAL at 09:01

## 2025-01-15 RX ADMIN — FUROSEMIDE 40 MG: 10 INJECTION, SOLUTION INTRAMUSCULAR; INTRAVENOUS at 09:01

## 2025-01-15 RX ADMIN — MORPHINE SULFATE 4 MG: 4 INJECTION, SOLUTION INTRAMUSCULAR; INTRAVENOUS at 03:01

## 2025-01-15 RX ADMIN — ENOXAPARIN SODIUM 120 MG: 120 INJECTION SUBCUTANEOUS at 09:01

## 2025-01-15 RX ADMIN — MICONAZOLE NITRATE: 20 POWDER TOPICAL at 02:01

## 2025-01-15 RX ADMIN — GABAPENTIN 300 MG: 300 CAPSULE ORAL at 08:01

## 2025-01-15 NOTE — PROGRESS NOTES
[facility] Medicine  Progress Note    Patient Name: Gideon Ross  MRN: [id number]  Patient Class: IP- Inpatient   Admission Date: 1/9/2025  Length of Stay: 6 days  Attending Physician: Pee Bolton MD  Primary Care Provider: Jennifer, Primary Doctor        Subjective     Principal Problem:Acute systolic congestive heart failure        HPI:  Mr. Ross is a 49 yom w/pmh significant for PID status post bilateral above-the-knee amputations, diabetes mellitus who presented for significant bilateral lower extremity edema and scrotal swelling, as well as intractable pain secondary to scrotal swelling.  Labs largely unremarkable.  BNP 3500, troponin 3300.  Patient was started on IV diuresis and pain control.  Cardiology was consulted.  Patient was initially started on heparin infusion.  On exam he has significant pain and tenderness due to significant scrotal swelling.    Overview/Hospital Course:  Patient with history of peripheral artery disease status post bilateral AKA, diabetes, substance abuse presents to the emergency room with complaints of lower extremity and scrotal edema.  BNP and troponin elevated.  Cardiology consulted.  Initially started on heparin drip but then transitioned to Lovenox.  Echocardiogram showed EF 25%.  Addiction Medicine consulted for heroin use.  Started on Suboxone and reports improvement in symptoms.  Diuretics held  when he developed MARLENE.  Needs LifeVest on discharge. He was started on dobutamine on 1/11/25, but overnight about 0200 he had Vtach about 50 beats and dobutamine turned off. He has a catheter for strict I&O with MARLENE, but is describing dysuria, there is purulent drainage at the end of the catheter so UA was obtained with concerns of UTI and pt placed on rocephin, catheter removed and he has been voiding. Urine culture with NGTD - will complete 3 days of Rocephin.  Nephrology has been consulted.  Nephrology recommends intermittent IV Lasix for now on  1/14.    Interval History:   Patient seen and examined.  NAD serum creatinine stable not improving (2.3).  Patient noncompliant with fluid restriction and low-sodium diet.  Patient has have eaten slim cosmo in bed, garlic salt and onion seasoning at bedside.  Patient was educated on low-sodium diet and fluid restriction.  Nephrology recommending Q 1 hour intake and output.  Patient will be transferred to ICU for closer monitoring.  Review of Systems   Constitutional:  Positive for activity change and fatigue. Negative for chills and fever.   Respiratory:  Positive for shortness of breath. Negative for chest tightness.    Cardiovascular:  Negative for chest pain.   Gastrointestinal: Negative.    Genitourinary:  Positive for scrotal swelling.   Neurological:  Positive for tremors and weakness.   Psychiatric/Behavioral:          Tearful      Objective:     Vital Signs (Most Recent):  Temp: 97.5 °F (36.4 °C) (01/15/25 0817)  Pulse: 96 (01/15/25 1218)  Resp: 18 (01/15/25 1218)  BP: 135/78 (97) (01/15/25 1218)  SpO2: 100 % (01/15/25 1218) Vital Signs (24h Range):  Temp:  [97.4 °F (36.3 °C)-98.6 °F (37 °C)] 97.5 °F (36.4 °C)  Pulse:  [] 96  Resp:  [15-22] 18  SpO2:  [96 %-100 %] 100 %  BP: (114-135)/(67-78) 135/78     Weight: 126.1 kg (278 lb)  Body mass index is 39.89 kg/m².    Intake/Output Summary (Last 24 hours) at 1/15/2025 1530  Last data filed at 1/15/2025 0321  Gross per 24 hour   Intake 720 ml   Output 1875 ml   Net -1155 ml         Physical Exam  Vitals and nursing note reviewed. Exam conducted with a chaperone present.   Constitutional:       Appearance: Normal appearance. He is obese. He is ill-appearing.   Pulmonary:      Effort: Pulmonary effort is normal.      Breath sounds: Rhonchi and rales present.   Genitourinary:     Comments: Scrotal Edema   Musculoskeletal:         General: Deformity present.      Comments: Right BKA/ Left AKA   Skin:     General: Skin is warm and dry.   Neurological:      Mental  Status: He is alert.   Psychiatric:         Mood and Affect: Mood normal.         Behavior: Behavior normal.         Thought Content: Thought content normal.             Significant Labs: All pertinent labs within the past 24 hours have been reviewed.  CBC:   Recent Labs   Lab 01/14/25  0521 01/15/25  0512   WBC 10.76 11.03   HGB 10.1* 9.5*   HCT 32.7* 30.8*    232     CMP:   Recent Labs   Lab 01/14/25  0521 01/15/25  0512   * 135*   K 4.4 4.7    104   CO2 23 24   * 140*   BUN 45* 53*   CREATININE 2.4* 2.3*   CALCIUM 7.6* 7.8*   PROT 5.7* 5.8*   ALBUMIN 2.6* 2.8*   BILITOT 0.2 0.2   ALKPHOS 113 140*   AST 13 14   ALT 10 12   ANIONGAP 7* 7*     Magnesium:   Recent Labs   Lab 01/14/25  0521 01/15/25  0512   MG 2.0 1.9     Microbiology Results (last 7 days)       Procedure Component Value Units Date/Time    Urine culture [[id number]] Collected: 01/12/25 1055    Order Status: Completed Specimen: Urine Updated: 01/15/25 0640     Urine Culture, Routine No growth    Narrative:      Specimen Source->Urine    Blood culture x two cultures. Draw prior to antibiotics. [[id number]] Collected: 01/09/25 0859    Order Status: Completed Specimen: Blood Updated: 01/14/25 1032     Blood Culture, Routine No growth after 5 days.    Narrative:      Aerobic and anaerobic    Blood culture x two cultures. Draw prior to antibiotics. [[id number]] Collected: 01/09/25 0859    Order Status: Completed Specimen: Blood Updated: 01/14/25 1032     Blood Culture, Routine No growth after 5 days.    Narrative:      Aerobic and anaerobic    Clostridium difficile EIA [[id number]] Collected: 01/09/25 2223    Order Status: Canceled Specimen: Stool             Significant Imaging: I have reviewed all pertinent imaging results/findings within the past 24 hours.  Imaging Results              X-Ray Chest AP Portable (Final result)  Result time 01/09/25 08:09:27      Final result by Manuel Snow MD (01/09/25 08:09:27)            "        Impression:      Top-normal size heart and findings of mild pulmonary vascular congestion/trace interstitial edema.      Electronically signed by: Manuel Snow  Date:    01/09/2025  Time:    08:09               Narrative:    CLINICAL HISTORY:  ([id number])48 y/o  (1975) M    Chest Pain;    TECHNIQUE:  (A#[id number], exam time 1/9/2025 7:54)    XR CHEST AP PORTABLE [id number]    COMPARISON:  Radiograph from 07/19/2022    FINDINGS:  Mild perihilar pulmonary vascular prominence with slightly increased central hilar interstitial lung markings bilaterally suggestive of mild pulmonary vascular congestion/trace edema. No pneumothorax is identified. The heart is top normal in size.  Osseous structures show degenerative changes in the spine. The visualized upper abdomen is unremarkable.                                        Assessment and Plan     * Acute systolic congestive heart failure  Patient has Systolic (HFrEF) heart failure that is Acute. On presentation their CHF was decompensated. Evidence of decompensated CHF on presentation includes: edema. The etiology of their decompensation is likely substance abuse . Most recent BNP and echo results are listed below.  No results for input(s): "BNP" in the last 72 hours.    Latest ECHO  Results for orders placed during the hospital encounter of 01/09/25    Echo    Interpretation Summary    Left Ventricle: The left ventricle is moderately dilated. Mildly increased wall thickness. There is mild asymmetric hypertrophy. Severe global hypokinesis present. There is severely reduced systolic function with a visually estimated ejection fraction of 25 - 30%.    Right Ventricle: Moderate right ventricular enlargement. Systolic function is mildly reduced.    Left Atrium: Left atrium is mildly dilated.    Tricuspid Valve: There is mild regurgitation.    IVC/SVC: Elevated venous pressure at 15 mmHg.    Current Heart Failure Medications  hydrALAZINE injection 5 mg, Every 6 hours " PRN, Intravenous  furosemide injection 60 mg, Every 8 hours, Intravenous    Plan  - Monitor strict I&Os and daily weights.    - Place on telemetry  - Low sodium diet  - Place on fluid restriction of 1.5 L.   - Cardiology has been consulted  - The patient's volume status is stable but not at their baseline as indicated by edema  -Hold lisinopril and hydrochlorothiazide  -Could not tolerate dobutamine d/t ectopy   -1/15 Lasix 60 mg x1 dose per Cardiology  -transferred to ICU    Urinary tract infection associated with indwelling urethral catheter  Catheter in place for severe acute decompensated heart failure and need for strict I&O with concern of cardiorenal syndrome   Completed 4 day course of ceftriaxone  Follow culture  Weber catheter was previously only move and unable to be reinserted  Patient has a male peer with        Opioid use disorder  Addiction medicine consulted   We will start patient on Suboxone  Per Addiction medicine:   For first dose of buprenorphine (at least 18 hours since last use of fentanyl) give one or two 8 mg strips (8-16 mg total).  Wait 1 hour and then...  If mild withdrawal, give another 8 mg strip.  If more significant withdrawal, give two more 8 mg strips.  If relaxed or calm, do not give any more.  Wait 1-2 more hours...  If still have withdrawal symptoms, take another 8 mg strip  If relaxed or calm, do not take any more  Try not to give more than 32 mg (4 strips) on day one.  On day 2, give 8 mg BID   Continue taking 8 mg BID until seeing me in clinic.    MARLENE (acute kidney injury)  MARLENE is likely due to pre-renal azotemia due to intravascular volume depletion. Baseline creatinine is  1 . Most recent creatinine and eGFR are listed below.  Recent Labs     01/13/25  0520 01/14/25  0521 01/15/25  0512   CREATININE 2.5* 2.4* 2.3*   EGFRNORACEVR 31.1* 32.7* 34.4*        Plan  - MARLENE is stable but not improving  - Avoid nephrotoxins and renally dose meds for GFR listed above  - Monitor urine  output, serial BMP, and adjust therapy as needed  - nephrology consulted    Diabetes mellitus  Patient's FSGs are controlled on current medication regimen.  Last A1c reviewed-   Lab Results   Component Value Date    HGBA1C 7.5 (H) 01/10/2025     Most recent fingerstick glucose reviewed-   Recent Labs   Lab 01/14/25  1605 01/14/25  1919 01/15/25  0804 01/15/25  1151   POCTGLUCOSE 207* 158* 130* 251*       Current correctional scale  Low  Maintain anti-hyperglycemic dose as follows-   Antihyperglycemics (From admission, onward)      Start     Stop Route Frequency Ordered    01/10/25 1515  insulin aspart U-100 pen 0-10 Units         -- SubQ Before meals & nightly PRN 01/10/25 1415          Hold Oral hypoglycemics while patient is in the hospital.    Elevated troponin  Troponin elevated but flat, cardiology following  No chest pain or shortness of breath   Likely secondary to congestive heart failure  Continue Lovenox      Scrotal edema  Secondary to acute decompensated heart failure  Elevate scrotum  Scrotal ultrasound reviewed  Diuresing as per Nephrology recommendation    Obesity  Body mass index is 39.89 kg/m². Morbid obesity complicates all aspects of disease management from diagnostic modalities to treatment. Weight loss encouraged and health benefits explained to patient.           VTE Risk Mitigation (From admission, onward)           Ordered     enoxaparin injection 120 mg  Every 12 hours         01/10/25 0724     IP VTE HIGH RISK PATIENT  Once         01/09/25 1326     Place sequential compression device  Until discontinued         01/09/25 1326     Reason for no Mechanical VTE Prophylaxis  Once        Question:  Reasons:  Answer:  Physician Provided (leave comment)  Comment:  already on heparin infusion    01/09/25 0906                    Discharge Planning   KATHERINE: 1/20/2025     Code Status: Full Code   Medical Readiness for Discharge Date:   Discharge Plan A: Home   Discharge Delays: None known at this  jah Mora NP  Department of Hospital Medicine   [facility]

## 2025-01-15 NOTE — ASSESSMENT & PLAN NOTE
MARLENE is likely due to pre-renal azotemia due to intravascular volume depletion. Baseline creatinine is  1 . Most recent creatinine and eGFR are listed below.  Recent Labs     01/13/25  0520 01/14/25  0521 01/15/25  0512   CREATININE 2.5* 2.4* 2.3*   EGFRNORACEVR 31.1* 32.7* 34.4*        Plan  - MARLENE is stable but not improving  - Avoid nephrotoxins and renally dose meds for GFR listed above  - Monitor urine output, serial BMP, and adjust therapy as needed  - nephrology consulted

## 2025-01-15 NOTE — CARE UPDATE
01/15/25 0806   PRE-TX-O2   Device (Oxygen Therapy) room air   SpO2 97 %   Pulse Oximetry Type Intermittent   $ Pulse Oximetry - Multiple Charge Pulse Oximetry - Multiple   Pulse 98   Resp 15   Respiratory Evaluation   $ Care Plan Tech Time 15 min

## 2025-01-15 NOTE — CONSULTS
Single lumen 18G, 10CM midline placed in the right cephalic vein. Needle advanced into the vessel under real time ultrasound guidance. Image recorded and saved.    Max dwell date 2/13/25.  Lot number: [id number]

## 2025-01-15 NOTE — PLAN OF CARE
Spoke to Marie with [facility] @ [phone] to check on status of lifevest, she states he is approved and will be fitted within 48 hrs of DC date. She stated to call her if DC date changes , as of now he will be fitted Sat or Sun.       01/15/25 1248   Post-Acute Status   Post-Acute Authorization HME   HME Status Pending medical clearance/testing

## 2025-01-15 NOTE — SUBJECTIVE & OBJECTIVE
Interval History:   Patient seen and examined.  NAD serum creatinine stable not improving (2.3).  Patient noncompliant with fluid restriction and low-sodium diet.  Patient has have eaten slim cosmo in bed, garlic salt and onion seasoning at bedside.  Patient was educated on low-sodium diet and fluid restriction.  Nephrology recommending Q 1 hour intake and output.  Patient will be transferred to ICU for closer monitoring.  Review of Systems   Constitutional:  Positive for activity change and fatigue. Negative for chills and fever.   Respiratory:  Positive for shortness of breath. Negative for chest tightness.    Cardiovascular:  Negative for chest pain.   Gastrointestinal: Negative.    Genitourinary:  Positive for scrotal swelling.   Neurological:  Positive for tremors and weakness.   Psychiatric/Behavioral:          Tearful      Objective:     Vital Signs (Most Recent):  Temp: 97.5 °F (36.4 °C) (01/15/25 0817)  Pulse: 96 (01/15/25 1218)  Resp: 18 (01/15/25 1218)  BP: 135/78 (97) (01/15/25 1218)  SpO2: 100 % (01/15/25 1218) Vital Signs (24h Range):  Temp:  [97.4 °F (36.3 °C)-98.6 °F (37 °C)] 97.5 °F (36.4 °C)  Pulse:  [] 96  Resp:  [15-22] 18  SpO2:  [96 %-100 %] 100 %  BP: (114-135)/(67-78) 135/78     Weight: 126.1 kg (278 lb)  Body mass index is 39.89 kg/m².    Intake/Output Summary (Last 24 hours) at 1/15/2025 1530  Last data filed at 1/15/2025 0321  Gross per 24 hour   Intake 720 ml   Output 1875 ml   Net -1155 ml         Physical Exam  Vitals and nursing note reviewed. Exam conducted with a chaperone present.   Constitutional:       Appearance: Normal appearance. He is obese. He is ill-appearing.   Pulmonary:      Effort: Pulmonary effort is normal.      Breath sounds: Rhonchi and rales present.   Genitourinary:     Comments: Scrotal Edema   Musculoskeletal:         General: Deformity present.      Comments: Right BKA/ Left AKA   Skin:     General: Skin is warm and dry.   Neurological:      Mental Status:  He is alert.   Psychiatric:         Mood and Affect: Mood normal.         Behavior: Behavior normal.         Thought Content: Thought content normal.             Significant Labs: All pertinent labs within the past 24 hours have been reviewed.  CBC:   Recent Labs   Lab 01/14/25  0521 01/15/25  0512   WBC 10.76 11.03   HGB 10.1* 9.5*   HCT 32.7* 30.8*    232     CMP:   Recent Labs   Lab 01/14/25  0521 01/15/25  0512   * 135*   K 4.4 4.7    104   CO2 23 24   * 140*   BUN 45* 53*   CREATININE 2.4* 2.3*   CALCIUM 7.6* 7.8*   PROT 5.7* 5.8*   ALBUMIN 2.6* 2.8*   BILITOT 0.2 0.2   ALKPHOS 113 140*   AST 13 14   ALT 10 12   ANIONGAP 7* 7*     Magnesium:   Recent Labs   Lab 01/14/25  0521 01/15/25  0512   MG 2.0 1.9     Microbiology Results (last 7 days)       Procedure Component Value Units Date/Time    Urine culture [[id number]] Collected: 01/12/25 1055    Order Status: Completed Specimen: Urine Updated: 01/15/25 0640     Urine Culture, Routine No growth    Narrative:      Specimen Source->Urine    Blood culture x two cultures. Draw prior to antibiotics. [[id number]] Collected: 01/09/25 0859    Order Status: Completed Specimen: Blood Updated: 01/14/25 1032     Blood Culture, Routine No growth after 5 days.    Narrative:      Aerobic and anaerobic    Blood culture x two cultures. Draw prior to antibiotics. [[id number]] Collected: 01/09/25 0859    Order Status: Completed Specimen: Blood Updated: 01/14/25 1032     Blood Culture, Routine No growth after 5 days.    Narrative:      Aerobic and anaerobic    Clostridium difficile EIA [[id number]] Collected: 01/09/25 2223    Order Status: Canceled Specimen: Stool             Significant Imaging: I have reviewed all pertinent imaging results/findings within the past 24 hours.  Imaging Results              X-Ray Chest AP Portable (Final result)  Result time 01/09/25 08:09:27      Final result by Manuel Snow MD (01/09/25 08:09:27)                    Impression:      Top-normal size heart and findings of mild pulmonary vascular congestion/trace interstitial edema.      Electronically signed by: Manuel Snow  Date:    01/09/2025  Time:    08:09               Narrative:    CLINICAL HISTORY:  ([id number])50 y/o  (1975) M    Chest Pain;    TECHNIQUE:  (A#[id number], exam time 1/9/2025 7:54)    XR CHEST AP PORTABLE [id number]    COMPARISON:  Radiograph from 07/19/2022    FINDINGS:  Mild perihilar pulmonary vascular prominence with slightly increased central hilar interstitial lung markings bilaterally suggestive of mild pulmonary vascular congestion/trace edema. No pneumothorax is identified. The heart is top normal in size.  Osseous structures show degenerative changes in the spine. The visualized upper abdomen is unremarkable.

## 2025-01-15 NOTE — ASSESSMENT & PLAN NOTE
Patient's FSGs are controlled on current medication regimen.  Last A1c reviewed-   Lab Results   Component Value Date    HGBA1C 7.5 (H) 01/10/2025     Most recent fingerstick glucose reviewed-   Recent Labs   Lab 01/14/25  1605 01/14/25  1919 01/15/25  0804 01/15/25  1151   POCTGLUCOSE 207* 158* 130* 251*       Current correctional scale  Low  Maintain anti-hyperglycemic dose as follows-   Antihyperglycemics (From admission, onward)    Start     Stop Route Frequency Ordered    01/10/25 1515  insulin aspart U-100 pen 0-10 Units         -- SubQ Before meals & nightly PRN 01/10/25 1415        Hold Oral hypoglycemics while patient is in the hospital.

## 2025-01-15 NOTE — CONSULTS
Right leg dry flakey, instructed pt not to rub or scratch    Posterior scrotum denuded cleaned and dried and applied Triad     Right groin ulcer approx 3x1cm yellow slough, cleaned and dried and covered with aquacel ag and abd pad    Scrotum small yellow ulcers cleaned and dried and applied Triad     Left groin ulcer approx 4.5 x2cm yellow gray slough, bilateral leg and scrotal edema, cleaned and dried and covered with aquacel ag and abd pad, spoke with Mendy Campos NP. Notified Dr. Hernandez photos sent. Dr Hernandez to see patient tomorrow    Left groin    Left groin    Left groin  Nurse came to assist, instructed to use male Qivi, keep clean and dry, ordered bariatric bed with foam mattress, complete skin assessment

## 2025-01-15 NOTE — CARE UPDATE
01/14/25 1912   Patient Assessment/Suction   Level of Consciousness (AVPU) alert   Respiratory Effort Normal;Unlabored   Expansion/Accessory Muscles/Retractions no use of accessory muscles   PRE-TX-O2   Device (Oxygen Therapy) room air   SpO2 97 %   Pulse Oximetry Type Intermittent   $ Pulse Oximetry - Multiple Charge Pulse Oximetry - Multiple   Pulse 96   Resp 20   Positioning HOB elevated 30 degrees   Positioning   Head of Bed (HOB) Positioning HOB at 30 degrees   Positioning/Transfer Devices pillows

## 2025-01-15 NOTE — PROGRESS NOTES
Nephrology Consult Note        Patient Name: Gideon Ross  MRN: [id number]    Patient Class: IP- Inpatient   Admission Date: 1/9/2025  Length of Stay: 6 days  Date of Service: 1/15/2025    Attending Physician: Pee Bolton MD  Primary Care Provider: Jennifer, Primary Doctor    Reason for Consult: marlene    SUBJECTIVE:     HPI: 49M with PID, bilateral BKA, DM presented for significant bilateral lower extremity edema, scrotal swelling, intractable pain. Labs largely unremarkable other than BNP 3,500, troponin 3,300. Patient was started on IV diuretics and pain control. Cardiology was consulted. Initially started on heparin drip but then transitioned to Lovenox. Echocardiogram showed EF 25%. Addiction Medicine consulted for heroin use. Started on Suboxone and reports improvement in symptoms. Diuretics held 2/2 worsening sCr and MARLENE. He was started on dobutamine on 1/11/25, but was stopped 2/2 tachycardia. He has a catheter for strict I&O with MARLENE, but is describing dysuria, there is purulent drainage at the end of the catheter so UA was obtained with concerns of UTI and pt placed on rocephin and recommendations for catheter exchange giving continued MARLENE and need for strict I&O with decompensated heart failure.      1/14 Agree with diuretics and albumin, will consider escalation or ultrafiltration. Not sure how acute the HF is. Consider palliative eval as well.  1/15 VSS. Good UO with diuretics+ albumin, will escalate more tomorrow if he remains stable. Not sure if UF will be better tolerated than diuretics.    ASSESSMENT/PLAN:     MARLENE 2/2 ATN due to intravascular volume depletion from diuretics  Hypoalbuminemia with dependent edema due to third-spacing and immobility  HFrEF ? Etiology, acuity, prognosis?  PAD  DM  Anemia  CKD stage 2, sCr 1 om 1/9/2025  No NSAIDs, ACEI/ARB, IV contrast or other nephrotoxins.  Keep MAP > 60, SBP > 100.  Dose meds for GFR < 30 ml/min.  Renal diet - low K, low phos, low sodium.  Optimize  nutrition, added protein supplement + IV.  Judicious diuretic use, edema will not resolve with diuretics alone.  Hgb and HCT are acceptable. Monitor for now.    Thank you for allowing us to participate in the care of your patient!   We will follow the patient and provide recommendations as needed.         Laboratory:  Recent Labs   Lab 01/13/25  0520 01/14/25  0521 01/15/25  0512   * 134* 135*   K 4.3 4.4 4.7    104 104   CO2 24 23 24   BUN 43* 45* 53*   CREATININE 2.5* 2.4* 2.3*   * 217* 140*       Recent Labs   Lab 01/13/25  0520 01/14/25  0521 01/15/25  0512   CALCIUM 7.6* 7.6* 7.8*   ALBUMIN 2.5* 2.6* 2.8*   MG 2.1 2.0 1.9             Recent Labs   Lab 01/12/25  2008 01/13/25  0750 01/13/25  1126 01/13/25  1547 01/13/25  1941 01/14/25  0731 01/14/25  1129 01/14/25  1605 01/14/25  1919 01/15/25  0804   POCTGLUCOSE 211* 208* 173* 268* 243* 212* 208* 207* 158* 130*       Recent Labs   Lab 01/10/25  0721   Hemoglobin A1C 7.5 H       Recent Labs   Lab 01/13/25 0520 01/14/25  0521 01/15/25  0512   WBC 12.43 10.76 11.03   HGB 10.6* 10.1* 9.5*   HCT 35.0* 32.7* 30.8*    251 232   MCV 85 85 85   MCHC 30.3* 30.9* 30.8*   MONO 11.2  1.4* 11.8  1.3* 13.6  1.5*   EOSINOPHIL 10.5* 11.4* 9.8*       Recent Labs   Lab 01/13/25 0520 01/14/25  0521 01/15/25  0512   BILITOT 0.3 0.2 0.2   PROT 5.6* 5.7* 5.8*   ALBUMIN 2.5* 2.6* 2.8*   ALKPHOS 101 113 140*   ALT 8* 10 12   AST 10 13 14       Recent Labs   Lab 07/19/22  2316 01/09/25  0743 01/12/25  1055   Color, UA Yellow Yellow [place] A   Appearance, UA Clear Hazy A Hazy A   pH, UA 6.0 6.0 6.0   Specific Gravity, UA <=1.005 1.025 1.010   Protein, UA Trace A 3+ A 1+ A   Glucose, UA 4+ A Trace A Negative   Ketones, UA Negative Negative Negative   Urobilinogen, UA Negative 2.0-3.0 A Negative   Bilirubin (UA) Negative Negative Negative   Occult Blood UA Negative 2+ A 3+ A   Nitrite, UA Negative Negative Negative   RBC, UA 1 7 H >100 H   WBC, UA  --  4 18 H    Bacteria None Occasional Rare   Hyaline Casts, UA  --  4 A 0       Recent Labs   Lab 07/19/22 2046 01/09/25  0707   POC PH 7.473 H  --    POC PCO2 42.4  --    POC HCO3 31.1 H  --    POC PO2 28 L  --    POC SATURATED O2 58 L  --    POC BE 7  --    Sample VENOUS VENOUS       Microbiology Results (last 7 days)       Procedure Component Value Units Date/Time    Urine culture [[id number]] Collected: 01/12/25 1055    Order Status: Completed Specimen: Urine Updated: 01/15/25 0640     Urine Culture, Routine No growth    Narrative:      Specimen Source->Urine    Blood culture x two cultures. Draw prior to antibiotics. [[id number]] Collected: 01/09/25 0859    Order Status: Completed Specimen: Blood Updated: 01/14/25 1032     Blood Culture, Routine No growth after 5 days.    Narrative:      Aerobic and anaerobic    Blood culture x two cultures. Draw prior to antibiotics. [[id number]] Collected: 01/09/25 0859    Order Status: Completed Specimen: Blood Updated: 01/14/25 1032     Blood Culture, Routine No growth after 5 days.    Narrative:      Aerobic and anaerobic    Clostridium difficile EIA [[id number]] Collected: 01/09/25 2223    Order Status: Canceled Specimen: Stool             Review of patient's allergies indicates:  No Known Allergies    Outpatient meds:  No current facility-administered medications on file prior to encounter.     Current Outpatient Medications on File Prior to Encounter   Medication Sig Dispense Refill    amLODIPine (NORVASC) 10 MG tablet Take 1 tablet (10 mg total) by mouth once daily. 30 tablet 1    aspirin (ECOTRIN) 81 MG EC tablet Take 1 tablet (81 mg total) by mouth once daily. 30 tablet 1    atorvastatin (LIPITOR) 80 MG tablet Take 1 tablet (80 mg total) by mouth once daily. 30 tablet 1    baclofen (LIORESAL) 10 MG tablet Take 1 tablet (10 mg total) by mouth 3 (three) times daily. 90 tablet 1    carvedilol (COREG) 6.25 MG tablet Take 1 tablet (6.25 mg total) by mouth 2 (two) times daily. 60  tablet 1    diazePAM (VALIUM) 5 MG tablet Take 1 tablet (5 mg total) by mouth every 12 (twelve) hours as needed for Anxiety (anxiety.). 30 tablet 0    gabapentin (NEURONTIN) 300 MG capsule Take 2 capsules (600 mg total) by mouth 3 (three) times daily. 90 capsule 1    hydroCHLOROthiazide (HYDRODIURIL) 50 MG tablet Take 1 tablet (50 mg total) by mouth once daily. 30 tablet 1    insulin aspart U-100 (NOVOLOG) 100 unit/mL InPn pen Inject 18 Units into the skin 3 (three) times daily. (Patient taking differently: Inject into the skin 3 (three) times daily.) 10 mL 1    insulin detemir U-100 (LEVEMIR FLEXTOUCH) 100 unit/mL (3 mL) SubQ InPn pen Inject 35 Units into the skin 2 (two) times daily. 10 mL 1    insulin glargine U-100, Lantus, 100 unit/mL injection Inject 30 Units into the skin every evening. (Patient not taking: Reported on 1/9/2025)      lisinopril (PRINIVIL,ZESTRIL) 40 MG tablet Take 1 tablet (40 mg total) by mouth once daily. 30 tablet 1    nicotine (NICODERM CQ) 14 mg/24 hr Place 1 patch onto the skin once daily. (Patient not taking: Reported on 1/9/2025) 15 patch 0    nortriptyline (PAMELOR) 25 MG capsule Take 1 capsule (25 mg total) by mouth 3 (three) times daily. 30 capsule 1    oxyCODONE (ROXICODONE) 5 MG immediate release tablet Take 1 tablet (5 mg total) by mouth every 12 (twelve) hours as needed. (Patient not taking: Reported on 1/9/2025) 30 tablet 0    polyethylene glycol (GLYCOLAX) 17 gram PwPk Take 17 g by mouth daily as needed. (Patient not taking: Reported on 1/9/2025) 30 each 1    QUEtiapine (SEROQUEL) 100 MG Tab Take 1 tablet (100 mg total) by mouth every evening. 30 tablet 1    sofosbuvir-velpatasvir (EPCLUSA) 400-100 mg Tab Take 1 tablet daily. (Patient not taking: Reported on 1/9/2025) 28 tablet 2       Scheduled meds:   albumin human 25%  25 g Intravenous Q12H    aspirin  81 mg Oral Daily    atorvastatin  80 mg Oral Daily    baclofen  5 mg Oral BID    buprenorphine-naloxone 8-2 mg  1 Film  Sublingual BID    cefTRIAXone (Rocephin) IV (PEDS and ADULTS)  2 g Intravenous Q24H    enoxparin  1 mg/kg Subcutaneous Q12H (treatment, non-standard time)    furosemide (LASIX) injection  40 mg Intravenous Q12H    gabapentin  300 mg Oral BID    miconazole NITRATE 2 %   Topical (Top) BID    tamsulosin  0.4 mg Oral Daily    white petrolatum   Topical (Top) Daily       Infusions:      PRN meds:    Current Facility-Administered Medications:     acetaminophen, 650 mg, Oral, Q4H PRN    aluminum-magnesium hydroxide-simethicone, 30 mL, Oral, QID PRN    dextrose 50%, 12.5 g, Intravenous, PRN    dextrose 50%, 25 g, Intravenous, PRN    diazePAM, 5 mg, Oral, Q12H PRN    glucagon (human recombinant), 1 mg, Intramuscular, PRN    glucose, 16 g, Oral, PRN    glucose, 24 g, Oral, PRN    hydrALAZINE, 5 mg, Intravenous, Q6H PRN    insulin aspart U-100, 0-10 Units, Subcutaneous, QID (AC + HS) PRN    magnesium oxide, 800 mg, Oral, PRN    magnesium oxide, 800 mg, Oral, PRN    melatonin, 6 mg, Oral, Nightly PRN    morphine, 4 mg, Intravenous, Q4H PRN    naloxone, 0.02 mg, Intravenous, PRN    ondansetron, 4 mg, Intravenous, Q6H PRN    oxyCODONE-acetaminophen, 1 tablet, Oral, Q4H PRN    oxyCODONE-acetaminophen, 1 tablet, Oral, Q4H PRN    potassium bicarbonate, 35 mEq, Oral, PRN    potassium bicarbonate, 50 mEq, Oral, PRN    potassium bicarbonate, 60 mEq, Oral, PRN    potassium, sodium phosphates, 2 packet, Oral, PRN    potassium, sodium phosphates, 2 packet, Oral, PRN    potassium, sodium phosphates, 2 packet, Oral, PRN    senna-docusate 8.6-50 mg, 1 tablet, Oral, Daily PRN    sodium chloride 0.9%, 10 mL, Intravenous, Q12H PRN    sodium chloride 0.9%, 10 mL, Intravenous, PRN    Past Medical History:   Diagnosis Date    Diabetes mellitus     Hypertension      Past Surgical History:   Procedure Laterality Date    SKIN GRAFT       No family history on file.  Social History     Tobacco Use    Smoking status: Every Day     Current packs/day:  1.00     Types: Cigarettes   Substance Use Topics    Alcohol use: Yes    Drug use: No       OBJECTIVE:     Vital Signs and IO:  Temp:  [97.4 °F (36.3 °C)-98.6 °F (37 °C)]   Pulse:  []   Resp:  [15-22]   BP: (114-133)/(67-83)   SpO2:  [96 %-98 %]   I/O last 3 completed shifts:  In: 1800 [P.O.:1800]  Out: 1975 [Urine:1975]  Wt Readings from Last 5 Encounters:   01/15/25 126.1 kg (278 lb)   07/19/22 99.8 kg (220 lb)   02/09/20 104.3 kg (230 lb)   08/06/19 113.4 kg (250 lb)   02/27/19 108.9 kg (240 lb)     Body mass index is 39.89 kg/m².    Physical Exam  Constitutional:       General: Patient is not in acute distress.     Appearance: Patient is well-developed. She is not diaphoretic.   HENT:      Head: Normocephalic and atraumatic.      Mouth/Throat: Mucous membranes are moist.   Eyes:      General: No scleral icterus.     Pupils: Pupils are equal, round, and reactive to light.   Cardiovascular:      Rate and Rhythm: Normal rate and regular rhythm.   Pulmonary:      Effort: Pulmonary effort is normal. No respiratory distress.      Breath sounds: No stridor.   Abdominal:      General: There is no distension.      Palpations: Abdomen is soft.   Musculoskeletal:         General: No deformity. Normal range of motion.      Cervical back: Neck supple.   Skin:     General: Skin is warm and dry.      Findings: No rash present. No erythema.   Neurological:      Mental Status: Patient is alert and oriented to person, place, and time.      Cranial Nerves: No cranial nerve deficit.   Psychiatric:         Behavior: Behavior normal.          Patient care time was spent personally by me on the following activities:     Obtaining a history.  Examination of patient.  Providing medical care at the patients bedside.  Developing a treatment plan with patient or surrogate and bedside caregivers.  Ordering and reviewing laboratory studies, radiographic studies, pulse oximetry.  Ordering and performing treatments and  interventions.  Evaluation of patient's response to treatment.  Discussions with consultants while on the unit and immediately available to the patient.  Re-evaluation of the patient's condition.  Documentation in the medical record.     Ubaldo Petersen MD    [facility]  [address] [place] [id number]    [phone] - tel  [phone] - fax    1/15/2025

## 2025-01-15 NOTE — ASSESSMENT & PLAN NOTE
"Patient has Systolic (HFrEF) heart failure that is Acute. On presentation their CHF was decompensated. Evidence of decompensated CHF on presentation includes: edema. The etiology of their decompensation is likely substance abuse . Most recent BNP and echo results are listed below.  No results for input(s): "BNP" in the last 72 hours.    Latest ECHO  Results for orders placed during the hospital encounter of 01/09/25    Echo    Interpretation Summary    Left Ventricle: The left ventricle is moderately dilated. Mildly increased wall thickness. There is mild asymmetric hypertrophy. Severe global hypokinesis present. There is severely reduced systolic function with a visually estimated ejection fraction of 25 - 30%.    Right Ventricle: Moderate right ventricular enlargement. Systolic function is mildly reduced.    Left Atrium: Left atrium is mildly dilated.    Tricuspid Valve: There is mild regurgitation.    IVC/SVC: Elevated venous pressure at 15 mmHg.    Current Heart Failure Medications  hydrALAZINE injection 5 mg, Every 6 hours PRN, Intravenous  furosemide injection 60 mg, Every 8 hours, Intravenous    Plan  - Monitor strict I&Os and daily weights.    - Place on telemetry  - Low sodium diet  - Place on fluid restriction of 1.5 L.   - Cardiology has been consulted  - The patient's volume status is stable but not at their baseline as indicated by edema  -Hold lisinopril and hydrochlorothiazide  -Could not tolerate dobutamine d/t ectopy   -1/15 Lasix 60 mg x1 dose per Cardiology  -transferred to ICU  "

## 2025-01-15 NOTE — ASSESSMENT & PLAN NOTE
Catheter in place for severe acute decompensated heart failure and need for strict I&O with concern of cardiorenal syndrome   Completed 4 day course of ceftriaxone  Follow culture  Weber catheter was previously only move and unable to be reinserted  Patient has a male peer with

## 2025-01-15 NOTE — PROGRESS NOTES
[facility]   Department of Cardiology  Progress Note      PATIENT NAME: Gideon Ross    MRN: [id number]  TODAY'S DATE: 01/15/2025  ADMIT DATE: 1/9/2025                          CONSULT REQUESTED BY: Pee Bolton MD    SUBJECTIVE     PRINCIPAL PROBLEM: Acute systolic congestive heart failure    01/15/2025    Patient seen resting in bed with no distress noted. Breathing is stable. He remains with a good amount of edema.     1/14/25  Resting in bed. Complaints of thirst.  Wheezing present.  Remains edematous; no further events of NSVT on tele    1/13/25    Resting in bed. NAD. RA.  Reports being thirsty.  Eating canned sausages.  Continues to report scrotal edema. Occasional PVCs noted on tele. Brief NSVT.  Not on dobutrex; Nephrology is following.    1/12/25    Patient seen sitting up bed with no distress noted. Breathing is stable. Family/friends at bedside. Stable on telemetry but mildly tachycardic. Dobutamine was held due to ectopy on telemetry.     1/11/25    Patient very drowsy when seen today.  No acute distress noted.  He was asking for pain medicine when awakened.  Patient with worsened renal function today.    1/10/25    Patient seen resting in bed this morning.  He was very upset.  He reports that he does use heroin on a daily basis and believes that he is likely detoxing currently.  Patient is not very optimistic for his outcome.    HPI:    Patient is a 49-year-old male who presented to the emergency room with complaints of testicular pain and swelling over the past 1 week.  Patient is a known diabetic and has a history of PID with bilateral above the knee amputations.  Patient also had some complaints of mild shortness of breath but denied any chest pain.  Patient was noted to have significantly elevated high sensitivity troponin level on arrival.  High sensitivity troponin level was 3343 on arrival and is now 3369.  Patient was started on heparin drip.  Patient does have a known  history of drug abuse and is positive for opiates, amphetamine, and marijuana metabolites in his UDS.  Patient reports his last use of methamphetamine was a couple of weeks ago.        REASON FOR CONSULT:  From Hospitalist H&P: Patient presents complaining of testicular pain and swelling.  Patient has noticed increased pain and swelling for the last 1 week as well as foul smell.  Patient has a history of bilateral above-the-knee and below-the-knee amputation.  He is a diabetic.  At the worst symptoms are moderate.  Patient complains of mild shortness of breath no chest pain.            Review of patient's allergies indicates:  No Known Allergies    Past Medical History:   Diagnosis Date    Diabetes mellitus     Hypertension      Past Surgical History:   Procedure Laterality Date    SKIN GRAFT       Social History     Tobacco Use    Smoking status: Every Day     Current packs/day: 1.00     Types: Cigarettes   Substance Use Topics    Alcohol use: Yes    Drug use: No        REVIEW OF SYSTEMS  Per HPI    OBJECTIVE     VITAL SIGNS (Most Recent)  Temp: 97.5 °F (36.4 °C) (01/15/25 0817)  Pulse: 96 (01/15/25 1218)  Resp: 18 (01/15/25 1218)  BP: 135/78 (97) (01/15/25 1218)  SpO2: 100 % (01/15/25 1218)    VENTILATION STATUS  Resp: 18 (01/15/25 1218)  SpO2: 100 % (01/15/25 1218)           I & O (Last 24H):  Intake/Output Summary (Last 24 hours) at 1/15/2025 1445  Last data filed at 1/15/2025 0321  Gross per 24 hour   Intake 720 ml   Output 1875 ml   Net -1155 ml       WEIGHTS  Wt Readings from Last 1 Encounters:   01/15/25 0400 126.1 kg (278 lb)   01/14/25 0400 127.1 kg (280 lb 3.2 oz)   01/09/25 2239 115.4 kg (254 lb 6.4 oz)   01/09/25 0824 108.9 kg (240 lb)       PHYSICAL EXAM  CONSTITUTIONAL: No fever, no chills obese  HEENT: Normocephalic, atraumatic,pupils reactive to light                 NECK:  No JVD no carotid bruit  CVS: S1S2+, RRR  LUNGS: crackles lower lobes; RA  ABDOMEN: Soft, NT, BS+  EXTREMITIES: No cyanosis,  edema; right BKA and Left AKA  : No valle catheter  NEURO: AAO X 3  PSY: Normal affect      HOME MEDICATIONS:  No current facility-administered medications on file prior to encounter.     Current Outpatient Medications on File Prior to Encounter   Medication Sig Dispense Refill    amLODIPine (NORVASC) 10 MG tablet Take 1 tablet (10 mg total) by mouth once daily. 30 tablet 1    aspirin (ECOTRIN) 81 MG EC tablet Take 1 tablet (81 mg total) by mouth once daily. 30 tablet 1    atorvastatin (LIPITOR) 80 MG tablet Take 1 tablet (80 mg total) by mouth once daily. 30 tablet 1    baclofen (LIORESAL) 10 MG tablet Take 1 tablet (10 mg total) by mouth 3 (three) times daily. 90 tablet 1    carvedilol (COREG) 6.25 MG tablet Take 1 tablet (6.25 mg total) by mouth 2 (two) times daily. 60 tablet 1    diazePAM (VALIUM) 5 MG tablet Take 1 tablet (5 mg total) by mouth every 12 (twelve) hours as needed for Anxiety (anxiety.). 30 tablet 0    gabapentin (NEURONTIN) 300 MG capsule Take 2 capsules (600 mg total) by mouth 3 (three) times daily. 90 capsule 1    hydroCHLOROthiazide (HYDRODIURIL) 50 MG tablet Take 1 tablet (50 mg total) by mouth once daily. 30 tablet 1    insulin aspart U-100 (NOVOLOG) 100 unit/mL InPn pen Inject 18 Units into the skin 3 (three) times daily. (Patient taking differently: Inject into the skin 3 (three) times daily.) 10 mL 1    insulin detemir U-100 (LEVEMIR FLEXTOUCH) 100 unit/mL (3 mL) SubQ InPn pen Inject 35 Units into the skin 2 (two) times daily. 10 mL 1    insulin glargine U-100, Lantus, 100 unit/mL injection Inject 30 Units into the skin every evening. (Patient not taking: Reported on 1/9/2025)      lisinopril (PRINIVIL,ZESTRIL) 40 MG tablet Take 1 tablet (40 mg total) by mouth once daily. 30 tablet 1    nicotine (NICODERM CQ) 14 mg/24 hr Place 1 patch onto the skin once daily. (Patient not taking: Reported on 1/9/2025) 15 patch 0    nortriptyline (PAMELOR) 25 MG capsule Take 1 capsule (25 mg total) by  mouth 3 (three) times daily. 30 capsule 1    oxyCODONE (ROXICODONE) 5 MG immediate release tablet Take 1 tablet (5 mg total) by mouth every 12 (twelve) hours as needed. (Patient not taking: Reported on 1/9/2025) 30 tablet 0    polyethylene glycol (GLYCOLAX) 17 gram PwPk Take 17 g by mouth daily as needed. (Patient not taking: Reported on 1/9/2025) 30 each 1    QUEtiapine (SEROQUEL) 100 MG Tab Take 1 tablet (100 mg total) by mouth every evening. 30 tablet 1    sofosbuvir-velpatasvir (EPCLUSA) 400-100 mg Tab Take 1 tablet daily. (Patient not taking: Reported on 1/9/2025) 28 tablet 2       SCHEDULED MEDS:   albumin human 25%  25 g Intravenous Q12H    aspirin  81 mg Oral Daily    atorvastatin  80 mg Oral Daily    baclofen  5 mg Oral BID    buprenorphine-naloxone 8-2 mg  1 Film Sublingual BID    cefTRIAXone (Rocephin) IV (PEDS and ADULTS)  2 g Intravenous Q24H    enoxparin  1 mg/kg Subcutaneous Q12H (treatment, non-standard time)    furosemide (LASIX) injection  60 mg Intravenous Q8H    gabapentin  300 mg Oral BID    miconazole NITRATE 2 %   Topical (Top) BID    tamsulosin  0.4 mg Oral Daily    white petrolatum   Topical (Top) Daily       CONTINUOUS INFUSIONS:          PRN MEDS:  Current Facility-Administered Medications:     acetaminophen, 650 mg, Oral, Q4H PRN    aluminum-magnesium hydroxide-simethicone, 30 mL, Oral, QID PRN    dextrose 50%, 12.5 g, Intravenous, PRN    dextrose 50%, 25 g, Intravenous, PRN    diazePAM, 5 mg, Oral, Q12H PRN    glucagon (human recombinant), 1 mg, Intramuscular, PRN    glucose, 16 g, Oral, PRN    glucose, 24 g, Oral, PRN    hydrALAZINE, 5 mg, Intravenous, Q6H PRN    insulin aspart U-100, 0-10 Units, Subcutaneous, QID (AC + HS) PRN    magnesium oxide, 800 mg, Oral, PRN    magnesium oxide, 800 mg, Oral, PRN    melatonin, 6 mg, Oral, Nightly PRN    morphine, 4 mg, Intravenous, Q4H PRN    naloxone, 0.02 mg, Intravenous, PRN    ondansetron, 4 mg, Intravenous, Q6H PRN    oxyCODONE-acetaminophen,  "1 tablet, Oral, Q4H PRN    oxyCODONE-acetaminophen, 1 tablet, Oral, Q4H PRN    potassium bicarbonate, 35 mEq, Oral, PRN    potassium bicarbonate, 50 mEq, Oral, PRN    potassium bicarbonate, 60 mEq, Oral, PRN    potassium, sodium phosphates, 2 packet, Oral, PRN    potassium, sodium phosphates, 2 packet, Oral, PRN    potassium, sodium phosphates, 2 packet, Oral, PRN    senna-docusate 8.6-50 mg, 1 tablet, Oral, Daily PRN    sodium chloride 0.9%, 10 mL, Intravenous, Q12H PRN    sodium chloride 0.9%, 10 mL, Intravenous, PRN    LABS AND DIAGNOSTICS     CBC LAST 3 DAYS  Recent Labs   Lab 01/13/25  0520 01/14/25  0521 01/15/25  0512   WBC 12.43 10.76 11.03   RBC 4.13* 3.84* 3.64*   HGB 10.6* 10.1* 9.5*   HCT 35.0* 32.7* 30.8*   MCV 85 85 85   MCH 25.7* 26.3* 26.1*   MCHC 30.3* 30.9* 30.8*   RDW 14.9* 14.7* 14.7*    251 232   MPV 10.5 10.8 10.7   GRAN 62.0  7.7 58.6  6.3 59.6  6.6   LYMPH 15.3*  1.9 17.1*  1.8 15.9*  1.8   MONO 11.2  1.4* 11.8  1.3* 13.6  1.5*   BASO 0.06 0.06 0.07   NRBC 0 0 0       COAGULATION LAST 3 DAYS  Recent Labs   Lab 01/09/25  0918 01/09/25  1659 01/09/25  2359 01/10/25  0721   INR 1.1  --   --   --    APTT 28.4 45.2* 32.2* 49.4*       CHEMISTRY LAST 3 DAYS  Recent Labs   Lab 01/13/25  0520 01/14/25  0521 01/15/25  0512   * 134* 135*   K 4.3 4.4 4.7    104 104   CO2 24 23 24   ANIONGAP 7* 7* 7*   BUN 43* 45* 53*   CREATININE 2.5* 2.4* 2.3*   * 217* 140*   CALCIUM 7.6* 7.6* 7.8*   MG 2.1 2.0 1.9   ALBUMIN 2.5* 2.6* 2.8*   PROT 5.6* 5.7* 5.8*   ALKPHOS 101 113 140*   ALT 8* 10 12   AST 10 13 14   BILITOT 0.3 0.2 0.2       CARDIAC PROFILE LAST 3 DAYS  Recent Labs   Lab 01/09/25  0709 01/09/25  0918   BNP 3,449*  --    TROPONINIHS 3343.0* 3369.7*       ENDOCRINE LAST 3 DAYS  No results for input(s): "TSH", "PROCAL" in the last 168 hours.    LAST ARTERIAL BLOOD GAS  ABG  No results for input(s): "PH", "PO2", "PCO2", "HCO3", "BE" in the last 168 hours.    LAST 7 DAYS " MICROBIOLOGY   Microbiology Results (last 7 days)       Procedure Component Value Units Date/Time    Urine culture [[id number]] Collected: 01/12/25 1055    Order Status: Completed Specimen: Urine Updated: 01/15/25 0640     Urine Culture, Routine No growth    Narrative:      Specimen Source->Urine    Blood culture x two cultures. Draw prior to antibiotics. [[id number]] Collected: 01/09/25 0859    Order Status: Completed Specimen: Blood Updated: 01/14/25 1032     Blood Culture, Routine No growth after 5 days.    Narrative:      Aerobic and anaerobic    Blood culture x two cultures. Draw prior to antibiotics. [[id number]] Collected: 01/09/25 0859    Order Status: Completed Specimen: Blood Updated: 01/14/25 1032     Blood Culture, Routine No growth after 5 days.    Narrative:      Aerobic and anaerobic    Clostridium difficile EIA [[id number]] Collected: 01/09/25 2223    Order Status: Canceled Specimen: Stool             MOST RECENT IMAGING  US Retroperitoneal Complete  Narrative: EXAMINATION:  US RETROPERITONEAL COMPLETE    CLINICAL HISTORY:  quinton, evaluate for hydronephrosis;    TECHNIQUE:  Grayscale and color Doppler sonographic analysis of the kidneys and retroperitoneum was performed.    FINDINGS:  Comparison to prior exams.  The exam is limited by poor acoustic window due to patient body habitus, and subsequent poor resolution.  The right kidney measures 12.6 cm in length, with normal cortical thickness and parenchymal echotexture, without echogenic calculi or hydronephrosis.    The left kidney measures 10.5 cm in length and has normal cortical thickness and parenchymal echotexture, without echogenic calculi or hydronephrosis.    The urinary bladder is collapsed and not evaluated.  The visualized abdominal aorta, aortic bifurcation, common iliac artery origins, and IVC are normal.  Impression: Limited exam, with no sonographic evidence of medical or surgical renal disease.    Electronically signed by: Eliseo  Sandro  Date:    01/12/2025  Time:    09:15      ECHOCARDIOGRAM RESULTS (last 5)  No results found for this or any previous visit.      CURRENT/PREVIOUS VISIT EKG  Results for orders placed or performed during the hospital encounter of 01/09/25   EKG 12-lead    Collection Time: 01/12/25  2:01 AM   Result Value Ref Range    QRS Duration 136 ms    [facility] QTC Calculation 511 ms    Narrative    Test Reason : R07.9,    Vent. Rate : 111 BPM     Atrial Rate : 111 BPM     P-R Int : 140 ms          QRS Dur : 136 ms      QT Int : 376 ms       P-R-T Axes :  61 148   1 degrees    QTcB Int : 511 ms    Sinus tachycardia with occasional Premature ventricular complexes  Right bundle branch block  Left posterior fascicular block   Bifascicular block   Possible Inferior infarct ,age undetermined  Abnormal ECG  No previous ECGs available    Referred By: AAAREFERRAL SELF           Confirmed By:            ASSESSMENT/PLAN:     Active Hospital Problems    Diagnosis    *Acute systolic congestive heart failure    Urinary tract infection associated with indwelling urethral catheter    MARLENE (acute kidney injury)    Opioid use disorder    Scrotal edema    Elevated troponin    Diabetes mellitus       ASSESSMENT & PLAN:     Elevated troponin  Acute HFrEF 25-30%  Testicular pain and swelling   Type II DM  + drug abuse: amphetamines, marijuana  PAD  Hx of bilateral AKA      RECOMMENDATIONS:    Continue albumin and furosemide as recommended by nephrology. Appreciate input regarding BUN/Cr.  Monitor strict I&O.   Trend labs.   Will follow.     Bhumi Sanchez NP  Date of Service: 01/15/2025      I have personally interviewed and examined the patient, I have reviewed the Nurse Practitioner's history and physical, assessment, and plan. I have personally evaluated the patient at bedside and agree with the findings and made appropriate changes as necessary in recommendations.    Patient still has anasarca is urine output is increase with Lasix and albumin.   His creatinine is slightly up but otherwise well tolerating.  Will increase the dose of the Lasix.  Transferred to ICU in anticipation of further intervention by Nephrology .  Kingsley Mosley MD  Department of Cardiology  [facility]  01/15/2025

## 2025-01-15 NOTE — ASSESSMENT & PLAN NOTE
Secondary to acute decompensated heart failure  Elevate scrotum  Scrotal ultrasound reviewed  Diuresing as per Nephrology recommendation

## 2025-01-15 NOTE — ASSESSMENT & PLAN NOTE
Body mass index is 39.89 kg/m². Morbid obesity complicates all aspects of disease management from diagnostic modalities to treatment. Weight loss encouraged and health benefits explained to patient.

## 2025-01-15 NOTE — PLAN OF CARE
Problem: Infection  Goal: Absence of Infection Signs and Symptoms  Outcome: Progressing     Problem: Adult Inpatient Plan of Care  Goal: Plan of Care Review  Outcome: Progressing  Goal: Patient-Specific Goal (Individualized)  Outcome: Progressing  Goal: Absence of Hospital-Acquired Illness or Injury  Outcome: Progressing  Goal: Readiness for Transition of Care  Outcome: Progressing     Problem: Fall Injury Risk  Goal: Absence of Fall and Fall-Related Injury  Outcome: Progressing     Problem: Heart Failure  Goal: Optimal Coping  Outcome: Progressing  Goal: Optimal Cardiac Output  Outcome: Progressing  Goal: Stable Heart Rate and Rhythm  Outcome: Progressing  Goal: Optimal Functional Ability  Outcome: Progressing  Goal: Fluid and Electrolyte Balance  Outcome: Progressing  Goal: Improved Oral Intake  Outcome: Progressing  Goal: Effective Oxygenation and Ventilation  Outcome: Progressing  Goal: Effective Breathing Pattern During Sleep  Outcome: Progressing     Problem: Diabetes Comorbidity  Goal: Blood Glucose Level Within Targeted Range  Outcome: Progressing     Problem: Wound  Goal: Optimal Coping  Outcome: Progressing  Goal: Improved Oral Intake  Outcome: Progressing  Goal: Optimal Pain Control and Function  Outcome: Progressing     Problem: Acute Kidney Injury/Impairment  Goal: Fluid and Electrolyte Balance  Outcome: Progressing  Goal: Improved Oral Intake  Outcome: Progressing  Goal: Effective Renal Function  Outcome: Progressing     Problem: Bariatric Environmental Safety  Goal: Safety Maintained with Care  Outcome: Progressing

## 2025-01-16 LAB
ALBUMIN SERPL BCP-MCNC: 3.1 G/DL (ref 3.5–5.2)
ALP SERPL-CCNC: 172 U/L (ref 55–135)
ALT SERPL W/O P-5'-P-CCNC: 10 U/L (ref 10–44)
ANION GAP SERPL CALC-SCNC: 8 MMOL/L (ref 8–16)
ANION GAP SERPL CALC-SCNC: 8 MMOL/L (ref 8–16)
AST SERPL-CCNC: 11 U/L (ref 10–40)
BASOPHILS # BLD AUTO: 0.05 K/UL (ref 0–0.2)
BASOPHILS NFR BLD: 0.5 % (ref 0–1.9)
BILIRUB SERPL-MCNC: 0.3 MG/DL (ref 0.1–1)
BUN SERPL-MCNC: 65 MG/DL (ref 6–20)
BUN SERPL-MCNC: 69 MG/DL (ref 6–20)
CALCIUM SERPL-MCNC: 8.5 MG/DL (ref 8.7–10.5)
CALCIUM SERPL-MCNC: 9.1 MG/DL (ref 8.7–10.5)
CHLORIDE SERPL-SCNC: 100 MMOL/L (ref 95–110)
CHLORIDE SERPL-SCNC: 103 MMOL/L (ref 95–110)
CO2 SERPL-SCNC: 26 MMOL/L (ref 23–29)
CO2 SERPL-SCNC: 28 MMOL/L (ref 23–29)
CREAT SERPL-MCNC: 2.3 MG/DL (ref 0.5–1.4)
CREAT SERPL-MCNC: 2.4 MG/DL (ref 0.5–1.4)
DIFFERENTIAL METHOD BLD: ABNORMAL
EOSINOPHIL # BLD AUTO: 1 K/UL (ref 0–0.5)
EOSINOPHIL NFR BLD: 9.5 % (ref 0–8)
ERYTHROCYTE [DISTWIDTH] IN BLOOD BY AUTOMATED COUNT: 14.8 % (ref 11.5–14.5)
EST. GFR  (NO RACE VARIABLE): 32.7 ML/MIN/1.73 M^2
EST. GFR  (NO RACE VARIABLE): 34.4 ML/MIN/1.73 M^2
GLUCOSE SERPL-MCNC: 108 MG/DL (ref 70–110)
GLUCOSE SERPL-MCNC: 160 MG/DL (ref 70–110)
HBV SURFACE AG SERPL QL IA: NORMAL
HCT VFR BLD AUTO: 29.3 % (ref 40–54)
HGB BLD-MCNC: 8.9 G/DL (ref 14–18)
IMM GRANULOCYTES # BLD AUTO: 0.06 K/UL (ref 0–0.04)
IMM GRANULOCYTES NFR BLD AUTO: 0.5 % (ref 0–0.5)
LYMPHOCYTES # BLD AUTO: 1.7 K/UL (ref 1–4.8)
LYMPHOCYTES NFR BLD: 15.4 % (ref 18–48)
MAGNESIUM SERPL-MCNC: 2 MG/DL (ref 1.6–2.6)
MCH RBC QN AUTO: 25.9 PG (ref 27–31)
MCHC RBC AUTO-ENTMCNC: 30.4 G/DL (ref 32–36)
MCV RBC AUTO: 85 FL (ref 82–98)
MONOCYTES # BLD AUTO: 1.6 K/UL (ref 0.3–1)
MONOCYTES NFR BLD: 14.8 % (ref 4–15)
NEUTROPHILS # BLD AUTO: 6.5 K/UL (ref 1.8–7.7)
NEUTROPHILS NFR BLD: 59.3 % (ref 38–73)
NRBC BLD-RTO: 0 /100 WBC
PLATELET # BLD AUTO: 231 K/UL (ref 150–450)
PMV BLD AUTO: 11 FL (ref 9.2–12.9)
POCT GLUCOSE: 110 MG/DL (ref 70–110)
POCT GLUCOSE: 132 MG/DL (ref 70–110)
POCT GLUCOSE: 137 MG/DL (ref 70–110)
POCT GLUCOSE: 159 MG/DL (ref 70–110)
POCT GLUCOSE: 171 MG/DL (ref 70–110)
POTASSIUM SERPL-SCNC: 4.8 MMOL/L (ref 3.5–5.1)
POTASSIUM SERPL-SCNC: 5 MMOL/L (ref 3.5–5.1)
PROT SERPL-MCNC: 6 G/DL (ref 6–8.4)
RBC # BLD AUTO: 3.44 M/UL (ref 4.6–6.2)
SODIUM SERPL-SCNC: 136 MMOL/L (ref 136–145)
SODIUM SERPL-SCNC: 137 MMOL/L (ref 136–145)
WBC # BLD AUTO: 10.91 K/UL (ref 3.9–12.7)

## 2025-01-16 PROCEDURE — 84484 ASSAY OF TROPONIN QUANT: CPT

## 2025-01-16 PROCEDURE — 25000003 PHARM REV CODE 250: Performed by: NURSE PRACTITIONER

## 2025-01-16 PROCEDURE — 36415 COLL VENOUS BLD VENIPUNCTURE: CPT | Performed by: INTERNAL MEDICINE

## 2025-01-16 PROCEDURE — 25000003 PHARM REV CODE 250: Performed by: STUDENT IN AN ORGANIZED HEALTH CARE EDUCATION/TRAINING PROGRAM

## 2025-01-16 PROCEDURE — 80053 COMPREHEN METABOLIC PANEL: CPT | Performed by: STUDENT IN AN ORGANIZED HEALTH CARE EDUCATION/TRAINING PROGRAM

## 2025-01-16 PROCEDURE — P9047 ALBUMIN (HUMAN), 25%, 50ML: HCPCS | Performed by: GENERAL PRACTICE

## 2025-01-16 PROCEDURE — 63600175 PHARM REV CODE 636 W HCPCS: Performed by: GENERAL PRACTICE

## 2025-01-16 PROCEDURE — 99233 SBSQ HOSP IP/OBS HIGH 50: CPT | Mod: ,,, | Performed by: GENERAL PRACTICE

## 2025-01-16 PROCEDURE — 83735 ASSAY OF MAGNESIUM: CPT | Performed by: STUDENT IN AN ORGANIZED HEALTH CARE EDUCATION/TRAINING PROGRAM

## 2025-01-16 PROCEDURE — 80048 BASIC METABOLIC PNL TOTAL CA: CPT

## 2025-01-16 PROCEDURE — 94761 N-INVAS EAR/PLS OXIMETRY MLT: CPT

## 2025-01-16 PROCEDURE — 93005 ELECTROCARDIOGRAM TRACING: CPT | Performed by: GENERAL PRACTICE

## 2025-01-16 PROCEDURE — 12000002 HC ACUTE/MED SURGE SEMI-PRIVATE ROOM

## 2025-01-16 PROCEDURE — 85025 COMPLETE CBC W/AUTO DIFF WBC: CPT | Performed by: STUDENT IN AN ORGANIZED HEALTH CARE EDUCATION/TRAINING PROGRAM

## 2025-01-16 PROCEDURE — 83735 ASSAY OF MAGNESIUM: CPT | Mod: 91

## 2025-01-16 PROCEDURE — 93010 ELECTROCARDIOGRAM REPORT: CPT | Mod: ,,, | Performed by: GENERAL PRACTICE

## 2025-01-16 PROCEDURE — 63600175 PHARM REV CODE 636 W HCPCS: Performed by: STUDENT IN AN ORGANIZED HEALTH CARE EDUCATION/TRAINING PROGRAM

## 2025-01-16 PROCEDURE — 99900031 HC PATIENT EDUCATION (STAT)

## 2025-01-16 PROCEDURE — 36415 COLL VENOUS BLD VENIPUNCTURE: CPT | Performed by: STUDENT IN AN ORGANIZED HEALTH CARE EDUCATION/TRAINING PROGRAM

## 2025-01-16 RX ORDER — ACYCLOVIR 400 MG/1
800 TABLET ORAL
Status: DISCONTINUED | OUTPATIENT
Start: 2025-01-16 | End: 2025-01-26 | Stop reason: HOSPADM

## 2025-01-16 RX ORDER — MORPHINE SULFATE 4 MG/ML
4 INJECTION, SOLUTION INTRAMUSCULAR; INTRAVENOUS
Status: DISCONTINUED | OUTPATIENT
Start: 2025-01-16 | End: 2025-01-26 | Stop reason: HOSPADM

## 2025-01-16 RX ADMIN — FUROSEMIDE 60 MG: 10 INJECTION, SOLUTION INTRAMUSCULAR; INTRAVENOUS at 06:01

## 2025-01-16 RX ADMIN — MORPHINE SULFATE 4 MG: 4 INJECTION INTRAVENOUS at 10:01

## 2025-01-16 RX ADMIN — ENOXAPARIN SODIUM 120 MG: 120 INJECTION SUBCUTANEOUS at 07:01

## 2025-01-16 RX ADMIN — WHITE PETROLATUM 41 % TOPICAL OINTMENT: at 08:01

## 2025-01-16 RX ADMIN — MICONAZOLE NITRATE: 20 POWDER TOPICAL at 08:01

## 2025-01-16 RX ADMIN — DIAZEPAM 5 MG: 5 TABLET ORAL at 06:01

## 2025-01-16 RX ADMIN — GABAPENTIN 300 MG: 300 CAPSULE ORAL at 07:01

## 2025-01-16 RX ADMIN — ACYCLOVIR 800 MG: 400 TABLET ORAL at 09:01

## 2025-01-16 RX ADMIN — BACLOFEN 5 MG: 5 TABLET ORAL at 08:01

## 2025-01-16 RX ADMIN — FUROSEMIDE 60 MG: 10 INJECTION, SOLUTION INTRAMUSCULAR; INTRAVENOUS at 08:01

## 2025-01-16 RX ADMIN — ALBUMIN (HUMAN) 25 G: 12.5 SOLUTION INTRAVENOUS at 07:01

## 2025-01-16 RX ADMIN — MORPHINE SULFATE 4 MG: 4 INJECTION, SOLUTION INTRAMUSCULAR; INTRAVENOUS at 08:01

## 2025-01-16 RX ADMIN — TAMSULOSIN HYDROCHLORIDE 0.4 MG: 0.4 CAPSULE ORAL at 07:01

## 2025-01-16 RX ADMIN — BACLOFEN 5 MG: 5 TABLET ORAL at 07:01

## 2025-01-16 RX ADMIN — GABAPENTIN 300 MG: 300 CAPSULE ORAL at 08:01

## 2025-01-16 RX ADMIN — DIAZEPAM 5 MG: 5 TABLET ORAL at 12:01

## 2025-01-16 RX ADMIN — INSULIN ASPART 2 UNITS: 100 INJECTION, SOLUTION INTRAVENOUS; SUBCUTANEOUS at 04:01

## 2025-01-16 RX ADMIN — MORPHINE SULFATE 4 MG: 4 INJECTION, SOLUTION INTRAMUSCULAR; INTRAVENOUS at 06:01

## 2025-01-16 RX ADMIN — ATORVASTATIN CALCIUM 80 MG: 40 TABLET, FILM COATED ORAL at 07:01

## 2025-01-16 RX ADMIN — ALBUMIN (HUMAN) 25 G: 12.5 SOLUTION INTRAVENOUS at 08:01

## 2025-01-16 RX ADMIN — ASPIRIN 81 MG: 81 TABLET, COATED ORAL at 07:01

## 2025-01-16 RX ADMIN — OXYCODONE HYDROCHLORIDE AND ACETAMINOPHEN 1 TABLET: 10; 325 TABLET ORAL at 12:01

## 2025-01-16 RX ADMIN — OXYCODONE HYDROCHLORIDE AND ACETAMINOPHEN 1 TABLET: 10; 325 TABLET ORAL at 08:01

## 2025-01-16 RX ADMIN — FUROSEMIDE 60 MG: 10 INJECTION, SOLUTION INTRAMUSCULAR; INTRAVENOUS at 01:01

## 2025-01-16 RX ADMIN — MORPHINE SULFATE 4 MG: 4 INJECTION, SOLUTION INTRAMUSCULAR; INTRAVENOUS at 01:01

## 2025-01-16 RX ADMIN — BUPRENORPHINE AND NALOXONE 1 FILM: 8; 2 FILM BUCCAL; SUBLINGUAL at 08:01

## 2025-01-16 RX ADMIN — BUPRENORPHINE AND NALOXONE 1 FILM: 8; 2 FILM BUCCAL; SUBLINGUAL at 07:01

## 2025-01-16 RX ADMIN — Medication 6 MG: at 12:01

## 2025-01-16 NOTE — PLAN OF CARE
Problem: Infection  Goal: Absence of Infection Signs and Symptoms  Outcome: Progressing     Problem: Adult Inpatient Plan of Care  Goal: Plan of Care Review  Outcome: Progressing  Goal: Absence of Hospital-Acquired Illness or Injury  Outcome: Progressing  Goal: Optimal Comfort and Wellbeing  Outcome: Not Progressing  Goal: Readiness for Transition of Care  Outcome: Not Progressing     Problem: Fall Injury Risk  Goal: Absence of Fall and Fall-Related Injury  Outcome: Progressing     Problem: Heart Failure  Goal: Optimal Coping  Outcome: Not Progressing  Goal: Stable Heart Rate and Rhythm  Outcome: Progressing   Pt came to ICU via bed at 1710. Spent 1.5 hr at bedside with wound RN. Wound care to bilateral groins, buttocks, thighs. New purick applied after shaving more hair off.

## 2025-01-16 NOTE — ASSESSMENT & PLAN NOTE
Catheter in place for severe acute decompensated heart failure and need for strict I&O with concern of cardiorenal syndrome   Completed 4 day course of ceftriaxone  Urine culture  Weber catheter was previously only move and unable to be reinserted  Patient has a male purevwick

## 2025-01-16 NOTE — ASSESSMENT & PLAN NOTE
MARLENE is likely due to pre-renal azotemia due to intravascular volume depletion. Baseline creatinine is  1 . Most recent creatinine and eGFR are listed below.  Recent Labs     01/14/25  0521 01/15/25  0512 01/16/25  0421   CREATININE 2.4* 2.3* 2.3*   EGFRNORACEVR 32.7* 34.4* 34.4*        Plan  - MARLENE is stable but not improving  - Avoid nephrotoxins and renally dose meds for GFR listed above  - Monitor urine output, serial BMP, and adjust therapy as needed  - nephrology consulted  - Close monitoring  - Diurese as tolerated

## 2025-01-16 NOTE — ASSESSMENT & PLAN NOTE
Addiction medicine consulted   Improved on Suboxone    Per Addiction medicine:   For first dose of buprenorphine (at least 18 hours since last use of fentanyl) give one or two 8 mg strips (8-16 mg total).  Wait 1 hour and then...  If mild withdrawal, give another 8 mg strip.  If more significant withdrawal, give two more 8 mg strips.  If relaxed or calm, do not give any more.  Wait 1-2 more hours...  If still have withdrawal symptoms, take another 8 mg strip  If relaxed or calm, do not take any more  Try not to give more than 32 mg (4 strips) on day one.  On day 2, give 8 mg BID   Continue taking 8 mg BID until seeing me in clinic.

## 2025-01-16 NOTE — SUBJECTIVE & OBJECTIVE
Interval History:   Patient seen and examined. He mentioned his SOB and swelling are improving. Serum creatinine stable (2.3). Nephrology and Cardiology closely following. Lasix increased, albumin added.       Review of Systems   Constitutional:  Positive for activity change. Negative for chills, fatigue and fever.   Respiratory:  Negative for chest tightness and shortness of breath.    Cardiovascular:  Negative for chest pain.   Gastrointestinal: Negative.    Genitourinary:  Positive for scrotal swelling.        Improving   Neurological:  Positive for weakness. Negative for tremors.     Objective:     Vital Signs (Most Recent):  Temp: 98.2 °F (36.8 °C) (01/16/25 1101)  Pulse: 100 (01/16/25 1501)  Resp: 17 (01/16/25 1501)  BP: (!) 142/100 (01/16/25 1501)  SpO2: 98 % (01/16/25 1501) Vital Signs (24h Range):  Temp:  [97.1 °F (36.2 °C)-99.2 °F (37.3 °C)] 98.2 °F (36.8 °C)  Pulse:  [] 100  Resp:  [8-67] 17  SpO2:  [94 %-100 %] 98 %  BP: (101-169)/() 142/100     Weight:  (bed scale -44 unable to obtain acurate weight at this time)  Body mass index is 39.89 kg/m².    Intake/Output Summary (Last 24 hours) at 1/16/2025 1542  Last data filed at 1/16/2025 1521  Gross per 24 hour   Intake 2116.27 ml   Output 2750 ml   Net -633.73 ml         Physical Exam  Vitals and nursing note reviewed. Exam conducted with a chaperone present.   Constitutional:       General: He is not in acute distress.     Appearance: Normal appearance. He is obese. He is not ill-appearing, toxic-appearing or diaphoretic.   Pulmonary:      Effort: Pulmonary effort is normal.      Breath sounds: Rhonchi and rales present.   Genitourinary:     Comments: Scrotal Edema   Musculoskeletal:         General: Deformity present.      Comments: Right BKA/ Left AKA   Skin:     General: Skin is warm and dry.   Neurological:      Mental Status: He is alert.   Psychiatric:         Mood and Affect: Mood normal.         Behavior: Behavior normal.          Thought Content: Thought content normal.             Significant Labs: All pertinent labs within the past 24 hours have been reviewed.  CBC:   Recent Labs   Lab 01/15/25  0512 01/16/25  0421   WBC 11.03 10.91   HGB 9.5* 8.9*   HCT 30.8* 29.3*    231     CMP:   Recent Labs   Lab 01/15/25  0512 01/16/25  0421   * 137   K 4.7 4.8    103   CO2 24 26   * 108   BUN 53* 65*   CREATININE 2.3* 2.3*   CALCIUM 7.8* 8.5*   PROT 5.8* 6.0   ALBUMIN 2.8* 3.1*   BILITOT 0.2 0.3   ALKPHOS 140* 172*   AST 14 11   ALT 12 10   ANIONGAP 7* 8     Magnesium:   Recent Labs   Lab 01/15/25  0512 01/16/25  0421   MG 1.9 2.0     Microbiology Results (last 7 days)       Procedure Component Value Units Date/Time    Urine culture [[id number]] Collected: 01/12/25 1055    Order Status: Completed Specimen: Urine Updated: 01/15/25 0640     Urine Culture, Routine No growth    Narrative:      Specimen Source->Urine    Blood culture x two cultures. Draw prior to antibiotics. [[id number]] Collected: 01/09/25 0859    Order Status: Completed Specimen: Blood Updated: 01/14/25 1032     Blood Culture, Routine No growth after 5 days.    Narrative:      Aerobic and anaerobic    Blood culture x two cultures. Draw prior to antibiotics. [[id number]] Collected: 01/09/25 0859    Order Status: Completed Specimen: Blood Updated: 01/14/25 1032     Blood Culture, Routine No growth after 5 days.    Narrative:      Aerobic and anaerobic    Clostridium difficile EIA [[id number]] Collected: 01/09/25 2223    Order Status: Canceled Specimen: Stool             Significant Imaging: I have reviewed all pertinent imaging results/findings within the past 24 hours.  Imaging Results              X-Ray Chest AP Portable (Final result)  Result time 01/09/25 08:09:27      Final result by Manuel Snow MD (01/09/25 08:09:27)                   Impression:      Top-normal size heart and findings of mild pulmonary vascular congestion/trace interstitial  edema.      Electronically signed by: Manuel Snow  Date:    01/09/2025  Time:    08:09               Narrative:    CLINICAL HISTORY:  ([id number])48 y/o  (1975) M    Chest Pain;    TECHNIQUE:  (A#[id number], exam time 1/9/2025 7:54)    XR CHEST AP PORTABLE [id number]    COMPARISON:  Radiograph from 07/19/2022    FINDINGS:  Mild perihilar pulmonary vascular prominence with slightly increased central hilar interstitial lung markings bilaterally suggestive of mild pulmonary vascular congestion/trace edema. No pneumothorax is identified. The heart is top normal in size.  Osseous structures show degenerative changes in the spine. The visualized upper abdomen is unremarkable.

## 2025-01-16 NOTE — PROGRESS NOTES
01/15/25 1910   WOCN Assessment   WOCN Total Time (mins) 120   Visit Date 01/15/25   Visit Time 1700   Consult Type Follow Up   WOCN Speciality Wound   Wound pressure;cellulitis;moisture   Intervention assessed;changed;applied;chart review;coordination of care;team conference;orders   Teaching on-going        Wound 01/09/25 1500 Incontinence associated dermatitis Buttocks   Date First Assessed/Time First Assessed: 01/09/25 1500   Present on Original Admission: Yes  Primary Wound Type: Incontinence associated dermatitis  Location: Buttocks   Wound Image    Dressing Appearance Open to air   Drainage Amount None   Appearance Red;Pink   Tissue loss description Partial thickness   Periwound Area Redness   Care Cleansed with:;Antimicrobial agent;Soap and water;Applied:;Skin Barrier   Dressing Applied  ([facility])        Wound 01/09/25 2239 Pressure Injury Buttocks   Date First Assessed/Time First Assessed: 01/09/25 2239   Present on Original Admission: Yes  Primary Wound Type: Pressure Injury  Location: Buttocks   Wound Image    Pressure Injury Stage 2   Dressing Appearance Open to air   Drainage Amount None   Appearance Red;Pink   Tissue loss description Partial thickness   Care Cleansed with:;Antimicrobial agent;Soap and water;Applied:;Skin Barrier   Dressing Applied  ([facility])        Wound 01/09/25 Moisture associated dermatitis Left anterior Groin   Date First Assessed: 01/09/25   Present on Original Admission: Yes  Primary Wound Type: Moisture associated dermatitis  Side: Left  Orientation: anterior  Location: Groin   Wound Image     Dressing Appearance Moist drainage   Drainage Amount Small   Drainage Characteristics/Odor Serosanguineous   Appearance Red;Yellow;Eschar;Purple   Tissue loss description Full thickness   Red (%), Wound Tissue Color 50 %   Yellow (%), Wound Tissue Color 50 %   Periwound Area Redness;Swelling;Excoriated   Care Cleansed with:;Antimicrobial agent;Soap and water;Applied:   Dressing  Applied;Honey;Absorptive Pad        Wound 01/09/25 Moisture associated dermatitis Right anterior Groin   Date First Assessed: 01/09/25   Present on Original Admission: Yes  Primary Wound Type: Moisture associated dermatitis  Side: Right  Orientation: anterior  Location: Groin   Wound Image     Dressing Appearance Moist drainage   Drainage Amount Small   Drainage Characteristics/Odor Serosanguineous   Appearance Yellow;Eschar   Tissue loss description Full thickness   Black (%), Wound Tissue Color 0 %   Red (%), Wound Tissue Color 0 %   Yellow (%), Wound Tissue Color 100 %   Periwound Area Ecchymotic;Redness;Swelling   Care Cleansed with:;Antimicrobial agent;Soap and water;Applied:   Dressing Applied;Honey;Absorptive Pad        Wound 01/09/25 Moisture associated dermatitis Right Buttocks   Date First Assessed: 01/09/25   Present on Original Admission: Yes  Primary Wound Type: Moisture associated dermatitis  Side: Right  Location: Buttocks   Wound Image    Dressing Appearance Open to air   Drainage Amount None   Appearance Red   Tissue loss description Partial thickness   Care Cleansed with:;Antimicrobial agent;Soap and water;Applied:   Dressing Applied  ([facility])        Wound 01/09/25 Pressure Injury lower;anterior;posterior;lateral Scrotum   Date First Assessed: 01/09/25   Present on Original Admission: Yes  Primary Wound Type: (c) Pressure Injury  Orientation: lower;anterior;posterior;lateral  Location: Scrotum   Wound Image     Pressure Injury Stage U   Dressing Appearance Open to air   Drainage Amount Small   Drainage Characteristics/Odor Yellow   Appearance Red;Pink;Yellow   Care Cleansed with:;Antimicrobial agent;Soap and water;Applied:;Skin Barrier   Dressing Applied  ([facility])       Swollen penis with scabbed area noted.   Patient did allow this nurse to elevate his scrotum with folded over towel and sheet to help with scrotal and penis swelling.   Patient is on a specialty bariatric bed with a standard mattress  to help promote bed mobility and to allow thigh room due to extensive wounds and swelling. Wound care will continue to follow up throughout hospital stay.

## 2025-01-16 NOTE — CARE UPDATE
01/16/25 0648   Patient Assessment/Suction   Level of Consciousness (AVPU) alert   Respiratory Effort Normal;Unlabored   PRE-TX-O2   Device (Oxygen Therapy) room air   SpO2 96 %   Pulse Oximetry Type Continuous   $ Pulse Oximetry - Multiple Charge Pulse Oximetry - Multiple   Pulse 85   Resp (!) 8   Education   $ Education 15 min

## 2025-01-16 NOTE — ASSESSMENT & PLAN NOTE
Patient's FSGs are controlled on current medication regimen.  Last A1c reviewed-   Lab Results   Component Value Date    HGBA1C 7.5 (H) 01/10/2025     Most recent fingerstick glucose reviewed-   Recent Labs   Lab 01/15/25  1623 01/15/25  2202 01/16/25  0719 01/16/25  1131   POCTGLUCOSE 133* 156* 132* 110       Current correctional scale  Low  Maintain anti-hyperglycemic dose as follows-   Antihyperglycemics (From admission, onward)    Start     Stop Route Frequency Ordered    01/10/25 1515  insulin aspart U-100 pen 0-10 Units         -- SubQ Before meals & nightly PRN 01/10/25 1415        Hold Oral hypoglycemics while patient is in the hospital.

## 2025-01-16 NOTE — PROGRESS NOTES
[facility]   Department of Cardiology  Progress Note      PATIENT NAME: Gideon Ross    MRN: [id number]  TODAY'S DATE: 01/16/2025  ADMIT DATE: 1/9/2025                          CONSULT REQUESTED BY: Terri Aguirre, *    SUBJECTIVE     PRINCIPAL PROBLEM: Acute systolic congestive heart failure    01/16/2025  H&H dropping 8.9/29.3. Denies bleeding.  NAD. Remains edematous. Cr 2.3, unchanged; in icu for strict I's and O's.    1/15/25    Patient seen resting in bed with no distress noted. Breathing is stable. He remains with a good amount of edema.     1/14/25  Resting in bed. Complaints of thirst.  Wheezing present.  Remains edematous; no further events of NSVT on tele    1/13/25    Resting in bed. NAD. RA.  Reports being thirsty.  Eating canned sausages.  Continues to report scrotal edema. Occasional PVCs noted on tele. Brief NSVT.  Not on dobutrex; Nephrology is following.    1/12/25    Patient seen sitting up bed with no distress noted. Breathing is stable. Family/friends at bedside. Stable on telemetry but mildly tachycardic. Dobutamine was held due to ectopy on telemetry.     1/11/25    Patient very drowsy when seen today.  No acute distress noted.  He was asking for pain medicine when awakened.  Patient with worsened renal function today.    1/10/25    Patient seen resting in bed this morning.  He was very upset.  He reports that he does use heroin on a daily basis and believes that he is likely detoxing currently.  Patient is not very optimistic for his outcome.    HPI:    Patient is a 49-year-old male who presented to the emergency room with complaints of testicular pain and swelling over the past 1 week.  Patient is a known diabetic and has a history of PID with bilateral above the knee amputations.  Patient also had some complaints of mild shortness of breath but denied any chest pain.  Patient was noted to have significantly elevated high sensitivity troponin level on arrival.  High  sensitivity troponin level was 3343 on arrival and is now 3369.  Patient was started on heparin drip.  Patient does have a known history of drug abuse and is positive for opiates, amphetamine, and marijuana metabolites in his UDS.  Patient reports his last use of methamphetamine was a couple of weeks ago.        REASON FOR CONSULT:  From Hospitalist H&P: Patient presents complaining of testicular pain and swelling.  Patient has noticed increased pain and swelling for the last 1 week as well as foul smell.  Patient has a history of bilateral above-the-knee and below-the-knee amputation.  He is a diabetic.  At the worst symptoms are moderate.  Patient complains of mild shortness of breath no chest pain.            Review of patient's allergies indicates:  No Known Allergies    Past Medical History:   Diagnosis Date    Diabetes mellitus     Hypertension      Past Surgical History:   Procedure Laterality Date    SKIN GRAFT       Social History     Tobacco Use    Smoking status: Every Day     Current packs/day: 1.00     Types: Cigarettes   Substance Use Topics    Alcohol use: Yes    Drug use: No        REVIEW OF SYSTEMS  Per HPI    OBJECTIVE     VITAL SIGNS (Most Recent)  Temp: 97.8 °F (36.6 °C) (01/15/25 2301)  Pulse: 85 (01/16/25 0701)  Resp: 11 (01/16/25 0809)  BP: (!) 152/74 (01/16/25 0701)  SpO2: 96 % (01/16/25 0701)    VENTILATION STATUS  Resp: 11 (01/16/25 0809)  SpO2: 96 % (01/16/25 0701)           I & O (Last 24H):  Intake/Output Summary (Last 24 hours) at 1/16/2025 1011  Last data filed at 1/16/2025 0855  Gross per 24 hour   Intake 1536.27 ml   Output 1750 ml   Net -213.73 ml       WEIGHTS  Wt Readings from Last 1 Encounters:   01/15/25 0400 126.1 kg (278 lb)   01/14/25 0400 127.1 kg (280 lb 3.2 oz)   01/09/25 2239 115.4 kg (254 lb 6.4 oz)   01/09/25 0824 108.9 kg (240 lb)       PHYSICAL EXAM  CONSTITUTIONAL: No fever, no chills obese  HEENT: Normocephalic, atraumatic,pupils reactive to light                  NECK:  No JVD no carotid bruit  CVS: S1S2+, RRR  LUNGS: crackles lower lobes; RA  ABDOMEN: Soft, NT, BS+  EXTREMITIES: No cyanosis, edema; right BKA and Left AKA  : No valle catheter  NEURO: AAO X 3  PSY: Normal affect      HOME MEDICATIONS:  No current facility-administered medications on file prior to encounter.     Current Outpatient Medications on File Prior to Encounter   Medication Sig Dispense Refill    amLODIPine (NORVASC) 10 MG tablet Take 1 tablet (10 mg total) by mouth once daily. 30 tablet 1    aspirin (ECOTRIN) 81 MG EC tablet Take 1 tablet (81 mg total) by mouth once daily. 30 tablet 1    atorvastatin (LIPITOR) 80 MG tablet Take 1 tablet (80 mg total) by mouth once daily. 30 tablet 1    baclofen (LIORESAL) 10 MG tablet Take 1 tablet (10 mg total) by mouth 3 (three) times daily. 90 tablet 1    carvedilol (COREG) 6.25 MG tablet Take 1 tablet (6.25 mg total) by mouth 2 (two) times daily. 60 tablet 1    diazePAM (VALIUM) 5 MG tablet Take 1 tablet (5 mg total) by mouth every 12 (twelve) hours as needed for Anxiety (anxiety.). 30 tablet 0    gabapentin (NEURONTIN) 300 MG capsule Take 2 capsules (600 mg total) by mouth 3 (three) times daily. 90 capsule 1    hydroCHLOROthiazide (HYDRODIURIL) 50 MG tablet Take 1 tablet (50 mg total) by mouth once daily. 30 tablet 1    insulin aspart U-100 (NOVOLOG) 100 unit/mL InPn pen Inject 18 Units into the skin 3 (three) times daily. (Patient taking differently: Inject into the skin 3 (three) times daily.) 10 mL 1    insulin detemir U-100 (LEVEMIR FLEXTOUCH) 100 unit/mL (3 mL) SubQ InPn pen Inject 35 Units into the skin 2 (two) times daily. 10 mL 1    insulin glargine U-100, Lantus, 100 unit/mL injection Inject 30 Units into the skin every evening. (Patient not taking: Reported on 1/9/2025)      lisinopril (PRINIVIL,ZESTRIL) 40 MG tablet Take 1 tablet (40 mg total) by mouth once daily. 30 tablet 1    nicotine (NICODERM CQ) 14 mg/24 hr Place 1 patch onto the skin once  daily. (Patient not taking: Reported on 1/9/2025) 15 patch 0    nortriptyline (PAMELOR) 25 MG capsule Take 1 capsule (25 mg total) by mouth 3 (three) times daily. 30 capsule 1    oxyCODONE (ROXICODONE) 5 MG immediate release tablet Take 1 tablet (5 mg total) by mouth every 12 (twelve) hours as needed. (Patient not taking: Reported on 1/9/2025) 30 tablet 0    polyethylene glycol (GLYCOLAX) 17 gram PwPk Take 17 g by mouth daily as needed. (Patient not taking: Reported on 1/9/2025) 30 each 1    QUEtiapine (SEROQUEL) 100 MG Tab Take 1 tablet (100 mg total) by mouth every evening. 30 tablet 1    sofosbuvir-velpatasvir (EPCLUSA) 400-100 mg Tab Take 1 tablet daily. (Patient not taking: Reported on 1/9/2025) 28 tablet 2       SCHEDULED MEDS:   albumin human 25%  25 g Intravenous Q12H    aspirin  81 mg Oral Daily    atorvastatin  80 mg Oral Daily    baclofen  5 mg Oral BID    buprenorphine-naloxone 8-2 mg  1 Film Sublingual BID    enoxparin  1 mg/kg Subcutaneous Q12H (treatment, non-standard time)    furosemide (LASIX) injection  60 mg Intravenous Q8H    gabapentin  300 mg Oral BID    miconazole NITRATE 2 %   Topical (Top) BID    tamsulosin  0.4 mg Oral Daily    white petrolatum   Topical (Top) Daily       CONTINUOUS INFUSIONS:          PRN MEDS:  Current Facility-Administered Medications:     acetaminophen, 650 mg, Oral, Q4H PRN    aluminum-magnesium hydroxide-simethicone, 30 mL, Oral, QID PRN    dextrose 50%, 12.5 g, Intravenous, PRN    dextrose 50%, 25 g, Intravenous, PRN    diazePAM, 5 mg, Oral, Q12H PRN    glucagon (human recombinant), 1 mg, Intramuscular, PRN    glucose, 16 g, Oral, PRN    glucose, 24 g, Oral, PRN    hydrALAZINE, 5 mg, Intravenous, Q6H PRN    insulin aspart U-100, 0-10 Units, Subcutaneous, QID (AC + HS) PRN    magnesium oxide, 800 mg, Oral, PRN    magnesium oxide, 800 mg, Oral, PRN    melatonin, 6 mg, Oral, Nightly PRN    morphine, 4 mg, Intravenous, Q4H PRN    naloxone, 0.02 mg, Intravenous, PRN     "ondansetron, 4 mg, Intravenous, Q6H PRN    oxyCODONE-acetaminophen, 1 tablet, Oral, Q4H PRN    oxyCODONE-acetaminophen, 1 tablet, Oral, Q4H PRN    potassium bicarbonate, 35 mEq, Oral, PRN    potassium bicarbonate, 50 mEq, Oral, PRN    potassium bicarbonate, 60 mEq, Oral, PRN    potassium, sodium phosphates, 2 packet, Oral, PRN    potassium, sodium phosphates, 2 packet, Oral, PRN    potassium, sodium phosphates, 2 packet, Oral, PRN    senna-docusate 8.6-50 mg, 1 tablet, Oral, Daily PRN    sodium chloride 0.9%, 10 mL, Intravenous, Q12H PRN    sodium chloride 0.9%, 10 mL, Intravenous, PRN    LABS AND DIAGNOSTICS     CBC LAST 3 DAYS  Recent Labs   Lab 01/14/25  0521 01/15/25  0512 01/16/25  0421   WBC 10.76 11.03 10.91   RBC 3.84* 3.64* 3.44*   HGB 10.1* 9.5* 8.9*   HCT 32.7* 30.8* 29.3*   MCV 85 85 85   MCH 26.3* 26.1* 25.9*   MCHC 30.9* 30.8* 30.4*   RDW 14.7* 14.7* 14.8*    232 231   MPV 10.8 10.7 11.0   GRAN 58.6  6.3 59.6  6.6 59.3  6.5   LYMPH 17.1*  1.8 15.9*  1.8 15.4*  1.7   MONO 11.8  1.3* 13.6  1.5* 14.8  1.6*   BASO 0.06 0.07 0.05   NRBC 0 0 0       COAGULATION LAST 3 DAYS  Recent Labs   Lab 01/09/25  1659 01/09/25  2359 01/10/25  0721   APTT 45.2* 32.2* 49.4*       CHEMISTRY LAST 3 DAYS  Recent Labs   Lab 01/14/25  0521 01/15/25  0512 01/16/25  0421   * 135* 137   K 4.4 4.7 4.8    104 103   CO2 23 24 26   ANIONGAP 7* 7* 8   BUN 45* 53* 65*   CREATININE 2.4* 2.3* 2.3*   * 140* 108   CALCIUM 7.6* 7.8* 8.5*   MG 2.0 1.9 2.0   ALBUMIN 2.6* 2.8* 3.1*   PROT 5.7* 5.8* 6.0   ALKPHOS 113 140* 172*   ALT 10 12 10   AST 13 14 11   BILITOT 0.2 0.2 0.3       CARDIAC PROFILE LAST 3 DAYS  No results for input(s): "BNP", "CPK", "CPKMB", "LDH", "TROPONINI", "TROPONINIHS" in the last 168 hours.      ENDOCRINE LAST 3 DAYS  No results for input(s): "TSH", "PROCAL" in the last 168 hours.    LAST ARTERIAL BLOOD GAS  ABG  No results for input(s): "PH", "PO2", "PCO2", "HCO3", "BE" in the last " 168 hours.    LAST 7 DAYS MICROBIOLOGY   Microbiology Results (last 7 days)       Procedure Component Value Units Date/Time    Urine culture [[id number]] Collected: 01/12/25 1055    Order Status: Completed Specimen: Urine Updated: 01/15/25 0640     Urine Culture, Routine No growth    Narrative:      Specimen Source->Urine    Blood culture x two cultures. Draw prior to antibiotics. [[id number]] Collected: 01/09/25 0859    Order Status: Completed Specimen: Blood Updated: 01/14/25 1032     Blood Culture, Routine No growth after 5 days.    Narrative:      Aerobic and anaerobic    Blood culture x two cultures. Draw prior to antibiotics. [[id number]] Collected: 01/09/25 0859    Order Status: Completed Specimen: Blood Updated: 01/14/25 1032     Blood Culture, Routine No growth after 5 days.    Narrative:      Aerobic and anaerobic    Clostridium difficile EIA [[id number]] Collected: 01/09/25 2223    Order Status: Canceled Specimen: Stool             MOST RECENT IMAGING  US Retroperitoneal Complete  Narrative: EXAMINATION:  US RETROPERITONEAL COMPLETE    CLINICAL HISTORY:  quinton, evaluate for hydronephrosis;    TECHNIQUE:  Grayscale and color Doppler sonographic analysis of the kidneys and retroperitoneum was performed.    FINDINGS:  Comparison to prior exams.  The exam is limited by poor acoustic window due to patient body habitus, and subsequent poor resolution.  The right kidney measures 12.6 cm in length, with normal cortical thickness and parenchymal echotexture, without echogenic calculi or hydronephrosis.    The left kidney measures 10.5 cm in length and has normal cortical thickness and parenchymal echotexture, without echogenic calculi or hydronephrosis.    The urinary bladder is collapsed and not evaluated.  The visualized abdominal aorta, aortic bifurcation, common iliac artery origins, and IVC are normal.  Impression: Limited exam, with no sonographic evidence of medical or surgical renal  disease.    Electronically signed by: Eliseo Jo  Date:    01/12/2025  Time:    09:15      ECHOCARDIOGRAM RESULTS (last 5)  No results found for this or any previous visit.      CURRENT/PREVIOUS VISIT EKG  Results for orders placed or performed during the hospital encounter of 01/09/25   EKG 12-lead    Collection Time: 01/12/25  2:01 AM   Result Value Ref Range    QRS Duration 136 ms    [facility] QTC Calculation 511 ms    Narrative    Test Reason : R07.9,    Vent. Rate : 111 BPM     Atrial Rate : 111 BPM     P-R Int : 140 ms          QRS Dur : 136 ms      QT Int : 376 ms       P-R-T Axes :  61 148   1 degrees    QTcB Int : 511 ms    Sinus tachycardia with occasional Premature ventricular complexes  Right bundle branch block  Left posterior fascicular block   Bifascicular block   Possible Inferior infarct ,age undetermined  Abnormal ECG  No previous ECGs available    Referred By: AAAREFERRAL SELF           Confirmed By:            ASSESSMENT/PLAN:     Active Hospital Problems    Diagnosis    *Acute systolic congestive heart failure    Urinary tract infection associated with indwelling urethral catheter    MARLENE (acute kidney injury)    Opioid use disorder    Scrotal edema    Elevated troponin    Diabetes mellitus    Obesity       ASSESSMENT & PLAN:     Elevated troponin  Acute HFrEF 25-30%  Testicular pain and swelling   Type II DM  + drug abuse: amphetamines, marijuana  PAD  Hx of bilateral AKA      RECOMMENDATIONS:    Continue albumin and furosemide as recommended by nephrology. Appreciate input regarding BUN/Cr.  Monitor strict I&O.   Trend labs.   No further NSVT on tele.  Will follow.     Nina Alejandre NP  Date of Service: 01/16/2025      I have personally interviewed and examined the patient, I have reviewed the Nurse Practitioner's history and physical, assessment, and plan. I have personally evaluated the patient at bedside and agree with the findings and made appropriate changes as necessary in  recommendations.    Patient still has anasarca is urine output is increase with Lasix and albumin.  His creatinine is slightly up but otherwise well tolerating.  Will increase the dose of the Lasix.  Transferred to ICU in anticipation of further intervention by Nephrology .  Kingsley Mosley MD  Department of Cardiology  [facility]  01/16/2025

## 2025-01-16 NOTE — PROGRESS NOTES
Nephrology Consult Note        Patient Name: Gideon Ross  MRN: [id number]    Patient Class: IP- Inpatient   Admission Date: 1/9/2025  Length of Stay: 7 days  Date of Service: 1/16/2025    Attending Physician: Terri Aguirer, *  Primary Care Provider: Jennifer, Primary Doctor    Reason for Consult: marlene    SUBJECTIVE:     HPI: 49M with PID, bilateral BKA, DM presented for significant bilateral lower extremity edema, scrotal swelling, intractable pain. Labs largely unremarkable other than BNP 3,500, troponin 3,300. Patient was started on IV diuretics and pain control. Cardiology was consulted. Initially started on heparin drip but then transitioned to Lovenox. Echocardiogram showed EF 25%. Addiction Medicine consulted for heroin use. Started on Suboxone and reports improvement in symptoms. Diuretics held 2/2 worsening sCr and MARLENE. He was started on dobutamine on 1/11/25, but was stopped 2/2 tachycardia. He has a catheter for strict I&O with MARLENE, but is describing dysuria, there is purulent drainage at the end of the catheter so UA was obtained with concerns of UTI and pt placed on rocephin and recommendations for catheter exchange giving continued MARLENE and need for strict I&O with decompensated heart failure.      1/14 Agree with diuretics and albumin, will consider escalation or ultrafiltration. Not sure how acute the HF is. Consider palliative eval as well.  1/15 VSS. Good UO with diuretics+ albumin, will escalate more tomorrow if he remains stable. Not sure if UF will be better tolerated than diuretics.  1/16 VSS. Ensure dietary compliance with salt and liquids. Keep IOs tally. Will give more albumin and lasix.    ASSESSMENT/PLAN:     MARLENE 2/2 ATN due to intravascular volume depletion from diuretics  Hypoalbuminemia with dependent edema due to third-spacing and immobility  HFrEF ? Etiology, acuity, prognosis?  PAD  DM  Anemia  CKD stage 2, sCr 1 om 1/9/2025  No NSAIDs, ACEI/ARB, IV contrast or other  nephrotoxins.  Keep MAP > 60, SBP > 100.  Dose meds for GFR < 30 ml/min.  Renal diet - low K, low phos, low sodium.  Optimize nutrition, added protein supplement + IV.  Judicious diuretic use, edema will not resolve with diuretics alone.  Hgb and HCT are acceptable. Monitor for now.    Thank you for allowing us to participate in the care of your patient!   We will follow the patient and provide recommendations as needed.         Laboratory:  Recent Labs   Lab 01/14/25  0521 01/15/25  0512 01/16/25  0421   * 135* 137   K 4.4 4.7 4.8    104 103   CO2 23 24 26   BUN 45* 53* 65*   CREATININE 2.4* 2.3* 2.3*   * 140* 108       Recent Labs   Lab 01/14/25  0521 01/15/25  0512 01/16/25  0421   CALCIUM 7.6* 7.8* 8.5*   ALBUMIN 2.6* 2.8* 3.1*   MG 2.0 1.9 2.0             Recent Labs   Lab 01/14/25  0731 01/14/25  1129 01/14/25  1605 01/14/25  1919 01/15/25  0804 01/15/25  1151 01/15/25  1623 01/15/25  2202 01/16/25  0719 01/16/25  1131   POCTGLUCOSE 212* 208* 207* 158* 130* 251* 133* 156* 132* 110       Recent Labs   Lab 01/10/25  0721   Hemoglobin A1C 7.5 H       Recent Labs   Lab 01/14/25  0521 01/15/25  0512 01/16/25  0421   WBC 10.76 11.03 10.91   HGB 10.1* 9.5* 8.9*   HCT 32.7* 30.8* 29.3*    232 231   MCV 85 85 85   MCHC 30.9* 30.8* 30.4*   MONO 11.8  1.3* 13.6  1.5* 14.8  1.6*   EOSINOPHIL 11.4* 9.8* 9.5*       Recent Labs   Lab 01/14/25  0521 01/15/25  0512 01/16/25  0421   BILITOT 0.2 0.2 0.3   PROT 5.7* 5.8* 6.0   ALBUMIN 2.6* 2.8* 3.1*   ALKPHOS 113 140* 172*   ALT 10 12 10   AST 13 14 11       Recent Labs   Lab 07/19/22  2316 01/09/25  0743 01/12/25  1055   Color, UA Yellow Yellow [place] A   Appearance, UA Clear Hazy A Hazy A   pH, UA 6.0 6.0 6.0   Specific Gravity, UA <=1.005 1.025 1.010   Protein, UA Trace A 3+ A 1+ A   Glucose, UA 4+ A Trace A Negative   Ketones, UA Negative Negative Negative   Urobilinogen, UA Negative 2.0-3.0 A Negative   Bilirubin (UA) Negative Negative Negative    Occult Blood UA Negative 2+ A 3+ A   Nitrite, UA Negative Negative Negative   RBC, UA 1 7 H >100 H   WBC, UA  --  4 18 H   Bacteria None Occasional Rare   Hyaline Casts, UA  --  4 A 0       Recent Labs   Lab 07/19/22 2046 01/09/25  0707   POC PH 7.473 H  --    POC PCO2 42.4  --    POC HCO3 31.1 H  --    POC PO2 28 L  --    POC SATURATED O2 58 L  --    POC BE 7  --    Sample VENOUS VENOUS       Microbiology Results (last 7 days)       Procedure Component Value Units Date/Time    Urine culture [[id number]] Collected: 01/12/25 1055    Order Status: Completed Specimen: Urine Updated: 01/15/25 0640     Urine Culture, Routine No growth    Narrative:      Specimen Source->Urine    Blood culture x two cultures. Draw prior to antibiotics. [[id number]] Collected: 01/09/25 0859    Order Status: Completed Specimen: Blood Updated: 01/14/25 1032     Blood Culture, Routine No growth after 5 days.    Narrative:      Aerobic and anaerobic    Blood culture x two cultures. Draw prior to antibiotics. [[id number]] Collected: 01/09/25 0859    Order Status: Completed Specimen: Blood Updated: 01/14/25 1032     Blood Culture, Routine No growth after 5 days.    Narrative:      Aerobic and anaerobic    Clostridium difficile EIA [[id number]] Collected: 01/09/25 2223    Order Status: Canceled Specimen: Stool             Review of patient's allergies indicates:  No Known Allergies    Outpatient meds:  No current facility-administered medications on file prior to encounter.     Current Outpatient Medications on File Prior to Encounter   Medication Sig Dispense Refill    amLODIPine (NORVASC) 10 MG tablet Take 1 tablet (10 mg total) by mouth once daily. 30 tablet 1    aspirin (ECOTRIN) 81 MG EC tablet Take 1 tablet (81 mg total) by mouth once daily. 30 tablet 1    atorvastatin (LIPITOR) 80 MG tablet Take 1 tablet (80 mg total) by mouth once daily. 30 tablet 1    baclofen (LIORESAL) 10 MG tablet Take 1 tablet (10 mg total) by mouth 3 (three)  times daily. 90 tablet 1    carvedilol (COREG) 6.25 MG tablet Take 1 tablet (6.25 mg total) by mouth 2 (two) times daily. 60 tablet 1    diazePAM (VALIUM) 5 MG tablet Take 1 tablet (5 mg total) by mouth every 12 (twelve) hours as needed for Anxiety (anxiety.). 30 tablet 0    gabapentin (NEURONTIN) 300 MG capsule Take 2 capsules (600 mg total) by mouth 3 (three) times daily. 90 capsule 1    hydroCHLOROthiazide (HYDRODIURIL) 50 MG tablet Take 1 tablet (50 mg total) by mouth once daily. 30 tablet 1    insulin aspart U-100 (NOVOLOG) 100 unit/mL InPn pen Inject 18 Units into the skin 3 (three) times daily. (Patient taking differently: Inject into the skin 3 (three) times daily.) 10 mL 1    insulin detemir U-100 (LEVEMIR FLEXTOUCH) 100 unit/mL (3 mL) SubQ InPn pen Inject 35 Units into the skin 2 (two) times daily. 10 mL 1    insulin glargine U-100, Lantus, 100 unit/mL injection Inject 30 Units into the skin every evening. (Patient not taking: Reported on 1/9/2025)      lisinopril (PRINIVIL,ZESTRIL) 40 MG tablet Take 1 tablet (40 mg total) by mouth once daily. 30 tablet 1    nicotine (NICODERM CQ) 14 mg/24 hr Place 1 patch onto the skin once daily. (Patient not taking: Reported on 1/9/2025) 15 patch 0    nortriptyline (PAMELOR) 25 MG capsule Take 1 capsule (25 mg total) by mouth 3 (three) times daily. 30 capsule 1    oxyCODONE (ROXICODONE) 5 MG immediate release tablet Take 1 tablet (5 mg total) by mouth every 12 (twelve) hours as needed. (Patient not taking: Reported on 1/9/2025) 30 tablet 0    polyethylene glycol (GLYCOLAX) 17 gram PwPk Take 17 g by mouth daily as needed. (Patient not taking: Reported on 1/9/2025) 30 each 1    QUEtiapine (SEROQUEL) 100 MG Tab Take 1 tablet (100 mg total) by mouth every evening. 30 tablet 1    sofosbuvir-velpatasvir (EPCLUSA) 400-100 mg Tab Take 1 tablet daily. (Patient not taking: Reported on 1/9/2025) 28 tablet 2       Scheduled meds:   albumin human 25%  25 g Intravenous Q12H     aspirin  81 mg Oral Daily    atorvastatin  80 mg Oral Daily    baclofen  5 mg Oral BID    buprenorphine-naloxone 8-2 mg  1 Film Sublingual BID    enoxparin  1 mg/kg Subcutaneous Q12H (treatment, non-standard time)    furosemide (LASIX) injection  60 mg Intravenous Q8H    gabapentin  300 mg Oral BID    miconazole NITRATE 2 %   Topical (Top) BID    tamsulosin  0.4 mg Oral Daily    white petrolatum   Topical (Top) Daily       Infusions:      PRN meds:    Current Facility-Administered Medications:     acetaminophen, 650 mg, Oral, Q4H PRN    aluminum-magnesium hydroxide-simethicone, 30 mL, Oral, QID PRN    dextrose 50%, 12.5 g, Intravenous, PRN    dextrose 50%, 25 g, Intravenous, PRN    diazePAM, 5 mg, Oral, Q12H PRN    glucagon (human recombinant), 1 mg, Intramuscular, PRN    glucose, 16 g, Oral, PRN    glucose, 24 g, Oral, PRN    hydrALAZINE, 5 mg, Intravenous, Q6H PRN    insulin aspart U-100, 0-10 Units, Subcutaneous, QID (AC + HS) PRN    magnesium oxide, 800 mg, Oral, PRN    magnesium oxide, 800 mg, Oral, PRN    melatonin, 6 mg, Oral, Nightly PRN    morphine, 4 mg, Intravenous, Q4H PRN    naloxone, 0.02 mg, Intravenous, PRN    ondansetron, 4 mg, Intravenous, Q6H PRN    oxyCODONE-acetaminophen, 1 tablet, Oral, Q4H PRN    oxyCODONE-acetaminophen, 1 tablet, Oral, Q4H PRN    potassium bicarbonate, 35 mEq, Oral, PRN    potassium bicarbonate, 50 mEq, Oral, PRN    potassium bicarbonate, 60 mEq, Oral, PRN    potassium, sodium phosphates, 2 packet, Oral, PRN    potassium, sodium phosphates, 2 packet, Oral, PRN    potassium, sodium phosphates, 2 packet, Oral, PRN    senna-docusate 8.6-50 mg, 1 tablet, Oral, Daily PRN    sodium chloride 0.9%, 10 mL, Intravenous, Q12H PRN    sodium chloride 0.9%, 10 mL, Intravenous, PRN    Past Medical History:   Diagnosis Date    Diabetes mellitus     Hypertension      Past Surgical History:   Procedure Laterality Date    SKIN GRAFT       No family history on file.  Social History     Tobacco  Use    Smoking status: Every Day     Current packs/day: 1.00     Types: Cigarettes   Substance Use Topics    Alcohol use: Yes    Drug use: No       OBJECTIVE:     Vital Signs and IO:  Temp:  [97.1 °F (36.2 °C)-99.2 °F (37.3 °C)]   Pulse:  []   Resp:  [8-67]   BP: (101-169)/()   SpO2:  [94 %-100 %]   I/O last 3 completed shifts:  In: 1536.3 [P.O.:1302; I.V.:234.3]  Out: 2325 [Urine:2325]  Wt Readings from Last 5 Encounters:   01/15/25 126.1 kg (278 lb)   07/19/22 99.8 kg (220 lb)   02/09/20 104.3 kg (230 lb)   08/06/19 113.4 kg (250 lb)   02/27/19 108.9 kg (240 lb)     Body mass index is 39.89 kg/m².    Physical Exam  Constitutional:       General: Patient is not in acute distress.     Appearance: Patient is well-developed. She is not diaphoretic.   HENT:      Head: Normocephalic and atraumatic.      Mouth/Throat: Mucous membranes are moist.   Eyes:      General: No scleral icterus.     Pupils: Pupils are equal, round, and reactive to light.   Cardiovascular:      Rate and Rhythm: Normal rate and regular rhythm.   Pulmonary:      Effort: Pulmonary effort is normal. No respiratory distress.      Breath sounds: No stridor.   Abdominal:      General: There is no distension.      Palpations: Abdomen is soft.   Musculoskeletal:         General: No deformity. Normal range of motion.      Cervical back: Neck supple.   Skin:     General: Skin is warm and dry.      Findings: No rash present. No erythema.   Neurological:      Mental Status: Patient is alert and oriented to person, place, and time.      Cranial Nerves: No cranial nerve deficit.   Psychiatric:         Behavior: Behavior normal.          Patient care time was spent personally by me on the following activities:     Obtaining a history.  Examination of patient.  Providing medical care at the patients bedside.  Developing a treatment plan with patient or surrogate and bedside caregivers.  Ordering and reviewing laboratory studies, radiographic studies,  pulse oximetry.  Ordering and performing treatments and interventions.  Evaluation of patient's response to treatment.  Discussions with consultants while on the unit and immediately available to the patient.  Re-evaluation of the patient's condition.  Documentation in the medical record.     Ubaldo Petersen MD    [facility]  [address] [place] [address]  MICHAEL Yeung [id number]    [phone] - tel  [phone] - fax    1/16/2025

## 2025-01-16 NOTE — ASSESSMENT & PLAN NOTE
"Patient has Systolic (HFrEF) heart failure that is Acute. On presentation their CHF was decompensated. Evidence of decompensated CHF on presentation includes: edema. The etiology of their decompensation is likely substance abuse . Most recent BNP and echo results are listed below.  No results for input(s): "BNP" in the last 72 hours.    Latest ECHO  Results for orders placed during the hospital encounter of 01/09/25    Echo    Interpretation Summary    Left Ventricle: The left ventricle is moderately dilated. Mildly increased wall thickness. There is mild asymmetric hypertrophy. Severe global hypokinesis present. There is severely reduced systolic function with a visually estimated ejection fraction of 25 - 30%.    Right Ventricle: Moderate right ventricular enlargement. Systolic function is mildly reduced.    Left Atrium: Left atrium is mildly dilated.    Tricuspid Valve: There is mild regurgitation.    IVC/SVC: Elevated venous pressure at 15 mmHg.    Current Heart Failure Medications  hydrALAZINE injection 5 mg, Every 6 hours PRN, Intravenous  furosemide injection 60 mg, Every 8 hours, Intravenous    Plan  - Monitor strict I&Os and daily weights.    - Place on telemetry  - Low sodium diet  - Place on fluid restriction of 1.5 L.   - Cardiology has been consulted  - The patient's volume status is stable but not at their baseline as indicated by edema  -Hold lisinopril and hydrochlorothiazide  -Could not tolerate dobutamine d/t ectopy   - Lasix as per Cardiology and Nephrology along with albumin  "

## 2025-01-16 NOTE — PROGRESS NOTES
[facility] Medicine  Progress Note    Patient Name: Gideon Ross  MRN: [id number]  Patient Class: IP- Inpatient   Admission Date: 1/9/2025  Length of Stay: 7 days  Attending Physician: Terri Aguirre, *  Primary Care Provider: No, Primary Doctor        Subjective     Principal Problem:Acute systolic congestive heart failure        HPI:  Mr. Ross is a 49 yom w/pmh significant for PID status post bilateral above-the-knee amputations, diabetes mellitus who presented for significant bilateral lower extremity edema and scrotal swelling, as well as intractable pain secondary to scrotal swelling.  Labs largely unremarkable.  BNP 3500, troponin 3300.  Patient was started on IV diuresis and pain control.  Cardiology was consulted.  Patient was initially started on heparin infusion.  On exam he has significant pain and tenderness due to significant scrotal swelling.    Overview/Hospital Course:  Patient with history of peripheral artery disease status post bilateral AKA, diabetes, substance abuse presents to the emergency room with complaints of lower extremity and scrotal edema.  BNP and troponin elevated.  Cardiology consulted.  Initially started on heparin drip but then transitioned to Lovenox.  Echocardiogram showed EF 25%.  Addiction Medicine consulted for heroin use.  Started on Suboxone and reports improvement in symptoms.  Diuretics held  when he developed MARLENE.  Needs LifeVest on discharge. He was started on dobutamine on 1/11/25, but overnight about 0200 he had Vtach about 50 beats and dobutamine turned off. He has a catheter for strict I&O with MARLENE, but is describing dysuria, there is purulent drainage at the end of the catheter so UA was obtained with concerns of UTI and pt placed on rocephin, catheter removed and he has been voiding. Urine culture with NGTD - will complete 3 days of Rocephin.  Nephrology has been consulted. Titrating lasix as tolerated along with albumin. Transfer  orders in place by Intensivist.    Interval History:   Patient seen and examined. He mentioned his SOB and swelling are improving. Serum creatinine stable (2.3). Nephrology and Cardiology closely following. Lasix increased, albumin added.       Review of Systems   Constitutional:  Positive for activity change. Negative for chills, fatigue and fever.   Respiratory:  Negative for chest tightness and shortness of breath.    Cardiovascular:  Negative for chest pain.   Gastrointestinal: Negative.    Genitourinary:  Positive for scrotal swelling.        Improving   Neurological:  Positive for weakness. Negative for tremors.     Objective:     Vital Signs (Most Recent):  Temp: 98.2 °F (36.8 °C) (01/16/25 1101)  Pulse: 100 (01/16/25 1501)  Resp: 17 (01/16/25 1501)  BP: (!) 142/100 (01/16/25 1501)  SpO2: 98 % (01/16/25 1501) Vital Signs (24h Range):  Temp:  [97.1 °F (36.2 °C)-99.2 °F (37.3 °C)] 98.2 °F (36.8 °C)  Pulse:  [] 100  Resp:  [8-67] 17  SpO2:  [94 %-100 %] 98 %  BP: (101-169)/() 142/100     Weight:  (bed scale -44 unable to obtain acurate weight at this time)  Body mass index is 39.89 kg/m².    Intake/Output Summary (Last 24 hours) at 1/16/2025 1542  Last data filed at 1/16/2025 1521  Gross per 24 hour   Intake 2116.27 ml   Output 2750 ml   Net -633.73 ml         Physical Exam  Vitals and nursing note reviewed. Exam conducted with a chaperone present.   Constitutional:       General: He is not in acute distress.     Appearance: Normal appearance. He is obese. He is not ill-appearing, toxic-appearing or diaphoretic.   Pulmonary:      Effort: Pulmonary effort is normal.      Breath sounds: Rhonchi and rales present.   Genitourinary:     Comments: Scrotal Edema   Musculoskeletal:         General: Deformity present.      Comments: Right BKA/ Left AKA   Skin:     General: Skin is warm and dry.   Neurological:      Mental Status: He is alert.   Psychiatric:         Mood and Affect: Mood normal.          Behavior: Behavior normal.         Thought Content: Thought content normal.             Significant Labs: All pertinent labs within the past 24 hours have been reviewed.  CBC:   Recent Labs   Lab 01/15/25  0512 01/16/25  0421   WBC 11.03 10.91   HGB 9.5* 8.9*   HCT 30.8* 29.3*    231     CMP:   Recent Labs   Lab 01/15/25  0512 01/16/25  0421   * 137   K 4.7 4.8    103   CO2 24 26   * 108   BUN 53* 65*   CREATININE 2.3* 2.3*   CALCIUM 7.8* 8.5*   PROT 5.8* 6.0   ALBUMIN 2.8* 3.1*   BILITOT 0.2 0.3   ALKPHOS 140* 172*   AST 14 11   ALT 12 10   ANIONGAP 7* 8     Magnesium:   Recent Labs   Lab 01/15/25  0512 01/16/25  0421   MG 1.9 2.0     Microbiology Results (last 7 days)       Procedure Component Value Units Date/Time    Urine culture [[id number]] Collected: 01/12/25 1055    Order Status: Completed Specimen: Urine Updated: 01/15/25 0640     Urine Culture, Routine No growth    Narrative:      Specimen Source->Urine    Blood culture x two cultures. Draw prior to antibiotics. [[id number]] Collected: 01/09/25 0859    Order Status: Completed Specimen: Blood Updated: 01/14/25 1032     Blood Culture, Routine No growth after 5 days.    Narrative:      Aerobic and anaerobic    Blood culture x two cultures. Draw prior to antibiotics. [[id number]] Collected: 01/09/25 0859    Order Status: Completed Specimen: Blood Updated: 01/14/25 1032     Blood Culture, Routine No growth after 5 days.    Narrative:      Aerobic and anaerobic    Clostridium difficile EIA [[id number]] Collected: 01/09/25 2223    Order Status: Canceled Specimen: Stool             Significant Imaging: I have reviewed all pertinent imaging results/findings within the past 24 hours.  Imaging Results              X-Ray Chest AP Portable (Final result)  Result time 01/09/25 08:09:27      Final result by Manuel Snow MD (01/09/25 08:09:27)                   Impression:      Top-normal size heart and findings of mild pulmonary vascular  "congestion/trace interstitial edema.      Electronically signed by: Manuel Snow  Date:    01/09/2025  Time:    08:09               Narrative:    CLINICAL HISTORY:  ([id number])50 y/o  (1975) M    Chest Pain;    TECHNIQUE:  (A#[id number], exam time 1/9/2025 7:54)    XR CHEST AP PORTABLE [id number]    COMPARISON:  Radiograph from 07/19/2022    FINDINGS:  Mild perihilar pulmonary vascular prominence with slightly increased central hilar interstitial lung markings bilaterally suggestive of mild pulmonary vascular congestion/trace edema. No pneumothorax is identified. The heart is top normal in size.  Osseous structures show degenerative changes in the spine. The visualized upper abdomen is unremarkable.                                        Assessment and Plan     * Acute systolic congestive heart failure  Patient has Systolic (HFrEF) heart failure that is Acute. On presentation their CHF was decompensated. Evidence of decompensated CHF on presentation includes: edema. The etiology of their decompensation is likely substance abuse . Most recent BNP and echo results are listed below.  No results for input(s): "BNP" in the last 72 hours.    Latest ECHO  Results for orders placed during the hospital encounter of 01/09/25    Echo    Interpretation Summary    Left Ventricle: The left ventricle is moderately dilated. Mildly increased wall thickness. There is mild asymmetric hypertrophy. Severe global hypokinesis present. There is severely reduced systolic function with a visually estimated ejection fraction of 25 - 30%.    Right Ventricle: Moderate right ventricular enlargement. Systolic function is mildly reduced.    Left Atrium: Left atrium is mildly dilated.    Tricuspid Valve: There is mild regurgitation.    IVC/SVC: Elevated venous pressure at 15 mmHg.    Current Heart Failure Medications  hydrALAZINE injection 5 mg, Every 6 hours PRN, Intravenous  furosemide injection 60 mg, Every 8 hours, Intravenous    Plan  - " Monitor strict I&Os and daily weights.    - Place on telemetry  - Low sodium diet  - Place on fluid restriction of 1.5 L.   - Cardiology has been consulted  - The patient's volume status is stable but not at their baseline as indicated by edema  -Hold lisinopril and hydrochlorothiazide  -Could not tolerate dobutamine d/t ectopy   - Lasix as per Cardiology and Nephrology along with albumin      Urinary tract infection associated with indwelling urethral catheter  Catheter in place for severe acute decompensated heart failure and need for strict I&O with concern of cardiorenal syndrome   Completed 4 day course of ceftriaxone  Urine culture  Weber catheter was removed before I saw the patient and unable to be reinserted.  Patient has a male [facility]        Opioid use disorder  Addiction medicine consulted   Improved on Suboxone    Per Addiction medicine:   For first dose of buprenorphine (at least 18 hours since last use of fentanyl) give one or two 8 mg strips (8-16 mg total).  Wait 1 hour and then...  If mild withdrawal, give another 8 mg strip.  If more significant withdrawal, give two more 8 mg strips.  If relaxed or calm, do not give any more.  Wait 1-2 more hours...  If still have withdrawal symptoms, take another 8 mg strip  If relaxed or calm, do not take any more  Try not to give more than 32 mg (4 strips) on day one.  On day 2, give 8 mg BID   Continue taking 8 mg BID until seeing me in clinic.    MARLENE (acute kidney injury)  MARLENE is likely due to pre-renal azotemia due to intravascular volume depletion. Baseline creatinine is  1 . Most recent creatinine and eGFR are listed below.  Recent Labs     01/14/25  0521 01/15/25  0512 01/16/25  0421   CREATININE 2.4* 2.3* 2.3*   EGFRNORACEVR 32.7* 34.4* 34.4*        Plan  - MARLENE is stable but not improving  - Avoid nephrotoxins and renally dose meds for GFR listed above  - Monitor urine output, serial BMP, and adjust therapy as needed  - nephrology consulted  - Close  monitoring  - Diurese as tolerated    Diabetes mellitus  Patient's FSGs are controlled on current medication regimen.  Last A1c reviewed-   Lab Results   Component Value Date    HGBA1C 7.5 (H) 01/10/2025     Most recent fingerstick glucose reviewed-   Recent Labs   Lab 01/15/25  1623 01/15/25  2202 01/16/25  0719 01/16/25  1131   POCTGLUCOSE 133* 156* 132* 110       Current correctional scale  Low  Maintain anti-hyperglycemic dose as follows-   Antihyperglycemics (From admission, onward)      Start     Stop Route Frequency Ordered    01/10/25 1515  insulin aspart U-100 pen 0-10 Units         -- SubQ Before meals & nightly PRN 01/10/25 1415          Hold Oral hypoglycemics while patient is in the hospital.    Elevated troponin  Troponin elevated but flat, cardiology following  No chest pain or shortness of breath   Likely secondary to congestive heart failure  Continue Lovenox      Scrotal edema  Secondary to acute decompensated heart failure  Elevate scrotum  Scrotal ultrasound reviewed  Diuresing as per Nephrology recommendation    Obesity  Body mass index is 39.89 kg/m². Morbid obesity complicates all aspects of disease management from diagnostic modalities to treatment. Weight loss encouraged and health benefits explained to patient.           VTE Risk Mitigation (From admission, onward)           Ordered     enoxaparin injection 120 mg  Every 12 hours         01/10/25 0724     IP VTE HIGH RISK PATIENT  Once         01/09/25 1326     Place sequential compression device  Until discontinued         01/09/25 1326     Reason for no Mechanical VTE Prophylaxis  Once        Question:  Reasons:  Answer:  Physician Provided (leave comment)  Comment:  already on heparin infusion    01/09/25 0906                    Discharge Planning   KATHERINE: 1/20/2025     Code Status: Full Code   Medical Readiness for Discharge Date:   Discharge Plan A: Home   Discharge Delays: None known at this time        Critical care time spent on the  evaluation and treatment of severe organ dysfunction, review of pertinent labs and imaging studies, discussions with consulting providers and discussions with patient/family: 45 minutes.            Terri Aguirre MD  Department of Hospital Medicine   [facility]

## 2025-01-16 NOTE — PROGRESS NOTES
Chief complaint:  Testicle Pain (And swelling x1 week with scrotal discharge )      HPI:  Gideon Ross is a 49 y.o. male presenting with Stage 2 pressure ulcers to the bilateral buttocks, Scrotum denuded and  Scrotal swelling that were POA. Pt has some pain but is manageable. No other complaints today    1/15/25  Pt seen at bedside with the wound nurse and his ICU nurse. Scrotum continues to swell with ulcers and he has open wounds of the BLM groins. Wounds are deep between the legs    PMH:  As per HPI and below:  Past Medical History:   Diagnosis Date    Diabetes mellitus     Hypertension        Social History     Socioeconomic History    Marital status: Significant Other   Tobacco Use    Smoking status: Every Day     Current packs/day: 1.00     Types: Cigarettes   Substance and Sexual Activity    Alcohol use: Yes    Drug use: No     Social Drivers of Health     Financial Resource Strain: Low Risk  (1/10/2025)    Overall Financial Resource Strain (CARDIA)     Difficulty of Paying Living Expenses: Not hard at all   Food Insecurity: Patient Unable To Answer (1/10/2025)    Hunger Vital Sign     Worried About Running Out of Food in the Last Year: Patient unable to answer     Ran Out of Food in the Last Year: Patient unable to answer   Transportation Needs: No Transportation Needs (1/10/2025)    TRANSPORTATION NEEDS     Transportation : No   Stress: No Stress Concern Present (1/10/2025)    [employer] - Occupational Stress Questionnaire     Feeling of Stress : Not at all   Housing Stability: Low Risk  (1/10/2025)    Housing Stability Vital Sign     Unable to Pay for Housing in the Last Year: No     Homeless in the Last Year: No       Past Surgical History:   Procedure Laterality Date    SKIN GRAFT         No family history on file.    Review of patient's allergies indicates:  No Known Allergies    No current facility-administered medications on file prior to encounter.     Current  Outpatient Medications on File Prior to Encounter   Medication Sig Dispense Refill    amLODIPine (NORVASC) 10 MG tablet Take 1 tablet (10 mg total) by mouth once daily. 30 tablet 1    aspirin (ECOTRIN) 81 MG EC tablet Take 1 tablet (81 mg total) by mouth once daily. 30 tablet 1    atorvastatin (LIPITOR) 80 MG tablet Take 1 tablet (80 mg total) by mouth once daily. 30 tablet 1    baclofen (LIORESAL) 10 MG tablet Take 1 tablet (10 mg total) by mouth 3 (three) times daily. 90 tablet 1    carvedilol (COREG) 6.25 MG tablet Take 1 tablet (6.25 mg total) by mouth 2 (two) times daily. 60 tablet 1    diazePAM (VALIUM) 5 MG tablet Take 1 tablet (5 mg total) by mouth every 12 (twelve) hours as needed for Anxiety (anxiety.). 30 tablet 0    gabapentin (NEURONTIN) 300 MG capsule Take 2 capsules (600 mg total) by mouth 3 (three) times daily. 90 capsule 1    hydroCHLOROthiazide (HYDRODIURIL) 50 MG tablet Take 1 tablet (50 mg total) by mouth once daily. 30 tablet 1    insulin aspart U-100 (NOVOLOG) 100 unit/mL InPn pen Inject 18 Units into the skin 3 (three) times daily. (Patient taking differently: Inject into the skin 3 (three) times daily.) 10 mL 1    insulin detemir U-100 (LEVEMIR FLEXTOUCH) 100 unit/mL (3 mL) SubQ InPn pen Inject 35 Units into the skin 2 (two) times daily. 10 mL 1    insulin glargine U-100, Lantus, 100 unit/mL injection Inject 30 Units into the skin every evening. (Patient not taking: Reported on 1/9/2025)      lisinopril (PRINIVIL,ZESTRIL) 40 MG tablet Take 1 tablet (40 mg total) by mouth once daily. 30 tablet 1    nicotine (NICODERM CQ) 14 mg/24 hr Place 1 patch onto the skin once daily. (Patient not taking: Reported on 1/9/2025) 15 patch 0    nortriptyline (PAMELOR) 25 MG capsule Take 1 capsule (25 mg total) by mouth 3 (three) times daily. 30 capsule 1    oxyCODONE (ROXICODONE) 5 MG immediate release tablet Take 1 tablet (5 mg total) by mouth every 12 (twelve) hours as needed. (Patient not taking: Reported  "on 2025) 30 tablet 0    polyethylene glycol (GLYCOLAX) 17 gram PwPk Take 17 g by mouth daily as needed. (Patient not taking: Reported on 2025) 30 each 1    QUEtiapine (SEROQUEL) 100 MG Tab Take 1 tablet (100 mg total) by mouth every evening. 30 tablet 1    sofosbuvir-velpatasvir (EPCLUSA) 400-100 mg Tab Take 1 tablet daily. (Patient not taking: Reported on 2025) 28 tablet 2       ROS: As per HPI and below:  Pertinent items are noted in HPI.      Physical Exam:     Vitals:    01/15/25 1830 01/15/25 1845 01/15/25 1900 01/15/25 2044   BP: (!) 102/50 (!) 104/55 (!) 110/53    BP Location:       Patient Position:       Pulse: 100 105 105    Resp: 11 13 14 12   Temp:       TempSrc:       SpO2: 97% 97% 99%    Weight:       Height:           BP  Min: 100/63  Max: 164/70  Temp  Av °F (36.7 °C)  Min: 97.1 °F (36.2 °C)  Max: 98.8 °F (37.1 °C)  Pulse  Av.6  Min: 90  Max: 111  Resp  Av.4  Min: 10  Max: 67  SpO2  Av.4 %  Min: 89 %  Max: 100 %  Height  Av' 10" (177.8 cm)  Min: 5' 10" (177.8 cm)  Max: 5' 10" (177.8 cm)  Weight  Av.4 kg (263 lb 2.4 oz)  Min: 108.9 kg (240 lb)  Max: 127.1 kg (280 lb 3.2 oz)    Body mass index is 39.89 kg/m².          General:             Well developed, well nourished, no apparent distress  HEENT:              NCAT, no JVD, mucous membranes moist, EOM intact  Cardiovascular:  Regular rate and rhythm, normal S1, normal S2, No murmurs, rubs, or gallops  Respiratory:        Normal breath sounds, no wheezes, no rales, no rhonchi  Abdomen:           Bowel sounds present, non tender, non distended, no masses, no hepatojugular reflux  Extremities:        No clubbing, no cyanosis, no edema  Vascular:            2+ b/l radial.  Peripheral pulses intact.  No carotid bruits.  Neurological:      No focal deficits  Skin:                   No obvious rashes or erythema, Stage 2 pressure ulcers to the bilateral buttocks, Scrotum denuded and  Scrotal " "swelling                Lab Results   Component Value Date    WBC 11.03 01/15/2025    HGB 9.5 (L) 01/15/2025    HCT 30.8 (L) 01/15/2025    MCV 85 01/15/2025     01/15/2025     Lab Results   Component Value Date    CHOL 122 09/27/2019     Lab Results   Component Value Date    HDL 23 (L) 09/27/2019     Lab Results   Component Value Date    LDLCALC 70.2 09/27/2019     Lab Results   Component Value Date    TRIG 144 09/27/2019     Lab Results   Component Value Date    CHOLHDL 18.9 (L) 09/27/2019     CMP  Recent Labs   Lab 01/15/25  0512   *   CALCIUM 7.8*   ALBUMIN 2.8*   PROT 5.8*   *   K 4.7   CO2 24      BUN 53*   CREATININE 2.3*   ALKPHOS 140*   ALT 12   AST 14   BILITOT 0.2      No results found for: "TSH"        Assessment and Recommendations       Diagnoses:    Stage 2 pressure ulcers to the bilateral buttocks  Scrotum denuded   Scrotal swelling    Plan:  [place] to the bilateral buttocks, sacrum and scrotum q shift  [place] to the [facility] groin wounds    Complexity:  Time spent preparing to see the patient, taking down dressings, reviewing history, performing evaluation, counseling patient, documentation and redressing the wounds was >55 minutes      moderate    "

## 2025-01-17 LAB
ALBUMIN SERPL BCP-MCNC: 3.3 G/DL (ref 3.5–5.2)
ALP SERPL-CCNC: 225 U/L (ref 55–135)
ALT SERPL W/O P-5'-P-CCNC: 10 U/L (ref 10–44)
ANION GAP SERPL CALC-SCNC: 9 MMOL/L (ref 8–16)
AST SERPL-CCNC: 12 U/L (ref 10–40)
BASOPHILS # BLD AUTO: 0.06 K/UL (ref 0–0.2)
BASOPHILS NFR BLD: 0.5 % (ref 0–1.9)
BILIRUB SERPL-MCNC: 0.3 MG/DL (ref 0.1–1)
BUN SERPL-MCNC: 75 MG/DL (ref 6–20)
CALCIUM SERPL-MCNC: 9.1 MG/DL (ref 8.7–10.5)
CHLORIDE SERPL-SCNC: 101 MMOL/L (ref 95–110)
CO2 SERPL-SCNC: 26 MMOL/L (ref 23–29)
CREAT SERPL-MCNC: 2.4 MG/DL (ref 0.5–1.4)
DIFFERENTIAL METHOD BLD: ABNORMAL
EOSINOPHIL # BLD AUTO: 0.9 K/UL (ref 0–0.5)
EOSINOPHIL NFR BLD: 8 % (ref 0–8)
ERYTHROCYTE [DISTWIDTH] IN BLOOD BY AUTOMATED COUNT: 15.1 % (ref 11.5–14.5)
EST. GFR  (NO RACE VARIABLE): 32.7 ML/MIN/1.73 M^2
GLUCOSE SERPL-MCNC: 158 MG/DL (ref 70–110)
HCT VFR BLD AUTO: 29.4 % (ref 40–54)
HGB BLD-MCNC: 8.9 G/DL (ref 14–18)
IMM GRANULOCYTES # BLD AUTO: 0.06 K/UL (ref 0–0.04)
IMM GRANULOCYTES NFR BLD AUTO: 0.5 % (ref 0–0.5)
LYMPHOCYTES # BLD AUTO: 1.4 K/UL (ref 1–4.8)
LYMPHOCYTES NFR BLD: 12.4 % (ref 18–48)
MAGNESIUM SERPL-MCNC: 1.9 MG/DL (ref 1.6–2.6)
MAGNESIUM SERPL-MCNC: 2 MG/DL (ref 1.6–2.6)
MCH RBC QN AUTO: 25.6 PG (ref 27–31)
MCHC RBC AUTO-ENTMCNC: 30.3 G/DL (ref 32–36)
MCV RBC AUTO: 85 FL (ref 82–98)
MONOCYTES # BLD AUTO: 1.5 K/UL (ref 0.3–1)
MONOCYTES NFR BLD: 12.8 % (ref 4–15)
NEUTROPHILS # BLD AUTO: 7.4 K/UL (ref 1.8–7.7)
NEUTROPHILS NFR BLD: 65.8 % (ref 38–73)
NRBC BLD-RTO: 0 /100 WBC
PLATELET # BLD AUTO: 251 K/UL (ref 150–450)
PMV BLD AUTO: 10.8 FL (ref 9.2–12.9)
POCT GLUCOSE: 160 MG/DL (ref 70–110)
POCT GLUCOSE: 166 MG/DL (ref 70–110)
POCT GLUCOSE: 180 MG/DL (ref 70–110)
POCT GLUCOSE: 214 MG/DL (ref 70–110)
POTASSIUM SERPL-SCNC: 5.1 MMOL/L (ref 3.5–5.1)
PROT SERPL-MCNC: 6.4 G/DL (ref 6–8.4)
RBC # BLD AUTO: 3.48 M/UL (ref 4.6–6.2)
SODIUM SERPL-SCNC: 136 MMOL/L (ref 136–145)
TROPONIN I SERPL HS-MCNC: 130.2 PG/ML (ref 0–14.9)
WBC # BLD AUTO: 11.31 K/UL (ref 3.9–12.7)

## 2025-01-17 PROCEDURE — 94761 N-INVAS EAR/PLS OXIMETRY MLT: CPT

## 2025-01-17 PROCEDURE — 99223 1ST HOSP IP/OBS HIGH 75: CPT | Mod: 25,,, | Performed by: UROLOGY

## 2025-01-17 PROCEDURE — 12000002 HC ACUTE/MED SURGE SEMI-PRIVATE ROOM

## 2025-01-17 PROCEDURE — 99233 SBSQ HOSP IP/OBS HIGH 50: CPT | Mod: ,,, | Performed by: GENERAL PRACTICE

## 2025-01-17 PROCEDURE — 25000003 PHARM REV CODE 250: Performed by: STUDENT IN AN ORGANIZED HEALTH CARE EDUCATION/TRAINING PROGRAM

## 2025-01-17 PROCEDURE — 80053 COMPREHEN METABOLIC PANEL: CPT | Performed by: STUDENT IN AN ORGANIZED HEALTH CARE EDUCATION/TRAINING PROGRAM

## 2025-01-17 PROCEDURE — 36415 COLL VENOUS BLD VENIPUNCTURE: CPT | Performed by: STUDENT IN AN ORGANIZED HEALTH CARE EDUCATION/TRAINING PROGRAM

## 2025-01-17 PROCEDURE — 63600175 PHARM REV CODE 636 W HCPCS: Performed by: STUDENT IN AN ORGANIZED HEALTH CARE EDUCATION/TRAINING PROGRAM

## 2025-01-17 PROCEDURE — 83735 ASSAY OF MAGNESIUM: CPT | Performed by: STUDENT IN AN ORGANIZED HEALTH CARE EDUCATION/TRAINING PROGRAM

## 2025-01-17 PROCEDURE — 99900031 HC PATIENT EDUCATION (STAT)

## 2025-01-17 PROCEDURE — P9047 ALBUMIN (HUMAN), 25%, 50ML: HCPCS | Performed by: GENERAL PRACTICE

## 2025-01-17 PROCEDURE — 63600175 PHARM REV CODE 636 W HCPCS

## 2025-01-17 PROCEDURE — 25000003 PHARM REV CODE 250: Performed by: NURSE PRACTITIONER

## 2025-01-17 PROCEDURE — P9047 ALBUMIN (HUMAN), 25%, 50ML: HCPCS | Performed by: INTERNAL MEDICINE

## 2025-01-17 PROCEDURE — 51703 INSERT BLADDER CATH COMPLEX: CPT | Mod: ,,, | Performed by: UROLOGY

## 2025-01-17 PROCEDURE — 25000003 PHARM REV CODE 250

## 2025-01-17 PROCEDURE — 85025 COMPLETE CBC W/AUTO DIFF WBC: CPT | Performed by: STUDENT IN AN ORGANIZED HEALTH CARE EDUCATION/TRAINING PROGRAM

## 2025-01-17 PROCEDURE — 63600175 PHARM REV CODE 636 W HCPCS: Performed by: GENERAL PRACTICE

## 2025-01-17 PROCEDURE — 99900035 HC TECH TIME PER 15 MIN (STAT)

## 2025-01-17 PROCEDURE — 27000221 HC OXYGEN, UP TO 24 HOURS

## 2025-01-17 PROCEDURE — 63600175 PHARM REV CODE 636 W HCPCS: Performed by: INTERNAL MEDICINE

## 2025-01-17 RX ORDER — ALBUMIN HUMAN 250 G/1000ML
25 SOLUTION INTRAVENOUS 3 TIMES DAILY
Status: DISCONTINUED | OUTPATIENT
Start: 2025-01-17 | End: 2025-01-17

## 2025-01-17 RX ORDER — LIDOCAINE HYDROCHLORIDE 20 MG/ML
JELLY TOPICAL ONCE AS NEEDED
Status: DISCONTINUED | OUTPATIENT
Start: 2025-01-17 | End: 2025-01-26 | Stop reason: HOSPADM

## 2025-01-17 RX ORDER — ALBUMIN HUMAN 250 G/1000ML
12.5 SOLUTION INTRAVENOUS 2 TIMES DAILY
Status: DISCONTINUED | OUTPATIENT
Start: 2025-01-17 | End: 2025-01-25

## 2025-01-17 RX ADMIN — ACYCLOVIR 800 MG: 400 TABLET ORAL at 05:01

## 2025-01-17 RX ADMIN — MORPHINE SULFATE 4 MG: 4 INJECTION INTRAVENOUS at 09:01

## 2025-01-17 RX ADMIN — MICONAZOLE NITRATE: 20 POWDER TOPICAL at 09:01

## 2025-01-17 RX ADMIN — GABAPENTIN 300 MG: 300 CAPSULE ORAL at 09:01

## 2025-01-17 RX ADMIN — ENOXAPARIN SODIUM 120 MG: 120 INJECTION SUBCUTANEOUS at 09:01

## 2025-01-17 RX ADMIN — FUROSEMIDE 15 MG/HR: 10 INJECTION, SOLUTION INTRAMUSCULAR; INTRAVENOUS at 02:01

## 2025-01-17 RX ADMIN — DIAZEPAM 5 MG: 5 TABLET ORAL at 11:01

## 2025-01-17 RX ADMIN — OXYCODONE HYDROCHLORIDE AND ACETAMINOPHEN 1 TABLET: 10; 325 TABLET ORAL at 06:01

## 2025-01-17 RX ADMIN — BUPRENORPHINE AND NALOXONE 1 FILM: 8; 2 FILM BUCCAL; SUBLINGUAL at 08:01

## 2025-01-17 RX ADMIN — INSULIN ASPART 2 UNITS: 100 INJECTION, SOLUTION INTRAVENOUS; SUBCUTANEOUS at 09:01

## 2025-01-17 RX ADMIN — ATORVASTATIN CALCIUM 80 MG: 40 TABLET, FILM COATED ORAL at 08:01

## 2025-01-17 RX ADMIN — ACYCLOVIR 800 MG: 400 TABLET ORAL at 02:01

## 2025-01-17 RX ADMIN — ALBUMIN (HUMAN) 25 G: 12.5 SOLUTION INTRAVENOUS at 09:01

## 2025-01-17 RX ADMIN — FUROSEMIDE 60 MG: 10 INJECTION, SOLUTION INTRAMUSCULAR; INTRAVENOUS at 05:01

## 2025-01-17 RX ADMIN — BACLOFEN 5 MG: 5 TABLET ORAL at 09:01

## 2025-01-17 RX ADMIN — ENOXAPARIN SODIUM 120 MG: 120 INJECTION SUBCUTANEOUS at 08:01

## 2025-01-17 RX ADMIN — ACYCLOVIR 800 MG: 400 TABLET ORAL at 09:01

## 2025-01-17 RX ADMIN — GABAPENTIN 300 MG: 300 CAPSULE ORAL at 08:01

## 2025-01-17 RX ADMIN — WHITE PETROLATUM 41 % TOPICAL OINTMENT: at 09:01

## 2025-01-17 RX ADMIN — ASPIRIN 81 MG: 81 TABLET, COATED ORAL at 08:01

## 2025-01-17 RX ADMIN — OXYCODONE HYDROCHLORIDE AND ACETAMINOPHEN 1 TABLET: 10; 325 TABLET ORAL at 05:01

## 2025-01-17 RX ADMIN — ALBUMIN (HUMAN) 12.5 G: 12.5 SOLUTION INTRAVENOUS at 09:01

## 2025-01-17 RX ADMIN — ACYCLOVIR 800 MG: 400 TABLET ORAL at 08:01

## 2025-01-17 RX ADMIN — BACLOFEN 5 MG: 5 TABLET ORAL at 08:01

## 2025-01-17 RX ADMIN — TAMSULOSIN HYDROCHLORIDE 0.4 MG: 0.4 CAPSULE ORAL at 08:01

## 2025-01-17 RX ADMIN — BUPRENORPHINE AND NALOXONE 1 FILM: 8; 2 FILM BUCCAL; SUBLINGUAL at 09:01

## 2025-01-17 NOTE — PLAN OF CARE
Problem: Wound  Goal: Optimal Coping  Outcome: Progressing  Goal: Improved Oral Intake  Intervention: Promote and Optimize Oral Intake  Flowsheets (Taken 1/17/2025 1532)  Nutrition Interventions: supplemental drinks provided

## 2025-01-17 NOTE — PROGRESS NOTES
[facility]  Progress Note    Patient Name: Gideon Ross  MRN: [id number]  Patient Class: IP- Inpatient   Admission Date: 1/9/2025  Length of Stay: 8 days  Attending Physician: Don Whelan DO  Primary Care Provider: Jennifer, Primary Doctor        Subjective     Principal Problem:Acute systolic congestive heart failure        HPI:  Mr. Ross is a 49 yom w/pmh significant for PID status post bilateral above-the-knee amputations, diabetes mellitus who presented for significant bilateral lower extremity edema and scrotal swelling, as well as intractable pain secondary to scrotal swelling.  Labs largely unremarkable.  BNP 3500, troponin 3300.  Patient was started on IV diuresis and pain control.  Cardiology was consulted.  Patient was initially started on heparin infusion.  On exam he has significant pain and tenderness due to significant scrotal swelling.    Overview/Hospital Course:  Patient with history of peripheral artery disease status post bilateral AKA, diabetes, substance abuse presents to the emergency room with complaints of lower extremity and scrotal edema.  BNP and troponin elevated.  Cardiology consulted.  Initially started on heparin drip but then transitioned to Lovenox.  Echocardiogram showed EF 25%.  Addiction Medicine consulted for heroin use.  Started on Suboxone and reports improvement in symptoms.  Diuretics held  when he developed MARLENE.  Needs LifeVest on discharge. He was started on dobutamine on 1/11/25, but overnight about 0200 he had Vtach about 50 beats and dobutamine turned off. He has a catheter for strict I&O with MARLENE, but is describing dysuria, there is purulent drainage at the end of the catheter so UA was obtained with concerns of UTI and pt placed on rocephin, catheter removed and he has been voiding. Urine culture with NGTD - will complete 3 days of Rocephin.  Nephrology has been consulted. Titrating lasix as tolerated along with albumin.  Cardiology  recommends Weber for ins and outs and we will attempt to place but if unable we will need to consult Urology.    Interval History:   Patient seen and examined. He mentioned his SOB and swelling are improving. Serum creatinine stable (2.3). Nephrology and Cardiology closely following. Lasix increased, albumin added.       Review of Systems   Constitutional:  Positive for activity change. Negative for chills, fatigue and fever.   Respiratory:  Negative for chest tightness and shortness of breath.    Cardiovascular:  Negative for chest pain.   Gastrointestinal: Negative.    Genitourinary:  Positive for scrotal swelling.        Improving   Neurological:  Positive for weakness. Negative for tremors.     Objective:     Vital Signs (Most Recent):  Temp: 98.4 °F (36.9 °C) (01/17/25 0808)  Pulse: 99 (01/17/25 1118)  Resp: 17 (01/17/25 0900)  BP: 137/78 (01/17/25 1118)  SpO2: 97 % (01/17/25 1118) Vital Signs (24h Range):  Temp:  [98.1 °F (36.7 °C)-99.4 °F (37.4 °C)] 98.4 °F (36.9 °C)  Pulse:  [] 99  Resp:  [7-18] 17  SpO2:  [94 %-100 %] 97 %  BP: (107-169)/(64-92) 137/78     Weight:  (bed scale -44 unable to obtain acurate weight at this time)  Body mass index is 39.89 kg/m².    Intake/Output Summary (Last 24 hours) at 1/17/2025 1504  Last data filed at 1/17/2025 1254  Gross per 24 hour   Intake 1460 ml   Output 3390 ml   Net -1930 ml         Physical Exam  Vitals and nursing note reviewed. Exam conducted with a chaperone present.   Constitutional:       General: He is not in acute distress.     Appearance: Normal appearance. He is obese. He is not ill-appearing, toxic-appearing or diaphoretic.   Pulmonary:      Effort: Pulmonary effort is normal.      Breath sounds: Rhonchi and rales present.   Genitourinary:     Comments: Scrotal Edema   Musculoskeletal:         General: Deformity present.      Comments: Right BKA/ Left AKA   Skin:     General: Skin is warm and dry.   Neurological:      Mental Status: He is alert.    Psychiatric:         Mood and Affect: Mood normal.         Behavior: Behavior normal.         Thought Content: Thought content normal.             Significant Labs: All pertinent labs within the past 24 hours have been reviewed.  CBC:   Recent Labs   Lab 01/16/25 0421 01/17/25  0630   WBC 10.91 11.31   HGB 8.9* 8.9*   HCT 29.3* 29.4*    251     CMP:   Recent Labs   Lab 01/16/25 0421 01/16/25  1803 01/17/25  0630    136 136   K 4.8 5.0 5.1    100 101   CO2 26 28 26    160* 158*   BUN 65* 69* 75*   CREATININE 2.3* 2.4* 2.4*   CALCIUM 8.5* 9.1 9.1   PROT 6.0  --  6.4   ALBUMIN 3.1*  --  3.3*   BILITOT 0.3  --  0.3   ALKPHOS 172*  --  225*   AST 11  --  12   ALT 10  --  10   ANIONGAP 8 8 9     Magnesium:   Recent Labs   Lab 01/16/25 0421 01/16/25 1803 01/17/25  0630   MG 2.0 1.9 2.0     Microbiology Results (last 7 days)       Procedure Component Value Units Date/Time    Urine culture [[id number]] Collected: 01/12/25 1055    Order Status: Completed Specimen: Urine Updated: 01/15/25 0640     Urine Culture, Routine No growth    Narrative:      Specimen Source->Urine    Blood culture x two cultures. Draw prior to antibiotics. [[id number]] Collected: 01/09/25 0859    Order Status: Completed Specimen: Blood Updated: 01/14/25 1032     Blood Culture, Routine No growth after 5 days.    Narrative:      Aerobic and anaerobic    Blood culture x two cultures. Draw prior to antibiotics. [[id number]] Collected: 01/09/25 0859    Order Status: Completed Specimen: Blood Updated: 01/14/25 1032     Blood Culture, Routine No growth after 5 days.    Narrative:      Aerobic and anaerobic            Significant Imaging: I have reviewed all pertinent imaging results/findings within the past 24 hours.  Imaging Results              X-Ray Chest AP Portable (Final result)  Result time 01/09/25 08:09:27      Final result by Manuel Snow MD (01/09/25 08:09:27)                   Impression:      Top-normal size  "heart and findings of mild pulmonary vascular congestion/trace interstitial edema.      Electronically signed by: Manuel Snow  Date:    01/09/2025  Time:    08:09               Narrative:    CLINICAL HISTORY:  ([id number])50 y/o  (1975) M    Chest Pain;    TECHNIQUE:  (A#[id number], exam time 1/9/2025 7:54)    XR CHEST AP PORTABLE [id number]    COMPARISON:  Radiograph from 07/19/2022    FINDINGS:  Mild perihilar pulmonary vascular prominence with slightly increased central hilar interstitial lung markings bilaterally suggestive of mild pulmonary vascular congestion/trace edema. No pneumothorax is identified. The heart is top normal in size.  Osseous structures show degenerative changes in the spine. The visualized upper abdomen is unremarkable.                                        Assessment and Plan     * Acute systolic congestive heart failure  Patient has Systolic (HFrEF) heart failure that is Acute. On presentation their CHF was decompensated. Evidence of decompensated CHF on presentation includes: edema. The etiology of their decompensation is likely substance abuse . Most recent BNP and echo results are listed below.  No results for input(s): "BNP" in the last 72 hours.    Latest ECHO  Results for orders placed during the hospital encounter of 01/09/25    Echo    Interpretation Summary    Left Ventricle: The left ventricle is moderately dilated. Mildly increased wall thickness. There is mild asymmetric hypertrophy. Severe global hypokinesis present. There is severely reduced systolic function with a visually estimated ejection fraction of 25 - 30%.    Right Ventricle: Moderate right ventricular enlargement. Systolic function is mildly reduced.    Left Atrium: Left atrium is mildly dilated.    Tricuspid Valve: There is mild regurgitation.    IVC/SVC: Elevated venous pressure at 15 mmHg.    Current Heart Failure Medications  hydrALAZINE injection 5 mg, Every 6 hours PRN, Intravenous  furosemide injection " 60 mg, Every 8 hours, Intravenous    Plan  - Monitor strict I&Os and daily weights.    - Place on telemetry  - Low sodium diet  - Place on fluid restriction of 1.5 L.   - Cardiology has been consulted  - The patient's volume status is stable but not at their baseline as indicated by edema  -Hold lisinopril and hydrochlorothiazide  -Could not tolerate dobutamine d/t ectopy   - Lasix as per Cardiology and Nephrology along with albumin    Urinary tract infection associated with indwelling urethral catheter  Catheter in place for severe acute decompensated heart failure and need for strict I&O with concern of cardiorenal syndrome   Completed 4 day course of ceftriaxone  Urine culture  Weber catheter was previously only move and unable to be reinserted  Patient has a male [facility]        Opioid use disorder  Addiction medicine consulted   Improved on Suboxone    Per Addiction medicine:   For first dose of buprenorphine (at least 18 hours since last use of fentanyl) give one or two 8 mg strips (8-16 mg total).  Wait 1 hour and then...  If mild withdrawal, give another 8 mg strip.  If more significant withdrawal, give two more 8 mg strips.  If relaxed or calm, do not give any more.  Wait 1-2 more hours...  If still have withdrawal symptoms, take another 8 mg strip  If relaxed or calm, do not take any more  Try not to give more than 32 mg (4 strips) on day one.  On day 2, give 8 mg BID   Continue taking 8 mg BID until seeing me in clinic.    MARLENE (acute kidney injury)  MARLENE is likely due to pre-renal azotemia due to intravascular volume depletion. Baseline creatinine is  1 . Most recent creatinine and eGFR are listed below.  Recent Labs     01/16/25  0421 01/16/25  1803 01/17/25  0630   CREATININE 2.3* 2.4* 2.4*   EGFRNORACEVR 34.4* 32.7* 32.7*        Plan  - MARLENE is stable but not improving  - Avoid nephrotoxins and renally dose meds for GFR listed above  - Monitor urine output, serial BMP, and adjust therapy as needed  -  nephrology consulted  - Close monitoring  - Diurese as tolerated    Diabetes mellitus  Patient's FSGs are controlled on current medication regimen.  Last A1c reviewed-   Lab Results   Component Value Date    HGBA1C 7.5 (H) 01/10/2025     Most recent fingerstick glucose reviewed-   Recent Labs   Lab 01/16/25  1948 01/16/25  2256 01/17/25  0554 01/17/25  1118   POCTGLUCOSE 171* 137* 160* 166*       Current correctional scale  Low  Maintain anti-hyperglycemic dose as follows-   Antihyperglycemics (From admission, onward)      Start     Stop Route Frequency Ordered    01/10/25 1515  insulin aspart U-100 pen 0-10 Units         -- SubQ Before meals & nightly PRN 01/10/25 1415          Hold Oral hypoglycemics while patient is in the hospital.    Elevated troponin  Troponin elevated but flat, cardiology following  No chest pain or shortness of breath   Likely secondary to congestive heart failure  Continue Lovenox      Scrotal edema  Secondary to acute decompensated heart failure  Elevate scrotum  Scrotal ultrasound reviewed  Diuresing as per Nephrology recommendation    Obesity  Body mass index is 39.89 kg/m². Morbid obesity complicates all aspects of disease management from diagnostic modalities to treatment. Weight loss encouraged and health benefits explained to patient.           VTE Risk Mitigation (From admission, onward)           Ordered     enoxaparin injection 120 mg  Every 12 hours         01/10/25 0724     IP VTE HIGH RISK PATIENT  Once         01/09/25 1326     Place sequential compression device  Until discontinued         01/09/25 1326     Reason for no Mechanical VTE Prophylaxis  Once        Question:  Reasons:  Answer:  Physician Provided (leave comment)  Comment:  already on heparin infusion    01/09/25 0906                    Discharge Planning   KATHERINE: 1/20/2025     Code Status: Full Code   Medical Readiness for Discharge Date:   Discharge Plan A: Home   Discharge Delays: None known at this  time          Treatment, consult recommendation, dispo planning, PT/OT          Don Whelan DO  Department of Hospital Medicine   [facility]

## 2025-01-17 NOTE — NURSING TRANSFER
Nursing Transfer Note      1/16/2025   10:24 PM    Nurse giving handoff:Jeremie RN  Nurse receiving handoff:Colette ESPINOSA    Reason patient is being transferred: ICU downgrade    Transfer : 3112/2203    Transfer via bed    Transfer with     Transported by RN    Transfer Vital Signs:  Blood Pressure:132/67  Heart Rate:99  O2:94  Temperature:98.1f  Respirations:18    Telemetry:   Order for Tele Monitor? yes    Additional Lines:     Medicines sent: acyclovir, aspart,     Any special needs or follow-up needed: no    Patient belongings transferred with patient: yes    Chart send with patient: yes    Notified:     Patient reassessed at:  (date, time)  1  Upon arrival to floor:

## 2025-01-17 NOTE — PROGRESS NOTES
[facility]  Adult Nutrition   Progress Note (Follow-Up)    SUMMARY     Recommendations  Recommendation/Intervention: 1. Continue diabetic, Low Sodium diet as tolerated. Fluid per MD. 2. Continue Boost GC wtih meals.  Nutrition Goal Status: progressing towards goal  Communication of RD Recs:  (plan of care)    Nutrition Goals:  PO intake will meet >75% of estimated needs by RD follow up.    Nutrition Interventions: Carbohydrate modified diet, Fluid modified diet, Sodium modified diet, and Medical Food Supplement Therapy    Nutrition Diagnosis PES Statement: Food- and nutrition-related knowledge deficit related to lack of prior education as evidenced by  lack of prior education as evidenced by statements per pt.        Nutrition Diagnosis Status:   Improving    Dietitian Rounds Brief  Patient reports he ate all of his breakfast and drank all of his Boost GC. Patient stated he has not questions about diabetic low sodium diet. Last BM 1/15//25. RD to monitor for intake, labs, and status change PRN.    Nutrition Related Social Determinants of Health: SDOH: None Identified  Food Insecurity: Patient Unable To Answer (1/10/2025)    Hunger Vital Sign     Worried About Running Out of Food in the Last Year: Patient unable to answer     Ran Out of Food in the Last Year: Patient unable to answer       Malnutrition Assessment  No evidence of malnutrition at this time.          Diet order:   Current Diet Order: Diabetic Low Sodium,2gm, Double Protein; 2000 Calories (up to 75 gm per meal); Fluid - 1500mL   Oral Nutrition Supplement: Boost GC with meals.             Evaluation of Received Nutrient/Fluid Intake  Energy Calories Required: meeting needs  Protein Required: meeting needs  Fluid Required: meeting needs  Tolerance: tolerating     % Intake of Estimated Energy Needs: 50 - 75 %  % Meal Intake: 50 - 75 %      Intake/Output Summary (Last 24 hours) at 1/17/2025 1527  Last data filed at 1/17/2025 1254  Gross per 24  "hour   Intake 1460 ml   Output 3140 ml   Net -1680 ml        Anthropometrics  Height: 5' 10" (177.8 cm)  Height (inches): 70 in  Height Method: Stated  Weight:  (bed scale -44 unable to obtain acurate weight at this time)  Weight (lb): 278 lb  Weight Method: Bed Scale  Ideal Body Weight (IBW), Male: 166 lb  % Ideal Body Weight, Male (lb): 153.25 %  BMI (Calculated): 39.9  BMI Grade: 35 - 39.9 - obesity - grade II       Estimated/Assessed Needs  Weight Used For Calorie Calculations: 115.4 kg (254 lb 6.6 oz)  Energy Calorie Requirements (kcal): 6800-8162 kcal daily  Energy Need Method: Kcal/kg (20-25)  Protein Requirements:  gm daily  Weight Used For Protein Calculations: 115.4 kg (254 lb 6.6 oz) (0.8-1.0 gm/kg)  Fluid Requirements (mL): 1 mL/kcal or per MD  Estimated Fluid Requirement Method: RDA Method  RDA Method (mL): 2308  CHO Requirement: 288 gm daily    Reason for Assessment  Reason For Assessment: RD follow-up  Diagnosis:  (scrotal edema)  General Information Comments: Patient up in chair at visit and provided feedback on meals.  Nutrition Discharge Planning: Diabetic 2 gm Low Sodium 1500 mL fluid. Coke Zero with lunch. Boost GC with meals.    Final diagnoses:  [R07.9] Chest pain (Primary)  [Z13.6] Screening for cardiovascular condition  [I50.9] Congestive heart failure, unspecified HF chronicity, unspecified heart failure type  [I21.4] NSTEMI (non-ST elevated myocardial infarction)     Past Medical History:   Diagnosis Date    Diabetes mellitus     Hypertension         Nutrition/Diet History  Nutrition Intake History: MST 0. Good intake/appetite prior to admit.  Food Preferences: Obtained at initial evaluation.  Spiritual, Cultural Beliefs, Scientology Practices, Values that Affect Care: no  Food Allergies: NKFA  Factors Affecting Nutritional Intake: None identified at this time    Nutrition Risk Screen          Wound 01/09/25 8327 Pressure Injury Buttocks-Wound Image: Images linked       Wound 01/09/25 " Moisture associated dermatitis Left anterior Groin-Wound Image: Images linked       Wound 01/09/25 Moisture associated dermatitis Right anterior Groin-Wound Image: Images linked       Wound 01/09/25 Moisture associated dermatitis Right Buttocks-Wound Image: Images linked       Wound 01/09/25 Pressure Injury lower;anterior;posterior;lateral Scrotum-Wound Image: Images linked       Wound 01/09/25 1500 Incontinence associated dermatitis Buttocks-Wound Image: Images linked  MST Score: 0  Have you recently lost weight without trying?: No  Weight loss score: 0  Have you been eating poorly because of a decreased appetite?: No  Appetite score: 0       Weight History:  Wt Readings from Last 10 Encounters:   01/15/25 126.1 kg (278 lb)   07/19/22 99.8 kg (220 lb)   02/09/20 104.3 kg (230 lb)   08/06/19 113.4 kg (250 lb)   02/27/19 108.9 kg (240 lb)   02/05/19 108.9 kg (240 lb 1.3 oz)   09/28/18 108.9 kg (240 lb)   06/03/18 106.6 kg (235 lb)   02/19/15 108.9 kg (240 lb)   09/02/14 108.9 kg (240 lb)        Lab/Procedures/Meds: Pertinent Labs/Meds Reviewed    Medications:Pertinent Medications Reviewed  Scheduled Meds:   acyclovir  800 mg Oral 5x Daily    albumin human 25%  12.5 g Intravenous BID    aspirin  81 mg Oral Daily    atorvastatin  80 mg Oral Daily    baclofen  5 mg Oral BID    buprenorphine-naloxone 8-2 mg  1 Film Sublingual BID    enoxparin  1 mg/kg Subcutaneous Q12H (treatment, non-standard time)    gabapentin  300 mg Oral BID    miconazole NITRATE 2 %   Topical (Top) BID    tamsulosin  0.4 mg Oral Daily    white petrolatum   Topical (Top) Daily     Continuous Infusions:   furosemide (LASIX) 2 mg/mL continuous infusion (non-titrating)  15 mg/hr Intravenous Continuous 7.5 mL/hr at 01/17/25 1452 15 mg/hr at 01/17/25 1452     PRN Meds:.  Current Facility-Administered Medications:     acetaminophen, 650 mg, Oral, Q4H PRN    aluminum-magnesium hydroxide-simethicone, 30 mL, Oral, QID PRN    dextrose 50%, 12.5 g,  "Intravenous, PRN    dextrose 50%, 25 g, Intravenous, PRN    diazePAM, 5 mg, Oral, Q12H PRN    glucagon (human recombinant), 1 mg, Intramuscular, PRN    glucose, 16 g, Oral, PRN    glucose, 24 g, Oral, PRN    hydrALAZINE, 5 mg, Intravenous, Q6H PRN    insulin aspart U-100, 0-10 Units, Subcutaneous, QID (AC + HS) PRN    magnesium oxide, 800 mg, Oral, PRN    magnesium oxide, 800 mg, Oral, PRN    melatonin, 6 mg, Oral, Nightly PRN    morphine, 4 mg, Intravenous, Q3H PRN    naloxone, 0.02 mg, Intravenous, PRN    ondansetron, 4 mg, Intravenous, Q6H PRN    oxyCODONE-acetaminophen, 1 tablet, Oral, Q4H PRN    oxyCODONE-acetaminophen, 1 tablet, Oral, Q4H PRN    potassium bicarbonate, 35 mEq, Oral, PRN    potassium bicarbonate, 50 mEq, Oral, PRN    potassium bicarbonate, 60 mEq, Oral, PRN    potassium, sodium phosphates, 2 packet, Oral, PRN    potassium, sodium phosphates, 2 packet, Oral, PRN    potassium, sodium phosphates, 2 packet, Oral, PRN    senna-docusate 8.6-50 mg, 1 tablet, Oral, Daily PRN    sodium chloride 0.9%, 10 mL, Intravenous, Q12H PRN    sodium chloride 0.9%, 10 mL, Intravenous, PRN    Labs: Pertinent Labs Reviewed  Clinical Chemistry:  Recent Labs   Lab 01/15/25  0512 01/16/25  0421 01/16/25  1803 01/17/25  0630   * 137   < > 136   K 4.7 4.8   < > 5.1    103   < > 101   CO2 24 26   < > 26   * 108   < > 158*   BUN 53* 65*   < > 75*   CREATININE 2.3* 2.3*   < > 2.4*   CALCIUM 7.8* 8.5*   < > 9.1   PROT 5.8* 6.0  --  6.4   ALBUMIN 2.8* 3.1*  --  3.3*   BILITOT 0.2 0.3  --  0.3   ALKPHOS 140* 172*  --  225*   AST 14 11  --  12   ALT 12 10  --  10   ANIONGAP 7* 8   < > 9   MG 1.9 2.0   < > 2.0    < > = values in this interval not displayed.     CBC:   Recent Labs   Lab 01/17/25  0630   WBC 11.31   RBC 3.48*   HGB 8.9*   HCT 29.4*      MCV 85   MCH 25.6*   MCHC 30.3*     Lipid Panel:  No results for input(s): "CHOL", "HDL", "LDLCALC", "TRIG", "CHOLHDL" in the last 168 hours.  Cardiac " "Profile:  No results for input(s): "BNP", "CPK", "CPKMB", "TROPONINI", "CKTOTAL" in the last 168 hours.  Inflammatory Labs:  No results for input(s): "CRP" in the last 168 hours.  Diabetes:  Recent Labs   Lab 01/16/25  2256 01/17/25  0554 01/17/25  1118   POCTGLUCOSE 137* 160* 166*     Thyroid & Parathyroid:  No results for input(s): "TSH", "FREET4", "L7RGTWP", "S1VUHGT", "THYROIDAB" in the last 168 hours.    Monitor and Evaluation  Food and Nutrient Intake: energy intake  Food and Nutrient Adminstration: diet order  Knowledge/Beliefs/Attitudes: beliefs and attitudes  Physical Activity and Function: nutrition-related ADLs and IADLs  Anthropometric Measurements: weight change, weight  Biochemical Data, Medical Tests and Procedures: electrolyte and renal panel, gastrointestinal profile, inflammatory profile, glucose/endocrine profile, lipid profile  Nutrition-Focused Physical Findings: overall appearance     Nutrition Risk  Level of Risk/Frequency of Follow-up:  (1 x / week)     Nutrition Follow-Up  RD Follow-up?: Yes            " 12-Feb-2023 15:38

## 2025-01-17 NOTE — ASSESSMENT & PLAN NOTE
Patient's FSGs are controlled on current medication regimen.  Last A1c reviewed-   Lab Results   Component Value Date    HGBA1C 7.5 (H) 01/10/2025     Most recent fingerstick glucose reviewed-   Recent Labs   Lab 01/16/25  1948 01/16/25  2256 01/17/25  0554 01/17/25  1118   POCTGLUCOSE 171* 137* 160* 166*       Current correctional scale  Low  Maintain anti-hyperglycemic dose as follows-   Antihyperglycemics (From admission, onward)    Start     Stop Route Frequency Ordered    01/10/25 1515  insulin aspart U-100 pen 0-10 Units         -- SubQ Before meals & nightly PRN 01/10/25 1415        Hold Oral hypoglycemics while patient is in the hospital.

## 2025-01-17 NOTE — PROGRESS NOTES
Nephrology Consult Note        Patient Name: Gideon Ross  MRN: [id number]    Patient Class: IP- Inpatient   Admission Date: 1/9/2025  Length of Stay: 8 days  Date of Service: 1/17/2025    Attending Physician: Don Whelan DO  Primary Care Provider: Jennifer, Primary Doctor    Reason for Consult: marlene    SUBJECTIVE:     HPI: 49M with PID, bilateral BKA, DM presented for significant bilateral lower extremity edema, scrotal swelling, intractable pain. Labs largely unremarkable other than BNP 3,500, troponin 3,300. Patient was started on IV diuretics and pain control. Cardiology was consulted. Initially started on heparin drip but then transitioned to Lovenox. Echocardiogram showed EF 25%. Addiction Medicine consulted for heroin use. Started on Suboxone and reports improvement in symptoms. Diuretics held 2/2 worsening sCr and MARLENE. He was started on dobutamine on 1/11/25, but was stopped 2/2 tachycardia. He has a catheter for strict I&O with MARLENE, but is describing dysuria, there is purulent drainage at the end of the catheter so UA was obtained with concerns of UTI and pt placed on rocephin and recommendations for catheter exchange giving continued MARLENE and need for strict I&O with decompensated heart failure.      1/14 Agree with diuretics and albumin, will consider escalation or ultrafiltration. Not sure how acute the HF is. Consider palliative eval as well.  1/15 VSS. Good UO with diuretics+ albumin, will escalate more tomorrow if he remains stable. Not sure if UF will be better tolerated than diuretics.  1/16 VSS. Ensure dietary compliance with salt and liquids. Keep IOs tally. Will give more albumin and lasix.  1/17 VSS. Agree with empiric acyclovir for possible shingles. Agree with escalation to lasix gtt as d/w cards. Strict IOS and fluid restriction enforcement.    ASSESSMENT/PLAN:     MARLENE 2/2 ATN due to intravascular volume depletion from diuretics  Hypoalbuminemia with dependent edema due to third-spacing and  immobility  HFrEF ? Etiology, acuity, prognosis?  PAD  DM  Anemia  CKD stage 2, sCr 1 om 1/9/2025  Shingles  No NSAIDs, ACEI/ARB, IV contrast or other nephrotoxins.  Keep MAP > 60, SBP > 100.  Dose meds for GFR < 30 ml/min.  Renal diet - low K, low phos, low sodium.  Optimize nutrition, added protein supplement + IV.  Judicious diuretic use, edema will not resolve with diuretics alone.  Hgb and HCT are acceptable. Monitor for now.    Thank you for allowing us to participate in the care of your patient!   We will follow the patient and provide recommendations as needed.         Laboratory:  Recent Labs   Lab 01/16/25  0421 01/16/25  1803 01/17/25  0630    136 136   K 4.8 5.0 5.1    100 101   CO2 26 28 26   BUN 65* 69* 75*   CREATININE 2.3* 2.4* 2.4*    160* 158*       Recent Labs   Lab 01/15/25  0512 01/16/25  0421 01/16/25  1803 01/17/25  0630   CALCIUM 7.8* 8.5* 9.1 9.1   ALBUMIN 2.8* 3.1*  --  3.3*   MG 1.9 2.0 1.9 2.0             Recent Labs   Lab 01/15/25  1151 01/15/25  1623 01/15/25  2202 01/16/25  0719 01/16/25  1131 01/16/25  1604 01/16/25  1948 01/16/25  2256 01/17/25  0554 01/17/25  1118   POCTGLUCOSE 251* 133* 156* 132* 110 159* 171* 137* 160* 166*       Recent Labs   Lab 01/10/25  0721   Hemoglobin A1C 7.5 H       Recent Labs   Lab 01/15/25  0512 01/16/25  0421 01/17/25  0630   WBC 11.03 10.91 11.31   HGB 9.5* 8.9* 8.9*   HCT 30.8* 29.3* 29.4*    231 251   MCV 85 85 85   MCHC 30.8* 30.4* 30.3*   MONO 13.6  1.5* 14.8  1.6* 12.8  1.5*   EOSINOPHIL 9.8* 9.5* 8.0       Recent Labs   Lab 01/15/25  0512 01/16/25  0421 01/17/25  0630   BILITOT 0.2 0.3 0.3   PROT 5.8* 6.0 6.4   ALBUMIN 2.8* 3.1* 3.3*   ALKPHOS 140* 172* 225*   ALT 12 10 10   AST 14 11 12       Recent Labs   Lab 07/19/22  2316 01/09/25  0743 01/12/25  1055   Color, UA Yellow Yellow [place] A   Appearance, UA Clear Hazy A Hazy A   pH, UA 6.0 6.0 6.0   Specific Gravity, UA <=1.005 1.025 1.010   Protein, UA Trace A 3+ A  1+ A   Glucose, UA 4+ A Trace A Negative   Ketones, UA Negative Negative Negative   Urobilinogen, UA Negative 2.0-3.0 A Negative   Bilirubin (UA) Negative Negative Negative   Occult Blood UA Negative 2+ A 3+ A   Nitrite, UA Negative Negative Negative   RBC, UA 1 7 H >100 H   WBC, UA  --  4 18 H   Bacteria None Occasional Rare   Hyaline Casts, UA  --  4 A 0       Recent Labs   Lab 07/19/22  2046 01/09/25  0707   POC PH 7.473 H  --    POC PCO2 42.4  --    POC HCO3 31.1 H  --    POC PO2 28 L  --    POC SATURATED O2 58 L  --    POC BE 7  --    Sample VENOUS VENOUS       Microbiology Results (last 7 days)       Procedure Component Value Units Date/Time    Urine culture [[id number]] Collected: 01/12/25 1055    Order Status: Completed Specimen: Urine Updated: 01/15/25 0640     Urine Culture, Routine No growth    Narrative:      Specimen Source->Urine    Blood culture x two cultures. Draw prior to antibiotics. [[id number]] Collected: 01/09/25 0859    Order Status: Completed Specimen: Blood Updated: 01/14/25 1032     Blood Culture, Routine No growth after 5 days.    Narrative:      Aerobic and anaerobic    Blood culture x two cultures. Draw prior to antibiotics. [[id number]] Collected: 01/09/25 0859    Order Status: Completed Specimen: Blood Updated: 01/14/25 1032     Blood Culture, Routine No growth after 5 days.    Narrative:      Aerobic and anaerobic            Review of patient's allergies indicates:  No Known Allergies    Outpatient meds:  No current facility-administered medications on file prior to encounter.     Current Outpatient Medications on File Prior to Encounter   Medication Sig Dispense Refill    amLODIPine (NORVASC) 10 MG tablet Take 1 tablet (10 mg total) by mouth once daily. 30 tablet 1    aspirin (ECOTRIN) 81 MG EC tablet Take 1 tablet (81 mg total) by mouth once daily. 30 tablet 1    atorvastatin (LIPITOR) 80 MG tablet Take 1 tablet (80 mg total) by mouth once daily. 30 tablet 1    baclofen (LIORESAL)  10 MG tablet Take 1 tablet (10 mg total) by mouth 3 (three) times daily. 90 tablet 1    carvedilol (COREG) 6.25 MG tablet Take 1 tablet (6.25 mg total) by mouth 2 (two) times daily. 60 tablet 1    diazePAM (VALIUM) 5 MG tablet Take 1 tablet (5 mg total) by mouth every 12 (twelve) hours as needed for Anxiety (anxiety.). 30 tablet 0    gabapentin (NEURONTIN) 300 MG capsule Take 2 capsules (600 mg total) by mouth 3 (three) times daily. 90 capsule 1    hydroCHLOROthiazide (HYDRODIURIL) 50 MG tablet Take 1 tablet (50 mg total) by mouth once daily. 30 tablet 1    insulin aspart U-100 (NOVOLOG) 100 unit/mL InPn pen Inject 18 Units into the skin 3 (three) times daily. (Patient taking differently: Inject into the skin 3 (three) times daily.) 10 mL 1    insulin detemir U-100 (LEVEMIR FLEXTOUCH) 100 unit/mL (3 mL) SubQ InPn pen Inject 35 Units into the skin 2 (two) times daily. 10 mL 1    insulin glargine U-100, Lantus, 100 unit/mL injection Inject 30 Units into the skin every evening. (Patient not taking: Reported on 1/9/2025)      lisinopril (PRINIVIL,ZESTRIL) 40 MG tablet Take 1 tablet (40 mg total) by mouth once daily. 30 tablet 1    nicotine (NICODERM CQ) 14 mg/24 hr Place 1 patch onto the skin once daily. (Patient not taking: Reported on 1/9/2025) 15 patch 0    nortriptyline (PAMELOR) 25 MG capsule Take 1 capsule (25 mg total) by mouth 3 (three) times daily. 30 capsule 1    oxyCODONE (ROXICODONE) 5 MG immediate release tablet Take 1 tablet (5 mg total) by mouth every 12 (twelve) hours as needed. (Patient not taking: Reported on 1/9/2025) 30 tablet 0    polyethylene glycol (GLYCOLAX) 17 gram PwPk Take 17 g by mouth daily as needed. (Patient not taking: Reported on 1/9/2025) 30 each 1    QUEtiapine (SEROQUEL) 100 MG Tab Take 1 tablet (100 mg total) by mouth every evening. 30 tablet 1    sofosbuvir-velpatasvir (EPCLUSA) 400-100 mg Tab Take 1 tablet daily. (Patient not taking: Reported on 1/9/2025) 28 tablet 2        Scheduled meds:   acyclovir  800 mg Oral 5x Daily    albumin human 25%  25 g Intravenous TID    aspirin  81 mg Oral Daily    atorvastatin  80 mg Oral Daily    baclofen  5 mg Oral BID    buprenorphine-naloxone 8-2 mg  1 Film Sublingual BID    enoxparin  1 mg/kg Subcutaneous Q12H (treatment, non-standard time)    gabapentin  300 mg Oral BID    miconazole NITRATE 2 %   Topical (Top) BID    tamsulosin  0.4 mg Oral Daily    white petrolatum   Topical (Top) Daily       Infusions:   furosemide (LASIX) 2 mg/mL continuous infusion (non-titrating)  15 mg/hr Intravenous Continuous           PRN meds:    Current Facility-Administered Medications:     acetaminophen, 650 mg, Oral, Q4H PRN    aluminum-magnesium hydroxide-simethicone, 30 mL, Oral, QID PRN    dextrose 50%, 12.5 g, Intravenous, PRN    dextrose 50%, 25 g, Intravenous, PRN    diazePAM, 5 mg, Oral, Q12H PRN    glucagon (human recombinant), 1 mg, Intramuscular, PRN    glucose, 16 g, Oral, PRN    glucose, 24 g, Oral, PRN    hydrALAZINE, 5 mg, Intravenous, Q6H PRN    insulin aspart U-100, 0-10 Units, Subcutaneous, QID (AC + HS) PRN    magnesium oxide, 800 mg, Oral, PRN    magnesium oxide, 800 mg, Oral, PRN    melatonin, 6 mg, Oral, Nightly PRN    morphine, 4 mg, Intravenous, Q3H PRN    naloxone, 0.02 mg, Intravenous, PRN    ondansetron, 4 mg, Intravenous, Q6H PRN    oxyCODONE-acetaminophen, 1 tablet, Oral, Q4H PRN    oxyCODONE-acetaminophen, 1 tablet, Oral, Q4H PRN    potassium bicarbonate, 35 mEq, Oral, PRN    potassium bicarbonate, 50 mEq, Oral, PRN    potassium bicarbonate, 60 mEq, Oral, PRN    potassium, sodium phosphates, 2 packet, Oral, PRN    potassium, sodium phosphates, 2 packet, Oral, PRN    potassium, sodium phosphates, 2 packet, Oral, PRN    senna-docusate 8.6-50 mg, 1 tablet, Oral, Daily PRN    sodium chloride 0.9%, 10 mL, Intravenous, Q12H PRN    sodium chloride 0.9%, 10 mL, Intravenous, PRN    Past Medical History:   Diagnosis Date    Diabetes mellitus      Hypertension      Past Surgical History:   Procedure Laterality Date    SKIN GRAFT       No family history on file.  Social History     Tobacco Use    Smoking status: Every Day     Current packs/day: 1.00     Types: Cigarettes   Substance Use Topics    Alcohol use: Yes    Drug use: No       OBJECTIVE:     Vital Signs and IO:  Temp:  [98.1 °F (36.7 °C)-99.4 °F (37.4 °C)]   Pulse:  []   Resp:  [7-18]   BP: (107-169)/()   SpO2:  [94 %-100 %]   I/O last 3 completed shifts:  In: 3076.3 [P.O.:2742; I.V.:334.3]  Out: 4490 [Urine:4490]  Wt Readings from Last 5 Encounters:   01/15/25 126.1 kg (278 lb)   07/19/22 99.8 kg (220 lb)   02/09/20 104.3 kg (230 lb)   08/06/19 113.4 kg (250 lb)   02/27/19 108.9 kg (240 lb)     Body mass index is 39.89 kg/m².    Physical Exam  Constitutional:       General: Patient is not in acute distress.     Appearance: Patient is well-developed. She is not diaphoretic.   HENT:      Head: Normocephalic and atraumatic.      Mouth/Throat: Mucous membranes are moist.   Eyes:      General: No scleral icterus.     Pupils: Pupils are equal, round, and reactive to light.   Cardiovascular:      Rate and Rhythm: Normal rate and regular rhythm.   Pulmonary:      Effort: Pulmonary effort is normal. No respiratory distress.      Breath sounds: No stridor.   Abdominal:      General: There is no distension.      Palpations: Abdomen is soft.   Musculoskeletal:         General: No deformity. Normal range of motion.      Cervical back: Neck supple.   Skin:     General: Skin is warm and dry.      Findings: No rash present. No erythema.   Neurological:      Mental Status: Patient is alert and oriented to person, place, and time.      Cranial Nerves: No cranial nerve deficit.   Psychiatric:         Behavior: Behavior normal.          Patient care time was spent personally by me on the following activities:     Obtaining a history.  Examination of patient.  Providing medical care at the patients  bedside.  Developing a treatment plan with patient or surrogate and bedside caregivers.  Ordering and reviewing laboratory studies, radiographic studies, pulse oximetry.  Ordering and performing treatments and interventions.  Evaluation of patient's response to treatment.  Discussions with consultants while on the unit and immediately available to the patient.  Re-evaluation of the patient's condition.  Documentation in the medical record.     Ubaldo Petersen MD    [facility]  [address] [id number]    [phone] - tel  [phone] - fax    1/17/2025

## 2025-01-17 NOTE — CARE UPDATE
01/17/25 0803   Patient Assessment/Suction   Level of Consciousness (AVPU) alert   Respiratory Effort Normal;Unlabored   Expansion/Accessory Muscles/Retractions no use of accessory muscles;no retractions;expansion symmetric   All Lung Fields Breath Sounds diminished   Rhythm/Pattern, Respiratory unlabored   Cough Frequency infrequent   PRE-TX-O2   Device (Oxygen Therapy) nasal cannula   $ Is the patient on Low Flow Oxygen? Yes   Flow (L/min) (Oxygen Therapy) 2   SpO2 97 %   Pulse Oximetry Type Intermittent   $ Pulse Oximetry - Multiple Charge Pulse Oximetry - Multiple   Pulse 87   Resp 17   Education   $ Education Bronchodilator;15 min

## 2025-01-17 NOTE — ASSESSMENT & PLAN NOTE
MARLENE is likely due to pre-renal azotemia due to intravascular volume depletion. Baseline creatinine is  1 . Most recent creatinine and eGFR are listed below.  Recent Labs     01/16/25  0421 01/16/25  1803 01/17/25  0630   CREATININE 2.3* 2.4* 2.4*   EGFRNORACEVR 34.4* 32.7* 32.7*        Plan  - MARLENE is stable but not improving  - Avoid nephrotoxins and renally dose meds for GFR listed above  - Monitor urine output, serial BMP, and adjust therapy as needed  - nephrology consulted  - Close monitoring  - Diurese as tolerated

## 2025-01-17 NOTE — PROGRESS NOTES
[facility]   Department of Cardiology  Progress Note      PATIENT NAME: Gideon Ross    MRN: [id number]  TODAY'S DATE: 01/17/2025  ADMIT DATE: 1/9/2025                          CONSULT REQUESTED BY: Don Whelan DO    SUBJECTIVE     PRINCIPAL PROBLEM: Acute systolic congestive heart failure    01/17/2025    Resting in bed.  NAD.  Continue to report edema.     1/16/25  H&H dropping 8.9/29.3. Denies bleeding.  NAD. Remains edematous. Cr 2.3, unchanged; in icu for strict I's and O's.    1/15/25    Patient seen resting in bed with no distress noted. Breathing is stable. He remains with a good amount of edema.     1/14/25  Resting in bed. Complaints of thirst.  Wheezing present.  Remains edematous; no further events of NSVT on tele    1/13/25    Resting in bed. NAD. RA.  Reports being thirsty.  Eating canned sausages.  Continues to report scrotal edema. Occasional PVCs noted on tele. Brief NSVT.  Not on dobutrex; Nephrology is following.    1/12/25    Patient seen sitting up bed with no distress noted. Breathing is stable. Family/friends at bedside. Stable on telemetry but mildly tachycardic. Dobutamine was held due to ectopy on telemetry.     1/11/25    Patient very drowsy when seen today.  No acute distress noted.  He was asking for pain medicine when awakened.  Patient with worsened renal function today.    1/10/25    Patient seen resting in bed this morning.  He was very upset.  He reports that he does use heroin on a daily basis and believes that he is likely detoxing currently.  Patient is not very optimistic for his outcome.    HPI:    Patient is a 49-year-old male who presented to the emergency room with complaints of testicular pain and swelling over the past 1 week.  Patient is a known diabetic and has a history of PID with bilateral above the knee amputations.  Patient also had some complaints of mild shortness of breath but denied any chest pain.  Patient was noted to have significantly  elevated high sensitivity troponin level on arrival.  High sensitivity troponin level was 3343 on arrival and is now 3369.  Patient was started on heparin drip.  Patient does have a known history of drug abuse and is positive for opiates, amphetamine, and marijuana metabolites in his UDS.  Patient reports his last use of methamphetamine was a couple of weeks ago.        REASON FOR CONSULT:  From Hospitalist H&P: Patient presents complaining of testicular pain and swelling.  Patient has noticed increased pain and swelling for the last 1 week as well as foul smell.  Patient has a history of bilateral above-the-knee and below-the-knee amputation.  He is a diabetic.  At the worst symptoms are moderate.  Patient complains of mild shortness of breath no chest pain.            Review of patient's allergies indicates:  No Known Allergies    Past Medical History:   Diagnosis Date    Diabetes mellitus     Hypertension      Past Surgical History:   Procedure Laterality Date    SKIN GRAFT       Social History     Tobacco Use    Smoking status: Every Day     Current packs/day: 1.00     Types: Cigarettes   Substance Use Topics    Alcohol use: Yes    Drug use: No        REVIEW OF SYSTEMS  Per HPI    OBJECTIVE     VITAL SIGNS (Most Recent)  Temp: 98.4 °F (36.9 °C) (01/17/25 0808)  Pulse: 99 (01/17/25 1118)  Resp: 17 (01/17/25 0900)  BP: 137/78 (01/17/25 1118)  SpO2: 97 % (01/17/25 1118)    VENTILATION STATUS  Resp: 17 (01/17/25 0900)  SpO2: 97 % (01/17/25 1118)           I & O (Last 24H):  Intake/Output Summary (Last 24 hours) at 1/17/2025 1619  Last data filed at 1/17/2025 1254  Gross per 24 hour   Intake 1460 ml   Output 2790 ml   Net -1330 ml       WEIGHTS  Wt Readings from Last 1 Encounters:   01/15/25 0400 126.1 kg (278 lb)   01/14/25 0400 127.1 kg (280 lb 3.2 oz)   01/09/25 2239 115.4 kg (254 lb 6.4 oz)   01/09/25 0824 108.9 kg (240 lb)       PHYSICAL EXAM  CONSTITUTIONAL: No fever, no chills obese  HEENT: Normocephalic,  atraumatic,pupils reactive to light                 NECK:  No JVD no carotid bruit  CVS: S1S2+, RRR  LUNGS: crackles lower lobes; RA  ABDOMEN: Soft, NT, BS+  EXTREMITIES: No cyanosis, edema; right BKA and Left AKA  : No valle catheter  NEURO: AAO X 3  PSY: Normal affect      HOME MEDICATIONS:  No current facility-administered medications on file prior to encounter.     Current Outpatient Medications on File Prior to Encounter   Medication Sig Dispense Refill    amLODIPine (NORVASC) 10 MG tablet Take 1 tablet (10 mg total) by mouth once daily. 30 tablet 1    aspirin (ECOTRIN) 81 MG EC tablet Take 1 tablet (81 mg total) by mouth once daily. 30 tablet 1    atorvastatin (LIPITOR) 80 MG tablet Take 1 tablet (80 mg total) by mouth once daily. 30 tablet 1    baclofen (LIORESAL) 10 MG tablet Take 1 tablet (10 mg total) by mouth 3 (three) times daily. 90 tablet 1    carvedilol (COREG) 6.25 MG tablet Take 1 tablet (6.25 mg total) by mouth 2 (two) times daily. 60 tablet 1    diazePAM (VALIUM) 5 MG tablet Take 1 tablet (5 mg total) by mouth every 12 (twelve) hours as needed for Anxiety (anxiety.). 30 tablet 0    gabapentin (NEURONTIN) 300 MG capsule Take 2 capsules (600 mg total) by mouth 3 (three) times daily. 90 capsule 1    hydroCHLOROthiazide (HYDRODIURIL) 50 MG tablet Take 1 tablet (50 mg total) by mouth once daily. 30 tablet 1    insulin aspart U-100 (NOVOLOG) 100 unit/mL InPn pen Inject 18 Units into the skin 3 (three) times daily. (Patient taking differently: Inject into the skin 3 (three) times daily.) 10 mL 1    insulin detemir U-100 (LEVEMIR FLEXTOUCH) 100 unit/mL (3 mL) SubQ InPn pen Inject 35 Units into the skin 2 (two) times daily. 10 mL 1    insulin glargine U-100, Lantus, 100 unit/mL injection Inject 30 Units into the skin every evening. (Patient not taking: Reported on 1/9/2025)      lisinopril (PRINIVIL,ZESTRIL) 40 MG tablet Take 1 tablet (40 mg total) by mouth once daily. 30 tablet 1    nicotine (NICODERM  CQ) 14 mg/24 hr Place 1 patch onto the skin once daily. (Patient not taking: Reported on 1/9/2025) 15 patch 0    nortriptyline (PAMELOR) 25 MG capsule Take 1 capsule (25 mg total) by mouth 3 (three) times daily. 30 capsule 1    oxyCODONE (ROXICODONE) 5 MG immediate release tablet Take 1 tablet (5 mg total) by mouth every 12 (twelve) hours as needed. (Patient not taking: Reported on 1/9/2025) 30 tablet 0    polyethylene glycol (GLYCOLAX) 17 gram PwPk Take 17 g by mouth daily as needed. (Patient not taking: Reported on 1/9/2025) 30 each 1    QUEtiapine (SEROQUEL) 100 MG Tab Take 1 tablet (100 mg total) by mouth every evening. 30 tablet 1    sofosbuvir-velpatasvir (EPCLUSA) 400-100 mg Tab Take 1 tablet daily. (Patient not taking: Reported on 1/9/2025) 28 tablet 2       SCHEDULED MEDS:   acyclovir  800 mg Oral 5x Daily    albumin human 25%  12.5 g Intravenous BID    aspirin  81 mg Oral Daily    atorvastatin  80 mg Oral Daily    baclofen  5 mg Oral BID    buprenorphine-naloxone 8-2 mg  1 Film Sublingual BID    enoxparin  1 mg/kg Subcutaneous Q12H (treatment, non-standard time)    gabapentin  300 mg Oral BID    miconazole NITRATE 2 %   Topical (Top) BID    tamsulosin  0.4 mg Oral Daily    white petrolatum   Topical (Top) Daily       CONTINUOUS INFUSIONS:   furosemide (LASIX) 2 mg/mL continuous infusion (non-titrating)  15 mg/hr Intravenous Continuous 7.5 mL/hr at 01/17/25 1452 15 mg/hr at 01/17/25 1452           PRN MEDS:  Current Facility-Administered Medications:     acetaminophen, 650 mg, Oral, Q4H PRN    aluminum-magnesium hydroxide-simethicone, 30 mL, Oral, QID PRN    dextrose 50%, 12.5 g, Intravenous, PRN    dextrose 50%, 25 g, Intravenous, PRN    diazePAM, 5 mg, Oral, Q12H PRN    glucagon (human recombinant), 1 mg, Intramuscular, PRN    glucose, 16 g, Oral, PRN    glucose, 24 g, Oral, PRN    hydrALAZINE, 5 mg, Intravenous, Q6H PRN    insulin aspart U-100, 0-10 Units, Subcutaneous, QID (AC + HS) PRN    magnesium  "oxide, 800 mg, Oral, PRN    magnesium oxide, 800 mg, Oral, PRN    melatonin, 6 mg, Oral, Nightly PRN    morphine, 4 mg, Intravenous, Q3H PRN    naloxone, 0.02 mg, Intravenous, PRN    ondansetron, 4 mg, Intravenous, Q6H PRN    oxyCODONE-acetaminophen, 1 tablet, Oral, Q4H PRN    oxyCODONE-acetaminophen, 1 tablet, Oral, Q4H PRN    potassium bicarbonate, 35 mEq, Oral, PRN    potassium bicarbonate, 50 mEq, Oral, PRN    potassium bicarbonate, 60 mEq, Oral, PRN    potassium, sodium phosphates, 2 packet, Oral, PRN    potassium, sodium phosphates, 2 packet, Oral, PRN    potassium, sodium phosphates, 2 packet, Oral, PRN    senna-docusate 8.6-50 mg, 1 tablet, Oral, Daily PRN    sodium chloride 0.9%, 10 mL, Intravenous, Q12H PRN    sodium chloride 0.9%, 10 mL, Intravenous, PRN    LABS AND DIAGNOSTICS     CBC LAST 3 DAYS  Recent Labs   Lab 01/15/25  0512 01/16/25  0421 01/17/25  0630   WBC 11.03 10.91 11.31   RBC 3.64* 3.44* 3.48*   HGB 9.5* 8.9* 8.9*   HCT 30.8* 29.3* 29.4*   MCV 85 85 85   MCH 26.1* 25.9* 25.6*   MCHC 30.8* 30.4* 30.3*   RDW 14.7* 14.8* 15.1*    231 251   MPV 10.7 11.0 10.8   GRAN 59.6  6.6 59.3  6.5 65.8  7.4   LYMPH 15.9*  1.8 15.4*  1.7 12.4*  1.4   MONO 13.6  1.5* 14.8  1.6* 12.8  1.5*   BASO 0.07 0.05 0.06   NRBC 0 0 0       COAGULATION LAST 3 DAYS  No results for input(s): "LABPT", "INR", "APTT" in the last 168 hours.      CHEMISTRY LAST 3 DAYS  Recent Labs   Lab 01/15/25  0512 01/16/25 0421 01/16/25  1803 01/17/25  0630   * 137 136 136   K 4.7 4.8 5.0 5.1    103 100 101   CO2 24 26 28 26   ANIONGAP 7* 8 8 9   BUN 53* 65* 69* 75*   CREATININE 2.3* 2.3* 2.4* 2.4*   * 108 160* 158*   CALCIUM 7.8* 8.5* 9.1 9.1   MG 1.9 2.0 1.9 2.0   ALBUMIN 2.8* 3.1*  --  3.3*   PROT 5.8* 6.0  --  6.4   ALKPHOS 140* 172*  --  225*   ALT 12 10  --  10   AST 14 11  --  12   BILITOT 0.2 0.3  --  0.3       CARDIAC PROFILE LAST 3 DAYS  Recent Labs   Lab 01/16/25  1803   TROPONINIHS 130.2* " "        ENDOCRINE LAST 3 DAYS  No results for input(s): "TSH", "PROCAL" in the last 168 hours.    LAST ARTERIAL BLOOD GAS  ABG  No results for input(s): "PH", "PO2", "PCO2", "HCO3", "BE" in the last 168 hours.    LAST 7 DAYS MICROBIOLOGY   Microbiology Results (last 7 days)       Procedure Component Value Units Date/Time    Urine culture [[id number]] Collected: 01/12/25 1055    Order Status: Completed Specimen: Urine Updated: 01/15/25 0640     Urine Culture, Routine No growth    Narrative:      Specimen Source->Urine    Blood culture x two cultures. Draw prior to antibiotics. [[id number]] Collected: 01/09/25 0859    Order Status: Completed Specimen: Blood Updated: 01/14/25 1032     Blood Culture, Routine No growth after 5 days.    Narrative:      Aerobic and anaerobic    Blood culture x two cultures. Draw prior to antibiotics. [[id number]] Collected: 01/09/25 0859    Order Status: Completed Specimen: Blood Updated: 01/14/25 1032     Blood Culture, Routine No growth after 5 days.    Narrative:      Aerobic and anaerobic            MOST RECENT IMAGING  X-Ray Chest 1 View  Narrative: EXAMINATION:  XR CHEST 1 VIEW    CLINICAL HISTORY:  Chest pain;    TECHNIQUE:  Single frontal view of the chest was performed.    COMPARISON:  01/09/2025    FINDINGS:  Enlarged cardiac silhouette with pulmonary vascular congestion.  No focal consolidation.  Impression: As above    Electronically signed by: Doron Maynard  Date:    01/16/2025  Time:    18:46      ECHOCARDIOGRAM RESULTS (last 5)  No results found for this or any previous visit.      CURRENT/PREVIOUS VISIT EKG  Results for orders placed or performed during the hospital encounter of 01/09/25   EKG 12-lead    Collection Time: 01/16/25  6:03 PM   Result Value Ref Range    QRS Duration 132 ms    [facility] QTC Calculation 504 ms    Narrative    Test Reason : R07.9,    Vent. Rate : 106 BPM     Atrial Rate : 106 BPM     P-R Int : 144 ms          QRS Dur : 132 ms      QT Int : 380 ms  "      P-R-T Axes :  44 174   8 degrees    QTcB Int : 504 ms    Sinus tachycardia  Right bundle branch block  Left posterior fascicular block   Bifascicular block   Possible Inferior infarct ,age undetermined  Anterior infarct ,age undetermined  Abnormal ECG  No previous ECGs available    Referred By: AAAREFERRAL SELF           Confirmed By:            ASSESSMENT/PLAN:     Active Hospital Problems    Diagnosis    *Acute systolic congestive heart failure    Urinary tract infection associated with indwelling urethral catheter    MARLENE (acute kidney injury)    Opioid use disorder    Scrotal edema    Elevated troponin    Diabetes mellitus    Obesity       ASSESSMENT & PLAN:     Elevated troponin  Acute HFrEF 25-30%  Testicular pain and swelling   Type II DM  + drug abuse: amphetamines, marijuana  PAD  Hx of bilateral AKA      RECOMMENDATIONS:    Start lasix gtt.  Increase albumin TID  Recommend urology consult for valle  Strict I's and O's  Strongly encourage 1.8 L fluid restriction and 2 g salt restriction.   Further recommendations for diuresis per nephrology.  Trend labs.   No further NSVT on tele.  Acute HFrEF.  Will eventually need ischemic evaluation.  Lifevest on dc  Will follow.     Nina Alejandre NP  Date of Service: 01/17/2025      I have personally interviewed and examined the patient, I have reviewed the Nurse Practitioner's history and physical, assessment, and plan. I have personally evaluated the patient at bedside and agree with the findings and made appropriate changes as necessary in recommendations.      The patient is fluid status is extremely difficult to evaluate without a Valle catheter and recommend a Valle catheter to be placed for aggressive fluid management.  Still has marked scrotal edema and only 500 cc diuresis yesterday on 60 Lasix t.i.d. plus albumin 25 b.i.d.    Recommend a Lasix drip and increase albumin 2 t.i.d.  Daily weights I&O accurately    Kingsley Mosley MD  Department of  Cardiology  [facility]  01/17/2025

## 2025-01-17 NOTE — SUBJECTIVE & OBJECTIVE
Interval History:   Patient seen and examined. He mentioned his SOB and swelling are improving. Serum creatinine stable (2.3). Nephrology and Cardiology closely following. Lasix increased, albumin added.       Review of Systems   Constitutional:  Positive for activity change. Negative for chills, fatigue and fever.   Respiratory:  Negative for chest tightness and shortness of breath.    Cardiovascular:  Negative for chest pain.   Gastrointestinal: Negative.    Genitourinary:  Positive for scrotal swelling.        Improving   Neurological:  Positive for weakness. Negative for tremors.     Objective:     Vital Signs (Most Recent):  Temp: 98.4 °F (36.9 °C) (01/17/25 0808)  Pulse: 99 (01/17/25 1118)  Resp: 17 (01/17/25 0900)  BP: 137/78 (01/17/25 1118)  SpO2: 97 % (01/17/25 1118) Vital Signs (24h Range):  Temp:  [98.1 °F (36.7 °C)-99.4 °F (37.4 °C)] 98.4 °F (36.9 °C)  Pulse:  [] 99  Resp:  [7-18] 17  SpO2:  [94 %-100 %] 97 %  BP: (107-169)/(64-92) 137/78     Weight:  (bed scale -44 unable to obtain acurate weight at this time)  Body mass index is 39.89 kg/m².    Intake/Output Summary (Last 24 hours) at 1/17/2025 1504  Last data filed at 1/17/2025 1254  Gross per 24 hour   Intake 1460 ml   Output 3390 ml   Net -1930 ml         Physical Exam  Vitals and nursing note reviewed. Exam conducted with a chaperone present.   Constitutional:       General: He is not in acute distress.     Appearance: Normal appearance. He is obese. He is not ill-appearing, toxic-appearing or diaphoretic.   Pulmonary:      Effort: Pulmonary effort is normal.      Breath sounds: Rhonchi and rales present.   Genitourinary:     Comments: Scrotal Edema   Musculoskeletal:         General: Deformity present.      Comments: Right BKA/ Left AKA   Skin:     General: Skin is warm and dry.   Neurological:      Mental Status: He is alert.   Psychiatric:         Mood and Affect: Mood normal.         Behavior: Behavior normal.         Thought Content:  Thought content normal.             Significant Labs: All pertinent labs within the past 24 hours have been reviewed.  CBC:   Recent Labs   Lab 01/16/25  0421 01/17/25  0630   WBC 10.91 11.31   HGB 8.9* 8.9*   HCT 29.3* 29.4*    251     CMP:   Recent Labs   Lab 01/16/25  0421 01/16/25  1803 01/17/25  0630    136 136   K 4.8 5.0 5.1    100 101   CO2 26 28 26    160* 158*   BUN 65* 69* 75*   CREATININE 2.3* 2.4* 2.4*   CALCIUM 8.5* 9.1 9.1   PROT 6.0  --  6.4   ALBUMIN 3.1*  --  3.3*   BILITOT 0.3  --  0.3   ALKPHOS 172*  --  225*   AST 11  --  12   ALT 10  --  10   ANIONGAP 8 8 9     Magnesium:   Recent Labs   Lab 01/16/25  0421 01/16/25  1803 01/17/25  0630   MG 2.0 1.9 2.0     Microbiology Results (last 7 days)       Procedure Component Value Units Date/Time    Urine culture [[id number]] Collected: 01/12/25 1055    Order Status: Completed Specimen: Urine Updated: 01/15/25 0640     Urine Culture, Routine No growth    Narrative:      Specimen Source->Urine    Blood culture x two cultures. Draw prior to antibiotics. [[id number]] Collected: 01/09/25 0859    Order Status: Completed Specimen: Blood Updated: 01/14/25 1032     Blood Culture, Routine No growth after 5 days.    Narrative:      Aerobic and anaerobic    Blood culture x two cultures. Draw prior to antibiotics. [[id number]] Collected: 01/09/25 0859    Order Status: Completed Specimen: Blood Updated: 01/14/25 1032     Blood Culture, Routine No growth after 5 days.    Narrative:      Aerobic and anaerobic            Significant Imaging: I have reviewed all pertinent imaging results/findings within the past 24 hours.  Imaging Results              X-Ray Chest AP Portable (Final result)  Result time 01/09/25 08:09:27      Final result by Manuel Snow MD (01/09/25 08:09:27)                   Impression:      Top-normal size heart and findings of mild pulmonary vascular congestion/trace interstitial edema.      Electronically signed  by: Manuel Snow  Date:    01/09/2025  Time:    08:09               Narrative:    CLINICAL HISTORY:  ([id number])48 y/o  (1975) M    Chest Pain;    TECHNIQUE:  (A#[id number], exam time 1/9/2025 7:54)    XR CHEST AP PORTABLE [id number]    COMPARISON:  Radiograph from 07/19/2022    FINDINGS:  Mild perihilar pulmonary vascular prominence with slightly increased central hilar interstitial lung markings bilaterally suggestive of mild pulmonary vascular congestion/trace edema. No pneumothorax is identified. The heart is top normal in size.  Osseous structures show degenerative changes in the spine. The visualized upper abdomen is unremarkable.

## 2025-01-17 NOTE — CONSULTS
Left groin tan slough maroon gray moist eschar, small amount blood on gauze when cleaning, applied Medihoney and covered with abd pad, staff using a versette to catch urine.  Nurse states urologist is coming to insert catheter.    Bilateral buttock red denuded, cleaned and dried and applied Triad        Cleaned and dried scrotum small ulcers open weeping, scrotum remain swollen and red, painful,     Scrotal swelling     Right leg, cleaned and dried and applied Aquaphor, patient peeling skin instructed not to peel or pull skin, let the aquaphor moisten the skin.         Right groin ulcer yellow tan slough, cleaned and dried and applied Medihoney and cover with abd pad    Left side, redness and multiple areas where patient continues to scratch, instructed not to scratch    Right arm scratches.  Complete skin assessment.

## 2025-01-17 NOTE — PLAN OF CARE
"Called to bedside as the patient began experiencing left-sided chest pain that "stings" per patient. Patient has an area of erythema with dermatomal distribution although no obvious vesicles observed. Given character of pain and distribution of rash will start empiric acyclovir. Primary hospitalist may add neuropathic pain agent such as gabapentin. Will increase frequency of IV morphine at this time.  "

## 2025-01-18 LAB
ALBUMIN SERPL BCP-MCNC: 3.5 G/DL (ref 3.5–5.2)
ALP SERPL-CCNC: 235 U/L (ref 55–135)
ALT SERPL W/O P-5'-P-CCNC: 12 U/L (ref 10–44)
ANION GAP SERPL CALC-SCNC: 10 MMOL/L (ref 8–16)
AST SERPL-CCNC: 16 U/L (ref 10–40)
BASOPHILS # BLD AUTO: 0.05 K/UL (ref 0–0.2)
BASOPHILS NFR BLD: 0.4 % (ref 0–1.9)
BILIRUB SERPL-MCNC: 0.4 MG/DL (ref 0.1–1)
BUN SERPL-MCNC: 84 MG/DL (ref 6–20)
CALCIUM SERPL-MCNC: 8.9 MG/DL (ref 8.7–10.5)
CHLORIDE SERPL-SCNC: 100 MMOL/L (ref 95–110)
CO2 SERPL-SCNC: 26 MMOL/L (ref 23–29)
CREAT SERPL-MCNC: 2.5 MG/DL (ref 0.5–1.4)
DIFFERENTIAL METHOD BLD: ABNORMAL
EOSINOPHIL # BLD AUTO: 0.9 K/UL (ref 0–0.5)
EOSINOPHIL NFR BLD: 6.5 % (ref 0–8)
ERYTHROCYTE [DISTWIDTH] IN BLOOD BY AUTOMATED COUNT: 15.4 % (ref 11.5–14.5)
EST. GFR  (NO RACE VARIABLE): 31.1 ML/MIN/1.73 M^2
GLUCOSE SERPL-MCNC: 129 MG/DL (ref 70–110)
HCT VFR BLD AUTO: 27.5 % (ref 40–54)
HGB BLD-MCNC: 8.4 G/DL (ref 14–18)
HIV1 RNA # SERPL NAA+PROBE: <20 COPIES/ML
HIV1 RNA SERPL NAA+PROBE-LOG#: NORMAL LOG10COPY/ML
IMM GRANULOCYTES # BLD AUTO: 0.09 K/UL (ref 0–0.04)
IMM GRANULOCYTES NFR BLD AUTO: 0.7 % (ref 0–0.5)
LYMPHOCYTES # BLD AUTO: 1.5 K/UL (ref 1–4.8)
LYMPHOCYTES NFR BLD: 10.9 % (ref 18–48)
MAGNESIUM SERPL-MCNC: 2 MG/DL (ref 1.6–2.6)
MCH RBC QN AUTO: 25.8 PG (ref 27–31)
MCHC RBC AUTO-ENTMCNC: 30.5 G/DL (ref 32–36)
MCV RBC AUTO: 85 FL (ref 82–98)
MONOCYTES # BLD AUTO: 1.8 K/UL (ref 0.3–1)
MONOCYTES NFR BLD: 13.5 % (ref 4–15)
NEUTROPHILS # BLD AUTO: 9.2 K/UL (ref 1.8–7.7)
NEUTROPHILS NFR BLD: 68 % (ref 38–73)
NRBC BLD-RTO: 0 /100 WBC
PLATELET # BLD AUTO: 248 K/UL (ref 150–450)
PMV BLD AUTO: 10.7 FL (ref 9.2–12.9)
POCT GLUCOSE: 128 MG/DL (ref 70–110)
POCT GLUCOSE: 144 MG/DL (ref 70–110)
POCT GLUCOSE: 157 MG/DL (ref 70–110)
POTASSIUM SERPL-SCNC: 5.5 MMOL/L (ref 3.5–5.1)
PROT SERPL-MCNC: 6.7 G/DL (ref 6–8.4)
RBC # BLD AUTO: 3.25 M/UL (ref 4.6–6.2)
SODIUM SERPL-SCNC: 136 MMOL/L (ref 136–145)
WBC # BLD AUTO: 13.43 K/UL (ref 3.9–12.7)

## 2025-01-18 PROCEDURE — 36415 COLL VENOUS BLD VENIPUNCTURE: CPT | Performed by: STUDENT IN AN ORGANIZED HEALTH CARE EDUCATION/TRAINING PROGRAM

## 2025-01-18 PROCEDURE — 80053 COMPREHEN METABOLIC PANEL: CPT | Performed by: STUDENT IN AN ORGANIZED HEALTH CARE EDUCATION/TRAINING PROGRAM

## 2025-01-18 PROCEDURE — 85025 COMPLETE CBC W/AUTO DIFF WBC: CPT | Performed by: STUDENT IN AN ORGANIZED HEALTH CARE EDUCATION/TRAINING PROGRAM

## 2025-01-18 PROCEDURE — 63600175 PHARM REV CODE 636 W HCPCS

## 2025-01-18 PROCEDURE — 63600175 PHARM REV CODE 636 W HCPCS: Performed by: INTERNAL MEDICINE

## 2025-01-18 PROCEDURE — 99900035 HC TECH TIME PER 15 MIN (STAT)

## 2025-01-18 PROCEDURE — 99900031 HC PATIENT EDUCATION (STAT)

## 2025-01-18 PROCEDURE — 25000003 PHARM REV CODE 250: Performed by: INTERNAL MEDICINE

## 2025-01-18 PROCEDURE — 83735 ASSAY OF MAGNESIUM: CPT | Performed by: STUDENT IN AN ORGANIZED HEALTH CARE EDUCATION/TRAINING PROGRAM

## 2025-01-18 PROCEDURE — 25000003 PHARM REV CODE 250: Performed by: NURSE PRACTITIONER

## 2025-01-18 PROCEDURE — P9047 ALBUMIN (HUMAN), 25%, 50ML: HCPCS | Performed by: INTERNAL MEDICINE

## 2025-01-18 PROCEDURE — 25000003 PHARM REV CODE 250: Performed by: STUDENT IN AN ORGANIZED HEALTH CARE EDUCATION/TRAINING PROGRAM

## 2025-01-18 PROCEDURE — 94761 N-INVAS EAR/PLS OXIMETRY MLT: CPT

## 2025-01-18 PROCEDURE — 12000002 HC ACUTE/MED SURGE SEMI-PRIVATE ROOM

## 2025-01-18 PROCEDURE — 25000003 PHARM REV CODE 250

## 2025-01-18 PROCEDURE — 63600175 PHARM REV CODE 636 W HCPCS: Performed by: STUDENT IN AN ORGANIZED HEALTH CARE EDUCATION/TRAINING PROGRAM

## 2025-01-18 RX ORDER — LORAZEPAM 0.5 MG/1
0.5 TABLET ORAL EVERY 6 HOURS PRN
Status: DISCONTINUED | OUTPATIENT
Start: 2025-01-18 | End: 2025-01-26 | Stop reason: HOSPADM

## 2025-01-18 RX ORDER — LOPERAMIDE HYDROCHLORIDE 2 MG/1
2 CAPSULE ORAL 4 TIMES DAILY PRN
Status: DISCONTINUED | OUTPATIENT
Start: 2025-01-18 | End: 2025-01-24

## 2025-01-18 RX ORDER — CARVEDILOL 3.12 MG/1
3.12 TABLET ORAL 2 TIMES DAILY
Status: DISCONTINUED | OUTPATIENT
Start: 2025-01-18 | End: 2025-01-26 | Stop reason: HOSPADM

## 2025-01-18 RX ADMIN — FUROSEMIDE 15 MG/HR: 10 INJECTION, SOLUTION INTRAMUSCULAR; INTRAVENOUS at 06:01

## 2025-01-18 RX ADMIN — GABAPENTIN 300 MG: 300 CAPSULE ORAL at 08:01

## 2025-01-18 RX ADMIN — MORPHINE SULFATE 4 MG: 4 INJECTION INTRAVENOUS at 09:01

## 2025-01-18 RX ADMIN — OXYCODONE HYDROCHLORIDE AND ACETAMINOPHEN 1 TABLET: 10; 325 TABLET ORAL at 06:01

## 2025-01-18 RX ADMIN — ENOXAPARIN SODIUM 120 MG: 120 INJECTION SUBCUTANEOUS at 08:01

## 2025-01-18 RX ADMIN — ALBUMIN (HUMAN) 12.5 G: 12.5 SOLUTION INTRAVENOUS at 08:01

## 2025-01-18 RX ADMIN — ATORVASTATIN CALCIUM 80 MG: 40 TABLET, FILM COATED ORAL at 08:01

## 2025-01-18 RX ADMIN — BACLOFEN 5 MG: 5 TABLET ORAL at 08:01

## 2025-01-18 RX ADMIN — ACYCLOVIR 800 MG: 400 TABLET ORAL at 05:01

## 2025-01-18 RX ADMIN — ALBUMIN (HUMAN) 12.5 G: 12.5 SOLUTION INTRAVENOUS at 09:01

## 2025-01-18 RX ADMIN — LORAZEPAM 0.5 MG: 0.5 TABLET ORAL at 05:01

## 2025-01-18 RX ADMIN — BUPRENORPHINE AND NALOXONE 1 FILM: 8; 2 FILM BUCCAL; SUBLINGUAL at 08:01

## 2025-01-18 RX ADMIN — TAMSULOSIN HYDROCHLORIDE 0.4 MG: 0.4 CAPSULE ORAL at 08:01

## 2025-01-18 RX ADMIN — ASPIRIN 81 MG: 81 TABLET, COATED ORAL at 09:01

## 2025-01-18 RX ADMIN — MICONAZOLE NITRATE: 20 POWDER TOPICAL at 08:01

## 2025-01-18 RX ADMIN — OXYCODONE HYDROCHLORIDE AND ACETAMINOPHEN 1 TABLET: 10; 325 TABLET ORAL at 08:01

## 2025-01-18 RX ADMIN — LOPERAMIDE HYDROCHLORIDE 2 MG: 2 CAPSULE ORAL at 05:01

## 2025-01-18 RX ADMIN — ACYCLOVIR 800 MG: 400 TABLET ORAL at 06:01

## 2025-01-18 RX ADMIN — ACYCLOVIR 800 MG: 400 TABLET ORAL at 08:01

## 2025-01-18 RX ADMIN — MICONAZOLE NITRATE: 20 POWDER TOPICAL at 09:01

## 2025-01-18 RX ADMIN — WHITE PETROLATUM 41 % TOPICAL OINTMENT: at 08:01

## 2025-01-18 RX ADMIN — ACYCLOVIR 800 MG: 400 TABLET ORAL at 02:01

## 2025-01-18 RX ADMIN — DIAZEPAM 5 MG: 5 TABLET ORAL at 12:01

## 2025-01-18 RX ADMIN — CARVEDILOL 3.12 MG: 3.12 TABLET, FILM COATED ORAL at 08:01

## 2025-01-18 RX ADMIN — MORPHINE SULFATE 4 MG: 4 INJECTION INTRAVENOUS at 01:01

## 2025-01-18 NOTE — PROGRESS NOTES
[facility]   Department of Cardiology  Progress Note      PATIENT NAME: Gideon Ross    MRN: [id number]  TODAY'S DATE: 01/18/2025  ADMIT DATE: 1/9/2025                          CONSULT REQUESTED BY: Lauren Lawler MD    SUBJECTIVE     PRINCIPAL PROBLEM: Acute systolic congestive heart failure    01/18/2025  Patient states he is detoxing so bad   still has edema present to lower extremities  Monitor normal sinus rhythm with bundle-branch block  Creatinine 2.5, potassium 5.5 today    1/17/25  Resting in bed.  NAD.  Continue to report edema.     1/16/25  H&H dropping 8.9/29.3. Denies bleeding.  NAD. Remains edematous. Cr 2.3, unchanged; in icu for strict I's and O's.    1/15/25    Patient seen resting in bed with no distress noted. Breathing is stable. He remains with a good amount of edema.     1/14/25  Resting in bed. Complaints of thirst.  Wheezing present.  Remains edematous; no further events of NSVT on tele    1/13/25    Resting in bed. NAD. RA.  Reports being thirsty.  Eating canned sausages.  Continues to report scrotal edema. Occasional PVCs noted on tele. Brief NSVT.  Not on dobutrex; Nephrology is following.    1/12/25    Patient seen sitting up bed with no distress noted. Breathing is stable. Family/friends at bedside. Stable on telemetry but mildly tachycardic. Dobutamine was held due to ectopy on telemetry.     1/11/25    Patient very drowsy when seen today.  No acute distress noted.  He was asking for pain medicine when awakened.  Patient with worsened renal function today.    1/10/25    Patient seen resting in bed this morning.  He was very upset.  He reports that he does use heroin on a daily basis and believes that he is likely detoxing currently.  Patient is not very optimistic for his outcome.    HPI:    Patient is a 49-year-old male who presented to the emergency room with complaints of testicular pain and swelling over the past 1 week.  Patient is a known diabetic and has a  history of PID with bilateral above the knee amputations.  Patient also had some complaints of mild shortness of breath but denied any chest pain.  Patient was noted to have significantly elevated high sensitivity troponin level on arrival.  High sensitivity troponin level was 3343 on arrival and is now 3369.  Patient was started on heparin drip.  Patient does have a known history of drug abuse and is positive for opiates, amphetamine, and marijuana metabolites in his UDS.  Patient reports his last use of methamphetamine was a couple of weeks ago.        REASON FOR CONSULT:  From Hospitalist H&P: Patient presents complaining of testicular pain and swelling.  Patient has noticed increased pain and swelling for the last 1 week as well as foul smell.  Patient has a history of bilateral above-the-knee and below-the-knee amputation.  He is a diabetic.  At the worst symptoms are moderate.  Patient complains of mild shortness of breath no chest pain.            Review of patient's allergies indicates:  No Known Allergies    Past Medical History:   Diagnosis Date    Diabetes mellitus     Hypertension      Past Surgical History:   Procedure Laterality Date    SKIN GRAFT       Social History     Tobacco Use    Smoking status: Every Day     Current packs/day: 1.00     Types: Cigarettes   Substance Use Topics    Alcohol use: Yes    Drug use: No        REVIEW OF SYSTEMS  Per HPI    OBJECTIVE     VITAL SIGNS (Most Recent)  Temp: 98.5 °F (36.9 °C) (01/18/25 1224)  Pulse: 104 (01/18/25 1224)  Resp: 17 (01/18/25 1224)  BP: 116/62 (01/18/25 1224)  SpO2: 96 % (01/18/25 1224)    VENTILATION STATUS  Resp: 17 (01/18/25 1224)  SpO2: 96 % (01/18/25 1224)  Oxygen Concentration (%):  [28] 28        I & O (Last 24H):  Intake/Output Summary (Last 24 hours) at 1/18/2025 1304  Last data filed at 1/18/2025 1109  Gross per 24 hour   Intake 1024.35 ml   Output 1400 ml   Net -375.65 ml       WEIGHTS  Wt Readings from Last 1 Encounters:   01/15/25  0400 126.1 kg (278 lb)   01/14/25 0400 127.1 kg (280 lb 3.2 oz)   01/09/25 2239 115.4 kg (254 lb 6.4 oz)   01/09/25 0824 108.9 kg (240 lb)       PHYSICAL EXAM  CONSTITUTIONAL: No fever, no chills obese  HEENT: Normocephalic, atraumatic,pupils reactive to light                 NECK:  No JVD no carotid bruit  CVS: S1S2+, RRR  LUNGS:  Scattered wheezing, no dyspnea  ABDOMEN: Soft, NT, BS+  EXTREMITIES: No cyanosis, +edema @ right BKA and Left AKA  :  valle catheter  NEURO: AAO X 3        HOME MEDICATIONS:  No current facility-administered medications on file prior to encounter.     Current Outpatient Medications on File Prior to Encounter   Medication Sig Dispense Refill    amLODIPine (NORVASC) 10 MG tablet Take 1 tablet (10 mg total) by mouth once daily. 30 tablet 1    aspirin (ECOTRIN) 81 MG EC tablet Take 1 tablet (81 mg total) by mouth once daily. 30 tablet 1    atorvastatin (LIPITOR) 80 MG tablet Take 1 tablet (80 mg total) by mouth once daily. 30 tablet 1    baclofen (LIORESAL) 10 MG tablet Take 1 tablet (10 mg total) by mouth 3 (three) times daily. 90 tablet 1    carvedilol (COREG) 6.25 MG tablet Take 1 tablet (6.25 mg total) by mouth 2 (two) times daily. 60 tablet 1    diazePAM (VALIUM) 5 MG tablet Take 1 tablet (5 mg total) by mouth every 12 (twelve) hours as needed for Anxiety (anxiety.). 30 tablet 0    gabapentin (NEURONTIN) 300 MG capsule Take 2 capsules (600 mg total) by mouth 3 (three) times daily. 90 capsule 1    hydroCHLOROthiazide (HYDRODIURIL) 50 MG tablet Take 1 tablet (50 mg total) by mouth once daily. 30 tablet 1    insulin aspart U-100 (NOVOLOG) 100 unit/mL InPn pen Inject 18 Units into the skin 3 (three) times daily. (Patient taking differently: Inject into the skin 3 (three) times daily.) 10 mL 1    insulin detemir U-100 (LEVEMIR FLEXTOUCH) 100 unit/mL (3 mL) SubQ InPn pen Inject 35 Units into the skin 2 (two) times daily. 10 mL 1    insulin glargine U-100, Lantus, 100 unit/mL injection Inject  30 Units into the skin every evening. (Patient not taking: Reported on 1/9/2025)      lisinopril (PRINIVIL,ZESTRIL) 40 MG tablet Take 1 tablet (40 mg total) by mouth once daily. 30 tablet 1    nicotine (NICODERM CQ) 14 mg/24 hr Place 1 patch onto the skin once daily. (Patient not taking: Reported on 1/9/2025) 15 patch 0    nortriptyline (PAMELOR) 25 MG capsule Take 1 capsule (25 mg total) by mouth 3 (three) times daily. 30 capsule 1    oxyCODONE (ROXICODONE) 5 MG immediate release tablet Take 1 tablet (5 mg total) by mouth every 12 (twelve) hours as needed. (Patient not taking: Reported on 1/9/2025) 30 tablet 0    polyethylene glycol (GLYCOLAX) 17 gram PwPk Take 17 g by mouth daily as needed. (Patient not taking: Reported on 1/9/2025) 30 each 1    QUEtiapine (SEROQUEL) 100 MG Tab Take 1 tablet (100 mg total) by mouth every evening. 30 tablet 1    sofosbuvir-velpatasvir (EPCLUSA) 400-100 mg Tab Take 1 tablet daily. (Patient not taking: Reported on 1/9/2025) 28 tablet 2       SCHEDULED MEDS:   acyclovir  800 mg Oral 5x Daily    albumin human 25%  12.5 g Intravenous BID    aspirin  81 mg Oral Daily    atorvastatin  80 mg Oral Daily    baclofen  5 mg Oral BID    buprenorphine-naloxone 8-2 mg  1 Film Sublingual BID    enoxparin  1 mg/kg Subcutaneous Q12H (treatment, non-standard time)    gabapentin  300 mg Oral BID    miconazole NITRATE 2 %   Topical (Top) BID    tamsulosin  0.4 mg Oral Daily    white petrolatum   Topical (Top) Daily       CONTINUOUS INFUSIONS:   furosemide (LASIX) 2 mg/mL continuous infusion (non-titrating)  15 mg/hr Intravenous Continuous 7.5 mL/hr at 01/18/25 0621 15 mg/hr at 01/18/25 0621           PRN MEDS:  Current Facility-Administered Medications:     acetaminophen, 650 mg, Oral, Q4H PRN    aluminum-magnesium hydroxide-simethicone, 30 mL, Oral, QID PRN    dextrose 50%, 12.5 g, Intravenous, PRN    dextrose 50%, 25 g, Intravenous, PRN    diazePAM, 5 mg, Oral, Q12H PRN    glucagon (human  "recombinant), 1 mg, Intramuscular, PRN    glucose, 16 g, Oral, PRN    glucose, 24 g, Oral, PRN    hydrALAZINE, 5 mg, Intravenous, Q6H PRN    insulin aspart U-100, 0-10 Units, Subcutaneous, QID (AC + HS) PRN    LIDOcaine HCl 2%, , Mucous Membrane, Once PRN    magnesium oxide, 800 mg, Oral, PRN    magnesium oxide, 800 mg, Oral, PRN    melatonin, 6 mg, Oral, Nightly PRN    morphine, 4 mg, Intravenous, Q3H PRN    naloxone, 0.02 mg, Intravenous, PRN    ondansetron, 4 mg, Intravenous, Q6H PRN    oxyCODONE-acetaminophen, 1 tablet, Oral, Q4H PRN    oxyCODONE-acetaminophen, 1 tablet, Oral, Q4H PRN    potassium bicarbonate, 35 mEq, Oral, PRN    potassium bicarbonate, 50 mEq, Oral, PRN    potassium bicarbonate, 60 mEq, Oral, PRN    potassium, sodium phosphates, 2 packet, Oral, PRN    potassium, sodium phosphates, 2 packet, Oral, PRN    potassium, sodium phosphates, 2 packet, Oral, PRN    senna-docusate 8.6-50 mg, 1 tablet, Oral, Daily PRN    sodium chloride 0.9%, 10 mL, Intravenous, Q12H PRN    sodium chloride 0.9%, 10 mL, Intravenous, PRN    LABS AND DIAGNOSTICS     CBC LAST 3 DAYS  Recent Labs   Lab 01/16/25  0421 01/17/25  0630 01/18/25  0512   WBC 10.91 11.31 13.43*   RBC 3.44* 3.48* 3.25*   HGB 8.9* 8.9* 8.4*   HCT 29.3* 29.4* 27.5*   MCV 85 85 85   MCH 25.9* 25.6* 25.8*   MCHC 30.4* 30.3* 30.5*   RDW 14.8* 15.1* 15.4*    251 248   MPV 11.0 10.8 10.7   GRAN 59.3  6.5 65.8  7.4 68.0  9.2*   LYMPH 15.4*  1.7 12.4*  1.4 10.9*  1.5   MONO 14.8  1.6* 12.8  1.5* 13.5  1.8*   BASO 0.05 0.06 0.05   NRBC 0 0 0       COAGULATION LAST 3 DAYS  No results for input(s): "LABPT", "INR", "APTT" in the last 168 hours.      CHEMISTRY LAST 3 DAYS  Recent Labs   Lab 01/16/25  0421 01/16/25  1803 01/17/25  0630 01/18/25  0512    136 136 136   K 4.8 5.0 5.1 5.5*    100 101 100   CO2 26 28 26 26   ANIONGAP 8 8 9 10   BUN 65* 69* 75* 84*   CREATININE 2.3* 2.4* 2.4* 2.5*    160* 158* 129*   CALCIUM 8.5* 9.1 9.1 " "8.9   MG 2.0 1.9 2.0 2.0   ALBUMIN 3.1*  --  3.3* 3.5   PROT 6.0  --  6.4 6.7   ALKPHOS 172*  --  225* 235*   ALT 10  --  10 12   AST 11  --  12 16   BILITOT 0.3  --  0.3 0.4       CARDIAC PROFILE LAST 3 DAYS  Recent Labs   Lab 01/16/25  1803   TROPONINIHS 130.2*         ENDOCRINE LAST 3 DAYS  No results for input(s): "TSH", "PROCAL" in the last 168 hours.    LAST ARTERIAL BLOOD GAS  ABG  No results for input(s): "PH", "PO2", "PCO2", "HCO3", "BE" in the last 168 hours.    LAST 7 DAYS MICROBIOLOGY   Microbiology Results (last 7 days)       Procedure Component Value Units Date/Time    Urine culture [[id number]] Collected: 01/12/25 1055    Order Status: Completed Specimen: Urine Updated: 01/15/25 0640     Urine Culture, Routine No growth    Narrative:      Specimen Source->Urine    Blood culture x two cultures. Draw prior to antibiotics. [[id number]] Collected: 01/09/25 0859    Order Status: Completed Specimen: Blood Updated: 01/14/25 1032     Blood Culture, Routine No growth after 5 days.    Narrative:      Aerobic and anaerobic    Blood culture x two cultures. Draw prior to antibiotics. [[id number]] Collected: 01/09/25 0859    Order Status: Completed Specimen: Blood Updated: 01/14/25 1032     Blood Culture, Routine No growth after 5 days.    Narrative:      Aerobic and anaerobic            MOST RECENT IMAGING  X-Ray Chest 1 View  Narrative: EXAMINATION:  XR CHEST 1 VIEW    CLINICAL HISTORY:  Chest pain;    TECHNIQUE:  Single frontal view of the chest was performed.    COMPARISON:  01/09/2025    FINDINGS:  Enlarged cardiac silhouette with pulmonary vascular congestion.  No focal consolidation.  Impression: As above    Electronically signed by: Doron Maynard  Date:    01/16/2025  Time:    18:46      ECHOCARDIOGRAM RESULTS (last 5)  No results found for this or any previous visit.      CURRENT/PREVIOUS VISIT EKG  Results for orders placed or performed during the hospital encounter of 01/09/25   EKG 12-lead    " Collection Time: 01/16/25  6:03 PM   Result Value Ref Range    QRS Duration 132 ms    [facility] QTC Calculation 504 ms    Narrative    Test Reason : R07.9,    Vent. Rate : 106 BPM     Atrial Rate : 106 BPM     P-R Int : 144 ms          QRS Dur : 132 ms      QT Int : 380 ms       P-R-T Axes :  44 174   8 degrees    QTcB Int : 504 ms    Sinus tachycardia  Right bundle branch block  Left posterior fascicular block   Bifascicular block   Possible Inferior infarct ,age undetermined  Anterior infarct ,age undetermined  Abnormal ECG  No previous ECGs available    Referred By: AAAREFERRAL SELF           Confirmed By:            ASSESSMENT/PLAN:     Active Hospital Problems    Diagnosis    *Acute systolic congestive heart failure    Urinary tract infection associated with indwelling urethral catheter    MARLENE (acute kidney injury)    Opioid use disorder    Scrotal edema    Elevated troponin    Diabetes mellitus    Obesity       ASSESSMENT & PLAN:     Elevated troponin  Acute HFrEF 25-30%  Cardiomyopathy  Testicular pain and swelling   Type II DM  + drug abuse: amphetamines, marijuana  PAD  Hx of bilateral AKA      RECOMMENDATIONS:    -1600 mL net balance Patient still has peripheral edema. Recommend continue Lasix drip for today  Continue albumin  Start Coreg 3.125 mg BID  Will eventually need ischemic evaluation.    5.   Recommend LifeVest for dilated cardiomyopathy    JOSE Krishna, CVNP-BC   [facility]  Department of Cardiology   Date of Service: 01/18/2025

## 2025-01-18 NOTE — ASSESSMENT & PLAN NOTE
Patient has Systolic (HFrEF) heart failure that is Acute. On presentation their CHF was decompensated. Evidence of decompensated CHF on presentation includes: edema. The etiology of their decompensation is likely substance abuse .   Latest ECHO  Results for orders placed during the hospital encounter of 01/09/25    Echo    Interpretation Summary    Left Ventricle: The left ventricle is moderately dilated. Mildly increased wall thickness. There is mild asymmetric hypertrophy. Severe global hypokinesis present. There is severely reduced systolic function with a visually estimated ejection fraction of 25 - 30%.    Right Ventricle: Moderate right ventricular enlargement. Systolic function is mildly reduced.    Left Atrium: Left atrium is mildly dilated.    Tricuspid Valve: There is mild regurgitation.    IVC/SVC: Elevated venous pressure at 15 mmHg.    Current Heart Failure Medications  hydrALAZINE injection 5 mg, Every 6 hours PRN, Intravenous  furosemide (Lasix) 200 mg in 0.9% NaCl SolP 100 mL continuous infusion (conc: 2 mg/mL), Continuous, Intravenous  carvediloL tablet 3.125 mg, 2 times daily, Oral    Plan  - Monitor strict I&Os and daily weights.    - nephrology and cardiology managing diuresis. Patient is on Lasix drip with albumin, Cr up trending slowly, cont above for now per consult recommendations   - Cont Coreg  - recommending life vest on discharge   - ischemic work up per cardiology

## 2025-01-18 NOTE — CONSULTS
"[facility] Urology Consult:   Consult for: unable to place valle - penoscrotal swelling  Consult from: Dr Whelan, \[facility] [place] [facility]\"" medicine    Gideon Ross is a 49 y.o. male status post bilateral above-the-knee amputations, diabetes mellitus who presented for significant bilateral lower extremity edema and scrotal swelling, as well as intractable pain secondary to scrotal swelling. Labs largely unremarkable. BNP 3500, troponin 3300. Patient was started on IV diuresis and pain control.     On review admission he was admitted for management of acute CHF and exacerbation for which diuresis was undertaken and he was admitted to the intensive care unit for which he had indwelling Valle catheter for strict I's and O's during his diuresis and at some point there was concern for potential purulent discharge around it and discussed changing it for fresh Valle for accurate I's and O's but ultimately Valle was removed in ICU on 1/12/25.     He has also been followed by wound care for unstageable bilateral groin wounds.    It was reported on consultation for inability to place Valle catheter given penoscrotal swelling with nurses trying in the ICU and on the floor and being unsuccessful, that he was being managed at this time with a female Purewick pad overlying his penoscrotal swelling with urine going to wall suction.    Transferred to [facility] overnight  Hospital Medicine attending who assumed care today reported that cardiology requested Valle catheter for strict I&Os swelling was not decreasing appropriately with diuresis    Per Dr Whelan  He had a purewick on today and has voided 1300 cc as of this am best I can see is that they have been using the purewick since 1/12, and unable to replace valle and charting "catheter removed and he has been voiding:     On discussion with the patient and nightshift nurse who was present last night, he had a condom catheter on last night with a about 900 cc of urine output overnight.  Pure " wick was utilized today.    Past Medical History:   Diagnosis Date    Diabetes mellitus     Hypertension        Past Surgical History:   Procedure Laterality Date    SKIN GRAFT         No family history on file.    Social History     Socioeconomic History    Marital status: Significant Other   Tobacco Use    Smoking status: Every Day     Current packs/day: 1.00     Types: Cigarettes   Substance and Sexual Activity    Alcohol use: Yes    Drug use: No     Social Drivers of Health     Financial Resource Strain: Low Risk  (1/10/2025)    Overall Financial Resource Strain (CARDIA)     Difficulty of Paying Living Expenses: Not hard at all   Food Insecurity: Patient Unable To Answer (1/10/2025)    Hunger Vital Sign     Worried About Running Out of Food in the Last Year: Patient unable to answer     Ran Out of Food in the Last Year: Patient unable to answer   Transportation Needs: No Transportation Needs (1/10/2025)    TRANSPORTATION NEEDS     Transportation : No   Stress: No Stress Concern Present (1/10/2025)    [employer] - Occupational Stress Questionnaire     Feeling of Stress : Not at all   Housing Stability: Low Risk  (1/10/2025)    Housing Stability Vital Sign     Unable to Pay for Housing in the Last Year: No     Homeless in the Last Year: No       Review of patient's allergies indicates:  No Known Allergies    Medications Reviewed: see MAR    Focused Physical Exam    Vitals:    01/17/25 1825   BP:    Pulse:    Resp: 17   Temp:      Body mass index is 39.89 kg/m².     Abdomen: Soft, non-tender, obese  Ext; BKA  :  Mild penile shaft circumferential edema, with significant scrotal edema without any draining sinus tracts fluctuance or concern for abscess.  - manual expression of penile edema easily reduces it, and then recurs, however within it, the glans penis and meatus were easily palpable less than 1 cm in from the opening of the penile skin swelling.      Bedside Complex Weber  Placement (33095)  After removal of the pure wick and examining the penoscrotal edema as above, with easy palpation of the glans penis and urethral meatus within, the phallus was prepped sterilely with Betadine swabs, and some Betadine solution was poured into the opening of the penoscrotal edema.  Using sterile gloves, his glans penis and meatus was palpated, and 2% xylocaine Uro jet was used to fill the space within the penoscrotal edema.  As the glans penis was easily palpable, 16 Andorran Weber catheter was attempted to pass per meatus which was palpable, however given the mobility of the glans penis within the edematous skin, and inability to hold steady at the base of the shaft of the penis, was mildly difficult and required a few attempts, including with nursing assistance gloved finger localizing meatus, and a holding inferior to it so that the catheter could guide over her finger into the patient's meatus, of which the silicone catheter then passed per meatus and was ultimately able to be placed with return immediately of clear yellow urine which was placed to gravity drainage.  The patient tolerated this procedure well without complication.       LABS:    Recent Results (from the past 2 weeks)   ISTAT Lactate    Collection Time: 01/09/25  7:07 AM   Result Value Ref Range    POC Lactate 1.03 0.5 - 2.2 mmol/L    Sample VENOUS    Magnesium    Collection Time: 01/09/25  7:09 AM   Result Value Ref Range    Magnesium 1.7 1.6 - 2.6 mg/dL   CBC auto differential    Collection Time: 01/09/25  7:09 AM   Result Value Ref Range    WBC 10.51 3.90 - 12.70 K/uL    RBC 4.50 (L) 4.60 - 6.20 M/uL    Hemoglobin 11.9 (L) 14.0 - 18.0 g/dL    Hematocrit 37.6 (L) 40.0 - 54.0 %    MCV 84 82 - 98 fL    MCH 26.4 (L) 27.0 - 31.0 pg    MCHC 31.6 (L) 32.0 - 36.0 g/dL    RDW 14.6 (H) 11.5 - 14.5 %    Platelets 251 150 - 450 K/uL    MPV 10.3 9.2 - 12.9 fL    Immature Granulocytes 0.4 0.0 - 0.5 %    Gran # (ANC) 7.1 1.8 - 7.7 K/uL     Immature Grans (Abs) 0.04 0.00 - 0.04 K/uL    Lymph # 1.7 1.0 - 4.8 K/uL    Mono # 0.9 0.3 - 1.0 K/uL    Eos # 0.7 (H) 0.0 - 0.5 K/uL    Baso # 0.08 0.00 - 0.20 K/uL    nRBC 0 0 /100 WBC    Gran % 67.8 38.0 - 73.0 %    Lymph % 15.8 (L) 18.0 - 48.0 %    Mono % 8.8 4.0 - 15.0 %    Eosinophil % 6.4 0.0 - 8.0 %    Basophil % 0.8 0.0 - 1.9 %    Differential Method Automated    Comprehensive metabolic panel    Collection Time: 01/09/25  7:09 AM   Result Value Ref Range    Sodium 136 136 - 145 mmol/L    Potassium 3.7 3.5 - 5.1 mmol/L    Chloride 106 95 - 110 mmol/L    CO2 24 23 - 29 mmol/L    Glucose 185 (H) 70 - 110 mg/dL    BUN 22 (H) 6 - 20 mg/dL    Creatinine 1.0 0.5 - 1.4 mg/dL    Calcium 7.7 (L) 8.7 - 10.5 mg/dL    Total Protein 5.5 (L) 6.0 - 8.4 g/dL    Albumin 2.5 (L) 3.5 - 5.2 g/dL    Total Bilirubin 0.7 0.1 - 1.0 mg/dL    Alkaline Phosphatase 94 55 - 135 U/L    AST 17 10 - 40 U/L    ALT 10 10 - 44 U/L    eGFR >60.0 >60 mL/min/1.73 m^2    Anion Gap 6 (L) 8 - 16 mmol/L   Troponin I High Sensitivity #1    Collection Time: 01/09/25  7:09 AM   Result Value Ref Range    Troponin I High Sensitivity 3343.0 (HH) 0.0 - 14.9 pg/mL   B-Type natriuretic peptide (BNP)    Collection Time: 01/09/25  7:09 AM   Result Value Ref Range    BNP 3,449 (H) 0 - 99 pg/mL   EKG 12-lead    Collection Time: 01/09/25  7:39 AM   Result Value Ref Range    QRS Duration 124 ms    OHS QTC Calculation 457 ms   Urinalysis, Reflex to Urine Culture Urine, Clean Catch    Collection Time: 01/09/25  7:43 AM    Specimen: Urine, Catheterized   Result Value Ref Range    Specimen UA Urine, Clean Catch     Color, UA Yellow Yellow, Straw, Edith    Appearance, UA Hazy (A) Clear    pH, UA 6.0 5.0 - 8.0    Specific Gravity, UA 1.025 1.005 - 1.030    Protein, UA 3+ (A) Negative    Glucose, UA Trace (A) Negative    Ketones, UA Negative Negative    Bilirubin (UA) Negative Negative    Occult Blood UA 2+ (A) Negative    Nitrite, UA Negative Negative    Urobilinogen,  UA 2.0-3.0 (A) Negative EU/dL    Leukocytes, UA Negative Negative   Drug screen panel, in-house    Collection Time: 01/09/25  7:43 AM   Result Value Ref Range    Benzodiazepines Negative Negative    Cocaine (Metab.) Negative Negative    Opiate Scrn, Ur Presumptive Positive (A) Negative    Barbiturate Screen, Ur Negative Negative    Amphetamine Screen, Ur Presumptive Positive (A) Negative    THC Presumptive Positive (A) Negative    Phencyclidine Negative Negative    Creatinine, Urine 157.6 23.0 - 375.0 mg/dL    Toxicology Information SEE COMMENT    Urinalysis Microscopic    Collection Time: 01/09/25  7:43 AM   Result Value Ref Range    RBC, UA 7 (H) 0 - 4 /hpf    WBC, UA 4 0 - 5 /hpf    Bacteria Occasional None-Occ /hpf    Squam Epithel, UA 0 /hpf    Hyaline Casts, UA 4 (A) 0-1/lpf /lpf    Microscopic Comment SEE COMMENT    Blood culture x two cultures. Draw prior to antibiotics.    Collection Time: 01/09/25  8:59 AM    Specimen: Blood   Result Value Ref Range    Blood Culture, Routine No growth after 5 days.    Blood culture x two cultures. Draw prior to antibiotics.    Collection Time: 01/09/25  8:59 AM    Specimen: Blood   Result Value Ref Range    Blood Culture, Routine No growth after 5 days.    Troponin I High Sensitivity #2    Collection Time: 01/09/25  9:18 AM   Result Value Ref Range    Troponin I High Sensitivity 3369.7 (HH) 0.0 - 14.9 pg/mL   APTT    Collection Time: 01/09/25  9:18 AM   Result Value Ref Range    aPTT 28.4 21.0 - 32.0 sec   Protime-INR    Collection Time: 01/09/25  9:18 AM   Result Value Ref Range    Prothrombin Time 12.3 9.0 - 12.5 sec    INR 1.1 0.8 - 1.2   Echo    Collection Time: 01/09/25  3:40 PM   Result Value Ref Range    Reyes's Biplane MOD Ejection Fraction 25 %    A2C EF 12 %    A4C EF 35 %    LVOT stroke volume 28.9 cm3    LVIDd 5.8 3.5 - 6.0 cm    LV Systolic Volume 137.00 mL    LVIDs 5.3 (A) 2.1 - 4.0 cm    LV Diastolic Volume 166.00 mL    LV EDV A2C 130 mL    LV EDV A4C  138.00 mL    Left Ventricular End Systolic Volume by Teichholz Method 137.00 mL    Left Ventricular End Diastolic Volume by Teichholz Method 166.00 mL    IVS 1.0 0.6 - 1.1 cm    LVOT diameter 2.0 cm    LVOT area 3.1 cm2    FS 8.6 (A) 28 - 44 %    Left Ventricle Relative Wall Thickness 0.45 cm    PW 1.3 (A) 0.6 - 1.1 cm    LV mass 280.4 g    MV Peak E Gurwinder 0.99 m/s    TDI LATERAL 0.08 m/s    TDI SEPTAL 0.04 m/s    E/E' ratio 16.50 m/s    TR Max Gurwinder 3.18 m/s    E wave deceleration time 151.00 msec    LV SEPTAL E/E' RATIO 24.75 m/s    LV LATERAL E/E' RATIO 12.38 m/s    LVOT peak gurwinder 0.7 m/s    Left Ventricular Outflow Tract Mean Velocity 0.46 cm/s    Left Ventricular Outflow Tract Mean Gradient 1.00 mmHg    RV S' 7.62 cm/s    TAPSE 1.73 cm    AV mean gradient 2.0 mmHg    AV peak gradient 2.6 mmHg    Ao peak gurwinder 0.8 m/s    Ao VTI 11.1 cm    LVOT peak VTI 9.2 cm    AV valve area 2.6 cm²    AV Velocity Ratio 0.88     AV index (prosthetic) 0.83     MARTHA by Velocity Ratio 2.7 cm²    Mr max gurwinder 3.53 m/s    MV mean gradient 3 mmHg    MV peak gradient 4 mmHg    MV valve area by continuity eq 1.54 cm2    MV VTI 18.7 cm    Triscuspid Valve Regurgitation Peak Gradient 40 mmHg    PV PEAK VELOCITY 0.98 m/s    PV peak gradient 4 mmHg    Pulmonary Valve Mean Velocity 0.69 m/s    Ao root annulus 2.50 cm    IVC diameter 2.41 cm    Mean e' 0.06 m/s    AORTIC VALVE CUSP SEPERATION 2.00 cm    TV resting pulmonary artery pressure 55 mmHg    RV TB RVSP 18 mmHg    Est. RA pres 15 mmHg   POCT glucose    Collection Time: 01/09/25  4:57 PM   Result Value Ref Range    POCT Glucose 174 (H) 70 - 110 mg/dL   APTT    Collection Time: 01/09/25  4:59 PM   Result Value Ref Range    aPTT 45.2 (H) 21.0 - 32.0 sec   POCT glucose    Collection Time: 01/09/25 11:11 PM   Result Value Ref Range    POCT Glucose 163 (H) 70 - 110 mg/dL   APTT    Collection Time: 01/09/25 11:59 PM   Result Value Ref Range    aPTT 32.2 (H) 21.0 - 32.0 sec   POCT glucose     Collection Time: 01/10/25  6:17 AM   Result Value Ref Range    POCT Glucose 163 (H) 70 - 110 mg/dL   APTT    Collection Time: 01/10/25  7:21 AM   Result Value Ref Range    aPTT 49.4 (H) 21.0 - 32.0 sec   CBC auto differential    Collection Time: 01/10/25  7:21 AM   Result Value Ref Range    WBC 11.65 3.90 - 12.70 K/uL    RBC 3.95 (L) 4.60 - 6.20 M/uL    Hemoglobin 10.5 (L) 14.0 - 18.0 g/dL    Hematocrit 33.1 (L) 40.0 - 54.0 %    MCV 84 82 - 98 fL    MCH 26.6 (L) 27.0 - 31.0 pg    MCHC 31.7 (L) 32.0 - 36.0 g/dL    RDW 14.7 (H) 11.5 - 14.5 %    Platelets 244 150 - 450 K/uL    MPV 10.4 9.2 - 12.9 fL    Immature Granulocytes 0.4 0.0 - 0.5 %    Gran # (ANC) 7.4 1.8 - 7.7 K/uL    Immature Grans (Abs) 0.05 (H) 0.00 - 0.04 K/uL    Lymph # 1.9 1.0 - 4.8 K/uL    Mono # 1.3 (H) 0.3 - 1.0 K/uL    Eos # 0.9 (H) 0.0 - 0.5 K/uL    Baso # 0.08 0.00 - 0.20 K/uL    nRBC 0 0 /100 WBC    Gran % 63.7 38.0 - 73.0 %    Lymph % 16.7 (L) 18.0 - 48.0 %    Mono % 10.9 4.0 - 15.0 %    Eosinophil % 7.6 0.0 - 8.0 %    Basophil % 0.7 0.0 - 1.9 %    Differential Method Automated    Comprehensive Metabolic Panel (CMP)    Collection Time: 01/10/25  7:21 AM   Result Value Ref Range    Sodium 137 136 - 145 mmol/L    Potassium 3.9 3.5 - 5.1 mmol/L    Chloride 106 95 - 110 mmol/L    CO2 26 23 - 29 mmol/L    Glucose 157 (H) 70 - 110 mg/dL    BUN 28 (H) 6 - 20 mg/dL    Creatinine 1.6 (H) 0.5 - 1.4 mg/dL    Calcium 7.5 (L) 8.7 - 10.5 mg/dL    Total Protein 4.8 (L) 6.0 - 8.4 g/dL    Albumin 2.3 (L) 3.5 - 5.2 g/dL    Total Bilirubin 0.4 0.1 - 1.0 mg/dL    Alkaline Phosphatase 86 55 - 135 U/L    AST 10 10 - 40 U/L    ALT 8 (L) 10 - 44 U/L    eGFR 53.1 (A) >60 mL/min/1.73 m^2    Anion Gap 5 (L) 8 - 16 mmol/L   Magnesium    Collection Time: 01/10/25  7:21 AM   Result Value Ref Range    Magnesium 1.6 1.6 - 2.6 mg/dL   CBC with Automated Differential    Collection Time: 01/10/25  7:21 AM   Result Value Ref Range    WBC 11.65 3.90 - 12.70 K/uL    RBC 3.95 (L) 4.60  - 6.20 M/uL    Hemoglobin 10.5 (L) 14.0 - 18.0 g/dL    Hematocrit 33.1 (L) 40.0 - 54.0 %    MCV 84 82 - 98 fL    MCH 26.6 (L) 27.0 - 31.0 pg    MCHC 31.7 (L) 32.0 - 36.0 g/dL    RDW 14.7 (H) 11.5 - 14.5 %    Platelets 244 150 - 450 K/uL    MPV 10.4 9.2 - 12.9 fL    Immature Granulocytes 0.4 0.0 - 0.5 %    Gran # (ANC) 7.4 1.8 - 7.7 K/uL    Immature Grans (Abs) 0.05 (H) 0.00 - 0.04 K/uL    Lymph # 1.9 1.0 - 4.8 K/uL    Mono # 1.3 (H) 0.3 - 1.0 K/uL    Eos # 0.9 (H) 0.0 - 0.5 K/uL    Baso # 0.08 0.00 - 0.20 K/uL    nRBC 0 0 /100 WBC    Gran % 63.7 38.0 - 73.0 %    Lymph % 16.7 (L) 18.0 - 48.0 %    Mono % 10.9 4.0 - 15.0 %    Eosinophil % 7.6 0.0 - 8.0 %    Basophil % 0.7 0.0 - 1.9 %    Differential Method Automated    Hemoglobin A1c if not done in past 3 months    Collection Time: 01/10/25  7:21 AM   Result Value Ref Range    Hemoglobin A1C 7.5 (H) 4.5 - 6.2 %    Estimated Avg Glucose 169 (H) 68 - 131 mg/dL   POCT glucose    Collection Time: 01/10/25  8:31 AM   Result Value Ref Range    POCT Glucose 150 (H) 70 - 110 mg/dL   POCT glucose    Collection Time: 01/10/25 11:31 AM   Result Value Ref Range    POCT Glucose 152 (H) 70 - 110 mg/dL   POCT glucose    Collection Time: 01/10/25  3:57 PM   Result Value Ref Range    POCT Glucose 195 (H) 70 - 110 mg/dL   POCT glucose    Collection Time: 01/10/25  8:04 PM   Result Value Ref Range    POCT Glucose 176 (H) 70 - 110 mg/dL   Comprehensive Metabolic Panel (CMP)    Collection Time: 01/11/25  5:09 AM   Result Value Ref Range    Sodium 141 136 - 145 mmol/L    Potassium 4.0 3.5 - 5.1 mmol/L    Chloride 108 95 - 110 mmol/L    CO2 25 23 - 29 mmol/L    Glucose 154 (H) 70 - 110 mg/dL    BUN 34 (H) 6 - 20 mg/dL    Creatinine 2.2 (H) 0.5 - 1.4 mg/dL    Calcium 7.3 (L) 8.7 - 10.5 mg/dL    Total Protein 5.0 (L) 6.0 - 8.4 g/dL    Albumin 2.3 (L) 3.5 - 5.2 g/dL    Total Bilirubin 0.3 0.1 - 1.0 mg/dL    Alkaline Phosphatase 88 55 - 135 U/L    AST 9 (L) 10 - 40 U/L    ALT 7 (L) 10 - 44 U/L     eGFR 36.3 (A) >60 mL/min/1.73 m^2    Anion Gap 8 8 - 16 mmol/L   Magnesium    Collection Time: 01/11/25  5:09 AM   Result Value Ref Range    Magnesium 1.7 1.6 - 2.6 mg/dL   CBC with Automated Differential    Collection Time: 01/11/25  5:09 AM   Result Value Ref Range    WBC 11.76 3.90 - 12.70 K/uL    RBC 3.95 (L) 4.60 - 6.20 M/uL    Hemoglobin 10.4 (L) 14.0 - 18.0 g/dL    Hematocrit 33.8 (L) 40.0 - 54.0 %    MCV 86 82 - 98 fL    MCH 26.3 (L) 27.0 - 31.0 pg    MCHC 30.8 (L) 32.0 - 36.0 g/dL    RDW 14.8 (H) 11.5 - 14.5 %    Platelets 249 150 - 450 K/uL    MPV 10.5 9.2 - 12.9 fL    Immature Granulocytes 0.2 0.0 - 0.5 %    Gran # (ANC) 7.3 1.8 - 7.7 K/uL    Immature Grans (Abs) 0.02 0.00 - 0.04 K/uL    Lymph # 2.2 1.0 - 4.8 K/uL    Mono # 1.3 (H) 0.3 - 1.0 K/uL    Eos # 0.9 (H) 0.0 - 0.5 K/uL    Baso # 0.09 0.00 - 0.20 K/uL    nRBC 0 0 /100 WBC    Gran % 62.2 38.0 - 73.0 %    Lymph % 18.9 18.0 - 48.0 %    Mono % 10.7 4.0 - 15.0 %    Eosinophil % 7.2 0.0 - 8.0 %    Basophil % 0.8 0.0 - 1.9 %    Differential Method Automated    POCT glucose    Collection Time: 01/11/25  7:49 AM   Result Value Ref Range    POCT Glucose 153 (H) 70 - 110 mg/dL   POCT glucose    Collection Time: 01/11/25 12:35 PM   Result Value Ref Range    POCT Glucose 160 (H) 70 - 110 mg/dL   POCT glucose    Collection Time: 01/11/25  4:52 PM   Result Value Ref Range    POCT Glucose 134 (H) 70 - 110 mg/dL   POCT glucose    Collection Time: 01/11/25  7:43 PM   Result Value Ref Range    POCT Glucose 140 (H) 70 - 110 mg/dL   EKG 12-lead    Collection Time: 01/12/25  2:01 AM   Result Value Ref Range    QRS Duration 136 ms    OHS QTC Calculation 511 ms   Comprehensive Metabolic Panel (CMP)    Collection Time: 01/12/25  2:07 AM   Result Value Ref Range    Sodium 138 136 - 145 mmol/L    Potassium 4.1 3.5 - 5.1 mmol/L    Chloride 106 95 - 110 mmol/L    CO2 26 23 - 29 mmol/L    Glucose 129 (H) 70 - 110 mg/dL    BUN 35 (H) 6 - 20 mg/dL    Creatinine 2.2 (H) 0.5 -  1.4 mg/dL    Calcium 7.8 (L) 8.7 - 10.5 mg/dL    Total Protein 5.2 (L) 6.0 - 8.4 g/dL    Albumin 2.4 (L) 3.5 - 5.2 g/dL    Total Bilirubin 0.4 0.1 - 1.0 mg/dL    Alkaline Phosphatase 82 55 - 135 U/L    AST 9 (L) 10 - 40 U/L    ALT 7 (L) 10 - 44 U/L    eGFR 36.3 (A) >60 mL/min/1.73 m^2    Anion Gap 6 (L) 8 - 16 mmol/L   Magnesium    Collection Time: 01/12/25  2:07 AM   Result Value Ref Range    Magnesium 1.8 1.6 - 2.6 mg/dL   CBC with Automated Differential    Collection Time: 01/12/25  2:07 AM   Result Value Ref Range    WBC 13.53 (H) 3.90 - 12.70 K/uL    RBC 4.10 (L) 4.60 - 6.20 M/uL    Hemoglobin 10.7 (L) 14.0 - 18.0 g/dL    Hematocrit 34.7 (L) 40.0 - 54.0 %    MCV 85 82 - 98 fL    MCH 26.1 (L) 27.0 - 31.0 pg    MCHC 30.8 (L) 32.0 - 36.0 g/dL    RDW 14.8 (H) 11.5 - 14.5 %    Platelets 260 150 - 450 K/uL    MPV 11.0 9.2 - 12.9 fL    Immature Granulocytes 0.4 0.0 - 0.5 %    Gran # (ANC) 9.4 (H) 1.8 - 7.7 K/uL    Immature Grans (Abs) 0.05 (H) 0.00 - 0.04 K/uL    Lymph # 1.7 1.0 - 4.8 K/uL    Mono # 1.4 (H) 0.3 - 1.0 K/uL    Eos # 0.9 (H) 0.0 - 0.5 K/uL    Baso # 0.07 0.00 - 0.20 K/uL    nRBC 0 0 /100 WBC    Gran % 69.7 38.0 - 73.0 %    Lymph % 12.9 (L) 18.0 - 48.0 %    Mono % 10.2 4.0 - 15.0 %    Eosinophil % 6.3 0.0 - 8.0 %    Basophil % 0.5 0.0 - 1.9 %    Differential Method Automated    POCT glucose    Collection Time: 01/12/25  7:28 AM   Result Value Ref Range    POCT Glucose 171 (H) 70 - 110 mg/dL   Urinalysis, Reflex to Urine Culture Urine, Catheterized    Collection Time: 01/12/25 10:55 AM    Specimen: Urine, Catheterized   Result Value Ref Range    Specimen UA Urine, Catheterized     Color, UA Orange (A) Yellow, Straw, Edith    Appearance, UA Hazy (A) Clear    pH, UA 6.0 5.0 - 8.0    Specific Gravity, UA 1.010 1.005 - 1.030    Protein, UA 1+ (A) Negative    Glucose, UA Negative Negative    Ketones, UA Negative Negative    Bilirubin (UA) Negative Negative    Occult Blood UA 3+ (A) Negative    Nitrite, UA  Negative Negative    Urobilinogen, UA Negative Negative EU/dL    Leukocytes, UA 1+ (A) Negative   C. trachomatis/N. gonorrhoeae by AMP DNA [place]; Urine    Collection Time: 01/12/25 10:55 AM   Result Value Ref Range    Chlamydia, Amplified DNA Negative Negative    N gonorrhoeae, amplified DNA Negative Negative   Urinalysis Microscopic    Collection Time: 01/12/25 10:55 AM   Result Value Ref Range    RBC, UA >100 (H) 0 - 4 /hpf    WBC, UA 18 (H) 0 - 5 /hpf    Bacteria Rare None-Occ /hpf    Squam Epithel, UA 0 /hpf    Hyaline Casts, UA 0 0-1/lpf /lpf    Amorphous, UA Rare None-Moderate    Microscopic Comment SEE COMMENT    Urine culture    Collection Time: 01/12/25 10:55 AM    Specimen: Urine   Result Value Ref Range    Urine Culture, Routine No growth    POCT glucose    Collection Time: 01/12/25 12:00 PM   Result Value Ref Range    POCT Glucose 212 (H) 70 - 110 mg/dL   POCT glucose    Collection Time: 01/12/25  5:13 PM   Result Value Ref Range    POCT Glucose 157 (H) 70 - 110 mg/dL   POCT glucose    Collection Time: 01/12/25  8:08 PM   Result Value Ref Range    POCT Glucose 211 (H) 70 - 110 mg/dL   Comprehensive Metabolic Panel (CMP)    Collection Time: 01/13/25  5:20 AM   Result Value Ref Range    Sodium 134 (L) 136 - 145 mmol/L    Potassium 4.3 3.5 - 5.1 mmol/L    Chloride 103 95 - 110 mmol/L    CO2 24 23 - 29 mmol/L    Glucose 217 (H) 70 - 110 mg/dL    BUN 43 (H) 6 - 20 mg/dL    Creatinine 2.5 (H) 0.5 - 1.4 mg/dL    Calcium 7.6 (L) 8.7 - 10.5 mg/dL    Total Protein 5.6 (L) 6.0 - 8.4 g/dL    Albumin 2.5 (L) 3.5 - 5.2 g/dL    Total Bilirubin 0.3 0.1 - 1.0 mg/dL    Alkaline Phosphatase 101 55 - 135 U/L    AST 10 10 - 40 U/L    ALT 8 (L) 10 - 44 U/L    eGFR 31.1 (A) >60 mL/min/1.73 m^2    Anion Gap 7 (L) 8 - 16 mmol/L   Magnesium    Collection Time: 01/13/25  5:20 AM   Result Value Ref Range    Magnesium 2.1 1.6 - 2.6 mg/dL   CBC with Automated Differential    Collection Time: 01/13/25  5:20 AM   Result Value Ref  Range    WBC 12.43 3.90 - 12.70 K/uL    RBC 4.13 (L) 4.60 - 6.20 M/uL    Hemoglobin 10.6 (L) 14.0 - 18.0 g/dL    Hematocrit 35.0 (L) 40.0 - 54.0 %    MCV 85 82 - 98 fL    MCH 25.7 (L) 27.0 - 31.0 pg    MCHC 30.3 (L) 32.0 - 36.0 g/dL    RDW 14.9 (H) 11.5 - 14.5 %    Platelets 241 150 - 450 K/uL    MPV 10.5 9.2 - 12.9 fL    Immature Granulocytes 0.5 0.0 - 0.5 %    Gran # (ANC) 7.7 1.8 - 7.7 K/uL    Immature Grans (Abs) 0.06 (H) 0.00 - 0.04 K/uL    Lymph # 1.9 1.0 - 4.8 K/uL    Mono # 1.4 (H) 0.3 - 1.0 K/uL    Eos # 1.3 (H) 0.0 - 0.5 K/uL    Baso # 0.06 0.00 - 0.20 K/uL    nRBC 0 0 /100 WBC    Gran % 62.0 38.0 - 73.0 %    Lymph % 15.3 (L) 18.0 - 48.0 %    Mono % 11.2 4.0 - 15.0 %    Eosinophil % 10.5 (H) 0.0 - 8.0 %    Basophil % 0.5 0.0 - 1.9 %    Differential Method Automated    POCT glucose    Collection Time: 01/13/25  7:50 AM   Result Value Ref Range    POCT Glucose 208 (H) 70 - 110 mg/dL   POCT glucose    Collection Time: 01/13/25 11:26 AM   Result Value Ref Range    POCT Glucose 173 (H) 70 - 110 mg/dL   POCT glucose    Collection Time: 01/13/25  3:47 PM   Result Value Ref Range    POCT Glucose 268 (H) 70 - 110 mg/dL   POCT glucose    Collection Time: 01/13/25  7:41 PM   Result Value Ref Range    POCT Glucose 243 (H) 70 - 110 mg/dL   Comprehensive Metabolic Panel (CMP)    Collection Time: 01/14/25  5:21 AM   Result Value Ref Range    Sodium 134 (L) 136 - 145 mmol/L    Potassium 4.4 3.5 - 5.1 mmol/L    Chloride 104 95 - 110 mmol/L    CO2 23 23 - 29 mmol/L    Glucose 217 (H) 70 - 110 mg/dL    BUN 45 (H) 6 - 20 mg/dL    Creatinine 2.4 (H) 0.5 - 1.4 mg/dL    Calcium 7.6 (L) 8.7 - 10.5 mg/dL    Total Protein 5.7 (L) 6.0 - 8.4 g/dL    Albumin 2.6 (L) 3.5 - 5.2 g/dL    Total Bilirubin 0.2 0.1 - 1.0 mg/dL    Alkaline Phosphatase 113 55 - 135 U/L    AST 13 10 - 40 U/L    ALT 10 10 - 44 U/L    eGFR 32.7 (A) >60 mL/min/1.73 m^2    Anion Gap 7 (L) 8 - 16 mmol/L   Magnesium    Collection Time: 01/14/25  5:21 AM   Result Value  Ref Range    Magnesium 2.0 1.6 - 2.6 mg/dL   CBC with Automated Differential    Collection Time: 01/14/25  5:21 AM   Result Value Ref Range    WBC 10.76 3.90 - 12.70 K/uL    RBC 3.84 (L) 4.60 - 6.20 M/uL    Hemoglobin 10.1 (L) 14.0 - 18.0 g/dL    Hematocrit 32.7 (L) 40.0 - 54.0 %    MCV 85 82 - 98 fL    MCH 26.3 (L) 27.0 - 31.0 pg    MCHC 30.9 (L) 32.0 - 36.0 g/dL    RDW 14.7 (H) 11.5 - 14.5 %    Platelets 251 150 - 450 K/uL    MPV 10.8 9.2 - 12.9 fL    Immature Granulocytes 0.5 0.0 - 0.5 %    Gran # (ANC) 6.3 1.8 - 7.7 K/uL    Immature Grans (Abs) 0.05 (H) 0.00 - 0.04 K/uL    Lymph # 1.8 1.0 - 4.8 K/uL    Mono # 1.3 (H) 0.3 - 1.0 K/uL    Eos # 1.2 (H) 0.0 - 0.5 K/uL    Baso # 0.06 0.00 - 0.20 K/uL    nRBC 0 0 /100 WBC    Gran % 58.6 38.0 - 73.0 %    Lymph % 17.1 (L) 18.0 - 48.0 %    Mono % 11.8 4.0 - 15.0 %    Eosinophil % 11.4 (H) 0.0 - 8.0 %    Basophil % 0.6 0.0 - 1.9 %    Differential Method Automated    POCT glucose    Collection Time: 01/14/25  7:31 AM   Result Value Ref Range    POCT Glucose 212 (H) 70 - 110 mg/dL   POCT glucose    Collection Time: 01/14/25 11:29 AM   Result Value Ref Range    POCT Glucose 208 (H) 70 - 110 mg/dL   Hepatitis B Surface Antigen    Collection Time: 01/14/25 12:47 PM   Result Value Ref Range    Hepatitis B Surface Ag Non-reactive Non-reactive   POCT glucose    Collection Time: 01/14/25  4:05 PM   Result Value Ref Range    POCT Glucose 207 (H) 70 - 110 mg/dL   POCT glucose    Collection Time: 01/14/25  7:19 PM   Result Value Ref Range    POCT Glucose 158 (H) 70 - 110 mg/dL   Rapid HIV    Collection Time: 01/14/25 11:13 PM   Result Value Ref Range    HIV Rapid Testing Negative Negative   Comprehensive Metabolic Panel (CMP)    Collection Time: 01/15/25  5:12 AM   Result Value Ref Range    Sodium 135 (L) 136 - 145 mmol/L    Potassium 4.7 3.5 - 5.1 mmol/L    Chloride 104 95 - 110 mmol/L    CO2 24 23 - 29 mmol/L    Glucose 140 (H) 70 - 110 mg/dL    BUN 53 (H) 6 - 20 mg/dL    Creatinine  2.3 (H) 0.5 - 1.4 mg/dL    Calcium 7.8 (L) 8.7 - 10.5 mg/dL    Total Protein 5.8 (L) 6.0 - 8.4 g/dL    Albumin 2.8 (L) 3.5 - 5.2 g/dL    Total Bilirubin 0.2 0.1 - 1.0 mg/dL    Alkaline Phosphatase 140 (H) 55 - 135 U/L    AST 14 10 - 40 U/L    ALT 12 10 - 44 U/L    eGFR 34.4 (A) >60 mL/min/1.73 m^2    Anion Gap 7 (L) 8 - 16 mmol/L   Magnesium    Collection Time: 01/15/25  5:12 AM   Result Value Ref Range    Magnesium 1.9 1.6 - 2.6 mg/dL   CBC with Automated Differential    Collection Time: 01/15/25  5:12 AM   Result Value Ref Range    WBC 11.03 3.90 - 12.70 K/uL    RBC 3.64 (L) 4.60 - 6.20 M/uL    Hemoglobin 9.5 (L) 14.0 - 18.0 g/dL    Hematocrit 30.8 (L) 40.0 - 54.0 %    MCV 85 82 - 98 fL    MCH 26.1 (L) 27.0 - 31.0 pg    MCHC 30.8 (L) 32.0 - 36.0 g/dL    RDW 14.7 (H) 11.5 - 14.5 %    Platelets 232 150 - 450 K/uL    MPV 10.7 9.2 - 12.9 fL    Immature Granulocytes 0.5 0.0 - 0.5 %    Gran # (ANC) 6.6 1.8 - 7.7 K/uL    Immature Grans (Abs) 0.05 (H) 0.00 - 0.04 K/uL    Lymph # 1.8 1.0 - 4.8 K/uL    Mono # 1.5 (H) 0.3 - 1.0 K/uL    Eos # 1.1 (H) 0.0 - 0.5 K/uL    Baso # 0.07 0.00 - 0.20 K/uL    nRBC 0 0 /100 WBC    Gran % 59.6 38.0 - 73.0 %    Lymph % 15.9 (L) 18.0 - 48.0 %    Mono % 13.6 4.0 - 15.0 %    Eosinophil % 9.8 (H) 0.0 - 8.0 %    Basophil % 0.6 0.0 - 1.9 %    Differential Method Automated    POCT glucose    Collection Time: 01/15/25  8:04 AM   Result Value Ref Range    POCT Glucose 130 (H) 70 - 110 mg/dL   POCT glucose    Collection Time: 01/15/25 11:51 AM   Result Value Ref Range    POCT Glucose 251 (H) 70 - 110 mg/dL   POCT glucose    Collection Time: 01/15/25  4:23 PM   Result Value Ref Range    POCT Glucose 133 (H) 70 - 110 mg/dL   POCT glucose    Collection Time: 01/15/25 10:02 PM   Result Value Ref Range    POCT Glucose 156 (H) 70 - 110 mg/dL   Comprehensive Metabolic Panel (CMP)    Collection Time: 01/16/25  4:21 AM   Result Value Ref Range    Sodium 137 136 - 145 mmol/L    Potassium 4.8 3.5 - 5.1 mmol/L     Chloride 103 95 - 110 mmol/L    CO2 26 23 - 29 mmol/L    Glucose 108 70 - 110 mg/dL    BUN 65 (H) 6 - 20 mg/dL    Creatinine 2.3 (H) 0.5 - 1.4 mg/dL    Calcium 8.5 (L) 8.7 - 10.5 mg/dL    Total Protein 6.0 6.0 - 8.4 g/dL    Albumin 3.1 (L) 3.5 - 5.2 g/dL    Total Bilirubin 0.3 0.1 - 1.0 mg/dL    Alkaline Phosphatase 172 (H) 55 - 135 U/L    AST 11 10 - 40 U/L    ALT 10 10 - 44 U/L    eGFR 34.4 (A) >60 mL/min/1.73 m^2    Anion Gap 8 8 - 16 mmol/L   Magnesium    Collection Time: 01/16/25  4:21 AM   Result Value Ref Range    Magnesium 2.0 1.6 - 2.6 mg/dL   CBC with Automated Differential    Collection Time: 01/16/25  4:21 AM   Result Value Ref Range    WBC 10.91 3.90 - 12.70 K/uL    RBC 3.44 (L) 4.60 - 6.20 M/uL    Hemoglobin 8.9 (L) 14.0 - 18.0 g/dL    Hematocrit 29.3 (L) 40.0 - 54.0 %    MCV 85 82 - 98 fL    MCH 25.9 (L) 27.0 - 31.0 pg    MCHC 30.4 (L) 32.0 - 36.0 g/dL    RDW 14.8 (H) 11.5 - 14.5 %    Platelets 231 150 - 450 K/uL    MPV 11.0 9.2 - 12.9 fL    Immature Granulocytes 0.5 0.0 - 0.5 %    Gran # (ANC) 6.5 1.8 - 7.7 K/uL    Immature Grans (Abs) 0.06 (H) 0.00 - 0.04 K/uL    Lymph # 1.7 1.0 - 4.8 K/uL    Mono # 1.6 (H) 0.3 - 1.0 K/uL    Eos # 1.0 (H) 0.0 - 0.5 K/uL    Baso # 0.05 0.00 - 0.20 K/uL    nRBC 0 0 /100 WBC    Gran % 59.3 38.0 - 73.0 %    Lymph % 15.4 (L) 18.0 - 48.0 %    Mono % 14.8 4.0 - 15.0 %    Eosinophil % 9.5 (H) 0.0 - 8.0 %    Basophil % 0.5 0.0 - 1.9 %    Differential Method Automated    POCT glucose    Collection Time: 01/16/25  7:19 AM   Result Value Ref Range    POCT Glucose 132 (H) 70 - 110 mg/dL   POCT glucose    Collection Time: 01/16/25 11:31 AM   Result Value Ref Range    POCT Glucose 110 70 - 110 mg/dL   POCT glucose    Collection Time: 01/16/25  4:04 PM   Result Value Ref Range    POCT Glucose 159 (H) 70 - 110 mg/dL   Troponin I High Sensitivity    Collection Time: 01/16/25  6:03 PM   Result Value Ref Range    Troponin I High Sensitivity 130.2 (HH) 0.0 - 14.9 pg/mL   Basic  metabolic panel    Collection Time: 01/16/25  6:03 PM   Result Value Ref Range    Sodium 136 136 - 145 mmol/L    Potassium 5.0 3.5 - 5.1 mmol/L    Chloride 100 95 - 110 mmol/L    CO2 28 23 - 29 mmol/L    Glucose 160 (H) 70 - 110 mg/dL    BUN 69 (H) 6 - 20 mg/dL    Creatinine 2.4 (H) 0.5 - 1.4 mg/dL    Calcium 9.1 8.7 - 10.5 mg/dL    Anion Gap 8 8 - 16 mmol/L    eGFR 32.7 (A) >60 mL/min/1.73 m^2   Magnesium    Collection Time: 01/16/25  6:03 PM   Result Value Ref Range    Magnesium 1.9 1.6 - 2.6 mg/dL   EKG 12-lead    Collection Time: 01/16/25  6:03 PM   Result Value Ref Range    QRS Duration 132 ms    [facility] QTC Calculation 504 ms   POCT glucose    Collection Time: 01/16/25  7:48 PM   Result Value Ref Range    POCT Glucose 171 (H) 70 - 110 mg/dL   POCT glucose    Collection Time: 01/16/25 10:56 PM   Result Value Ref Range    POCT Glucose 137 (H) 70 - 110 mg/dL   POCT glucose    Collection Time: 01/17/25  5:54 AM   Result Value Ref Range    POCT Glucose 160 (H) 70 - 110 mg/dL   Comprehensive Metabolic Panel (CMP)    Collection Time: 01/17/25  6:30 AM   Result Value Ref Range    Sodium 136 136 - 145 mmol/L    Potassium 5.1 3.5 - 5.1 mmol/L    Chloride 101 95 - 110 mmol/L    CO2 26 23 - 29 mmol/L    Glucose 158 (H) 70 - 110 mg/dL    BUN 75 (H) 6 - 20 mg/dL    Creatinine 2.4 (H) 0.5 - 1.4 mg/dL    Calcium 9.1 8.7 - 10.5 mg/dL    Total Protein 6.4 6.0 - 8.4 g/dL    Albumin 3.3 (L) 3.5 - 5.2 g/dL    Total Bilirubin 0.3 0.1 - 1.0 mg/dL    Alkaline Phosphatase 225 (H) 55 - 135 U/L    AST 12 10 - 40 U/L    ALT 10 10 - 44 U/L    eGFR 32.7 (A) >60 mL/min/1.73 m^2    Anion Gap 9 8 - 16 mmol/L   Magnesium    Collection Time: 01/17/25  6:30 AM   Result Value Ref Range    Magnesium 2.0 1.6 - 2.6 mg/dL   CBC with Automated Differential    Collection Time: 01/17/25  6:30 AM   Result Value Ref Range    WBC 11.31 3.90 - 12.70 K/uL    RBC 3.48 (L) 4.60 - 6.20 M/uL    Hemoglobin 8.9 (L) 14.0 - 18.0 g/dL    Hematocrit 29.4 (L) 40.0 - 54.0 %     MCV 85 82 - 98 fL    MCH 25.6 (L) 27.0 - 31.0 pg    MCHC 30.3 (L) 32.0 - 36.0 g/dL    RDW 15.1 (H) 11.5 - 14.5 %    Platelets 251 150 - 450 K/uL    MPV 10.8 9.2 - 12.9 fL    Immature Granulocytes 0.5 0.0 - 0.5 %    Gran # (ANC) 7.4 1.8 - 7.7 K/uL    Immature Grans (Abs) 0.06 (H) 0.00 - 0.04 K/uL    Lymph # 1.4 1.0 - 4.8 K/uL    Mono # 1.5 (H) 0.3 - 1.0 K/uL    Eos # 0.9 (H) 0.0 - 0.5 K/uL    Baso # 0.06 0.00 - 0.20 K/uL    nRBC 0 0 /100 WBC    Gran % 65.8 38.0 - 73.0 %    Lymph % 12.4 (L) 18.0 - 48.0 %    Mono % 12.8 4.0 - 15.0 %    Eosinophil % 8.0 0.0 - 8.0 %    Basophil % 0.5 0.0 - 1.9 %    Differential Method Automated    POCT glucose    Collection Time: 01/17/25 11:18 AM   Result Value Ref Range    POCT Glucose 166 (H) 70 - 110 mg/dL   POCT glucose    Collection Time: 01/17/25  5:22 PM   Result Value Ref Range    POCT Glucose 180 (H) 70 - 110 mg/dL         Assessment/Diagnosis:  Unable to place Weber/difficult Weber  Penoscrotal edema  Acute systolic CHF    Plans:    Inability to place Weber was due to the penoscrotal edema as described above, and though the penile shaft edema reduced with manual expression, it did recur, and this was not painful to the patient.  Ultimately as above his gland was localized within the penile edema, without significant depth, and meatus palpable, and complex Weber catheter placement completed with nursing assistance as above.    As main reason for urologic consult was inability for nursing staff to place Weber catheter, for which catheter was requested for diuresis for accurate I's and O's given continued diuresis for his acute heart failure, Weber catheter is now placed and can be utilized at the primary team's discretion.  Certainly should remain in until diuresis is completed inappropriate per medical team, and if is removed and needs to be replaced again, these localization maneuvers can be taken to palpate the glans and meatus and utilize an assistant finger inferior to  the meatus to pass the Weber catheter in over it.  Penoscrotal edema should not preclude the ability to place Weber catheter when glans and meatus are palpable.  Pure wick and suction device this should be avoided when there was significant edema as this can worsen edema.     Regarding his penoscrotal edema, it does appear to be just that, when there is anasarca and systemic edema especially with CHF exacerbations, scrotal swelling is off in the last 2 resolve.  Patient has no means of scrotal support in place, and should attempts this.  If too cumbersome for scrotal support garment such as jockstrap, should utilize rolled towels and watch bed positioning to make sure does not think into the center of the bed and become dependent.    No further urologic intervention is needed.  Weber per discretion of primary team.    Level of Medical Decision Making  [ X ]  High

## 2025-01-18 NOTE — ASSESSMENT & PLAN NOTE
Catheter in place for severe acute decompensated heart failure and need for strict I&O with concern of cardiorenal syndrome   Completed 4 day course of ceftriaxone

## 2025-01-18 NOTE — PROGRESS NOTES
Nephrology Consult Note        Patient Name: Gideon Ross  MRN: [id number]    Patient Class: IP- Inpatient   Admission Date: 1/9/2025  Length of Stay: 9 days  Date of Service: 1/18/2025    Attending Physician: Lauren Lawler MD  Primary Care Provider: Jennifer, Primary Doctor    Reason for Consult: marlene    SUBJECTIVE:     HPI: 49M with PID, bilateral BKA, DM presented for significant bilateral lower extremity edema, scrotal swelling, intractable pain. Labs largely unremarkable other than BNP 3,500, troponin 3,300. Patient was started on IV diuretics and pain control. Cardiology was consulted. Initially started on heparin drip but then transitioned to Lovenox. Echocardiogram showed EF 25%. Addiction Medicine consulted for heroin use. Started on Suboxone and reports improvement in symptoms. Diuretics held 2/2 worsening sCr and MARLENE. He was started on dobutamine on 1/11/25, but was stopped 2/2 tachycardia. He has a catheter for strict I&O with MARLENE, but is describing dysuria, there is purulent drainage at the end of the catheter so UA was obtained with concerns of UTI and pt placed on rocephin and recommendations for catheter exchange giving continued MARLENE and need for strict I&O with decompensated heart failure.      1/14 Agree with diuretics and albumin, will consider escalation or ultrafiltration. Not sure how acute the HF is. Consider palliative eval as well.  1/15 VSS. Good UO with diuretics+ albumin, will escalate more tomorrow if he remains stable. Not sure if UF will be better tolerated than diuretics.  1/16 VSS. Ensure dietary compliance with salt and liquids. Keep IOs tally. Will give more albumin and lasix.  1/17 VSS. Agree with empiric acyclovir for possible shingles. Agree with escalation to lasix gtt as d/w cards. Strict IOS and fluid restriction enforcement.  1/18 VSS. Greatly appreciate Urology help with valle placement and management of scrotal edema.    ASSESSMENT/PLAN:     MARLENE 2/2 ATN due to intravascular  volume depletion from diuretics  Hypoalbuminemia with dependent edema due to third-spacing and immobility  HFrEF ? Etiology, acuity, prognosis?  PAD  DM  Anemia  CKD stage 2, sCr 1 om 1/9/2025  Shingles  No NSAIDs, ACEI/ARB, IV contrast or other nephrotoxins.  Keep MAP > 60, SBP > 100.  Dose meds for GFR < 30 ml/min.  Renal diet - low K, low phos, low sodium. Strictly enforce liquid intake restrictions. Document strict IOs.  Optimize nutrition, added protein supplement + IV.  Aggressive diuretic therapy as tolerated, edema will not resolve with diuretics alone, appreciate Urology help.  Hgb and HCT are acceptable. Monitor for now.    Thank you for allowing us to participate in the care of your patient!   We will follow the patient and provide recommendations as needed.         Laboratory:  Recent Labs   Lab 01/16/25  1803 01/17/25  0630 01/18/25  0512    136 136   K 5.0 5.1 5.5*    101 100   CO2 28 26 26   BUN 69* 75* 84*   CREATININE 2.4* 2.4* 2.5*   * 158* 129*       Recent Labs   Lab 01/16/25  0421 01/16/25  1803 01/17/25  0630 01/18/25  0512   CALCIUM 8.5* 9.1 9.1 8.9   ALBUMIN 3.1*  --  3.3* 3.5   MG 2.0 1.9 2.0 2.0             Recent Labs   Lab 01/16/25  0719 01/16/25  1131 01/16/25  1604 01/16/25  1948 01/16/25  2256 01/17/25  0554 01/17/25  1118 01/17/25  1722 01/17/25  2135 01/18/25  1123   POCTGLUCOSE 132* 110 159* 171* 137* 160* 166* 180* 214* 157*       Recent Labs   Lab 01/10/25  0721   Hemoglobin A1C 7.5 H       Recent Labs   Lab 01/16/25  0421 01/17/25  0630 01/18/25  0512   WBC 10.91 11.31 13.43*   HGB 8.9* 8.9* 8.4*   HCT 29.3* 29.4* 27.5*    251 248   MCV 85 85 85   MCHC 30.4* 30.3* 30.5*   MONO 14.8  1.6* 12.8  1.5* 13.5  1.8*   EOSINOPHIL 9.5* 8.0 6.5       Recent Labs   Lab 01/16/25  0421 01/17/25  0630 01/18/25  0512   BILITOT 0.3 0.3 0.4   PROT 6.0 6.4 6.7   ALBUMIN 3.1* 3.3* 3.5   ALKPHOS 172* 225* 235*   ALT 10 10 12   AST 11 12 16       Recent Labs   Lab  07/19/22  2316 01/09/25  0743 01/12/25  1055   Color, UA Yellow Yellow [place] A   Appearance, UA Clear Hazy A Hazy A   pH, UA 6.0 6.0 6.0   Specific Gravity, UA <=1.005 1.025 1.010   Protein, UA Trace A 3+ A 1+ A   Glucose, UA 4+ A Trace A Negative   Ketones, UA Negative Negative Negative   Urobilinogen, UA Negative 2.0-3.0 A Negative   Bilirubin (UA) Negative Negative Negative   Occult Blood UA Negative 2+ A 3+ A   Nitrite, UA Negative Negative Negative   RBC, UA 1 7 H >100 H   WBC, UA  --  4 18 H   Bacteria None Occasional Rare   Hyaline Casts, UA  --  4 A 0       Recent Labs   Lab 07/19/22 2046 01/09/25  0707   POC PH 7.473 H  --    POC PCO2 42.4  --    POC HCO3 31.1 H  --    POC PO2 28 L  --    POC SATURATED O2 58 L  --    POC BE 7  --    Sample VENOUS VENOUS       Microbiology Results (last 7 days)       Procedure Component Value Units Date/Time    Urine culture [[id number]] Collected: 01/12/25 1055    Order Status: Completed Specimen: Urine Updated: 01/15/25 0640     Urine Culture, Routine No growth    Narrative:      Specimen Source->Urine    Blood culture x two cultures. Draw prior to antibiotics. [[id number]] Collected: 01/09/25 0859    Order Status: Completed Specimen: Blood Updated: 01/14/25 1032     Blood Culture, Routine No growth after 5 days.    Narrative:      Aerobic and anaerobic    Blood culture x two cultures. Draw prior to antibiotics. [[id number]] Collected: 01/09/25 0859    Order Status: Completed Specimen: Blood Updated: 01/14/25 1032     Blood Culture, Routine No growth after 5 days.    Narrative:      Aerobic and anaerobic            Review of patient's allergies indicates:  No Known Allergies    Outpatient meds:  No current facility-administered medications on file prior to encounter.     Current Outpatient Medications on File Prior to Encounter   Medication Sig Dispense Refill    amLODIPine (NORVASC) 10 MG tablet Take 1 tablet (10 mg total) by mouth once daily. 30 tablet 1    aspirin  (ECOTRIN) 81 MG EC tablet Take 1 tablet (81 mg total) by mouth once daily. 30 tablet 1    atorvastatin (LIPITOR) 80 MG tablet Take 1 tablet (80 mg total) by mouth once daily. 30 tablet 1    baclofen (LIORESAL) 10 MG tablet Take 1 tablet (10 mg total) by mouth 3 (three) times daily. 90 tablet 1    carvedilol (COREG) 6.25 MG tablet Take 1 tablet (6.25 mg total) by mouth 2 (two) times daily. 60 tablet 1    diazePAM (VALIUM) 5 MG tablet Take 1 tablet (5 mg total) by mouth every 12 (twelve) hours as needed for Anxiety (anxiety.). 30 tablet 0    gabapentin (NEURONTIN) 300 MG capsule Take 2 capsules (600 mg total) by mouth 3 (three) times daily. 90 capsule 1    hydroCHLOROthiazide (HYDRODIURIL) 50 MG tablet Take 1 tablet (50 mg total) by mouth once daily. 30 tablet 1    insulin aspart U-100 (NOVOLOG) 100 unit/mL InPn pen Inject 18 Units into the skin 3 (three) times daily. (Patient taking differently: Inject into the skin 3 (three) times daily.) 10 mL 1    insulin detemir U-100 (LEVEMIR FLEXTOUCH) 100 unit/mL (3 mL) SubQ InPn pen Inject 35 Units into the skin 2 (two) times daily. 10 mL 1    insulin glargine U-100, Lantus, 100 unit/mL injection Inject 30 Units into the skin every evening. (Patient not taking: Reported on 1/9/2025)      lisinopril (PRINIVIL,ZESTRIL) 40 MG tablet Take 1 tablet (40 mg total) by mouth once daily. 30 tablet 1    nicotine (NICODERM CQ) 14 mg/24 hr Place 1 patch onto the skin once daily. (Patient not taking: Reported on 1/9/2025) 15 patch 0    nortriptyline (PAMELOR) 25 MG capsule Take 1 capsule (25 mg total) by mouth 3 (three) times daily. 30 capsule 1    oxyCODONE (ROXICODONE) 5 MG immediate release tablet Take 1 tablet (5 mg total) by mouth every 12 (twelve) hours as needed. (Patient not taking: Reported on 1/9/2025) 30 tablet 0    polyethylene glycol (GLYCOLAX) 17 gram PwPk Take 17 g by mouth daily as needed. (Patient not taking: Reported on 1/9/2025) 30 each 1    QUEtiapine (SEROQUEL) 100 MG  Tab Take 1 tablet (100 mg total) by mouth every evening. 30 tablet 1    sofosbuvir-velpatasvir (EPCLUSA) 400-100 mg Tab Take 1 tablet daily. (Patient not taking: Reported on 1/9/2025) 28 tablet 2       Scheduled meds:   acyclovir  800 mg Oral 5x Daily    albumin human 25%  12.5 g Intravenous BID    aspirin  81 mg Oral Daily    atorvastatin  80 mg Oral Daily    baclofen  5 mg Oral BID    buprenorphine-naloxone 8-2 mg  1 Film Sublingual BID    carvediloL  3.125 mg Oral BID    enoxparin  1 mg/kg Subcutaneous Q12H (treatment, non-standard time)    gabapentin  300 mg Oral BID    miconazole NITRATE 2 %   Topical (Top) BID    tamsulosin  0.4 mg Oral Daily    white petrolatum   Topical (Top) Daily       Infusions:   furosemide (LASIX) 2 mg/mL continuous infusion (non-titrating)  15 mg/hr Intravenous Continuous 7.5 mL/hr at 01/18/25 0621 15 mg/hr at 01/18/25 0621       PRN meds:    Current Facility-Administered Medications:     acetaminophen, 650 mg, Oral, Q4H PRN    aluminum-magnesium hydroxide-simethicone, 30 mL, Oral, QID PRN    dextrose 50%, 12.5 g, Intravenous, PRN    dextrose 50%, 25 g, Intravenous, PRN    diazePAM, 5 mg, Oral, Q12H PRN    glucagon (human recombinant), 1 mg, Intramuscular, PRN    glucose, 16 g, Oral, PRN    glucose, 24 g, Oral, PRN    hydrALAZINE, 5 mg, Intravenous, Q6H PRN    insulin aspart U-100, 0-10 Units, Subcutaneous, QID (AC + HS) PRN    LIDOcaine HCl 2%, , Mucous Membrane, Once PRN    magnesium oxide, 800 mg, Oral, PRN    magnesium oxide, 800 mg, Oral, PRN    melatonin, 6 mg, Oral, Nightly PRN    morphine, 4 mg, Intravenous, Q3H PRN    naloxone, 0.02 mg, Intravenous, PRN    ondansetron, 4 mg, Intravenous, Q6H PRN    oxyCODONE-acetaminophen, 1 tablet, Oral, Q4H PRN    oxyCODONE-acetaminophen, 1 tablet, Oral, Q4H PRN    potassium bicarbonate, 35 mEq, Oral, PRN    potassium bicarbonate, 50 mEq, Oral, PRN    potassium bicarbonate, 60 mEq, Oral, PRN    potassium, sodium phosphates, 2 packet, Oral,  PRN    potassium, sodium phosphates, 2 packet, Oral, PRN    potassium, sodium phosphates, 2 packet, Oral, PRN    senna-docusate 8.6-50 mg, 1 tablet, Oral, Daily PRN    sodium chloride 0.9%, 10 mL, Intravenous, Q12H PRN    sodium chloride 0.9%, 10 mL, Intravenous, PRN    Past Medical History:   Diagnosis Date    Diabetes mellitus     Hypertension      Past Surgical History:   Procedure Laterality Date    SKIN GRAFT       No family history on file.  Social History     Tobacco Use    Smoking status: Every Day     Current packs/day: 1.00     Types: Cigarettes   Substance Use Topics    Alcohol use: Yes    Drug use: No       OBJECTIVE:     Vital Signs and IO:  Temp:  [96.8 °F (36 °C)-99.7 °F (37.6 °C)]   Pulse:  []   Resp:  [17-18]   BP: (110-137)/(56-84)   SpO2:  [96 %-98 %]   I/O last 3 completed shifts:  In: 2507.3 [P.O.:2260; I.V.:247.3]  Out: 3050 [Urine:3050]  Wt Readings from Last 5 Encounters:   01/15/25 126.1 kg (278 lb)   07/19/22 99.8 kg (220 lb)   02/09/20 104.3 kg (230 lb)   08/06/19 113.4 kg (250 lb)   02/27/19 108.9 kg (240 lb)     Body mass index is 39.89 kg/m².    Physical Exam  Constitutional:       General: Patient is not in acute distress.     Appearance: Patient is well-developed. She is not diaphoretic.   HENT:      Head: Normocephalic and atraumatic.      Mouth/Throat: Mucous membranes are moist.   Eyes:      General: No scleral icterus.     Pupils: Pupils are equal, round, and reactive to light.   Cardiovascular:      Rate and Rhythm: Normal rate and regular rhythm.   Pulmonary:      Effort: Pulmonary effort is normal. No respiratory distress.      Breath sounds: No stridor.   Abdominal:      General: There is no distension.      Palpations: Abdomen is soft.   Musculoskeletal:         General: No deformity. Normal range of motion.      Cervical back: Neck supple.   Skin:     General: Skin is warm and dry.      Findings: No rash present. No erythema.   Neurological:      Mental Status: Patient  is alert and oriented to person, place, and time.      Cranial Nerves: No cranial nerve deficit.   Psychiatric:         Behavior: Behavior normal.          Patient care time was spent personally by me on the following activities:     Obtaining a history.  Examination of patient.  Providing medical care at the patients bedside.  Developing a treatment plan with patient or surrogate and bedside caregivers.  Ordering and reviewing laboratory studies, radiographic studies, pulse oximetry.  Ordering and performing treatments and interventions.  Evaluation of patient's response to treatment.  Discussions with consultants while on the unit and immediately available to the patient.  Re-evaluation of the patient's condition.  Documentation in the medical record.     Ubaldo Petersen MD    [facility]  [address] [place] [address] [id number] [phone] - tel  [phone] - fax    1/18/2025

## 2025-01-18 NOTE — ASSESSMENT & PLAN NOTE
MARLENE is likely due to Cardiorenal syndrome. Baseline creatinine is  1 . Most recent creatinine and eGFR are listed below.  Recent Labs     01/16/25  1803 01/17/25  0630 01/18/25  0512   CREATININE 2.4* 2.4* 2.5*   EGFRNORACEVR 32.7* 32.7* 31.1*        Plan  - Avoid nephrotoxins and renally dose meds for GFR listed above  - Monitor urine output, serial BMP, and adjust therapy as needed  - nephrology following   - Close monitoring  - Diurese as above

## 2025-01-18 NOTE — SUBJECTIVE & OBJECTIVE
Interval History: Patient complains of jerking, jitteriness and diarrhea. States his suboxone dose is not enough. Patient reports good urine output and swelling is improved per patient. Although still there is lot of scrotal swelling.     Review of Systems  Objective:     Vital Signs (Most Recent):  Temp: 98.5 °F (36.9 °C) (01/18/25 1224)  Pulse: 104 (01/18/25 1224)  Resp: 18 (01/18/25 1319)  BP: 116/62 (01/18/25 1224)  SpO2: 96 % (01/18/25 1224) Vital Signs (24h Range):  Temp:  [96.8 °F (36 °C)-99.7 °F (37.6 °C)] 98.5 °F (36.9 °C)  Pulse:  [] 104  Resp:  [17-18] 18  SpO2:  [96 %-98 %] 96 %  BP: (110-137)/(56-84) 116/62     Weight:  (bed scale -44 unable to obtain acurate weight at this time)  Body mass index is 39.89 kg/m².    Intake/Output Summary (Last 24 hours) at 1/18/2025 1459  Last data filed at 1/18/2025 1407  Gross per 24 hour   Intake 1084.35 ml   Output 2850 ml   Net -1765.65 ml         Physical Exam  Vitals and nursing note reviewed. Exam conducted with a chaperone present.   Constitutional:       General: He is not in acute distress.     Appearance: Normal appearance. He is obese. He is not ill-appearing, toxic-appearing or diaphoretic.   Cardiovascular   Normal heart sounds, there is edema on both lower extremity stumps   Pulmonary:      Effort: Pulmonary effort is normal.      Breath sounds: coarse breath sounds   Genitourinary:     Comments: Scrotal Edema   Musculoskeletal:         General: Deformity present.      Comments: Right BKA/ Left AKA   Skin:     General: Skin is warm and dry.   Neurological:      Mental Status: He is alert.   Psychiatric:         Mood and Affect: Mood normal.         Behavior: Behavior normal.         Thought Content: Thought content normal.     Significant Labs: All pertinent labs within the past 24 hours have been reviewed.  CBC:   Recent Labs   Lab 01/17/25  0630 01/18/25  0512   WBC 11.31 13.43*   HGB 8.9* 8.4*   HCT 29.4* 27.5*    248     CMP:   Recent  Labs   Lab 01/16/25  1803 01/17/25  0630 01/18/25  0512    136 136   K 5.0 5.1 5.5*    101 100   CO2 28 26 26   * 158* 129*   BUN 69* 75* 84*   CREATININE 2.4* 2.4* 2.5*   CALCIUM 9.1 9.1 8.9   PROT  --  6.4 6.7   ALBUMIN  --  3.3* 3.5   BILITOT  --  0.3 0.4   ALKPHOS  --  225* 235*   AST  --  12 16   ALT  --  10 12   ANIONGAP 8 9 10       Significant Imaging: I have reviewed all pertinent imaging results/findings within the past 24 hours.

## 2025-01-18 NOTE — PROGRESS NOTES
[facility] Medicine  Progress Note    Patient Name: Gideon Ross  MRN: [id number]  Patient Class: IP- Inpatient   Admission Date: 1/9/2025  Length of Stay: 9 days  Attending Physician: Lauren Lawler MD  Primary Care Provider: Jennifer, Primary Doctor        Subjective     Principal Problem:Acute systolic congestive heart failure        HPI:  Mr. Ross is a 49 yom w/pmh significant for PID status post bilateral above-the-knee amputations, diabetes mellitus who presented for significant bilateral lower extremity edema and scrotal swelling, as well as intractable pain secondary to scrotal swelling.  Labs largely unremarkable.  BNP 3500, troponin 3300.  Patient was started on IV diuresis and pain control.  Cardiology was consulted.  Patient was initially started on heparin infusion.  On exam he has significant pain and tenderness due to significant scrotal swelling.    Overview/Hospital Course:  Patient with history of peripheral artery disease status post bilateral AKA, diabetes, substance abuse presents to the emergency room with complaints of lower extremity and scrotal edema.  BNP and troponin elevated.  Cardiology consulted.  Initially started on heparin drip but then transitioned to Lovenox.  Echocardiogram showed EF 25%.  Addiction Medicine consulted for heroin use.  Started on Suboxone and reports improvement in symptoms.  Diuretics held  when he developed MARLENE.  Needs LifeVest on discharge. He was started on dobutamine on 1/11/25, but overnight about 0200 he had Vtach about 50 beats and dobutamine turned off. He has a catheter for strict I&O with MARLENE, but is describing dysuria, there is purulent drainage at the end of the catheter so UA was obtained with concerns of UTI and pt placed on rocephin, catheter removed and he has been voiding. Urine culture with NGTD - will complete 3 days of Rocephin.  Nephrology has been consulted. Titrating lasix as tolerated along with albumin.  Cr up  trending. Weber was placed by urology on 1/17.     Interval History: Patient complains of jerking, jitteriness and diarrhea. States his suboxone dose is not enough. Patient reports good urine output and swelling is improved per patient. Although still there is lot of scrotal swelling.     Review of Systems  Objective:     Vital Signs (Most Recent):  Temp: 98.5 °F (36.9 °C) (01/18/25 1224)  Pulse: 104 (01/18/25 1224)  Resp: 18 (01/18/25 1319)  BP: 116/62 (01/18/25 1224)  SpO2: 96 % (01/18/25 1224) Vital Signs (24h Range):  Temp:  [96.8 °F (36 °C)-99.7 °F (37.6 °C)] 98.5 °F (36.9 °C)  Pulse:  [] 104  Resp:  [17-18] 18  SpO2:  [96 %-98 %] 96 %  BP: (110-137)/(56-84) 116/62     Weight:  (bed scale -44 unable to obtain acurate weight at this time)  Body mass index is 39.89 kg/m².    Intake/Output Summary (Last 24 hours) at 1/18/2025 1459  Last data filed at 1/18/2025 1407  Gross per 24 hour   Intake 1084.35 ml   Output 2850 ml   Net -1765.65 ml         Physical Exam  Vitals and nursing note reviewed. Exam conducted with a chaperone present.   Constitutional:       General: He is not in acute distress.     Appearance: Normal appearance. He is obese. He is not ill-appearing, toxic-appearing or diaphoretic.   Cardiovascular   Normal heart sounds, there is edema on both lower extremity stumps   Pulmonary:      Effort: Pulmonary effort is normal.      Breath sounds: coarse breath sounds   Genitourinary:     Comments: Scrotal Edema   Musculoskeletal:         General: Deformity present.      Comments: Right BKA/ Left AKA   Skin:     General: Skin is warm and dry.   Neurological:      Mental Status: He is alert.   Psychiatric:         Mood and Affect: Mood normal.         Behavior: Behavior normal.         Thought Content: Thought content normal.     Significant Labs: All pertinent labs within the past 24 hours have been reviewed.  CBC:   Recent Labs   Lab 01/17/25  0630 01/18/25  0512   WBC 11.31 13.43*   HGB 8.9* 8.4*    HCT 29.4* 27.5*    248     CMP:   Recent Labs   Lab 01/16/25  1803 01/17/25  0630 01/18/25  0512    136 136   K 5.0 5.1 5.5*    101 100   CO2 28 26 26   * 158* 129*   BUN 69* 75* 84*   CREATININE 2.4* 2.4* 2.5*   CALCIUM 9.1 9.1 8.9   PROT  --  6.4 6.7   ALBUMIN  --  3.3* 3.5   BILITOT  --  0.3 0.4   ALKPHOS  --  225* 235*   AST  --  12 16   ALT  --  10 12   ANIONGAP 8 9 10       Significant Imaging: I have reviewed all pertinent imaging results/findings within the past 24 hours.    Assessment and Plan     * Acute systolic congestive heart failure  Patient has Systolic (HFrEF) heart failure that is Acute. On presentation their CHF was decompensated. Evidence of decompensated CHF on presentation includes: edema. The etiology of their decompensation is likely substance abuse .   Latest ECHO  Results for orders placed during the hospital encounter of 01/09/25    Echo    Interpretation Summary    Left Ventricle: The left ventricle is moderately dilated. Mildly increased wall thickness. There is mild asymmetric hypertrophy. Severe global hypokinesis present. There is severely reduced systolic function with a visually estimated ejection fraction of 25 - 30%.    Right Ventricle: Moderate right ventricular enlargement. Systolic function is mildly reduced.    Left Atrium: Left atrium is mildly dilated.    Tricuspid Valve: There is mild regurgitation.    IVC/SVC: Elevated venous pressure at 15 mmHg.    Current Heart Failure Medications  hydrALAZINE injection 5 mg, Every 6 hours PRN, Intravenous  furosemide (Lasix) 200 mg in 0.9% NaCl SolP 100 mL continuous infusion (conc: 2 mg/mL), Continuous, Intravenous  carvediloL tablet 3.125 mg, 2 times daily, Oral    Plan  - Monitor strict I&Os and daily weights.    - nephrology and cardiology managing diuresis. Patient is on Lasix drip with albumin, Cr up trending slowly, cont above for now per consult recommendations   - Cont Coreg  - recommending life vest  on discharge   - ischemic work up per cardiology     Urinary tract infection associated with indwelling urethral catheter  Catheter in place for severe acute decompensated heart failure and need for strict I&O with concern of cardiorenal syndrome   Completed 4 day course of ceftriaxone        Opioid use disorder  Addiction medicine consulted   Improved on Suboxone    Per Addiction medicine:   For first dose of buprenorphine (at least 18 hours since last use of fentanyl) give one or two 8 mg strips (8-16 mg total).  Wait 1 hour and then...  If mild withdrawal, give another 8 mg strip.  If more significant withdrawal, give two more 8 mg strips.  If relaxed or calm, do not give any more.  Wait 1-2 more hours...  If still have withdrawal symptoms, take another 8 mg strip  If relaxed or calm, do not take any more  Try not to give more than 32 mg (4 strips) on day one.  On day 2, give 8 mg BID   Continue taking 8 mg BID until seeing me in clinic.    MARLENE (acute kidney injury)  MARLENE is likely due to Cardiorenal syndrome. Baseline creatinine is  1 . Most recent creatinine and eGFR are listed below.  Recent Labs     01/16/25  1803 01/17/25  0630 01/18/25  0512   CREATININE 2.4* 2.4* 2.5*   EGFRNORACEVR 32.7* 32.7* 31.1*        Plan  - Avoid nephrotoxins and renally dose meds for GFR listed above  - Monitor urine output, serial BMP, and adjust therapy as needed  - nephrology following   - Close monitoring  - Diurese as above     Diabetes mellitus  Patient's FSGs are controlled on current medication regimen.  Last A1c reviewed-   Lab Results   Component Value Date    HGBA1C 7.5 (H) 01/10/2025     Most recent fingerstick glucose reviewed-   Recent Labs   Lab 01/17/25  1722 01/17/25  2135 01/18/25  1123   POCTGLUCOSE 180* 214* 157*       Current correctional scale  Low  Maintain anti-hyperglycemic dose as follows-   Antihyperglycemics (From admission, onward)      Start     Stop Route Frequency Ordered    01/10/25 1515  insulin  aspart U-100 pen 0-10 Units         -- SubQ Before meals & nightly PRN 01/10/25 1415          Hold Oral hypoglycemics while patient is in the hospital.    Elevated troponin  Troponin elevated but flat, cardiology following  No chest pain or shortness of breath   Likely secondary to congestive heart failure  Continue Lovenox      Scrotal edema  Secondary to acute decompensated heart failure  Elevate scrotum  Scrotal ultrasound reviewed  Diuresing as per Nephrology recommendation    Obesity  Body mass index is 39.89 kg/m². Morbid obesity complicates all aspects of disease management from diagnostic modalities to treatment. Weight loss encouraged and health benefits explained to patient.           VTE Risk Mitigation (From admission, onward)           Ordered     enoxaparin injection 120 mg  Every 12 hours         01/10/25 0724     IP VTE HIGH RISK PATIENT  Once         01/09/25 1326     Place sequential compression device  Until discontinued         01/09/25 1326     Reason for no Mechanical VTE Prophylaxis  Once        Question:  Reasons:  Answer:  Physician Provided (leave comment)  Comment:  already on heparin infusion    01/09/25 0906                    Discharge Planning   KATHERINE: 1/20/2025     Code Status: Full Code   Medical Readiness for Discharge Date:   Discharge Plan A: Home   Discharge Delays: None known at this time                    Lauren Lawler MD  Department of Hospital Medicine   Formerly [facility], [facility]

## 2025-01-18 NOTE — ASSESSMENT & PLAN NOTE
Patient's FSGs are controlled on current medication regimen.  Last A1c reviewed-   Lab Results   Component Value Date    HGBA1C 7.5 (H) 01/10/2025     Most recent fingerstick glucose reviewed-   Recent Labs   Lab 01/17/25  1722 01/17/25  2135 01/18/25  1123   POCTGLUCOSE 180* 214* 157*       Current correctional scale  Low  Maintain anti-hyperglycemic dose as follows-   Antihyperglycemics (From admission, onward)    Start     Stop Route Frequency Ordered    01/10/25 1515  insulin aspart U-100 pen 0-10 Units         -- SubQ Before meals & nightly PRN 01/10/25 1415        Hold Oral hypoglycemics while patient is in the hospital.

## 2025-01-18 NOTE — RESPIRATORY THERAPY
01/17/25 2040   Patient Assessment/Suction   Level of Consciousness (AVPU)   (patient sleeping)   Respiratory Effort Normal;Unlabored   Expansion/Accessory Muscles/Retractions no retractions;no use of accessory muscles   All Lung Fields Breath Sounds Anterior:;Posterior:;Lateral:;equal bilaterally;clear;diminished   Rhythm/Pattern, Respiratory depth regular;pattern regular;unlabored   Cough Frequency no cough   Skin Integrity   $ Wound Care Tech Time 15 min   Area Observed Left;Right;Behind ear;Cheek;Upper lip;Nares   Skin Appearance without discoloration   PRE-TX-O2   Device (Oxygen Therapy) nasal cannula   $ Is the patient on Low Flow Oxygen? Yes   Flow (L/min) (Oxygen Therapy) 2   Oxygen Concentration (%) 28   SpO2 97 %   Pulse Oximetry Type Intermittent   $ Pulse Oximetry - Multiple Charge Pulse Oximetry - Multiple   Pulse 102   Resp 18

## 2025-01-18 NOTE — CARE UPDATE
01/18/25 1159   Patient Assessment/Suction   Level of Consciousness (AVPU) alert   Respiratory Effort Normal;Unlabored   Expansion/Accessory Muscles/Retractions no use of accessory muscles;no retractions;expansion symmetric   All Lung Fields Breath Sounds coarse;clear   Rhythm/Pattern, Respiratory unlabored;depth regular   Cough Frequency no cough   PRE-TX-O2   Device (Oxygen Therapy) room air   SpO2 97 %   Pulse Oximetry Type Intermittent   $ Pulse Oximetry - Multiple Charge Pulse Oximetry - Multiple   Pulse 87   Resp 17   Aerosol Therapy   $ Aerosol Therapy Charges PRN treatment not required   Education   $ Education 15 min;Other (see comment)

## 2025-01-19 LAB
ALBUMIN SERPL BCP-MCNC: 3.4 G/DL (ref 3.5–5.2)
ALP SERPL-CCNC: 253 U/L (ref 55–135)
ALT SERPL W/O P-5'-P-CCNC: 12 U/L (ref 10–44)
ANION GAP SERPL CALC-SCNC: 9 MMOL/L (ref 8–16)
AST SERPL-CCNC: 16 U/L (ref 10–40)
BASOPHILS # BLD AUTO: 0.05 K/UL (ref 0–0.2)
BASOPHILS NFR BLD: 0.4 % (ref 0–1.9)
BILIRUB SERPL-MCNC: 0.4 MG/DL (ref 0.1–1)
BUN SERPL-MCNC: 86 MG/DL (ref 6–20)
CALCIUM SERPL-MCNC: 9.1 MG/DL (ref 8.7–10.5)
CHLORIDE SERPL-SCNC: 99 MMOL/L (ref 95–110)
CO2 SERPL-SCNC: 26 MMOL/L (ref 23–29)
CREAT SERPL-MCNC: 2.2 MG/DL (ref 0.5–1.4)
DIFFERENTIAL METHOD BLD: ABNORMAL
EOSINOPHIL # BLD AUTO: 1 K/UL (ref 0–0.5)
EOSINOPHIL NFR BLD: 7.9 % (ref 0–8)
ERYTHROCYTE [DISTWIDTH] IN BLOOD BY AUTOMATED COUNT: 15 % (ref 11.5–14.5)
EST. GFR  (NO RACE VARIABLE): 36.3 ML/MIN/1.73 M^2
GLUCOSE SERPL-MCNC: 138 MG/DL (ref 70–110)
HCT VFR BLD AUTO: 27 % (ref 40–54)
HGB BLD-MCNC: 8.3 G/DL (ref 14–18)
IMM GRANULOCYTES # BLD AUTO: 0.08 K/UL (ref 0–0.04)
IMM GRANULOCYTES NFR BLD AUTO: 0.6 % (ref 0–0.5)
LYMPHOCYTES # BLD AUTO: 1.3 K/UL (ref 1–4.8)
LYMPHOCYTES NFR BLD: 10 % (ref 18–48)
MAGNESIUM SERPL-MCNC: 1.9 MG/DL (ref 1.6–2.6)
MCH RBC QN AUTO: 25.6 PG (ref 27–31)
MCHC RBC AUTO-ENTMCNC: 30.7 G/DL (ref 32–36)
MCV RBC AUTO: 83 FL (ref 82–98)
MONOCYTES # BLD AUTO: 1.7 K/UL (ref 0.3–1)
MONOCYTES NFR BLD: 12.8 % (ref 4–15)
NEUTROPHILS # BLD AUTO: 9 K/UL (ref 1.8–7.7)
NEUTROPHILS NFR BLD: 68.3 % (ref 38–73)
NRBC BLD-RTO: 0 /100 WBC
PLATELET # BLD AUTO: 276 K/UL (ref 150–450)
PMV BLD AUTO: 11 FL (ref 9.2–12.9)
POCT GLUCOSE: 144 MG/DL (ref 70–110)
POCT GLUCOSE: 157 MG/DL (ref 70–110)
POCT GLUCOSE: 167 MG/DL (ref 70–110)
POCT GLUCOSE: 185 MG/DL (ref 70–110)
POTASSIUM SERPL-SCNC: 4.8 MMOL/L (ref 3.5–5.1)
PROT SERPL-MCNC: 6.4 G/DL (ref 6–8.4)
RBC # BLD AUTO: 3.24 M/UL (ref 4.6–6.2)
SODIUM SERPL-SCNC: 134 MMOL/L (ref 136–145)
WBC # BLD AUTO: 13.14 K/UL (ref 3.9–12.7)

## 2025-01-19 PROCEDURE — 83735 ASSAY OF MAGNESIUM: CPT | Performed by: STUDENT IN AN ORGANIZED HEALTH CARE EDUCATION/TRAINING PROGRAM

## 2025-01-19 PROCEDURE — P9047 ALBUMIN (HUMAN), 25%, 50ML: HCPCS | Performed by: INTERNAL MEDICINE

## 2025-01-19 PROCEDURE — 63600175 PHARM REV CODE 636 W HCPCS: Performed by: INTERNAL MEDICINE

## 2025-01-19 PROCEDURE — 99900031 HC PATIENT EDUCATION (STAT)

## 2025-01-19 PROCEDURE — 12000002 HC ACUTE/MED SURGE SEMI-PRIVATE ROOM

## 2025-01-19 PROCEDURE — 25000003 PHARM REV CODE 250

## 2025-01-19 PROCEDURE — 63600150 PHARM REV CODE 636

## 2025-01-19 PROCEDURE — 63600175 PHARM REV CODE 636 W HCPCS: Performed by: STUDENT IN AN ORGANIZED HEALTH CARE EDUCATION/TRAINING PROGRAM

## 2025-01-19 PROCEDURE — 99900035 HC TECH TIME PER 15 MIN (STAT)

## 2025-01-19 PROCEDURE — 94761 N-INVAS EAR/PLS OXIMETRY MLT: CPT

## 2025-01-19 PROCEDURE — 25000003 PHARM REV CODE 250: Performed by: NURSE PRACTITIONER

## 2025-01-19 PROCEDURE — 25000003 PHARM REV CODE 250: Performed by: STUDENT IN AN ORGANIZED HEALTH CARE EDUCATION/TRAINING PROGRAM

## 2025-01-19 PROCEDURE — 80053 COMPREHEN METABOLIC PANEL: CPT | Performed by: STUDENT IN AN ORGANIZED HEALTH CARE EDUCATION/TRAINING PROGRAM

## 2025-01-19 PROCEDURE — 63600175 PHARM REV CODE 636 W HCPCS

## 2025-01-19 PROCEDURE — 85025 COMPLETE CBC W/AUTO DIFF WBC: CPT | Performed by: STUDENT IN AN ORGANIZED HEALTH CARE EDUCATION/TRAINING PROGRAM

## 2025-01-19 PROCEDURE — 36415 COLL VENOUS BLD VENIPUNCTURE: CPT | Performed by: STUDENT IN AN ORGANIZED HEALTH CARE EDUCATION/TRAINING PROGRAM

## 2025-01-19 RX ADMIN — WHITE PETROLATUM 41 % TOPICAL OINTMENT: at 09:01

## 2025-01-19 RX ADMIN — ENOXAPARIN SODIUM 120 MG: 120 INJECTION SUBCUTANEOUS at 09:01

## 2025-01-19 RX ADMIN — ALBUMIN (HUMAN) 12.5 G: 12.5 SOLUTION INTRAVENOUS at 08:01

## 2025-01-19 RX ADMIN — FUROSEMIDE 15 MG/HR: 10 INJECTION, SOLUTION INTRAMUSCULAR; INTRAVENOUS at 11:01

## 2025-01-19 RX ADMIN — GABAPENTIN 300 MG: 300 CAPSULE ORAL at 09:01

## 2025-01-19 RX ADMIN — CARVEDILOL 3.12 MG: 3.12 TABLET, FILM COATED ORAL at 09:01

## 2025-01-19 RX ADMIN — BUPRENORPHINE AND NALOXONE 1 FILM: 8; 2 FILM BUCCAL; SUBLINGUAL at 08:01

## 2025-01-19 RX ADMIN — ASPIRIN 81 MG: 81 TABLET, COATED ORAL at 09:01

## 2025-01-19 RX ADMIN — INSULIN ASPART 1 UNITS: 100 INJECTION, SOLUTION INTRAVENOUS; SUBCUTANEOUS at 08:01

## 2025-01-19 RX ADMIN — BUPRENORPHINE AND NALOXONE 1 FILM: 8; 2 FILM BUCCAL; SUBLINGUAL at 09:01

## 2025-01-19 RX ADMIN — ACYCLOVIR 800 MG: 400 TABLET ORAL at 01:01

## 2025-01-19 RX ADMIN — ENOXAPARIN SODIUM 120 MG: 120 INJECTION SUBCUTANEOUS at 08:01

## 2025-01-19 RX ADMIN — TAMSULOSIN HYDROCHLORIDE 0.4 MG: 0.4 CAPSULE ORAL at 09:01

## 2025-01-19 RX ADMIN — BACLOFEN 5 MG: 5 TABLET ORAL at 08:01

## 2025-01-19 RX ADMIN — ATORVASTATIN CALCIUM 80 MG: 40 TABLET, FILM COATED ORAL at 09:01

## 2025-01-19 RX ADMIN — CARVEDILOL 3.12 MG: 3.12 TABLET, FILM COATED ORAL at 08:01

## 2025-01-19 RX ADMIN — OXYCODONE HYDROCHLORIDE AND ACETAMINOPHEN 1 TABLET: 10; 325 TABLET ORAL at 01:01

## 2025-01-19 RX ADMIN — FUROSEMIDE 15 MG/HR: 10 INJECTION, SOLUTION INTRAMUSCULAR; INTRAVENOUS at 09:01

## 2025-01-19 RX ADMIN — OXYCODONE HYDROCHLORIDE AND ACETAMINOPHEN 1 TABLET: 10; 325 TABLET ORAL at 08:01

## 2025-01-19 RX ADMIN — MICONAZOLE NITRATE: 20 POWDER TOPICAL at 09:01

## 2025-01-19 RX ADMIN — ALBUMIN (HUMAN) 12.5 G: 12.5 SOLUTION INTRAVENOUS at 09:01

## 2025-01-19 RX ADMIN — MORPHINE SULFATE 4 MG: 4 INJECTION INTRAVENOUS at 10:01

## 2025-01-19 RX ADMIN — ACYCLOVIR 800 MG: 400 TABLET ORAL at 06:01

## 2025-01-19 RX ADMIN — ACYCLOVIR 800 MG: 400 TABLET ORAL at 09:01

## 2025-01-19 RX ADMIN — BACLOFEN 5 MG: 5 TABLET ORAL at 09:01

## 2025-01-19 RX ADMIN — ACYCLOVIR 800 MG: 400 TABLET ORAL at 05:01

## 2025-01-19 RX ADMIN — ACYCLOVIR 800 MG: 400 TABLET ORAL at 08:01

## 2025-01-19 NOTE — PROGRESS NOTES
Nephrology Consult Note        Patient Name: Gideon Ross  MRN: [id number]    Patient Class: IP- Inpatient   Admission Date: 1/9/2025  Length of Stay: 10 days  Date of Service: 1/19/2025    Attending Physician: Lauren Lawler MD  Primary Care Provider: Jennifer, Primary Doctor    Reason for Consult: marlene    SUBJECTIVE:     HPI: 49M with PID, bilateral BKA, DM presented for significant bilateral lower extremity edema, scrotal swelling, intractable pain. Labs largely unremarkable other than BNP 3,500, troponin 3,300. Patient was started on IV diuretics and pain control. Cardiology was consulted. Initially started on heparin drip but then transitioned to Lovenox. Echocardiogram showed EF 25%. Addiction Medicine consulted for heroin use. Started on Suboxone and reports improvement in symptoms. Diuretics held 2/2 worsening sCr and MARLENE. He was started on dobutamine on 1/11/25, but was stopped 2/2 tachycardia. He has a catheter for strict I&O with MARLENE, but is describing dysuria, there is purulent drainage at the end of the catheter so UA was obtained with concerns of UTI and pt placed on rocephin and recommendations for catheter exchange giving continued MARLENE and need for strict I&O with decompensated heart failure.      1/14 Agree with diuretics and albumin, will consider escalation or ultrafiltration. Not sure how acute the HF is. Consider palliative eval as well.  1/15 VSS. Good UO with diuretics+ albumin, will escalate more tomorrow if he remains stable. Not sure if UF will be better tolerated than diuretics.  1/16 VSS. Ensure dietary compliance with salt and liquids. Keep IOs tally. Will give more albumin and lasix.  1/17 VSS. Agree with empiric acyclovir for possible shingles. Agree with escalation to lasix gtt as d/w cards. Strict IOS and fluid restriction enforcement.  1/18 VSS. Greatly appreciate Urology help with valle placement and management of scrotal edema.  1/19 VSS. Scr finally a little better, UO 4L via  leonard. Continue present management, monitor labs, replete lytes, enforce liquid restriction.    ASSESSMENT/PLAN:     MARLENE 2/2 ATN due to intravascular volume depletion from diuretics  Hypoalbuminemia with dependent edema due to third-spacing and immobility  HFrEF ? Etiology, acuity, prognosis?  PAD  DM  Anemia  CKD stage 2, sCr 1 om 1/9/2025  Shingles  No NSAIDs, ACEI/ARB, IV contrast or other nephrotoxins.  Keep MAP > 60, SBP > 100.  Dose meds for GFR < 30 ml/min.  Renal diet - low K, low phos, low sodium. Strictly enforce liquid intake restrictions. Document strict IOs.  Optimize nutrition, added protein supplement + IV.  Aggressive diuretic therapy as tolerated, edema will not resolve with diuretics alone, appreciate Urology help.  Hgb and HCT are acceptable. Monitor for now.    Thank you for allowing us to participate in the care of your patient!   We will follow the patient and provide recommendations as needed.         Laboratory:  Recent Labs   Lab 01/17/25  0630 01/18/25  0512 01/19/25  0516    136 134*   K 5.1 5.5* 4.8    100 99   CO2 26 26 26   BUN 75* 84* 86*   CREATININE 2.4* 2.5* 2.2*   * 129* 138*       Recent Labs   Lab 01/17/25  0630 01/18/25  0512 01/19/25  0516   CALCIUM 9.1 8.9 9.1   ALBUMIN 3.3* 3.5 3.4*   MG 2.0 2.0 1.9             Recent Labs   Lab 01/16/25  1948 01/16/25  2256 01/17/25  0554 01/17/25  1118 01/17/25  1722 01/17/25  2135 01/18/25  1123 01/18/25  1621 01/18/25  2055 01/19/25  0547   POCTGLUCOSE 171* 137* 160* 166* 180* 214* 157* 144* 128* 144*       Recent Labs   Lab 01/10/25  0721   Hemoglobin A1C 7.5 H       Recent Labs   Lab 01/17/25  0630 01/18/25  0512 01/19/25  0516   WBC 11.31 13.43* 13.14*   HGB 8.9* 8.4* 8.3*   HCT 29.4* 27.5* 27.0*    248 276   MCV 85 85 83   MCHC 30.3* 30.5* 30.7*   MONO 12.8  1.5* 13.5  1.8* 12.8  1.7*   EOSINOPHIL 8.0 6.5 7.9       Recent Labs   Lab 01/17/25  0630 01/18/25  0512 01/19/25  0516   BILITOT 0.3 0.4 0.4   PROT  6.4 6.7 6.4   ALBUMIN 3.3* 3.5 3.4*   ALKPHOS 225* 235* 253*   ALT 10 12 12   AST 12 16 16       Recent Labs   Lab 07/19/22  2316 01/09/25  0743 01/12/25  1055   Color, UA Yellow Yellow [place] A   Appearance, UA Clear Hazy A Hazy A   pH, UA 6.0 6.0 6.0   Specific Gravity, UA <=1.005 1.025 1.010   Protein, UA Trace A 3+ A 1+ A   Glucose, UA 4+ A Trace A Negative   Ketones, UA Negative Negative Negative   Urobilinogen, UA Negative 2.0-3.0 A Negative   Bilirubin (UA) Negative Negative Negative   Occult Blood UA Negative 2+ A 3+ A   Nitrite, UA Negative Negative Negative   RBC, UA 1 7 H >100 H   WBC, UA  --  4 18 H   Bacteria None Occasional Rare   Hyaline Casts, UA  --  4 A 0       Recent Labs   Lab 07/19/22  2046 01/09/25  0707   POC PH 7.473 H  --    POC PCO2 42.4  --    POC HCO3 31.1 H  --    POC PO2 28 L  --    POC SATURATED O2 58 L  --    POC BE 7  --    Sample VENOUS VENOUS       Microbiology Results (last 7 days)       Procedure Component Value Units Date/Time    Urine culture [[id number]] Collected: 01/12/25 1055    Order Status: Completed Specimen: Urine Updated: 01/15/25 0640     Urine Culture, Routine No growth    Narrative:      Specimen Source->Urine    Blood culture x two cultures. Draw prior to antibiotics. [[id number]] Collected: 01/09/25 0859    Order Status: Completed Specimen: Blood Updated: 01/14/25 1032     Blood Culture, Routine No growth after 5 days.    Narrative:      Aerobic and anaerobic    Blood culture x two cultures. Draw prior to antibiotics. [[id number]] Collected: 01/09/25 0859    Order Status: Completed Specimen: Blood Updated: 01/14/25 1032     Blood Culture, Routine No growth after 5 days.    Narrative:      Aerobic and anaerobic            Review of patient's allergies indicates:  No Known Allergies    Outpatient meds:  No current facility-administered medications on file prior to encounter.     Current Outpatient Medications on File Prior to Encounter   Medication Sig Dispense  Refill    amLODIPine (NORVASC) 10 MG tablet Take 1 tablet (10 mg total) by mouth once daily. 30 tablet 1    aspirin (ECOTRIN) 81 MG EC tablet Take 1 tablet (81 mg total) by mouth once daily. 30 tablet 1    atorvastatin (LIPITOR) 80 MG tablet Take 1 tablet (80 mg total) by mouth once daily. 30 tablet 1    baclofen (LIORESAL) 10 MG tablet Take 1 tablet (10 mg total) by mouth 3 (three) times daily. 90 tablet 1    carvedilol (COREG) 6.25 MG tablet Take 1 tablet (6.25 mg total) by mouth 2 (two) times daily. 60 tablet 1    diazePAM (VALIUM) 5 MG tablet Take 1 tablet (5 mg total) by mouth every 12 (twelve) hours as needed for Anxiety (anxiety.). 30 tablet 0    gabapentin (NEURONTIN) 300 MG capsule Take 2 capsules (600 mg total) by mouth 3 (three) times daily. 90 capsule 1    hydroCHLOROthiazide (HYDRODIURIL) 50 MG tablet Take 1 tablet (50 mg total) by mouth once daily. 30 tablet 1    insulin aspart U-100 (NOVOLOG) 100 unit/mL InPn pen Inject 18 Units into the skin 3 (three) times daily. (Patient taking differently: Inject into the skin 3 (three) times daily.) 10 mL 1    insulin detemir U-100 (LEVEMIR FLEXTOUCH) 100 unit/mL (3 mL) SubQ InPn pen Inject 35 Units into the skin 2 (two) times daily. 10 mL 1    insulin glargine U-100, Lantus, 100 unit/mL injection Inject 30 Units into the skin every evening. (Patient not taking: Reported on 1/9/2025)      lisinopril (PRINIVIL,ZESTRIL) 40 MG tablet Take 1 tablet (40 mg total) by mouth once daily. 30 tablet 1    nicotine (NICODERM CQ) 14 mg/24 hr Place 1 patch onto the skin once daily. (Patient not taking: Reported on 1/9/2025) 15 patch 0    nortriptyline (PAMELOR) 25 MG capsule Take 1 capsule (25 mg total) by mouth 3 (three) times daily. 30 capsule 1    oxyCODONE (ROXICODONE) 5 MG immediate release tablet Take 1 tablet (5 mg total) by mouth every 12 (twelve) hours as needed. (Patient not taking: Reported on 1/9/2025) 30 tablet 0    polyethylene glycol (GLYCOLAX) 17 gram PwPk Take  17 g by mouth daily as needed. (Patient not taking: Reported on 1/9/2025) 30 each 1    QUEtiapine (SEROQUEL) 100 MG Tab Take 1 tablet (100 mg total) by mouth every evening. 30 tablet 1    sofosbuvir-velpatasvir (EPCLUSA) 400-100 mg Tab Take 1 tablet daily. (Patient not taking: Reported on 1/9/2025) 28 tablet 2       Scheduled meds:   acyclovir  800 mg Oral 5x Daily    albumin human 25%  12.5 g Intravenous BID    aspirin  81 mg Oral Daily    atorvastatin  80 mg Oral Daily    baclofen  5 mg Oral BID    buprenorphine-naloxone 8-2 mg  1 Film Sublingual BID    carvediloL  3.125 mg Oral BID    enoxparin  1 mg/kg Subcutaneous Q12H (treatment, non-standard time)    gabapentin  300 mg Oral BID    miconazole NITRATE 2 %   Topical (Top) BID    tamsulosin  0.4 mg Oral Daily    white petrolatum   Topical (Top) Daily       Infusions:   furosemide (LASIX) 2 mg/mL continuous infusion (non-titrating)  15 mg/hr Intravenous Continuous 7.5 mL/hr at 01/19/25 0913 15 mg/hr at 01/19/25 0913       PRN meds:    Current Facility-Administered Medications:     acetaminophen, 650 mg, Oral, Q4H PRN    aluminum-magnesium hydroxide-simethicone, 30 mL, Oral, QID PRN    dextrose 50%, 12.5 g, Intravenous, PRN    dextrose 50%, 25 g, Intravenous, PRN    glucagon (human recombinant), 1 mg, Intramuscular, PRN    glucose, 16 g, Oral, PRN    glucose, 24 g, Oral, PRN    hydrALAZINE, 5 mg, Intravenous, Q6H PRN    insulin aspart U-100, 0-10 Units, Subcutaneous, QID (AC + HS) PRN    LIDOcaine HCl 2%, , Mucous Membrane, Once PRN    loperamide, 2 mg, Oral, QID PRN    LORazepam, 0.5 mg, Oral, Q6H PRN    magnesium oxide, 800 mg, Oral, PRN    magnesium oxide, 800 mg, Oral, PRN    melatonin, 6 mg, Oral, Nightly PRN    morphine, 4 mg, Intravenous, Q3H PRN    naloxone, 0.02 mg, Intravenous, PRN    ondansetron, 4 mg, Intravenous, Q6H PRN    oxyCODONE-acetaminophen, 1 tablet, Oral, Q4H PRN    oxyCODONE-acetaminophen, 1 tablet, Oral, Q4H PRN    potassium bicarbonate, 35  mEq, Oral, PRN    potassium bicarbonate, 50 mEq, Oral, PRN    potassium bicarbonate, 60 mEq, Oral, PRN    potassium, sodium phosphates, 2 packet, Oral, PRN    potassium, sodium phosphates, 2 packet, Oral, PRN    potassium, sodium phosphates, 2 packet, Oral, PRN    senna-docusate 8.6-50 mg, 1 tablet, Oral, Daily PRN    sodium chloride 0.9%, 10 mL, Intravenous, Q12H PRN    sodium chloride 0.9%, 10 mL, Intravenous, PRN    Past Medical History:   Diagnosis Date    Diabetes mellitus     Hypertension      Past Surgical History:   Procedure Laterality Date    SKIN GRAFT       No family history on file.  Social History     Tobacco Use    Smoking status: Every Day     Current packs/day: 1.00     Types: Cigarettes   Substance Use Topics    Alcohol use: Yes    Drug use: No       OBJECTIVE:     Vital Signs and IO:  Temp:  [97.6 °F (36.4 °C)-99.4 °F (37.4 °C)]   Pulse:  []   Resp:  [14-19]   BP: (116-147)/()   SpO2:  [94 %-97 %]   I/O last 3 completed shifts:  In: 1538.9 [P.O.:1200; I.V.:338.9]  Out: 4600 [Urine:4600]  Wt Readings from Last 5 Encounters:   01/15/25 126.1 kg (278 lb)   07/19/22 99.8 kg (220 lb)   02/09/20 104.3 kg (230 lb)   08/06/19 113.4 kg (250 lb)   02/27/19 108.9 kg (240 lb)     Body mass index is 39.89 kg/m².    Physical Exam  Constitutional:       General: Patient is not in acute distress.     Appearance: Patient is well-developed. She is not diaphoretic.   HENT:      Head: Normocephalic and atraumatic.      Mouth/Throat: Mucous membranes are moist.   Eyes:      General: No scleral icterus.     Pupils: Pupils are equal, round, and reactive to light.   Cardiovascular:      Rate and Rhythm: Normal rate and regular rhythm.   Pulmonary:      Effort: Pulmonary effort is normal. No respiratory distress.      Breath sounds: No stridor.   Abdominal:      General: There is no distension.      Palpations: Abdomen is soft.   Musculoskeletal:         General: No deformity. Normal range of motion.       Cervical back: Neck supple.   Skin:     General: Skin is warm and dry.      Findings: No rash present. No erythema.   Neurological:      Mental Status: Patient is alert and oriented to person, place, and time.      Cranial Nerves: No cranial nerve deficit.   Psychiatric:         Behavior: Behavior normal.          Patient care time was spent personally by me on the following activities:     Obtaining a history.  Examination of patient.  Providing medical care at the patients bedside.  Developing a treatment plan with patient or surrogate and bedside caregivers.  Ordering and reviewing laboratory studies, radiographic studies, pulse oximetry.  Ordering and performing treatments and interventions.  Evaluation of patient's response to treatment.  Discussions with consultants while on the unit and immediately available to the patient.  Re-evaluation of the patient's condition.  Documentation in the medical record.     Ubaldo Petersen MD    [facility]  [address] [place] [address]  [place] [address]    [phone] - tel  [phone] - fax    1/19/2025

## 2025-01-19 NOTE — PROGRESS NOTES
[facility]  Progress Note    Patient Name: Gideon Ross  MRN: [id number]  Patient Class: IP- Inpatient   Admission Date: 1/9/2025  Length of Stay: 10 days  Attending Physician: Lauren Lawler MD  Primary Care Provider: Jennifer, Primary Doctor        Subjective     Principal Problem:Acute systolic congestive heart failure        HPI:  Mr. Ross is a 49 yom w/pmh significant for PID status post bilateral above-the-knee amputations, diabetes mellitus who presented for significant bilateral lower extremity edema and scrotal swelling, as well as intractable pain secondary to scrotal swelling.  Labs largely unremarkable.  BNP 3500, troponin 3300.  Patient was started on IV diuresis and pain control.  Cardiology was consulted.  Patient was initially started on heparin infusion.  On exam he has significant pain and tenderness due to significant scrotal swelling.    Overview/Hospital Course:  Patient with history of peripheral artery disease status post bilateral AKA, diabetes, substance abuse presents to the emergency room with complaints of lower extremity and scrotal edema.  BNP and troponin elevated.  Cardiology consulted.  Initially started on heparin drip but then transitioned to Lovenox.  Echocardiogram showed EF 25%.  Addiction Medicine consulted for heroin use.  Started on Suboxone and reports improvement in symptoms.  Diuretics held  when he developed MARLENE.  Needs LifeVest on discharge. He was started on dobutamine on 1/11/25, but overnight about 0200 he had Vtach about 50 beats and dobutamine turned off. He has a catheter for strict I&O with MARLENE, but is describing dysuria, there is purulent drainage at the end of the catheter so UA was obtained with concerns of UTI and pt placed on rocephin, catheter removed and he has been voiding. Urine culture with NGTD - will complete 3 days of Rocephin.  Nephrology has been consulted. Titrating lasix as tolerated along with albumin.  Cr up  trending. Weber was placed by urology on 1/17.     Interval History: Patient states jerking and diarrhea is somewhat better today. He is requesting a coke. Per nursing, he is being eating all day. His swelling is slightly improved. He had over 4 L urine output last 24 hrs and Cr slightly improved as well.    Review of Systems  Objective:     Vital Signs (Most Recent):  Temp: 98.1 °F (36.7 °C) (01/19/25 1126)  Pulse: 93 (01/19/25 1126)  Resp: 18 (01/19/25 1126)  BP: 124/70 (01/19/25 1126)  SpO2: 98 % (01/19/25 1126) Vital Signs (24h Range):  Temp:  [97.6 °F (36.4 °C)-99.4 °F (37.4 °C)] 98.1 °F (36.7 °C)  Pulse:  [] 93  Resp:  [14-19] 18  SpO2:  [94 %-98 %] 98 %  BP: (116-147)/() 124/70     Weight:  (bed scale -44 unable to obtain acurate weight at this time)  Body mass index is 39.89 kg/m².    Intake/Output Summary (Last 24 hours) at 1/19/2025 1126  Last data filed at 1/19/2025 0614  Gross per 24 hour   Intake 574.57 ml   Output 3400 ml   Net -2825.43 ml         Physical Exam  Vitals and nursing note reviewed. Exam conducted with a chaperone present.   Constitutional:       General: He is not in acute distress.     Appearance: Normal appearance. He is obese. He is not ill-appearing, toxic-appearing or diaphoretic.   Cardiovascular   Normal heart sounds, there is edema on both lower extremity stumps   Pulmonary:      Effort: Pulmonary effort is normal.      Breath sounds: coarse breath sounds   Genitourinary:     Comments: Scrotal Edema   Musculoskeletal:         General: Deformity present.      Comments: Right BKA/ Left AKA   Skin:     General: Skin is warm and dry.   Neurological:      Mental Status: He is alert.   Psychiatric:         Mood and Affect: Mood normal.         Behavior: Behavior normal.         Thought Content: Thought content normal.     Significant Labs: All pertinent labs within the past 24 hours have been reviewed.  CBC:   Recent Labs   Lab 01/18/25  0512 01/19/25  0516   WBC 13.43*  13.14*   HGB 8.4* 8.3*   HCT 27.5* 27.0*    276     CMP:   Recent Labs   Lab 01/18/25  0512 01/19/25  0516    134*   K 5.5* 4.8    99   CO2 26 26   * 138*   BUN 84* 86*   CREATININE 2.5* 2.2*   CALCIUM 8.9 9.1   PROT 6.7 6.4   ALBUMIN 3.5 3.4*   BILITOT 0.4 0.4   ALKPHOS 235* 253*   AST 16 16   ALT 12 12   ANIONGAP 10 9       Significant Imaging: I have reviewed all pertinent imaging results/findings within the past 24 hours.    Assessment and Plan     * Acute systolic congestive heart failure  Patient has Systolic (HFrEF) heart failure that is Acute. On presentation their CHF was decompensated. Evidence of decompensated CHF on presentation includes: edema. The etiology of their decompensation is likely substance abuse .   Latest ECHO  Results for orders placed during the hospital encounter of 01/09/25    Echo    Interpretation Summary    Left Ventricle: The left ventricle is moderately dilated. Mildly increased wall thickness. There is mild asymmetric hypertrophy. Severe global hypokinesis present. There is severely reduced systolic function with a visually estimated ejection fraction of 25 - 30%.    Right Ventricle: Moderate right ventricular enlargement. Systolic function is mildly reduced.    Left Atrium: Left atrium is mildly dilated.    Tricuspid Valve: There is mild regurgitation.    IVC/SVC: Elevated venous pressure at 15 mmHg.    Current Heart Failure Medications  hydrALAZINE injection 5 mg, Every 6 hours PRN, Intravenous  furosemide (Lasix) 200 mg in 0.9% NaCl SolP 100 mL continuous infusion (conc: 2 mg/mL), Continuous, Intravenous  carvediloL tablet 3.125 mg, 2 times daily, Oral    Plan  - Monitor strict I&Os and daily weights.    - nephrology and cardiology managing diuresis. Patient is on Lasix drip with albumin, cont   - Cont Coreg  - recommending life vest on discharge   - ischemic work up per cardiology     Urinary tract infection associated with indwelling urethral  catheter  Catheter in place for severe acute decompensated heart failure and need for strict I&O with concern of cardiorenal syndrome   Completed 4 day course of ceftriaxone        Opioid use disorder  Addiction medicine consulted   Improved on Suboxone    Per Addiction medicine:   For first dose of buprenorphine (at least 18 hours since last use of fentanyl) give one or two 8 mg strips (8-16 mg total).  Wait 1 hour and then...  If mild withdrawal, give another 8 mg strip.  If more significant withdrawal, give two more 8 mg strips.  If relaxed or calm, do not give any more.  Wait 1-2 more hours...  If still have withdrawal symptoms, take another 8 mg strip  If relaxed or calm, do not take any more  Try not to give more than 32 mg (4 strips) on day one.  On day 2, give 8 mg BID   Continue taking 8 mg BID until seeing in clinic.    MARLENE (acute kidney injury)  MARLENE is likely due to Cardiorenal syndrome. Baseline creatinine is  1 . Most recent creatinine and eGFR are listed below.  Recent Labs     01/17/25  0630 01/18/25  0512 01/19/25  0516   CREATININE 2.4* 2.5* 2.2*   EGFRNORACEVR 32.7* 31.1* 36.3*        Plan  - Avoid nephrotoxins and renally dose meds for GFR listed above  - Monitor urine output, serial BMP, and adjust therapy as needed  - nephrology following   - Close monitoring  - Diurese as above     Diabetes mellitus  Patient's FSGs are controlled on current medication regimen.  Last A1c reviewed-   Lab Results   Component Value Date    HGBA1C 7.5 (H) 01/10/2025     Most recent fingerstick glucose reviewed-   Recent Labs   Lab 01/18/25  1621 01/18/25  2055 01/19/25  0547 01/19/25  1123   POCTGLUCOSE 144* 128* 144* 157*       Current correctional scale  Low  Maintain anti-hyperglycemic dose as follows-   Antihyperglycemics (From admission, onward)      Start     Stop Route Frequency Ordered    01/10/25 1515  insulin aspart U-100 pen 0-10 Units         -- SubQ Before meals & nightly PRN 01/10/25 1415          Hold  Oral hypoglycemics while patient is in the hospital.    Elevated troponin  Acute nonischemic, nontraumatic myocardial injury due to acute on chronic HFrEF  Troponin elevated but flat, cardiology following  No chest pain or shortness of breath         Scrotal edema  Secondary to acute decompensated heart failure  Elevate scrotum  Scrotal ultrasound reviewed  Diuresing as per Nephrology recommendation    Obesity  Body mass index is 39.89 kg/m². Morbid obesity complicates all aspects of disease management from diagnostic modalities to treatment. Weight loss encouraged and health benefits explained to patient.           VTE Risk Mitigation (From admission, onward)           Ordered     enoxaparin injection 120 mg  Every 12 hours         01/10/25 0724     IP VTE HIGH RISK PATIENT  Once         01/09/25 1326     Place sequential compression device  Until discontinued         01/09/25 1326     Reason for no Mechanical VTE Prophylaxis  Once        Question:  Reasons:  Answer:  Physician Provided (leave comment)  Comment:  already on heparin infusion    01/09/25 0906                    Discharge Planning   KATHERINE: 1/20/2025     Code Status: Full Code   Medical Readiness for Discharge Date:   Discharge Plan A: Home   Discharge Delays: None known at this time                    Lauren Lawler MD  Department of Hospital Medicine   [facility]

## 2025-01-19 NOTE — ASSESSMENT & PLAN NOTE
Acute nonischemic, nontraumatic myocardial injury due to acute on chronic HFrEF  Troponin elevated but flat, cardiology following  No chest pain or shortness of breath

## 2025-01-19 NOTE — CARE UPDATE
01/19/25 0841   Patient Assessment/Suction   Level of Consciousness (AVPU) alert   Respiratory Effort Normal;Unlabored   Expansion/Accessory Muscles/Retractions no use of accessory muscles;no retractions;expansion symmetric   All Lung Fields Breath Sounds diminished   Rhythm/Pattern, Respiratory unlabored   Cough Frequency no cough   PRE-TX-O2   Device (Oxygen Therapy) room air   SpO2 97 %   Pulse Oximetry Type Intermittent   $ Pulse Oximetry - Multiple Charge Pulse Oximetry - Multiple   Pulse 94   Resp 19   Aerosol Therapy   $ Aerosol Therapy Charges PRN treatment not required   Education   $ Education 15 min;Ventilator Oxygen

## 2025-01-19 NOTE — ASSESSMENT & PLAN NOTE
MARLENE is likely due to Cardiorenal syndrome. Baseline creatinine is  1 . Most recent creatinine and eGFR are listed below.  Recent Labs     01/17/25  0630 01/18/25  0512 01/19/25  0516   CREATININE 2.4* 2.5* 2.2*   EGFRNORACEVR 32.7* 31.1* 36.3*        Plan  - Avoid nephrotoxins and renally dose meds for GFR listed above  - Monitor urine output, serial BMP, and adjust therapy as needed  - nephrology following   - Close monitoring  - Diurese as above

## 2025-01-19 NOTE — SUBJECTIVE & OBJECTIVE
Interval History: Patient states jerking and diarrhea is somewhat better today. He is requesting a coke. Per nursing, he is being eating all day. His swelling is slightly improved. He had over 4 L urine output last 24 hrs and Cr slightly improved as well.    Review of Systems  Objective:     Vital Signs (Most Recent):  Temp: 98.1 °F (36.7 °C) (01/19/25 1126)  Pulse: 93 (01/19/25 1126)  Resp: 18 (01/19/25 1126)  BP: 124/70 (01/19/25 1126)  SpO2: 98 % (01/19/25 1126) Vital Signs (24h Range):  Temp:  [97.6 °F (36.4 °C)-99.4 °F (37.4 °C)] 98.1 °F (36.7 °C)  Pulse:  [] 93  Resp:  [14-19] 18  SpO2:  [94 %-98 %] 98 %  BP: (116-147)/() 124/70     Weight:  (bed scale -44 unable to obtain acurate weight at this time)  Body mass index is 39.89 kg/m².    Intake/Output Summary (Last 24 hours) at 1/19/2025 1126  Last data filed at 1/19/2025 0614  Gross per 24 hour   Intake 574.57 ml   Output 3400 ml   Net -2825.43 ml         Physical Exam  Vitals and nursing note reviewed. Exam conducted with a chaperone present.   Constitutional:       General: He is not in acute distress.     Appearance: Normal appearance. He is obese. He is not ill-appearing, toxic-appearing or diaphoretic.   Cardiovascular   Normal heart sounds, there is edema on both lower extremity stumps   Pulmonary:      Effort: Pulmonary effort is normal.      Breath sounds: coarse breath sounds   Genitourinary:     Comments: Scrotal Edema   Musculoskeletal:         General: Deformity present.      Comments: Right BKA/ Left AKA   Skin:     General: Skin is warm and dry.   Neurological:      Mental Status: He is alert.   Psychiatric:         Mood and Affect: Mood normal.         Behavior: Behavior normal.         Thought Content: Thought content normal.     Significant Labs: All pertinent labs within the past 24 hours have been reviewed.  CBC:   Recent Labs   Lab 01/18/25  0512 01/19/25  0516   WBC 13.43* 13.14*   HGB 8.4* 8.3*   HCT 27.5* 27.0*    276      CMP:   Recent Labs   Lab 01/18/25  0512 01/19/25  0516    134*   K 5.5* 4.8    99   CO2 26 26   * 138*   BUN 84* 86*   CREATININE 2.5* 2.2*   CALCIUM 8.9 9.1   PROT 6.7 6.4   ALBUMIN 3.5 3.4*   BILITOT 0.4 0.4   ALKPHOS 235* 253*   AST 16 16   ALT 12 12   ANIONGAP 10 9       Significant Imaging: I have reviewed all pertinent imaging results/findings within the past 24 hours.

## 2025-01-19 NOTE — ASSESSMENT & PLAN NOTE
Patient's FSGs are controlled on current medication regimen.  Last A1c reviewed-   Lab Results   Component Value Date    HGBA1C 7.5 (H) 01/10/2025     Most recent fingerstick glucose reviewed-   Recent Labs   Lab 01/18/25  1621 01/18/25  2055 01/19/25  0547 01/19/25  1123   POCTGLUCOSE 144* 128* 144* 157*       Current correctional scale  Low  Maintain anti-hyperglycemic dose as follows-   Antihyperglycemics (From admission, onward)    Start     Stop Route Frequency Ordered    01/10/25 1515  insulin aspart U-100 pen 0-10 Units         -- SubQ Before meals & nightly PRN 01/10/25 1415        Hold Oral hypoglycemics while patient is in the hospital.

## 2025-01-19 NOTE — ASSESSMENT & PLAN NOTE
Addiction medicine consulted   Improved on Suboxone    Per Addiction medicine:   For first dose of buprenorphine (at least 18 hours since last use of fentanyl) give one or two 8 mg strips (8-16 mg total).  Wait 1 hour and then...  If mild withdrawal, give another 8 mg strip.  If more significant withdrawal, give two more 8 mg strips.  If relaxed or calm, do not give any more.  Wait 1-2 more hours...  If still have withdrawal symptoms, take another 8 mg strip  If relaxed or calm, do not take any more  Try not to give more than 32 mg (4 strips) on day one.  On day 2, give 8 mg BID   Continue taking 8 mg BID until seeing in clinic.

## 2025-01-19 NOTE — CARE UPDATE
01/18/25 2047   Patient Assessment/Suction   Level of Consciousness (AVPU) alert   Respiratory Effort Normal;Unlabored   PRE-TX-O2   Device (Oxygen Therapy) room air   SpO2 (!) 94 %   Pulse Oximetry Type Intermittent   $ Pulse Oximetry - Multiple Charge Pulse Oximetry - Multiple   Pulse 102   Resp 18   Temp 98.9 °F (37.2 °C)   /78   Education   $ Education 15 min;Oxygen

## 2025-01-19 NOTE — ASSESSMENT & PLAN NOTE
Patient has Systolic (HFrEF) heart failure that is Acute. On presentation their CHF was decompensated. Evidence of decompensated CHF on presentation includes: edema. The etiology of their decompensation is likely substance abuse .   Latest ECHO  Results for orders placed during the hospital encounter of 01/09/25    Echo    Interpretation Summary    Left Ventricle: The left ventricle is moderately dilated. Mildly increased wall thickness. There is mild asymmetric hypertrophy. Severe global hypokinesis present. There is severely reduced systolic function with a visually estimated ejection fraction of 25 - 30%.    Right Ventricle: Moderate right ventricular enlargement. Systolic function is mildly reduced.    Left Atrium: Left atrium is mildly dilated.    Tricuspid Valve: There is mild regurgitation.    IVC/SVC: Elevated venous pressure at 15 mmHg.    Current Heart Failure Medications  hydrALAZINE injection 5 mg, Every 6 hours PRN, Intravenous  furosemide (Lasix) 200 mg in 0.9% NaCl SolP 100 mL continuous infusion (conc: 2 mg/mL), Continuous, Intravenous  carvediloL tablet 3.125 mg, 2 times daily, Oral    Plan  - Monitor strict I&Os and daily weights.    - nephrology and cardiology managing diuresis. Patient is on Lasix drip with albumin, cont   - Cont Coreg  - recommending life vest on discharge   - ischemic work up per cardiology

## 2025-01-20 LAB
ALBUMIN SERPL BCP-MCNC: 3.5 G/DL (ref 3.5–5.2)
ALP SERPL-CCNC: 261 U/L (ref 55–135)
ALT SERPL W/O P-5'-P-CCNC: 16 U/L (ref 10–44)
ANION GAP SERPL CALC-SCNC: 10 MMOL/L (ref 8–16)
AST SERPL-CCNC: 18 U/L (ref 10–40)
BASOPHILS # BLD AUTO: 0.05 K/UL (ref 0–0.2)
BASOPHILS NFR BLD: 0.4 % (ref 0–1.9)
BILIRUB SERPL-MCNC: 0.4 MG/DL (ref 0.1–1)
BUN SERPL-MCNC: 87 MG/DL (ref 6–20)
CALCIUM SERPL-MCNC: 9.3 MG/DL (ref 8.7–10.5)
CHLORIDE SERPL-SCNC: 97 MMOL/L (ref 95–110)
CO2 SERPL-SCNC: 28 MMOL/L (ref 23–29)
CREAT SERPL-MCNC: 2.3 MG/DL (ref 0.5–1.4)
DIFFERENTIAL METHOD BLD: ABNORMAL
EOSINOPHIL # BLD AUTO: 1 K/UL (ref 0–0.5)
EOSINOPHIL NFR BLD: 8.4 % (ref 0–8)
ERYTHROCYTE [DISTWIDTH] IN BLOOD BY AUTOMATED COUNT: 15.1 % (ref 11.5–14.5)
EST. GFR  (NO RACE VARIABLE): 34.4 ML/MIN/1.73 M^2
GLUCOSE SERPL-MCNC: 191 MG/DL (ref 70–110)
HCT VFR BLD AUTO: 27.6 % (ref 40–54)
HGB BLD-MCNC: 8.6 G/DL (ref 14–18)
IMM GRANULOCYTES # BLD AUTO: 0.06 K/UL (ref 0–0.04)
IMM GRANULOCYTES NFR BLD AUTO: 0.5 % (ref 0–0.5)
LYMPHOCYTES # BLD AUTO: 1.1 K/UL (ref 1–4.8)
LYMPHOCYTES NFR BLD: 9.4 % (ref 18–48)
MAGNESIUM SERPL-MCNC: 1.9 MG/DL (ref 1.6–2.6)
MCH RBC QN AUTO: 26 PG (ref 27–31)
MCHC RBC AUTO-ENTMCNC: 31.2 G/DL (ref 32–36)
MCV RBC AUTO: 83 FL (ref 82–98)
MONOCYTES # BLD AUTO: 1.5 K/UL (ref 0.3–1)
MONOCYTES NFR BLD: 12.7 % (ref 4–15)
NEUTROPHILS # BLD AUTO: 8 K/UL (ref 1.8–7.7)
NEUTROPHILS NFR BLD: 68.6 % (ref 38–73)
NRBC BLD-RTO: 0 /100 WBC
OHS QRS DURATION: 132 MS
OHS QRS DURATION: 136 MS
OHS QTC CALCULATION: 504 MS
OHS QTC CALCULATION: 511 MS
PLATELET # BLD AUTO: 301 K/UL (ref 150–450)
PMV BLD AUTO: 10.9 FL (ref 9.2–12.9)
POCT GLUCOSE: 176 MG/DL (ref 70–110)
POCT GLUCOSE: 178 MG/DL (ref 70–110)
POCT GLUCOSE: 185 MG/DL (ref 70–110)
POTASSIUM SERPL-SCNC: 4.8 MMOL/L (ref 3.5–5.1)
PROT SERPL-MCNC: 6.7 G/DL (ref 6–8.4)
RBC # BLD AUTO: 3.31 M/UL (ref 4.6–6.2)
SODIUM SERPL-SCNC: 135 MMOL/L (ref 136–145)
WBC # BLD AUTO: 11.68 K/UL (ref 3.9–12.7)

## 2025-01-20 PROCEDURE — 25000003 PHARM REV CODE 250: Performed by: STUDENT IN AN ORGANIZED HEALTH CARE EDUCATION/TRAINING PROGRAM

## 2025-01-20 PROCEDURE — 83735 ASSAY OF MAGNESIUM: CPT | Performed by: STUDENT IN AN ORGANIZED HEALTH CARE EDUCATION/TRAINING PROGRAM

## 2025-01-20 PROCEDURE — 36415 COLL VENOUS BLD VENIPUNCTURE: CPT | Performed by: STUDENT IN AN ORGANIZED HEALTH CARE EDUCATION/TRAINING PROGRAM

## 2025-01-20 PROCEDURE — 63600175 PHARM REV CODE 636 W HCPCS: Performed by: INTERNAL MEDICINE

## 2025-01-20 PROCEDURE — P9047 ALBUMIN (HUMAN), 25%, 50ML: HCPCS | Performed by: INTERNAL MEDICINE

## 2025-01-20 PROCEDURE — 85025 COMPLETE CBC W/AUTO DIFF WBC: CPT | Performed by: STUDENT IN AN ORGANIZED HEALTH CARE EDUCATION/TRAINING PROGRAM

## 2025-01-20 PROCEDURE — 25000003 PHARM REV CODE 250: Performed by: NURSE PRACTITIONER

## 2025-01-20 PROCEDURE — 63600175 PHARM REV CODE 636 W HCPCS

## 2025-01-20 PROCEDURE — 94761 N-INVAS EAR/PLS OXIMETRY MLT: CPT

## 2025-01-20 PROCEDURE — 27000221 HC OXYGEN, UP TO 24 HOURS

## 2025-01-20 PROCEDURE — 80053 COMPREHEN METABOLIC PANEL: CPT | Performed by: STUDENT IN AN ORGANIZED HEALTH CARE EDUCATION/TRAINING PROGRAM

## 2025-01-20 PROCEDURE — 99900031 HC PATIENT EDUCATION (STAT)

## 2025-01-20 PROCEDURE — 25000003 PHARM REV CODE 250

## 2025-01-20 PROCEDURE — 12000002 HC ACUTE/MED SURGE SEMI-PRIVATE ROOM

## 2025-01-20 PROCEDURE — 63600175 PHARM REV CODE 636 W HCPCS: Performed by: STUDENT IN AN ORGANIZED HEALTH CARE EDUCATION/TRAINING PROGRAM

## 2025-01-20 PROCEDURE — 99900035 HC TECH TIME PER 15 MIN (STAT)

## 2025-01-20 RX ADMIN — MORPHINE SULFATE 4 MG: 4 INJECTION INTRAVENOUS at 04:01

## 2025-01-20 RX ADMIN — GABAPENTIN 300 MG: 300 CAPSULE ORAL at 08:01

## 2025-01-20 RX ADMIN — MICONAZOLE NITRATE: 20 POWDER TOPICAL at 08:01

## 2025-01-20 RX ADMIN — TAMSULOSIN HYDROCHLORIDE 0.4 MG: 0.4 CAPSULE ORAL at 08:01

## 2025-01-20 RX ADMIN — ATORVASTATIN CALCIUM 80 MG: 40 TABLET, FILM COATED ORAL at 08:01

## 2025-01-20 RX ADMIN — OXYCODONE HYDROCHLORIDE AND ACETAMINOPHEN 1 TABLET: 10; 325 TABLET ORAL at 06:01

## 2025-01-20 RX ADMIN — ALBUMIN (HUMAN) 12.5 G: 12.5 SOLUTION INTRAVENOUS at 09:01

## 2025-01-20 RX ADMIN — ENOXAPARIN SODIUM 120 MG: 120 INJECTION SUBCUTANEOUS at 08:01

## 2025-01-20 RX ADMIN — ALBUMIN (HUMAN) 12.5 G: 12.5 SOLUTION INTRAVENOUS at 08:01

## 2025-01-20 RX ADMIN — ACYCLOVIR 800 MG: 400 TABLET ORAL at 05:01

## 2025-01-20 RX ADMIN — ASPIRIN 81 MG: 81 TABLET, COATED ORAL at 08:01

## 2025-01-20 RX ADMIN — ACYCLOVIR 800 MG: 400 TABLET ORAL at 01:01

## 2025-01-20 RX ADMIN — MORPHINE SULFATE 4 MG: 4 INJECTION INTRAVENOUS at 08:01

## 2025-01-20 RX ADMIN — BUPRENORPHINE AND NALOXONE 1 FILM: 8; 2 FILM BUCCAL; SUBLINGUAL at 08:01

## 2025-01-20 RX ADMIN — BACLOFEN 5 MG: 5 TABLET ORAL at 08:01

## 2025-01-20 RX ADMIN — ACYCLOVIR 800 MG: 400 TABLET ORAL at 08:01

## 2025-01-20 RX ADMIN — CARVEDILOL 3.12 MG: 3.12 TABLET, FILM COATED ORAL at 08:01

## 2025-01-20 RX ADMIN — OXYCODONE HYDROCHLORIDE AND ACETAMINOPHEN 1 TABLET: 10; 325 TABLET ORAL at 02:01

## 2025-01-20 RX ADMIN — FUROSEMIDE 15 MG/HR: 10 INJECTION, SOLUTION INTRAMUSCULAR; INTRAVENOUS at 01:01

## 2025-01-20 RX ADMIN — WHITE PETROLATUM 41 % TOPICAL OINTMENT: at 09:01

## 2025-01-20 NOTE — CARE UPDATE
01/20/25 0816   Patient Assessment/Suction   Level of Consciousness (AVPU) alert   Respiratory Effort Unlabored   Expansion/Accessory Muscles/Retractions no use of accessory muscles   All Lung Fields Breath Sounds Anterior:;Lateral:;clear;diminished   Rhythm/Pattern, Respiratory no shortness of breath reported   Cough Frequency no cough   PRE-TX-O2   Device (Oxygen Therapy) room air   SpO2 98 %   Pulse Oximetry Type Intermittent   $ Pulse Oximetry - Multiple Charge Pulse Oximetry - Multiple   Pulse 92   Resp 16   Education   $ Education 15 min;Oxygen

## 2025-01-20 NOTE — SUBJECTIVE & OBJECTIVE
Interval History: Patient is feeling better. Diuresing well on lasix drip. Still complains of jerking of extremities and some diarrhea. Cr is somewhat stable.     Review of Systems  Objective:     Vital Signs (Most Recent):  Temp: 97.3 °F (36.3 °C) (01/20/25 0804)  Pulse: 92 (01/20/25 0816)  Resp: 16 (01/20/25 0816)  BP: 130/79 (01/20/25 0804)  SpO2: 98 % (01/20/25 0816) Vital Signs (24h Range):  Temp:  [97.3 °F (36.3 °C)-97.6 °F (36.4 °C)] 97.3 °F (36.3 °C)  Pulse:  [87-99] 92  Resp:  [9-18] 16  SpO2:  [98 %-99 %] 98 %  BP: (123-145)/(75-82) 130/79     Weight:  (bed scale -44 unable to obtain acurate weight at this time)  Body mass index is 39.89 kg/m².    Intake/Output Summary (Last 24 hours) at 1/20/2025 1214  Last data filed at 1/20/2025 1030  Gross per 24 hour   Intake 1094 ml   Output 4700 ml   Net -3606 ml         Physical Exam  Vitals and nursing note reviewed. Exam conducted with a chaperone present.   Constitutional:       General: He is not in acute distress.     Appearance: Normal appearance. He is obese. He is not ill-appearing, toxic-appearing or diaphoretic.   Cardiovascular   Normal heart sounds, there is edema on both lower extremity stumps   Pulmonary:      Effort: Pulmonary effort is normal.      Breath sounds: coarse breath sounds   Genitourinary:     Comments: Scrotal Edema   Musculoskeletal:         General: Deformity present.      Comments: Right BKA/ Left AKA   Skin:     General: Skin is warm and dry.   Neurological:      Mental Status: He is alert.   Psychiatric:         Mood and Affect: Mood normal.         Behavior: Behavior normal.         Thought Content: Thought content normal.     Significant Labs: All pertinent labs within the past 24 hours have been reviewed.  CBC:   Recent Labs   Lab 01/19/25  0516 01/20/25  0523   WBC 13.14* 11.68   HGB 8.3* 8.6*   HCT 27.0* 27.6*    301     CMP:   Recent Labs   Lab 01/19/25 0516 01/20/25  0523   * 135*   K 4.8 4.8   CL 99 97   CO2 26  28   * 191*   BUN 86* 87*   CREATININE 2.2* 2.3*   CALCIUM 9.1 9.3   PROT 6.4 6.7   ALBUMIN 3.4* 3.5   BILITOT 0.4 0.4   ALKPHOS 253* 261*   AST 16 18   ALT 12 16   ANIONGAP 9 10       Significant Imaging: I have reviewed all pertinent imaging results/findings within the past 24 hours.

## 2025-01-20 NOTE — PROGRESS NOTES
Denies known Latex allergy or symptoms of Latex sensitivity.  Respiratory/Sleep medications, allergies and tobacco use reviewed.    Social History     Tobacco Use   Smoking Status Current Some Day Smoker   • Packs/day: 0.80   • Years: 44.00   • Pack years: 35.20   • Types: Cigarettes   • Start date: 1/1/1975   Smokeless Tobacco Never Used   Tobacco Comment    Started at age 27, 1/2 ppd       PCP:  DO Juan Moon TARYN Eastman is a 75 year old male presenting for copd, kelly follow up.      Patient would like communication of their results via:      [employer]    Cell Phone:   Telephone Information:   Mobile [phone]     Okay to leave a message containing results? Yes        Flu vaccine: 10-  COPD Assessment Test     CAT scoring range 0 - 40 higher scores indicate a poorer health status  CAT score of 10 or more identifies patients who are more symptomatic       Situation               Score                           Situation   I never cough 0 I cough all the time    I have no phlegm (mucus) in my chest 1 My chest is full of phlegm (mucus)   My chest does not feel tight at all 0 My chest feels very tight   When I walk up a hill or one flight of stairs I am not breathless 2 When I walk up a hill or one flight of stairs I am very breathless   I am not limited doing any activities at home 1 I am very limited doing activities at home   I am confident leaving my home despite my lung condition 0 I am not at all confident leaving my home because of my lung condition   I sleep soundly 0 I don't sleep soundly because of my lung condition   I have lots of energy 3 I have no energy at all   TOTAL SCORE  7                 Information updated/reviewed 08/02/22   Nephrology Consult Note        Patient Name: Gideon Ross  MRN: [id number]    Patient Class: IP- Inpatient   Admission Date: 1/9/2025  Length of Stay: 11 days  Date of Service: 1/20/2025    Attending Physician: Lauren Lawler MD  Primary Care Provider: Jennifer, Primary Doctor    Reason for Consult: marlene    SUBJECTIVE:     HPI: 49M with PID, bilateral BKA, DM presented for significant bilateral lower extremity edema, scrotal swelling, intractable pain. Labs largely unremarkable other than BNP 3,500, troponin 3,300. Patient was started on IV diuretics and pain control. Cardiology was consulted. Initially started on heparin drip but then transitioned to Lovenox. Echocardiogram showed EF 25%. Addiction Medicine consulted for heroin use. Started on Suboxone and reports improvement in symptoms. Diuretics held 2/2 worsening sCr and MARLENE. He was started on dobutamine on 1/11/25, but was stopped 2/2 tachycardia. He has a catheter for strict I&O with MARLENE, but is describing dysuria, there is purulent drainage at the end of the catheter so UA was obtained with concerns of UTI and pt placed on rocephin and recommendations for catheter exchange giving continued MARLENE and need for strict I&O with decompensated heart failure.      1/14 Agree with diuretics and albumin, will consider escalation or ultrafiltration. Not sure how acute the HF is. Consider palliative eval as well.  1/15 VSS. Good UO with diuretics+ albumin, will escalate more tomorrow if he remains stable. Not sure if UF will be better tolerated than diuretics.  1/16 VSS. Ensure dietary compliance with salt and liquids. Keep IOs tally. Will give more albumin and lasix.  1/17 VSS. Agree with empiric acyclovir for possible shingles. Agree with escalation to lasix gtt as d/w cards. Strict IOS and fluid restriction enforcement.  1/18 VSS. Greatly appreciate Urology help with valle placement and management of scrotal edema.  1/19 VSS. Scr finally a little better, UO 4L via  valle. Continue present management, monitor labs, replete lytes, enforce liquid restriction.  1/20  Scr w/small bump today.  UOP 4.9L, no new complaints.    ASSESSMENT/PLAN:     MARLENE 2/2 ATN due to intravascular volume depletion from diuretics  Hypoalbuminemia with dependent edema due to third-spacing and immobility  HFrEF ? Etiology, acuity, prognosis?  PAD  DM  Anemia  CKD stage 2, sCr 1 om 1/9/2025  Shingles  No NSAIDs, ACEI/ARB, IV contrast or other nephrotoxins.  Keep MAP > 60, SBP > 100.  Dose meds for GFR < 30 ml/min.  Renal diet - low K, low phos, low sodium. Strictly enforce liquid intake restrictions. Document strict IOs.  Optimize nutrition, added protein supplement + IV.  Aggressive diuretic therapy as tolerated, edema will not resolve with diuretics alone, appreciate Urology help.  Hgb and HCT are acceptable. Monitor for now.    Thank you for allowing us to participate in the care of your patient!   We will follow the patient and provide recommendations as needed.         Laboratory:  Recent Labs   Lab 01/18/25  0512 01/19/25  0516 01/20/25  0523    134* 135*   K 5.5* 4.8 4.8    99 97   CO2 26 26 28   BUN 84* 86* 87*   CREATININE 2.5* 2.2* 2.3*   * 138* 191*       Recent Labs   Lab 01/18/25  0512 01/19/25  0516 01/20/25  0523   CALCIUM 8.9 9.1 9.3   ALBUMIN 3.5 3.4* 3.5   MG 2.0 1.9 1.9             Recent Labs   Lab 01/17/25  1722 01/17/25  2135 01/18/25  1123 01/18/25  1621 01/18/25  2055 01/19/25  0547 01/19/25  1123 01/19/25  1623 01/19/25 2010 01/20/25  0625   POCTGLUCOSE 180* 214* 157* 144* 128* 144* 157* 167* 185* 185*       Recent Labs   Lab 01/10/25  0721   Hemoglobin A1C 7.5 H       Recent Labs   Lab 01/18/25  0512 01/19/25  0516 01/20/25  0523   WBC 13.43* 13.14* 11.68   HGB 8.4* 8.3* 8.6*   HCT 27.5* 27.0* 27.6*    276 301   MCV 85 83 83   MCHC 30.5* 30.7* 31.2*   MONO 13.5  1.8* 12.8  1.7* 12.7  1.5*   EOSINOPHIL 6.5 7.9 8.4*       Recent Labs   Lab  01/18/25  0512 01/19/25  0516 01/20/25  0523   BILITOT 0.4 0.4 0.4   PROT 6.7 6.4 6.7   ALBUMIN 3.5 3.4* 3.5   ALKPHOS 235* 253* 261*   ALT 12 12 16   AST 16 16 18       Recent Labs   Lab 07/19/22  2316 01/09/25  0743 01/12/25  1055   Color, UA Yellow Yellow [place] A   Appearance, UA Clear Hazy A Hazy A   pH, UA 6.0 6.0 6.0   Specific Gravity, UA <=1.005 1.025 1.010   Protein, UA Trace A 3+ A 1+ A   Glucose, UA 4+ A Trace A Negative   Ketones, UA Negative Negative Negative   Urobilinogen, UA Negative 2.0-3.0 A Negative   Bilirubin (UA) Negative Negative Negative   Occult Blood UA Negative 2+ A 3+ A   Nitrite, UA Negative Negative Negative   RBC, UA 1 7 H >100 H   WBC, UA  --  4 18 H   Bacteria None Occasional Rare   Hyaline Casts, UA  --  4 A 0       Recent Labs   Lab 07/19/22  2046 01/09/25  0707   POC PH 7.473 H  --    POC PCO2 42.4  --    POC HCO3 31.1 H  --    POC PO2 28 L  --    POC SATURATED O2 58 L  --    POC BE 7  --    Sample VENOUS VENOUS       Microbiology Results (last 7 days)       Procedure Component Value Units Date/Time    Urine culture [[id number]] Collected: 01/12/25 1055    Order Status: Completed Specimen: Urine Updated: 01/15/25 0640     Urine Culture, Routine No growth    Narrative:      Specimen Source->Urine    Blood culture x two cultures. Draw prior to antibiotics. [[id number]] Collected: 01/09/25 0859    Order Status: Completed Specimen: Blood Updated: 01/14/25 1032     Blood Culture, Routine No growth after 5 days.    Narrative:      Aerobic and anaerobic    Blood culture x two cultures. Draw prior to antibiotics. [[id number]] Collected: 01/09/25 0859    Order Status: Completed Specimen: Blood Updated: 01/14/25 1032     Blood Culture, Routine No growth after 5 days.    Narrative:      Aerobic and anaerobic            Review of patient's allergies indicates:  No Known Allergies    Outpatient meds:  No current facility-administered medications on file prior to encounter.     Current  Outpatient Medications on File Prior to Encounter   Medication Sig Dispense Refill    amLODIPine (NORVASC) 10 MG tablet Take 1 tablet (10 mg total) by mouth once daily. 30 tablet 1    aspirin (ECOTRIN) 81 MG EC tablet Take 1 tablet (81 mg total) by mouth once daily. 30 tablet 1    atorvastatin (LIPITOR) 80 MG tablet Take 1 tablet (80 mg total) by mouth once daily. 30 tablet 1    baclofen (LIORESAL) 10 MG tablet Take 1 tablet (10 mg total) by mouth 3 (three) times daily. 90 tablet 1    carvedilol (COREG) 6.25 MG tablet Take 1 tablet (6.25 mg total) by mouth 2 (two) times daily. 60 tablet 1    diazePAM (VALIUM) 5 MG tablet Take 1 tablet (5 mg total) by mouth every 12 (twelve) hours as needed for Anxiety (anxiety.). 30 tablet 0    gabapentin (NEURONTIN) 300 MG capsule Take 2 capsules (600 mg total) by mouth 3 (three) times daily. 90 capsule 1    hydroCHLOROthiazide (HYDRODIURIL) 50 MG tablet Take 1 tablet (50 mg total) by mouth once daily. 30 tablet 1    insulin aspart U-100 (NOVOLOG) 100 unit/mL InPn pen Inject 18 Units into the skin 3 (three) times daily. (Patient taking differently: Inject into the skin 3 (three) times daily.) 10 mL 1    insulin detemir U-100 (LEVEMIR FLEXTOUCH) 100 unit/mL (3 mL) SubQ InPn pen Inject 35 Units into the skin 2 (two) times daily. 10 mL 1    insulin glargine U-100, Lantus, 100 unit/mL injection Inject 30 Units into the skin every evening. (Patient not taking: Reported on 1/9/2025)      lisinopril (PRINIVIL,ZESTRIL) 40 MG tablet Take 1 tablet (40 mg total) by mouth once daily. 30 tablet 1    nicotine (NICODERM CQ) 14 mg/24 hr Place 1 patch onto the skin once daily. (Patient not taking: Reported on 1/9/2025) 15 patch 0    nortriptyline (PAMELOR) 25 MG capsule Take 1 capsule (25 mg total) by mouth 3 (three) times daily. 30 capsule 1    oxyCODONE (ROXICODONE) 5 MG immediate release tablet Take 1 tablet (5 mg total) by mouth every 12 (twelve) hours as needed. (Patient not taking: Reported  on 1/9/2025) 30 tablet 0    polyethylene glycol (GLYCOLAX) 17 gram PwPk Take 17 g by mouth daily as needed. (Patient not taking: Reported on 1/9/2025) 30 each 1    QUEtiapine (SEROQUEL) 100 MG Tab Take 1 tablet (100 mg total) by mouth every evening. 30 tablet 1    sofosbuvir-velpatasvir (EPCLUSA) 400-100 mg Tab Take 1 tablet daily. (Patient not taking: Reported on 1/9/2025) 28 tablet 2       Scheduled meds:   acyclovir  800 mg Oral 5x Daily    albumin human 25%  12.5 g Intravenous BID    aspirin  81 mg Oral Daily    atorvastatin  80 mg Oral Daily    baclofen  5 mg Oral BID    buprenorphine-naloxone 8-2 mg  1 Film Sublingual BID    carvediloL  3.125 mg Oral BID    enoxparin  1 mg/kg Subcutaneous Q12H (treatment, non-standard time)    gabapentin  300 mg Oral BID    miconazole NITRATE 2 %   Topical (Top) BID    tamsulosin  0.4 mg Oral Daily    white petrolatum   Topical (Top) Daily       Infusions:   furosemide (LASIX) 2 mg/mL continuous infusion (non-titrating)  15 mg/hr Intravenous Continuous 7.5 mL/hr at 01/19/25 2306 15 mg/hr at 01/19/25 2306       PRN meds:    Current Facility-Administered Medications:     acetaminophen, 650 mg, Oral, Q4H PRN    aluminum-magnesium hydroxide-simethicone, 30 mL, Oral, QID PRN    dextrose 50%, 12.5 g, Intravenous, PRN    dextrose 50%, 25 g, Intravenous, PRN    glucagon (human recombinant), 1 mg, Intramuscular, PRN    glucose, 16 g, Oral, PRN    glucose, 24 g, Oral, PRN    hydrALAZINE, 5 mg, Intravenous, Q6H PRN    insulin aspart U-100, 0-10 Units, Subcutaneous, QID (AC + HS) PRN    LIDOcaine HCl 2%, , Mucous Membrane, Once PRN    loperamide, 2 mg, Oral, QID PRN    LORazepam, 0.5 mg, Oral, Q6H PRN    magnesium oxide, 800 mg, Oral, PRN    magnesium oxide, 800 mg, Oral, PRN    melatonin, 6 mg, Oral, Nightly PRN    morphine, 4 mg, Intravenous, Q3H PRN    naloxone, 0.02 mg, Intravenous, PRN    ondansetron, 4 mg, Intravenous, Q6H PRN    oxyCODONE-acetaminophen, 1 tablet, Oral, Q4H PRN     oxyCODONE-acetaminophen, 1 tablet, Oral, Q4H PRN    potassium bicarbonate, 35 mEq, Oral, PRN    potassium bicarbonate, 50 mEq, Oral, PRN    potassium bicarbonate, 60 mEq, Oral, PRN    potassium, sodium phosphates, 2 packet, Oral, PRN    potassium, sodium phosphates, 2 packet, Oral, PRN    potassium, sodium phosphates, 2 packet, Oral, PRN    senna-docusate 8.6-50 mg, 1 tablet, Oral, Daily PRN    sodium chloride 0.9%, 10 mL, Intravenous, Q12H PRN    sodium chloride 0.9%, 10 mL, Intravenous, PRN    Past Medical History:   Diagnosis Date    Diabetes mellitus     Hypertension      Past Surgical History:   Procedure Laterality Date    SKIN GRAFT       No family history on file.  Social History     Tobacco Use    Smoking status: Every Day     Current packs/day: 1.00     Types: Cigarettes   Substance Use Topics    Alcohol use: Yes    Drug use: No       OBJECTIVE:     Vital Signs and IO:  Temp:  [97.3 °F (36.3 °C)-98.1 °F (36.7 °C)]   Pulse:  [87-99]   Resp:  [9-18]   BP: (123-145)/(70-82)   SpO2:  [98 %-99 %]   I/O last 3 completed shifts:  In: 1308.6 [P.O.:960; I.V.:348.6]  Out: 5850 [Urine:5850]  Wt Readings from Last 5 Encounters:   01/15/25 126.1 kg (278 lb)   07/19/22 99.8 kg (220 lb)   02/09/20 104.3 kg (230 lb)   08/06/19 113.4 kg (250 lb)   02/27/19 108.9 kg (240 lb)     Body mass index is 39.89 kg/m².    Physical Exam  Constitutional:       General: Patient is not in acute distress.     Appearance: Patient is well-developed. She is not diaphoretic.   HENT:      Head: Normocephalic and atraumatic.      Mouth/Throat: Mucous membranes are moist.   Eyes:      General: No scleral icterus.     Pupils: Pupils are equal, round, and reactive to light.   Cardiovascular:      Rate and Rhythm: Normal rate and regular rhythm.   Pulmonary:      Effort: Pulmonary effort is normal. No respiratory distress.      Breath sounds: No stridor.   Abdominal:      General: There is no distension.      Palpations: Abdomen is soft.    Musculoskeletal:         General: No deformity. Normal range of motion.      Cervical back: Neck supple.   Skin:     General: Skin is warm and dry.      Findings: No rash present. No erythema.   Neurological:      Mental Status: Patient is alert and oriented to person, place, and time.      Cranial Nerves: No cranial nerve deficit.   Psychiatric:         Behavior: Behavior normal.          Patient care time was spent personally by me on the following activities:     Obtaining a history.  Examination of patient.  Providing medical care at the patients bedside.  Developing a treatment plan with patient or surrogate and bedside caregivers.  Ordering and reviewing laboratory studies, radiographic studies, pulse oximetry.  Ordering and performing treatments and interventions.  Evaluation of patient's response to treatment.  Discussions with consultants while on the unit and immediately available to the patient.  Re-evaluation of the patient's condition.  Documentation in the medical record.     Sandee Radford NP      [facility]  [address] [place] [address]  [place] [address]    [phone] - tel  [phone] - fax    1/20/2025

## 2025-01-20 NOTE — ASSESSMENT & PLAN NOTE
Patient's FSGs are controlled on current medication regimen.  Last A1c reviewed-   Lab Results   Component Value Date    HGBA1C 7.5 (H) 01/10/2025     Most recent fingerstick glucose reviewed-   Recent Labs   Lab 01/19/25  1623 01/19/25 2010 01/20/25  0625 01/20/25  1116   POCTGLUCOSE 167* 185* 185* 176*       Current correctional scale  Low  Maintain anti-hyperglycemic dose as follows-   Antihyperglycemics (From admission, onward)    Start     Stop Route Frequency Ordered    01/10/25 1515  insulin aspart U-100 pen 0-10 Units         -- SubQ Before meals & nightly PRN 01/10/25 1415        Hold Oral hypoglycemics while patient is in the hospital.

## 2025-01-20 NOTE — ASSESSMENT & PLAN NOTE
MARLENE is likely due to Cardiorenal syndrome. Baseline creatinine is  1 . Most recent creatinine and eGFR are listed below.  Recent Labs     01/18/25  0512 01/19/25  0516 01/20/25  0523   CREATININE 2.5* 2.2* 2.3*   EGFRNORACEVR 31.1* 36.3* 34.4*        Plan  - Avoid nephrotoxins and renally dose meds for GFR listed above  - Monitor urine output, serial BMP, and adjust therapy as needed  - nephrology following   - Close monitoring  - Diurese as above

## 2025-01-20 NOTE — PROGRESS NOTES
[facility]  Progress Note    Patient Name: Gideon Ross  MRN: [id number]  Patient Class: IP- Inpatient   Admission Date: 1/9/2025  Length of Stay: 11 days  Attending Physician: Lauren Lawler MD  Primary Care Provider: Jennifer, Primary Doctor        Subjective     Principal Problem:Acute systolic congestive heart failure        HPI:  Mr. Ross is a 49 yom w/pmh significant for PID status post bilateral above-the-knee amputations, diabetes mellitus who presented for significant bilateral lower extremity edema and scrotal swelling, as well as intractable pain secondary to scrotal swelling.  Labs largely unremarkable.  BNP 3500, troponin 3300.  Patient was started on IV diuresis and pain control.  Cardiology was consulted.  Patient was initially started on heparin infusion.  On exam he has significant pain and tenderness due to significant scrotal swelling.    Overview/Hospital Course:  Patient with history of peripheral artery disease status post bilateral AKA, diabetes, substance abuse presents to the emergency room with complaints of lower extremity and scrotal edema.  BNP and troponin elevated.  Cardiology consulted.  Initially started on heparin drip but then transitioned to Lovenox.  Echocardiogram showed EF 25%.  Addiction Medicine consulted for heroin use.  Started on Suboxone and reports improvement in symptoms.  Diuretics held  when he developed MARLENE.  Needs LifeVest on discharge. He was started on dobutamine on 1/11/25, but overnight about 0200 he had Vtach about 50 beats and dobutamine turned off. He has a catheter for strict I&O with MARLENE, but is describing dysuria, there is purulent drainage at the end of the catheter so UA was obtained with concerns of UTI and pt placed on rocephin, catheter removed and he has been voiding. Urine culture with NGTD - will complete 3 days of Rocephin.  Nephrology has been consulted. Titrating lasix as tolerated along with albumin.  Cr up  trending. Weber was placed by urology on 1/17. Patient is diuresing well on Lasix drip.     Interval History: Patient is feeling better. Diuresing well on lasix drip. Still complains of jerking of extremities and some diarrhea. Cr is somewhat stable.     Review of Systems  Objective:     Vital Signs (Most Recent):  Temp: 97.3 °F (36.3 °C) (01/20/25 0804)  Pulse: 92 (01/20/25 0816)  Resp: 16 (01/20/25 0816)  BP: 130/79 (01/20/25 0804)  SpO2: 98 % (01/20/25 0816) Vital Signs (24h Range):  Temp:  [97.3 °F (36.3 °C)-97.6 °F (36.4 °C)] 97.3 °F (36.3 °C)  Pulse:  [87-99] 92  Resp:  [9-18] 16  SpO2:  [98 %-99 %] 98 %  BP: (123-145)/(75-82) 130/79     Weight:  (bed scale -44 unable to obtain acurate weight at this time)  Body mass index is 39.89 kg/m².    Intake/Output Summary (Last 24 hours) at 1/20/2025 1214  Last data filed at 1/20/2025 1030  Gross per 24 hour   Intake 1094 ml   Output 4700 ml   Net -3606 ml         Physical Exam  Vitals and nursing note reviewed. Exam conducted with a chaperone present.   Constitutional:       General: He is not in acute distress.     Appearance: Normal appearance. He is obese. He is not ill-appearing, toxic-appearing or diaphoretic.   Cardiovascular   Normal heart sounds, there is edema on both lower extremity stumps   Pulmonary:      Effort: Pulmonary effort is normal.      Breath sounds: coarse breath sounds   Genitourinary:     Comments: Scrotal Edema   Musculoskeletal:         General: Deformity present.      Comments: Right BKA/ Left AKA   Skin:     General: Skin is warm and dry.   Neurological:      Mental Status: He is alert.   Psychiatric:         Mood and Affect: Mood normal.         Behavior: Behavior normal.         Thought Content: Thought content normal.     Significant Labs: All pertinent labs within the past 24 hours have been reviewed.  CBC:   Recent Labs   Lab 01/19/25  0516 01/20/25  0523   WBC 13.14* 11.68   HGB 8.3* 8.6*   HCT 27.0* 27.6*    301     CMP:    Recent Labs   Lab 01/19/25  0516 01/20/25  0523   * 135*   K 4.8 4.8   CL 99 97   CO2 26 28   * 191*   BUN 86* 87*   CREATININE 2.2* 2.3*   CALCIUM 9.1 9.3   PROT 6.4 6.7   ALBUMIN 3.4* 3.5   BILITOT 0.4 0.4   ALKPHOS 253* 261*   AST 16 18   ALT 12 16   ANIONGAP 9 10       Significant Imaging: I have reviewed all pertinent imaging results/findings within the past 24 hours.    Assessment and Plan     * Acute systolic congestive heart failure  Patient has Systolic (HFrEF) heart failure that is Acute. On presentation their CHF was decompensated. Evidence of decompensated CHF on presentation includes: edema. The etiology of their decompensation is likely substance abuse .   Latest ECHO  Results for orders placed during the hospital encounter of 01/09/25    Echo    Interpretation Summary    Left Ventricle: The left ventricle is moderately dilated. Mildly increased wall thickness. There is mild asymmetric hypertrophy. Severe global hypokinesis present. There is severely reduced systolic function with a visually estimated ejection fraction of 25 - 30%.    Right Ventricle: Moderate right ventricular enlargement. Systolic function is mildly reduced.    Left Atrium: Left atrium is mildly dilated.    Tricuspid Valve: There is mild regurgitation.    IVC/SVC: Elevated venous pressure at 15 mmHg.    Current Heart Failure Medications  hydrALAZINE injection 5 mg, Every 6 hours PRN, Intravenous  furosemide (Lasix) 200 mg in 0.9% NaCl SolP 100 mL continuous infusion (conc: 2 mg/mL), Continuous, Intravenous  carvediloL tablet 3.125 mg, 2 times daily, Oral    Plan  - Monitor strict I&Os and daily weights.    - nephrology and cardiology managing diuresis. Patient is on Lasix drip with albumin, cont   - Cont Coreg  - recommending life vest on discharge   - ischemic work up per cardiology     Urinary tract infection associated with indwelling urethral catheter  Catheter in place for severe acute decompensated heart failure  and need for strict I&O with concern of cardiorenal syndrome   Completed 4 day course of ceftriaxone        Opioid use disorder  Addiction medicine consulted   Improved on Suboxone    Per Addiction medicine:   For first dose of buprenorphine (at least 18 hours since last use of fentanyl) give one or two 8 mg strips (8-16 mg total).  Wait 1 hour and then...  If mild withdrawal, give another 8 mg strip.  If more significant withdrawal, give two more 8 mg strips.  If relaxed or calm, do not give any more.  Wait 1-2 more hours...  If still have withdrawal symptoms, take another 8 mg strip  If relaxed or calm, do not take any more  Try not to give more than 32 mg (4 strips) on day one.  On day 2, give 8 mg BID   Continue taking 8 mg BID until seeing in clinic.    MARLENE (acute kidney injury)  MARLENE is likely due to Cardiorenal syndrome. Baseline creatinine is  1 . Most recent creatinine and eGFR are listed below.  Recent Labs     01/18/25  0512 01/19/25  0516 01/20/25  0523   CREATININE 2.5* 2.2* 2.3*   EGFRNORACEVR 31.1* 36.3* 34.4*        Plan  - Avoid nephrotoxins and renally dose meds for GFR listed above  - Monitor urine output, serial BMP, and adjust therapy as needed  - nephrology following   - Close monitoring  - Diurese as above     Diabetes mellitus  Patient's FSGs are controlled on current medication regimen.  Last A1c reviewed-   Lab Results   Component Value Date    HGBA1C 7.5 (H) 01/10/2025     Most recent fingerstick glucose reviewed-   Recent Labs   Lab 01/19/25  1623 01/19/25 2010 01/20/25  0625 01/20/25  1116   POCTGLUCOSE 167* 185* 185* 176*       Current correctional scale  Low  Maintain anti-hyperglycemic dose as follows-   Antihyperglycemics (From admission, onward)      Start     Stop Route Frequency Ordered    01/10/25 1515  insulin aspart U-100 pen 0-10 Units         -- SubQ Before meals & nightly PRN 01/10/25 1415          Hold Oral hypoglycemics while patient is in the hospital.    Elevated  troponin  Acute nonischemic, nontraumatic myocardial injury due to acute on chronic HFrEF  Troponin elevated but flat, cardiology following  No chest pain or shortness of breath         Scrotal edema  Secondary to acute decompensated heart failure  Elevate scrotum  Scrotal ultrasound reviewed  Diuresing as per Nephrology recommendation    Obesity  Body mass index is 39.89 kg/m². Morbid obesity complicates all aspects of disease management from diagnostic modalities to treatment. Weight loss encouraged and health benefits explained to patient.           VTE Risk Mitigation (From admission, onward)           Ordered     enoxaparin injection 120 mg  Every 12 hours         01/10/25 0724     IP VTE HIGH RISK PATIENT  Once         01/09/25 1326     Place sequential compression device  Until discontinued         01/09/25 1326     Reason for no Mechanical VTE Prophylaxis  Once        Question:  Reasons:  Answer:  Physician Provided (leave comment)  Comment:  already on heparin infusion    01/09/25 0906                    Discharge Planning   KATHERINE: 1/22/2025     Code Status: Full Code   Medical Readiness for Discharge Date:   Discharge Plan A: Home   Discharge Delays: None known at this time                    Lauren Lawler MD  Department of Hospital Medicine   [facility]

## 2025-01-21 LAB
ALBUMIN SERPL BCP-MCNC: 3.5 G/DL (ref 3.5–5.2)
ALP SERPL-CCNC: 212 U/L (ref 55–135)
ALT SERPL W/O P-5'-P-CCNC: 14 U/L (ref 10–44)
ANION GAP SERPL CALC-SCNC: 8 MMOL/L (ref 8–16)
AST SERPL-CCNC: 17 U/L (ref 10–40)
BASOPHILS # BLD AUTO: 0.05 K/UL (ref 0–0.2)
BASOPHILS NFR BLD: 0.5 % (ref 0–1.9)
BILIRUB SERPL-MCNC: 0.4 MG/DL (ref 0.1–1)
BUN SERPL-MCNC: 91 MG/DL (ref 6–20)
CALCIUM SERPL-MCNC: 9.5 MG/DL (ref 8.7–10.5)
CHLORIDE SERPL-SCNC: 96 MMOL/L (ref 95–110)
CO2 SERPL-SCNC: 30 MMOL/L (ref 23–29)
CREAT SERPL-MCNC: 2.1 MG/DL (ref 0.5–1.4)
DIFFERENTIAL METHOD BLD: ABNORMAL
EOSINOPHIL # BLD AUTO: 1.1 K/UL (ref 0–0.5)
EOSINOPHIL NFR BLD: 10.3 % (ref 0–8)
ERYTHROCYTE [DISTWIDTH] IN BLOOD BY AUTOMATED COUNT: 14.9 % (ref 11.5–14.5)
EST. GFR  (NO RACE VARIABLE): 38.4 ML/MIN/1.73 M^2
GLUCOSE SERPL-MCNC: 161 MG/DL (ref 70–110)
HCT VFR BLD AUTO: 25.9 % (ref 40–54)
HGB BLD-MCNC: 8.1 G/DL (ref 14–18)
IMM GRANULOCYTES # BLD AUTO: 0.05 K/UL (ref 0–0.04)
IMM GRANULOCYTES NFR BLD AUTO: 0.5 % (ref 0–0.5)
LYMPHOCYTES # BLD AUTO: 1.5 K/UL (ref 1–4.8)
LYMPHOCYTES NFR BLD: 13.8 % (ref 18–48)
MAGNESIUM SERPL-MCNC: 1.9 MG/DL (ref 1.6–2.6)
MCH RBC QN AUTO: 25.5 PG (ref 27–31)
MCHC RBC AUTO-ENTMCNC: 31.3 G/DL (ref 32–36)
MCV RBC AUTO: 81 FL (ref 82–98)
MONOCYTES # BLD AUTO: 1.6 K/UL (ref 0.3–1)
MONOCYTES NFR BLD: 14.1 % (ref 4–15)
NEUTROPHILS # BLD AUTO: 6.7 K/UL (ref 1.8–7.7)
NEUTROPHILS NFR BLD: 60.8 % (ref 38–73)
NRBC BLD-RTO: 0 /100 WBC
PLATELET # BLD AUTO: 308 K/UL (ref 150–450)
PMV BLD AUTO: 10.7 FL (ref 9.2–12.9)
POCT GLUCOSE: 143 MG/DL (ref 70–110)
POCT GLUCOSE: 159 MG/DL (ref 70–110)
POCT GLUCOSE: 160 MG/DL (ref 70–110)
POCT GLUCOSE: 186 MG/DL (ref 70–110)
POTASSIUM SERPL-SCNC: 4.5 MMOL/L (ref 3.5–5.1)
PROT SERPL-MCNC: 6.6 G/DL (ref 6–8.4)
RBC # BLD AUTO: 3.18 M/UL (ref 4.6–6.2)
SODIUM SERPL-SCNC: 134 MMOL/L (ref 136–145)
WBC # BLD AUTO: 11 K/UL (ref 3.9–12.7)

## 2025-01-21 PROCEDURE — 25000003 PHARM REV CODE 250

## 2025-01-21 PROCEDURE — 99900031 HC PATIENT EDUCATION (STAT)

## 2025-01-21 PROCEDURE — 12000002 HC ACUTE/MED SURGE SEMI-PRIVATE ROOM

## 2025-01-21 PROCEDURE — 25000003 PHARM REV CODE 250: Performed by: STUDENT IN AN ORGANIZED HEALTH CARE EDUCATION/TRAINING PROGRAM

## 2025-01-21 PROCEDURE — 25000003 PHARM REV CODE 250: Performed by: NURSE PRACTITIONER

## 2025-01-21 PROCEDURE — 63600175 PHARM REV CODE 636 W HCPCS: Performed by: STUDENT IN AN ORGANIZED HEALTH CARE EDUCATION/TRAINING PROGRAM

## 2025-01-21 PROCEDURE — 63600175 PHARM REV CODE 636 W HCPCS: Performed by: INTERNAL MEDICINE

## 2025-01-21 PROCEDURE — 63600150 PHARM REV CODE 636

## 2025-01-21 PROCEDURE — 80053 COMPREHEN METABOLIC PANEL: CPT | Performed by: STUDENT IN AN ORGANIZED HEALTH CARE EDUCATION/TRAINING PROGRAM

## 2025-01-21 PROCEDURE — 36415 COLL VENOUS BLD VENIPUNCTURE: CPT | Performed by: STUDENT IN AN ORGANIZED HEALTH CARE EDUCATION/TRAINING PROGRAM

## 2025-01-21 PROCEDURE — P9047 ALBUMIN (HUMAN), 25%, 50ML: HCPCS | Performed by: INTERNAL MEDICINE

## 2025-01-21 PROCEDURE — 83735 ASSAY OF MAGNESIUM: CPT | Performed by: STUDENT IN AN ORGANIZED HEALTH CARE EDUCATION/TRAINING PROGRAM

## 2025-01-21 PROCEDURE — 85025 COMPLETE CBC W/AUTO DIFF WBC: CPT | Performed by: STUDENT IN AN ORGANIZED HEALTH CARE EDUCATION/TRAINING PROGRAM

## 2025-01-21 PROCEDURE — 94761 N-INVAS EAR/PLS OXIMETRY MLT: CPT

## 2025-01-21 RX ADMIN — BACLOFEN 5 MG: 5 TABLET ORAL at 08:01

## 2025-01-21 RX ADMIN — MORPHINE SULFATE 4 MG: 4 INJECTION INTRAVENOUS at 05:01

## 2025-01-21 RX ADMIN — ALBUMIN (HUMAN) 12.5 G: 12.5 SOLUTION INTRAVENOUS at 08:01

## 2025-01-21 RX ADMIN — ACYCLOVIR 800 MG: 400 TABLET ORAL at 08:01

## 2025-01-21 RX ADMIN — BUPRENORPHINE AND NALOXONE 1 FILM: 8; 2 FILM BUCCAL; SUBLINGUAL at 08:01

## 2025-01-21 RX ADMIN — ENOXAPARIN SODIUM 120 MG: 120 INJECTION SUBCUTANEOUS at 08:01

## 2025-01-21 RX ADMIN — MORPHINE SULFATE 4 MG: 4 INJECTION INTRAVENOUS at 08:01

## 2025-01-21 RX ADMIN — TAMSULOSIN HYDROCHLORIDE 0.4 MG: 0.4 CAPSULE ORAL at 08:01

## 2025-01-21 RX ADMIN — MORPHINE SULFATE 4 MG: 4 INJECTION INTRAVENOUS at 12:01

## 2025-01-21 RX ADMIN — ACYCLOVIR 800 MG: 400 TABLET ORAL at 05:01

## 2025-01-21 RX ADMIN — MORPHINE SULFATE 4 MG: 4 INJECTION INTRAVENOUS at 10:01

## 2025-01-21 RX ADMIN — ATORVASTATIN CALCIUM 80 MG: 40 TABLET, FILM COATED ORAL at 08:01

## 2025-01-21 RX ADMIN — GABAPENTIN 300 MG: 300 CAPSULE ORAL at 08:01

## 2025-01-21 RX ADMIN — ACYCLOVIR 800 MG: 400 TABLET ORAL at 12:01

## 2025-01-21 RX ADMIN — CARVEDILOL 3.12 MG: 3.12 TABLET, FILM COATED ORAL at 08:01

## 2025-01-21 RX ADMIN — WHITE PETROLATUM 41 % TOPICAL OINTMENT: at 09:01

## 2025-01-21 RX ADMIN — MICONAZOLE NITRATE: 20 POWDER TOPICAL at 09:01

## 2025-01-21 RX ADMIN — FUROSEMIDE 15 MG/HR: 10 INJECTION, SOLUTION INTRAMUSCULAR; INTRAVENOUS at 03:01

## 2025-01-21 RX ADMIN — MICONAZOLE NITRATE: 20 POWDER TOPICAL at 08:01

## 2025-01-21 RX ADMIN — ASPIRIN 81 MG: 81 TABLET, COATED ORAL at 08:01

## 2025-01-21 NOTE — PROGRESS NOTES
[facility]  Progress Note    Patient Name: Gideon Ross  MRN: [id number]  Patient Class: IP- Inpatient   Admission Date: 1/9/2025  Length of Stay: 12 days  Attending Physician: Lauren Lawler MD  Primary Care Provider: Jennifer, Primary Doctor        Subjective     Principal Problem:Acute systolic congestive heart failure        HPI:  Mr. Ross is a 49 yom w/pmh significant for PID status post bilateral above-the-knee amputations, diabetes mellitus who presented for significant bilateral lower extremity edema and scrotal swelling, as well as intractable pain secondary to scrotal swelling.  Labs largely unremarkable.  BNP 3500, troponin 3300.  Patient was started on IV diuresis and pain control.  Cardiology was consulted.  Patient was initially started on heparin infusion.  On exam he has significant pain and tenderness due to significant scrotal swelling.    Overview/Hospital Course:  Patient with history of peripheral artery disease status post bilateral AKA, diabetes, substance abuse presents to the emergency room with complaints of lower extremity and scrotal edema.  BNP and troponin elevated.  Cardiology consulted.  Initially started on heparin drip but then transitioned to Lovenox.  Echocardiogram showed EF 25%.  Addiction Medicine consulted for heroin use.  Started on Suboxone and reports improvement in symptoms.  Diuretics held  when he developed MARLENE.  Needs LifeVest on discharge. He was started on dobutamine on 1/11/25, but overnight about 0200 he had Vtach about 50 beats and dobutamine turned off. He has a catheter for strict I&O with MARLENE, but is describing dysuria, there is purulent drainage at the end of the catheter so UA was obtained with concerns of UTI and pt placed on rocephin, catheter removed and he has been voiding. Urine culture with NGTD - will complete 3 days of Rocephin.  Nephrology has been consulted. Titrating lasix as tolerated along with albumin.  Cr up  trending. Weber was placed by urology on 1/17. Patient is diuresing well on Lasix drip.     Interval History: Patient is diuresing well and feeling better. Swelling is improving. No new complains.     Review of Systems  Objective:     Vital Signs (Most Recent):  Temp: 97.7 °F (36.5 °C) (01/21/25 1117)  Pulse: 91 (01/21/25 1117)  Resp: 16 (01/21/25 1235)  BP: 134/87 (01/21/25 1117)  SpO2: (!) 94 % (01/21/25 1117) Vital Signs (24h Range):  Temp:  [97.3 °F (36.3 °C)-98.2 °F (36.8 °C)] 97.7 °F (36.5 °C)  Pulse:  [] 91  Resp:  [14-18] 16  SpO2:  [94 %-100 %] 94 %  BP: (125-151)/(63-89) 134/87     Weight: 117.9 kg (260 lb)  Body mass index is 37.31 kg/m².    Intake/Output Summary (Last 24 hours) at 1/21/2025 1258  Last data filed at 1/21/2025 0950  Gross per 24 hour   Intake 1942.48 ml   Output 5050 ml   Net -3107.52 ml      Physical Exam  Vitals and nursing note reviewed. Exam conducted with a chaperone present.   Constitutional:       General: He is not in acute distress.     Appearance: Normal appearance. He is obese. He is not ill-appearing, toxic-appearing or diaphoretic.   Cardiovascular   Normal heart sounds, there is edema on both lower extremity stumps   Pulmonary:      Effort: Pulmonary effort is normal.      Breath sounds: coarse breath sounds   Genitourinary:     Comments: Scrotal Edema   Musculoskeletal:         General: Deformity present.      Comments: Right BKA/ Left AKA   Skin:     General: Skin is warm and dry.   Neurological:      Mental Status: He is alert.   Psychiatric:         Mood and Affect: Mood normal.         Behavior: Behavior normal.         Thought Content: Thought content normal.     Significant Labs: All pertinent labs within the past 24 hours have been reviewed.  CBC:   Recent Labs   Lab 01/20/25  0523 01/21/25  0510   WBC 11.68 11.00   HGB 8.6* 8.1*   HCT 27.6* 25.9*    308     CMP:   Recent Labs   Lab 01/20/25  0523 01/21/25  0510   * 134*   K 4.8 4.5   CL 97 96   CO2  28 30*   * 161*   BUN 87* 91*   CREATININE 2.3* 2.1*   CALCIUM 9.3 9.5   PROT 6.7 6.6   ALBUMIN 3.5 3.5   BILITOT 0.4 0.4   ALKPHOS 261* 212*   AST 18 17   ALT 16 14   ANIONGAP 10 8       Significant Imaging: I have reviewed all pertinent imaging results/findings within the past 24 hours.    Assessment and Plan     * Acute systolic congestive heart failure  Patient has Systolic (HFrEF) heart failure that is Acute. On presentation their CHF was decompensated. Evidence of decompensated CHF on presentation includes: edema. The etiology of their decompensation is likely substance abuse .   Latest ECHO  Results for orders placed during the hospital encounter of 01/09/25    Echo    Interpretation Summary    Left Ventricle: The left ventricle is moderately dilated. Mildly increased wall thickness. There is mild asymmetric hypertrophy. Severe global hypokinesis present. There is severely reduced systolic function with a visually estimated ejection fraction of 25 - 30%.    Right Ventricle: Moderate right ventricular enlargement. Systolic function is mildly reduced.    Left Atrium: Left atrium is mildly dilated.    Tricuspid Valve: There is mild regurgitation.    IVC/SVC: Elevated venous pressure at 15 mmHg.    Current Heart Failure Medications  hydrALAZINE injection 5 mg, Every 6 hours PRN, Intravenous  furosemide (Lasix) 200 mg in 0.9% NaCl SolP 100 mL continuous infusion (conc: 2 mg/mL), Continuous, Intravenous  carvediloL tablet 3.125 mg, 2 times daily, Oral    Plan  - Monitor strict I&Os and daily weights.    - nephrology and cardiology managing diuresis. Patient is on Lasix drip with albumin, cont   - Cont Coreg  - recommending life vest on discharge   - ischemic work up per cardiology     Urinary tract infection associated with indwelling urethral catheter  Catheter in place for severe acute decompensated heart failure and need for strict I&O with concern of cardiorenal syndrome   Completed 4 day course of  ceftriaxone        Opioid use disorder  Addiction medicine consulted   Improved on Suboxone    Per Addiction medicine:   For first dose of buprenorphine (at least 18 hours since last use of fentanyl) give one or two 8 mg strips (8-16 mg total).  Wait 1 hour and then...  If mild withdrawal, give another 8 mg strip.  If more significant withdrawal, give two more 8 mg strips.  If relaxed or calm, do not give any more.  Wait 1-2 more hours...  If still have withdrawal symptoms, take another 8 mg strip  If relaxed or calm, do not take any more  Try not to give more than 32 mg (4 strips) on day one.  On day 2, give 8 mg BID   Continue taking 8 mg BID until seeing in clinic.    MARLENE (acute kidney injury)  MARLENE is likely due to Cardiorenal syndrome. Baseline creatinine is  1 . Most recent creatinine and eGFR are listed below.  Recent Labs     01/19/25  0516 01/20/25  0523 01/21/25  0510   CREATININE 2.2* 2.3* 2.1*   EGFRNORACEVR 36.3* 34.4* 38.4*        Plan  - Avoid nephrotoxins and renally dose meds for GFR listed above  - Monitor urine output, serial BMP, and adjust therapy as needed  - nephrology following   - Close monitoring  - Diurese as above     Diabetes mellitus  Patient's FSGs are controlled on current medication regimen.  Last A1c reviewed-   Lab Results   Component Value Date    HGBA1C 7.5 (H) 01/10/2025     Most recent fingerstick glucose reviewed-   Recent Labs   Lab 01/20/25  2051 01/21/25  0559 01/21/25  1114   POCTGLUCOSE 178* 186* 143*       Current correctional scale  Low  Maintain anti-hyperglycemic dose as follows-   Antihyperglycemics (From admission, onward)      Start     Stop Route Frequency Ordered    01/10/25 1515  insulin aspart U-100 pen 0-10 Units         -- SubQ Before meals & nightly PRN 01/10/25 1415          Hold Oral hypoglycemics while patient is in the hospital.    Elevated troponin  Acute nonischemic, nontraumatic myocardial injury due to acute on chronic HFrEF  Troponin elevated but  flat, cardiology following  No chest pain or shortness of breath         Scrotal edema  Secondary to acute decompensated heart failure  Elevate scrotum  Scrotal ultrasound reviewed  Diuresing as per Nephrology recommendation    Obesity  Body mass index is 39.89 kg/m². Morbid obesity complicates all aspects of disease management from diagnostic modalities to treatment. Weight loss encouraged and health benefits explained to patient.           VTE Risk Mitigation (From admission, onward)           Ordered     enoxaparin injection 120 mg  Every 12 hours         01/10/25 0724     IP VTE HIGH RISK PATIENT  Once         01/09/25 1326     Place sequential compression device  Until discontinued         01/09/25 1326     Reason for no Mechanical VTE Prophylaxis  Once        Question:  Reasons:  Answer:  Physician Provided (leave comment)  Comment:  already on heparin infusion    01/09/25 0906                    Discharge Planning   KATHERINE: 1/22/2025     Code Status: Full Code   Medical Readiness for Discharge Date:   Discharge Plan A: Home   Discharge Delays: None known at this time                    Lauren Lawler MD  Department of Hospital Medicine   [facility]

## 2025-01-21 NOTE — ASSESSMENT & PLAN NOTE
Patient's FSGs are controlled on current medication regimen.  Last A1c reviewed-   Lab Results   Component Value Date    HGBA1C 7.5 (H) 01/10/2025     Most recent fingerstick glucose reviewed-   Recent Labs   Lab 01/20/25 2051 01/21/25  0559 01/21/25  1114   POCTGLUCOSE 178* 186* 143*       Current correctional scale  Low  Maintain anti-hyperglycemic dose as follows-   Antihyperglycemics (From admission, onward)    Start     Stop Route Frequency Ordered    01/10/25 1515  insulin aspart U-100 pen 0-10 Units         -- SubQ Before meals & nightly PRN 01/10/25 1415        Hold Oral hypoglycemics while patient is in the hospital.

## 2025-01-21 NOTE — PLAN OF CARE
Problem: Infection  Goal: Absence of Infection Signs and Symptoms  1/21/2025 0744 by Ranjeet Gomez RN  Outcome: Progressing  1/21/2025 0744 by Ranjeet Gomez RN  Outcome: Progressing     Problem: Adult Inpatient Plan of Care  Goal: Plan of Care Review  1/21/2025 0744 by Ranjeet Gomez RN  Outcome: Progressing  1/21/2025 0744 by Ranjeet Gomez RN  Outcome: Progressing  Goal: Patient-Specific Goal (Individualized)  1/21/2025 0744 by Ranjeet Gomez RN  Outcome: Progressing  1/21/2025 0744 by Ranjeet Gomez RN  Outcome: Progressing  Goal: Absence of Hospital-Acquired Illness or Injury  1/21/2025 0744 by Ranjeet Gomez RN  Outcome: Progressing  1/21/2025 0744 by Ranjeet Gomez RN  Outcome: Progressing  Goal: Optimal Comfort and Wellbeing  1/21/2025 0744 by Ranjeet Gomez RN  Outcome: Progressing  1/21/2025 0744 by Ranjeet Gomez RN  Outcome: Progressing  Goal: Readiness for Transition of Care  1/21/2025 0744 by Ranjeet Gomez RN  Outcome: Progressing  1/21/2025 0744 by Ranjeet Gomez RN  Outcome: Progressing     Problem: Fall Injury Risk  Goal: Absence of Fall and Fall-Related Injury  1/21/2025 0744 by Ranjeet Gomez RN  Outcome: Progressing  1/21/2025 0744 by Ranjeet Gomez RN  Outcome: Progressing     Problem: Heart Failure  Goal: Optimal Coping  1/21/2025 0744 by Ranjeet Gomez RN  Outcome: Progressing  1/21/2025 0744 by Ranjeet Gomez RN  Outcome: Progressing  Goal: Optimal Cardiac Output  1/21/2025 0744 by Ranjeet Gomez RN  Outcome: Progressing  1/21/2025 0744 by Ranjeet Gomez RN  Outcome: Progressing  Goal: Stable Heart Rate and Rhythm  1/21/2025 0744 by Ranjeet Gomez RN  Outcome: Progressing  1/21/2025 0744 by Ranjeet Gomez RN  Outcome: Progressing  Goal: Optimal Functional Ability  1/21/2025 0744 by Ranjeet Gomez RN  Outcome: Progressing  1/21/2025 0744 by Ranjeet Gomez RN  Outcome: Progressing  Goal: Fluid and Electrolyte Balance  1/21/2025 0744 by Ranjeet Gomez, RN  Outcome:  Progressing  1/21/2025 0744 by Ranjeet Gomez RN  Outcome: Progressing  Goal: Improved Oral Intake  1/21/2025 0744 by Ranjeet Gomez RN  Outcome: Progressing  1/21/2025 0744 by Ranjeet Gomez RN  Outcome: Progressing  Goal: Effective Oxygenation and Ventilation  1/21/2025 0744 by Ranjeet Gomez RN  Outcome: Progressing  1/21/2025 0744 by Ranjeet Gomez RN  Outcome: Progressing  Goal: Effective Breathing Pattern During Sleep  1/21/2025 0744 by Ranjeet Gomez RN  Outcome: Progressing  1/21/2025 0744 by Ranjeet Gomez RN  Outcome: Progressing     Problem: Urinary Retention  Goal: Effective Urinary Elimination  1/21/2025 0744 by Ranjeet Gomez RN  Outcome: Progressing  1/21/2025 0744 by Ranjeet Gomez RN  Outcome: Progressing     Problem: Diabetes Comorbidity  Goal: Blood Glucose Level Within Targeted Range  1/21/2025 0744 by Ranjeet Gomez RN  Outcome: Progressing  1/21/2025 0744 by Ranjeet Gomez RN  Outcome: Progressing     Problem: Wound  Goal: Optimal Coping  1/21/2025 0744 by Ranjeet Gomez RN  Outcome: Progressing  1/21/2025 0744 by Ranjeet Gomez RN  Outcome: Progressing  Goal: Optimal Functional Ability  1/21/2025 0744 by Ranjeet Gomez RN  Outcome: Progressing  1/21/2025 0744 by Ranjeet Gomez RN  Outcome: Progressing  Goal: Absence of Infection Signs and Symptoms  1/21/2025 0744 by Ranjeet Gomez RN  Outcome: Progressing  1/21/2025 0744 by Ranjeet Gomez RN  Outcome: Progressing  Goal: Improved Oral Intake  1/21/2025 0744 by Ranjeet Gomez RN  Outcome: Progressing  1/21/2025 0744 by Ranjeet Gomez RN  Outcome: Progressing  Goal: Optimal Pain Control and Function  1/21/2025 0744 by Ranjeet Gomez RN  Outcome: Progressing  1/21/2025 0744 by Ranjeet Gomez RN  Outcome: Progressing  Goal: Skin Health and Integrity  1/21/2025 0744 by Ranjeet Gomez RN  Outcome: Progressing  1/21/2025 0744 by Ranjeet Gomez RN  Outcome: Progressing  Goal: Optimal Wound Healing  1/21/2025 0744 by Patricia  PARVEEN Pollack  Outcome: Progressing  1/21/2025 0744 by Ranjeet Gomez RN  Outcome: Progressing     Problem: Acute Kidney Injury/Impairment  Goal: Fluid and Electrolyte Balance  1/21/2025 0744 by Ranjeet Gomez RN  Outcome: Progressing  1/21/2025 0744 by Ranjeet Gomez RN  Outcome: Progressing  Goal: Improved Oral Intake  1/21/2025 0744 by Ranjeet Gomez RN  Outcome: Progressing  1/21/2025 0744 by Ranjeet Gomez RN  Outcome: Progressing  Goal: Effective Renal Function  1/21/2025 0744 by Ranjeet Gomez RN  Outcome: Progressing  1/21/2025 0744 by Ranjeet Gomez RN  Outcome: Progressing     Problem: Skin Injury Risk Increased  Goal: Skin Health and Integrity  1/21/2025 0744 by Ranjeet Gomez RN  Outcome: Progressing  1/21/2025 0744 by Ranjeet Gomez, RN  Outcome: Progressing     Problem: Bariatric Environmental Safety  Goal: Safety Maintained with Care  1/21/2025 0744 by Ranjeet Gomez RN  Outcome: Progressing  1/21/2025 0744 by Ranjeet Gomez, RN  Outcome: Progressing

## 2025-01-21 NOTE — PLAN OF CARE
[facility] was delivered to patient.      01/21/25 1612   Post-Acute Status   Post-Acute Authorization HME   HME Status Set-up Complete/Auth obtained

## 2025-01-21 NOTE — PROGRESS NOTES
Nephrology Consult Note        Patient Name: Gideon Ross  MRN: [id number]    Patient Class: IP- Inpatient   Admission Date: 1/9/2025  Length of Stay: 12 days  Date of Service: 1/21/2025    Attending Physician: Lauren Lawler MD  Primary Care Provider: Jennifer, Primary Doctor    Reason for Consult: marlene    SUBJECTIVE:     HPI: 49M with PID, bilateral BKA, DM presented for significant bilateral lower extremity edema, scrotal swelling, intractable pain. Labs largely unremarkable other than BNP 3,500, troponin 3,300. Patient was started on IV diuretics and pain control. Cardiology was consulted. Initially started on heparin drip but then transitioned to Lovenox. Echocardiogram showed EF 25%. Addiction Medicine consulted for heroin use. Started on Suboxone and reports improvement in symptoms. Diuretics held 2/2 worsening sCr and MARLENE. He was started on dobutamine on 1/11/25, but was stopped 2/2 tachycardia. He has a catheter for strict I&O with MARLENE, but is describing dysuria, there is purulent drainage at the end of the catheter so UA was obtained with concerns of UTI and pt placed on rocephin and recommendations for catheter exchange giving continued MARLENE and need for strict I&O with decompensated heart failure.      1/14 Agree with diuretics and albumin, will consider escalation or ultrafiltration. Not sure how acute the HF is. Consider palliative eval as well.  1/15 VSS. Good UO with diuretics+ albumin, will escalate more tomorrow if he remains stable. Not sure if UF will be better tolerated than diuretics.  1/16 VSS. Ensure dietary compliance with salt and liquids. Keep IOs tally. Will give more albumin and lasix.  1/17 VSS. Agree with empiric acyclovir for possible shingles. Agree with escalation to lasix gtt as d/w cards. Strict IOS and fluid restriction enforcement.  1/18 VSS. Greatly appreciate Urology help with valle placement and management of scrotal edema.  1/19 VSS. Scr finally a little better, UO 4L via  leonard. Continue present management, monitor labs, replete lytes, enforce liquid restriction.  1/20  Scr w/small bump today.  UOP 4.9L, no new complaints.  1/21 vitals stable, UOP 5L, net -14.5L, audio telehealth visit attempted- patient did not answer phone x 2     ASSESSMENT/PLAN:     MARLENE 2/2 ATN due to intravascular volume depletion from diuretics  Hypoalbuminemia with dependent edema due to third-spacing and immobility  HFrEF ? Etiology, acuity, prognosis?  PAD  DM  Anemia  CKD stage 2, sCr 1 om 1/9/2025  Shingles    Diuresing well however starting to develop contraction alkalosis  BP stable with albumin - leave same  Cut back lasix gtt to 10mg/hr  No acute RRT needs    Thank you for allowing us to participate in the care of your patient!   We will follow the patient and provide recommendations as needed.         Laboratory:  Recent Labs   Lab 01/19/25  0516 01/20/25  0523 01/21/25  0510   * 135* 134*   K 4.8 4.8 4.5   CL 99 97 96   CO2 26 28 30*   BUN 86* 87* 91*   CREATININE 2.2* 2.3* 2.1*   * 191* 161*       Recent Labs   Lab 01/19/25  0516 01/20/25  0523 01/21/25  0510   CALCIUM 9.1 9.3 9.5   ALBUMIN 3.4* 3.5 3.5   MG 1.9 1.9 1.9             Recent Labs   Lab 01/18/25 2055 01/19/25  0547 01/19/25  1123 01/19/25  1623 01/19/25 2010 01/20/25  0625 01/20/25  1116 01/20/25 2051 01/21/25  0559 01/21/25  1114   POCTGLUCOSE 128* 144* 157* 167* 185* 185* 176* 178* 186* 143*       Recent Labs   Lab 01/10/25  0721   Hemoglobin A1C 7.5 H       Recent Labs   Lab 01/19/25  0516 01/20/25  0523 01/21/25  0510   WBC 13.14* 11.68 11.00   HGB 8.3* 8.6* 8.1*   HCT 27.0* 27.6* 25.9*    301 308   MCV 83 83 81*   MCHC 30.7* 31.2* 31.3*   MONO 12.8  1.7* 12.7  1.5* 14.1  1.6*   EOSINOPHIL 7.9 8.4* 10.3*       Recent Labs   Lab 01/19/25  0516 01/20/25  0523 01/21/25  0510   BILITOT 0.4 0.4 0.4   PROT 6.4 6.7 6.6   ALBUMIN 3.4* 3.5 3.5   ALKPHOS 253* 261* 212*   ALT 12 16 14   AST 16 18 17       Recent Labs    Lab 07/19/22  2316 01/09/25  0743 01/12/25  1055   Color, UA Yellow Yellow [place] A   Appearance, UA Clear Hazy A Hazy A   pH, UA 6.0 6.0 6.0   Specific Gravity, UA <=1.005 1.025 1.010   Protein, UA Trace A 3+ A 1+ A   Glucose, UA 4+ A Trace A Negative   Ketones, UA Negative Negative Negative   Urobilinogen, UA Negative 2.0-3.0 A Negative   Bilirubin (UA) Negative Negative Negative   Occult Blood UA Negative 2+ A 3+ A   Nitrite, UA Negative Negative Negative   RBC, UA 1 7 H >100 H   WBC, UA  --  4 18 H   Bacteria None Occasional Rare   Hyaline Casts, UA  --  4 A 0       Recent Labs   Lab 07/19/22  2046 01/09/25  0707   POC PH 7.473 H  --    POC PCO2 42.4  --    POC HCO3 31.1 H  --    POC PO2 28 L  --    POC SATURATED O2 58 L  --    POC BE 7  --    Sample VENOUS VENOUS       Microbiology Results (last 7 days)       Procedure Component Value Units Date/Time    Urine culture [[id number]] Collected: 01/12/25 1055    Order Status: Completed Specimen: Urine Updated: 01/15/25 0640     Urine Culture, Routine No growth    Narrative:      Specimen Source->Urine            Review of patient's allergies indicates:  No Known Allergies    Outpatient meds:  No current facility-administered medications on file prior to encounter.     Current Outpatient Medications on File Prior to Encounter   Medication Sig Dispense Refill    amLODIPine (NORVASC) 10 MG tablet Take 1 tablet (10 mg total) by mouth once daily. 30 tablet 1    aspirin (ECOTRIN) 81 MG EC tablet Take 1 tablet (81 mg total) by mouth once daily. 30 tablet 1    atorvastatin (LIPITOR) 80 MG tablet Take 1 tablet (80 mg total) by mouth once daily. 30 tablet 1    baclofen (LIORESAL) 10 MG tablet Take 1 tablet (10 mg total) by mouth 3 (three) times daily. 90 tablet 1    carvedilol (COREG) 6.25 MG tablet Take 1 tablet (6.25 mg total) by mouth 2 (two) times daily. 60 tablet 1    diazePAM (VALIUM) 5 MG tablet Take 1 tablet (5 mg total) by mouth every 12 (twelve) hours as needed  for Anxiety (anxiety.). 30 tablet 0    gabapentin (NEURONTIN) 300 MG capsule Take 2 capsules (600 mg total) by mouth 3 (three) times daily. 90 capsule 1    hydroCHLOROthiazide (HYDRODIURIL) 50 MG tablet Take 1 tablet (50 mg total) by mouth once daily. 30 tablet 1    insulin aspart U-100 (NOVOLOG) 100 unit/mL InPn pen Inject 18 Units into the skin 3 (three) times daily. (Patient taking differently: Inject into the skin 3 (three) times daily.) 10 mL 1    insulin detemir U-100 (LEVEMIR FLEXTOUCH) 100 unit/mL (3 mL) SubQ InPn pen Inject 35 Units into the skin 2 (two) times daily. 10 mL 1    insulin glargine U-100, Lantus, 100 unit/mL injection Inject 30 Units into the skin every evening. (Patient not taking: Reported on 1/9/2025)      lisinopril (PRINIVIL,ZESTRIL) 40 MG tablet Take 1 tablet (40 mg total) by mouth once daily. 30 tablet 1    nicotine (NICODERM CQ) 14 mg/24 hr Place 1 patch onto the skin once daily. (Patient not taking: Reported on 1/9/2025) 15 patch 0    nortriptyline (PAMELOR) 25 MG capsule Take 1 capsule (25 mg total) by mouth 3 (three) times daily. 30 capsule 1    oxyCODONE (ROXICODONE) 5 MG immediate release tablet Take 1 tablet (5 mg total) by mouth every 12 (twelve) hours as needed. (Patient not taking: Reported on 1/9/2025) 30 tablet 0    polyethylene glycol (GLYCOLAX) 17 gram PwPk Take 17 g by mouth daily as needed. (Patient not taking: Reported on 1/9/2025) 30 each 1    QUEtiapine (SEROQUEL) 100 MG Tab Take 1 tablet (100 mg total) by mouth every evening. 30 tablet 1    sofosbuvir-velpatasvir (EPCLUSA) 400-100 mg Tab Take 1 tablet daily. (Patient not taking: Reported on 1/9/2025) 28 tablet 2       Scheduled meds:   acyclovir  800 mg Oral 5x Daily    albumin human 25%  12.5 g Intravenous BID    aspirin  81 mg Oral Daily    atorvastatin  80 mg Oral Daily    baclofen  5 mg Oral BID    buprenorphine-naloxone 8-2 mg  1 Film Sublingual BID    carvediloL  3.125 mg Oral BID    enoxparin  1 mg/kg  Subcutaneous Q12H (treatment, non-standard time)    gabapentin  300 mg Oral BID    miconazole NITRATE 2 %   Topical (Top) BID    tamsulosin  0.4 mg Oral Daily    white petrolatum   Topical (Top) Daily       Infusions:   furosemide (LASIX) 2 mg/mL continuous infusion (non-titrating)  15 mg/hr Intravenous Continuous 7.5 mL/hr at 01/21/25 0607 15 mg/hr at 01/21/25 0607       PRN meds:    Current Facility-Administered Medications:     acetaminophen, 650 mg, Oral, Q4H PRN    aluminum-magnesium hydroxide-simethicone, 30 mL, Oral, QID PRN    dextrose 50%, 12.5 g, Intravenous, PRN    dextrose 50%, 25 g, Intravenous, PRN    glucagon (human recombinant), 1 mg, Intramuscular, PRN    glucose, 16 g, Oral, PRN    glucose, 24 g, Oral, PRN    hydrALAZINE, 5 mg, Intravenous, Q6H PRN    insulin aspart U-100, 0-10 Units, Subcutaneous, QID (AC + HS) PRN    LIDOcaine HCl 2%, , Mucous Membrane, Once PRN    loperamide, 2 mg, Oral, QID PRN    LORazepam, 0.5 mg, Oral, Q6H PRN    magnesium oxide, 800 mg, Oral, PRN    magnesium oxide, 800 mg, Oral, PRN    melatonin, 6 mg, Oral, Nightly PRN    morphine, 4 mg, Intravenous, Q3H PRN    naloxone, 0.02 mg, Intravenous, PRN    ondansetron, 4 mg, Intravenous, Q6H PRN    oxyCODONE-acetaminophen, 1 tablet, Oral, Q4H PRN    oxyCODONE-acetaminophen, 1 tablet, Oral, Q4H PRN    potassium bicarbonate, 35 mEq, Oral, PRN    potassium bicarbonate, 50 mEq, Oral, PRN    potassium bicarbonate, 60 mEq, Oral, PRN    potassium, sodium phosphates, 2 packet, Oral, PRN    potassium, sodium phosphates, 2 packet, Oral, PRN    potassium, sodium phosphates, 2 packet, Oral, PRN    senna-docusate 8.6-50 mg, 1 tablet, Oral, Daily PRN    sodium chloride 0.9%, 10 mL, Intravenous, Q12H PRN    sodium chloride 0.9%, 10 mL, Intravenous, PRN    Past Medical History:   Diagnosis Date    Diabetes mellitus     Hypertension      Past Surgical History:   Procedure Laterality Date    SKIN GRAFT       No family history on file.  Social  History     Tobacco Use    Smoking status: Every Day     Current packs/day: 1.00     Types: Cigarettes   Substance Use Topics    Alcohol use: Yes    Drug use: No       OBJECTIVE:     Vital Signs and IO:  Temp:  [97.3 °F (36.3 °C)-98.2 °F (36.8 °C)]   Pulse:  []   Resp:  [14-18]   BP: (125-151)/(63-89)   SpO2:  [94 %-100 %]   I/O last 3 completed shifts:  In: 2182.5 [P.O.:1824; I.V.:358.5]  Out: 7700 [Urine:7700]  Wt Readings from Last 5 Encounters:   01/21/25 117.9 kg (260 lb)   07/19/22 99.8 kg (220 lb)   02/09/20 104.3 kg (230 lb)   08/06/19 113.4 kg (250 lb)   02/27/19 108.9 kg (240 lb)     Body mass index is 37.31 kg/m².    Physical Exam  Constitutional:       General: Patient is not in acute distress.     Appearance: Patient is well-developed. She is not diaphoretic.   HENT:      Head: Normocephalic and atraumatic.      Mouth/Throat: Mucous membranes are moist.   Eyes:      General: No scleral icterus.     Pupils: Pupils are equal, round, and reactive to light.   Cardiovascular:      Rate and Rhythm: Normal rate and regular rhythm.   Pulmonary:      Effort: Pulmonary effort is normal. No respiratory distress.      Breath sounds: No stridor.   Abdominal:      General: There is no distension.      Palpations: Abdomen is soft.   Musculoskeletal:         General: No deformity. Normal range of motion.      Cervical back: Neck supple.   Skin:     General: Skin is warm and dry.      Findings: No rash present. No erythema.   Neurological:      Mental Status: Patient is alert and oriented to person, place, and time.      Cranial Nerves: No cranial nerve deficit.   Psychiatric:         Behavior: Behavior normal.          Patient care time was spent personally by me on the following activities:     Obtaining a history.  Examination of patient.  Providing medical care at the patients bedside.  Developing a treatment plan with patient or surrogate and bedside caregivers.  Ordering and reviewing laboratory studies,  radiographic studies, pulse oximetry.  Ordering and performing treatments and interventions.  Evaluation of patient's response to treatment.  Discussions with consultants while on the unit and immediately available to the patient.  Re-evaluation of the patient's condition.  Documentation in the medical record.     Essie Rodríguez MD      [facility]  [address] [place] [address]  [place] [id number]    [phone] - tel  [phone] - fax    1/21/2025

## 2025-01-21 NOTE — SUBJECTIVE & OBJECTIVE
Interval History: Patient is diuresing well and feeling better. Swelling is improving. No new complains.     Review of Systems  Objective:     Vital Signs (Most Recent):  Temp: 97.7 °F (36.5 °C) (01/21/25 1117)  Pulse: 91 (01/21/25 1117)  Resp: 16 (01/21/25 1235)  BP: 134/87 (01/21/25 1117)  SpO2: (!) 94 % (01/21/25 1117) Vital Signs (24h Range):  Temp:  [97.3 °F (36.3 °C)-98.2 °F (36.8 °C)] 97.7 °F (36.5 °C)  Pulse:  [] 91  Resp:  [14-18] 16  SpO2:  [94 %-100 %] 94 %  BP: (125-151)/(63-89) 134/87     Weight: 117.9 kg (260 lb)  Body mass index is 37.31 kg/m².    Intake/Output Summary (Last 24 hours) at 1/21/2025 1258  Last data filed at 1/21/2025 0950  Gross per 24 hour   Intake 1942.48 ml   Output 5050 ml   Net -3107.52 ml      Physical Exam  Vitals and nursing note reviewed. Exam conducted with a chaperone present.   Constitutional:       General: He is not in acute distress.     Appearance: Normal appearance. He is obese. He is not ill-appearing, toxic-appearing or diaphoretic.   Cardiovascular   Normal heart sounds, there is edema on both lower extremity stumps   Pulmonary:      Effort: Pulmonary effort is normal.      Breath sounds: coarse breath sounds   Genitourinary:     Comments: Scrotal Edema   Musculoskeletal:         General: Deformity present.      Comments: Right BKA/ Left AKA   Skin:     General: Skin is warm and dry.   Neurological:      Mental Status: He is alert.   Psychiatric:         Mood and Affect: Mood normal.         Behavior: Behavior normal.         Thought Content: Thought content normal.     Significant Labs: All pertinent labs within the past 24 hours have been reviewed.  CBC:   Recent Labs   Lab 01/20/25  0523 01/21/25  0510   WBC 11.68 11.00   HGB 8.6* 8.1*   HCT 27.6* 25.9*    308     CMP:   Recent Labs   Lab 01/20/25 0523 01/21/25  0510   * 134*   K 4.8 4.5   CL 97 96   CO2 28 30*   * 161*   BUN 87* 91*   CREATININE 2.3* 2.1*   CALCIUM 9.3 9.5   PROT 6.7  6.6   ALBUMIN 3.5 3.5   BILITOT 0.4 0.4   ALKPHOS 261* 212*   AST 18 17   ALT 16 14   ANIONGAP 10 8       Significant Imaging: I have reviewed all pertinent imaging results/findings within the past 24 hours.

## 2025-01-21 NOTE — ASSESSMENT & PLAN NOTE
MARLENE is likely due to Cardiorenal syndrome. Baseline creatinine is  1 . Most recent creatinine and eGFR are listed below.  Recent Labs     01/19/25  0516 01/20/25  0523 01/21/25  0510   CREATININE 2.2* 2.3* 2.1*   EGFRNORACEVR 36.3* 34.4* 38.4*        Plan  - Avoid nephrotoxins and renally dose meds for GFR listed above  - Monitor urine output, serial BMP, and adjust therapy as needed  - nephrology following   - Close monitoring  - Diurese as above

## 2025-01-22 LAB
ALBUMIN SERPL BCP-MCNC: 3.6 G/DL (ref 3.5–5.2)
ALP SERPL-CCNC: 207 U/L (ref 55–135)
ALT SERPL W/O P-5'-P-CCNC: 13 U/L (ref 10–44)
ANION GAP SERPL CALC-SCNC: 10 MMOL/L (ref 8–16)
AST SERPL-CCNC: 15 U/L (ref 10–40)
BASOPHILS # BLD AUTO: 0.04 K/UL (ref 0–0.2)
BASOPHILS NFR BLD: 0.4 % (ref 0–1.9)
BILIRUB SERPL-MCNC: 0.4 MG/DL (ref 0.1–1)
BUN SERPL-MCNC: 89 MG/DL (ref 6–20)
CALCIUM SERPL-MCNC: 9.4 MG/DL (ref 8.7–10.5)
CHLORIDE SERPL-SCNC: 93 MMOL/L (ref 95–110)
CO2 SERPL-SCNC: 29 MMOL/L (ref 23–29)
CREAT SERPL-MCNC: 1.9 MG/DL (ref 0.5–1.4)
DIFFERENTIAL METHOD BLD: ABNORMAL
EOSINOPHIL # BLD AUTO: 1.1 K/UL (ref 0–0.5)
EOSINOPHIL NFR BLD: 10.4 % (ref 0–8)
ERYTHROCYTE [DISTWIDTH] IN BLOOD BY AUTOMATED COUNT: 15.1 % (ref 11.5–14.5)
EST. GFR  (NO RACE VARIABLE): 43.2 ML/MIN/1.73 M^2
GLUCOSE SERPL-MCNC: 199 MG/DL (ref 70–110)
HCT VFR BLD AUTO: 25.9 % (ref 40–54)
HGB BLD-MCNC: 8 G/DL (ref 14–18)
IMM GRANULOCYTES # BLD AUTO: 0.05 K/UL (ref 0–0.04)
IMM GRANULOCYTES NFR BLD AUTO: 0.5 % (ref 0–0.5)
LYMPHOCYTES # BLD AUTO: 1.4 K/UL (ref 1–4.8)
LYMPHOCYTES NFR BLD: 13.9 % (ref 18–48)
MAGNESIUM SERPL-MCNC: 1.9 MG/DL (ref 1.6–2.6)
MCH RBC QN AUTO: 25.5 PG (ref 27–31)
MCHC RBC AUTO-ENTMCNC: 30.9 G/DL (ref 32–36)
MCV RBC AUTO: 83 FL (ref 82–98)
MONOCYTES # BLD AUTO: 1.5 K/UL (ref 0.3–1)
MONOCYTES NFR BLD: 15 % (ref 4–15)
NEUTROPHILS # BLD AUTO: 6.1 K/UL (ref 1.8–7.7)
NEUTROPHILS NFR BLD: 59.8 % (ref 38–73)
NRBC BLD-RTO: 0 /100 WBC
PLATELET # BLD AUTO: 334 K/UL (ref 150–450)
PMV BLD AUTO: 10.4 FL (ref 9.2–12.9)
POCT GLUCOSE: 146 MG/DL (ref 70–110)
POCT GLUCOSE: 163 MG/DL (ref 70–110)
POCT GLUCOSE: 163 MG/DL (ref 70–110)
POCT GLUCOSE: 169 MG/DL (ref 70–110)
POTASSIUM SERPL-SCNC: 4.3 MMOL/L (ref 3.5–5.1)
PROT SERPL-MCNC: 6.8 G/DL (ref 6–8.4)
RBC # BLD AUTO: 3.14 M/UL (ref 4.6–6.2)
SODIUM SERPL-SCNC: 132 MMOL/L (ref 136–145)
WBC # BLD AUTO: 10.12 K/UL (ref 3.9–12.7)

## 2025-01-22 PROCEDURE — 25000003 PHARM REV CODE 250: Performed by: STUDENT IN AN ORGANIZED HEALTH CARE EDUCATION/TRAINING PROGRAM

## 2025-01-22 PROCEDURE — 63600175 PHARM REV CODE 636 W HCPCS: Performed by: INTERNAL MEDICINE

## 2025-01-22 PROCEDURE — 25000003 PHARM REV CODE 250: Performed by: NURSE PRACTITIONER

## 2025-01-22 PROCEDURE — 63600175 PHARM REV CODE 636 W HCPCS: Performed by: STUDENT IN AN ORGANIZED HEALTH CARE EDUCATION/TRAINING PROGRAM

## 2025-01-22 PROCEDURE — 12000002 HC ACUTE/MED SURGE SEMI-PRIVATE ROOM

## 2025-01-22 PROCEDURE — 80053 COMPREHEN METABOLIC PANEL: CPT | Performed by: STUDENT IN AN ORGANIZED HEALTH CARE EDUCATION/TRAINING PROGRAM

## 2025-01-22 PROCEDURE — P9047 ALBUMIN (HUMAN), 25%, 50ML: HCPCS | Performed by: INTERNAL MEDICINE

## 2025-01-22 PROCEDURE — 36415 COLL VENOUS BLD VENIPUNCTURE: CPT | Performed by: STUDENT IN AN ORGANIZED HEALTH CARE EDUCATION/TRAINING PROGRAM

## 2025-01-22 PROCEDURE — 99900031 HC PATIENT EDUCATION (STAT)

## 2025-01-22 PROCEDURE — 83735 ASSAY OF MAGNESIUM: CPT | Performed by: STUDENT IN AN ORGANIZED HEALTH CARE EDUCATION/TRAINING PROGRAM

## 2025-01-22 PROCEDURE — 85025 COMPLETE CBC W/AUTO DIFF WBC: CPT | Performed by: STUDENT IN AN ORGANIZED HEALTH CARE EDUCATION/TRAINING PROGRAM

## 2025-01-22 PROCEDURE — 94761 N-INVAS EAR/PLS OXIMETRY MLT: CPT

## 2025-01-22 RX ORDER — ENOXAPARIN SODIUM 100 MG/ML
40 INJECTION SUBCUTANEOUS EVERY 24 HOURS
Status: DISCONTINUED | OUTPATIENT
Start: 2025-01-23 | End: 2025-01-26 | Stop reason: HOSPADM

## 2025-01-22 RX ADMIN — ACYCLOVIR 800 MG: 400 TABLET ORAL at 01:01

## 2025-01-22 RX ADMIN — MORPHINE SULFATE 4 MG: 4 INJECTION INTRAVENOUS at 08:01

## 2025-01-22 RX ADMIN — ATORVASTATIN CALCIUM 80 MG: 40 TABLET, FILM COATED ORAL at 08:01

## 2025-01-22 RX ADMIN — ALBUMIN (HUMAN) 12.5 G: 12.5 SOLUTION INTRAVENOUS at 08:01

## 2025-01-22 RX ADMIN — CARVEDILOL 3.12 MG: 3.12 TABLET, FILM COATED ORAL at 08:01

## 2025-01-22 RX ADMIN — BACLOFEN 5 MG: 5 TABLET ORAL at 08:01

## 2025-01-22 RX ADMIN — MICONAZOLE NITRATE: 20 POWDER TOPICAL at 08:01

## 2025-01-22 RX ADMIN — MORPHINE SULFATE 4 MG: 4 INJECTION INTRAVENOUS at 04:01

## 2025-01-22 RX ADMIN — ACYCLOVIR 800 MG: 400 TABLET ORAL at 04:01

## 2025-01-22 RX ADMIN — MORPHINE SULFATE 4 MG: 4 INJECTION INTRAVENOUS at 06:01

## 2025-01-22 RX ADMIN — BUPRENORPHINE AND NALOXONE 1 FILM: 8; 2 FILM BUCCAL; SUBLINGUAL at 08:01

## 2025-01-22 RX ADMIN — ACYCLOVIR 800 MG: 400 TABLET ORAL at 08:01

## 2025-01-22 RX ADMIN — MORPHINE SULFATE 4 MG: 4 INJECTION INTRAVENOUS at 12:01

## 2025-01-22 RX ADMIN — WHITE PETROLATUM 41 % TOPICAL OINTMENT: at 09:01

## 2025-01-22 RX ADMIN — TAMSULOSIN HYDROCHLORIDE 0.4 MG: 0.4 CAPSULE ORAL at 08:01

## 2025-01-22 RX ADMIN — ASPIRIN 81 MG: 81 TABLET, COATED ORAL at 08:01

## 2025-01-22 RX ADMIN — ENOXAPARIN SODIUM 120 MG: 120 INJECTION SUBCUTANEOUS at 08:01

## 2025-01-22 RX ADMIN — ACYCLOVIR 800 MG: 400 TABLET ORAL at 05:01

## 2025-01-22 RX ADMIN — ALBUMIN (HUMAN) 12.5 G: 12.5 SOLUTION INTRAVENOUS at 09:01

## 2025-01-22 NOTE — ASSESSMENT & PLAN NOTE
MARLENE is likely due to Cardiorenal syndrome. Baseline creatinine is  1 . Most recent creatinine and eGFR are listed below.  Recent Labs     01/20/25  0523 01/21/25  0510 01/22/25  0545   CREATININE 2.3* 2.1* 1.9*   EGFRNORACEVR 34.4* 38.4* 43.2*        Plan  - Avoid nephrotoxins and renally dose meds for GFR listed above  - Monitor urine output, serial BMP, and adjust therapy as needed  - nephrology following   - Close monitoring  - Diurese as above

## 2025-01-22 NOTE — PLAN OF CARE
Problem: Infection  Goal: Absence of Infection Signs and Symptoms  Outcome: Progressing     Problem: Adult Inpatient Plan of Care  Goal: Plan of Care Review  Outcome: Progressing  Goal: Patient-Specific Goal (Individualized)  Outcome: Progressing  Goal: Absence of Hospital-Acquired Illness or Injury  Outcome: Progressing  Goal: Optimal Comfort and Wellbeing  Outcome: Progressing  Goal: Readiness for Transition of Care  Outcome: Progressing     Problem: Fall Injury Risk  Goal: Absence of Fall and Fall-Related Injury  Outcome: Progressing     Problem: Heart Failure  Goal: Optimal Coping  Outcome: Progressing  Goal: Optimal Cardiac Output  Outcome: Progressing  Goal: Stable Heart Rate and Rhythm  Outcome: Progressing  Goal: Optimal Functional Ability  Outcome: Progressing  Goal: Fluid and Electrolyte Balance  Outcome: Progressing  Goal: Improved Oral Intake  Outcome: Progressing  Goal: Effective Oxygenation and Ventilation  Outcome: Progressing  Goal: Effective Breathing Pattern During Sleep  Outcome: Progressing     Problem: Urinary Retention  Goal: Effective Urinary Elimination  Outcome: Progressing     Problem: Diabetes Comorbidity  Goal: Blood Glucose Level Within Targeted Range  Outcome: Progressing     Problem: Wound  Goal: Optimal Coping  Outcome: Progressing  Goal: Optimal Functional Ability  Outcome: Progressing  Goal: Absence of Infection Signs and Symptoms  Outcome: Progressing  Goal: Improved Oral Intake  Outcome: Progressing  Goal: Optimal Pain Control and Function  Outcome: Progressing  Goal: Skin Health and Integrity  Outcome: Progressing  Goal: Optimal Wound Healing  Outcome: Progressing     Problem: Acute Kidney Injury/Impairment  Goal: Fluid and Electrolyte Balance  Outcome: Progressing  Goal: Improved Oral Intake  Outcome: Progressing  Goal: Effective Renal Function  Outcome: Progressing     Problem: Skin Injury Risk Increased  Goal: Skin Health and Integrity  Outcome: Progressing     Problem:  Bariatric Environmental Safety  Goal: Safety Maintained with Care  Outcome: Progressing

## 2025-01-22 NOTE — ASSESSMENT & PLAN NOTE
Patient's FSGs are controlled on current medication regimen.  Last A1c reviewed-   Lab Results   Component Value Date    HGBA1C 7.5 (H) 01/10/2025     Most recent fingerstick glucose reviewed-   Recent Labs   Lab 01/21/25  1642 01/21/25 2024 01/22/25  0624   POCTGLUCOSE 160* 159* 163*       Current correctional scale  Low  Maintain anti-hyperglycemic dose as follows-   Antihyperglycemics (From admission, onward)    Start     Stop Route Frequency Ordered    01/10/25 1515  insulin aspart U-100 pen 0-10 Units         -- SubQ Before meals & nightly PRN 01/10/25 1415        Hold Oral hypoglycemics while patient is in the hospital.

## 2025-01-22 NOTE — PROGRESS NOTES
Nephrology Consult Note        Patient Name: Gideon Ross  MRN: [id number]    Patient Class: IP- Inpatient   Admission Date: 1/9/2025  Length of Stay: 13 days  Date of Service: 1/22/2025    Attending Physician: Lauren Lawler MD  Primary Care Provider: Jennifer, Primary Doctor    Reason for Consult: marlene    SUBJECTIVE:     HPI: 49M with PID, bilateral BKA, DM presented for significant bilateral lower extremity edema, scrotal swelling, intractable pain. Labs largely unremarkable other than BNP 3,500, troponin 3,300. Patient was started on IV diuretics and pain control. Cardiology was consulted. Initially started on heparin drip but then transitioned to Lovenox. Echocardiogram showed EF 25%. Addiction Medicine consulted for heroin use. Started on Suboxone and reports improvement in symptoms. Diuretics held 2/2 worsening sCr and MARLENE. He was started on dobutamine on 1/11/25, but was stopped 2/2 tachycardia. He has a catheter for strict I&O with MARLENE, but is describing dysuria, there is purulent drainage at the end of the catheter so UA was obtained with concerns of UTI and pt placed on rocephin and recommendations for catheter exchange giving continued MARLENE and need for strict I&O with decompensated heart failure.      1/14 Agree with diuretics and albumin, will consider escalation or ultrafiltration. Not sure how acute the HF is. Consider palliative eval as well.  1/15 VSS. Good UO with diuretics+ albumin, will escalate more tomorrow if he remains stable. Not sure if UF will be better tolerated than diuretics.  1/16 VSS. Ensure dietary compliance with salt and liquids. Keep IOs tally. Will give more albumin and lasix.  1/17 VSS. Agree with empiric acyclovir for possible shingles. Agree with escalation to lasix gtt as d/w cards. Strict IOS and fluid restriction enforcement.  1/18 VSS. Greatly appreciate Urology help with valle placement and management of scrotal edema.  1/19 VSS. Scr finally a little better, UO 4L via  leonard. Continue present management, monitor labs, replete lytes, enforce liquid restriction.  1/20  Scr w/small bump today.  UOP 4.9L, no new complaints.  1/21 vitals stable, UOP 5L, net -14.5L, audio telehealth visit attempted- patient did not answer phone x 2   1/22 AFVSS.  3.9 liter uop.  Requesting higher dose of Suboxone.      ASSESSMENT/PLAN:     MARLENE 2/2 ATN due to intravascular volume depletion from diuretics  Hypoalbuminemia with dependent edema due to third-spacing and immobility  HFrEF ? Etiology, acuity, prognosis?  PAD  DM  Anemia  CKD stage 2, sCr 1 om 1/9/2025  Shingles      Diuresing well however starting to develop contraction alkalosis  BP stable with albumin - leave same  Cut back lasix gtt to 10mg/hr.  Cr stable today.  Trend electrolytes  No acute RRT needs    Thank you for allowing us to participate in the care of your patient!   We will follow the patient and provide recommendations as needed.         Laboratory:  Recent Labs   Lab 01/20/25  0523 01/21/25  0510 01/22/25  0545   * 134* 132*   K 4.8 4.5 4.3   CL 97 96 93*   CO2 28 30* 29   BUN 87* 91* 89*   CREATININE 2.3* 2.1* 1.9*   * 161* 199*       Recent Labs   Lab 01/20/25  0523 01/21/25  0510 01/22/25  0545   CALCIUM 9.3 9.5 9.4   ALBUMIN 3.5 3.5 3.6   MG 1.9 1.9 1.9             Recent Labs   Lab 01/19/25 2010 01/20/25  0625 01/20/25  1116 01/20/25 2051 01/21/25  0559 01/21/25  1114 01/21/25  1642 01/21/25  2024 01/22/25  0624 01/22/25  1205   POCTGLUCOSE 185* 185* 176* 178* 186* 143* 160* 159* 163* 146*       Recent Labs   Lab 01/10/25  0721   Hemoglobin A1C 7.5 H       Recent Labs   Lab 01/20/25  0523 01/21/25  0510 01/22/25  0545   WBC 11.68 11.00 10.12   HGB 8.6* 8.1* 8.0*   HCT 27.6* 25.9* 25.9*    308 334   MCV 83 81* 83   MCHC 31.2* 31.3* 30.9*   MONO 12.7  1.5* 14.1  1.6* 15.0  1.5*   EOSINOPHIL 8.4* 10.3* 10.4*       Recent Labs   Lab 01/20/25  0523 01/21/25  0510 01/22/25  0545   BILITOT 0.4 0.4 0.4    PROT 6.7 6.6 6.8   ALBUMIN 3.5 3.5 3.6   ALKPHOS 261* 212* 207*   ALT 16 14 13   AST 18 17 15       Recent Labs   Lab 07/19/22  2316 01/09/25  0743 01/12/25  1055   Color, UA Yellow Yellow [place] A   Appearance, UA Clear Hazy A Hazy A   pH, UA 6.0 6.0 6.0   Specific Gravity, UA <=1.005 1.025 1.010   Protein, UA Trace A 3+ A 1+ A   Glucose, UA 4+ A Trace A Negative   Ketones, UA Negative Negative Negative   Urobilinogen, UA Negative 2.0-3.0 A Negative   Bilirubin (UA) Negative Negative Negative   Occult Blood UA Negative 2+ A 3+ A   Nitrite, UA Negative Negative Negative   RBC, UA 1 7 H >100 H   WBC, UA  --  4 18 H   Bacteria None Occasional Rare   Hyaline Casts, UA  --  4 A 0       Recent Labs   Lab 07/19/22  2046 01/09/25  0707   POC PH 7.473 H  --    POC PCO2 42.4  --    POC HCO3 31.1 H  --    POC PO2 28 L  --    POC SATURATED O2 58 L  --    POC BE 7  --    Sample VENOUS VENOUS       Microbiology Results (last 7 days)       ** No results found for the last 168 hours. **            Review of patient's allergies indicates:  No Known Allergies    Outpatient meds:  No current facility-administered medications on file prior to encounter.     Current Outpatient Medications on File Prior to Encounter   Medication Sig Dispense Refill    amLODIPine (NORVASC) 10 MG tablet Take 1 tablet (10 mg total) by mouth once daily. 30 tablet 1    aspirin (ECOTRIN) 81 MG EC tablet Take 1 tablet (81 mg total) by mouth once daily. 30 tablet 1    atorvastatin (LIPITOR) 80 MG tablet Take 1 tablet (80 mg total) by mouth once daily. 30 tablet 1    baclofen (LIORESAL) 10 MG tablet Take 1 tablet (10 mg total) by mouth 3 (three) times daily. 90 tablet 1    carvedilol (COREG) 6.25 MG tablet Take 1 tablet (6.25 mg total) by mouth 2 (two) times daily. 60 tablet 1    diazePAM (VALIUM) 5 MG tablet Take 1 tablet (5 mg total) by mouth every 12 (twelve) hours as needed for Anxiety (anxiety.). 30 tablet 0    gabapentin (NEURONTIN) 300 MG capsule Take 2  capsules (600 mg total) by mouth 3 (three) times daily. 90 capsule 1    hydroCHLOROthiazide (HYDRODIURIL) 50 MG tablet Take 1 tablet (50 mg total) by mouth once daily. 30 tablet 1    insulin aspart U-100 (NOVOLOG) 100 unit/mL InPn pen Inject 18 Units into the skin 3 (three) times daily. (Patient taking differently: Inject into the skin 3 (three) times daily.) 10 mL 1    insulin detemir U-100 (LEVEMIR FLEXTOUCH) 100 unit/mL (3 mL) SubQ InPn pen Inject 35 Units into the skin 2 (two) times daily. 10 mL 1    insulin glargine U-100, Lantus, 100 unit/mL injection Inject 30 Units into the skin every evening. (Patient not taking: Reported on 1/9/2025)      lisinopril (PRINIVIL,ZESTRIL) 40 MG tablet Take 1 tablet (40 mg total) by mouth once daily. 30 tablet 1    nicotine (NICODERM CQ) 14 mg/24 hr Place 1 patch onto the skin once daily. (Patient not taking: Reported on 1/9/2025) 15 patch 0    nortriptyline (PAMELOR) 25 MG capsule Take 1 capsule (25 mg total) by mouth 3 (three) times daily. 30 capsule 1    oxyCODONE (ROXICODONE) 5 MG immediate release tablet Take 1 tablet (5 mg total) by mouth every 12 (twelve) hours as needed. (Patient not taking: Reported on 1/9/2025) 30 tablet 0    polyethylene glycol (GLYCOLAX) 17 gram PwPk Take 17 g by mouth daily as needed. (Patient not taking: Reported on 1/9/2025) 30 each 1    QUEtiapine (SEROQUEL) 100 MG Tab Take 1 tablet (100 mg total) by mouth every evening. 30 tablet 1    sofosbuvir-velpatasvir (EPCLUSA) 400-100 mg Tab Take 1 tablet daily. (Patient not taking: Reported on 1/9/2025) 28 tablet 2       Scheduled meds:   acyclovir  800 mg Oral 5x Daily    albumin human 25%  12.5 g Intravenous BID    aspirin  81 mg Oral Daily    atorvastatin  80 mg Oral Daily    baclofen  5 mg Oral BID    buprenorphine-naloxone 8-2 mg  1 Film Sublingual BID    carvediloL  3.125 mg Oral BID    [START ON 1/23/2025] enoxparin  40 mg Subcutaneous Q24H (prophylaxis, 1700)    gabapentin  300 mg Oral BID     miconazole NITRATE 2 %   Topical (Top) BID    tamsulosin  0.4 mg Oral Daily    white petrolatum   Topical (Top) Daily       Infusions:   furosemide (LASIX) 2 mg/mL continuous infusion (non-titrating)  10 mg/hr Intravenous Continuous 5 mL/hr at 01/21/25 1514 10 mg/hr at 01/21/25 1514       PRN meds:    Current Facility-Administered Medications:     acetaminophen, 650 mg, Oral, Q4H PRN    aluminum-magnesium hydroxide-simethicone, 30 mL, Oral, QID PRN    dextrose 50%, 12.5 g, Intravenous, PRN    dextrose 50%, 25 g, Intravenous, PRN    glucagon (human recombinant), 1 mg, Intramuscular, PRN    glucose, 16 g, Oral, PRN    glucose, 24 g, Oral, PRN    hydrALAZINE, 5 mg, Intravenous, Q6H PRN    insulin aspart U-100, 0-10 Units, Subcutaneous, QID (AC + HS) PRN    LIDOcaine HCl 2%, , Mucous Membrane, Once PRN    loperamide, 2 mg, Oral, QID PRN    LORazepam, 0.5 mg, Oral, Q6H PRN    magnesium oxide, 800 mg, Oral, PRN    magnesium oxide, 800 mg, Oral, PRN    melatonin, 6 mg, Oral, Nightly PRN    morphine, 4 mg, Intravenous, Q3H PRN    naloxone, 0.02 mg, Intravenous, PRN    ondansetron, 4 mg, Intravenous, Q6H PRN    oxyCODONE-acetaminophen, 1 tablet, Oral, Q4H PRN    oxyCODONE-acetaminophen, 1 tablet, Oral, Q4H PRN    potassium bicarbonate, 35 mEq, Oral, PRN    potassium bicarbonate, 50 mEq, Oral, PRN    potassium bicarbonate, 60 mEq, Oral, PRN    potassium, sodium phosphates, 2 packet, Oral, PRN    potassium, sodium phosphates, 2 packet, Oral, PRN    potassium, sodium phosphates, 2 packet, Oral, PRN    senna-docusate 8.6-50 mg, 1 tablet, Oral, Daily PRN    sodium chloride 0.9%, 10 mL, Intravenous, Q12H PRN    sodium chloride 0.9%, 10 mL, Intravenous, PRN    Past Medical History:   Diagnosis Date    Diabetes mellitus     Hypertension      Past Surgical History:   Procedure Laterality Date    SKIN GRAFT       No family history on file.  Social History     Tobacco Use    Smoking status: Every Day     Current packs/day: 1.00      Types: Cigarettes   Substance Use Topics    Alcohol use: Yes    Drug use: No       OBJECTIVE:     Vital Signs and IO:  Temp:  [97.5 °F (36.4 °C)-98.6 °F (37 °C)]   Pulse:  []   Resp:  [16-18]   BP: (129-157)/(67-90)   SpO2:  [94 %-99 %]   I/O last 3 completed shifts:  In: 2542.5 [P.O.:2184; I.V.:358.5]  Out: 6750 [Urine:6750]  Wt Readings from Last 5 Encounters:   01/21/25 117.9 kg (260 lb)   07/19/22 99.8 kg (220 lb)   02/09/20 104.3 kg (230 lb)   08/06/19 113.4 kg (250 lb)   02/27/19 108.9 kg (240 lb)     Body mass index is 37.31 kg/m².    Physical Exam  Constitutional:       General: Patient is not in acute distress.     Appearance: Patient is well-developed. She is not diaphoretic.   HENT:      Head: Normocephalic and atraumatic.      Mouth/Throat: Mucous membranes are moist.   Eyes:      General: No scleral icterus.     Pupils: Pupils are equal, round, and reactive to light.   Cardiovascular:      Rate and Rhythm: Normal rate and regular rhythm.   Pulmonary:      Effort: Pulmonary effort is normal. No respiratory distress.      Breath sounds: No stridor.   Abdominal:      General: There is no distension.      Palpations: Abdomen is soft.   Musculoskeletal:         General: No deformity. Normal range of motion.      Cervical back: Neck supple.   Skin:     General: Skin is warm and dry.      Findings: No rash present. No erythema.   Neurological:      Mental Status: Patient is alert and oriented to person, place, and time.      Cranial Nerves: No cranial nerve deficit.   Psychiatric:         Behavior: Behavior normal.          Patient care time was spent personally by me on the following activities:     Obtaining a history.  Examination of patient.  Providing medical care at the patients bedside.  Developing a treatment plan with patient or surrogate and bedside caregivers.  Ordering and reviewing laboratory studies, radiographic studies, pulse oximetry.  Ordering and performing treatments and  interventions.  Evaluation of patient's response to treatment.  Discussions with consultants while on the unit and immediately available to the patient.  Re-evaluation of the patient's condition.  Documentation in the medical record.     Don Covarrubias MD      [facility]  [address] [place] [address]  [place] [address]    [phone] - tel  [phone] - fax    1/22/2025

## 2025-01-22 NOTE — PROGRESS NOTES
[facility] Medicine  Progress Note    Patient Name: Gideon Ross  MRN: [id number]  Patient Class: IP- Inpatient   Admission Date: 1/9/2025  Length of Stay: 13 days  Attending Physician: Lauren Lawler MD  Primary Care Provider: Jennifer, Primary Doctor        Subjective     Principal Problem:Acute systolic congestive heart failure        HPI:  Mr. Ross is a 49 yom w/pmh significant for PID status post bilateral above-the-knee amputations, diabetes mellitus who presented for significant bilateral lower extremity edema and scrotal swelling, as well as intractable pain secondary to scrotal swelling.  Labs largely unremarkable.  BNP 3500, troponin 3300.  Patient was started on IV diuresis and pain control.  Cardiology was consulted.  Patient was initially started on heparin infusion.  On exam he has significant pain and tenderness due to significant scrotal swelling.    Overview/Hospital Course:  Patient with history of peripheral artery disease status post bilateral AKA, diabetes, substance abuse presents to the emergency room with complaints of lower extremity and scrotal edema.  BNP and troponin elevated.  Cardiology consulted.  Initially started on heparin drip but then transitioned to Lovenox.  Echocardiogram showed EF 25%.  Addiction Medicine consulted for heroin use.  Started on Suboxone and reports improvement in symptoms.  Diuretics held  when he developed MARLENE.  Needs LifeVest on discharge. He was started on dobutamine on 1/11/25, but overnight about 0200 he had Vtach about 50 beats and dobutamine turned off. He has a catheter for strict I&O with MARLENE, but is describing dysuria, there is purulent drainage at the end of the catheter so UA was obtained with concerns of UTI and pt placed on rocephin, catheter removed and he has been voiding. Urine culture with NGTD - will complete 3 days of Rocephin.  Nephrology has been consulted. Titrating lasix as tolerated along with albumin.  Cr up  trending. Weber was placed by urology on 1/17. Patient is diuresing well on Lasix drip.     Interval History: He is doing well. Swelling improving with Lasix drip. Cr also improving. No complains or overnight events.     Review of Systems  Objective:     Vital Signs (Most Recent):  Temp: 97.5 °F (36.4 °C) (01/22/25 0811)  Pulse: 90 (01/22/25 0811)  Resp: 16 (01/22/25 0859)  BP: (!) 148/81 (01/22/25 0811)  SpO2: 97 % (01/22/25 0811) Vital Signs (24h Range):  Temp:  [97.5 °F (36.4 °C)-98.6 °F (37 °C)] 97.5 °F (36.4 °C)  Pulse:  [] 90  Resp:  [16-18] 16  SpO2:  [94 %-99 %] 97 %  BP: (129-157)/(67-90) 148/81     Weight: 117.9 kg (260 lb)  Body mass index is 37.31 kg/m².    Intake/Output Summary (Last 24 hours) at 1/22/2025 1135  Last data filed at 1/21/2025 2211  Gross per 24 hour   Intake 840 ml   Output 2800 ml   Net -1960 ml         Physical Exam  Vitals and nursing note reviewed. Exam conducted with a chaperone present.   Constitutional:       General: He is not in acute distress.     Appearance: Normal appearance. He is obese. He is not ill-appearing, toxic-appearing or diaphoretic.   Cardiovascular   Normal heart sounds, there is edema on both lower extremity stumps   Pulmonary:      Effort: Pulmonary effort is normal.      Breath sounds: coarse breath sounds   Genitourinary:     Comments: Scrotal Edema   Musculoskeletal:         General: Deformity present.      Comments: Right BKA/ Left AKA   Skin:     General: Skin is warm and dry.   Neurological:      Mental Status: He is alert.   Psychiatric:         Mood and Affect: Mood normal.         Behavior: Behavior normal.         Thought Content: Thought content normal.     Significant Labs: All pertinent labs within the past 24 hours have been reviewed.  CBC:   Recent Labs   Lab 01/21/25  0510 01/22/25  0545   WBC 11.00 10.12   HGB 8.1* 8.0*   HCT 25.9* 25.9*    334     CMP:   Recent Labs   Lab 01/21/25  0510 01/22/25  0545   * 132*   K 4.5 4.3    CL 96 93*   CO2 30* 29   * 199*   BUN 91* 89*   CREATININE 2.1* 1.9*   CALCIUM 9.5 9.4   PROT 6.6 6.8   ALBUMIN 3.5 3.6   BILITOT 0.4 0.4   ALKPHOS 212* 207*   AST 17 15   ALT 14 13   ANIONGAP 8 10       Significant Imaging: I have reviewed all pertinent imaging results/findings within the past 24 hours.    Assessment and Plan     * Acute systolic congestive heart failure  Patient has Systolic (HFrEF) heart failure that is Acute. On presentation their CHF was decompensated. Evidence of decompensated CHF on presentation includes: edema. The etiology of their decompensation is likely substance abuse .   Latest ECHO  Results for orders placed during the hospital encounter of 01/09/25    Echo    Interpretation Summary    Left Ventricle: The left ventricle is moderately dilated. Mildly increased wall thickness. There is mild asymmetric hypertrophy. Severe global hypokinesis present. There is severely reduced systolic function with a visually estimated ejection fraction of 25 - 30%.    Right Ventricle: Moderate right ventricular enlargement. Systolic function is mildly reduced.    Left Atrium: Left atrium is mildly dilated.    Tricuspid Valve: There is mild regurgitation.    IVC/SVC: Elevated venous pressure at 15 mmHg.    Current Heart Failure Medications  hydrALAZINE injection 5 mg, Every 6 hours PRN, Intravenous  furosemide (Lasix) 200 mg in 0.9% NaCl SolP 100 mL continuous infusion (conc: 2 mg/mL), Continuous, Intravenous  carvediloL tablet 3.125 mg, 2 times daily, Oral    Plan  - Monitor strict I&Os and daily weights.    - nephrology and cardiology managing diuresis. Patient is on Lasix drip with albumin, cont   - Cont Coreg  - recommending life vest on discharge   - ischemic work up per cardiology     Urinary tract infection associated with indwelling urethral catheter  Catheter in place for severe acute decompensated heart failure and need for strict I&O with concern of cardiorenal syndrome   Completed 4  day course of ceftriaxone        Opioid use disorder  Addiction medicine consulted   Improved on Suboxone    Per Addiction medicine:   For first dose of buprenorphine (at least 18 hours since last use of fentanyl) give one or two 8 mg strips (8-16 mg total).  Wait 1 hour and then...  If mild withdrawal, give another 8 mg strip.  If more significant withdrawal, give two more 8 mg strips.  If relaxed or calm, do not give any more.  Wait 1-2 more hours...  If still have withdrawal symptoms, take another 8 mg strip  If relaxed or calm, do not take any more  Try not to give more than 32 mg (4 strips) on day one.  On day 2, give 8 mg BID   Continue taking 8 mg BID until seeing in clinic.    MARLENE (acute kidney injury)  MARLENE is likely due to Cardiorenal syndrome. Baseline creatinine is  1 . Most recent creatinine and eGFR are listed below.  Recent Labs     01/20/25  0523 01/21/25  0510 01/22/25  0545   CREATININE 2.3* 2.1* 1.9*   EGFRNORACEVR 34.4* 38.4* 43.2*        Plan  - Avoid nephrotoxins and renally dose meds for GFR listed above  - Monitor urine output, serial BMP, and adjust therapy as needed  - nephrology following   - Close monitoring  - Diurese as above     Diabetes mellitus  Patient's FSGs are controlled on current medication regimen.  Last A1c reviewed-   Lab Results   Component Value Date    HGBA1C 7.5 (H) 01/10/2025     Most recent fingerstick glucose reviewed-   Recent Labs   Lab 01/21/25  1642 01/21/25  2024 01/22/25  0624   POCTGLUCOSE 160* 159* 163*       Current correctional scale  Low  Maintain anti-hyperglycemic dose as follows-   Antihyperglycemics (From admission, onward)      Start     Stop Route Frequency Ordered    01/10/25 1515  insulin aspart U-100 pen 0-10 Units         -- SubQ Before meals & nightly PRN 01/10/25 1415          Hold Oral hypoglycemics while patient is in the hospital.    Elevated troponin  Acute nonischemic, nontraumatic myocardial injury due to acute on chronic HFrEF  Troponin  elevated but flat, cardiology following  No chest pain or shortness of breath         Scrotal edema  Secondary to acute decompensated heart failure  Elevate scrotum  Scrotal ultrasound reviewed  Diuresing as per Nephrology recommendation    Obesity  Body mass index is 39.89 kg/m². Morbid obesity complicates all aspects of disease management from diagnostic modalities to treatment. Weight loss encouraged and health benefits explained to patient.           VTE Risk Mitigation (From admission, onward)           Ordered     enoxaparin injection 40 mg  Every 24 hours         01/22/25 1033     IP VTE HIGH RISK PATIENT  Once         01/09/25 1326     Place sequential compression device  Until discontinued         01/09/25 1326     Reason for no Mechanical VTE Prophylaxis  Once        Question:  Reasons:  Answer:  Physician Provided (leave comment)  Comment:  already on heparin infusion    01/09/25 0906                    Discharge Planning   KATHERINE: 1/25/2025     Code Status: Full Code   Medical Readiness for Discharge Date:   Discharge Plan A: Home   Discharge Delays: None known at this time                    Lauren Lawler MD  Department of Hospital Medicine   [facility]

## 2025-01-22 NOTE — CARE UPDATE
01/21/25 0825   Patient Assessment/Suction   Level of Consciousness (AVPU) alert   Respiratory Effort Normal;Unlabored   Expansion/Accessory Muscles/Retractions no use of accessory muscles   Rhythm/Pattern, Respiratory no shortness of breath reported   Cough Frequency infrequent   Cough Type nonproductive   PRE-TX-O2   Device (Oxygen Therapy) room air   SpO2 95 %   Pulse Oximetry Type Intermittent   $ Pulse Oximetry - Multiple Charge Pulse Oximetry - Multiple   Pulse 93   Resp 16   Positioning HOB elevated 30 degrees   Education   $ Education 15 min

## 2025-01-22 NOTE — SUBJECTIVE & OBJECTIVE
Interval History: He is doing well. Swelling improving with Lasix drip. Cr also improving. No complains or overnight events.     Review of Systems  Objective:     Vital Signs (Most Recent):  Temp: 97.5 °F (36.4 °C) (01/22/25 0811)  Pulse: 90 (01/22/25 0811)  Resp: 16 (01/22/25 0859)  BP: (!) 148/81 (01/22/25 0811)  SpO2: 97 % (01/22/25 0811) Vital Signs (24h Range):  Temp:  [97.5 °F (36.4 °C)-98.6 °F (37 °C)] 97.5 °F (36.4 °C)  Pulse:  [] 90  Resp:  [16-18] 16  SpO2:  [94 %-99 %] 97 %  BP: (129-157)/(67-90) 148/81     Weight: 117.9 kg (260 lb)  Body mass index is 37.31 kg/m².    Intake/Output Summary (Last 24 hours) at 1/22/2025 1135  Last data filed at 1/21/2025 2211  Gross per 24 hour   Intake 840 ml   Output 2800 ml   Net -1960 ml         Physical Exam  Vitals and nursing note reviewed. Exam conducted with a chaperone present.   Constitutional:       General: He is not in acute distress.     Appearance: Normal appearance. He is obese. He is not ill-appearing, toxic-appearing or diaphoretic.   Cardiovascular   Normal heart sounds, there is edema on both lower extremity stumps   Pulmonary:      Effort: Pulmonary effort is normal.      Breath sounds: coarse breath sounds   Genitourinary:     Comments: Scrotal Edema   Musculoskeletal:         General: Deformity present.      Comments: Right BKA/ Left AKA   Skin:     General: Skin is warm and dry.   Neurological:      Mental Status: He is alert.   Psychiatric:         Mood and Affect: Mood normal.         Behavior: Behavior normal.         Thought Content: Thought content normal.     Significant Labs: All pertinent labs within the past 24 hours have been reviewed.  CBC:   Recent Labs   Lab 01/21/25  0510 01/22/25  0545   WBC 11.00 10.12   HGB 8.1* 8.0*   HCT 25.9* 25.9*    334     CMP:   Recent Labs   Lab 01/21/25  0510 01/22/25  0545   * 132*   K 4.5 4.3   CL 96 93*   CO2 30* 29   * 199*   BUN 91* 89*   CREATININE 2.1* 1.9*   CALCIUM 9.5 9.4    PROT 6.6 6.8   ALBUMIN 3.5 3.6   BILITOT 0.4 0.4   ALKPHOS 212* 207*   AST 17 15   ALT 14 13   ANIONGAP 8 10       Significant Imaging: I have reviewed all pertinent imaging results/findings within the past 24 hours.

## 2025-01-22 NOTE — NURSING
Patient assisted x1 to wheelchair this shift. Transfer w/ min/A. Patient up for 1.5 hr. Stand by observation of patient transferring self into bed. Prn morphine given once back in bed at 2213. Patient very pleased to be up this shift and looking forward to PT zina.

## 2025-01-23 LAB
ALBUMIN SERPL BCP-MCNC: 3.7 G/DL (ref 3.5–5.2)
ALP SERPL-CCNC: 182 U/L (ref 55–135)
ALT SERPL W/O P-5'-P-CCNC: 13 U/L (ref 10–44)
ANION GAP SERPL CALC-SCNC: 9 MMOL/L (ref 8–16)
AST SERPL-CCNC: 15 U/L (ref 10–40)
BASOPHILS # BLD AUTO: 0.06 K/UL (ref 0–0.2)
BASOPHILS NFR BLD: 0.5 % (ref 0–1.9)
BILIRUB SERPL-MCNC: 0.4 MG/DL (ref 0.1–1)
BUN SERPL-MCNC: 91 MG/DL (ref 6–20)
CALCIUM SERPL-MCNC: 9.6 MG/DL (ref 8.7–10.5)
CHLORIDE SERPL-SCNC: 96 MMOL/L (ref 95–110)
CO2 SERPL-SCNC: 32 MMOL/L (ref 23–29)
CREAT SERPL-MCNC: 1.9 MG/DL (ref 0.5–1.4)
DIFFERENTIAL METHOD BLD: ABNORMAL
EOSINOPHIL # BLD AUTO: 1.3 K/UL (ref 0–0.5)
EOSINOPHIL NFR BLD: 11.6 % (ref 0–8)
ERYTHROCYTE [DISTWIDTH] IN BLOOD BY AUTOMATED COUNT: 15 % (ref 11.5–14.5)
EST. GFR  (NO RACE VARIABLE): 43.2 ML/MIN/1.73 M^2
GLUCOSE SERPL-MCNC: 158 MG/DL (ref 70–110)
HCT VFR BLD AUTO: 27.3 % (ref 40–54)
HGB BLD-MCNC: 8.4 G/DL (ref 14–18)
IMM GRANULOCYTES # BLD AUTO: 0.06 K/UL (ref 0–0.04)
IMM GRANULOCYTES NFR BLD AUTO: 0.5 % (ref 0–0.5)
LYMPHOCYTES # BLD AUTO: 1.4 K/UL (ref 1–4.8)
LYMPHOCYTES NFR BLD: 13.1 % (ref 18–48)
MAGNESIUM SERPL-MCNC: 2 MG/DL (ref 1.6–2.6)
MCH RBC QN AUTO: 26 PG (ref 27–31)
MCHC RBC AUTO-ENTMCNC: 30.8 G/DL (ref 32–36)
MCV RBC AUTO: 85 FL (ref 82–98)
MONOCYTES # BLD AUTO: 1.5 K/UL (ref 0.3–1)
MONOCYTES NFR BLD: 13.5 % (ref 4–15)
NEUTROPHILS # BLD AUTO: 6.7 K/UL (ref 1.8–7.7)
NEUTROPHILS NFR BLD: 60.8 % (ref 38–73)
NRBC BLD-RTO: 0 /100 WBC
PLATELET # BLD AUTO: 340 K/UL (ref 150–450)
PMV BLD AUTO: 10.2 FL (ref 9.2–12.9)
POCT GLUCOSE: 140 MG/DL (ref 70–110)
POCT GLUCOSE: 143 MG/DL (ref 70–110)
POCT GLUCOSE: 146 MG/DL (ref 70–110)
POCT GLUCOSE: 196 MG/DL (ref 70–110)
POTASSIUM SERPL-SCNC: 4.5 MMOL/L (ref 3.5–5.1)
PROT SERPL-MCNC: 7 G/DL (ref 6–8.4)
RBC # BLD AUTO: 3.23 M/UL (ref 4.6–6.2)
SODIUM SERPL-SCNC: 137 MMOL/L (ref 136–145)
WBC # BLD AUTO: 11 K/UL (ref 3.9–12.7)

## 2025-01-23 PROCEDURE — 63600175 PHARM REV CODE 636 W HCPCS: Performed by: STUDENT IN AN ORGANIZED HEALTH CARE EDUCATION/TRAINING PROGRAM

## 2025-01-23 PROCEDURE — 25000003 PHARM REV CODE 250: Performed by: INTERNAL MEDICINE

## 2025-01-23 PROCEDURE — P9047 ALBUMIN (HUMAN), 25%, 50ML: HCPCS | Performed by: INTERNAL MEDICINE

## 2025-01-23 PROCEDURE — 63600175 PHARM REV CODE 636 W HCPCS: Performed by: INTERNAL MEDICINE

## 2025-01-23 PROCEDURE — 99900031 HC PATIENT EDUCATION (STAT)

## 2025-01-23 PROCEDURE — 12000002 HC ACUTE/MED SURGE SEMI-PRIVATE ROOM

## 2025-01-23 PROCEDURE — 25000003 PHARM REV CODE 250: Performed by: NURSE PRACTITIONER

## 2025-01-23 PROCEDURE — 25000003 PHARM REV CODE 250: Performed by: STUDENT IN AN ORGANIZED HEALTH CARE EDUCATION/TRAINING PROGRAM

## 2025-01-23 PROCEDURE — 83735 ASSAY OF MAGNESIUM: CPT | Performed by: STUDENT IN AN ORGANIZED HEALTH CARE EDUCATION/TRAINING PROGRAM

## 2025-01-23 PROCEDURE — 99233 SBSQ HOSP IP/OBS HIGH 50: CPT | Mod: ,,, | Performed by: INTERNAL MEDICINE

## 2025-01-23 PROCEDURE — 85025 COMPLETE CBC W/AUTO DIFF WBC: CPT | Performed by: STUDENT IN AN ORGANIZED HEALTH CARE EDUCATION/TRAINING PROGRAM

## 2025-01-23 PROCEDURE — 36415 COLL VENOUS BLD VENIPUNCTURE: CPT | Performed by: STUDENT IN AN ORGANIZED HEALTH CARE EDUCATION/TRAINING PROGRAM

## 2025-01-23 PROCEDURE — 94761 N-INVAS EAR/PLS OXIMETRY MLT: CPT

## 2025-01-23 PROCEDURE — 80053 COMPREHEN METABOLIC PANEL: CPT | Performed by: STUDENT IN AN ORGANIZED HEALTH CARE EDUCATION/TRAINING PROGRAM

## 2025-01-23 RX ORDER — ISOSORBIDE DINITRATE 10 MG/1
20 TABLET ORAL 3 TIMES DAILY
Status: DISCONTINUED | OUTPATIENT
Start: 2025-01-23 | End: 2025-01-26 | Stop reason: HOSPADM

## 2025-01-23 RX ORDER — HYDRALAZINE HYDROCHLORIDE 10 MG/1
10 TABLET, FILM COATED ORAL EVERY 8 HOURS
Status: DISCONTINUED | OUTPATIENT
Start: 2025-01-23 | End: 2025-01-24

## 2025-01-23 RX ADMIN — MICONAZOLE NITRATE: 20 POWDER TOPICAL at 09:01

## 2025-01-23 RX ADMIN — ENOXAPARIN SODIUM 40 MG: 40 INJECTION SUBCUTANEOUS at 04:01

## 2025-01-23 RX ADMIN — ACYCLOVIR 800 MG: 400 TABLET ORAL at 01:01

## 2025-01-23 RX ADMIN — ALBUMIN (HUMAN) 12.5 G: 12.5 SOLUTION INTRAVENOUS at 09:01

## 2025-01-23 RX ADMIN — CARVEDILOL 3.12 MG: 3.12 TABLET, FILM COATED ORAL at 09:01

## 2025-01-23 RX ADMIN — ACYCLOVIR 800 MG: 400 TABLET ORAL at 06:01

## 2025-01-23 RX ADMIN — BACLOFEN 5 MG: 5 TABLET ORAL at 09:01

## 2025-01-23 RX ADMIN — SENNOSIDES AND DOCUSATE SODIUM 1 TABLET: 50; 8.6 TABLET ORAL at 09:01

## 2025-01-23 RX ADMIN — ACYCLOVIR 800 MG: 400 TABLET ORAL at 09:01

## 2025-01-23 RX ADMIN — HYDRALAZINE HYDROCHLORIDE 10 MG: 10 TABLET ORAL at 11:01

## 2025-01-23 RX ADMIN — TAMSULOSIN HYDROCHLORIDE 0.4 MG: 0.4 CAPSULE ORAL at 07:01

## 2025-01-23 RX ADMIN — MORPHINE SULFATE 4 MG: 4 INJECTION INTRAVENOUS at 12:01

## 2025-01-23 RX ADMIN — CARVEDILOL 3.12 MG: 3.12 TABLET, FILM COATED ORAL at 07:01

## 2025-01-23 RX ADMIN — BUPRENORPHINE AND NALOXONE 1 FILM: 8; 2 FILM BUCCAL; SUBLINGUAL at 07:01

## 2025-01-23 RX ADMIN — MORPHINE SULFATE 4 MG: 4 INJECTION INTRAVENOUS at 07:01

## 2025-01-23 RX ADMIN — FUROSEMIDE 10 MG/HR: 10 INJECTION, SOLUTION INTRAMUSCULAR; INTRAVENOUS at 07:01

## 2025-01-23 RX ADMIN — ALBUMIN (HUMAN) 12.5 G: 12.5 SOLUTION INTRAVENOUS at 07:01

## 2025-01-23 RX ADMIN — BUPRENORPHINE AND NALOXONE 1 FILM: 8; 2 FILM BUCCAL; SUBLINGUAL at 09:01

## 2025-01-23 RX ADMIN — BACLOFEN 5 MG: 5 TABLET ORAL at 07:01

## 2025-01-23 RX ADMIN — ISOSORBIDE DINITRATE 20 MG: 10 TABLET ORAL at 09:01

## 2025-01-23 RX ADMIN — MORPHINE SULFATE 4 MG: 4 INJECTION INTRAVENOUS at 08:01

## 2025-01-23 RX ADMIN — OXYCODONE HYDROCHLORIDE AND ACETAMINOPHEN 1 TABLET: 10; 325 TABLET ORAL at 01:01

## 2025-01-23 RX ADMIN — ASPIRIN 81 MG: 81 TABLET, COATED ORAL at 07:01

## 2025-01-23 RX ADMIN — ATORVASTATIN CALCIUM 80 MG: 40 TABLET, FILM COATED ORAL at 07:01

## 2025-01-23 RX ADMIN — MORPHINE SULFATE 4 MG: 4 INJECTION INTRAVENOUS at 04:01

## 2025-01-23 RX ADMIN — ACYCLOVIR 800 MG: 400 TABLET ORAL at 05:01

## 2025-01-23 NOTE — ASSESSMENT & PLAN NOTE
Addiction medicine consulted   Improved on Suboxone  Symptomatic Rx with Ativan and imodium     Per Addiction medicine:   For first dose of buprenorphine (at least 18 hours since last use of fentanyl) give one or two 8 mg strips (8-16 mg total).  Wait 1 hour and then...  If mild withdrawal, give another 8 mg strip.  If more significant withdrawal, give two more 8 mg strips.  If relaxed or calm, do not give any more.  Wait 1-2 more hours...  If still have withdrawal symptoms, take another 8 mg strip  If relaxed or calm, do not take any more  Try not to give more than 32 mg (4 strips) on day one.  On day 2, give 8 mg BID   Continue taking 8 mg BID until seeing in clinic.

## 2025-01-23 NOTE — PT/OT/SLP PROGRESS
Physical Therapy      Patient Name:  Gideon Ross   MRN:  [id number]    Patient not seen today secondary to Patient unwilling to participate, Other (Comment), Pain, Patient fatigue (too much swelling.  However, pt's RN told PT that pt transferred into/out of w/c & BSC over the last few days.). Will follow-up 1/24/25.

## 2025-01-23 NOTE — ASSESSMENT & PLAN NOTE
MARLENE is likely due to Cardiorenal syndrome. Baseline creatinine is  1 . Most recent creatinine and eGFR are listed below.  Recent Labs     01/21/25  0510 01/22/25  0545 01/23/25  0553   CREATININE 2.1* 1.9* 1.9*   EGFRNORACEVR 38.4* 43.2* 43.2*        Plan  - Avoid nephrotoxins and renally dose meds for GFR listed above  - Monitor urine output, serial BMP, and adjust therapy as needed  - nephrology following   - Close monitoring  - Diurese as above

## 2025-01-23 NOTE — CONSULTS
Small open weeping pink wound at the 7 oclock position base of penis on scrotum. Cleaned around area and catheter, applied small amount triad, blisters at distal aspect of scrotum dry cleaned and applied Triad, scrotum appears to be a little less swollen than previous assessment. Bilateral buttock without redness or open wounds. Complete skin assessment done,     Right groin, white slough wound bed, cleaned and dried and applied Medihoney and covered with abd pad.         Left groin pink red denuded tissue gray black slough cleaned and dried and applied Medihoney covered with abd pad. Bilateral arm dry scabs, patient scratching

## 2025-01-23 NOTE — PROGRESS NOTES
[facility] Medicine  Progress Note    Patient Name: Gideon Ross  MRN: [id number]  Patient Class: IP- Inpatient   Admission Date: 1/9/2025  Length of Stay: 14 days  Attending Physician: Lauren Lawler MD  Primary Care Provider: Jennifer, Primary Doctor        Subjective     Principal Problem:Acute systolic congestive heart failure        HPI:  Mr. Ross is a 49 yom w/pmh significant for PID status post bilateral above-the-knee amputations, diabetes mellitus who presented for significant bilateral lower extremity edema and scrotal swelling, as well as intractable pain secondary to scrotal swelling.  Labs largely unremarkable.  BNP 3500, troponin 3300.  Patient was started on IV diuresis and pain control.  Cardiology was consulted.  Patient was initially started on heparin infusion.  On exam he has significant pain and tenderness due to significant scrotal swelling.    Overview/Hospital Course:  Patient with history of peripheral artery disease status post bilateral AKA, diabetes, substance abuse presents to the emergency room with complaints of lower extremity and scrotal edema.  BNP and troponin elevated.  Cardiology consulted.  Initially started on heparin drip but then transitioned to Lovenox.  Echocardiogram showed EF 25%.  Addiction Medicine consulted for heroin use.  Started on Suboxone and reports improvement in symptoms.  Diuretics held  when he developed MARLENE.  Needs LifeVest on discharge. He was started on dobutamine on 1/11/25, but overnight about 0200 he had Vtach about 50 beats and dobutamine turned off. He has a catheter for strict I&O with MARLENE, but is describing dysuria, there is purulent drainage at the end of the catheter so UA was obtained with concerns of UTI and pt placed on rocephin, catheter removed and he has been voiding. Urine culture with NGTD - will complete 3 days of Rocephin.  Nephrology has been consulted. Titrating lasix as tolerated along with albumin.  Cr up  trending. Weber was placed by urology on 1/17. Patient is diuresing well on Lasix drip.     Interval History: Cr stable. Patient continue to diurese on Lasix drip. Diarrhea and withdrawal symptoms have improved. No other overnight events.     Review of Systems  Objective:     Vital Signs (Most Recent):  Temp: 98 °F (36.7 °C) (01/23/25 0823)  Pulse: 96 (01/23/25 1000)  Resp: 16 (01/23/25 1000)  BP: 134/81 (01/23/25 0823)  SpO2: 97 % (01/23/25 1000) Vital Signs (24h Range):  Temp:  [98 °F (36.7 °C)-98.7 °F (37.1 °C)] 98 °F (36.7 °C)  Pulse:  [] 96  Resp:  [16-18] 16  SpO2:  [94 %-98 %] 97 %  BP: (131-142)/(61-81) 134/81     Weight: 117.9 kg (260 lb)  Body mass index is 37.31 kg/m².    Intake/Output Summary (Last 24 hours) at 1/23/2025 1111  Last data filed at 1/23/2025 0532  Gross per 24 hour   Intake 1287.39 ml   Output 1800 ml   Net -512.61 ml         Physical Exam  Vitals and nursing note reviewed. Exam conducted with a chaperone present.   Constitutional:       General: He is not in acute distress.     Appearance: Normal appearance. He is obese. He is not ill-appearing, toxic-appearing or diaphoretic.   Cardiovascular   Normal heart sounds, there is edema on both lower extremity stumps   Pulmonary:      Effort: Pulmonary effort is normal.      Breath sounds: coarse breath sounds   Genitourinary:     Comments: Scrotal Edema   Musculoskeletal:         General: Deformity present.      Comments: Right BKA/ Left AKA   Skin:     General: Skin is warm and dry.   Neurological:      Mental Status: He is alert.   Psychiatric:         Mood and Affect: Mood normal.         Behavior: Behavior normal.         Thought Content: Thought content normal.     Significant Labs: All pertinent labs within the past 24 hours have been reviewed.  CBC:   Recent Labs   Lab 01/22/25  0545 01/23/25  0553   WBC 10.12 11.00   HGB 8.0* 8.4*   HCT 25.9* 27.3*    340     CMP:   Recent Labs   Lab 01/22/25  0545 01/23/25  0553   *  137   K 4.3 4.5   CL 93* 96   CO2 29 32*   * 158*   BUN 89* 91*   CREATININE 1.9* 1.9*   CALCIUM 9.4 9.6   PROT 6.8 7.0   ALBUMIN 3.6 3.7   BILITOT 0.4 0.4   ALKPHOS 207* 182*   AST 15 15   ALT 13 13   ANIONGAP 10 9       Significant Imaging: I have reviewed all pertinent imaging results/findings within the past 24 hours.    Assessment and Plan     * Acute systolic congestive heart failure  Patient has Systolic (HFrEF) heart failure that is Acute. On presentation their CHF was decompensated. Evidence of decompensated CHF on presentation includes: edema. The etiology of their decompensation is likely substance abuse .   Latest ECHO  Results for orders placed during the hospital encounter of 01/09/25    Echo    Interpretation Summary    Left Ventricle: The left ventricle is moderately dilated. Mildly increased wall thickness. There is mild asymmetric hypertrophy. Severe global hypokinesis present. There is severely reduced systolic function with a visually estimated ejection fraction of 25 - 30%.    Right Ventricle: Moderate right ventricular enlargement. Systolic function is mildly reduced.    Left Atrium: Left atrium is mildly dilated.    Tricuspid Valve: There is mild regurgitation.    IVC/SVC: Elevated venous pressure at 15 mmHg.    Current Heart Failure Medications  hydrALAZINE injection 5 mg, Every 6 hours PRN, Intravenous  furosemide (Lasix) 200 mg in 0.9% NaCl SolP 100 mL continuous infusion (conc: 2 mg/mL), Continuous, Intravenous  carvediloL tablet 3.125 mg, 2 times daily, Oral    Plan  - Monitor strict I&Os and daily weights.    - nephrology and cardiology managing diuresis. Patient is on Lasix drip with albumin,developing contraction alkalosis, may have to cut back   - Cont Coreg  - recommending life vest on discharge   - ischemic work up per cardiology     Urinary tract infection associated with indwelling urethral catheter  Catheter in place for severe acute decompensated heart failure and need  for strict I&O with concern of cardiorenal syndrome   Completed 4 day course of ceftriaxone        Opioid use disorder  Addiction medicine consulted   Improved on Suboxone  Symptomatic Rx with Ativan and imodium     Per Addiction medicine:   For first dose of buprenorphine (at least 18 hours since last use of fentanyl) give one or two 8 mg strips (8-16 mg total).  Wait 1 hour and then...  If mild withdrawal, give another 8 mg strip.  If more significant withdrawal, give two more 8 mg strips.  If relaxed or calm, do not give any more.  Wait 1-2 more hours...  If still have withdrawal symptoms, take another 8 mg strip  If relaxed or calm, do not take any more  Try not to give more than 32 mg (4 strips) on day one.  On day 2, give 8 mg BID   Continue taking 8 mg BID until seeing in clinic.    MARLENE (acute kidney injury)  MARLENE is likely due to Cardiorenal syndrome. Baseline creatinine is  1 . Most recent creatinine and eGFR are listed below.  Recent Labs     01/21/25  0510 01/22/25  0545 01/23/25  0553   CREATININE 2.1* 1.9* 1.9*   EGFRNORACEVR 38.4* 43.2* 43.2*        Plan  - Avoid nephrotoxins and renally dose meds for GFR listed above  - Monitor urine output, serial BMP, and adjust therapy as needed  - nephrology following   - Close monitoring  - Diurese as above     Diabetes mellitus  Patient's FSGs are controlled on current medication regimen.  Last A1c reviewed-   Lab Results   Component Value Date    HGBA1C 7.5 (H) 01/10/2025     Most recent fingerstick glucose reviewed-   Recent Labs   Lab 01/22/25  1205 01/22/25  1631 01/22/25  2105 01/23/25  0643   POCTGLUCOSE 146* 169* 163* 140*       Current correctional scale  Low  Maintain anti-hyperglycemic dose as follows-   Antihyperglycemics (From admission, onward)      Start     Stop Route Frequency Ordered    01/10/25 1515  insulin aspart U-100 pen 0-10 Units         -- SubQ Before meals & nightly PRN 01/10/25 1415          Hold Oral hypoglycemics while patient is in  the hospital.    Elevated troponin  Acute nonischemic, nontraumatic myocardial injury due to acute on chronic HFrEF  Troponin elevated but flat, cardiology following  No chest pain or shortness of breath         Scrotal edema  Secondary to acute decompensated heart failure  Elevate scrotum  Scrotal ultrasound reviewed  Diuresing as per Nephrology recommendation    Obesity  Body mass index is 39.89 kg/m². Morbid obesity complicates all aspects of disease management from diagnostic modalities to treatment. Weight loss encouraged and health benefits explained to patient.           VTE Risk Mitigation (From admission, onward)           Ordered     enoxaparin injection 40 mg  Every 24 hours         01/22/25 1033     IP VTE HIGH RISK PATIENT  Once         01/09/25 1326     Place sequential compression device  Until discontinued         01/09/25 1326     Reason for no Mechanical VTE Prophylaxis  Once        Question:  Reasons:  Answer:  Physician Provided (leave comment)  Comment:  already on heparin infusion    01/09/25 0906                    Discharge Planning   KATHERINE: 1/27/2025     Code Status: Full Code   Medical Readiness for Discharge Date:   Discharge Plan A: Home   Discharge Delays: None known at this time                    Lauren Lawler MD  Department of Hospital Medicine   [facility]

## 2025-01-23 NOTE — CARE UPDATE
01/22/25 2200   Patient Assessment/Suction   Level of Consciousness (AVPU) alert   Respiratory Effort Normal;Unlabored   Expansion/Accessory Muscles/Retractions no use of accessory muscles   All Lung Fields Breath Sounds clear   PRE-TX-O2   Device (Oxygen Therapy) room air   SpO2 95 %   Pulse Oximetry Type Intermittent   $ Pulse Oximetry - Multiple Charge Pulse Oximetry - Multiple   Pulse 90   Resp 18   Positioning HOB elevated 30 degrees   Positioning   Body Position position changed independently   Positioning/Transfer Devices pillows;in use   Education   $ Education Other (see comment);15 min

## 2025-01-23 NOTE — ASSESSMENT & PLAN NOTE
Patient's FSGs are controlled on current medication regimen.  Last A1c reviewed-   Lab Results   Component Value Date    HGBA1C 7.5 (H) 01/10/2025     Most recent fingerstick glucose reviewed-   Recent Labs   Lab 01/22/25  1205 01/22/25  1631 01/22/25  2105 01/23/25  0643   POCTGLUCOSE 146* 169* 163* 140*       Current correctional scale  Low  Maintain anti-hyperglycemic dose as follows-   Antihyperglycemics (From admission, onward)    Start     Stop Route Frequency Ordered    01/10/25 1515  insulin aspart U-100 pen 0-10 Units         -- SubQ Before meals & nightly PRN 01/10/25 1415        Hold Oral hypoglycemics while patient is in the hospital.

## 2025-01-23 NOTE — SUBJECTIVE & OBJECTIVE
Interval History: Cr stable. Patient continue to diurese on Lasix drip. Diarrhea and withdrawal symptoms have improved. No other overnight events.     Review of Systems  Objective:     Vital Signs (Most Recent):  Temp: 98 °F (36.7 °C) (01/23/25 0823)  Pulse: 96 (01/23/25 1000)  Resp: 16 (01/23/25 1000)  BP: 134/81 (01/23/25 0823)  SpO2: 97 % (01/23/25 1000) Vital Signs (24h Range):  Temp:  [98 °F (36.7 °C)-98.7 °F (37.1 °C)] 98 °F (36.7 °C)  Pulse:  [] 96  Resp:  [16-18] 16  SpO2:  [94 %-98 %] 97 %  BP: (131-142)/(61-81) 134/81     Weight: 117.9 kg (260 lb)  Body mass index is 37.31 kg/m².    Intake/Output Summary (Last 24 hours) at 1/23/2025 1111  Last data filed at 1/23/2025 0532  Gross per 24 hour   Intake 1287.39 ml   Output 1800 ml   Net -512.61 ml         Physical Exam  Vitals and nursing note reviewed. Exam conducted with a chaperone present.   Constitutional:       General: He is not in acute distress.     Appearance: Normal appearance. He is obese. He is not ill-appearing, toxic-appearing or diaphoretic.   Cardiovascular   Normal heart sounds, there is edema on both lower extremity stumps   Pulmonary:      Effort: Pulmonary effort is normal.      Breath sounds: coarse breath sounds   Genitourinary:     Comments: Scrotal Edema   Musculoskeletal:         General: Deformity present.      Comments: Right BKA/ Left AKA   Skin:     General: Skin is warm and dry.   Neurological:      Mental Status: He is alert.   Psychiatric:         Mood and Affect: Mood normal.         Behavior: Behavior normal.         Thought Content: Thought content normal.     Significant Labs: All pertinent labs within the past 24 hours have been reviewed.  CBC:   Recent Labs   Lab 01/22/25  0545 01/23/25  0553   WBC 10.12 11.00   HGB 8.0* 8.4*   HCT 25.9* 27.3*    340     CMP:   Recent Labs   Lab 01/22/25 0545 01/23/25  0553   * 137   K 4.3 4.5   CL 93* 96   CO2 29 32*   * 158*   BUN 89* 91*   CREATININE 1.9*  1.9*   CALCIUM 9.4 9.6   PROT 6.8 7.0   ALBUMIN 3.6 3.7   BILITOT 0.4 0.4   ALKPHOS 207* 182*   AST 15 15   ALT 13 13   ANIONGAP 10 9       Significant Imaging: I have reviewed all pertinent imaging results/findings within the past 24 hours.

## 2025-01-23 NOTE — CARE UPDATE
01/23/25 1000   Patient Assessment/Suction   Level of Consciousness (AVPU) alert   Respiratory Effort Unlabored   Expansion/Accessory Muscles/Retractions no use of accessory muscles   Rhythm/Pattern, Respiratory depth regular   Cough Frequency no cough   PRE-TX-O2   Device (Oxygen Therapy) room air   SpO2 97 %   Pulse Oximetry Type Intermittent   $ Pulse Oximetry - Multiple Charge Pulse Oximetry - Multiple   Pulse 96   Resp 16   Education   $ Education Oxygen;15 min

## 2025-01-23 NOTE — ASSESSMENT & PLAN NOTE
Patient has Systolic (HFrEF) heart failure that is Acute. On presentation their CHF was decompensated. Evidence of decompensated CHF on presentation includes: edema. The etiology of their decompensation is likely substance abuse .   Latest ECHO  Results for orders placed during the hospital encounter of 01/09/25    Echo    Interpretation Summary    Left Ventricle: The left ventricle is moderately dilated. Mildly increased wall thickness. There is mild asymmetric hypertrophy. Severe global hypokinesis present. There is severely reduced systolic function with a visually estimated ejection fraction of 25 - 30%.    Right Ventricle: Moderate right ventricular enlargement. Systolic function is mildly reduced.    Left Atrium: Left atrium is mildly dilated.    Tricuspid Valve: There is mild regurgitation.    IVC/SVC: Elevated venous pressure at 15 mmHg.    Current Heart Failure Medications  hydrALAZINE injection 5 mg, Every 6 hours PRN, Intravenous  furosemide (Lasix) 200 mg in 0.9% NaCl SolP 100 mL continuous infusion (conc: 2 mg/mL), Continuous, Intravenous  carvediloL tablet 3.125 mg, 2 times daily, Oral    Plan  - Monitor strict I&Os and daily weights.    - nephrology and cardiology managing diuresis. Patient is on Lasix drip with albumin,developing contraction alkalosis, may have to cut back   - Cont Coreg  - recommending life vest on discharge   - ischemic work up per cardiology

## 2025-01-23 NOTE — PROGRESS NOTES
[facility]   Department of Cardiology  Progress Note      PATIENT NAME: Gideon Ross    MRN: [id number]  TODAY'S DATE: 01/23/2025  ADMIT DATE: 1/9/2025                          CONSULT REQUESTED BY: Lauren Lawler MD    SUBJECTIVE     PRINCIPAL PROBLEM: Acute systolic congestive heart failure    01/23/2025  PATIENT SEEMS TO BE DIURESING VERY WELL STILL HAS SOME EDEMA PERSISTING   Maintaining sinus rhythm at this time  Denies having any shortness of breath or chest discomfort    01/20/2025  Patient states he is detoxing so bad   still has edema present to lower extremities  Monitor normal sinus rhythm with bundle-branch block  Creatinine 2.5, potassium 5.5 today    1/17/25  Resting in bed.  NAD.  Continue to report edema.     1/16/25  H&H dropping 8.9/29.3. Denies bleeding.  NAD. Remains edematous. Cr 2.3, unchanged; in icu for strict I's and O's.    1/15/25    Patient seen resting in bed with no distress noted. Breathing is stable. He remains with a good amount of edema.     1/14/25  Resting in bed. Complaints of thirst.  Wheezing present.  Remains edematous; no further events of NSVT on tele    1/13/25    Resting in bed. NAD. RA.  Reports being thirsty.  Eating canned sausages.  Continues to report scrotal edema. Occasional PVCs noted on tele. Brief NSVT.  Not on dobutrex; Nephrology is following.    1/12/25    Patient seen sitting up bed with no distress noted. Breathing is stable. Family/friends at bedside. Stable on telemetry but mildly tachycardic. Dobutamine was held due to ectopy on telemetry.     1/11/25    Patient very drowsy when seen today.  No acute distress noted.  He was asking for pain medicine when awakened.  Patient with worsened renal function today.    1/10/25    Patient seen resting in bed this morning.  He was very upset.  He reports that he does use heroin on a daily basis and believes that he is likely detoxing currently.  Patient is not very optimistic for his  outcome.    HPI:    Patient is a 49-year-old male who presented to the emergency room with complaints of testicular pain and swelling over the past 1 week.  Patient is a known diabetic and has a history of PID with bilateral above the knee amputations.  Patient also had some complaints of mild shortness of breath but denied any chest pain.  Patient was noted to have significantly elevated high sensitivity troponin level on arrival.  High sensitivity troponin level was 3343 on arrival and is now 3369.  Patient was started on heparin drip.  Patient does have a known history of drug abuse and is positive for opiates, amphetamine, and marijuana metabolites in his UDS.  Patient reports his last use of methamphetamine was a couple of weeks ago.        REASON FOR CONSULT:  From Hospitalist H&P: Patient presents complaining of testicular pain and swelling.  Patient has noticed increased pain and swelling for the last 1 week as well as foul smell.  Patient has a history of bilateral above-the-knee and below-the-knee amputation.  He is a diabetic.  At the worst symptoms are moderate.  Patient complains of mild shortness of breath no chest pain.            Review of patient's allergies indicates:  No Known Allergies    Past Medical History:   Diagnosis Date    Diabetes mellitus     Hypertension      Past Surgical History:   Procedure Laterality Date    SKIN GRAFT       Social History     Tobacco Use    Smoking status: Every Day     Current packs/day: 1.00     Types: Cigarettes   Substance Use Topics    Alcohol use: Yes    Drug use: No        REVIEW OF SYSTEMS  Per HPI    OBJECTIVE     VITAL SIGNS (Most Recent)  Temp: 98 °F (36.7 °C) (01/23/25 1221)  Pulse: 94 (01/23/25 1221)  Resp: 17 (01/23/25 1302)  BP: (!) 139/92 (01/23/25 1221)  SpO2: 96 % (01/23/25 1221)    VENTILATION STATUS  Resp: 17 (01/23/25 1302)  SpO2: 96 % (01/23/25 1221)           I & O (Last 24H):  Intake/Output Summary (Last 24 hours) at 1/23/2025 5529  Last  data filed at 1/23/2025 1310  Gross per 24 hour   Intake 1587.39 ml   Output 1800 ml   Net -212.61 ml       WEIGHTS  Wt Readings from Last 1 Encounters:   01/21/25 0515 117.9 kg (260 lb)   01/15/25 0400 126.1 kg (278 lb)   01/14/25 0400 127.1 kg (280 lb 3.2 oz)   01/09/25 2239 115.4 kg (254 lb 6.4 oz)   01/09/25 0824 108.9 kg (240 lb)       PHYSICAL EXAM  CONSTITUTIONAL: No fever, no chills obese  HEENT: Normocephalic, atraumatic,pupils reactive to light                 NECK:  No JVD no carotid bruit  CVS: S1S2+, RRR  LUNGS:  Scattered wheezing, no dyspnea  ABDOMEN: Soft, NT, BS+  EXTREMITIES: No cyanosis, +edema @ right BKA and Left AKA  :  valle catheter  NEURO: AAO X 3        HOME MEDICATIONS:  No current facility-administered medications on file prior to encounter.     Current Outpatient Medications on File Prior to Encounter   Medication Sig Dispense Refill    amLODIPine (NORVASC) 10 MG tablet Take 1 tablet (10 mg total) by mouth once daily. 30 tablet 1    aspirin (ECOTRIN) 81 MG EC tablet Take 1 tablet (81 mg total) by mouth once daily. 30 tablet 1    atorvastatin (LIPITOR) 80 MG tablet Take 1 tablet (80 mg total) by mouth once daily. 30 tablet 1    baclofen (LIORESAL) 10 MG tablet Take 1 tablet (10 mg total) by mouth 3 (three) times daily. 90 tablet 1    carvedilol (COREG) 6.25 MG tablet Take 1 tablet (6.25 mg total) by mouth 2 (two) times daily. 60 tablet 1    diazePAM (VALIUM) 5 MG tablet Take 1 tablet (5 mg total) by mouth every 12 (twelve) hours as needed for Anxiety (anxiety.). 30 tablet 0    gabapentin (NEURONTIN) 300 MG capsule Take 2 capsules (600 mg total) by mouth 3 (three) times daily. 90 capsule 1    hydroCHLOROthiazide (HYDRODIURIL) 50 MG tablet Take 1 tablet (50 mg total) by mouth once daily. 30 tablet 1    insulin aspart U-100 (NOVOLOG) 100 unit/mL InPn pen Inject 18 Units into the skin 3 (three) times daily. (Patient taking differently: Inject into the skin 3 (three) times daily.) 10 mL 1     insulin detemir U-100 (LEVEMIR FLEXTOUCH) 100 unit/mL (3 mL) SubQ InPn pen Inject 35 Units into the skin 2 (two) times daily. 10 mL 1    insulin glargine U-100, Lantus, 100 unit/mL injection Inject 30 Units into the skin every evening. (Patient not taking: Reported on 1/9/2025)      lisinopril (PRINIVIL,ZESTRIL) 40 MG tablet Take 1 tablet (40 mg total) by mouth once daily. 30 tablet 1    nicotine (NICODERM CQ) 14 mg/24 hr Place 1 patch onto the skin once daily. (Patient not taking: Reported on 1/9/2025) 15 patch 0    nortriptyline (PAMELOR) 25 MG capsule Take 1 capsule (25 mg total) by mouth 3 (three) times daily. 30 capsule 1    oxyCODONE (ROXICODONE) 5 MG immediate release tablet Take 1 tablet (5 mg total) by mouth every 12 (twelve) hours as needed. (Patient not taking: Reported on 1/9/2025) 30 tablet 0    polyethylene glycol (GLYCOLAX) 17 gram PwPk Take 17 g by mouth daily as needed. (Patient not taking: Reported on 1/9/2025) 30 each 1    QUEtiapine (SEROQUEL) 100 MG Tab Take 1 tablet (100 mg total) by mouth every evening. 30 tablet 1    sofosbuvir-velpatasvir (EPCLUSA) 400-100 mg Tab Take 1 tablet daily. (Patient not taking: Reported on 1/9/2025) 28 tablet 2       SCHEDULED MEDS:   acyclovir  800 mg Oral 5x Daily    albumin human 25%  12.5 g Intravenous BID    aspirin  81 mg Oral Daily    atorvastatin  80 mg Oral Daily    baclofen  5 mg Oral BID    buprenorphine-naloxone 8-2 mg  1 Film Sublingual BID    carvediloL  3.125 mg Oral BID    enoxparin  40 mg Subcutaneous Q24H (prophylaxis, 1700)    gabapentin  300 mg Oral BID    miconazole NITRATE 2 %   Topical (Top) BID    tamsulosin  0.4 mg Oral Daily    white petrolatum   Topical (Top) Daily       CONTINUOUS INFUSIONS:   furosemide (LASIX) 2 mg/mL continuous infusion (non-titrating)  5 mg/hr Intravenous Continuous 2.5 mL/hr at 01/23/25 1119 5 mg/hr at 01/23/25 1119           PRN MEDS:  Current Facility-Administered Medications:     acetaminophen, 650 mg, Oral, Q4H  "PRN    aluminum-magnesium hydroxide-simethicone, 30 mL, Oral, QID PRN    dextrose 50%, 12.5 g, Intravenous, PRN    dextrose 50%, 25 g, Intravenous, PRN    glucagon (human recombinant), 1 mg, Intramuscular, PRN    glucose, 16 g, Oral, PRN    glucose, 24 g, Oral, PRN    hydrALAZINE, 5 mg, Intravenous, Q6H PRN    insulin aspart U-100, 0-10 Units, Subcutaneous, QID (AC + HS) PRN    LIDOcaine HCl 2%, , Mucous Membrane, Once PRN    loperamide, 2 mg, Oral, QID PRN    LORazepam, 0.5 mg, Oral, Q6H PRN    magnesium oxide, 800 mg, Oral, PRN    magnesium oxide, 800 mg, Oral, PRN    melatonin, 6 mg, Oral, Nightly PRN    morphine, 4 mg, Intravenous, Q3H PRN    naloxone, 0.02 mg, Intravenous, PRN    ondansetron, 4 mg, Intravenous, Q6H PRN    oxyCODONE-acetaminophen, 1 tablet, Oral, Q4H PRN    oxyCODONE-acetaminophen, 1 tablet, Oral, Q4H PRN    potassium bicarbonate, 35 mEq, Oral, PRN    potassium bicarbonate, 50 mEq, Oral, PRN    potassium bicarbonate, 60 mEq, Oral, PRN    potassium, sodium phosphates, 2 packet, Oral, PRN    potassium, sodium phosphates, 2 packet, Oral, PRN    potassium, sodium phosphates, 2 packet, Oral, PRN    senna-docusate 8.6-50 mg, 1 tablet, Oral, Daily PRN    sodium chloride 0.9%, 10 mL, Intravenous, Q12H PRN    sodium chloride 0.9%, 10 mL, Intravenous, PRN    LABS AND DIAGNOSTICS     CBC LAST 3 DAYS  Recent Labs   Lab 01/21/25  0510 01/22/25  0545 01/23/25  0553   WBC 11.00 10.12 11.00   RBC 3.18* 3.14* 3.23*   HGB 8.1* 8.0* 8.4*   HCT 25.9* 25.9* 27.3*   MCV 81* 83 85   MCH 25.5* 25.5* 26.0*   MCHC 31.3* 30.9* 30.8*   RDW 14.9* 15.1* 15.0*    334 340   MPV 10.7 10.4 10.2   GRAN 60.8  6.7 59.8  6.1 60.8  6.7   LYMPH 13.8*  1.5 13.9*  1.4 13.1*  1.4   MONO 14.1  1.6* 15.0  1.5* 13.5  1.5*   BASO 0.05 0.04 0.06   NRBC 0 0 0       COAGULATION LAST 3 DAYS  No results for input(s): "LABPT", "INR", "APTT" in the last 168 hours.      CHEMISTRY LAST 3 DAYS  Recent Labs   Lab 01/21/25  0510 " "01/22/25  0545 01/23/25  0553   * 132* 137   K 4.5 4.3 4.5   CL 96 93* 96   CO2 30* 29 32*   ANIONGAP 8 10 9   BUN 91* 89* 91*   CREATININE 2.1* 1.9* 1.9*   * 199* 158*   CALCIUM 9.5 9.4 9.6   MG 1.9 1.9 2.0   ALBUMIN 3.5 3.6 3.7   PROT 6.6 6.8 7.0   ALKPHOS 212* 207* 182*   ALT 14 13 13   AST 17 15 15   BILITOT 0.4 0.4 0.4       CARDIAC PROFILE LAST 3 DAYS  Recent Labs   Lab 01/16/25  1803   TROPONINIHS 130.2*         ENDOCRINE LAST 3 DAYS  No results for input(s): "TSH", "PROCAL" in the last 168 hours.    LAST ARTERIAL BLOOD GAS  ABG  No results for input(s): "PH", "PO2", "PCO2", "HCO3", "BE" in the last 168 hours.    LAST 7 DAYS MICROBIOLOGY   Microbiology Results (last 7 days)       ** No results found for the last 168 hours. **            MOST RECENT IMAGING  X-Ray Chest 1 View  Narrative: EXAMINATION:  XR CHEST 1 VIEW    CLINICAL HISTORY:  Chest pain;    TECHNIQUE:  Single frontal view of the chest was performed.    COMPARISON:  01/09/2025    FINDINGS:  Enlarged cardiac silhouette with pulmonary vascular congestion.  No focal consolidation.  Impression: As above    Electronically signed by: Doron Maynard  Date:    01/16/2025  Time:    18:46      ECHOCARDIOGRAM RESULTS (last 5)  No results found for this or any previous visit.      CURRENT/PREVIOUS VISIT EKG  Results for orders placed or performed during the hospital encounter of 01/09/25   EKG 12-lead    Collection Time: 01/16/25  6:03 PM   Result Value Ref Range    QRS Duration 132 ms    [facility] QTC Calculation 504 ms    Narrative    Test Reason : R07.9,    Vent. Rate : 106 BPM     Atrial Rate : 106 BPM     P-R Int : 144 ms          QRS Dur : 132 ms      QT Int : 380 ms       P-R-T Axes :  44 174   8 degrees    QTcB Int : 504 ms    Sinus tachycardia  Right bundle branch block  Left posterior fascicular block   Bifascicular block   Possible Inferior infarct ,age undetermined  Anterior infarct ,age undetermined  Abnormal ECG  No previous ECGs " available  Confirmed by Kingsley Mosley ([id number]) on 1/20/2025 11:54:42 AM    Referred By: AAAREFERRAL SELF           Confirmed By: Kingsley Mosley           ASSESSMENT/PLAN:     Active Hospital Problems    Diagnosis    *Acute systolic congestive heart failure    Urinary tract infection associated with indwelling urethral catheter    MARLENE (acute kidney injury)    Opioid use disorder    Scrotal edema    Elevated troponin    Diabetes mellitus    Obesity       ASSESSMENT & PLAN:     Elevated troponin  Acute HFrEF 25-30%  Cardiomyopathy  Testicular pain and swelling   Type II DM  + drug abuse: amphetamines, marijuana  PAD  Hx of bilateral AKA  CKD EGFR of 43      RECOMMENDATIONS:    -1600 mL net balance Patient still has peripheral edema.   Currently maintain on Lasix drip at 5 mg FOR ANOTHER 24 HOURS AND CONSIDER SWITCHING TO TORSEMIDE 20 MG P.O. B.I.D.  Start Coreg 3.125 mg BID  Recommend to add hydralazine 10 mg with isosorbide dinitrate 10 mg every 8 hours and titrate this upwards as tolerated  Because of CKD patient may not be able to tolerate spironolactone well, potassium is 4.5.  Will eventually need ischemic evaluation.    7.   Recommend LifeVest for dilated cardiomyopathy    LÓPEZ FREDERICK MD.

## 2025-01-23 NOTE — PROGRESS NOTES
Nephrology Consult Note        Patient Name: Gideon Ross  MRN: [id number]    Patient Class: IP- Inpatient   Admission Date: 1/9/2025  Length of Stay: 14 days  Date of Service: 1/23/2025    Attending Physician: Lauren Lawler MD  Primary Care Provider: Jennifer, Primary Doctor    Reason for Consult: marlene    SUBJECTIVE:     HPI: 49M with PID, bilateral BKA, DM presented for significant bilateral lower extremity edema, scrotal swelling, intractable pain. Labs largely unremarkable other than BNP 3,500, troponin 3,300. Patient was started on IV diuretics and pain control. Cardiology was consulted. Initially started on heparin drip but then transitioned to Lovenox. Echocardiogram showed EF 25%. Addiction Medicine consulted for heroin use. Started on Suboxone and reports improvement in symptoms. Diuretics held 2/2 worsening sCr and MARLENE. He was started on dobutamine on 1/11/25, but was stopped 2/2 tachycardia. He has a catheter for strict I&O with MARLENE, but is describing dysuria, there is purulent drainage at the end of the catheter so UA was obtained with concerns of UTI and pt placed on rocephin and recommendations for catheter exchange giving continued MARLENE and need for strict I&O with decompensated heart failure.      1/14 Agree with diuretics and albumin, will consider escalation or ultrafiltration. Not sure how acute the HF is. Consider palliative eval as well.  1/15 VSS. Good UO with diuretics+ albumin, will escalate more tomorrow if he remains stable. Not sure if UF will be better tolerated than diuretics.  1/16 VSS. Ensure dietary compliance with salt and liquids. Keep IOs tally. Will give more albumin and lasix.  1/17 VSS. Agree with empiric acyclovir for possible shingles. Agree with escalation to lasix gtt as d/w cards. Strict IOS and fluid restriction enforcement.  1/18 VSS. Greatly appreciate Urology help with valle placement and management of scrotal edema.  1/19 VSS. Scr finally a little better, UO 4L via  leonard. Continue present management, monitor labs, replete lytes, enforce liquid restriction.  1/20  Scr w/small bump today.  UOP 4.9L, no new complaints.  1/21 vitals stable, UOP 5L, net -14.5L, audio telehealth visit attempted- patient did not answer phone x 2   1/22 AFVSS.  3.9 liter uop.  Requesting higher dose of Suboxone.    1/23  UOP 1.8L.  VSS, renal function same as yesterday.  Will continue lasix gtt.  No new complaints.    ASSESSMENT/PLAN:     MARLENE 2/2 ATN due to intravascular volume depletion from diuretics  Hypoalbuminemia with dependent edema due to third-spacing and immobility  HFrEF ? Etiology, acuity, prognosis?  PAD  DM  Anemia  CKD stage 2, sCr 1 om 1/9/2025  Shingles      Diuresing well however starting to develop contraction alkalosis  BP stable with albumin - leave same  Cut back lasix gtt to 10mg/hr.  Cr stable today.  Trend electrolytes  No acute RRT needs    Thank you for allowing us to participate in the care of your patient!   We will follow the patient and provide recommendations as needed.         Laboratory:  Recent Labs   Lab 01/21/25  0510 01/22/25  0545 01/23/25  0553   * 132* 137   K 4.5 4.3 4.5   CL 96 93* 96   CO2 30* 29 32*   BUN 91* 89* 91*   CREATININE 2.1* 1.9* 1.9*   * 199* 158*       Recent Labs   Lab 01/21/25  0510 01/22/25  0545 01/23/25  0553   CALCIUM 9.5 9.4 9.6   ALBUMIN 3.5 3.6 3.7   MG 1.9 1.9 2.0             Recent Labs   Lab 01/20/25  2051 01/21/25  0559 01/21/25  1114 01/21/25  1642 01/21/25  2024 01/22/25  0624 01/22/25  1205 01/22/25  1631 01/22/25  2105 01/23/25  0643   POCTGLUCOSE 178* 186* 143* 160* 159* 163* 146* 169* 163* 140*       Recent Labs   Lab 01/10/25  0721   Hemoglobin A1C 7.5 H       Recent Labs   Lab 01/21/25  0510 01/22/25  0545 01/23/25  0553   WBC 11.00 10.12 11.00   HGB 8.1* 8.0* 8.4*   HCT 25.9* 25.9* 27.3*    334 340   MCV 81* 83 85   MCHC 31.3* 30.9* 30.8*   MONO 14.1  1.6* 15.0  1.5* 13.5  1.5*   EOSINOPHIL 10.3*  10.4* 11.6*       Recent Labs   Lab 01/21/25  0510 01/22/25  0545 01/23/25  0553   BILITOT 0.4 0.4 0.4   PROT 6.6 6.8 7.0   ALBUMIN 3.5 3.6 3.7   ALKPHOS 212* 207* 182*   ALT 14 13 13   AST 17 15 15       Recent Labs   Lab 07/19/22  2316 01/09/25  0743 01/12/25  1055   Color, UA Yellow Yellow [place] A   Appearance, UA Clear Hazy A Hazy A   pH, UA 6.0 6.0 6.0   Specific Gravity, UA <=1.005 1.025 1.010   Protein, UA Trace A 3+ A 1+ A   Glucose, UA 4+ A Trace A Negative   Ketones, UA Negative Negative Negative   Urobilinogen, UA Negative 2.0-3.0 A Negative   Bilirubin (UA) Negative Negative Negative   Occult Blood UA Negative 2+ A 3+ A   Nitrite, UA Negative Negative Negative   RBC, UA 1 7 H >100 H   WBC, UA  --  4 18 H   Bacteria None Occasional Rare   Hyaline Casts, UA  --  4 A 0       Recent Labs   Lab 07/19/22  2046 01/09/25  0707   POC PH 7.473 H  --    POC PCO2 42.4  --    POC HCO3 31.1 H  --    POC PO2 28 L  --    POC SATURATED O2 58 L  --    POC BE 7  --    Sample VENOUS VENOUS       Microbiology Results (last 7 days)       ** No results found for the last 168 hours. **            Review of patient's allergies indicates:  No Known Allergies    Outpatient meds:  No current facility-administered medications on file prior to encounter.     Current Outpatient Medications on File Prior to Encounter   Medication Sig Dispense Refill    amLODIPine (NORVASC) 10 MG tablet Take 1 tablet (10 mg total) by mouth once daily. 30 tablet 1    aspirin (ECOTRIN) 81 MG EC tablet Take 1 tablet (81 mg total) by mouth once daily. 30 tablet 1    atorvastatin (LIPITOR) 80 MG tablet Take 1 tablet (80 mg total) by mouth once daily. 30 tablet 1    baclofen (LIORESAL) 10 MG tablet Take 1 tablet (10 mg total) by mouth 3 (three) times daily. 90 tablet 1    carvedilol (COREG) 6.25 MG tablet Take 1 tablet (6.25 mg total) by mouth 2 (two) times daily. 60 tablet 1    diazePAM (VALIUM) 5 MG tablet Take 1 tablet (5 mg total) by mouth every 12  (twelve) hours as needed for Anxiety (anxiety.). 30 tablet 0    gabapentin (NEURONTIN) 300 MG capsule Take 2 capsules (600 mg total) by mouth 3 (three) times daily. 90 capsule 1    hydroCHLOROthiazide (HYDRODIURIL) 50 MG tablet Take 1 tablet (50 mg total) by mouth once daily. 30 tablet 1    insulin aspart U-100 (NOVOLOG) 100 unit/mL InPn pen Inject 18 Units into the skin 3 (three) times daily. (Patient taking differently: Inject into the skin 3 (three) times daily.) 10 mL 1    insulin detemir U-100 (LEVEMIR FLEXTOUCH) 100 unit/mL (3 mL) SubQ InPn pen Inject 35 Units into the skin 2 (two) times daily. 10 mL 1    insulin glargine U-100, Lantus, 100 unit/mL injection Inject 30 Units into the skin every evening. (Patient not taking: Reported on 1/9/2025)      lisinopril (PRINIVIL,ZESTRIL) 40 MG tablet Take 1 tablet (40 mg total) by mouth once daily. 30 tablet 1    nicotine (NICODERM CQ) 14 mg/24 hr Place 1 patch onto the skin once daily. (Patient not taking: Reported on 1/9/2025) 15 patch 0    nortriptyline (PAMELOR) 25 MG capsule Take 1 capsule (25 mg total) by mouth 3 (three) times daily. 30 capsule 1    oxyCODONE (ROXICODONE) 5 MG immediate release tablet Take 1 tablet (5 mg total) by mouth every 12 (twelve) hours as needed. (Patient not taking: Reported on 1/9/2025) 30 tablet 0    polyethylene glycol (GLYCOLAX) 17 gram PwPk Take 17 g by mouth daily as needed. (Patient not taking: Reported on 1/9/2025) 30 each 1    QUEtiapine (SEROQUEL) 100 MG Tab Take 1 tablet (100 mg total) by mouth every evening. 30 tablet 1    sofosbuvir-velpatasvir (EPCLUSA) 400-100 mg Tab Take 1 tablet daily. (Patient not taking: Reported on 1/9/2025) 28 tablet 2       Scheduled meds:   acyclovir  800 mg Oral 5x Daily    albumin human 25%  12.5 g Intravenous BID    aspirin  81 mg Oral Daily    atorvastatin  80 mg Oral Daily    baclofen  5 mg Oral BID    buprenorphine-naloxone 8-2 mg  1 Film Sublingual BID    carvediloL  3.125 mg Oral BID     enoxparin  40 mg Subcutaneous Q24H (prophylaxis, 1700)    gabapentin  300 mg Oral BID    miconazole NITRATE 2 %   Topical (Top) BID    tamsulosin  0.4 mg Oral Daily    white petrolatum   Topical (Top) Daily       Infusions:   furosemide (LASIX) 2 mg/mL continuous infusion (non-titrating)  10 mg/hr Intravenous Continuous 5 mL/hr at 01/23/25 0720 10 mg/hr at 01/23/25 0720       PRN meds:    Current Facility-Administered Medications:     acetaminophen, 650 mg, Oral, Q4H PRN    aluminum-magnesium hydroxide-simethicone, 30 mL, Oral, QID PRN    dextrose 50%, 12.5 g, Intravenous, PRN    dextrose 50%, 25 g, Intravenous, PRN    glucagon (human recombinant), 1 mg, Intramuscular, PRN    glucose, 16 g, Oral, PRN    glucose, 24 g, Oral, PRN    hydrALAZINE, 5 mg, Intravenous, Q6H PRN    insulin aspart U-100, 0-10 Units, Subcutaneous, QID (AC + HS) PRN    LIDOcaine HCl 2%, , Mucous Membrane, Once PRN    loperamide, 2 mg, Oral, QID PRN    LORazepam, 0.5 mg, Oral, Q6H PRN    magnesium oxide, 800 mg, Oral, PRN    magnesium oxide, 800 mg, Oral, PRN    melatonin, 6 mg, Oral, Nightly PRN    morphine, 4 mg, Intravenous, Q3H PRN    naloxone, 0.02 mg, Intravenous, PRN    ondansetron, 4 mg, Intravenous, Q6H PRN    oxyCODONE-acetaminophen, 1 tablet, Oral, Q4H PRN    oxyCODONE-acetaminophen, 1 tablet, Oral, Q4H PRN    potassium bicarbonate, 35 mEq, Oral, PRN    potassium bicarbonate, 50 mEq, Oral, PRN    potassium bicarbonate, 60 mEq, Oral, PRN    potassium, sodium phosphates, 2 packet, Oral, PRN    potassium, sodium phosphates, 2 packet, Oral, PRN    potassium, sodium phosphates, 2 packet, Oral, PRN    senna-docusate 8.6-50 mg, 1 tablet, Oral, Daily PRN    sodium chloride 0.9%, 10 mL, Intravenous, Q12H PRN    sodium chloride 0.9%, 10 mL, Intravenous, PRN    Past Medical History:   Diagnosis Date    Diabetes mellitus     Hypertension      Past Surgical History:   Procedure Laterality Date    SKIN GRAFT       No family history on file.  Social  History     Tobacco Use    Smoking status: Every Day     Current packs/day: 1.00     Types: Cigarettes   Substance Use Topics    Alcohol use: Yes    Drug use: No       OBJECTIVE:     Vital Signs and IO:  Temp:  [98 °F (36.7 °C)-98.7 °F (37.1 °C)]   Pulse:  []   Resp:  [16-18]   BP: (131-142)/(61-81)   SpO2:  [94 %-98 %]   I/O last 3 completed shifts:  In: 1887.4 [P.O.:1560; I.V.:327.4]  Out: 3400 [Urine:3400]  Wt Readings from Last 5 Encounters:   01/21/25 117.9 kg (260 lb)   07/19/22 99.8 kg (220 lb)   02/09/20 104.3 kg (230 lb)   08/06/19 113.4 kg (250 lb)   02/27/19 108.9 kg (240 lb)     Body mass index is 37.31 kg/m².    Physical Exam  Constitutional:       General: Patient is not in acute distress.     Appearance: Patient is well-developed. She is not diaphoretic.   HENT:      Head: Normocephalic and atraumatic.      Mouth/Throat: Mucous membranes are moist.   Eyes:      General: No scleral icterus.     Pupils: Pupils are equal, round, and reactive to light.   Cardiovascular:      Rate and Rhythm: Normal rate and regular rhythm.   Pulmonary:      Effort: Pulmonary effort is normal. No respiratory distress.      Breath sounds: No stridor.   Abdominal:      General: There is no distension.      Palpations: Abdomen is soft.   Musculoskeletal:         General: No deformity. Normal range of motion.      Cervical back: Neck supple.   Skin:     General: Skin is warm and dry.      Findings: No rash present. No erythema.   Neurological:      Mental Status: Patient is alert and oriented to person, place, and time.      Cranial Nerves: No cranial nerve deficit.   Psychiatric:         Behavior: Behavior normal.          Patient care time was spent personally by me on the following activities:     Obtaining a history.  Examination of patient.  Providing medical care at the patients bedside.  Developing a treatment plan with patient or surrogate and bedside caregivers.  Ordering and reviewing laboratory studies,  radiographic studies, pulse oximetry.  Ordering and performing treatments and interventions.  Evaluation of patient's response to treatment.  Discussions with consultants while on the unit and immediately available to the patient.  Re-evaluation of the patient's condition.  Documentation in the medical record.     Sandee Radford NP      [facility]  [address] [place] [address]  MICHAEL Yeung [id number]    [phone] - tel  [phone] - fax    1/23/2025

## 2025-01-24 LAB
ALBUMIN SERPL BCP-MCNC: 3.5 G/DL (ref 3.5–5.2)
ALP SERPL-CCNC: 176 U/L (ref 55–135)
ALT SERPL W/O P-5'-P-CCNC: 12 U/L (ref 10–44)
ANION GAP SERPL CALC-SCNC: 11 MMOL/L (ref 8–16)
AST SERPL-CCNC: 14 U/L (ref 10–40)
BASOPHILS # BLD AUTO: 0.07 K/UL (ref 0–0.2)
BASOPHILS NFR BLD: 0.6 % (ref 0–1.9)
BILIRUB SERPL-MCNC: 0.4 MG/DL (ref 0.1–1)
BUN SERPL-MCNC: 87 MG/DL (ref 6–20)
CALCIUM SERPL-MCNC: 9.5 MG/DL (ref 8.7–10.5)
CHLORIDE SERPL-SCNC: 96 MMOL/L (ref 95–110)
CO2 SERPL-SCNC: 32 MMOL/L (ref 23–29)
CREAT SERPL-MCNC: 1.7 MG/DL (ref 0.5–1.4)
DIFFERENTIAL METHOD BLD: ABNORMAL
EOSINOPHIL # BLD AUTO: 1.5 K/UL (ref 0–0.5)
EOSINOPHIL NFR BLD: 12.9 % (ref 0–8)
ERYTHROCYTE [DISTWIDTH] IN BLOOD BY AUTOMATED COUNT: 15 % (ref 11.5–14.5)
EST. GFR  (NO RACE VARIABLE): 49.4 ML/MIN/1.73 M^2
GLUCOSE SERPL-MCNC: 139 MG/DL (ref 70–110)
HCT VFR BLD AUTO: 26.6 % (ref 40–54)
HGB BLD-MCNC: 8.5 G/DL (ref 14–18)
IMM GRANULOCYTES # BLD AUTO: 0.07 K/UL (ref 0–0.04)
IMM GRANULOCYTES NFR BLD AUTO: 0.6 % (ref 0–0.5)
LYMPHOCYTES # BLD AUTO: 1.7 K/UL (ref 1–4.8)
LYMPHOCYTES NFR BLD: 13.8 % (ref 18–48)
MAGNESIUM SERPL-MCNC: 2 MG/DL (ref 1.6–2.6)
MCH RBC QN AUTO: 26.9 PG (ref 27–31)
MCHC RBC AUTO-ENTMCNC: 32 G/DL (ref 32–36)
MCV RBC AUTO: 84 FL (ref 82–98)
MONOCYTES # BLD AUTO: 1.5 K/UL (ref 0.3–1)
MONOCYTES NFR BLD: 12.5 % (ref 4–15)
NEUTROPHILS # BLD AUTO: 7.1 K/UL (ref 1.8–7.7)
NEUTROPHILS NFR BLD: 59.6 % (ref 38–73)
NRBC BLD-RTO: 0 /100 WBC
PLATELET # BLD AUTO: 339 K/UL (ref 150–450)
PMV BLD AUTO: 10.2 FL (ref 9.2–12.9)
POCT GLUCOSE: 135 MG/DL (ref 70–110)
POCT GLUCOSE: 143 MG/DL (ref 70–110)
POCT GLUCOSE: 213 MG/DL (ref 70–110)
POTASSIUM SERPL-SCNC: 4.5 MMOL/L (ref 3.5–5.1)
PROT SERPL-MCNC: 6.7 G/DL (ref 6–8.4)
RBC # BLD AUTO: 3.16 M/UL (ref 4.6–6.2)
SODIUM SERPL-SCNC: 139 MMOL/L (ref 136–145)
WBC # BLD AUTO: 11.93 K/UL (ref 3.9–12.7)

## 2025-01-24 PROCEDURE — 63600175 PHARM REV CODE 636 W HCPCS: Performed by: INTERNAL MEDICINE

## 2025-01-24 PROCEDURE — 25000003 PHARM REV CODE 250: Performed by: STUDENT IN AN ORGANIZED HEALTH CARE EDUCATION/TRAINING PROGRAM

## 2025-01-24 PROCEDURE — 12000002 HC ACUTE/MED SURGE SEMI-PRIVATE ROOM

## 2025-01-24 PROCEDURE — 83735 ASSAY OF MAGNESIUM: CPT | Performed by: STUDENT IN AN ORGANIZED HEALTH CARE EDUCATION/TRAINING PROGRAM

## 2025-01-24 PROCEDURE — 63600175 PHARM REV CODE 636 W HCPCS: Performed by: STUDENT IN AN ORGANIZED HEALTH CARE EDUCATION/TRAINING PROGRAM

## 2025-01-24 PROCEDURE — 25000003 PHARM REV CODE 250: Performed by: INTERNAL MEDICINE

## 2025-01-24 PROCEDURE — 99233 SBSQ HOSP IP/OBS HIGH 50: CPT | Mod: ,,, | Performed by: INTERNAL MEDICINE

## 2025-01-24 PROCEDURE — 25000003 PHARM REV CODE 250: Performed by: NURSE PRACTITIONER

## 2025-01-24 PROCEDURE — 85025 COMPLETE CBC W/AUTO DIFF WBC: CPT | Performed by: STUDENT IN AN ORGANIZED HEALTH CARE EDUCATION/TRAINING PROGRAM

## 2025-01-24 PROCEDURE — 36415 COLL VENOUS BLD VENIPUNCTURE: CPT | Performed by: STUDENT IN AN ORGANIZED HEALTH CARE EDUCATION/TRAINING PROGRAM

## 2025-01-24 PROCEDURE — 80053 COMPREHEN METABOLIC PANEL: CPT | Performed by: STUDENT IN AN ORGANIZED HEALTH CARE EDUCATION/TRAINING PROGRAM

## 2025-01-24 PROCEDURE — P9047 ALBUMIN (HUMAN), 25%, 50ML: HCPCS | Performed by: INTERNAL MEDICINE

## 2025-01-24 RX ORDER — HYDRALAZINE HYDROCHLORIDE 25 MG/1
25 TABLET, FILM COATED ORAL EVERY 8 HOURS
Status: DISCONTINUED | OUTPATIENT
Start: 2025-01-24 | End: 2025-01-26

## 2025-01-24 RX ADMIN — ALBUMIN (HUMAN) 12.5 G: 12.5 SOLUTION INTRAVENOUS at 09:01

## 2025-01-24 RX ADMIN — HYDRALAZINE HYDROCHLORIDE 10 MG: 10 TABLET ORAL at 03:01

## 2025-01-24 RX ADMIN — FUROSEMIDE 5 MG/HR: 10 INJECTION, SOLUTION INTRAMUSCULAR; INTRAVENOUS at 07:01

## 2025-01-24 RX ADMIN — ASPIRIN 81 MG: 81 TABLET, COATED ORAL at 08:01

## 2025-01-24 RX ADMIN — ACYCLOVIR 800 MG: 400 TABLET ORAL at 06:01

## 2025-01-24 RX ADMIN — MORPHINE SULFATE 4 MG: 4 INJECTION INTRAVENOUS at 03:01

## 2025-01-24 RX ADMIN — ACYCLOVIR 800 MG: 400 TABLET ORAL at 09:01

## 2025-01-24 RX ADMIN — ISOSORBIDE DINITRATE 20 MG: 10 TABLET ORAL at 03:01

## 2025-01-24 RX ADMIN — MICONAZOLE NITRATE: 20 POWDER TOPICAL at 09:01

## 2025-01-24 RX ADMIN — TAMSULOSIN HYDROCHLORIDE 0.4 MG: 0.4 CAPSULE ORAL at 08:01

## 2025-01-24 RX ADMIN — ENOXAPARIN SODIUM 40 MG: 40 INJECTION SUBCUTANEOUS at 06:01

## 2025-01-24 RX ADMIN — OXYCODONE HYDROCHLORIDE AND ACETAMINOPHEN 1 TABLET: 10; 325 TABLET ORAL at 09:01

## 2025-01-24 RX ADMIN — ALBUMIN (HUMAN) 12.5 G: 12.5 SOLUTION INTRAVENOUS at 08:01

## 2025-01-24 RX ADMIN — HYDRALAZINE HYDROCHLORIDE 25 MG: 25 TABLET ORAL at 10:01

## 2025-01-24 RX ADMIN — GABAPENTIN 300 MG: 300 CAPSULE ORAL at 08:01

## 2025-01-24 RX ADMIN — BACLOFEN 5 MG: 5 TABLET ORAL at 08:01

## 2025-01-24 RX ADMIN — MORPHINE SULFATE 4 MG: 4 INJECTION INTRAVENOUS at 09:01

## 2025-01-24 RX ADMIN — ACYCLOVIR 800 MG: 400 TABLET ORAL at 05:01

## 2025-01-24 RX ADMIN — MORPHINE SULFATE 4 MG: 4 INJECTION INTRAVENOUS at 11:01

## 2025-01-24 RX ADMIN — ACYCLOVIR 800 MG: 400 TABLET ORAL at 03:01

## 2025-01-24 RX ADMIN — ISOSORBIDE DINITRATE 20 MG: 10 TABLET ORAL at 09:01

## 2025-01-24 RX ADMIN — ISOSORBIDE DINITRATE 20 MG: 10 TABLET ORAL at 08:01

## 2025-01-24 RX ADMIN — OXYCODONE HYDROCHLORIDE AND ACETAMINOPHEN 1 TABLET: 10; 325 TABLET ORAL at 01:01

## 2025-01-24 RX ADMIN — ACYCLOVIR 800 MG: 400 TABLET ORAL at 12:01

## 2025-01-24 RX ADMIN — HYDRALAZINE HYDROCHLORIDE 10 MG: 10 TABLET ORAL at 05:01

## 2025-01-24 RX ADMIN — ATORVASTATIN CALCIUM 80 MG: 40 TABLET, FILM COATED ORAL at 08:01

## 2025-01-24 RX ADMIN — BUPRENORPHINE AND NALOXONE 1 FILM: 8; 2 FILM BUCCAL; SUBLINGUAL at 08:01

## 2025-01-24 RX ADMIN — BUPRENORPHINE AND NALOXONE 1 FILM: 8; 2 FILM BUCCAL; SUBLINGUAL at 09:01

## 2025-01-24 RX ADMIN — CARVEDILOL 3.12 MG: 3.12 TABLET, FILM COATED ORAL at 09:01

## 2025-01-24 RX ADMIN — MORPHINE SULFATE 4 MG: 4 INJECTION INTRAVENOUS at 05:01

## 2025-01-24 RX ADMIN — BACLOFEN 5 MG: 5 TABLET ORAL at 09:01

## 2025-01-24 RX ADMIN — CARVEDILOL 3.12 MG: 3.12 TABLET, FILM COATED ORAL at 08:01

## 2025-01-24 NOTE — PROGRESS NOTES
[facility]   Department of Cardiology  Progress Note      PATIENT NAME: Gideon Ross    MRN: [id number]  TODAY'S DATE: 01/24/2025  ADMIT DATE: 1/9/2025                          CONSULT REQUESTED BY: Lauren Lawler MD    SUBJECTIVE     PRINCIPAL PROBLEM: Acute systolic congestive heart failure    01/24/2025  Swelling has improved overall  Denies dyspnea  Wearing LifeVest    1/23/25  PATIENT SEEMS TO BE DIURESING VERY WELL STILL HAS SOME EDEMA PERSISTING   Maintaining sinus rhythm at this time  Denies having any shortness of breath or chest discomfort    01/20/2025  Patient states he is detoxing so bad   still has edema present to lower extremities  Monitor normal sinus rhythm with bundle-branch block  Creatinine 2.5, potassium 5.5 today    1/17/25  Resting in bed.  NAD.  Continue to report edema.     1/16/25  H&H dropping 8.9/29.3. Denies bleeding.  NAD. Remains edematous. Cr 2.3, unchanged; in icu for strict I's and O's.    1/15/25    Patient seen resting in bed with no distress noted. Breathing is stable. He remains with a good amount of edema.     1/14/25  Resting in bed. Complaints of thirst.  Wheezing present.  Remains edematous; no further events of NSVT on tele    1/13/25    Resting in bed. NAD. RA.  Reports being thirsty.  Eating canned sausages.  Continues to report scrotal edema. Occasional PVCs noted on tele. Brief NSVT.  Not on dobutrex; Nephrology is following.    1/12/25    Patient seen sitting up bed with no distress noted. Breathing is stable. Family/friends at bedside. Stable on telemetry but mildly tachycardic. Dobutamine was held due to ectopy on telemetry.     1/11/25    Patient very drowsy when seen today.  No acute distress noted.  He was asking for pain medicine when awakened.  Patient with worsened renal function today.    1/10/25    Patient seen resting in bed this morning.  He was very upset.  He reports that he does use heroin on a daily basis and believes that he is  likely detoxing currently.  Patient is not very optimistic for his outcome.    HPI:    Patient is a 49-year-old male who presented to the emergency room with complaints of testicular pain and swelling over the past 1 week.  Patient is a known diabetic and has a history of PID with bilateral above the knee amputations.  Patient also had some complaints of mild shortness of breath but denied any chest pain.  Patient was noted to have significantly elevated high sensitivity troponin level on arrival.  High sensitivity troponin level was 3343 on arrival and is now 3369.  Patient was started on heparin drip.  Patient does have a known history of drug abuse and is positive for opiates, amphetamine, and marijuana metabolites in his UDS.  Patient reports his last use of methamphetamine was a couple of weeks ago.        REASON FOR CONSULT:  From Hospitalist H&P: Patient presents complaining of testicular pain and swelling.  Patient has noticed increased pain and swelling for the last 1 week as well as foul smell.  Patient has a history of bilateral above-the-knee and below-the-knee amputation.  He is a diabetic.  At the worst symptoms are moderate.  Patient complains of mild shortness of breath no chest pain.            Review of patient's allergies indicates:  No Known Allergies    Past Medical History:   Diagnosis Date    Diabetes mellitus     Hypertension      Past Surgical History:   Procedure Laterality Date    SKIN GRAFT       Social History     Tobacco Use    Smoking status: Every Day     Current packs/day: 1.00     Types: Cigarettes   Substance Use Topics    Alcohol use: Yes    Drug use: No        REVIEW OF SYSTEMS  Per HPI    OBJECTIVE     VITAL SIGNS (Most Recent)  Temp: 97.9 °F (36.6 °C) (01/24/25 0741)  Pulse: 95 (01/24/25 0741)  Resp: 18 (01/24/25 0901)  BP: (!) 140/75 (01/24/25 0741)  SpO2: 96 % (01/24/25 0741)    VENTILATION STATUS  Resp: 18 (01/24/25 0901)  SpO2: 96 % (01/24/25 0741)           I & O (Last  24H):  Intake/Output Summary (Last 24 hours) at 1/24/2025 0927  Last data filed at 1/24/2025 0500  Gross per 24 hour   Intake 780 ml   Output 3450 ml   Net -2670 ml       WEIGHTS  Wt Readings from Last 1 Encounters:   01/21/25 0515 117.9 kg (260 lb)   01/15/25 0400 126.1 kg (278 lb)   01/14/25 0400 127.1 kg (280 lb 3.2 oz)   01/09/25 2239 115.4 kg (254 lb 6.4 oz)   01/09/25 0824 108.9 kg (240 lb)       PHYSICAL EXAM  CONSTITUTIONAL:  Chronically ill male appears older than stated age Resting in bed breathing comfortably  HEENT: Normocephalic, atraumatic,pupils reactive to light                 NECK:  No JVD no carotid bruit  CVS: S1S2+, RRR  LUNGS:  Breath sounds clear, diminished at bases no dyspnea  ABDOMEN: Soft, NT, BS+  EXTREMITIES: No cyanosis, trace edema @ right BKA and Left AKA  :  valle catheter  NEURO: AAO X 3    LifeVest in place    HOME MEDICATIONS:  No current facility-administered medications on file prior to encounter.     Current Outpatient Medications on File Prior to Encounter   Medication Sig Dispense Refill    amLODIPine (NORVASC) 10 MG tablet Take 1 tablet (10 mg total) by mouth once daily. 30 tablet 1    aspirin (ECOTRIN) 81 MG EC tablet Take 1 tablet (81 mg total) by mouth once daily. 30 tablet 1    atorvastatin (LIPITOR) 80 MG tablet Take 1 tablet (80 mg total) by mouth once daily. 30 tablet 1    baclofen (LIORESAL) 10 MG tablet Take 1 tablet (10 mg total) by mouth 3 (three) times daily. 90 tablet 1    carvedilol (COREG) 6.25 MG tablet Take 1 tablet (6.25 mg total) by mouth 2 (two) times daily. 60 tablet 1    diazePAM (VALIUM) 5 MG tablet Take 1 tablet (5 mg total) by mouth every 12 (twelve) hours as needed for Anxiety (anxiety.). 30 tablet 0    gabapentin (NEURONTIN) 300 MG capsule Take 2 capsules (600 mg total) by mouth 3 (three) times daily. 90 capsule 1    hydroCHLOROthiazide (HYDRODIURIL) 50 MG tablet Take 1 tablet (50 mg total) by mouth once daily. 30 tablet 1    insulin aspart U-100  (NOVOLOG) 100 unit/mL InPn pen Inject 18 Units into the skin 3 (three) times daily. (Patient taking differently: Inject into the skin 3 (three) times daily.) 10 mL 1    insulin detemir U-100 (LEVEMIR FLEXTOUCH) 100 unit/mL (3 mL) SubQ InPn pen Inject 35 Units into the skin 2 (two) times daily. 10 mL 1    insulin glargine U-100, Lantus, 100 unit/mL injection Inject 30 Units into the skin every evening. (Patient not taking: Reported on 1/9/2025)      lisinopril (PRINIVIL,ZESTRIL) 40 MG tablet Take 1 tablet (40 mg total) by mouth once daily. 30 tablet 1    nicotine (NICODERM CQ) 14 mg/24 hr Place 1 patch onto the skin once daily. (Patient not taking: Reported on 1/9/2025) 15 patch 0    nortriptyline (PAMELOR) 25 MG capsule Take 1 capsule (25 mg total) by mouth 3 (three) times daily. 30 capsule 1    oxyCODONE (ROXICODONE) 5 MG immediate release tablet Take 1 tablet (5 mg total) by mouth every 12 (twelve) hours as needed. (Patient not taking: Reported on 1/9/2025) 30 tablet 0    polyethylene glycol (GLYCOLAX) 17 gram PwPk Take 17 g by mouth daily as needed. (Patient not taking: Reported on 1/9/2025) 30 each 1    QUEtiapine (SEROQUEL) 100 MG Tab Take 1 tablet (100 mg total) by mouth every evening. 30 tablet 1    sofosbuvir-velpatasvir (EPCLUSA) 400-100 mg Tab Take 1 tablet daily. (Patient not taking: Reported on 1/9/2025) 28 tablet 2       SCHEDULED MEDS:   acyclovir  800 mg Oral 5x Daily    albumin human 25%  12.5 g Intravenous BID    aspirin  81 mg Oral Daily    atorvastatin  80 mg Oral Daily    baclofen  5 mg Oral BID    buprenorphine-naloxone 8-2 mg  1 Film Sublingual BID    carvediloL  3.125 mg Oral BID    enoxparin  40 mg Subcutaneous Q24H (prophylaxis, 1700)    gabapentin  300 mg Oral BID    hydrALAZINE  10 mg Oral Q8H    isosorbide dinitrate  20 mg Oral TID    miconazole NITRATE 2 %   Topical (Top) BID    tamsulosin  0.4 mg Oral Daily    white petrolatum   Topical (Top) Daily       CONTINUOUS INFUSIONS:    furosemide (LASIX) 2 mg/mL continuous infusion (non-titrating)  5 mg/hr Intravenous Continuous 2.5 mL/hr at 01/23/25 1119 5 mg/hr at 01/23/25 1119           PRN MEDS:  Current Facility-Administered Medications:     acetaminophen, 650 mg, Oral, Q4H PRN    aluminum-magnesium hydroxide-simethicone, 30 mL, Oral, QID PRN    dextrose 50%, 12.5 g, Intravenous, PRN    dextrose 50%, 25 g, Intravenous, PRN    glucagon (human recombinant), 1 mg, Intramuscular, PRN    glucose, 16 g, Oral, PRN    glucose, 24 g, Oral, PRN    hydrALAZINE, 5 mg, Intravenous, Q6H PRN    insulin aspart U-100, 0-10 Units, Subcutaneous, QID (AC + HS) PRN    LIDOcaine HCl 2%, , Mucous Membrane, Once PRN    loperamide, 2 mg, Oral, QID PRN    LORazepam, 0.5 mg, Oral, Q6H PRN    magnesium oxide, 800 mg, Oral, PRN    magnesium oxide, 800 mg, Oral, PRN    melatonin, 6 mg, Oral, Nightly PRN    morphine, 4 mg, Intravenous, Q3H PRN    naloxone, 0.02 mg, Intravenous, PRN    ondansetron, 4 mg, Intravenous, Q6H PRN    oxyCODONE-acetaminophen, 1 tablet, Oral, Q4H PRN    oxyCODONE-acetaminophen, 1 tablet, Oral, Q4H PRN    potassium bicarbonate, 35 mEq, Oral, PRN    potassium bicarbonate, 50 mEq, Oral, PRN    potassium bicarbonate, 60 mEq, Oral, PRN    potassium, sodium phosphates, 2 packet, Oral, PRN    potassium, sodium phosphates, 2 packet, Oral, PRN    potassium, sodium phosphates, 2 packet, Oral, PRN    senna-docusate 8.6-50 mg, 1 tablet, Oral, Daily PRN    sodium chloride 0.9%, 10 mL, Intravenous, Q12H PRN    sodium chloride 0.9%, 10 mL, Intravenous, PRN    LABS AND DIAGNOSTICS     CBC LAST 3 DAYS  Recent Labs   Lab 01/22/25  0545 01/23/25  0553 01/24/25  0528   WBC 10.12 11.00 11.93   RBC 3.14* 3.23* 3.16*   HGB 8.0* 8.4* 8.5*   HCT 25.9* 27.3* 26.6*   MCV 83 85 84   MCH 25.5* 26.0* 26.9*   MCHC 30.9* 30.8* 32.0   RDW 15.1* 15.0* 15.0*    340 339   MPV 10.4 10.2 10.2   GRAN 59.8  6.1 60.8  6.7 59.6  7.1   LYMPH 13.9*  1.4 13.1*  1.4 13.8*  1.7  "  MONO 15.0  1.5* 13.5  1.5* 12.5  1.5*   BASO 0.04 0.06 0.07   NRBC 0 0 0       COAGULATION LAST 3 DAYS  No results for input(s): "LABPT", "INR", "APTT" in the last 168 hours.      CHEMISTRY LAST 3 DAYS  Recent Labs   Lab 01/22/25  0545 01/23/25  0553 01/24/25  0528   * 137 139   K 4.3 4.5 4.5   CL 93* 96 96   CO2 29 32* 32*   ANIONGAP 10 9 11   BUN 89* 91* 87*   CREATININE 1.9* 1.9* 1.7*   * 158* 139*   CALCIUM 9.4 9.6 9.5   MG 1.9 2.0 2.0   ALBUMIN 3.6 3.7 3.5   PROT 6.8 7.0 6.7   ALKPHOS 207* 182* 176*   ALT 13 13 12   AST 15 15 14   BILITOT 0.4 0.4 0.4       CARDIAC PROFILE LAST 3 DAYS  No results for input(s): "BNP", "CPK", "CPKMB", "LDH", "TROPONINI", "TROPONINIHS" in the last 168 hours.        ENDOCRINE LAST 3 DAYS  No results for input(s): "TSH", "PROCAL" in the last 168 hours.    LAST ARTERIAL BLOOD GAS  ABG  No results for input(s): "PH", "PO2", "PCO2", "HCO3", "BE" in the last 168 hours.    LAST 7 DAYS MICROBIOLOGY   Microbiology Results (last 7 days)       ** No results found for the last 168 hours. **            MOST RECENT IMAGING  X-Ray Chest 1 View  Narrative: EXAMINATION:  XR CHEST 1 VIEW    CLINICAL HISTORY:  Chest pain;    TECHNIQUE:  Single frontal view of the chest was performed.    COMPARISON:  01/09/2025    FINDINGS:  Enlarged cardiac silhouette with pulmonary vascular congestion.  No focal consolidation.  Impression: As above    Electronically signed by: Doron Maynard  Date:    01/16/2025  Time:    18:46      ECHOCARDIOGRAM RESULTS (last 5)  No results found for this or any previous visit.      CURRENT/PREVIOUS VISIT EKG  Results for orders placed or performed during the hospital encounter of 01/09/25   EKG 12-lead    Collection Time: 01/16/25  6:03 PM   Result Value Ref Range    QRS Duration 132 ms    [facility] QTC Calculation 504 ms    Narrative    Test Reason : R07.9,    Vent. Rate : 106 BPM     Atrial Rate : 106 BPM     P-R Int : 144 ms          QRS Dur : 132 ms      QT " Int : 380 ms       P-R-T Axes :  44 174   8 degrees    QTcB Int : 504 ms    Sinus tachycardia  Right bundle branch block  Left posterior fascicular block   Bifascicular block   Possible Inferior infarct ,age undetermined  Anterior infarct ,age undetermined  Abnormal ECG  No previous ECGs available  Confirmed by Kingsley Mosley ([id number]) on 1/20/2025 11:54:42 AM    Referred By: NOAHERRAL SELF           Confirmed By: Kingsley Mosley           ASSESSMENT/PLAN:     Active Hospital Problems    Diagnosis    *Acute systolic congestive heart failure    Urinary tract infection associated with indwelling urethral catheter    MALRENE (acute kidney injury)    Opioid use disorder    Scrotal edema    Elevated troponin    Diabetes mellitus    Obesity       ASSESSMENT & PLAN:     Elevated troponin  Acute HFrEF 25-30%  Cardiomyopathy  Testicular pain and swelling   Type II DM  + drug abuse: amphetamines, marijuana  PAD  Hx of bilateral AKA  CKD EGFR of 43      RECOMMENDATIONS:    Discontinue Lasix drip and transition to torsemide 20 mg b.i.d. Diuresing well. BP trend stable   Continue HFrEF regimen: Coreg 3.125 mg BID, hydralazine 10 mg with isosorbide dinitrate 20 mg every 8 hours and titrate this upwards as tolerated  Because of CKD patient may not be able to tolerate spironolactone well, potassium is 4.5.  Will eventually need ischemic evaluation.    6.   LifeVest for dilated cardiomyopathy          JOSE Krishna, CVNP-BC  [facility]  Department of Cardiology  Date of service: 01/24/2025    Patient is a showing steady improvement with the diuresis.  He can be transitioned to torsemide 20 mg p.o. b.i.d.  If the renal function shows some improvement may consider adding Aldactone 25 mg a day to his regimen.  As long as the GFR is above 30 and monitor electrolytes-potassium closely  Meanwhile recommend increase the hydralazine to 25 mg q.8 hours and isosorbide mononitrate to be continued at 20 t.i.d.  I have personally  interviewed and examined the patient, I have reviewed the Nurse Practitioner's history and physical, assessment, and plan. I have personally evaluated the patient at bedside and agree with the findings and made appropriate changes as necessary in recommendations.      Jairo Corcoran MD  Department of Cardiology  [facility]  Date of Service: 01/24/25       JAIRO CORCORAN MD.  01/24/2025

## 2025-01-24 NOTE — PT/OT/SLP PROGRESS
Physical Therapy      Patient Name:  Gideon Ross   MRN:  [id number]    Patient not seen today secondary to Patient unwilling to participate, Pain, Other (Comment) (due to scrotal pain and edema pt not wanting to transfer to BSC or w/c unless absolutely necessary. Will attempt to work with pt when he needs BSC, if possible.). Will follow-up 1/25/25.

## 2025-01-24 NOTE — ASSESSMENT & PLAN NOTE
MARLENE is likely due to Cardiorenal syndrome. Baseline creatinine is  1 . Most recent creatinine and eGFR are listed below.  Recent Labs     01/22/25  0545 01/23/25  0553 01/24/25  0528   CREATININE 1.9* 1.9* 1.7*   EGFRNORACEVR 43.2* 43.2* 49.4*        Plan  - Avoid nephrotoxins and renally dose meds for GFR listed above  - Monitor urine output, serial BMP, and adjust therapy as needed  - nephrology following   - Close monitoring  - Diurese as above

## 2025-01-24 NOTE — SUBJECTIVE & OBJECTIVE
Interval History: Patient diuresing well. Cr improving. States he has been constipated today. Trying prune juice. No other complains or overnight events.     Review of Systems  Objective:     Vital Signs (Most Recent):  Temp: 97.9 °F (36.6 °C) (01/24/25 0741)  Pulse: 95 (01/24/25 0741)  Resp: 18 (01/24/25 0901)  BP: (!) 140/75 (01/24/25 0741)  SpO2: 96 % (01/24/25 0741) Vital Signs (24h Range):  Temp:  [97.6 °F (36.4 °C)-98.5 °F (36.9 °C)] 97.9 °F (36.6 °C)  Pulse:  [90-96] 95  Resp:  [17-19] 18  SpO2:  [94 %-97 %] 96 %  BP: (130-150)/(65-90) 140/75     Weight: 117.9 kg (260 lb)  Body mass index is 37.31 kg/m².    Intake/Output Summary (Last 24 hours) at 1/24/2025 1249  Last data filed at 1/24/2025 0841  Gross per 24 hour   Intake 880 ml   Output 3850 ml   Net -2970 ml         Physical Exam  Vitals and nursing note reviewed. Exam conducted with a chaperone present.   Constitutional:       General: He is not in acute distress.     Appearance: Normal appearance. He is obese. He is not ill-appearing, toxic-appearing or diaphoretic.   Cardiovascular   Normal heart sounds, there is edema on both lower extremity stumps   Pulmonary:      Effort: Pulmonary effort is normal.      Breath sounds: coarse breath sounds   Genitourinary:     Comments: Scrotal Edema   Musculoskeletal:         General: Deformity present.      Comments: Right BKA/ Left AKA   Skin:     General: Skin is warm and dry.   Neurological:      Mental Status: He is alert.   Psychiatric:         Mood and Affect: Mood normal.         Behavior: Behavior normal.         Thought Content: Thought content normal.     Significant Labs: All pertinent labs within the past 24 hours have been reviewed.  CBC:   Recent Labs   Lab 01/23/25 0553 01/24/25 0528   WBC 11.00 11.93   HGB 8.4* 8.5*   HCT 27.3* 26.6*    339     CMP:   Recent Labs   Lab 01/23/25 0553 01/24/25 0528    139   K 4.5 4.5   CL 96 96   CO2 32* 32*   * 139*   BUN 91* 87*   CREATININE  1.9* 1.7*   CALCIUM 9.6 9.5   PROT 7.0 6.7   ALBUMIN 3.7 3.5   BILITOT 0.4 0.4   ALKPHOS 182* 176*   AST 15 14   ALT 13 12   ANIONGAP 9 11       Significant Imaging: I have reviewed all pertinent imaging results/findings within the past 24 hours.

## 2025-01-24 NOTE — PT/OT/SLP PROGRESS
Physical Therapy      Patient Name:  Gideon Ross   MRN:  [id number]    Patient not seen today secondary to inclement weather and therapist unavailable on 1/21/25 and 1/22/25. Will follow-up 1/23/25.

## 2025-01-24 NOTE — PLAN OF CARE
Spoke with patient and hospitalist during morning rounds. Patient still on lasix gtt at this time and requiring inpatient hospital care. Per patient, he needs a new wheelchair at discharge, PT aware.        01/24/25 6271   Discharge Reassessment   Assessment Type Discharge Planning Reassessment   Did the patient's condition or plan change since previous assessment? No   Discharge Plan discussed with: Patient   Communicated KATHERINE with patient/caregiver Yes   Discharge Plan A Home   Discharge Plan B Home   DME Needed Upon Discharge  wheelchair   Why the patient remains in the hospital Requires continued medical care

## 2025-01-24 NOTE — NURSING
Light pink tinged blood noted in valle cath tubing, Dr Aguilar notified, Pt Awake alert and oriented denies pain/needs at this time. Will continue to monitor

## 2025-01-24 NOTE — ASSESSMENT & PLAN NOTE
Patient's FSGs are controlled on current medication regimen.  Last A1c reviewed-   Lab Results   Component Value Date    HGBA1C 7.5 (H) 01/10/2025     Most recent fingerstick glucose reviewed-   Recent Labs   Lab 01/23/25  1644 01/23/25  2229 01/24/25  0655   POCTGLUCOSE 146* 143* 135*       Current correctional scale  Low  Maintain anti-hyperglycemic dose as follows-   Antihyperglycemics (From admission, onward)    Start     Stop Route Frequency Ordered    01/10/25 1515  insulin aspart U-100 pen 0-10 Units         -- SubQ Before meals & nightly PRN 01/10/25 1415        Hold Oral hypoglycemics while patient is in the hospital.

## 2025-01-24 NOTE — PROGRESS NOTES
Nephrology Consult Note        Patient Name: Gideon Ross  MRN: [id number]    Patient Class: IP- Inpatient   Admission Date: 1/9/2025  Length of Stay: 15 days  Date of Service: 1/24/2025    Attending Physician: Lauren Lawler MD  Primary Care Provider: Jennifer, Primary Doctor    Reason for Consult: marlene    SUBJECTIVE:     HPI: 49M with PID, bilateral BKA, DM presented for significant bilateral lower extremity edema, scrotal swelling, intractable pain. Labs largely unremarkable other than BNP 3,500, troponin 3,300. Patient was started on IV diuretics and pain control. Cardiology was consulted. Initially started on heparin drip but then transitioned to Lovenox. Echocardiogram showed EF 25%. Addiction Medicine consulted for heroin use. Started on Suboxone and reports improvement in symptoms. Diuretics held 2/2 worsening sCr and MARLENE. He was started on dobutamine on 1/11/25, but was stopped 2/2 tachycardia. He has a catheter for strict I&O with MARLENE, but is describing dysuria, there is purulent drainage at the end of the catheter so UA was obtained with concerns of UTI and pt placed on rocephin and recommendations for catheter exchange giving continued MARLENE and need for strict I&O with decompensated heart failure.      1/14 Agree with diuretics and albumin, will consider escalation or ultrafiltration. Not sure how acute the HF is. Consider palliative eval as well.  1/15 VSS. Good UO with diuretics+ albumin, will escalate more tomorrow if he remains stable. Not sure if UF will be better tolerated than diuretics.  1/16 VSS. Ensure dietary compliance with salt and liquids. Keep IOs tally. Will give more albumin and lasix.  1/17 VSS. Agree with empiric acyclovir for possible shingles. Agree with escalation to lasix gtt as d/w cards. Strict IOS and fluid restriction enforcement.  1/18 VSS. Greatly appreciate Urology help with valle placement and management of scrotal edema.  1/19 VSS. Scr finally a little better, UO 4L via  leonard. Continue present management, monitor labs, replete lytes, enforce liquid restriction.  1/20  Scr w/small bump today.  UOP 4.9L, no new complaints.  1/21 vitals stable, UOP 5L, net -14.5L, audio telehealth visit attempted- patient did not answer phone x 2   1/22 AFVSS.  3.9 liter uop.  Requesting higher dose of Suboxone.    1/23  UOP 1.8L.  VSS, renal function same as yesterday.  Will continue lasix gtt.  No new complaints.  1/24  UOP 3.4L, Scr trended down.  VSS.  Lasix now at 5mg/hr.  No new complaints.    ASSESSMENT/PLAN:     MARLENE 2/2 ATN due to intravascular volume depletion from diuretics  Hypoalbuminemia with dependent edema due to third-spacing and immobility  HFrEF ? Etiology, acuity, prognosis?  PAD  DM  Anemia  CKD stage 2, sCr 1 om 1/9/2025  Shingles      Diuresing well however starting to develop contraction alkalosis  BP stable with albumin - leave same  Cut back lasix gtt to 5mg/hr.  Cr stable today.  Trend electrolytes  No acute RRT needs    Thank you for allowing us to participate in the care of your patient!   We will follow the patient and provide recommendations as needed.         Laboratory:  Recent Labs   Lab 01/22/25  0545 01/23/25  0553 01/24/25  0528   * 137 139   K 4.3 4.5 4.5   CL 93* 96 96   CO2 29 32* 32*   BUN 89* 91* 87*   CREATININE 1.9* 1.9* 1.7*   * 158* 139*       Recent Labs   Lab 01/22/25  0545 01/23/25  0553 01/24/25  0528   CALCIUM 9.4 9.6 9.5   ALBUMIN 3.6 3.7 3.5   MG 1.9 2.0 2.0             Recent Labs   Lab 01/21/25  2024 01/22/25  0624 01/22/25  1205 01/22/25  1631 01/22/25  2105 01/23/25  0643 01/23/25  1242 01/23/25  1644 01/23/25  2229 01/24/25  0655   POCTGLUCOSE 159* 163* 146* 169* 163* 140* 196* 146* 143* 135*       Recent Labs   Lab 01/10/25  0721   Hemoglobin A1C 7.5 H       Recent Labs   Lab 01/22/25  0545 01/23/25  0553 01/24/25  0528   WBC 10.12 11.00 11.93   HGB 8.0* 8.4* 8.5*   HCT 25.9* 27.3* 26.6*    340 339   MCV 83 85 84   MCHC  30.9* 30.8* 32.0   MONO 15.0  1.5* 13.5  1.5* 12.5  1.5*   EOSINOPHIL 10.4* 11.6* 12.9*       Recent Labs   Lab 01/22/25  0545 01/23/25  0553 01/24/25  0528   BILITOT 0.4 0.4 0.4   PROT 6.8 7.0 6.7   ALBUMIN 3.6 3.7 3.5   ALKPHOS 207* 182* 176*   ALT 13 13 12   AST 15 15 14       Recent Labs   Lab 07/19/22  2316 01/09/25  0743 01/12/25  1055   Color, UA Yellow Yellow Burke A   Appearance, UA Clear Hazy A Hazy A   pH, UA 6.0 6.0 6.0   Specific Gravity, UA <=1.005 1.025 1.010   Protein, UA Trace A 3+ A 1+ A   Glucose, UA 4+ A Trace A Negative   Ketones, UA Negative Negative Negative   Urobilinogen, UA Negative 2.0-3.0 A Negative   Bilirubin (UA) Negative Negative Negative   Occult Blood UA Negative 2+ A 3+ A   Nitrite, UA Negative Negative Negative   RBC, UA 1 7 H >100 H   WBC, UA  --  4 18 H   Bacteria None Occasional Rare   Hyaline Casts, UA  --  4 A 0       Recent Labs   Lab 07/19/22  2046 01/09/25  0707   POC PH 7.473 H  --    POC PCO2 42.4  --    POC HCO3 31.1 H  --    POC PO2 28 L  --    POC SATURATED O2 58 L  --    POC BE 7  --    Sample VENOUS VENOUS       Microbiology Results (last 7 days)       ** No results found for the last 168 hours. **            Review of patient's allergies indicates:  No Known Allergies    Outpatient meds:  No current facility-administered medications on file prior to encounter.     Current Outpatient Medications on File Prior to Encounter   Medication Sig Dispense Refill    amLODIPine (NORVASC) 10 MG tablet Take 1 tablet (10 mg total) by mouth once daily. 30 tablet 1    aspirin (ECOTRIN) 81 MG EC tablet Take 1 tablet (81 mg total) by mouth once daily. 30 tablet 1    atorvastatin (LIPITOR) 80 MG tablet Take 1 tablet (80 mg total) by mouth once daily. 30 tablet 1    baclofen (LIORESAL) 10 MG tablet Take 1 tablet (10 mg total) by mouth 3 (three) times daily. 90 tablet 1    carvedilol (COREG) 6.25 MG tablet Take 1 tablet (6.25 mg total) by mouth 2 (two) times daily. 60 tablet 1     diazePAM (VALIUM) 5 MG tablet Take 1 tablet (5 mg total) by mouth every 12 (twelve) hours as needed for Anxiety (anxiety.). 30 tablet 0    gabapentin (NEURONTIN) 300 MG capsule Take 2 capsules (600 mg total) by mouth 3 (three) times daily. 90 capsule 1    hydroCHLOROthiazide (HYDRODIURIL) 50 MG tablet Take 1 tablet (50 mg total) by mouth once daily. 30 tablet 1    insulin aspart U-100 (NOVOLOG) 100 unit/mL InPn pen Inject 18 Units into the skin 3 (three) times daily. (Patient taking differently: Inject into the skin 3 (three) times daily.) 10 mL 1    insulin detemir U-100 (LEVEMIR FLEXTOUCH) 100 unit/mL (3 mL) SubQ InPn pen Inject 35 Units into the skin 2 (two) times daily. 10 mL 1    insulin glargine U-100, Lantus, 100 unit/mL injection Inject 30 Units into the skin every evening. (Patient not taking: Reported on 1/9/2025)      lisinopril (PRINIVIL,ZESTRIL) 40 MG tablet Take 1 tablet (40 mg total) by mouth once daily. 30 tablet 1    nicotine (NICODERM CQ) 14 mg/24 hr Place 1 patch onto the skin once daily. (Patient not taking: Reported on 1/9/2025) 15 patch 0    nortriptyline (PAMELOR) 25 MG capsule Take 1 capsule (25 mg total) by mouth 3 (three) times daily. 30 capsule 1    oxyCODONE (ROXICODONE) 5 MG immediate release tablet Take 1 tablet (5 mg total) by mouth every 12 (twelve) hours as needed. (Patient not taking: Reported on 1/9/2025) 30 tablet 0    polyethylene glycol (GLYCOLAX) 17 gram PwPk Take 17 g by mouth daily as needed. (Patient not taking: Reported on 1/9/2025) 30 each 1    QUEtiapine (SEROQUEL) 100 MG Tab Take 1 tablet (100 mg total) by mouth every evening. 30 tablet 1    sofosbuvir-velpatasvir (EPCLUSA) 400-100 mg Tab Take 1 tablet daily. (Patient not taking: Reported on 1/9/2025) 28 tablet 2       Scheduled meds:   acyclovir  800 mg Oral 5x Daily    albumin human 25%  12.5 g Intravenous BID    aspirin  81 mg Oral Daily    atorvastatin  80 mg Oral Daily    baclofen  5 mg Oral BID     buprenorphine-naloxone 8-2 mg  1 Film Sublingual BID    carvediloL  3.125 mg Oral BID    enoxparin  40 mg Subcutaneous Q24H (prophylaxis, 1700)    gabapentin  300 mg Oral BID    hydrALAZINE  10 mg Oral Q8H    isosorbide dinitrate  20 mg Oral TID    miconazole NITRATE 2 %   Topical (Top) BID    tamsulosin  0.4 mg Oral Daily    white petrolatum   Topical (Top) Daily       Infusions:   furosemide (LASIX) 2 mg/mL continuous infusion (non-titrating)  5 mg/hr Intravenous Continuous 2.5 mL/hr at 01/23/25 1119 5 mg/hr at 01/23/25 1119       PRN meds:    Current Facility-Administered Medications:     acetaminophen, 650 mg, Oral, Q4H PRN    aluminum-magnesium hydroxide-simethicone, 30 mL, Oral, QID PRN    dextrose 50%, 12.5 g, Intravenous, PRN    dextrose 50%, 25 g, Intravenous, PRN    glucagon (human recombinant), 1 mg, Intramuscular, PRN    glucose, 16 g, Oral, PRN    glucose, 24 g, Oral, PRN    hydrALAZINE, 5 mg, Intravenous, Q6H PRN    insulin aspart U-100, 0-10 Units, Subcutaneous, QID (AC + HS) PRN    LIDOcaine HCl 2%, , Mucous Membrane, Once PRN    loperamide, 2 mg, Oral, QID PRN    LORazepam, 0.5 mg, Oral, Q6H PRN    magnesium oxide, 800 mg, Oral, PRN    magnesium oxide, 800 mg, Oral, PRN    melatonin, 6 mg, Oral, Nightly PRN    morphine, 4 mg, Intravenous, Q3H PRN    naloxone, 0.02 mg, Intravenous, PRN    ondansetron, 4 mg, Intravenous, Q6H PRN    oxyCODONE-acetaminophen, 1 tablet, Oral, Q4H PRN    oxyCODONE-acetaminophen, 1 tablet, Oral, Q4H PRN    potassium bicarbonate, 35 mEq, Oral, PRN    potassium bicarbonate, 50 mEq, Oral, PRN    potassium bicarbonate, 60 mEq, Oral, PRN    potassium, sodium phosphates, 2 packet, Oral, PRN    potassium, sodium phosphates, 2 packet, Oral, PRN    potassium, sodium phosphates, 2 packet, Oral, PRN    senna-docusate 8.6-50 mg, 1 tablet, Oral, Daily PRN    sodium chloride 0.9%, 10 mL, Intravenous, Q12H PRN    sodium chloride 0.9%, 10 mL, Intravenous, PRN    Past Medical History:    Diagnosis Date    Diabetes mellitus     Hypertension      Past Surgical History:   Procedure Laterality Date    SKIN GRAFT       No family history on file.  Social History     Tobacco Use    Smoking status: Every Day     Current packs/day: 1.00     Types: Cigarettes   Substance Use Topics    Alcohol use: Yes    Drug use: No       OBJECTIVE:     Vital Signs and IO:  Temp:  [97.6 °F (36.4 °C)-98.5 °F (36.9 °C)]   Pulse:  [90-96]   Resp:  [16-19]   BP: (130-150)/(65-92)   SpO2:  [94 %-97 %]   I/O last 3 completed shifts:  In: 2067.4 [P.O.:1740; I.V.:327.4]  Out: 4250 [Urine:4250]  Wt Readings from Last 5 Encounters:   01/21/25 117.9 kg (260 lb)   07/19/22 99.8 kg (220 lb)   02/09/20 104.3 kg (230 lb)   08/06/19 113.4 kg (250 lb)   02/27/19 108.9 kg (240 lb)     Body mass index is 37.31 kg/m².    Physical Exam  Constitutional:       General: Patient is not in acute distress.     Appearance: Patient is well-developed. She is not diaphoretic.   HENT:      Head: Normocephalic and atraumatic.      Mouth/Throat: Mucous membranes are moist.   Eyes:      General: No scleral icterus.     Pupils: Pupils are equal, round, and reactive to light.   Cardiovascular:      Rate and Rhythm: Normal rate and regular rhythm.   Pulmonary:      Effort: Pulmonary effort is normal. No respiratory distress.      Breath sounds: No stridor.   Abdominal:      General: There is no distension.      Palpations: Abdomen is soft.   Musculoskeletal:         General: No deformity. Normal range of motion.      Cervical back: Neck supple.   Skin:     General: Skin is warm and dry.      Findings: No rash present. No erythema.   Neurological:      Mental Status: Patient is alert and oriented to person, place, and time.      Cranial Nerves: No cranial nerve deficit.   Psychiatric:         Behavior: Behavior normal.          Patient care time was spent personally by me on the following activities:     Obtaining a history.  Examination of patient.  Providing  medical care at the patients bedside.  Developing a treatment plan with patient or surrogate and bedside caregivers.  Ordering and reviewing laboratory studies, radiographic studies, pulse oximetry.  Ordering and performing treatments and interventions.  Evaluation of patient's response to treatment.  Discussions with consultants while on the unit and immediately available to the patient.  Re-evaluation of the patient's condition.  Documentation in the medical record.     Sandee Radford NP      [facility]  [address] [place] [address]  MICHAEL Yeung [id number]    [phone] - tel  [phone] - fax    1/24/2025

## 2025-01-24 NOTE — PROGRESS NOTES
[facility]  Progress Note    Patient Name: Gideon Ross  MRN: [id number]  Patient Class: IP- Inpatient   Admission Date: 1/9/2025  Length of Stay: 15 days  Attending Physician: Lauren Lawler MD  Primary Care Provider: Jennifer, Primary Doctor        Subjective     Principal Problem:Acute systolic congestive heart failure        HPI:  Mr. Ross is a 49 yom w/pmh significant for PID status post bilateral above-the-knee amputations, diabetes mellitus who presented for significant bilateral lower extremity edema and scrotal swelling, as well as intractable pain secondary to scrotal swelling.  Labs largely unremarkable.  BNP 3500, troponin 3300.  Patient was started on IV diuresis and pain control.  Cardiology was consulted.  Patient was initially started on heparin infusion.  On exam he has significant pain and tenderness due to significant scrotal swelling.    Overview/Hospital Course:  Patient with history of peripheral artery disease status post bilateral AKA, diabetes, substance abuse presents to the emergency room with complaints of lower extremity and scrotal edema.  BNP and troponin elevated.  Cardiology consulted.  Initially started on heparin drip but then transitioned to Lovenox.  Echocardiogram showed EF 25%.  Addiction Medicine consulted for heroin use.  Started on Suboxone and reports improvement in symptoms.  Diuretics held  when he developed MARLENE.  Needs LifeVest on discharge. He was started on dobutamine on 1/11/25, but overnight about 0200 he had Vtach about 50 beats and dobutamine turned off. He has a catheter for strict I&O with MARLENE, but is describing dysuria, there is purulent drainage at the end of the catheter so UA was obtained with concerns of UTI and pt placed on rocephin, catheter removed and he has been voiding. Urine culture with NGTD - will complete 3 days of Rocephin.  Nephrology has been consulted. Titrating lasix as tolerated along with albumin.  Cr up  trending. Weber was placed by urology on 1/17. Patient is diuresing well on Lasix drip.     Interval History: Patient diuresing well. Cr improving. States he has been constipated today. Trying prune juice. No other complains or overnight events.     Review of Systems  Objective:     Vital Signs (Most Recent):  Temp: 97.9 °F (36.6 °C) (01/24/25 0741)  Pulse: 95 (01/24/25 0741)  Resp: 18 (01/24/25 0901)  BP: (!) 140/75 (01/24/25 0741)  SpO2: 96 % (01/24/25 0741) Vital Signs (24h Range):  Temp:  [97.6 °F (36.4 °C)-98.5 °F (36.9 °C)] 97.9 °F (36.6 °C)  Pulse:  [90-96] 95  Resp:  [17-19] 18  SpO2:  [94 %-97 %] 96 %  BP: (130-150)/(65-90) 140/75     Weight: 117.9 kg (260 lb)  Body mass index is 37.31 kg/m².    Intake/Output Summary (Last 24 hours) at 1/24/2025 1249  Last data filed at 1/24/2025 0841  Gross per 24 hour   Intake 880 ml   Output 3850 ml   Net -2970 ml         Physical Exam  Vitals and nursing note reviewed. Exam conducted with a chaperone present.   Constitutional:       General: He is not in acute distress.     Appearance: Normal appearance. He is obese. He is not ill-appearing, toxic-appearing or diaphoretic.   Cardiovascular   Normal heart sounds, there is edema on both lower extremity stumps   Pulmonary:      Effort: Pulmonary effort is normal.      Breath sounds: coarse breath sounds   Genitourinary:     Comments: Scrotal Edema   Musculoskeletal:         General: Deformity present.      Comments: Right BKA/ Left AKA   Skin:     General: Skin is warm and dry.   Neurological:      Mental Status: He is alert.   Psychiatric:         Mood and Affect: Mood normal.         Behavior: Behavior normal.         Thought Content: Thought content normal.     Significant Labs: All pertinent labs within the past 24 hours have been reviewed.  CBC:   Recent Labs   Lab 01/23/25  0553 01/24/25  0528   WBC 11.00 11.93   HGB 8.4* 8.5*   HCT 27.3* 26.6*    339     CMP:   Recent Labs   Lab 01/23/25  0553 01/24/25  0528     139   K 4.5 4.5   CL 96 96   CO2 32* 32*   * 139*   BUN 91* 87*   CREATININE 1.9* 1.7*   CALCIUM 9.6 9.5   PROT 7.0 6.7   ALBUMIN 3.7 3.5   BILITOT 0.4 0.4   ALKPHOS 182* 176*   AST 15 14   ALT 13 12   ANIONGAP 9 11       Significant Imaging: I have reviewed all pertinent imaging results/findings within the past 24 hours.    Assessment and Plan     * Acute systolic congestive heart failure  Patient has Systolic (HFrEF) heart failure that is Acute. On presentation their CHF was decompensated. Evidence of decompensated CHF on presentation includes: edema. The etiology of their decompensation is likely substance abuse .   Latest ECHO  Results for orders placed during the hospital encounter of 01/09/25    Echo    Interpretation Summary    Left Ventricle: The left ventricle is moderately dilated. Mildly increased wall thickness. There is mild asymmetric hypertrophy. Severe global hypokinesis present. There is severely reduced systolic function with a visually estimated ejection fraction of 25 - 30%.    Right Ventricle: Moderate right ventricular enlargement. Systolic function is mildly reduced.    Left Atrium: Left atrium is mildly dilated.    Tricuspid Valve: There is mild regurgitation.    IVC/SVC: Elevated venous pressure at 15 mmHg.    Current Heart Failure Medications  hydrALAZINE injection 5 mg, Every 6 hours PRN, Intravenous  furosemide (Lasix) 200 mg in 0.9% NaCl SolP 100 mL continuous infusion (conc: 2 mg/mL), Continuous, Intravenous  carvediloL tablet 3.125 mg, 2 times daily, Oral  hydrALAZINE tablet 10 mg, Every 8 hours, Oral  isosorbide dinitrate tablet 20 mg, 3 times daily, Oral    Plan  - Monitor strict I&Os and daily weights.    - nephrology and cardiology managing diuresis. Patient is on Lasix drip with albumin,developing contraction alkalosis, continue per nephrology   - Cont Coreg  - recommending life vest on discharge, already at the bedside   - ischemic work up per cardiology      Urinary tract infection associated with indwelling urethral catheter  Catheter in place for severe acute decompensated heart failure and need for strict I&O with concern of cardiorenal syndrome   Completed 4 day course of ceftriaxone        Opioid use disorder  Addiction medicine consulted   Improved on Suboxone  Symptomatic Rx with Ativan and imodium     Per Addiction medicine:   For first dose of buprenorphine (at least 18 hours since last use of fentanyl) give one or two 8 mg strips (8-16 mg total).  Wait 1 hour and then...  If mild withdrawal, give another 8 mg strip.  If more significant withdrawal, give two more 8 mg strips.  If relaxed or calm, do not give any more.  Wait 1-2 more hours...  If still have withdrawal symptoms, take another 8 mg strip  If relaxed or calm, do not take any more  Try not to give more than 32 mg (4 strips) on day one.  On day 2, give 8 mg BID   Continue taking 8 mg BID until seeing in clinic.    MARLENE (acute kidney injury)  MARLENE is likely due to Cardiorenal syndrome. Baseline creatinine is  1 . Most recent creatinine and eGFR are listed below.  Recent Labs     01/22/25  0545 01/23/25  0553 01/24/25  0528   CREATININE 1.9* 1.9* 1.7*   EGFRNORACEVR 43.2* 43.2* 49.4*        Plan  - Avoid nephrotoxins and renally dose meds for GFR listed above  - Monitor urine output, serial BMP, and adjust therapy as needed  - nephrology following   - Close monitoring  - Diurese as above     Diabetes mellitus  Patient's FSGs are controlled on current medication regimen.  Last A1c reviewed-   Lab Results   Component Value Date    HGBA1C 7.5 (H) 01/10/2025     Most recent fingerstick glucose reviewed-   Recent Labs   Lab 01/23/25  1644 01/23/25  2229 01/24/25  0655   POCTGLUCOSE 146* 143* 135*       Current correctional scale  Low  Maintain anti-hyperglycemic dose as follows-   Antihyperglycemics (From admission, onward)      Start     Stop Route Frequency Ordered    01/10/25 1515  insulin aspart U-100  pen 0-10 Units         -- SubQ Before meals & nightly PRN 01/10/25 1415          Hold Oral hypoglycemics while patient is in the hospital.    Elevated troponin  Acute nonischemic, nontraumatic myocardial injury due to acute on chronic HFrEF  Troponin elevated but flat, cardiology following  No chest pain or shortness of breath         Scrotal edema  Secondary to acute decompensated heart failure  Elevate scrotum  Scrotal ultrasound reviewed  Diuresing as per Nephrology recommendation    Obesity  Body mass index is 39.89 kg/m². Morbid obesity complicates all aspects of disease management from diagnostic modalities to treatment. Weight loss encouraged and health benefits explained to patient.           VTE Risk Mitigation (From admission, onward)           Ordered     enoxaparin injection 40 mg  Every 24 hours         01/22/25 1033     IP VTE HIGH RISK PATIENT  Once         01/09/25 1326     Place sequential compression device  Until discontinued         01/09/25 1326     Reason for no Mechanical VTE Prophylaxis  Once        Question:  Reasons:  Answer:  Physician Provided (leave comment)  Comment:  already on heparin infusion    01/09/25 0906                    Discharge Planning   KATHERINE: 1/27/2025     Code Status: Full Code   Medical Readiness for Discharge Date:   Discharge Plan A: Home   Discharge Delays: None known at this time                    Lauren Lawler MD  Department of Hospital Medicine   [facility]

## 2025-01-24 NOTE — PROGRESS NOTES
"[facility]  Adult Nutrition   Progress Note (Follow-Up)    SUMMARY     Recommendations  Recommendation/Intervention: 1. Continue diabetic low sodium diet as tolerated. 2.  to obtain daily menu choices.  Nutrition Goal Status: progressing towards goal  Communication of RD Recs:  (plan of care)    Nutrition Goals:  PO intake will meet >75% of estimated needs by RD follow up.    Nutrition Interventions: Carbohydrate modified diet, Fluid modified diet, Sodium modified diet, and Medical Food Supplement Therapy    Nutrition Diagnosis PES Statement:   Food- and nutrition-related knowledge deficit related to lack of prior education as evidenced by  lack of prior education as evidenced by statements per pt.         Nutrition Diagnosis Status:   Improving    Dietitian Rounds Brief  Patient reports he ate all of his breakfast today. Pt stated he has no questions about his diet or meals. Glucose labs < 180 mg/dL. Last BM 1 / 20 /25. RD to follow for intake, labs, diet tolerance and status PRN.    Nutrition Related Social Determinants of Health: SDOH: None Identified    Malnutrition Assessment     No evidence of malnutrition at this time.          Diet order:   Current Diet Order: Diabetic Low Sodium, 2gm, Double Protein; 2000 Calories (up to 75 gm per meal); Fluid - 1500mL   Oral Nutrition Supplement: Boost Glucose Control with meals.             Evaluation of Received Nutrient/Fluid Intake  Energy Calories Required: meeting needs  Protein Required: meeting needs  Fluid Required: meeting needs  Tolerance: tolerating     % Intake of Estimated Energy Needs: 75 - 100 %  % Meal Intake: 75 - 100 %      Intake/Output Summary (Last 24 hours) at 1/24/2025 1536  Last data filed at 1/24/2025 1345  Gross per 24 hour   Intake 780 ml   Output 3450 ml   Net -2670 ml        Anthropometrics  Height: 5' 10" (177.8 cm)  Height (inches): 70 in  Height Method: Stated  Weight: 117.9 kg (260 lb)  Weight (lb): 260 lb  Weight " Method: Bed Scale  Ideal Body Weight (IBW), Male: 166 lb  % Ideal Body Weight, Male (lb): 153.25 %  BMI (Calculated): 37.3  BMI Grade: 35 - 39.9 - obesity - grade II       Estimated/Assessed Needs  Weight Used For Calorie Calculations: 115.4 kg (254 lb 6.6 oz)  Energy Calorie Requirements (kcal): 1949-3537 kcal daily  Energy Need Method: Kcal/kg (20-25)  Protein Requirements:  gm daily  Weight Used For Protein Calculations: 115.4 kg (254 lb 6.6 oz) (0.8-1.0 gm/kg)  Fluid Requirements (mL): 1 mL/kcal or per MD  Estimated Fluid Requirement Method: RDA Method  RDA Method (mL): 2308  CHO Requirement: 288 gm daily    Reason for Assessment  Reason For Assessment: RD follow-up  Diagnosis:  (scrotal edema)  General Information Comments: Patient continues to be on insulin gtt. Inake per nursing is 100% of meals.  Nutrition Discharge Planning: Diabetic 2000 kcal, 2 gm Low sodium, 1500 mL fluid and Double protein foods.    Final diagnoses:  [R07.9] Chest pain (Primary)  [Z13.6] Screening for cardiovascular condition  [I50.9] Congestive heart failure, unspecified HF chronicity, unspecified heart failure type  [I21.4] NSTEMI (non-ST elevated myocardial infarction)     Past Medical History:   Diagnosis Date    Diabetes mellitus     Hypertension         Nutrition/Diet History  Nutrition Intake History: MST 0. Good intake/appetite prior to admit.  Food Preferences: Obtained at initial evaluation.  Spiritual, Cultural Beliefs, Confucianism Practices, Values that Affect Care: no  Food Allergies: NKFA  Factors Affecting Nutritional Intake: None identified at this time    Nutrition Risk Screen          Wound 01/09/25 2239 Pressure Injury Buttocks-Wound Image: Images linked       Wound 01/09/25 Moisture associated dermatitis Left anterior Groin-Wound Image: Images linked       Wound 01/09/25 Moisture associated dermatitis Right anterior Groin-Wound Image: Images linked       Wound 01/09/25 Moisture associated dermatitis Right  Buttocks-Wound Image: Images linked       Wound 01/09/25 Pressure Injury lower;anterior;posterior;lateral Scrotum-Wound Image: Images linked       Wound 01/09/25 1500 Incontinence associated dermatitis Buttocks-Wound Image: Images linked  MST Score: 0  Have you recently lost weight without trying?: No  Weight loss score: 0  Have you been eating poorly because of a decreased appetite?: No  Appetite score: 0       Weight History:  Wt Readings from Last 10 Encounters:   01/21/25 117.9 kg (260 lb)   07/19/22 99.8 kg (220 lb)   02/09/20 104.3 kg (230 lb)   08/06/19 113.4 kg (250 lb)   02/27/19 108.9 kg (240 lb)   02/05/19 108.9 kg (240 lb 1.3 oz)   09/28/18 108.9 kg (240 lb)   06/03/18 106.6 kg (235 lb)   02/19/15 108.9 kg (240 lb)   09/02/14 108.9 kg (240 lb)        Lab/Procedures/Meds: Pertinent Labs/Meds Reviewed    Medications:Pertinent Medications Reviewed  Scheduled Meds:   acyclovir  800 mg Oral 5x Daily    albumin human 25%  12.5 g Intravenous BID    aspirin  81 mg Oral Daily    atorvastatin  80 mg Oral Daily    baclofen  5 mg Oral BID    buprenorphine-naloxone 8-2 mg  1 Film Sublingual BID    carvediloL  3.125 mg Oral BID    enoxparin  40 mg Subcutaneous Q24H (prophylaxis, 1700)    gabapentin  300 mg Oral BID    hydrALAZINE  10 mg Oral Q8H    isosorbide dinitrate  20 mg Oral TID    miconazole NITRATE 2 %   Topical (Top) BID    tamsulosin  0.4 mg Oral Daily    white petrolatum   Topical (Top) Daily     Continuous Infusions:   furosemide (LASIX) 2 mg/mL continuous infusion (non-titrating)  5 mg/hr Intravenous Continuous 2.5 mL/hr at 01/23/25 1119 5 mg/hr at 01/23/25 1119     PRN Meds:.  Current Facility-Administered Medications:     acetaminophen, 650 mg, Oral, Q4H PRN    aluminum-magnesium hydroxide-simethicone, 30 mL, Oral, QID PRN    dextrose 50%, 12.5 g, Intravenous, PRN    dextrose 50%, 25 g, Intravenous, PRN    glucagon (human recombinant), 1 mg, Intramuscular, PRN    glucose, 16 g, Oral, PRN    glucose, 24  "g, Oral, PRN    hydrALAZINE, 5 mg, Intravenous, Q6H PRN    insulin aspart U-100, 0-10 Units, Subcutaneous, QID (AC + HS) PRN    LIDOcaine HCl 2%, , Mucous Membrane, Once PRN    LORazepam, 0.5 mg, Oral, Q6H PRN    magnesium oxide, 800 mg, Oral, PRN    magnesium oxide, 800 mg, Oral, PRN    melatonin, 6 mg, Oral, Nightly PRN    morphine, 4 mg, Intravenous, Q3H PRN    naloxone, 0.02 mg, Intravenous, PRN    ondansetron, 4 mg, Intravenous, Q6H PRN    oxyCODONE-acetaminophen, 1 tablet, Oral, Q4H PRN    oxyCODONE-acetaminophen, 1 tablet, Oral, Q4H PRN    potassium bicarbonate, 35 mEq, Oral, PRN    potassium bicarbonate, 50 mEq, Oral, PRN    potassium bicarbonate, 60 mEq, Oral, PRN    potassium, sodium phosphates, 2 packet, Oral, PRN    potassium, sodium phosphates, 2 packet, Oral, PRN    potassium, sodium phosphates, 2 packet, Oral, PRN    senna-docusate 8.6-50 mg, 1 tablet, Oral, Daily PRN    sodium chloride 0.9%, 10 mL, Intravenous, Q12H PRN    sodium chloride 0.9%, 10 mL, Intravenous, PRN    Labs: Pertinent Labs Reviewed  Clinical Chemistry:  Recent Labs   Lab 01/22/25  0545 01/23/25  0553 01/24/25  0528   * 137 139   K 4.3 4.5 4.5   CL 93* 96 96   CO2 29 32* 32*   * 158* 139*   BUN 89* 91* 87*   CREATININE 1.9* 1.9* 1.7*   CALCIUM 9.4 9.6 9.5   PROT 6.8 7.0 6.7   ALBUMIN 3.6 3.7 3.5   BILITOT 0.4 0.4 0.4   ALKPHOS 207* 182* 176*   AST 15 15 14   ALT 13 13 12   ANIONGAP 10 9 11   MG 1.9 2.0 2.0     CBC:   Recent Labs   Lab 01/24/25  0528   WBC 11.93   RBC 3.16*   HGB 8.5*   HCT 26.6*      MCV 84   MCH 26.9*   MCHC 32.0     Lipid Panel:  No results for input(s): "CHOL", "HDL", "LDLCALC", "TRIG", "CHOLHDL" in the last 168 hours.  Cardiac Profile:  No results for input(s): "BNP", "CPK", "CPKMB", "TROPONINI", "CKTOTAL" in the last 168 hours.  Inflammatory Labs:  No results for input(s): "CRP" in the last 168 hours.  Diabetes:  Recent Labs   Lab 01/23/25  1644 01/23/25  2229 01/24/25  0655   POCTGLUCOSE " "146* 143* 135*     Thyroid & Parathyroid:  No results for input(s): "TSH", "FREET4", "V3UEOFQ", "W1SVTOH", "THYROIDAB" in the last 168 hours.    Monitor and Evaluation  Food and Nutrient Intake: energy intake  Food and Nutrient Adminstration: diet order  Knowledge/Beliefs/Attitudes: beliefs and attitudes  Physical Activity and Function: nutrition-related ADLs and IADLs  Anthropometric Measurements: weight change, weight  Biochemical Data, Medical Tests and Procedures: electrolyte and renal panel, gastrointestinal profile, inflammatory profile, glucose/endocrine profile, lipid profile  Nutrition-Focused Physical Findings: overall appearance     Nutrition Risk  Level of Risk/Frequency of Follow-up:  (1 x / week)     Nutrition Follow-Up  RD Follow-up?: Yes            "

## 2025-01-24 NOTE — PLAN OF CARE
Problem: Wound  Goal: Improved Oral Intake  Outcome: Progressing  Intervention: Promote and Optimize Oral Intake  Flowsheets (Taken 1/24/2025 1540)  Nutrition Interventions:   food preferences provided   supplemental drinks provided

## 2025-01-24 NOTE — ASSESSMENT & PLAN NOTE
Patient has Systolic (HFrEF) heart failure that is Acute. On presentation their CHF was decompensated. Evidence of decompensated CHF on presentation includes: edema. The etiology of their decompensation is likely substance abuse .   Latest ECHO  Results for orders placed during the hospital encounter of 01/09/25    Echo    Interpretation Summary    Left Ventricle: The left ventricle is moderately dilated. Mildly increased wall thickness. There is mild asymmetric hypertrophy. Severe global hypokinesis present. There is severely reduced systolic function with a visually estimated ejection fraction of 25 - 30%.    Right Ventricle: Moderate right ventricular enlargement. Systolic function is mildly reduced.    Left Atrium: Left atrium is mildly dilated.    Tricuspid Valve: There is mild regurgitation.    IVC/SVC: Elevated venous pressure at 15 mmHg.    Current Heart Failure Medications  hydrALAZINE injection 5 mg, Every 6 hours PRN, Intravenous  furosemide (Lasix) 200 mg in 0.9% NaCl SolP 100 mL continuous infusion (conc: 2 mg/mL), Continuous, Intravenous  carvediloL tablet 3.125 mg, 2 times daily, Oral  hydrALAZINE tablet 10 mg, Every 8 hours, Oral  isosorbide dinitrate tablet 20 mg, 3 times daily, Oral    Plan  - Monitor strict I&Os and daily weights.    - nephrology and cardiology managing diuresis. Patient is on Lasix drip with albumin,developing contraction alkalosis, continue per nephrology   - Cont Coreg  - recommending life vest on discharge, already at the bedside   - ischemic work up per cardiology

## 2025-01-25 LAB
ALBUMIN SERPL BCP-MCNC: 3.6 G/DL (ref 3.5–5.2)
ALP SERPL-CCNC: 148 U/L (ref 55–135)
ALT SERPL W/O P-5'-P-CCNC: 12 U/L (ref 10–44)
ANION GAP SERPL CALC-SCNC: 9 MMOL/L (ref 8–16)
AST SERPL-CCNC: 12 U/L (ref 10–40)
BASOPHILS # BLD AUTO: 0.07 K/UL (ref 0–0.2)
BASOPHILS NFR BLD: 0.6 % (ref 0–1.9)
BILIRUB SERPL-MCNC: 0.4 MG/DL (ref 0.1–1)
BUN SERPL-MCNC: 83 MG/DL (ref 6–20)
CALCIUM SERPL-MCNC: 9.6 MG/DL (ref 8.7–10.5)
CHLORIDE SERPL-SCNC: 96 MMOL/L (ref 95–110)
CO2 SERPL-SCNC: 33 MMOL/L (ref 23–29)
CREAT SERPL-MCNC: 1.6 MG/DL (ref 0.5–1.4)
DIFFERENTIAL METHOD BLD: ABNORMAL
EOSINOPHIL # BLD AUTO: 1.4 K/UL (ref 0–0.5)
EOSINOPHIL NFR BLD: 12.8 % (ref 0–8)
ERYTHROCYTE [DISTWIDTH] IN BLOOD BY AUTOMATED COUNT: 15.5 % (ref 11.5–14.5)
EST. GFR  (NO RACE VARIABLE): 53.1 ML/MIN/1.73 M^2
FERRITIN SERPL-MCNC: 44.9 NG/ML (ref 20–300)
GLUCOSE SERPL-MCNC: 159 MG/DL (ref 70–110)
HCT VFR BLD AUTO: 26 % (ref 40–54)
HGB BLD-MCNC: 8.1 G/DL (ref 14–18)
IMM GRANULOCYTES # BLD AUTO: 0.04 K/UL (ref 0–0.04)
IMM GRANULOCYTES NFR BLD AUTO: 0.4 % (ref 0–0.5)
IRON SERPL-MCNC: 23 UG/DL (ref 45–160)
LYMPHOCYTES # BLD AUTO: 1.7 K/UL (ref 1–4.8)
LYMPHOCYTES NFR BLD: 15.5 % (ref 18–48)
MAGNESIUM SERPL-MCNC: 2.1 MG/DL (ref 1.6–2.6)
MCH RBC QN AUTO: 26 PG (ref 27–31)
MCHC RBC AUTO-ENTMCNC: 31.2 G/DL (ref 32–36)
MCV RBC AUTO: 83 FL (ref 82–98)
MONOCYTES # BLD AUTO: 1.4 K/UL (ref 0.3–1)
MONOCYTES NFR BLD: 12.1 % (ref 4–15)
NEUTROPHILS # BLD AUTO: 6.6 K/UL (ref 1.8–7.7)
NEUTROPHILS NFR BLD: 58.6 % (ref 38–73)
NRBC BLD-RTO: 0 /100 WBC
PLATELET # BLD AUTO: 328 K/UL (ref 150–450)
PMV BLD AUTO: 10.1 FL (ref 9.2–12.9)
POCT GLUCOSE: 157 MG/DL (ref 70–110)
POCT GLUCOSE: 163 MG/DL (ref 70–110)
POCT GLUCOSE: 169 MG/DL (ref 70–110)
POCT GLUCOSE: 180 MG/DL (ref 70–110)
POTASSIUM SERPL-SCNC: 4.2 MMOL/L (ref 3.5–5.1)
PROT SERPL-MCNC: 6.6 G/DL (ref 6–8.4)
RBC # BLD AUTO: 3.12 M/UL (ref 4.6–6.2)
SATURATED IRON: 8 % (ref 20–50)
SODIUM SERPL-SCNC: 138 MMOL/L (ref 136–145)
TOTAL IRON BINDING CAPACITY: 280 UG/DL (ref 250–450)
TRANSFERRIN SERPL-MCNC: 200 MG/DL (ref 200–375)
TSH SERPL DL<=0.005 MIU/L-ACNC: 4.16 UIU/ML (ref 0.34–5.6)
WBC # BLD AUTO: 11.23 K/UL (ref 3.9–12.7)

## 2025-01-25 PROCEDURE — 25000003 PHARM REV CODE 250: Performed by: FAMILY MEDICINE

## 2025-01-25 PROCEDURE — P9047 ALBUMIN (HUMAN), 25%, 50ML: HCPCS | Performed by: INTERNAL MEDICINE

## 2025-01-25 PROCEDURE — 63600175 PHARM REV CODE 636 W HCPCS: Performed by: INTERNAL MEDICINE

## 2025-01-25 PROCEDURE — 80053 COMPREHEN METABOLIC PANEL: CPT | Performed by: STUDENT IN AN ORGANIZED HEALTH CARE EDUCATION/TRAINING PROGRAM

## 2025-01-25 PROCEDURE — 25000003 PHARM REV CODE 250: Performed by: STUDENT IN AN ORGANIZED HEALTH CARE EDUCATION/TRAINING PROGRAM

## 2025-01-25 PROCEDURE — 85025 COMPLETE CBC W/AUTO DIFF WBC: CPT | Performed by: STUDENT IN AN ORGANIZED HEALTH CARE EDUCATION/TRAINING PROGRAM

## 2025-01-25 PROCEDURE — 63600175 PHARM REV CODE 636 W HCPCS: Performed by: STUDENT IN AN ORGANIZED HEALTH CARE EDUCATION/TRAINING PROGRAM

## 2025-01-25 PROCEDURE — 84443 ASSAY THYROID STIM HORMONE: CPT | Performed by: INTERNAL MEDICINE

## 2025-01-25 PROCEDURE — 36415 COLL VENOUS BLD VENIPUNCTURE: CPT | Performed by: STUDENT IN AN ORGANIZED HEALTH CARE EDUCATION/TRAINING PROGRAM

## 2025-01-25 PROCEDURE — 36415 COLL VENOUS BLD VENIPUNCTURE: CPT | Performed by: INTERNAL MEDICINE

## 2025-01-25 PROCEDURE — 25000003 PHARM REV CODE 250: Performed by: NURSE PRACTITIONER

## 2025-01-25 PROCEDURE — 83735 ASSAY OF MAGNESIUM: CPT | Performed by: STUDENT IN AN ORGANIZED HEALTH CARE EDUCATION/TRAINING PROGRAM

## 2025-01-25 PROCEDURE — 12000002 HC ACUTE/MED SURGE SEMI-PRIVATE ROOM

## 2025-01-25 PROCEDURE — 25000003 PHARM REV CODE 250: Performed by: INTERNAL MEDICINE

## 2025-01-25 PROCEDURE — 82728 ASSAY OF FERRITIN: CPT | Performed by: INTERNAL MEDICINE

## 2025-01-25 PROCEDURE — 83540 ASSAY OF IRON: CPT | Performed by: INTERNAL MEDICINE

## 2025-01-25 RX ORDER — TORSEMIDE 20 MG/1
20 TABLET ORAL 2 TIMES DAILY
Status: DISCONTINUED | OUTPATIENT
Start: 2025-01-25 | End: 2025-01-26 | Stop reason: HOSPADM

## 2025-01-25 RX ADMIN — ISOSORBIDE DINITRATE 20 MG: 10 TABLET ORAL at 08:01

## 2025-01-25 RX ADMIN — HYDRALAZINE HYDROCHLORIDE 25 MG: 25 TABLET ORAL at 02:01

## 2025-01-25 RX ADMIN — ACYCLOVIR 800 MG: 400 TABLET ORAL at 05:01

## 2025-01-25 RX ADMIN — MORPHINE SULFATE 4 MG: 4 INJECTION INTRAVENOUS at 10:01

## 2025-01-25 RX ADMIN — BUPRENORPHINE AND NALOXONE 1 FILM: 8; 2 FILM BUCCAL; SUBLINGUAL at 08:01

## 2025-01-25 RX ADMIN — TORSEMIDE 20 MG: 20 TABLET ORAL at 09:01

## 2025-01-25 RX ADMIN — OXYCODONE HYDROCHLORIDE AND ACETAMINOPHEN 1 TABLET: 10; 325 TABLET ORAL at 09:01

## 2025-01-25 RX ADMIN — BACLOFEN 5 MG: 5 TABLET ORAL at 09:01

## 2025-01-25 RX ADMIN — ENOXAPARIN SODIUM 40 MG: 40 INJECTION SUBCUTANEOUS at 05:01

## 2025-01-25 RX ADMIN — ALBUMIN (HUMAN) 12.5 G: 12.5 SOLUTION INTRAVENOUS at 08:01

## 2025-01-25 RX ADMIN — MICONAZOLE NITRATE: 20 POWDER TOPICAL at 09:01

## 2025-01-25 RX ADMIN — ISOSORBIDE DINITRATE 20 MG: 10 TABLET ORAL at 09:01

## 2025-01-25 RX ADMIN — HYDRALAZINE HYDROCHLORIDE 25 MG: 25 TABLET ORAL at 05:01

## 2025-01-25 RX ADMIN — ACYCLOVIR 800 MG: 400 TABLET ORAL at 10:01

## 2025-01-25 RX ADMIN — CARVEDILOL 3.12 MG: 3.12 TABLET, FILM COATED ORAL at 08:01

## 2025-01-25 RX ADMIN — ISOSORBIDE DINITRATE 20 MG: 10 TABLET ORAL at 02:01

## 2025-01-25 RX ADMIN — TAMSULOSIN HYDROCHLORIDE 0.4 MG: 0.4 CAPSULE ORAL at 08:01

## 2025-01-25 RX ADMIN — ASPIRIN 81 MG: 81 TABLET, COATED ORAL at 08:01

## 2025-01-25 RX ADMIN — ATORVASTATIN CALCIUM 80 MG: 40 TABLET, FILM COATED ORAL at 08:01

## 2025-01-25 RX ADMIN — OXYCODONE HYDROCHLORIDE AND ACETAMINOPHEN 1 TABLET: 10; 325 TABLET ORAL at 04:01

## 2025-01-25 RX ADMIN — BACLOFEN 5 MG: 5 TABLET ORAL at 08:01

## 2025-01-25 RX ADMIN — ACYCLOVIR 800 MG: 400 TABLET ORAL at 02:01

## 2025-01-25 RX ADMIN — CARVEDILOL 3.12 MG: 3.12 TABLET, FILM COATED ORAL at 09:01

## 2025-01-25 RX ADMIN — BUPRENORPHINE AND NALOXONE 1 FILM: 8; 2 FILM BUCCAL; SUBLINGUAL at 09:01

## 2025-01-25 RX ADMIN — ACYCLOVIR 800 MG: 400 TABLET ORAL at 09:01

## 2025-01-25 RX ADMIN — TORSEMIDE 20 MG: 20 TABLET ORAL at 10:01

## 2025-01-25 RX ADMIN — HYDRALAZINE HYDROCHLORIDE 25 MG: 25 TABLET ORAL at 09:01

## 2025-01-25 NOTE — SUBJECTIVE & OBJECTIVE
Interval History: Patient said he feels fine    Review of Systems   All other systems reviewed and are negative.    Objective:     Vital Signs (Most Recent):  Temp: 97.8 °F (36.6 °C) (01/25/25 1545)  Pulse: 92 (01/25/25 1545)  Resp: 20 (01/25/25 1624)  BP: 139/71 (01/25/25 1545)  SpO2: 97 % (01/25/25 1545) Vital Signs (24h Range):  Temp:  [97.3 °F (36.3 °C)-97.8 °F (36.6 °C)] 97.8 °F (36.6 °C)  Pulse:  [88-99] 92  Resp:  [16-20] 20  SpO2:  [90 %-98 %] 97 %  BP: (100-158)/(56-93) 139/71     Weight: 117.9 kg (260 lb)  Body mass index is 37.31 kg/m².    Intake/Output Summary (Last 24 hours) at 1/25/2025 1634  Last data filed at 1/25/2025 1138  Gross per 24 hour   Intake 750 ml   Output 3275 ml   Net -2525 ml         Physical Exam  Cardiovascular:      Rate and Rhythm: Normal rate.      Pulses: Normal pulses.   Pulmonary:      Effort: Pulmonary effort is normal.   Neurological:      Mental Status: He is alert and oriented to person, place, and time.             Significant Labs: All pertinent labs within the past 24 hours have been reviewed.    Significant Imaging: I have reviewed all pertinent imaging results/findings within the past 24 hours.

## 2025-01-25 NOTE — PT/OT/SLP PROGRESS
"Physical Therapy      Patient Name:  Gideon Ross   MRN:  [id number]    Patient not seen today secondary to Patient unwilling to participate, Other (Comment) (Pt refused again and stated "talk to the people who have helped me get to the commode. I can do it"). Will follow-up 1/26/25.    "

## 2025-01-25 NOTE — PROGRESS NOTES
Nephrology Consult Note        Patient Name: Gideon Ross  MRN: [id number]    Patient Class: IP- Inpatient   Admission Date: 1/9/2025  Length of Stay: 16 days  Date of Service: 1/25/2025    Attending Physician: Lauren Lawler MD  Primary Care Provider: Jennifer, Primary Doctor    Reason for Consult: marlene    SUBJECTIVE:     HPI: 49M with PID, bilateral BKA, DM presented for significant bilateral lower extremity edema, scrotal swelling, intractable pain. Labs largely unremarkable other than BNP 3,500, troponin 3,300. Patient was started on IV diuretics and pain control. Cardiology was consulted. Initially started on heparin drip but then transitioned to Lovenox. Echocardiogram showed EF 25%. Addiction Medicine consulted for heroin use. Started on Suboxone and reports improvement in symptoms. Diuretics held 2/2 worsening sCr and MARLENE. He was started on dobutamine on 1/11/25, but was stopped 2/2 tachycardia. He has a catheter for strict I&O with MARLENE, but is describing dysuria, there is purulent drainage at the end of the catheter so UA was obtained with concerns of UTI and pt placed on rocephin and recommendations for catheter exchange giving continued MARLENE and need for strict I&O with decompensated heart failure.      1/14 Agree with diuretics and albumin, will consider escalation or ultrafiltration. Not sure how acute the HF is. Consider palliative eval as well.  1/15 VSS. Good UO with diuretics+ albumin, will escalate more tomorrow if he remains stable. Not sure if UF will be better tolerated than diuretics.  1/16 VSS. Ensure dietary compliance with salt and liquids. Keep IOs tally. Will give more albumin and lasix.  1/17 VSS. Agree with empiric acyclovir for possible shingles. Agree with escalation to lasix gtt as d/w cards. Strict IOS and fluid restriction enforcement.  1/18 VSS. Greatly appreciate Urology help with valle placement and management of scrotal edema.  1/19 VSS. Scr finally a little better, UO 4L via  leonard. Continue present management, monitor labs, replete lytes, enforce liquid restriction.  1/20  Scr w/small bump today.  UOP 4.9L, no new complaints.  1/21 vitals stable, UOP 5L, net -14.5L, audio telehealth visit attempted- patient did not answer phone x 2   1/22 AFVSS.  3.9 liter uop.  Requesting higher dose of Suboxone.    1/23  UOP 1.8L.  VSS, renal function same as yesterday.  Will continue lasix gtt.  No new complaints.  1/24  UOP 3.4L, Scr trended down.  VSS.  Lasix now at 5mg/hr.  No new complaints.  1/25  UOP 3.5L, VSS, Scr trended down slightly.  Sleeping soundly, NAD noted.  Blood tinged urine in catheter bag.    ASSESSMENT/PLAN:     MARLENE 2/2 ATN due to intravascular volume depletion from diuretics  Hypoalbuminemia with dependent edema due to third-spacing and immobility  HFrEF ? Etiology, acuity, prognosis?  PAD  DM  Anemia  CKD stage 2, sCr 1 om 1/9/2025  Shingles      Diuresing well however starting to develop contraction alkalosis  BP stable with albumin - leave same  Cut back lasix gtt to 5mg/hr.  Cr stable today.  Trend electrolytes  No acute RRT needs    Thank you for allowing us to participate in the care of your patient!   We will follow the patient and provide recommendations as needed.         Laboratory:  Recent Labs   Lab 01/23/25  0553 01/24/25  0528 01/25/25  0616    139 138   K 4.5 4.5 4.2   CL 96 96 96   CO2 32* 32* 33*   BUN 91* 87* 83*   CREATININE 1.9* 1.7* 1.6*   * 139* 159*       Recent Labs   Lab 01/23/25  0553 01/24/25  0528 01/25/25  0616   CALCIUM 9.6 9.5 9.6   ALBUMIN 3.7 3.5 3.6   MG 2.0 2.0 2.1             Recent Labs   Lab 01/22/25  1631 01/22/25  2105 01/23/25  0643 01/23/25  1242 01/23/25  1644 01/23/25  2229 01/24/25  0655 01/24/25  1724 01/24/25  2225 01/25/25  0637   POCTGLUCOSE 169* 163* 140* 196* 146* 143* 135* 213* 143* 157*       Recent Labs   Lab 01/10/25  0721   Hemoglobin A1C 7.5 H       Recent Labs   Lab 01/23/25  0553 01/24/25  0528  01/25/25  0616   WBC 11.00 11.93 11.23   HGB 8.4* 8.5* 8.1*   HCT 27.3* 26.6* 26.0*    339 328   MCV 85 84 83   MCHC 30.8* 32.0 31.2*   MONO 13.5  1.5* 12.5  1.5* 12.1  1.4*   EOSINOPHIL 11.6* 12.9* 12.8*       Recent Labs   Lab 01/23/25  0553 01/24/25  0528 01/25/25  0616   BILITOT 0.4 0.4 0.4   PROT 7.0 6.7 6.6   ALBUMIN 3.7 3.5 3.6   ALKPHOS 182* 176* 148*   ALT 13 12 12   AST 15 14 12       Recent Labs   Lab 07/19/22  2316 01/09/25  0743 01/12/25  1055   Color, UA Yellow Yellow Clarke A   Appearance, UA Clear Hazy A Hazy A   pH, UA 6.0 6.0 6.0   Specific Gravity, UA <=1.005 1.025 1.010   Protein, UA Trace A 3+ A 1+ A   Glucose, UA 4+ A Trace A Negative   Ketones, UA Negative Negative Negative   Urobilinogen, UA Negative 2.0-3.0 A Negative   Bilirubin (UA) Negative Negative Negative   Occult Blood UA Negative 2+ A 3+ A   Nitrite, UA Negative Negative Negative   RBC, UA 1 7 H >100 H   WBC, UA  --  4 18 H   Bacteria None Occasional Rare   Hyaline Casts, UA  --  4 A 0       Recent Labs   Lab 07/19/22  2046 01/09/25  0707   POC PH 7.473 H  --    POC PCO2 42.4  --    POC HCO3 31.1 H  --    POC PO2 28 L  --    POC SATURATED O2 58 L  --    POC BE 7  --    Sample VENOUS VENOUS       Microbiology Results (last 7 days)       ** No results found for the last 168 hours. **            Review of patient's allergies indicates:  No Known Allergies    Outpatient meds:  No current facility-administered medications on file prior to encounter.     Current Outpatient Medications on File Prior to Encounter   Medication Sig Dispense Refill    amLODIPine (NORVASC) 10 MG tablet Take 1 tablet (10 mg total) by mouth once daily. 30 tablet 1    aspirin (ECOTRIN) 81 MG EC tablet Take 1 tablet (81 mg total) by mouth once daily. 30 tablet 1    atorvastatin (LIPITOR) 80 MG tablet Take 1 tablet (80 mg total) by mouth once daily. 30 tablet 1    baclofen (LIORESAL) 10 MG tablet Take 1 tablet (10 mg total) by mouth 3 (three) times daily.  90 tablet 1    carvedilol (COREG) 6.25 MG tablet Take 1 tablet (6.25 mg total) by mouth 2 (two) times daily. 60 tablet 1    diazePAM (VALIUM) 5 MG tablet Take 1 tablet (5 mg total) by mouth every 12 (twelve) hours as needed for Anxiety (anxiety.). 30 tablet 0    gabapentin (NEURONTIN) 300 MG capsule Take 2 capsules (600 mg total) by mouth 3 (three) times daily. 90 capsule 1    hydroCHLOROthiazide (HYDRODIURIL) 50 MG tablet Take 1 tablet (50 mg total) by mouth once daily. 30 tablet 1    insulin aspart U-100 (NOVOLOG) 100 unit/mL InPn pen Inject 18 Units into the skin 3 (three) times daily. (Patient taking differently: Inject into the skin 3 (three) times daily.) 10 mL 1    insulin detemir U-100 (LEVEMIR FLEXTOUCH) 100 unit/mL (3 mL) SubQ InPn pen Inject 35 Units into the skin 2 (two) times daily. 10 mL 1    insulin glargine U-100, Lantus, 100 unit/mL injection Inject 30 Units into the skin every evening. (Patient not taking: Reported on 1/9/2025)      lisinopril (PRINIVIL,ZESTRIL) 40 MG tablet Take 1 tablet (40 mg total) by mouth once daily. 30 tablet 1    nicotine (NICODERM CQ) 14 mg/24 hr Place 1 patch onto the skin once daily. (Patient not taking: Reported on 1/9/2025) 15 patch 0    nortriptyline (PAMELOR) 25 MG capsule Take 1 capsule (25 mg total) by mouth 3 (three) times daily. 30 capsule 1    oxyCODONE (ROXICODONE) 5 MG immediate release tablet Take 1 tablet (5 mg total) by mouth every 12 (twelve) hours as needed. (Patient not taking: Reported on 1/9/2025) 30 tablet 0    polyethylene glycol (GLYCOLAX) 17 gram PwPk Take 17 g by mouth daily as needed. (Patient not taking: Reported on 1/9/2025) 30 each 1    QUEtiapine (SEROQUEL) 100 MG Tab Take 1 tablet (100 mg total) by mouth every evening. 30 tablet 1    sofosbuvir-velpatasvir (EPCLUSA) 400-100 mg Tab Take 1 tablet daily. (Patient not taking: Reported on 1/9/2025) 28 tablet 2       Scheduled meds:   acyclovir  800 mg Oral 5x Daily    albumin human 25%  12.5 g  Intravenous BID    aspirin  81 mg Oral Daily    atorvastatin  80 mg Oral Daily    baclofen  5 mg Oral BID    buprenorphine-naloxone 8-2 mg  1 Film Sublingual BID    carvediloL  3.125 mg Oral BID    enoxparin  40 mg Subcutaneous Q24H (prophylaxis, 1700)    gabapentin  300 mg Oral BID    hydrALAZINE  25 mg Oral Q8H    isosorbide dinitrate  20 mg Oral TID    miconazole NITRATE 2 %   Topical (Top) BID    tamsulosin  0.4 mg Oral Daily    white petrolatum   Topical (Top) Daily       Infusions:   furosemide (LASIX) 2 mg/mL continuous infusion (non-titrating)  5 mg/hr Intravenous Continuous 2.5 mL/hr at 01/24/25 1919 5 mg/hr at 01/24/25 1919       PRN meds:    Current Facility-Administered Medications:     acetaminophen, 650 mg, Oral, Q4H PRN    aluminum-magnesium hydroxide-simethicone, 30 mL, Oral, QID PRN    dextrose 50%, 12.5 g, Intravenous, PRN    dextrose 50%, 25 g, Intravenous, PRN    glucagon (human recombinant), 1 mg, Intramuscular, PRN    glucose, 16 g, Oral, PRN    glucose, 24 g, Oral, PRN    hydrALAZINE, 5 mg, Intravenous, Q6H PRN    insulin aspart U-100, 0-10 Units, Subcutaneous, QID (AC + HS) PRN    LIDOcaine HCl 2%, , Mucous Membrane, Once PRN    LORazepam, 0.5 mg, Oral, Q6H PRN    magnesium oxide, 800 mg, Oral, PRN    magnesium oxide, 800 mg, Oral, PRN    melatonin, 6 mg, Oral, Nightly PRN    morphine, 4 mg, Intravenous, Q3H PRN    naloxone, 0.02 mg, Intravenous, PRN    ondansetron, 4 mg, Intravenous, Q6H PRN    oxyCODONE-acetaminophen, 1 tablet, Oral, Q4H PRN    oxyCODONE-acetaminophen, 1 tablet, Oral, Q4H PRN    potassium bicarbonate, 35 mEq, Oral, PRN    potassium bicarbonate, 50 mEq, Oral, PRN    potassium bicarbonate, 60 mEq, Oral, PRN    potassium, sodium phosphates, 2 packet, Oral, PRN    potassium, sodium phosphates, 2 packet, Oral, PRN    potassium, sodium phosphates, 2 packet, Oral, PRN    senna-docusate 8.6-50 mg, 1 tablet, Oral, Daily PRN    sodium chloride 0.9%, 10 mL, Intravenous, Q12H PRN     sodium chloride 0.9%, 10 mL, Intravenous, PRN    Past Medical History:   Diagnosis Date    Diabetes mellitus     Hypertension      Past Surgical History:   Procedure Laterality Date    SKIN GRAFT       No family history on file.  Social History     Tobacco Use    Smoking status: Every Day     Current packs/day: 1.00     Types: Cigarettes   Substance Use Topics    Alcohol use: Yes    Drug use: No       OBJECTIVE:     Vital Signs and IO:  Temp:  [97.2 °F (36.2 °C)-97.8 °F (36.6 °C)]   Pulse:  [88-99]   Resp:  [16-18]   BP: (100-158)/(56-93)   SpO2:  [90 %-98 %]   I/O last 3 completed shifts:  In: 780 [P.O.:780]  Out: 5875 [Urine:5875]  Wt Readings from Last 5 Encounters:   01/21/25 117.9 kg (260 lb)   07/19/22 99.8 kg (220 lb)   02/09/20 104.3 kg (230 lb)   08/06/19 113.4 kg (250 lb)   02/27/19 108.9 kg (240 lb)     Body mass index is 37.31 kg/m².    Physical Exam  Constitutional:       General: Patient is not in acute distress.     Appearance: Patient is well-developed. She is not diaphoretic.   HENT:      Head: Normocephalic and atraumatic.      Mouth/Throat: Mucous membranes are moist.   Eyes:      General: No scleral icterus.     Pupils: Pupils are equal, round, and reactive to light.   Cardiovascular:      Rate and Rhythm: Normal rate and regular rhythm.   Pulmonary:      Effort: Pulmonary effort is normal. No respiratory distress.      Breath sounds: No stridor.   Abdominal:      General: There is no distension.      Palpations: Abdomen is soft.   Musculoskeletal:         General: No deformity. Normal range of motion.      Cervical back: Neck supple.   Skin:     General: Skin is warm and dry.      Findings: No rash present. No erythema.   Neurological:      Mental Status: Patient is alert and oriented to person, place, and time.      Cranial Nerves: No cranial nerve deficit.   Psychiatric:         Behavior: Behavior normal.          Patient care time was spent personally by me on the following activities:      Obtaining a history.  Examination of patient.  Providing medical care at the patients bedside.  Developing a treatment plan with patient or surrogate and bedside caregivers.  Ordering and reviewing laboratory studies, radiographic studies, pulse oximetry.  Ordering and performing treatments and interventions.  Evaluation of patient's response to treatment.  Discussions with consultants while on the unit and immediately available to the patient.  Re-evaluation of the patient's condition.  Documentation in the medical record.     Sandee Radford NP      [facility]  [address] [place] [address]  MICHAEL Yeung [id number] [phone] - tel  [phone] - fax    1/25/2025

## 2025-01-25 NOTE — PROGRESS NOTES
[facility]  Progress Note    Patient Name: Gideon Ross  MRN: [id number]  Patient Class: IP- Inpatient   Admission Date: 1/9/2025  Length of Stay: 16 days  Attending Physician: Lauren Lawler MD  Primary Care Provider: Jennifer, Primary Doctor        Subjective     Principal Problem:Acute systolic congestive heart failure        HPI:  Mr. Ross is a 49 yom w/pmh significant for PID status post bilateral above-the-knee amputations, diabetes mellitus who presented for significant bilateral lower extremity edema and scrotal swelling, as well as intractable pain secondary to scrotal swelling.  Labs largely unremarkable.  BNP 3500, troponin 3300.  Patient was started on IV diuresis and pain control.  Cardiology was consulted.  Patient was initially started on heparin infusion.  On exam he has significant pain and tenderness due to significant scrotal swelling.    Overview/Hospital Course:  Patient with history of peripheral artery disease status post bilateral AKA, diabetes, substance abuse presents to the emergency room with complaints of lower extremity and scrotal edema.  BNP and troponin elevated.  Cardiology consulted.  Initially started on heparin drip but then transitioned to Lovenox.  Echocardiogram showed EF 25%.  Addiction Medicine consulted for heroin use.  Started on Suboxone and reports improvement in symptoms.  Diuretics held  when he developed MARLENE.  Needs LifeVest on discharge. He was started on dobutamine on 1/11/25, but overnight about 0200 he had Vtach about 50 beats and dobutamine turned off. He has a catheter for strict I&O with MARLENE, but is describing dysuria, there is purulent drainage at the end of the catheter so UA was obtained with concerns of UTI and pt placed on rocephin, catheter removed and he has been voiding. Urine culture with NGTD - will complete 3 days of Rocephin.  Nephrology has been consulted. Titrating lasix as tolerated along with albumin.  Cr up  trending. Weber was placed by urology on 1/17. Patient is diuresing well on Lasix drip and transitioned to torsemide.    Interval History: Patient said he feels fine    Review of Systems   All other systems reviewed and are negative.    Objective:     Vital Signs (Most Recent):  Temp: 97.8 °F (36.6 °C) (01/25/25 1545)  Pulse: 92 (01/25/25 1545)  Resp: 20 (01/25/25 1624)  BP: 139/71 (01/25/25 1545)  SpO2: 97 % (01/25/25 1545) Vital Signs (24h Range):  Temp:  [97.3 °F (36.3 °C)-97.8 °F (36.6 °C)] 97.8 °F (36.6 °C)  Pulse:  [88-99] 92  Resp:  [16-20] 20  SpO2:  [90 %-98 %] 97 %  BP: (100-158)/(56-93) 139/71     Weight: 117.9 kg (260 lb)  Body mass index is 37.31 kg/m².    Intake/Output Summary (Last 24 hours) at 1/25/2025 1634  Last data filed at 1/25/2025 1138  Gross per 24 hour   Intake 750 ml   Output 3275 ml   Net -2525 ml         Physical Exam  Cardiovascular:      Rate and Rhythm: Normal rate.      Pulses: Normal pulses.   Pulmonary:      Effort: Pulmonary effort is normal.   Neurological:      Mental Status: He is alert and oriented to person, place, and time.             Significant Labs: All pertinent labs within the past 24 hours have been reviewed.    Significant Imaging: I have reviewed all pertinent imaging results/findings within the past 24 hours.    Assessment and Plan     * Acute systolic congestive heart failure  Patient has Systolic (HFrEF) heart failure that is Acute. On presentation their CHF was decompensated. Evidence of decompensated CHF on presentation includes: edema. The etiology of their decompensation is likely substance abuse .   Latest ECHO  Results for orders placed during the hospital encounter of 01/09/25    Echo    Interpretation Summary    Left Ventricle: The left ventricle is moderately dilated. Mildly increased wall thickness. There is mild asymmetric hypertrophy. Severe global hypokinesis present. There is severely reduced systolic function with a visually estimated ejection  fraction of 25 - 30%.    Right Ventricle: Moderate right ventricular enlargement. Systolic function is mildly reduced.    Left Atrium: Left atrium is mildly dilated.    Tricuspid Valve: There is mild regurgitation.    IVC/SVC: Elevated venous pressure at 15 mmHg.    Current Heart Failure Medications  hydrALAZINE injection 5 mg, Every 6 hours PRN, Intravenous  furosemide (Lasix) 200 mg in 0.9% NaCl SolP 100 mL continuous infusion (conc: 2 mg/mL), Continuous, Intravenous  carvediloL tablet 3.125 mg, 2 times daily, Oral  hydrALAZINE tablet 10 mg, Every 8 hours, Oral  isosorbide dinitrate tablet 20 mg, 3 times daily, Oral    Plan  - Monitor strict I&Os and daily weights.    - nephrology and cardiology managing diuresis. Patient is on Lasix drip with albumin,developing contraction alkalosis, continue per nephrology   - Cont Coreg  - recommending life vest on discharge, already at the bedside   - ischemic work up per cardiology     Urinary tract infection associated with indwelling urethral catheter  Catheter in place for severe acute decompensated heart failure and need for strict I&O with concern of cardiorenal syndrome   Completed 4 day course of ceftriaxone        Opioid use disorder  Addiction medicine consulted   Improved on Suboxone  Symptomatic Rx with Ativan and imodium     Per Addiction medicine:   For first dose of buprenorphine (at least 18 hours since last use of fentanyl) give one or two 8 mg strips (8-16 mg total).  Wait 1 hour and then...  If mild withdrawal, give another 8 mg strip.  If more significant withdrawal, give two more 8 mg strips.  If relaxed or calm, do not give any more.  Wait 1-2 more hours...  If still have withdrawal symptoms, take another 8 mg strip  If relaxed or calm, do not take any more  Try not to give more than 32 mg (4 strips) on day one.  On day 2, give 8 mg BID   Continue taking 8 mg BID until seeing in clinic.    MARLENE (acute kidney injury)  MARLENE is likely due to Cardiorenal  syndrome. Baseline creatinine is  1 . Most recent creatinine and eGFR are listed below.  Recent Labs     01/22/25  0545 01/23/25  0553 01/24/25  0528   CREATININE 1.9* 1.9* 1.7*   EGFRNORACEVR 43.2* 43.2* 49.4*        Plan  - Avoid nephrotoxins and renally dose meds for GFR listed above  - Monitor urine output, serial BMP, and adjust therapy as needed  - nephrology following   - Close monitoring  - Diurese as above     Diabetes mellitus  Patient's FSGs are controlled on current medication regimen.  Last A1c reviewed-   Lab Results   Component Value Date    HGBA1C 7.5 (H) 01/10/2025     Most recent fingerstick glucose reviewed-   Recent Labs   Lab 01/23/25  1644 01/23/25  2229 01/24/25  0655   POCTGLUCOSE 146* 143* 135*       Current correctional scale  Low  Maintain anti-hyperglycemic dose as follows-   Antihyperglycemics (From admission, onward)      Start     Stop Route Frequency Ordered    01/10/25 1515  insulin aspart U-100 pen 0-10 Units         -- SubQ Before meals & nightly PRN 01/10/25 1415          Hold Oral hypoglycemics while patient is in the hospital.    Elevated troponin  Acute nonischemic, nontraumatic myocardial injury due to acute on chronic HFrEF  Troponin elevated but flat, cardiology following  No chest pain or shortness of breath         Scrotal edema  Secondary to acute decompensated heart failure  Elevate scrotum  Scrotal ultrasound reviewed  Diuresing as per Nephrology recommendation    Obesity  Body mass index is 39.89 kg/m². Morbid obesity complicates all aspects of disease management from diagnostic modalities to treatment. Weight loss encouraged and health benefits explained to patient.           VTE Risk Mitigation (From admission, onward)           Ordered     enoxaparin injection 40 mg  Every 24 hours         01/22/25 1033     IP VTE HIGH RISK PATIENT  Once         01/09/25 1326     Place sequential compression device  Until discontinued         01/09/25 1326     Reason for no  Mechanical VTE Prophylaxis  Once        Question:  Reasons:  Answer:  Physician Provided (leave comment)  Comment:  already on heparin infusion    01/09/25 0906                    Discharge Planning   KATHERINE: 1/26/2025     Code Status: Full Code   Medical Readiness for Discharge Date:   Discharge Plan A: Home   Discharge Delays: None known at this time                    Lashay Palmer DO  Department of Hospital Medicine   [facility]

## 2025-01-26 VITALS
SYSTOLIC BLOOD PRESSURE: 144 MMHG | BODY MASS INDEX: 37.09 KG/M2 | HEIGHT: 70 IN | HEART RATE: 95 BPM | WEIGHT: 259.06 LBS | OXYGEN SATURATION: 96 % | RESPIRATION RATE: 18 BRPM | TEMPERATURE: 98 F | DIASTOLIC BLOOD PRESSURE: 83 MMHG

## 2025-01-26 LAB
ALBUMIN SERPL BCP-MCNC: 3.8 G/DL (ref 3.5–5.2)
ALP SERPL-CCNC: 144 U/L (ref 55–135)
ALT SERPL W/O P-5'-P-CCNC: 12 U/L (ref 10–44)
ANION GAP SERPL CALC-SCNC: 9 MMOL/L (ref 8–16)
AST SERPL-CCNC: 14 U/L (ref 10–40)
BASOPHILS # BLD AUTO: 0.08 K/UL (ref 0–0.2)
BASOPHILS NFR BLD: 0.7 % (ref 0–1.9)
BILIRUB SERPL-MCNC: 0.5 MG/DL (ref 0.1–1)
BUN SERPL-MCNC: 82 MG/DL (ref 6–20)
CALCIUM SERPL-MCNC: 9.9 MG/DL (ref 8.7–10.5)
CHLORIDE SERPL-SCNC: 95 MMOL/L (ref 95–110)
CO2 SERPL-SCNC: 32 MMOL/L (ref 23–29)
CREAT SERPL-MCNC: 1.7 MG/DL (ref 0.5–1.4)
DIFFERENTIAL METHOD BLD: ABNORMAL
EOSINOPHIL # BLD AUTO: 1.7 K/UL (ref 0–0.5)
EOSINOPHIL NFR BLD: 13.8 % (ref 0–8)
ERYTHROCYTE [DISTWIDTH] IN BLOOD BY AUTOMATED COUNT: 15.7 % (ref 11.5–14.5)
EST. GFR  (NO RACE VARIABLE): 49.4 ML/MIN/1.73 M^2
GLUCOSE SERPL-MCNC: 183 MG/DL (ref 70–110)
HCT VFR BLD AUTO: 26.2 % (ref 40–54)
HGB BLD-MCNC: 8.2 G/DL (ref 14–18)
IMM GRANULOCYTES # BLD AUTO: 0.06 K/UL (ref 0–0.04)
IMM GRANULOCYTES NFR BLD AUTO: 0.5 % (ref 0–0.5)
LYMPHOCYTES # BLD AUTO: 1.9 K/UL (ref 1–4.8)
LYMPHOCYTES NFR BLD: 15.4 % (ref 18–48)
MAGNESIUM SERPL-MCNC: 2.1 MG/DL (ref 1.6–2.6)
MCH RBC QN AUTO: 26.3 PG (ref 27–31)
MCHC RBC AUTO-ENTMCNC: 31.3 G/DL (ref 32–36)
MCV RBC AUTO: 84 FL (ref 82–98)
MONOCYTES # BLD AUTO: 1.5 K/UL (ref 0.3–1)
MONOCYTES NFR BLD: 12.2 % (ref 4–15)
NEUTROPHILS # BLD AUTO: 6.9 K/UL (ref 1.8–7.7)
NEUTROPHILS NFR BLD: 57.4 % (ref 38–73)
NRBC BLD-RTO: 0 /100 WBC
PLATELET # BLD AUTO: 358 K/UL (ref 150–450)
PMV BLD AUTO: 10 FL (ref 9.2–12.9)
POCT GLUCOSE: 154 MG/DL (ref 70–110)
POCT GLUCOSE: 156 MG/DL (ref 70–110)
POTASSIUM SERPL-SCNC: 4.2 MMOL/L (ref 3.5–5.1)
PROT SERPL-MCNC: 7 G/DL (ref 6–8.4)
RBC # BLD AUTO: 3.12 M/UL (ref 4.6–6.2)
SODIUM SERPL-SCNC: 136 MMOL/L (ref 136–145)
WBC # BLD AUTO: 12.07 K/UL (ref 3.9–12.7)

## 2025-01-26 PROCEDURE — 25000003 PHARM REV CODE 250: Performed by: STUDENT IN AN ORGANIZED HEALTH CARE EDUCATION/TRAINING PROGRAM

## 2025-01-26 PROCEDURE — 25000003 PHARM REV CODE 250: Performed by: FAMILY MEDICINE

## 2025-01-26 PROCEDURE — 25000003 PHARM REV CODE 250: Performed by: NURSE PRACTITIONER

## 2025-01-26 PROCEDURE — 85025 COMPLETE CBC W/AUTO DIFF WBC: CPT | Performed by: STUDENT IN AN ORGANIZED HEALTH CARE EDUCATION/TRAINING PROGRAM

## 2025-01-26 PROCEDURE — 80053 COMPREHEN METABOLIC PANEL: CPT | Performed by: STUDENT IN AN ORGANIZED HEALTH CARE EDUCATION/TRAINING PROGRAM

## 2025-01-26 PROCEDURE — 25000003 PHARM REV CODE 250: Performed by: INTERNAL MEDICINE

## 2025-01-26 PROCEDURE — 36415 COLL VENOUS BLD VENIPUNCTURE: CPT | Performed by: STUDENT IN AN ORGANIZED HEALTH CARE EDUCATION/TRAINING PROGRAM

## 2025-01-26 PROCEDURE — 83735 ASSAY OF MAGNESIUM: CPT | Performed by: STUDENT IN AN ORGANIZED HEALTH CARE EDUCATION/TRAINING PROGRAM

## 2025-01-26 PROCEDURE — 63600175 PHARM REV CODE 636 W HCPCS: Performed by: INTERNAL MEDICINE

## 2025-01-26 PROCEDURE — 63600175 PHARM REV CODE 636 W HCPCS: Performed by: STUDENT IN AN ORGANIZED HEALTH CARE EDUCATION/TRAINING PROGRAM

## 2025-01-26 RX ORDER — BUPRENORPHINE AND NALOXONE 8; 2 MG/1; MG/1
1 FILM, SOLUBLE BUCCAL; SUBLINGUAL 2 TIMES DAILY
Qty: 10 FILM | Refills: 0 | Status: SHIPPED | OUTPATIENT
Start: 2025-01-26 | End: 2025-01-31

## 2025-01-26 RX ORDER — LORAZEPAM 0.5 MG/1
0.5 TABLET ORAL EVERY 8 HOURS PRN
Qty: 6 TABLET | Refills: 0 | Status: SHIPPED | OUTPATIENT
Start: 2025-01-26 | End: 2025-01-28

## 2025-01-26 RX ORDER — ISOSORBIDE DINITRATE 20 MG/1
20 TABLET ORAL 3 TIMES DAILY
Qty: 90 TABLET | Refills: 11 | Status: SHIPPED | OUTPATIENT
Start: 2025-01-26 | End: 2026-01-26

## 2025-01-26 RX ORDER — INSULIN ASPART 100 [IU]/ML
0-10 INJECTION, SOLUTION INTRAVENOUS; SUBCUTANEOUS
Qty: 1 EACH | Refills: 0 | Status: SHIPPED | OUTPATIENT
Start: 2025-01-26 | End: 2026-01-26

## 2025-01-26 RX ORDER — TAMSULOSIN HYDROCHLORIDE 0.4 MG/1
0.4 CAPSULE ORAL DAILY
Qty: 30 CAPSULE | Refills: 11 | Status: SHIPPED | OUTPATIENT
Start: 2025-01-27 | End: 2026-01-27

## 2025-01-26 RX ORDER — LANOLIN ALCOHOL/MO/W.PET/CERES
1 CREAM (GRAM) TOPICAL DAILY
Status: DISCONTINUED | OUTPATIENT
Start: 2025-01-26 | End: 2025-01-26 | Stop reason: HOSPADM

## 2025-01-26 RX ORDER — TORSEMIDE 20 MG/1
20 TABLET ORAL 2 TIMES DAILY
Qty: 60 TABLET | Refills: 11 | Status: SHIPPED | OUTPATIENT
Start: 2025-01-26 | End: 2026-01-26

## 2025-01-26 RX ORDER — HYDRALAZINE HYDROCHLORIDE 25 MG/1
50 TABLET, FILM COATED ORAL EVERY 8 HOURS
Status: DISCONTINUED | OUTPATIENT
Start: 2025-01-26 | End: 2025-01-26 | Stop reason: HOSPADM

## 2025-01-26 RX ADMIN — ACYCLOVIR 800 MG: 400 TABLET ORAL at 09:01

## 2025-01-26 RX ADMIN — ATORVASTATIN CALCIUM 80 MG: 40 TABLET, FILM COATED ORAL at 09:01

## 2025-01-26 RX ADMIN — MICONAZOLE NITRATE: 20 POWDER TOPICAL at 09:01

## 2025-01-26 RX ADMIN — BACLOFEN 5 MG: 5 TABLET ORAL at 09:01

## 2025-01-26 RX ADMIN — ISOSORBIDE DINITRATE 20 MG: 10 TABLET ORAL at 03:01

## 2025-01-26 RX ADMIN — BUPRENORPHINE AND NALOXONE 1 FILM: 8; 2 FILM BUCCAL; SUBLINGUAL at 09:01

## 2025-01-26 RX ADMIN — ACYCLOVIR 800 MG: 400 TABLET ORAL at 05:01

## 2025-01-26 RX ADMIN — ENOXAPARIN SODIUM 40 MG: 40 INJECTION SUBCUTANEOUS at 03:01

## 2025-01-26 RX ADMIN — HYDRALAZINE HYDROCHLORIDE 25 MG: 25 TABLET ORAL at 05:01

## 2025-01-26 RX ADMIN — TORSEMIDE 20 MG: 20 TABLET ORAL at 09:01

## 2025-01-26 RX ADMIN — CARVEDILOL 3.12 MG: 3.12 TABLET, FILM COATED ORAL at 09:01

## 2025-01-26 RX ADMIN — ACYCLOVIR 800 MG: 400 TABLET ORAL at 03:01

## 2025-01-26 RX ADMIN — FERROUS SULFATE TAB 325 MG (65 MG ELEMENTAL FE) 1 EACH: 325 (65 FE) TAB at 10:01

## 2025-01-26 RX ADMIN — ASPIRIN 81 MG: 81 TABLET, COATED ORAL at 09:01

## 2025-01-26 RX ADMIN — HYDRALAZINE HYDROCHLORIDE 50 MG: 25 TABLET ORAL at 03:01

## 2025-01-26 RX ADMIN — WHITE PETROLATUM 41 % TOPICAL OINTMENT: at 09:01

## 2025-01-26 RX ADMIN — TAMSULOSIN HYDROCHLORIDE 0.4 MG: 0.4 CAPSULE ORAL at 09:01

## 2025-01-26 RX ADMIN — ISOSORBIDE DINITRATE 20 MG: 10 TABLET ORAL at 09:01

## 2025-01-26 NOTE — PT/OT/SLP PROGRESS
Physical Therapy      Patient Name:  Gideon Ross   MRN:  [id number]    Patient not seen today secondary to Other (Comment) (pt not in room). Will follow-up 1/27/2025.

## 2025-01-26 NOTE — PLAN OF CARE
01/26/25 1547   Final Note   Assessment Type Final Discharge Note   Anticipated Discharge Disposition Home   What phone number can be called within the next 1-3 days to see how you are doing after discharge? [phone]   Post-Acute Status   Coverage Medicaid [employer]   Discharge Delays None known at this time     Patient cleared for discharge from case management standpoint.  Chart and discharge orders reviewed.  Patient discharged home with no further case management needs.

## 2025-01-26 NOTE — NURSING
Weber cath d/c'ed discarded 300 cc bloody urine pt c/o of discomfort scale 0-0/10.Urine noted bright red blood. Scrotal edema 3+ noted, Will pass on to oncoming shift to monitor pt with voiding dairy trial. Urinal at bedside explained to pt to notify nurse to measure before discarding, verbalized understanding.

## 2025-01-26 NOTE — DISCHARGE INSTRUCTIONS
.Our goal at [facility] is to always give you outstanding care and exceptional service. You may receive a survey by mail, text or e-mail in the next 7-10 days from Anastasia Kennedy and our leadership team asking about the care you received with us. The survey should only take 5-10 minutes to complete and is very important to us.     Your feedback provides us with a way to recognize our staff who work tirelessly to provide the best care! Also, your responses help us learn how to improve when your experience was below our aspiration of excellence. We WILL use your feedback to continue making improvements to help us provide the highest quality care. We keep your personal information and feedback confidential. We appreciate your time completing this survey and can't wait to hear from you!!!     We want you to leave us today feeling like you can DEFINITELY recommend us to others! We look forward to your continued care with us! Thanks so much for choosing [facility] for your healthcare needs!

## 2025-01-26 NOTE — DISCHARGE SUMMARY
[facility] Medicine  Discharge Summary      Patient Name: Gideon Ross  MRN: [id number]  JAYSON: [id number]  Patient Class: IP- Inpatient  Admission Date: 1/9/2025  Hospital Length of Stay: 17 days  Discharge Date and Time:  01/26/2025 4:19 PM  Attending Physician: Lashay Palmer DO   Discharging Provider: Lashay Palmer DO  Primary Care Provider: Jennifer Primary Doctor    Primary Care Team: Networked reference to record PCT     HPI:   Mr. Ross is a 49 yom w/pmh significant for PID status post bilateral above-the-knee amputations, diabetes mellitus who presented for significant bilateral lower extremity edema and scrotal swelling, as well as intractable pain secondary to scrotal swelling.  Labs largely unremarkable.  BNP 3500, troponin 3300.  Patient was started on IV diuresis and pain control.  Cardiology was consulted.  Patient was initially started on heparin infusion.  On exam he has significant pain and tenderness due to significant scrotal swelling.    * No surgery found *      Hospital Course:   Patient with history of peripheral artery disease status post bilateral AKA, diabetes, substance abuse presents to the emergency room with complaints of lower extremity and scrotal edema.  BNP and troponin elevated.  Cardiology consulted.  Initially started on heparin drip but then transitioned to Lovenox.  Echocardiogram showed EF 25%.  Addiction Medicine consulted for heroin use.  Started on Suboxone and reports improvement in symptoms.  Diuretics held  when he developed MARLENE.  He was started on dobutamine on 1/11/25, but overnight about 0200 he had Vtach about 50 beats and dobutamine turned off. He has a catheter for strict I&O with MARLENE, but is describing dysuria, there is purulent drainage at the end of the catheter so UA was obtained with concerns of UTI and pt placed on rocephin, catheter removed and he has been voiding. Urine culture with NGTD - will complete 3 days of Rocephin.  Nephrology  has been consulted. Titrating lasix as tolerated along with albumin.  Cr up trending. Weber was placed by urology on 1/17. Patient is diuresing well on Lasix drip and transitioned to torsemide.  Patient was discharged with LifeVest.  Patient was discharged with Suboxone and referral was placed to Dr. Eric Langston.  Patient was also discharged with torsemide b.i.d..  Recommended to follow-up with all specialty including Cardiology, Nephrology and, pain management     Goals of Care Treatment Preferences:  Code Status: Full Code      SDOH Screening:  The patient was unable to be screened for utility difficulties, food insecurity, transport difficulties, housing insecurity, and interpersonal safety, so no concerns could be identified this admission.     Consults:   Consults (From admission, onward)          Status Ordering Provider     Inpatient consult to Urology  Once        Provider:  Jhon Esteves MD    Completed SONIA KRAMER     Inpatient consult to Midline team  Once        Provider:  (Not yet assigned)    Completed MOO PEMBERTON     Inpatient consult to Nephrology  Once        Provider:  Ubaldo Petersen MD    Completed NUVIA BAKER     Inpatient consult to Nephrology  Once        Provider:  Ubaldo Petersen MD    Completed JORGE, MARÍA L.     Inpatient consult to Social Work/Case Management  Once        Provider:  (Not yet assigned)    Completed JORGE, MARÍA L.     Inpatient consult to Psychiatry  Once        Provider:  SHO Langston DO    Completed JORGE, MARÍA L.     Inpatient consult to Social Work/Case Management  Once        Provider:  (Not yet assigned)    Completed MAGUE BURNETT     Inpatient consult to Registered Dietitian/Nutritionist  Once        Provider:  (Not yet assigned)    Completed MAGUE BURNETT     Inpatient consult to Cardiology  Once        Provider:  Diane Patel MD    Completed ANA COMBS Acute systolic congestive heart  failure  Patient has Systolic (HFrEF) heart failure that is Acute. On presentation their CHF was decompensated. Evidence of decompensated CHF on presentation includes: edema. The etiology of their decompensation is likely substance abuse .   Latest ECHO  Results for orders placed during the hospital encounter of 01/09/25    Echo    Interpretation Summary    Left Ventricle: The left ventricle is moderately dilated. Mildly increased wall thickness. There is mild asymmetric hypertrophy. Severe global hypokinesis present. There is severely reduced systolic function with a visually estimated ejection fraction of 25 - 30%.    Right Ventricle: Moderate right ventricular enlargement. Systolic function is mildly reduced.    Left Atrium: Left atrium is mildly dilated.    Tricuspid Valve: There is mild regurgitation.    IVC/SVC: Elevated venous pressure at 15 mmHg.    Current Heart Failure Medications  hydrALAZINE injection 5 mg, Every 6 hours PRN, Intravenous  furosemide (Lasix) 200 mg in 0.9% NaCl SolP 100 mL continuous infusion (conc: 2 mg/mL), Continuous, Intravenous  carvediloL tablet 3.125 mg, 2 times daily, Oral  hydrALAZINE tablet 10 mg, Every 8 hours, Oral  isosorbide dinitrate tablet 20 mg, 3 times daily, Oral    Plan  - Monitor strict I&Os and daily weights.    - nephrology and cardiology managing diuresis. Patient is on Lasix drip with albumin,developing contraction alkalosis, continue per nephrology   - Cont Coreg  - recommending life vest on discharge, already at the bedside   - ischemic work up per cardiology     Urinary tract infection associated with indwelling urethral catheter  Catheter in place for severe acute decompensated heart failure and need for strict I&O with concern of cardiorenal syndrome   Completed 4 day course of ceftriaxone        Opioid use disorder  Addiction medicine consulted   Improved on Suboxone  Symptomatic Rx with Ativan and imodium     Per Addiction medicine:   For first dose of  buprenorphine (at least 18 hours since last use of fentanyl) give one or two 8 mg strips (8-16 mg total).  Wait 1 hour and then...  If mild withdrawal, give another 8 mg strip.  If more significant withdrawal, give two more 8 mg strips.  If relaxed or calm, do not give any more.  Wait 1-2 more hours...  If still have withdrawal symptoms, take another 8 mg strip  If relaxed or calm, do not take any more  Try not to give more than 32 mg (4 strips) on day one.  On day 2, give 8 mg BID   Continue taking 8 mg BID until seeing in clinic.    MARLENE (acute kidney injury)  MARLENE is likely due to Cardiorenal syndrome. Baseline creatinine is  1 . Most recent creatinine and eGFR are listed below.  Recent Labs     01/22/25  0545 01/23/25  0553 01/24/25  0528   CREATININE 1.9* 1.9* 1.7*   EGFRNORACEVR 43.2* 43.2* 49.4*        Plan  - Avoid nephrotoxins and renally dose meds for GFR listed above  - Monitor urine output, serial BMP, and adjust therapy as needed  - nephrology following   - Close monitoring  - Diurese as above     Diabetes mellitus  Patient's FSGs are controlled on current medication regimen.  Last A1c reviewed-   Lab Results   Component Value Date    HGBA1C 7.5 (H) 01/10/2025     Most recent fingerstick glucose reviewed-   Recent Labs   Lab 01/23/25  1644 01/23/25  2229 01/24/25  0655   POCTGLUCOSE 146* 143* 135*       Current correctional scale  Low  Maintain anti-hyperglycemic dose as follows-   Antihyperglycemics (From admission, onward)      Start     Stop Route Frequency Ordered    01/10/25 1515  insulin aspart U-100 pen 0-10 Units         -- SubQ Before meals & nightly PRN 01/10/25 1415          Hold Oral hypoglycemics while patient is in the hospital.    Elevated troponin  Acute nonischemic, nontraumatic myocardial injury due to acute on chronic HFrEF  Troponin elevated but flat, cardiology following  No chest pain or shortness of breath         Scrotal edema  Secondary to acute decompensated heart  failure  Elevate scrotum  Scrotal ultrasound reviewed  Diuresing as per Nephrology recommendation    Obesity  Body mass index is 39.89 kg/m². Morbid obesity complicates all aspects of disease management from diagnostic modalities to treatment. Weight loss encouraged and health benefits explained to patient.           Final Active Diagnoses:    Diagnosis Date Noted POA    PRINCIPAL PROBLEM:  Acute systolic congestive heart failure [I50.21] 01/10/2025 Yes    Urinary tract infection associated with indwelling urethral catheter [T83.511A, N39.0] 01/12/2025 No    MARLENE (acute kidney injury) [N17.9] 01/10/2025 No    Opioid use disorder [F11.90] 01/10/2025 Yes    Scrotal edema [N50.89] 01/09/2025 Yes    Elevated troponin [R79.89] 01/09/2025 Yes    Diabetes mellitus [E11.9] 01/09/2025 Yes    Obesity [E66.9] 02/05/2019 Yes      Problems Resolved During this Admission:       Discharged Condition: fair    Disposition: Home or Self Care    Follow Up:   Contact information for follow-up providers       Jairo Corcoran MD Follow up in 1 week(s).    Specialties: Interventional Cardiology, Cardiology  Contact information:  [address] [place] [address]  [place] [address]  [phone]               No, Primary Doctor Follow up in 1 week(s).               Essie Rodríguez MD Follow up in 1 week(s).    Specialty: Nephrology  Contact information:  [address] [facility] [address]  [place] [address]  [phone]               hJon Esteves MD Follow up in 1 week(s).    Specialty: Urology  Contact information:  [address] [place] [address] [address]  [address]  [place] [address] [phone]               SHO Langston DO Follow up in 1 week(s).    Specialties: Psychiatry, Behavioral Health  Contact information:  [address] [place] [address]  [address]  [place] [address]  [phone]                       Contact information for after-discharge care       Durable Medical Equipment       [employer] .    Service: Durable  Medical Equipment  Contact information:  [facility] [address] [phone]                                 Patient Instructions:      Ambulatory referral/consult to Addiction Psychiatry   Standing Status: Future   Referral Priority: Routine Referral Type: Psychiatric   Referral Reason: Specialty Services Required   Requested Specialty: Addiction Medicine   Number of Visits Requested: 1       Significant Diagnostic Studies: Labs: BMP:   Recent Labs   Lab 01/25/25  0616 01/26/25  0441   * 183*    136   K 4.2 4.2   CL 96 95   CO2 33* 32*   BUN 83* 82*   CREATININE 1.6* 1.7*   CALCIUM 9.6 9.9   MG 2.1 2.1   , CMP   Recent Labs   Lab 01/25/25  0616 01/26/25  0441    136   K 4.2 4.2   CL 96 95   CO2 33* 32*   * 183*   BUN 83* 82*   CREATININE 1.6* 1.7*   CALCIUM 9.6 9.9   PROT 6.6 7.0   ALBUMIN 3.6 3.8   BILITOT 0.4 0.5   ALKPHOS 148* 144*   AST 12 14   ALT 12 12   ANIONGAP 9 9   , and CBC   Recent Labs   Lab 01/25/25  0616 01/26/25  0441   WBC 11.23 12.07   HGB 8.1* 8.2*   HCT 26.0* 26.2*    358       Pending Diagnostic Studies:       None           Medications:  Reconciled Home Medications:      Medication List        START taking these medications      buprenorphine-naloxone 8-2 mg 8-2 mg  Commonly known as: SUBOXONE  Place 1 Film under the tongue 2 (two) times daily. for 5 days     isosorbide dinitrate 20 MG tablet  Commonly known as: ISORDIL  Take 1 tablet (20 mg total) by mouth 3 (three) times daily.     LORazepam 0.5 MG tablet  Commonly known as: ATIVAN  Take 1 tablet (0.5 mg total) by mouth every 8 (eight) hours as needed for Anxiety (tremors).     tamsulosin 0.4 mg Cap  Commonly known as: FLOMAX  Take 1 capsule (0.4 mg total) by mouth once daily.  Start taking on: January 27, 2025     torsemide 20 MG Tab  Commonly known as: DEMADEX  Take 1 tablet (20 mg total) by mouth 2 (two) times a day.            CHANGE how you take these medications      insulin  "aspart U-100 100 unit/mL (3 mL) Inpn pen  Commonly known as: NovoLOG  Inject 0-10 Units into the skin before meals and at bedtime as needed (Hyperglycemia). **MODERATE CORRECTION DOSE**  Blood Glucose  mg/dL                  Pre-meal                2200  151-200                2 units                    1 unit  201-250                4 units                    2 units  251-300                6 units                    3 units  301-350                8 units                    4 units  >350                     10 units                  5 units  Administer subcutaneously if needed at times designated by monitoring  schedule.  DO NOT HOLD correction dose insulin for patients who are  NPO.    "HIGH ALERT MEDICATION" - Administer with meals or TF/TPN.  What changed:   how much to take  when to take this  reasons to take this  additional instructions            CONTINUE taking these medications      aspirin 81 MG EC tablet  Commonly known as: ECOTRIN  Take 1 tablet (81 mg total) by mouth once daily.     atorvastatin 80 MG tablet  Commonly known as: LIPITOR  Take 1 tablet (80 mg total) by mouth once daily.     baclofen 10 MG tablet  Commonly known as: LIORESAL  Take 1 tablet (10 mg total) by mouth 3 (three) times daily.     carvediloL 6.25 MG tablet  Commonly known as: COREG  Take 1 tablet (6.25 mg total) by mouth 2 (two) times daily.     gabapentin 300 MG capsule  Commonly known as: NEURONTIN  Take 2 capsules (600 mg total) by mouth 3 (three) times daily.            STOP taking these medications      amLODIPine 10 MG tablet  Commonly known as: NORVASC     diazePAM 5 MG tablet  Commonly known as: VALIUM     hydroCHLOROthiazide 50 MG tablet  Commonly known as: HYDRODIURIL     insulin detemir U-100 (Levemir) 100 unit/mL (3 mL) Inpn pen     insulin glargine U-100 (Lantus) 100 unit/mL injection     lisinopriL 40 MG tablet  Commonly known as: PRINIVIL,ZESTRIL     nortriptyline 25 MG capsule  Commonly known as: PAMELOR   "   oxyCODONE 5 MG immediate release tablet  Commonly known as: ROXICODONE     polyethylene glycol 17 gram Pwpk  Commonly known as: GLYCOLAX     QUEtiapine 100 MG Tab  Commonly known as: SEROQUEL     sofosbuvir-velpatasvir 400-100 mg Tab  Commonly known as: EPCLUSA            ASK your doctor about these medications      nicotine 14 mg/24 hr  Commonly known as: NICODERM CQ  Place 1 patch onto the skin once daily.              Indwelling Lines/Drains at time of discharge:   Lines/Drains/Airways       None                   Time spent on the discharge of patient: 32 minutes         Lashay Palmer DO  Department of Hospital Medicine  [facility]

## 2025-01-26 NOTE — PROGRESS NOTES
Nephrology Consult Note        Patient Name: Gideon Ross  MRN: [id number]    Patient Class: IP- Inpatient   Admission Date: 1/9/2025  Length of Stay: 17 days  Date of Service: 1/26/2025    Attending Physician: Lashay Palmer DO  Primary Care Provider: Jennifer, Primary Doctor    Reason for Consult: marlene    SUBJECTIVE:     HPI: 49M with PID, bilateral BKA, DM presented for significant bilateral lower extremity edema, scrotal swelling, intractable pain. Labs largely unremarkable other than BNP 3,500, troponin 3,300. Patient was started on IV diuretics and pain control. Cardiology was consulted. Initially started on heparin drip but then transitioned to Lovenox. Echocardiogram showed EF 25%. Addiction Medicine consulted for heroin use. Started on Suboxone and reports improvement in symptoms. Diuretics held 2/2 worsening sCr and MARLENE. He was started on dobutamine on 1/11/25, but was stopped 2/2 tachycardia. He has a catheter for strict I&O with MARLENE, but is describing dysuria, there is purulent drainage at the end of the catheter so UA was obtained with concerns of UTI and pt placed on rocephin and recommendations for catheter exchange giving continued MARLENE and need for strict I&O with decompensated heart failure.      1/14 Agree with diuretics and albumin, will consider escalation or ultrafiltration. Not sure how acute the HF is. Consider palliative eval as well.  1/15 VSS. Good UO with diuretics+ albumin, will escalate more tomorrow if he remains stable. Not sure if UF will be better tolerated than diuretics.  1/16 VSS. Ensure dietary compliance with salt and liquids. Keep IOs tally. Will give more albumin and lasix.  1/17 VSS. Agree with empiric acyclovir for possible shingles. Agree with escalation to lasix gtt as d/w cards. Strict IOS and fluid restriction enforcement.  1/18 VSS. Greatly appreciate Urology help with valle placement and management of scrotal edema.  1/19 VSS. Scr finally a little better, UO 4L via valle.  Continue present management, monitor labs, replete lytes, enforce liquid restriction.  1/20  Scr w/small bump today.  UOP 4.9L, no new complaints.  1/21 vitals stable, UOP 5L, net -14.5L, audio telehealth visit attempted- patient did not answer phone x 2   1/22 AFVSS.  3.9 liter uop.  Requesting higher dose of Suboxone.    1/23  UOP 1.8L.  VSS, renal function same as yesterday.  Will continue lasix gtt.  No new complaints.  1/24  UOP 3.4L, Scr trended down.  VSS.  Lasix now at 5mg/hr.  No new complaints.  1/25  UOP 3.5L, VSS, Scr trended down slightly.  Sleeping soundly, NAD noted.  Blood tinged urine in catheter bag.  1/26  switched to PO diuretics yesterday, UOP 1.6L.  VSS, small bump in Scr, baseline Scr is around 1.  Out of room.    ASSESSMENT/PLAN:     MARLENE 2/2 ATN due to intravascular volume depletion from diuretics  Hypoalbuminemia with dependent edema due to third-spacing and immobility  HFrEF ? Etiology, acuity, prognosis?  PAD  DM  Anemia  CKD stage 2, sCr 1 om 1/9/2025  Shingles      Diuresing well however starting to develop contraction alkalosis  BP stable with albumin - leave same  Cut back lasix gtt to 5mg/hr.  Cr stable today.  Trend electrolytes  No acute RRT needs    Thank you for allowing us to participate in the care of your patient!   We will follow the patient and provide recommendations as needed.         Laboratory:  Recent Labs   Lab 01/24/25  0528 01/25/25  0616 01/26/25  0441    138 136   K 4.5 4.2 4.2   CL 96 96 95   CO2 32* 33* 32*   BUN 87* 83* 82*   CREATININE 1.7* 1.6* 1.7*   * 159* 183*       Recent Labs   Lab 01/24/25  0528 01/25/25  0616 01/26/25  0441   CALCIUM 9.5 9.6 9.9   ALBUMIN 3.5 3.6 3.8   MG 2.0 2.1 2.1             Recent Labs   Lab 01/23/25  1644 01/23/25  2229 01/24/25  0655 01/24/25  1724 01/24/25  2225 01/25/25  0637 01/25/25  1029 01/25/25  1546 01/25/25  2117 01/26/25  0633   POCTGLUCOSE 146* 143* 135* 213* 143* 157* 163* 180* 169* 156*       Recent Labs    Lab 01/10/25  0721   Hemoglobin A1C 7.5 H       Recent Labs   Lab 01/24/25  0528 01/25/25  0616 01/26/25  0441   WBC 11.93 11.23 12.07   HGB 8.5* 8.1* 8.2*   HCT 26.6* 26.0* 26.2*    328 358   MCV 84 83 84   MCHC 32.0 31.2* 31.3*   MONO 12.5  1.5* 12.1  1.4* 12.2  1.5*   EOSINOPHIL 12.9* 12.8* 13.8*       Recent Labs   Lab 01/24/25  0528 01/25/25  0616 01/26/25  0441   BILITOT 0.4 0.4 0.5   PROT 6.7 6.6 7.0   ALBUMIN 3.5 3.6 3.8   ALKPHOS 176* 148* 144*   ALT 12 12 12   AST 14 12 14       Recent Labs   Lab 07/19/22  2316 01/09/25  0743 01/12/25  1055   Color, UA Yellow Yellow Kern A   Appearance, UA Clear Hazy A Hazy A   pH, UA 6.0 6.0 6.0   Specific Gravity, UA <=1.005 1.025 1.010   Protein, UA Trace A 3+ A 1+ A   Glucose, UA 4+ A Trace A Negative   Ketones, UA Negative Negative Negative   Urobilinogen, UA Negative 2.0-3.0 A Negative   Bilirubin (UA) Negative Negative Negative   Occult Blood UA Negative 2+ A 3+ A   Nitrite, UA Negative Negative Negative   RBC, UA 1 7 H >100 H   WBC, UA  --  4 18 H   Bacteria None Occasional Rare   Hyaline Casts, UA  --  4 A 0       Recent Labs   Lab 07/19/22  2046 01/09/25  0707   POC PH 7.473 H  --    POC PCO2 42.4  --    POC HCO3 31.1 H  --    POC PO2 28 L  --    POC SATURATED O2 58 L  --    POC BE 7  --    Sample VENOUS VENOUS       Microbiology Results (last 7 days)       ** No results found for the last 168 hours. **            Review of patient's allergies indicates:  No Known Allergies    Outpatient meds:  No current facility-administered medications on file prior to encounter.     Current Outpatient Medications on File Prior to Encounter   Medication Sig Dispense Refill    amLODIPine (NORVASC) 10 MG tablet Take 1 tablet (10 mg total) by mouth once daily. 30 tablet 1    aspirin (ECOTRIN) 81 MG EC tablet Take 1 tablet (81 mg total) by mouth once daily. 30 tablet 1    atorvastatin (LIPITOR) 80 MG tablet Take 1 tablet (80 mg total) by mouth once daily. 30 tablet 1     baclofen (LIORESAL) 10 MG tablet Take 1 tablet (10 mg total) by mouth 3 (three) times daily. 90 tablet 1    carvedilol (COREG) 6.25 MG tablet Take 1 tablet (6.25 mg total) by mouth 2 (two) times daily. 60 tablet 1    diazePAM (VALIUM) 5 MG tablet Take 1 tablet (5 mg total) by mouth every 12 (twelve) hours as needed for Anxiety (anxiety.). 30 tablet 0    gabapentin (NEURONTIN) 300 MG capsule Take 2 capsules (600 mg total) by mouth 3 (three) times daily. 90 capsule 1    hydroCHLOROthiazide (HYDRODIURIL) 50 MG tablet Take 1 tablet (50 mg total) by mouth once daily. 30 tablet 1    insulin aspart U-100 (NOVOLOG) 100 unit/mL InPn pen Inject 18 Units into the skin 3 (three) times daily. (Patient taking differently: Inject into the skin 3 (three) times daily.) 10 mL 1    insulin detemir U-100 (LEVEMIR FLEXTOUCH) 100 unit/mL (3 mL) SubQ InPn pen Inject 35 Units into the skin 2 (two) times daily. 10 mL 1    insulin glargine U-100, Lantus, 100 unit/mL injection Inject 30 Units into the skin every evening. (Patient not taking: Reported on 1/9/2025)      lisinopril (PRINIVIL,ZESTRIL) 40 MG tablet Take 1 tablet (40 mg total) by mouth once daily. 30 tablet 1    nicotine (NICODERM CQ) 14 mg/24 hr Place 1 patch onto the skin once daily. (Patient not taking: Reported on 1/9/2025) 15 patch 0    nortriptyline (PAMELOR) 25 MG capsule Take 1 capsule (25 mg total) by mouth 3 (three) times daily. 30 capsule 1    oxyCODONE (ROXICODONE) 5 MG immediate release tablet Take 1 tablet (5 mg total) by mouth every 12 (twelve) hours as needed. (Patient not taking: Reported on 1/9/2025) 30 tablet 0    polyethylene glycol (GLYCOLAX) 17 gram PwPk Take 17 g by mouth daily as needed. (Patient not taking: Reported on 1/9/2025) 30 each 1    QUEtiapine (SEROQUEL) 100 MG Tab Take 1 tablet (100 mg total) by mouth every evening. 30 tablet 1    sofosbuvir-velpatasvir (EPCLUSA) 400-100 mg Tab Take 1 tablet daily. (Patient not taking: Reported on 1/9/2025) 28  tablet 2       Scheduled meds:   acyclovir  800 mg Oral 5x Daily    aspirin  81 mg Oral Daily    atorvastatin  80 mg Oral Daily    baclofen  5 mg Oral BID    buprenorphine-naloxone 8-2 mg  1 Film Sublingual BID    carvediloL  3.125 mg Oral BID    enoxparin  40 mg Subcutaneous Q24H (prophylaxis, 1700)    gabapentin  300 mg Oral BID    hydrALAZINE  25 mg Oral Q8H    isosorbide dinitrate  20 mg Oral TID    miconazole NITRATE 2 %   Topical (Top) BID    tamsulosin  0.4 mg Oral Daily    torsemide  20 mg Oral BID    white petrolatum   Topical (Top) Daily       Infusions:        PRN meds:    Current Facility-Administered Medications:     acetaminophen, 650 mg, Oral, Q4H PRN    aluminum-magnesium hydroxide-simethicone, 30 mL, Oral, QID PRN    dextrose 50%, 12.5 g, Intravenous, PRN    dextrose 50%, 25 g, Intravenous, PRN    glucagon (human recombinant), 1 mg, Intramuscular, PRN    glucose, 16 g, Oral, PRN    glucose, 24 g, Oral, PRN    hydrALAZINE, 5 mg, Intravenous, Q6H PRN    insulin aspart U-100, 0-10 Units, Subcutaneous, QID (AC + HS) PRN    LIDOcaine HCl 2%, , Mucous Membrane, Once PRN    LORazepam, 0.5 mg, Oral, Q6H PRN    magnesium oxide, 800 mg, Oral, PRN    magnesium oxide, 800 mg, Oral, PRN    melatonin, 6 mg, Oral, Nightly PRN    morphine, 4 mg, Intravenous, Q3H PRN    naloxone, 0.02 mg, Intravenous, PRN    ondansetron, 4 mg, Intravenous, Q6H PRN    oxyCODONE-acetaminophen, 1 tablet, Oral, Q4H PRN    oxyCODONE-acetaminophen, 1 tablet, Oral, Q4H PRN    potassium bicarbonate, 35 mEq, Oral, PRN    potassium bicarbonate, 50 mEq, Oral, PRN    potassium bicarbonate, 60 mEq, Oral, PRN    potassium, sodium phosphates, 2 packet, Oral, PRN    potassium, sodium phosphates, 2 packet, Oral, PRN    potassium, sodium phosphates, 2 packet, Oral, PRN    senna-docusate 8.6-50 mg, 1 tablet, Oral, Daily PRN    sodium chloride 0.9%, 10 mL, Intravenous, Q12H PRN    sodium chloride 0.9%, 10 mL, Intravenous, PRN    Past Medical History:    Diagnosis Date    Diabetes mellitus     Hypertension      Past Surgical History:   Procedure Laterality Date    SKIN GRAFT       No family history on file.  Social History     Tobacco Use    Smoking status: Every Day     Current packs/day: 1.00     Types: Cigarettes   Substance Use Topics    Alcohol use: Yes    Drug use: No       OBJECTIVE:     Vital Signs and IO:  Temp:  [97.3 °F (36.3 °C)-98.5 °F (36.9 °C)]   Pulse:  []   Resp:  [18-20]   BP: (123-158)/(64-85)   SpO2:  [93 %-97 %]   I/O last 3 completed shifts:  In: 750 [P.O.:750]  Out: 3425 [Urine:3425]  Wt Readings from Last 5 Encounters:   01/26/25 117.5 kg (259 lb 0.7 oz)   07/19/22 99.8 kg (220 lb)   02/09/20 104.3 kg (230 lb)   08/06/19 113.4 kg (250 lb)   02/27/19 108.9 kg (240 lb)     Body mass index is 37.17 kg/m².    Physical Exam  Constitutional:       General: Patient is not in acute distress.     Appearance: Patient is well-developed. She is not diaphoretic.   HENT:      Head: Normocephalic and atraumatic.      Mouth/Throat: Mucous membranes are moist.   Eyes:      General: No scleral icterus.     Pupils: Pupils are equal, round, and reactive to light.   Cardiovascular:      Rate and Rhythm: Normal rate and regular rhythm.   Pulmonary:      Effort: Pulmonary effort is normal. No respiratory distress.      Breath sounds: No stridor.   Abdominal:      General: There is no distension.      Palpations: Abdomen is soft.   Musculoskeletal:         General: No deformity. Normal range of motion.      Cervical back: Neck supple.   Skin:     General: Skin is warm and dry.      Findings: No rash present. No erythema.   Neurological:      Mental Status: Patient is alert and oriented to person, place, and time.      Cranial Nerves: No cranial nerve deficit.   Psychiatric:         Behavior: Behavior normal.          Patient care time was spent personally by me on the following activities:     Obtaining a history.  Examination of patient.  Providing medical  care at the patients bedside.  Developing a treatment plan with patient or surrogate and bedside caregivers.  Ordering and reviewing laboratory studies, radiographic studies, pulse oximetry.  Ordering and performing treatments and interventions.  Evaluation of patient's response to treatment.  Discussions with consultants while on the unit and immediately available to the patient.  Re-evaluation of the patient's condition.  Documentation in the medical record.     Sandee Radford NP      [facility]  [address] [place] [address]  [place] [address]    [phone] - tel  [phone] - fax    1/26/2025

## 2025-01-27 NOTE — NURSING
Patient left facility in stable condition with his life vest in place via wheelchair with transportation  company at 1955 pm.  All Iv's were removed with no complication and tele box returned to tele room.

## 2025-02-03 ENCOUNTER — TELEPHONE (OUTPATIENT)
Dept: PSYCHIATRY | Facility: CLINIC | Age: 50
End: 2025-02-03
Payer: MEDICAID

## 2025-02-03 ENCOUNTER — TELEPHONE (OUTPATIENT)
Dept: CARDIOLOGY | Facility: CLINIC | Age: 50
End: 2025-02-03
Payer: MEDICAID

## 2025-02-03 ENCOUNTER — TELEPHONE (OUTPATIENT)
Dept: UROLOGY | Facility: CLINIC | Age: 50
End: 2025-02-03
Payer: MEDICAID

## 2025-02-03 NOTE — TELEPHONE ENCOUNTER
----- Message from Sivan sent at 2/3/2025 12:06 PM CST -----  Contact: PT  Type:  Same Day Appointment Request    Caller is requesting a same day appointment.  Caller declined first available appointment listed below.      Name of Caller:  PT  When is the first available appointment?  No solutions  Symptoms:  Hospital FU  Best Call Back Number:  [phone]  Additional Information:   Patient  was discharged from [facility] on Sunday 1/26/25 was told to fu within 1 week.

## 2025-02-03 NOTE — TELEPHONE ENCOUNTER
----- Message from Joo sent at 2/3/2025 11:46 AM CST -----  Regarding: [facility] f/u-NP  Contact: GIDEON PEDRAZA [[id number]]  Type:  Sooner Appointment Request    Caller is requesting a sooner appointment.      Name of Caller:  Gideon    When is the first available appointment?  Dept book    Symptoms:  Hosp f/u-Saw Dr. Corcoran inpatient    Would the patient rather a call back or a response via [facility]? Call    Best Call Back Number:  [phone] (home)     Additional Information:  Please call to advise.

## 2025-02-03 NOTE — TELEPHONE ENCOUNTER
Urinary tract infection associated with indwelling urethral catheter  Catheter in place for severe acute decompensated heart failure and need for strict I&O with concern of cardiorenal syndrome   Completed 4 day course of ceftriaxone    Pt sched for NP for follow up of urinary symptoms

## 2025-02-04 ENCOUNTER — TELEPHONE (OUTPATIENT)
Dept: PSYCHIATRY | Facility: CLINIC | Age: 50
End: 2025-02-04
Payer: MEDICAID

## 2025-02-04 NOTE — TELEPHONE ENCOUNTER
See other encounter from today. Attempted to reach patient to schedule appt but he did not answer.

## 2025-02-04 NOTE — TELEPHONE ENCOUNTER
Sivan attempted to contact patient to schedule NP/hospital f/u appt with Dr. Langston. Patient did not answer call so she LVM for call back.

## 2025-02-04 NOTE — TELEPHONE ENCOUNTER
----- Message from SHO Langston DO sent at 1/31/2025 12:04 PM CST -----  Regarding: Schedule follow up  Please call and offer patient appointment. He has been discharged by hospital and will likely need refill of suboxone soon.    Thank you.

## 2025-02-06 ENCOUNTER — TELEPHONE (OUTPATIENT)
Dept: PSYCHIATRY | Facility: CLINIC | Age: 50
End: 2025-02-06
Payer: MEDICAID

## 2025-02-06 NOTE — TELEPHONE ENCOUNTER
Pt called to reschedule New Pt appt with Dr. Langston on 2/7/25. He does not have transportation. Appt rescheduled to 2/12/25.

## 2025-02-12 ENCOUNTER — OFFICE VISIT (OUTPATIENT)
Dept: PSYCHIATRY | Facility: CLINIC | Age: 50
End: 2025-02-12
Payer: MEDICAID

## 2025-02-12 VITALS — HEART RATE: 93 BPM | SYSTOLIC BLOOD PRESSURE: 126 MMHG | DIASTOLIC BLOOD PRESSURE: 77 MMHG

## 2025-02-12 DIAGNOSIS — E11.59 TYPE 2 DIABETES MELLITUS WITH OTHER CIRCULATORY COMPLICATION, UNSPECIFIED WHETHER LONG TERM INSULIN USE: ICD-10-CM

## 2025-02-12 DIAGNOSIS — F41.9 ANXIETY: ICD-10-CM

## 2025-02-12 DIAGNOSIS — F11.20 OPIOID USE DISORDER, SEVERE, DEPENDENCE: Primary | ICD-10-CM

## 2025-02-12 PROCEDURE — G2211 COMPLEX E/M VISIT ADD ON: HCPCS | Mod: AF,HB,S$PBB, | Performed by: NEUROMUSCULOSKELETAL MEDICINE & OMM

## 2025-02-12 PROCEDURE — 99214 OFFICE O/P EST MOD 30 MIN: CPT | Mod: AF,HB,S$PBB, | Performed by: NEUROMUSCULOSKELETAL MEDICINE & OMM

## 2025-02-12 PROCEDURE — 99214 OFFICE O/P EST MOD 30 MIN: CPT | Mod: PBBFAC,PN | Performed by: NEUROMUSCULOSKELETAL MEDICINE & OMM

## 2025-02-12 PROCEDURE — 3051F HG A1C>EQUAL 7.0%<8.0%: CPT | Mod: CPTII,,, | Performed by: NEUROMUSCULOSKELETAL MEDICINE & OMM

## 2025-02-12 PROCEDURE — 1159F MED LIST DOCD IN RCRD: CPT | Mod: CPTII,,, | Performed by: NEUROMUSCULOSKELETAL MEDICINE & OMM

## 2025-02-12 PROCEDURE — 1111F DSCHRG MED/CURRENT MED MERGE: CPT | Mod: CPTII,,, | Performed by: NEUROMUSCULOSKELETAL MEDICINE & OMM

## 2025-02-12 PROCEDURE — 99999 PR PBB SHADOW E&M-EST. PATIENT-LVL IV: CPT | Mod: PBBFAC,AF,HB, | Performed by: NEUROMUSCULOSKELETAL MEDICINE & OMM

## 2025-02-12 PROCEDURE — 3078F DIAST BP <80 MM HG: CPT | Mod: CPTII,,, | Performed by: NEUROMUSCULOSKELETAL MEDICINE & OMM

## 2025-02-12 PROCEDURE — 3074F SYST BP LT 130 MM HG: CPT | Mod: CPTII,,, | Performed by: NEUROMUSCULOSKELETAL MEDICINE & OMM

## 2025-02-12 PROCEDURE — 1160F RVW MEDS BY RX/DR IN RCRD: CPT | Mod: CPTII,,, | Performed by: NEUROMUSCULOSKELETAL MEDICINE & OMM

## 2025-02-12 RX ORDER — HYDROXYZINE HYDROCHLORIDE 25 MG/1
25 TABLET, FILM COATED ORAL 3 TIMES DAILY PRN
Qty: 90 TABLET | Refills: 0 | Status: SHIPPED | OUTPATIENT
Start: 2025-02-12 | End: 2025-03-14

## 2025-02-12 RX ORDER — BUPRENORPHINE AND NALOXONE 8; 2 MG/1; MG/1
1 FILM, SOLUBLE BUCCAL; SUBLINGUAL 3 TIMES DAILY
Qty: 63 FILM | Refills: 0 | Status: SHIPPED | OUTPATIENT
Start: 2025-02-12 | End: 2025-03-05

## 2025-02-12 NOTE — PROGRESS NOTES
Addiction Medicine  NEW PATIENT EVALUATION    Date of Service: 2/12/2025    Gideon Ross  [id number]   1975   [email or web address] [facility] [address]  [phone] (home)       Patient referral by:  Lashay Palmer DO  [address]  [place] [id number]    Subjective     02/12/2025   Gideon Ross presents today for evaluation and management of OUD.    Regarding opioid use. Denies recent substance use. Admits to cravings. Has cravings regularly but has been using suboxone as needed. Ran out today. Not taking any medications to help with cravings or withdrawals. Was taking suboxone in hospital but wasn't able to get in to see me soon enough. Has been stretching his hospital RX out and ran out today. Would like to get back on suboxone. Not involved in mutual support groups for addiction. Not currently employed, patient disabled. Difficulty sleeping. Likely related to withdrawal symptoms. Anxiety worse. Mood stable. Anxiety/mood likely related to withdrawal. Motivational interviewing used. Relapse prevention discussed. Discussed medications. Patient has not seen another provider since hospital discharge. Patient requests PCP referral. He denies other concerns at present.       01/10/2025   HPI per primary service:  Mr. Ross is a 49 yom w/pmh significant for PID status post bilateral above-the-knee amputations, diabetes mellitus who presented for significant bilateral lower extremity edema and scrotal swelling, as well as intractable pain secondary to scrotal swelling. Labs largely unremarkable. BNP 3500, troponin 3300. Patient was started on IV diuresis and pain control. Cardiology was consulted. Patient was initially started on heparin infusion. On exam he has significant pain and tenderness due to significant scrotal swelling.      Addiction Medicine Consult (01/10/2025):  Patient evaluated for opioid use and stimulant use.      Imminent risks: The patient is not intoxicated. Last reported substance use  "was 2 days ago The patient is currently in withdrawal. COWS score at 1030 AM was 9. Patient admits to history of IV drug use. Last reported use of IV drugs was "years ago". The patient has significant medical problems that need to be managed in the acute setting.     Initiation and pattern of substance use: Starting using heroin regularly about 5 years ago. States he doesn't use meth but he figures it is frequently mixed in his supply. Patient is aware that his heroin likely has fentanyl in it. Patient prefers to smoke heroin. He doesn't like needles any more. He uses it multiple times per day. He has t     Effects/consequences of substance use: Uses it for depression, he assumes a lot of his health problems are due to heroin use and possible previous IVDU.     Previous treatment attempts include: intensive outpatient treatment (IOP) . Patient did not finish treatment. He is open to outpatient treatment with me. If that doesn't work he is willing to discuss residential programs.     Periods of abstinence: "not in a long time"     Patient's stated goal: Abstinence. Motivational interviewing provided. Patient wants to be on suboxone.        Objective     Vitals:    02/12/25 1332   BP: 126/77   Pulse: 93   Weight: Comment: Unable to weigh patient        Physical Exam  Constitutional:       General: He is not in acute distress.     Appearance: Normal appearance.   HENT:      Head: Normocephalic and atraumatic.   Eyes:      General: No scleral icterus.     Conjunctiva/sclera: Conjunctivae normal.      Pupils: Pupils are equal, round, and reactive to light.   Pulmonary:      Effort: Pulmonary effort is normal. No respiratory distress.   Neurological:      Mental Status: He is alert and oriented to person, place, and time.   Psychiatric:         Attention and Perception: Attention normal.         Mood and Affect: Mood and affect normal.         Speech: Speech normal.         Behavior: Behavior normal. Behavior is " cooperative.            The PDMP was checked today and there is no concern regarding current controlled substance prescription.       Assessment       ICD-10-CM ICD-9-CM   1. Opioid use disorder, severe, dependence  F11.20 304.00   2. Anxiety  F41.9 300.00   3. Type 2 diabetes mellitus with other circulatory complication, unspecified whether long term insulin use  E11.59 250.70        Impression/Plan     Will restart buprenorphine at 8 mg TID. Referral place for PCP. Will provided hydroxyzine prn for anxiety. Anxiety likely related in part to opioid withdrawal. Patient shows adequate insight regarding withdrawal and use of buprenorphine. Very cooperative today. Wants to get better.    Follow up in about 2 weeks (around 2/26/2025) for Virtual Visit.    Today: Discussion, counseling, and education provided. Patient provided anticipatory guidance regarding treatment. Discussed RX administration and adherence PDMP reviewed with patient Relapse prevention discussed. Motivational interviewing utilized. Discussed risks of acute and chronic use of opioid/opiate. Discussed withdrawal symptoms and management. Controlled substance contract discussed today. Patient signed controlled substance contract.        Orders Placed This Encounter    Ambulatory referral/consult to Family Practice    POCT Urine Drug Screen (With BUP)    hydrOXYzine HCL (ATARAX) 25 MG tablet    buprenorphine-naloxone 8-2 mg (SUBOXONE) 8-2 mg             NOTE: Portions of this documentation were dictated using voice recognition software and may contain dictation related errors in spelling / grammar / syntax not discovered on text review.            SHO Langston DO    Board Certified, Addiction Medicine  Board Certified, Neuromusculoskeletal Medicine and [facility]  Department of Psychiatry, [facility]      Method of Encounter   IN PERSON visit at the clinic   Type of Encounter   Follow up visit with me   E/M Code   97042: Office E&M of an ESTABLISHED patient,  level 4, Level 4 criteria: 1 unstable chronic illness and Prescription Drug Management     Separate from E/M (same visit)   None   Time   N/A - Not billing for time

## 2025-02-12 NOTE — PATIENT INSTRUCTIONS
Follow up in two weeks. Take medications as prescribed. Contact the clinic with any concerns. If you feel the concerns are of a very serious (example: seizure, suicidal thoughts, hallucinations, severe illness) call 911 or go to the nearest emergency department.

## 2025-03-10 ENCOUNTER — OFFICE VISIT (OUTPATIENT)
Facility: CLINIC | Age: 50
End: 2025-03-10
Payer: MEDICAID

## 2025-03-10 ENCOUNTER — PATIENT MESSAGE (OUTPATIENT)
Dept: PSYCHIATRY | Facility: CLINIC | Age: 50
End: 2025-03-10
Payer: MEDICAID

## 2025-03-10 DIAGNOSIS — I50.21 ACUTE SYSTOLIC CONGESTIVE HEART FAILURE: ICD-10-CM

## 2025-03-10 DIAGNOSIS — F11.20 OPIOID USE DISORDER, SEVERE, DEPENDENCE: Primary | ICD-10-CM

## 2025-03-10 DIAGNOSIS — F41.9 ANXIETY: ICD-10-CM

## 2025-03-10 RX ORDER — BUPRENORPHINE AND NALOXONE 8; 2 MG/1; MG/1
1 FILM, SOLUBLE BUCCAL; SUBLINGUAL 3 TIMES DAILY
Qty: 90 FILM | Refills: 0 | Status: SHIPPED | OUTPATIENT
Start: 2025-03-10 | End: 2025-04-09

## 2025-03-10 RX ORDER — TORSEMIDE 20 MG/1
20 TABLET ORAL 2 TIMES DAILY
Qty: 60 TABLET | Refills: 11 | Status: SHIPPED | OUTPATIENT
Start: 2025-03-10 | End: 2026-03-10

## 2025-03-10 RX ORDER — ISOSORBIDE DINITRATE 20 MG/1
20 TABLET ORAL 3 TIMES DAILY
Qty: 90 TABLET | Refills: 11 | Status: SHIPPED | OUTPATIENT
Start: 2025-03-10 | End: 2026-03-10

## 2025-03-10 RX ORDER — TAMSULOSIN HYDROCHLORIDE 0.4 MG/1
0.4 CAPSULE ORAL DAILY
Qty: 30 CAPSULE | Refills: 11 | Status: SHIPPED | OUTPATIENT
Start: 2025-03-10 | End: 2026-03-10

## 2025-03-10 NOTE — PATIENT INSTRUCTIONS
Schedule appointment with primary care provider through [facility].  Follow up in four weeks. Take medications as prescribed. Contact the clinic with any concerns. If you feel the concerns are of a very serious (example: seizure, suicidal thoughts, hallucinations, severe illness) call 911 or go to the nearest emergency department.

## 2025-03-19 DIAGNOSIS — F11.20 OPIOID USE DISORDER, SEVERE, DEPENDENCE: ICD-10-CM

## 2025-03-19 RX ORDER — BUPRENORPHINE AND NALOXONE 8; 2 MG/1; MG/1
1 FILM, SOLUBLE BUCCAL; SUBLINGUAL 3 TIMES DAILY
Qty: 90 FILM | Refills: 0 | Status: SHIPPED | OUTPATIENT
Start: 2025-03-19 | End: 2025-04-18

## 2025-03-19 NOTE — TELEPHONE ENCOUNTER
Pt is requesting to transfer Rx suboxone to [facility], closer to his house. He does not want to drive to [facility] to pick it up.

## 2025-04-07 ENCOUNTER — OFFICE VISIT (OUTPATIENT)
Facility: CLINIC | Age: 50
End: 2025-04-07
Payer: MEDICAID

## 2025-04-07 DIAGNOSIS — F11.20 OPIOID USE DISORDER, SEVERE, ON MAINTENANCE THERAPY, DEPENDENCE: Primary | ICD-10-CM

## 2025-04-07 DIAGNOSIS — I50.21 ACUTE SYSTOLIC CONGESTIVE HEART FAILURE: ICD-10-CM

## 2025-04-07 DIAGNOSIS — F41.9 ANXIETY: ICD-10-CM

## 2025-04-07 RX ORDER — BUPRENORPHINE AND NALOXONE 8; 2 MG/1; MG/1
1 FILM, SOLUBLE BUCCAL; SUBLINGUAL 4 TIMES DAILY
Qty: 120 FILM | Refills: 0 | Status: SHIPPED | OUTPATIENT
Start: 2025-04-07 | End: 2025-05-07

## 2025-04-07 NOTE — PROGRESS NOTES
Addiction Medicine  Virtual Encounter    Date of Service: 2025    Name:  Gideon Ross   MRN:  [id number]   :  1975   Address:  [facility] [address]  [place] [address]   Phone:  [phone]      The patient location is:  Home  The patient State: [place]; Provider licensed in [place] (#[id number]), [place] (#[id number]), [place] (#[id number]), and [place] (#[phone]).  The patient phone number is: [phone]   Visit type: Virtual visit with synchronous audio and video  Each patient to whom is provided medical services by telemedicine is:  (1) informed of the relationship between the physician and patient and the respective role of any other health care provider with respect to management of the patient; and (2) notified that he or she may decline to receive medical services by telemedicine and may withdraw from such care at any time.  Crisis Disclaimer: Patient was informed that due to the virtual nature of the visit, that if a crisis develops, protocols will be implemented to ensure patient safety, including but not limited to: 1) Initiating a welfare check with local Law Enforcement, 2) Calling [facility]/National Crisis Hotline, and/or 3) Initiating PEC/CEC procedures.      Subjective     2025   Gideon Ross presents today for evaluation and management of opioid use .    Denies recent substance use.  Last reported substance use: 2025 prior to hospitalization.  Admits to cravings.   Cravings every day. Extreme cravings for a couple of days but pulled through.  Currently taking buprenorphine 24 mg daily. Current dosing is not helping.   Does not see a therapist regularly.  Does not attend mutual support group meetings regularly.   Not currently employed.  No concerns regarding home-life.  Patient has no concerns regarding sleep.  Patient has no concerns regarding anxiety.  Patient has no concerns regarding mood.  Motivational interviewing utilized.  Relapse prevention discussed.  Discussed  "medications.    Not interested in REMY at present due to "knot" left after injection"  Has enough buprenorphine to last until 4/19/2025.  Waiting to hear back from PCP. States he is supposed to hear back this week.  Needs blood pressure medication. Patient unsure of what he was taking. Will contact me when he finds out.       Objective     Physical Exam:  No acute distress. Normal appearance. No scleral icterus. Conjunctivae normal. Pulmonary effort is normal. Nor respiratory distress. Alert and oriented x 3. Attention and perception appear normal. Mood and affect appear normal. Speech normal. Patient is cooperative.       Assessment       ICD-10-CM ICD-9-CM   1. Opioid use disorder, severe, on maintenance therapy, dependence  F11.20 304.00   2. Acute systolic congestive heart failure  I50.21 428.21     428.0   3. Anxiety  F41.9 300.00        Plan     Increase buprenorphine to 32 mg per day due to cravings. This appears necessary due to patient's history of long-term fentanyl use. He is having cravings every day and sometimes intense cravings. Discussed with patient that sometimes insurance doesn't cover 4 times per day. He states understanding.    Patient is not interested in Sublocade but may consider Brixadi in the future.    Prescription drug management entails the review, recommendation, or consideration without recommendation of medications, and as such was employed during the encounter.  Discussion, counseling, and education provided.  Provided anticipatory guidance regarding treatment.  Substance use counseling and resources provided as warranted.  Motivational interviewing provided.  Relapse prevention counseling provided.   reviewed and discussed with patient as warranted.  Follow up 2 weeks (virtual) and 4 weeks (clinic).    Orders Placed This Encounter    buprenorphine-naloxone 8-2 mg (SUBOXONE) 8-2 mg              SHO Langston DO    Board Certified, Addiction Medicine  Board Certified, " Neuromusculoskeletal Medicine    Portions of this documentation may have been dictated using voice recognition software and may contain dictation related errors in spelling/grammar/syntax not discovered on text review.      Subsequent patient encounter.  Virtual encounter.  [id number] - synchronized audio only  Added Complexity: Visit today included increased complexity associated with the care of the episodic problem(s) addressed and managing the longitudinal care of the patient due to the serious and/or complex managed problem(s).  At least 17 minutes were in direct communication with the patient with synchronized audio. Synchronized AV was attempted but patient had connection issues.

## 2025-04-16 ENCOUNTER — TELEPHONE (OUTPATIENT)
Dept: PSYCHIATRY | Facility: CLINIC | Age: 50
End: 2025-04-16
Payer: MEDICAID

## 2025-04-17 NOTE — TELEPHONE ENCOUNTER
After initial denial I called patient's insurance to do a PA over the phone. PA for the requested mg per day was approved request ID [id number] approval # [id number]. Was informed after they did a test run that another PA would be needed for the qty needed. We also got that approved request ID [id number] approval #[id number]. Called pharmacy to advise that 2 PA's were approved for the medication. Pharmacist tried running the rx and it was still being rejected needing a PA. I faxed both approvals to the pharmacy for them to contact medicaid. PA's approved 4-16-25 to 10-13-25.

## 2025-04-17 NOTE — TELEPHONE ENCOUNTER
"Pharmacy left a message this morning advising that after they called the patient's insurance they were informed that a 3rd PA was required for the medication. Called insurance back to check what was needed. Not really sure what the issue was. I was trying to do a 3rd PA and the rep had to keep putting me hold while she checked with the pharmacist and then she transferred me to the pharmacist. The pharmacist then kept putting me on hold while she was checking on "something". Then another rep picked up advising that I had to do another PA per the pharmacist which is why I was transferred to her and had to start the whole process over. After numerous times of being placed on hold while she was "checking" on something the 3rd rep advised that "it was fixed" and they did a test run and it went through. She provided me with the request # [id number] for this encounter. Called the pharmacy to advise and they tried running the rx and it finally went through.   "

## 2025-04-21 ENCOUNTER — OFFICE VISIT (OUTPATIENT)
Facility: CLINIC | Age: 50
End: 2025-04-21
Payer: MEDICAID

## 2025-04-21 DIAGNOSIS — F11.20 OPIOID USE DISORDER, SEVERE, ON MAINTENANCE THERAPY, DEPENDENCE: Primary | ICD-10-CM

## 2025-04-21 DIAGNOSIS — E11.22 TYPE 2 DIABETES MELLITUS WITH DIABETIC CHRONIC KIDNEY DISEASE, UNSPECIFIED CKD STAGE, UNSPECIFIED WHETHER LONG TERM INSULIN USE: ICD-10-CM

## 2025-04-21 DIAGNOSIS — F32.A DEPRESSION, UNSPECIFIED DEPRESSION TYPE: ICD-10-CM

## 2025-04-21 PROCEDURE — 1159F MED LIST DOCD IN RCRD: CPT | Mod: CPTII,95,, | Performed by: NEUROMUSCULOSKELETAL MEDICINE & OMM

## 2025-04-21 PROCEDURE — 98006 SYNCH AUDIO-VIDEO EST MOD 30: CPT | Mod: 95,,, | Performed by: NEUROMUSCULOSKELETAL MEDICINE & OMM

## 2025-04-21 PROCEDURE — 1160F RVW MEDS BY RX/DR IN RCRD: CPT | Mod: CPTII,95,, | Performed by: NEUROMUSCULOSKELETAL MEDICINE & OMM

## 2025-04-21 PROCEDURE — G2211 COMPLEX E/M VISIT ADD ON: HCPCS | Mod: 95,,, | Performed by: NEUROMUSCULOSKELETAL MEDICINE & OMM

## 2025-04-21 PROCEDURE — 3051F HG A1C>EQUAL 7.0%<8.0%: CPT | Mod: CPTII,95,, | Performed by: NEUROMUSCULOSKELETAL MEDICINE & OMM

## 2025-04-21 NOTE — PROGRESS NOTES
"     Addiction Medicine  Virtual Encounter    Date of Service: 2025    Name:  Gideon Ross   MRN:  [id number]   :  1975   Address:  [facility] [address]  [place] [address]   Phone:  [phone]      The patient location is:  Home  The patient State: [place]; Provider licensed in [place] (#[id number]), [place] (#[id number]), [place] (#[id number]), and [place] (#[phone]).  The patient phone number is: [phone]   Visit type: Virtual visit with synchronous audio and video  Each patient to whom is provided medical services by telemedicine is:  (1) informed of the relationship between the physician and patient and the respective role of any other health care provider with respect to management of the patient; and (2) notified that he or she may decline to receive medical services by telemedicine and may withdraw from such care at any time.  Crisis Disclaimer: Patient was informed that due to the virtual nature of the visit, that if a crisis develops, protocols will be implemented to ensure patient safety, including but not limited to: 1) Initiating a welfare check with local Law Enforcement, 2) Calling [facility]/National Crisis Hotline, and/or 3) Initiating PEC/CEC procedures.      Subjective     2025   Gideon Ross presents today for evaluation and management of opioid use .    Denies recent substance use.  Denies cravings.  Currently taking buprenorphine 8 mg QID. Current dosing is helping.  Not currently employed.  No concerns regarding home-life.  Anxiety is manageable.  Feels depressed. Mostly about health. "I never intended to end up like this."  Difficulty managing depression.  Offered medications or referral to therapy. Patient declined and stated he will get through it. Denies thoughts or plans of self harm.  Relapse prevention discussed.  Discussed medications.  Additionally, discussed with patient the importance of seeing a PCP. Patient wants a referral. Also discussed how he may be able to " make an appointment through the [facility]. Patient states he will try.       Objective     Physical Exam:  No acute distress. Normal appearance. No scleral icterus. Conjunctivae normal. Pulmonary effort is normal. Nor respiratory distress. Alert and oriented x 3. Attention and perception appear normal. Mood and affect appear normal. Speech normal. Patient is cooperative.       Assessment       ICD-10-CM ICD-9-CM   1. Opioid use disorder, severe, on maintenance therapy, dependence  F11.20 304.00   2. Depression, unspecified depression type  F32.A 311        Plan     Refer to PCP for management of chronic conditions.  Continue with current treatment with buprenorphine. Patient doing well with 8 mg QID.  Discussed depression. Patient refused medication or therapy referral.  Prescription drug management entails the review, recommendation, or consideration without recommendation of medications, and as such was employed during the encounter.  Discussion, counseling, and education provided.  Provided anticipatory guidance regarding treatment.  Substance use counseling and resources provided as warranted.  Relapse prevention counseling provided.   reviewed and discussed with patient as warranted.  Follow up 4 weeks.                SHO Langston DO    Board Certified, Addiction Medicine  Board Certified, Neuromusculoskeletal Medicine    Portions of this documentation may have been dictated using voice recognition software and may contain dictation related errors in spelling/grammar/syntax not discovered on text review.      Subsequent patient encounter.  Virtual encounter.  MN SYNCHRONOUS AUDIO-VIDEO VISIT, EST, MOD MDM 30+ MIN [59877]  Added Complexity: Visit today included increased complexity associated with the care of the episodic problem(s) addressed and managing the longitudinal care of the patient due to the serious and/or complex managed problem(s).  Spent at least 11 minutes in direct synchronous AV contact  with patient.

## 2025-05-11 ENCOUNTER — HOSPITAL ENCOUNTER (INPATIENT)
Facility: HOSPITAL | Age: 50
LOS: 14 days | Discharge: HOME | DRG: 564 | End: 2025-05-27
Attending: STUDENT IN AN ORGANIZED HEALTH CARE EDUCATION/TRAINING PROGRAM | Admitting: FAMILY MEDICINE
Payer: MEDICAID

## 2025-05-11 DIAGNOSIS — R60.0 PERIPHERAL EDEMA: ICD-10-CM

## 2025-05-11 DIAGNOSIS — Z89.612 S/P AKA (ABOVE KNEE AMPUTATION) UNILATERAL, LEFT: ICD-10-CM

## 2025-05-11 DIAGNOSIS — Z89.511 S/P BELOW KNEE AMPUTATION, RIGHT: ICD-10-CM

## 2025-05-11 DIAGNOSIS — E87.6 HYPOKALEMIA: ICD-10-CM

## 2025-05-11 DIAGNOSIS — I50.9 ACUTE EXACERBATION OF CHF (CONGESTIVE HEART FAILURE): ICD-10-CM

## 2025-05-11 DIAGNOSIS — R79.89 ELEVATED BRAIN NATRIURETIC PEPTIDE (BNP) LEVEL: ICD-10-CM

## 2025-05-11 DIAGNOSIS — I50.9 CHF (CONGESTIVE HEART FAILURE): ICD-10-CM

## 2025-05-11 DIAGNOSIS — R94.31 PROLONGED Q-T INTERVAL ON ECG: ICD-10-CM

## 2025-05-11 DIAGNOSIS — R06.02 SHORTNESS OF BREATH: ICD-10-CM

## 2025-05-11 DIAGNOSIS — R06.02 SOB (SHORTNESS OF BREATH): Primary | ICD-10-CM

## 2025-05-11 DIAGNOSIS — I50.21 ACUTE SYSTOLIC CONGESTIVE HEART FAILURE: ICD-10-CM

## 2025-05-11 DIAGNOSIS — T87.89 NON-HEALING WOUND OF AMPUTATION STUMP: ICD-10-CM

## 2025-05-11 DIAGNOSIS — I50.9 ACUTE ON CHRONIC CONGESTIVE HEART FAILURE, UNSPECIFIED HEART FAILURE TYPE: ICD-10-CM

## 2025-05-11 PROBLEM — E88.09 HYPOALBUMINEMIA: Status: ACTIVE | Noted: 2025-05-11

## 2025-05-11 PROBLEM — I10 HTN (HYPERTENSION): Status: ACTIVE | Noted: 2025-05-11

## 2025-05-11 PROBLEM — D64.9 ANEMIA: Status: ACTIVE | Noted: 2025-05-11

## 2025-05-11 LAB
ABSOLUTE EOSINOPHIL (SMH): 0.79 K/UL
ABSOLUTE MONOCYTE (SMH): 0.79 K/UL (ref 0.3–1)
ABSOLUTE NEUTROPHIL COUNT (SMH): 5.4 K/UL (ref 1.8–7.7)
ALBUMIN SERPL-MCNC: 2.4 G/DL (ref 3.5–5.2)
ALP SERPL-CCNC: 145 UNIT/L (ref 55–135)
ALT SERPL-CCNC: 5 UNIT/L (ref 10–44)
ANION GAP (SMH): 9 MMOL/L (ref 8–16)
AST SERPL-CCNC: 9 UNIT/L (ref 10–40)
BASOPHILS # BLD AUTO: 0.06 K/UL
BASOPHILS NFR BLD AUTO: 0.7 %
BILIRUB SERPL-MCNC: 0.5 MG/DL (ref 0.1–1)
BNP SERPL-MCNC: 1998 PG/ML
BUN SERPL-MCNC: 23 MG/DL (ref 6–20)
CALCIUM SERPL-MCNC: 7.9 MG/DL (ref 8.7–10.5)
CHLORIDE SERPL-SCNC: 102 MMOL/L (ref 95–110)
CO2 SERPL-SCNC: 29 MMOL/L (ref 23–29)
CREAT SERPL-MCNC: 0.9 MG/DL (ref 0.5–1.4)
ERYTHROCYTE [DISTWIDTH] IN BLOOD BY AUTOMATED COUNT: 16.1 % (ref 11.5–14.5)
ERYTHROCYTE [SEDIMENTATION RATE] IN BLOOD: 36 MM/HR (ref 0–10)
GFR SERPLBLD CREATININE-BSD FMLA CKD-EPI: >60 ML/MIN/1.73/M2
GLUCOSE SERPL-MCNC: 191 MG/DL (ref 70–110)
HCT VFR BLD AUTO: 31.2 % (ref 40–54)
HGB BLD-MCNC: 9.5 GM/DL (ref 14–18)
IMM GRANULOCYTES # BLD AUTO: 0.03 K/UL (ref 0–0.04)
IMM GRANULOCYTES NFR BLD AUTO: 0.4 % (ref 0–0.5)
LYMPHOCYTES # BLD AUTO: 1.05 K/UL (ref 1–4.8)
MCH RBC QN AUTO: 24.9 PG (ref 27–31)
MCHC RBC AUTO-ENTMCNC: 30.4 G/DL (ref 32–36)
MCV RBC AUTO: 82 FL (ref 82–98)
NUCLEATED RBC (/100WBC) (SMH): 0 /100 WBC
PLATELET # BLD AUTO: 219 K/UL (ref 150–450)
PMV BLD AUTO: 10.2 FL (ref 9.2–12.9)
POTASSIUM SERPL-SCNC: 3.1 MMOL/L (ref 3.5–5.1)
PROT SERPL-MCNC: 5.5 GM/DL (ref 6–8.4)
RBC # BLD AUTO: 3.82 M/UL (ref 4.6–6.2)
RELATIVE EOSINOPHIL (SMH): 9.7 % (ref 0–8)
RELATIVE LYMPHOCYTE (SMH): 12.9 % (ref 18–48)
RELATIVE MONOCYTE (SMH): 9.7 % (ref 4–15)
RELATIVE NEUTROPHIL (SMH): 66.6 % (ref 38–73)
SODIUM SERPL-SCNC: 140 MMOL/L (ref 136–145)
TROPONIN HIGH SENSITIVE (SMH): 24.2 PG/ML
WBC # BLD AUTO: 8.13 K/UL (ref 3.9–12.7)

## 2025-05-11 PROCEDURE — 36415 COLL VENOUS BLD VENIPUNCTURE: CPT

## 2025-05-11 PROCEDURE — 93005 ELECTROCARDIOGRAM TRACING: CPT | Performed by: GENERAL PRACTICE

## 2025-05-11 PROCEDURE — 86140 C-REACTIVE PROTEIN: CPT

## 2025-05-11 PROCEDURE — 87389 HIV-1 AG W/HIV-1&-2 AB AG IA: CPT | Performed by: EMERGENCY MEDICINE

## 2025-05-11 PROCEDURE — 63600175 PHARM REV CODE 636 W HCPCS: Performed by: STUDENT IN AN ORGANIZED HEALTH CARE EDUCATION/TRAINING PROGRAM

## 2025-05-11 PROCEDURE — 86803 HEPATITIS C AB TEST: CPT | Performed by: EMERGENCY MEDICINE

## 2025-05-11 PROCEDURE — 96374 THER/PROPH/DIAG INJ IV PUSH: CPT

## 2025-05-11 PROCEDURE — 80053 COMPREHEN METABOLIC PANEL: CPT | Performed by: STUDENT IN AN ORGANIZED HEALTH CARE EDUCATION/TRAINING PROGRAM

## 2025-05-11 PROCEDURE — 85025 COMPLETE CBC W/AUTO DIFF WBC: CPT | Performed by: STUDENT IN AN ORGANIZED HEALTH CARE EDUCATION/TRAINING PROGRAM

## 2025-05-11 PROCEDURE — 99285 EMERGENCY DEPT VISIT HI MDM: CPT | Mod: 25

## 2025-05-11 PROCEDURE — 85651 RBC SED RATE NONAUTOMATED: CPT

## 2025-05-11 PROCEDURE — 87040 BLOOD CULTURE FOR BACTERIA: CPT

## 2025-05-11 PROCEDURE — 94761 N-INVAS EAR/PLS OXIMETRY MLT: CPT

## 2025-05-11 PROCEDURE — 87522 HEPATITIS C REVRS TRNSCRPJ: CPT | Performed by: EMERGENCY MEDICINE

## 2025-05-11 PROCEDURE — 83880 ASSAY OF NATRIURETIC PEPTIDE: CPT | Performed by: STUDENT IN AN ORGANIZED HEALTH CARE EDUCATION/TRAINING PROGRAM

## 2025-05-11 PROCEDURE — 84484 ASSAY OF TROPONIN QUANT: CPT | Performed by: STUDENT IN AN ORGANIZED HEALTH CARE EDUCATION/TRAINING PROGRAM

## 2025-05-11 PROCEDURE — G0378 HOSPITAL OBSERVATION PER HR: HCPCS

## 2025-05-11 PROCEDURE — 93010 ELECTROCARDIOGRAM REPORT: CPT | Mod: ,,, | Performed by: GENERAL PRACTICE

## 2025-05-11 RX ORDER — GLUCAGON 1 MG
1 KIT INJECTION
Status: DISCONTINUED | OUTPATIENT
Start: 2025-05-12 | End: 2025-05-12

## 2025-05-11 RX ORDER — BUPRENORPHINE AND NALOXONE 8; 2 MG/1; MG/1
1 FILM, SOLUBLE BUCCAL; SUBLINGUAL ONCE
Status: COMPLETED | OUTPATIENT
Start: 2025-05-11 | End: 2025-05-11

## 2025-05-11 RX ORDER — KETOROLAC TROMETHAMINE 30 MG/ML
15 INJECTION, SOLUTION INTRAMUSCULAR; INTRAVENOUS EVERY 6 HOURS PRN
Status: DISPENSED | OUTPATIENT
Start: 2025-05-12 | End: 2025-05-12

## 2025-05-11 RX ORDER — FUROSEMIDE 10 MG/ML
80 INJECTION INTRAMUSCULAR; INTRAVENOUS
Status: COMPLETED | OUTPATIENT
Start: 2025-05-11 | End: 2025-05-11

## 2025-05-11 RX ORDER — ACETAMINOPHEN 325 MG/1
650 TABLET ORAL EVERY 6 HOURS PRN
Status: DISCONTINUED | OUTPATIENT
Start: 2025-05-12 | End: 2025-05-27 | Stop reason: HOSPADM

## 2025-05-11 RX ORDER — BISACODYL 10 MG/1
10 SUPPOSITORY RECTAL DAILY PRN
Status: DISCONTINUED | OUTPATIENT
Start: 2025-05-12 | End: 2025-05-27 | Stop reason: HOSPADM

## 2025-05-11 RX ORDER — SODIUM CHLORIDE 0.9 % (FLUSH) 0.9 %
10 SYRINGE (ML) INJECTION
Status: DISCONTINUED | OUTPATIENT
Start: 2025-05-12 | End: 2025-05-27 | Stop reason: HOSPADM

## 2025-05-11 RX ORDER — ENOXAPARIN SODIUM 100 MG/ML
40 INJECTION SUBCUTANEOUS EVERY 24 HOURS
Status: DISCONTINUED | OUTPATIENT
Start: 2025-05-12 | End: 2025-05-27 | Stop reason: HOSPADM

## 2025-05-11 RX ORDER — INSULIN ASPART 100 [IU]/ML
0-5 INJECTION, SOLUTION INTRAVENOUS; SUBCUTANEOUS EVERY 6 HOURS PRN
Status: DISCONTINUED | OUTPATIENT
Start: 2025-05-12 | End: 2025-05-12

## 2025-05-11 RX ORDER — FUROSEMIDE 10 MG/ML
40 INJECTION INTRAMUSCULAR; INTRAVENOUS
Status: DISCONTINUED | OUTPATIENT
Start: 2025-05-11 | End: 2025-05-11

## 2025-05-11 RX ORDER — PROMETHAZINE HYDROCHLORIDE 12.5 MG/1
12.5 SUPPOSITORY RECTAL EVERY 6 HOURS PRN
Status: DISCONTINUED | OUTPATIENT
Start: 2025-05-12 | End: 2025-05-27 | Stop reason: HOSPADM

## 2025-05-11 RX ORDER — BUPRENORPHINE AND NALOXONE 8; 2 MG/1; MG/1
2 FILM, SOLUBLE BUCCAL; SUBLINGUAL 2 TIMES DAILY
Status: ON HOLD | COMMUNITY
Start: 2025-04-17 | End: 2025-05-13 | Stop reason: SDUPTHER

## 2025-05-11 RX ORDER — FUROSEMIDE 10 MG/ML
40 INJECTION INTRAMUSCULAR; INTRAVENOUS EVERY 12 HOURS
Status: DISCONTINUED | OUTPATIENT
Start: 2025-05-12 | End: 2025-05-12

## 2025-05-11 RX ORDER — TALC
6 POWDER (GRAM) TOPICAL NIGHTLY PRN
Status: DISCONTINUED | OUTPATIENT
Start: 2025-05-12 | End: 2025-05-27 | Stop reason: HOSPADM

## 2025-05-11 RX ADMIN — BUPRENORPHINE AND NALOXONE 1 FILM: 8; 2 FILM BUCCAL; SUBLINGUAL at 09:05

## 2025-05-11 RX ADMIN — FUROSEMIDE 80 MG: 10 INJECTION, SOLUTION INTRAMUSCULAR; INTRAVENOUS at 09:05

## 2025-05-12 ENCOUNTER — CLINICAL SUPPORT (OUTPATIENT)
Dept: CARDIOLOGY | Facility: HOSPITAL | Age: 50
End: 2025-05-12
Attending: FAMILY MEDICINE
Payer: MEDICAID

## 2025-05-12 ENCOUNTER — RESULTS FOLLOW-UP (OUTPATIENT)
Dept: EMERGENCY MEDICINE | Facility: HOSPITAL | Age: 50
End: 2025-05-12

## 2025-05-12 VITALS — BODY MASS INDEX: 30.77 KG/M2 | HEIGHT: 70 IN | WEIGHT: 214.94 LBS

## 2025-05-12 LAB
ABSOLUTE EOSINOPHIL (SMH): 0.85 K/UL
ABSOLUTE MONOCYTE (SMH): 0.85 K/UL (ref 0.3–1)
ABSOLUTE NEUTROPHIL COUNT (SMH): 5.1 K/UL (ref 1.8–7.7)
ALBUMIN SERPL-MCNC: 2.7 G/DL (ref 3.5–5.2)
ALP SERPL-CCNC: 148 UNIT/L (ref 55–135)
ALT SERPL-CCNC: 6 UNIT/L (ref 10–44)
AMPHET UR QL SCN: NEGATIVE
ANION GAP (SMH): 5 MMOL/L (ref 8–16)
AST SERPL-CCNC: 10 UNIT/L (ref 10–40)
BACTERIA #/AREA URNS AUTO: ABNORMAL /HPF
BARBITURATE SCN PRESENT UR: NEGATIVE
BASOPHILS # BLD AUTO: 0.07 K/UL
BASOPHILS NFR BLD AUTO: 0.8 %
BENZODIAZ UR QL SCN: NEGATIVE
BILIRUB SERPL-MCNC: 0.5 MG/DL (ref 0.1–1)
BILIRUB UR QL STRIP.AUTO: NEGATIVE
BUN SERPL-MCNC: 24 MG/DL (ref 6–20)
C-REACTIVE PROTEIN (SMH): 2 MG/DL
CALCIUM SERPL-MCNC: 8.3 MG/DL (ref 8.7–10.5)
CANNABINOIDS UR QL SCN: NEGATIVE
CHLORIDE SERPL-SCNC: 102 MMOL/L (ref 95–110)
CLARITY UR: CLEAR
CO2 SERPL-SCNC: 33 MMOL/L (ref 23–29)
COCAINE UR QL SCN: NEGATIVE
COLOR UR AUTO: YELLOW
CREAT SERPL-MCNC: 0.9 MG/DL (ref 0.5–1.4)
CREAT UR-MCNC: 75.6 MG/DL (ref 23–375)
D DIMER PPP IA.FEU-MCNC: 1.14 MG/L FEU
EAG (SMH): 166 MG/DL (ref 68–131)
ERYTHROCYTE [DISTWIDTH] IN BLOOD BY AUTOMATED COUNT: 16 % (ref 11.5–14.5)
ETHANOL SERPL-MCNC: <10 MG/DL
GFR SERPLBLD CREATININE-BSD FMLA CKD-EPI: >60 ML/MIN/1.73/M2
GLUCOSE SERPL-MCNC: 147 MG/DL (ref 70–110)
GLUCOSE UR QL STRIP: NEGATIVE
HBA1C MFR BLD: 7.4 % (ref 4.5–6.2)
HCT VFR BLD AUTO: 34 % (ref 40–54)
HCV AB SERPL QL IA: REACTIVE
HGB BLD-MCNC: 10.2 GM/DL (ref 14–18)
HGB UR QL STRIP: ABNORMAL
HIV 1+2 AB+HIV1 P24 AG SERPL QL IA: NORMAL
HYALINE CASTS UR QL AUTO: 3 /LPF (ref 0–1)
IMM GRANULOCYTES # BLD AUTO: 0.04 K/UL (ref 0–0.04)
IMM GRANULOCYTES NFR BLD AUTO: 0.5 % (ref 0–0.5)
INFLUENZA A MOLECULAR (OHS): NEGATIVE
INFLUENZA B MOLECULAR (OHS): NEGATIVE
KETONES UR QL STRIP: NEGATIVE
LACTATE SERPL-SCNC: 0.8 MMOL/L (ref 0.5–1.9)
LEUKOCYTE ESTERASE UR QL STRIP: NEGATIVE
LYMPHOCYTES # BLD AUTO: 1.39 K/UL (ref 1–4.8)
MAGNESIUM SERPL-MCNC: 1.7 MG/DL (ref 1.6–2.6)
MCH RBC QN AUTO: 24.8 PG (ref 27–31)
MCHC RBC AUTO-ENTMCNC: 30 G/DL (ref 32–36)
MCV RBC AUTO: 83 FL (ref 82–98)
MICROSCOPIC COMMENT: ABNORMAL
NITRITE UR QL STRIP: NEGATIVE
NUCLEATED RBC (/100WBC) (SMH): 0 /100 WBC
OHS QRS DURATION: 148 MS
OHS QTC CALCULATION: 503 MS
OPIATES UR QL SCN: NEGATIVE
PCP UR QL: NEGATIVE
PH UR STRIP: 6 [PH]
PLATELET # BLD AUTO: 225 K/UL (ref 150–450)
PMV BLD AUTO: 10 FL (ref 9.2–12.9)
POCT GLUCOSE: 145 MG/DL (ref 70–110)
POCT GLUCOSE: 155 MG/DL (ref 70–110)
POCT GLUCOSE: 170 MG/DL (ref 70–110)
POCT GLUCOSE: 184 MG/DL (ref 70–110)
POTASSIUM SERPL-SCNC: 3.7 MMOL/L (ref 3.5–5.1)
PROCALCITONIN SERPL-MCNC: 0.08 NG/ML
PROT SERPL-MCNC: 6.3 GM/DL (ref 6–8.4)
PROT UR QL STRIP: ABNORMAL
RBC # BLD AUTO: 4.12 M/UL (ref 4.6–6.2)
RBC #/AREA URNS AUTO: 6 /HPF
RELATIVE EOSINOPHIL (SMH): 10.2 % (ref 0–8)
RELATIVE LYMPHOCYTE (SMH): 16.7 % (ref 18–48)
RELATIVE MONOCYTE (SMH): 10.2 % (ref 4–15)
RELATIVE NEUTROPHIL (SMH): 61.6 % (ref 38–73)
SARS-COV-2 RDRP RESP QL NAA+PROBE: NEGATIVE
SODIUM SERPL-SCNC: 140 MMOL/L (ref 136–145)
SP GR UR STRIP: 1.01
T4 FREE SERPL-MCNC: 1.06 NG/DL (ref 0.71–1.51)
TROPONIN HIGH SENSITIVE (SMH): 26.3 PG/ML
TROPONIN HIGH SENSITIVE (SMH): 26.4 PG/ML
TROPONIN HIGH SENSITIVE (SMH): 26.8 PG/ML
TSH SERPL-ACNC: 4.42 UIU/ML (ref 0.34–5.6)
UROBILINOGEN UR STRIP-ACNC: ABNORMAL EU/DL
WBC # BLD AUTO: 8.33 K/UL (ref 3.9–12.7)
WBC #/AREA URNS AUTO: 1 /HPF

## 2025-05-12 PROCEDURE — 84484 ASSAY OF TROPONIN QUANT: CPT | Mod: 91

## 2025-05-12 PROCEDURE — 63600175 PHARM REV CODE 636 W HCPCS

## 2025-05-12 PROCEDURE — 80307 DRUG TEST PRSMV CHEM ANLYZR: CPT

## 2025-05-12 PROCEDURE — 83036 HEMOGLOBIN GLYCOSYLATED A1C: CPT

## 2025-05-12 PROCEDURE — 93306 TTE W/DOPPLER COMPLETE: CPT | Mod: 26,,, | Performed by: INTERNAL MEDICINE

## 2025-05-12 PROCEDURE — 84450 TRANSFERASE (AST) (SGOT): CPT

## 2025-05-12 PROCEDURE — 94761 N-INVAS EAR/PLS OXIMETRY MLT: CPT

## 2025-05-12 PROCEDURE — 81003 URINALYSIS AUTO W/O SCOPE: CPT

## 2025-05-12 PROCEDURE — 83605 ASSAY OF LACTIC ACID: CPT

## 2025-05-12 PROCEDURE — 99223 1ST HOSP IP/OBS HIGH 75: CPT | Mod: ,,, | Performed by: STUDENT IN AN ORGANIZED HEALTH CARE EDUCATION/TRAINING PROGRAM

## 2025-05-12 PROCEDURE — 93005 ELECTROCARDIOGRAM TRACING: CPT | Performed by: INTERNAL MEDICINE

## 2025-05-12 PROCEDURE — 63600175 PHARM REV CODE 636 W HCPCS: Performed by: FAMILY MEDICINE

## 2025-05-12 PROCEDURE — 63600175 PHARM REV CODE 636 W HCPCS: Performed by: STUDENT IN AN ORGANIZED HEALTH CARE EDUCATION/TRAINING PROGRAM

## 2025-05-12 PROCEDURE — G0378 HOSPITAL OBSERVATION PER HR: HCPCS

## 2025-05-12 PROCEDURE — 25000003 PHARM REV CODE 250: Performed by: STUDENT IN AN ORGANIZED HEALTH CARE EDUCATION/TRAINING PROGRAM

## 2025-05-12 PROCEDURE — 85379 FIBRIN DEGRADATION QUANT: CPT

## 2025-05-12 PROCEDURE — 93010 ELECTROCARDIOGRAM REPORT: CPT | Mod: ,,, | Performed by: INTERNAL MEDICINE

## 2025-05-12 PROCEDURE — 96372 THER/PROPH/DIAG INJ SC/IM: CPT

## 2025-05-12 PROCEDURE — 85025 COMPLETE CBC W/AUTO DIFF WBC: CPT

## 2025-05-12 PROCEDURE — 25000003 PHARM REV CODE 250: Performed by: FAMILY MEDICINE

## 2025-05-12 PROCEDURE — 82077 ASSAY SPEC XCP UR&BREATH IA: CPT

## 2025-05-12 PROCEDURE — 99222 1ST HOSP IP/OBS MODERATE 55: CPT | Mod: ,,, | Performed by: FAMILY MEDICINE

## 2025-05-12 PROCEDURE — 83735 ASSAY OF MAGNESIUM: CPT

## 2025-05-12 PROCEDURE — U0002 COVID-19 LAB TEST NON-CDC: HCPCS

## 2025-05-12 PROCEDURE — 87186 SC STD MICRODIL/AGAR DIL: CPT

## 2025-05-12 PROCEDURE — 36415 COLL VENOUS BLD VENIPUNCTURE: CPT

## 2025-05-12 PROCEDURE — 84145 PROCALCITONIN (PCT): CPT

## 2025-05-12 PROCEDURE — 84443 ASSAY THYROID STIM HORMONE: CPT

## 2025-05-12 PROCEDURE — 84439 ASSAY OF FREE THYROXINE: CPT

## 2025-05-12 PROCEDURE — 93306 TTE W/DOPPLER COMPLETE: CPT

## 2025-05-12 PROCEDURE — 87040 BLOOD CULTURE FOR BACTERIA: CPT

## 2025-05-12 PROCEDURE — 87502 INFLUENZA DNA AMP PROBE: CPT

## 2025-05-12 PROCEDURE — 99214 OFFICE O/P EST MOD 30 MIN: CPT | Mod: AF,HB,95,GC | Performed by: PSYCHIATRY & NEUROLOGY

## 2025-05-12 RX ORDER — GLUCAGON 1 MG
1 KIT INJECTION
Status: DISCONTINUED | OUTPATIENT
Start: 2025-05-12 | End: 2025-05-27 | Stop reason: HOSPADM

## 2025-05-12 RX ORDER — IBUPROFEN 200 MG
24 TABLET ORAL
Status: DISCONTINUED | OUTPATIENT
Start: 2025-05-12 | End: 2025-05-27 | Stop reason: HOSPADM

## 2025-05-12 RX ORDER — FUROSEMIDE 10 MG/ML
60 INJECTION INTRAMUSCULAR; INTRAVENOUS EVERY 12 HOURS
Status: DISCONTINUED | OUTPATIENT
Start: 2025-05-12 | End: 2025-05-16

## 2025-05-12 RX ORDER — BUPRENORPHINE AND NALOXONE 8; 2 MG/1; MG/1
2 FILM, SOLUBLE BUCCAL; SUBLINGUAL 2 TIMES DAILY
Status: DISCONTINUED | OUTPATIENT
Start: 2025-05-12 | End: 2025-05-27 | Stop reason: HOSPADM

## 2025-05-12 RX ORDER — FUROSEMIDE 10 MG/ML
60 INJECTION INTRAMUSCULAR; INTRAVENOUS EVERY 12 HOURS
Status: DISCONTINUED | OUTPATIENT
Start: 2025-05-12 | End: 2025-05-12

## 2025-05-12 RX ORDER — AMOXICILLIN 250 MG
1 CAPSULE ORAL 2 TIMES DAILY
Status: DISCONTINUED | OUTPATIENT
Start: 2025-05-12 | End: 2025-05-27 | Stop reason: HOSPADM

## 2025-05-12 RX ORDER — INSULIN ASPART 100 [IU]/ML
0-10 INJECTION, SOLUTION INTRAVENOUS; SUBCUTANEOUS
Status: DISCONTINUED | OUTPATIENT
Start: 2025-05-12 | End: 2025-05-27 | Stop reason: HOSPADM

## 2025-05-12 RX ORDER — IBUPROFEN 200 MG
16 TABLET ORAL
Status: DISCONTINUED | OUTPATIENT
Start: 2025-05-12 | End: 2025-05-27 | Stop reason: HOSPADM

## 2025-05-12 RX ORDER — INSULIN GLARGINE 100 [IU]/ML
10 INJECTION, SOLUTION SUBCUTANEOUS DAILY
Status: DISCONTINUED | OUTPATIENT
Start: 2025-05-12 | End: 2025-05-27 | Stop reason: HOSPADM

## 2025-05-12 RX ORDER — ACETAMINOPHEN 10 MG/ML
1000 INJECTION, SOLUTION INTRAVENOUS EVERY 8 HOURS
Status: DISPENSED | OUTPATIENT
Start: 2025-05-12 | End: 2025-05-13

## 2025-05-12 RX ADMIN — SODIUM CHLORIDE 4.5 G: 9 INJECTION, SOLUTION INTRAVENOUS at 05:05

## 2025-05-12 RX ADMIN — BUPRENORPHINE AND NALOXONE 2 FILM: 8; 2 FILM BUCCAL; SUBLINGUAL at 08:05

## 2025-05-12 RX ADMIN — VANCOMYCIN HYDROCHLORIDE 2000 MG: 2 INJECTION, POWDER, LYOPHILIZED, FOR SOLUTION INTRAVENOUS at 12:05

## 2025-05-12 RX ADMIN — KETOROLAC TROMETHAMINE 15 MG: 30 INJECTION, SOLUTION INTRAMUSCULAR; INTRAVENOUS at 03:05

## 2025-05-12 RX ADMIN — FUROSEMIDE 60 MG: 10 INJECTION, SOLUTION INTRAMUSCULAR; INTRAVENOUS at 03:05

## 2025-05-12 RX ADMIN — FUROSEMIDE 60 MG: 10 INJECTION, SOLUTION INTRAMUSCULAR; INTRAVENOUS at 08:05

## 2025-05-12 RX ADMIN — ENOXAPARIN SODIUM 40 MG: 40 INJECTION SUBCUTANEOUS at 05:05

## 2025-05-12 RX ADMIN — ACETAMINOPHEN 1000 MG: 10 INJECTION INTRAVENOUS at 09:05

## 2025-05-12 RX ADMIN — FUROSEMIDE 40 MG: 10 INJECTION, SOLUTION INTRAMUSCULAR; INTRAVENOUS at 09:05

## 2025-05-12 RX ADMIN — POTASSIUM BICARBONATE 50 MEQ: 978 TABLET, EFFERVESCENT ORAL at 01:05

## 2025-05-12 RX ADMIN — BUPRENORPHINE AND NALOXONE 2 FILM: 8; 2 FILM BUCCAL; SUBLINGUAL at 09:05

## 2025-05-12 NOTE — CONSULTS
05/12/25 1102        Wound 01/09/25 2239 Pressure Injury Buttocks   Date First Assessed/Time First Assessed: 01/09/25 2239   Present on Original Admission: Yes  Primary Wound Type: Pressure Injury  Location: Buttocks   Wound Image    Pressure Injury Stage 2   Dressing Appearance Open to air   Drainage Amount None   Appearance Red;Pink;Maroon;Moist   Tissue loss description Partial thickness   Periwound Area Denuded;Redness;Moist   Care Cleansed with:;Antimicrobial agent;Sterile normal saline   Dressing   (Dr Hernandez here will order Triad)   Right gluteal fold stage 1.6x1.5x0.2cm pale pink applied Triad   05/12/25 1104        Wound 01/09/25 1500 Incontinence associated dermatitis Buttocks   Date First Assessed/Time First Assessed: 01/09/25 1500   Present on Original Admission: Yes  Primary Wound Type: Incontinence associated dermatitis  Location: Buttocks   Dressing Appearance Open to air   Drainage Amount None   Appearance Pink;Red;Maroon   Periwound Area Denuded        Wound 01/09/25 Pressure Injury lower;anterior;posterior;lateral Scrotum   Date First Assessed: 01/09/25   Present on Original Admission: Yes  Primary Wound Type: (c) Pressure Injury  Orientation: lower;anterior;posterior;lateral  Location: Scrotum   Drainage Amount None   Appearance Red   Periwound Area Swelling     Right groin pink moist odorous, scrotal edema, generalized edema    Left groin pink moist, denuded odorous      05/12/25 1104        Wound 05/12/25 1104 Ulceration Right Leg   Date First Assessed/Time First Assessed: 05/12/25 1104   Present on Original Admission: Yes  Primary Wound Type: Ulceration  Side: Right  Location: Leg   Wound Image     Dressing Appearance Open to air   Drainage Amount Small   Drainage Characteristics/Odor Purulent   Appearance   (thick crusty dried skin, stained with feces, unable to remove at this time.  cleaned and dried and applied Triad to aid in debridment and loosing of dead skin)   Periwound Area  Swelling;Redness   Complete skin assessment with Dr Hernandez

## 2025-05-12 NOTE — CONSULTS
49-year-old male  With severe lower extremity edema  And scrotal swelling  Considerable swelling of the foreskin    Nursing staff attempted to place a Weber and could not  Upon examination the glans penis is several cm proximal to the very swollen penile foreskin    I did attempt to probe for the urethra with a small coude catheter, with no success  The patient did not tolerate this well at all    He does state that he is urinating well on his own  And has no other symptoms at this point  Patient will be undergoing diuresis    At this point we could let him void on his own  If Weber catheterization becomes absolutely necessary, I would consider another attempt in the operating room under sedation

## 2025-05-12 NOTE — SUBJECTIVE & OBJECTIVE
"Past Medical History:   Diagnosis Date    Diabetes mellitus     Hypertension        Past Surgical History:   Procedure Laterality Date    SKIN GRAFT         Review of patient's allergies indicates:  No Known Allergies    No current facility-administered medications on file prior to encounter.     Current Outpatient Medications on File Prior to Encounter   Medication Sig    buprenorphine-naloxone 8-2 mg (SUBOXONE) 8-2 mg Place 2 Film under the tongue 2 (two) times a day.    isosorbide dinitrate (ISORDIL) 20 MG tablet Take 1 tablet (20 mg total) by mouth 3 (three) times daily.    tamsulosin (FLOMAX) 0.4 mg Cap Take 1 capsule (0.4 mg total) by mouth once daily.    torsemide (DEMADEX) 20 MG Tab Take 1 tablet (20 mg total) by mouth 2 (two) times a day.    insulin aspart U-100 (NOVOLOG) 100 unit/mL (3 mL) InPn pen Inject 0-10 Units into the skin before meals and at bedtime as needed (Hyperglycemia). **MODERATE CORRECTION DOSE**  Blood Glucose  mg/dL                  Pre-meal                2200  151-200                2 units                    1 unit  201-250                4 units                    2 units  251-300                6 units                    3 units  301-350                8 units                    4 units  >350                     10 units                  5 units  Administer subcutaneously if needed at times designated by monitoring  schedule.  DO NOT HOLD correction dose insulin for patients who are  NPO.    "HIGH ALERT MEDICATION" - Administer with meals or TF/TPN. (Patient not taking: Reported on 2/12/2025)    [DISCONTINUED] aspirin (ECOTRIN) 81 MG EC tablet Take 1 tablet (81 mg total) by mouth once daily.    [DISCONTINUED] atorvastatin (LIPITOR) 80 MG tablet Take 1 tablet (80 mg total) by mouth once daily.    [DISCONTINUED] baclofen (LIORESAL) 10 MG tablet Take 1 tablet (10 mg total) by mouth 3 (three) times daily.    [DISCONTINUED] carvedilol (COREG) 6.25 MG tablet Take 1 tablet (6.25 mg total) " by mouth 2 (two) times daily.    [DISCONTINUED] gabapentin (NEURONTIN) 300 MG capsule Take 2 capsules (600 mg total) by mouth 3 (three) times daily.    [DISCONTINUED] LORazepam (ATIVAN) 0.5 MG tablet Take 1 tablet (0.5 mg total) by mouth every 8 (eight) hours as needed for Anxiety (tremors).    [DISCONTINUED] nicotine (NICODERM CQ) 14 mg/24 hr Place 1 patch onto the skin once daily. (Patient not taking: Reported on 2/12/2025)     Family History    None       Tobacco Use    Smoking status: Every Day     Current packs/day: 1.00     Types: Cigarettes    Smokeless tobacco: Not on file   Substance and Sexual Activity    Alcohol use: Yes    Drug use: No    Sexual activity: Not on file     Review of Systems   Constitutional:  Negative for chills and fever.   HENT:  Negative for congestion and sore throat.    Eyes:  Negative for visual disturbance.   Respiratory:  Positive for shortness of breath.    Cardiovascular:  Positive for leg swelling. Negative for chest pain.   Gastrointestinal:  Positive for abdominal distention and abdominal pain. Negative for constipation, diarrhea, nausea and vomiting.   Genitourinary:  Positive for decreased urine volume, difficulty urinating, penile swelling and scrotal swelling.   Skin:  Positive for wound.   Neurological:  Positive for weakness. Negative for syncope.   Psychiatric/Behavioral:  Negative for confusion.      Objective:     Vital Signs (Most Recent):  Temp: 98.7 °F (37.1 °C) (05/11/25 1954)  Pulse: 91 (05/11/25 2125)  Resp: 16 (05/11/25 2125)  BP: (!) 178/92 (05/11/25 2029)  SpO2: 100 % (05/11/25 2125) Vital Signs (24h Range):  Temp:  [98.7 °F (37.1 °C)] 98.7 °F (37.1 °C)  Pulse:  [] 91  Resp:  [15-17] 16  SpO2:  [95 %-100 %] 100 %  BP: (168-178)/(82-92) 178/92     Weight: 97.5 kg (215 lb)  Body mass index is 30.85 kg/m².     Physical Exam  Vitals reviewed.   Constitutional:       General: He is not in acute distress.     Appearance: He is ill-appearing.   HENT:       "Head: Normocephalic and atraumatic.      Nose: Nose normal.      Mouth/Throat:      Mouth: Mucous membranes are moist.   Eyes:      Conjunctiva/sclera: Conjunctivae normal.   Cardiovascular:      Rate and Rhythm: Normal rate and regular rhythm.   Pulmonary:      Effort: Pulmonary effort is normal. No respiratory distress.      Breath sounds: Decreased breath sounds present.   Abdominal:      Palpations: Abdomen is soft.      Tenderness: There is no abdominal tenderness.      Comments: Edema   Musculoskeletal:         General: Normal range of motion.      Cervical back: Normal range of motion.      Right lower leg: Edema present.      Left lower leg: Edema present.      Comments: R BKA, L AKA.   Skin:     General: Skin is warm.      Comments: Wound with purulent drainage to right stump.   Neurological:      Mental Status: He is alert and oriented to person, place, and time.   Psychiatric:         Mood and Affect: Mood normal.                Significant Labs: All pertinent labs within the past 24 hours have been reviewed.  CBC:   Recent Labs   Lab 05/11/25 2106   WBC 8.13   HGB 9.5*   HCT 31.2*        CMP:   Recent Labs   Lab 05/11/25 2106      K 3.1*      CO2 29   *   BUN 23*   CREATININE 0.9   CALCIUM 7.9*   PROT 5.5*   ALBUMIN 2.4*   BILITOT 0.5   ALKPHOS 145*   AST 9*   ALT 5*   ANIONGAP 9     Cardiac Markers:   Recent Labs   Lab 05/11/25 2106   BNP 1,998*     Troponin: No results for input(s): "TROPONINI", "TROPONINIHS" in the last 48 hours.  TSH:   Recent Labs   Lab 01/25/25  0616   TSH 4.161     Urine Studies: No results for input(s): "COLORU", "APPEARANCEUA", "PHUR", "SPECGRAV", "PROTEINUA", "GLUCUA", "KETONESU", "BILIRUBINUA", "OCCULTUA", "NITRITE", "UROBILINOGEN", "LEUKOCYTESUR", "RBCUA", "WBCUA", "BACTERIA", "SQUAMEPITHEL", "HYALINECASTS" in the last 48 hours.    Invalid input(s): "WRIGHTSUR"    Significant Imaging: I have reviewed all pertinent imaging results/findings within " the past 24 hours.

## 2025-05-12 NOTE — ASSESSMENT & PLAN NOTE
Patient's blood pressure range in the last 24 hours was: BP  Min: 168/82  Max: 178/92.The patient's inpatient anti-hypertensive regimen is listed below:  Current Antihypertensives  furosemide injection 40 mg, Every 12 hours, Intravenous    Plan  - BP is controlled, no changes needed to their regimen
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
## 2025-05-12 NOTE — PROGRESS NOTES
"Pharmacokinetic Initial Assessment: IV Vancomycin    Assessment/Plan:    Initiate intravenous vancomycin with loading dose of 2000 mg once followed by a maintenance dose of vancomycin 1750 mg IV every 12 hours  Desired empiric serum trough concentration is 10 to 15 mcg/mL  Draw vancomycin trough level 60 min prior to fourth dose on 05/13 at approximately 1200  Pharmacy will continue to follow and monitor vancomycin.      Please contact pharmacy at extension [phone] with any questions regarding this assessment.     Thank you for the consult,   Russell Ruiz       Patient brief summary:  Gideon Ross is a 49 y.o. male initiated on antimicrobial therapy with IV Vancomycin for treatment of suspected skin & soft tissue infection    Drug Allergies:   Review of patient's allergies indicates:  No Known Allergies    Actual Body Weight:   97.5 kg    Renal Function:   Estimated Creatinine Clearance: 118.8 mL/min (based on SCr of 0.9 mg/dL).,     CBC (last 72 hours):  Recent Labs   Lab Result Units 05/11/25  2106   WBC K/uL 8.13   Hgb gm/dL 9.5*   Hct % 31.2*   Platelet Count K/uL 219   Mono % % 9.7   Eos % % 9.7*   Basophil % % 0.7       Metabolic Panel (last 72 hours):  Recent Labs   Lab Result Units 05/11/25  2106   Sodium mmol/L 140   Potassium mmol/L 3.1*   Chloride mmol/L 102   CO2 mmol/L 29   Glucose mg/dL 191*   BUN mg/dL 23*   Creatinine mg/dL 0.9   Albumin g/dL 2.4*   Bilirubin Total mg/dL 0.5   ALP unit/L 145*   AST unit/L 9*   ALT unit/L 5*       Drug levels (last 3 results):  No results for input(s): "VANCOMYCINRA", "VANCORANDOM", "VANCOMYCINPE", "VANCOPEAK", "VANCOMYCINTR", "VANCOTROUGH" in the last 72 hours.    Microbiologic Results:  Microbiology Results (last 7 days)       Procedure Component Value Units Date/Time    Influenza A & B by Molecular [[id number]]  (Normal) Collected: 05/12/25 0040    Order Status: Completed Specimen: Nasal Swab Updated: 05/12/25 0150     INFLUENZA A MOLECULAR Negative     INFLUENZA B " MOLECULAR  Negative    Aerobic culture [[id number]] Collected: 05/12/25 0040    Order Status: Sent Specimen: Wound from Leg, Right Updated: 05/12/25 0111    Blood culture [[id number]] Collected: 05/12/25 0004    Order Status: Sent Specimen: Blood Updated: 05/12/25 0044    Blood culture [[id number]] Collected: 05/11/25 2331    Order Status: Sent Specimen: Blood Updated: 05/11/25 2342

## 2025-05-12 NOTE — ASSESSMENT & PLAN NOTE
S/p R BKA and L AKA  Patient's FSGs are uncontrolled due to hyperglycemia on current medication regimen.  Last A1c reviewed-   Lab Results   Component Value Date    HGBA1C 7.5 (H) 01/10/2025     Most recent fingerstick glucose reviewed-   Recent Labs   Lab 05/12/25  0338   POCTGLUCOSE 170*     Current correctional scale  Low  Maintain anti-hyperglycemic dose as follows-   Antihyperglycemics (From admission, onward)      Start     Stop Route Frequency Ordered    05/12/25 1445  insulin glargine U-100 (Lantus) pen 10 Units         -- SubQ Daily 05/12/25 1332    05/12/25 0943  insulin aspart U-100 pen 0-10 Units         -- SubQ Before meals & nightly PRN 05/12/25 0843

## 2025-05-12 NOTE — ASSESSMENT & PLAN NOTE
Procalcitonin, lactic acid ESR, CRP, blood cx's  Wound cx  R knee XR  Vancomycin and Zosyn  Turn patient q2h  Consult wound care

## 2025-05-12 NOTE — ASSESSMENT & PLAN NOTE
"S/p R BKA and L AKA  Patient's FSGs are uncontrolled due to hyperglycemia on current medication regimen.  Last A1c reviewed-   Lab Results   Component Value Date    HGBA1C 7.5 (H) 01/10/2025     Most recent fingerstick glucose reviewed- No results for input(s): "POCTGLUCOSE" in the last 24 hours.  Current correctional scale  Low  Maintain anti-hyperglycemic dose as follows-   Antihyperglycemics (From admission, onward)      Start     Stop Route Frequency Ordered    05/12/25 0021  insulin aspart U-100 pen 0-5 Units         -- SubQ Every 6 hours PRN 05/11/25 2322          Hold Oral hypoglycemics while patient is in the hospital.  A1c  "
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
## 2025-05-12 NOTE — ASSESSMENT & PLAN NOTE
Patient's blood pressure range in the last 24 hours was: BP  Min: 147/96  Max: 178/92.The patient's inpatient anti-hypertensive regimen is listed below:  Current Antihypertensives  furosemide injection 60 mg, Every 12 hours, Intravenous    Plan  -will make adjustment as needed

## 2025-05-12 NOTE — PLAN OF CARE
[facility]  Initial Discharge Assessment       Primary Care Provider: Maria Del Carmen Garcia FNP-C    Admission Diagnosis: Acute exacerbation of CHF (congestive heart failure) [I50.9]    Admission Date: 5/11/2025  Expected Discharge Date: 5/15/2025    SW met with patient bedside. SW verified demographics, insurance, supports, and PCP. Patient reported he has an appointment to establish a PCP on 5/27/2025 with Maria Del Carmen Garcia. Patient reported he lives in the home with grandmother and uses Medicaid transportation bus for appointments. SW assessed patient's needs. Patient is unable to complete ADLs independently. Patient verified at home DME: wheelchair.  Patient verified No HH, No Dialysis, No Blood Thinners, and No Oxygen. Patient reported he is trying to get into an assisted living facility due to the lack of help at home.    Patient verified pharmacy of choice: [facility]  Patient confirmed Medicaid transportation will be source of transportation at the time of discharge.     Transition of Care Barriers: None    Payor: MEDICAID / Plan: [facility]) / Product Type: Managed Medicaid /     Extended Emergency Contact Information  Primary Emergency Contact: Jolanta Ross   [place]  Mobile Phone: [phone]  Relation: Mother    Discharge Plan A: Home with family  Discharge Plan B: Home with family      [facility] - [place] - [address]  [address]  [facility] [address]  Phone: [phone] Fax: [phone]    [facility]  [address]  [facility] [address]  Phone: [phone] Fax: [phone]    [facility] - [place] - [address]  [address]  [place] [address]  Phone: [phone] Fax: [phone]      Initial Assessment (most recent)       Adult Discharge Assessment - 05/12/25 1355          Discharge Assessment    Assessment Type Discharge Planning Assessment      Confirmed/corrected address, phone number and insurance Yes     Confirmed Demographics Correct on Facesheet     Source of Information patient     Communicated KATHERINE with patient/caregiver Date not available/Unable to determine     Reason For Admission Acute systolic congestive heart failure     People in Home grandparent(s)     Do you expect to return to your current living situation? No   Patient reported he is trying to go to an Assisted Living facility.    Do you have help at home or someone to help you manage your care at home? No     Prior to hospitilization cognitive status: Unable to Assess     Current cognitive status: Alert/Oriented     Walking or Climbing Stairs Difficulty yes     Walking or Climbing Stairs ambulation difficulty, requires equipment;transferring difficulty, assistance 1 person     Mobility Management wheelchair     Dressing/Bathing Difficulty yes     Dressing/Bathing bathing difficulty, assistance 1 person;dressing difficulty, assistance 1 person     Home Accessibility wheelchair accessible     Home Layout Able to live on 1st floor     Equipment Currently Used at Home wheelchair     Readmission within 30 days? No     Patient currently being followed by outpatient case management? No     Do you currently have service(s) that help you manage your care at home? No     Do you take prescription medications? Yes     Do you have prescription coverage? Yes     Coverage Medicaid     Do you have any problems affording any of your prescribed medications? No     Is the patient taking medications as prescribed? yes     Who is going to help you get home at discharge? Medicaid transportation     How do you get to doctors appointments? health plan transportation     Are you on dialysis? No     Do you take coumadin? No     Discharge Plan A Home with family     Discharge Plan B Home with family     DME Needed Upon Discharge  none     Discharge Plan discussed with: Patient     Transition of Care Barriers  None

## 2025-05-12 NOTE — PT/OT/SLP PROGRESS
Physical Therapy      Patient Name:  Gideon Ross   MRN:  [id number]    Patient not seen today secondary to  (On hold per nurse). Will follow-up 5/13/2025.
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
## 2025-05-12 NOTE — PT/OT/SLP PROGRESS
Occupational Therapy      Patient Name:  Gideon Ross   MRN:  [id number]    Patient not seen today secondary to Testing/imaging (xray/CT/MRI). Will follow-up I'm pm if time permits otherwise 5/13/2025.    5/12/2025
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
## 2025-05-12 NOTE — ED PROVIDER NOTES
"Encounter Date: 5/11/2025       History     Chief Complaint   Patient presents with    Shortness of Breath     Weakness getting worse X 1 week. EMS patient stated "I feel like I am drowning when I lay down" 97% on RA. EMS placed 4 L NC for comfort     Abdominal Pain     Ongoing times a few days      HPI  Gideon Ross is a 49 y.o. M w PMHx HFrEF (25-30%, 01/09/2025) HTN, T2 DM, PID status post bilateral leg amputations (above and below-knee) presenting for 1 week of increasing peripheral edema, generalized weakness, shortness of breath.     Admission 01/19/2026 for acute systolic heart failure requiring Lasix GTT.  Reportedly discharged on LifeVest.  Patient reports he has not followed up with Cardiology since discharge.  Reports taking torsemide, Isordil, tamsulosin since discharge (has these medications at bedside).  Taking Suboxone, however ran out recently.    Reports increasing peripheral edema to bilateral extremities, abdomen, scrotum, and now to upper arms over the last week.  Increasing shortness of breath and orthopnea over same timeframe.  Endorses abdominal discomfort associated with increased fluid on his abdomen.  Due to degree of edema, unable to get up out of bed for multiple days and take care of himself.    Per EMS report, received  and nitroglycerin.  Hypertensive with SBP in 200s on their evaluation.    Review of patient's allergies indicates:  No Known Allergies  Past Medical History:   Diagnosis Date    Diabetes mellitus     Hypertension      Past Surgical History:   Procedure Laterality Date    SKIN GRAFT       No family history on file.  Social History[1]  Review of Systems   Constitutional:  Negative for chills and fever.   HENT:  Negative for congestion and rhinorrhea.    Respiratory:  Positive for shortness of breath. Negative for cough.    Cardiovascular:  Positive for leg swelling. Negative for chest pain and palpitations.   Gastrointestinal:  Positive for abdominal distention and " abdominal pain. Negative for diarrhea, nausea and vomiting.   Genitourinary:  Positive for scrotal swelling. Negative for dysuria and urgency.   Skin:  Positive for wound.   Neurological:  Positive for weakness (Generalized). Negative for syncope and light-headedness.   Psychiatric/Behavioral:  Negative for agitation and confusion.        Physical Exam     Initial Vitals [05/11/25 1954]   BP Pulse Resp Temp SpO2   (!) 168/82 106 17 98.7 °F (37.1 °C) 95 %      MAP       --         Physical Exam    Nursing note and vitals reviewed.  Constitutional: He appears well-developed and well-nourished.   HENT:   Head: Normocephalic and atraumatic.   Right Ear: External ear normal.   Left Ear: External ear normal. Mouth/Throat: Oropharynx is clear and moist.   Eyes: Conjunctivae are normal. Right eye exhibits no discharge. Left eye exhibits no discharge.   Cardiovascular:  Normal rate, regular rhythm, normal heart sounds and intact distal pulses.           Pulmonary/Chest: Breath sounds normal. No respiratory distress. He has no wheezes.   On 2L NC for comfort  Diminished lung sounds bilaterally, possibly secondary to body habitus   Abdominal: He exhibits distension. There is abdominal tenderness.   Generalized abdominal tenderness without rebound or guarding  Pitting edema to lower abdomen There is no rebound and no guarding.   Genitourinary:    Genitourinary Comments: Significant scrotal swelling     Musculoskeletal:      Comments: History of bilateral amputations, Right below-the-knee and Left above the knee  Pitting edema of the bilateral lower extremities up to pelvis         Neurological: He is alert and oriented to person, place, and time.   Skin: Skin is warm.   Venous stasis ulcers along R medial aspect of leg         ED Course   Procedures  Labs Reviewed   COMPREHENSIVE METABOLIC PANEL - Abnormal       Result Value    Sodium 140      Potassium 3.1 (*)     Chloride 102      CO2 29      Glucose 191 (*)     BUN 23 (*)      Creatinine 0.9      Calcium 7.9 (*)     Protein Total 5.5 (*)     Albumin 2.4 (*)     Bilirubin Total 0.5       (*)     AST 9 (*)     ALT 5 (*)     Anion Gap 9      eGFR >60     B-TYPE NATRIURETIC PEPTIDE - Abnormal    BNP 1,998 (*)    TROPONIN I HIGH SENSITIVITY - Abnormal    Troponin High Sensitive 24.2 (*)    CBC WITH DIFFERENTIAL - Abnormal    WBC 8.13      RBC 3.82 (*)     Hgb 9.5 (*)     Hct 31.2 (*)     MCV 82      MCH 24.9 (*)     MCHC 30.4 (*)     RDW 16.1 (*)     Platelet Count 219      MPV 10.2      Nucleated RBC 0      Neut % 66.6      Lymph % 12.9 (*)     Mono % 9.7      Eos % 9.7 (*)     Basophil % 0.7      Imm Grans % 0.4      Neut # 5.4      Lymph # 1.05      Mono # 0.79      Eos # 0.79 (*)     Baso # 0.06      Imm Grans # 0.03     CBC W/ AUTO DIFFERENTIAL    Narrative:     The following orders were created for panel order CBC Auto Differential.  Procedure                               Abnormality         Status                     ---------                               -----------         ------                     CBC with Differential[[id number]]       Abnormal            Final result                 Please view results for these tests on the individual orders.   HEPATITIS C ANTIBODY   HIV 1 / 2 ANTIBODY          Imaging Results              X-Ray Chest 1 View (Final result)  Result time 05/11/25 21:32:44      Final result by Doron Maynard MD (05/11/25 21:32:44)                   Impression:      As above      Electronically signed by: Doron Maynard  Date:    05/11/2025  Time:    21:32               Narrative:    EXAMINATION:  XR CHEST 1 VIEW    CLINICAL HISTORY:  shortness of breath;    TECHNIQUE:  Single frontal view of the chest was performed.    COMPARISON:  01/16/2025    FINDINGS:  Enlarged cardiac silhouette with pulmonary vascular congestion.  No focal consolidation.                                       Medications   potassium bicarbonate disintegrating tablet 50  mEq (has no administration in time range)   buprenorphine-naloxone 8-2 mg SL film 1 Film (1 Film Sublingual Given 5/11/25 2124)   furosemide injection 80 mg (80 mg Intravenous Given 5/11/25 2124)     Medical Decision Making  Problems Addressed:  Acute on chronic congestive heart failure, unspecified heart failure type: chronic illness or injury with exacerbation, progression, or side effects of treatment that poses a threat to life or bodily functions  Hypokalemia: acute illness or injury that poses a threat to life or bodily functions  SOB (shortness of breath): acute illness or injury that poses a threat to life or bodily functions    Amount and/or Complexity of Data Reviewed  Independent Historian: EMS  External Data Reviewed: labs, radiology, ECG and notes.  Labs: ordered.  Radiology: ordered and independent interpretation performed.  ECG/medicine tests: ordered and independent interpretation performed.    Risk  Prescription drug management.  Decision regarding hospitalization.    Gideon Ross is a 49 y.o. male w PMHx HFrEF (25-30%, 01/09/2025) HTN, T2 DM, PID status post bilateral leg amputations (above and below-knee) presenting for 1 week of increasing peripheral edema, generalized weakness, shortness of breath. Vitals notable for hypertension, mild tachycardia to low 100s, resolved upon rooming.  Arrived on 4 L NC from EMS, wean and saturating well on room air at rest. Desaturates to 90% with minimal exertion. Placed on 2L NC. Exam notable for significant pitting edema to bilateral lower extremities, scrotum, abdomen.   Differential includes but not limited to CHF exacerbation, ACS, pneumonia, anemia, acute electrolyte abnormalities, hypoglycemia, etc.     EKG (as interpreted by me):  Sinus tachycardia at 103 bpm, right bundle branch block (seen on priors), no STEMI, no acute changes from priors    Imaging:  CXR (per my interpretation)- cardiomegaly, vascular congestion concerning for volume  overload    Labs:  Stable anemia   No leukocytosis   Hypokalemia 3.1   No MARLENE   BNP elevated at 1998   Troponin elevated at 24.2    ED Management:  New oxygen requirement- desaturating to 90% with minimal exertion on room air  Exam and labs with volume overload, given Lasix to 80 mg IV  P.o. potassium for hypokalemia  Given home dose of Suboxone  Plan for admission for management of acute on chronic CHF with new O2 requirement. Patient amenable to plan.         Placido Alexander MD  Emergency Medicine PGY-4                 ED Course as of 05/11/25 2203   [place] May 11, 2025   1957 EMS report: Shortness of breath with leg swelling x 1 week. Hx of CHF.  Got aspirin 324 and nitro. Room air sat of 97%. Hypertensive with SBP in 200s. Placed on 4L NC by EMS. Symptomatically improved.  [IL]      ED Course User Index  [IL] Terrance Valdez MD                           Clinical Impression:  Final diagnoses:  [R06.02] SOB (shortness of breath) (Primary)  [I50.9] Acute on chronic congestive heart failure, unspecified heart failure type  [R60.0] Peripheral edema  [E87.6] Hypokalemia          ED Disposition Condition    Admit                     [1]   Social History  Tobacco Use    Smoking status: Every Day     Current packs/day: 1.00     Types: Cigarettes   Substance Use Topics    Alcohol use: Yes    Drug use: No        Placido Alexandre MD  Resident  05/11/25 2203

## 2025-05-12 NOTE — PT/OT/SLP PROGRESS
Physical Therapy      Patient Name:  Gideon Ross   MRN:  [id number]    Patient not seen today secondary to Testing/imaging (xray/CT/MRI). Will follow-up in PM.

## 2025-05-12 NOTE — CONSULTS
"[facility]  Adult Nutrition   Consult Note (Initial Assessment)     SUMMARY     Recommendations  Add Mario BID.   Vitamin C 500 mg BID.  MVI daily.  Zinc Sulfate 220 mg daily x 14 days.  Communication of RD Recs: reviewed with RN    Nutrition Goals:  PO intake will meet >75% of estimated needs by RD follow up.  Nutrition Goal Status: new    Nutrition Interventions: Protein Modified diet, Carbohydrate modified diet, Fat modified diet, Cholesterol modified diet, Fluid modified diet, and Vitamin and Mineral Supplement Therapy      Nutrition Diagnosis   Nutrition PES Problem: Increased nutrient needs  Nutrition PES Etiology: Wound healing  Nutrition PES Signs and Symptoms: Wounds  Nutrition PES Status: New      Dietitian Rounds Brief  48 yo M admitted with CHF. Reports abdominal pain. Fluid overloaded. Right buttock PU noted. Pmhx R BKA & L AKA. Pt reports wt shifts 2/2 fluid removal.     Nutrition Related Social Determinants of Health:   SDOH: None Identified    Malnutrition Assessment  Does not meet criteria at this time.     Diet order:   Current Diet Order: Consistent Carbohydrate 1500 Calories; Heart Healthy; Fluid - 1500 mL       Evaluation of Received Nutrient/Fluid Intake  Tolerance: tolerating     % Intake of Estimated Energy Needs: 75 - 100 %  % Meal Intake: 75 - 100 %      Intake/Output Summary (Last 24 hours) at 5/12/2025 1418  Last data filed at 5/12/2025 1155  Gross per 24 hour   Intake 601.86 ml   Output 400 ml   Net 201.86 ml        Anthropometrics  Height: 5' 10" (177.8 cm)  Height (inches): 70 in  Weight: 97.5 kg (214 lb 15.2 oz)  Weight (lb): 214.95 lb  Weight Method: Stated  Ideal Body Weight (IBW), Male: 166 lb  % Ideal Body Weight, Male (lb): 129.49 %  BMI (Calculated): 30.8  BMI Grade: 30 - 34.9- obesity - grade I       Estimated/Assessed Needs  Weight Used For Calorie Calculations: 97.5 kg (214 lb 15.2 oz)  Energy Calorie Requirements (kcal): 6429-6638 kcal daily  Energy Need " Method: Kcal/kg  Protein Requirements: 117-147 gm daily  Weight Used For Protein Calculations: 97.5 kg (214 lb 15.2 oz) (1.2-1.5 gm/kg)  Fluid Requirements (mL): 1 mL/kcal or per MD  Estimated Fluid Requirement Method: RDA Method  RDA Method (mL): 1950  CHO Requirement: 244 gm daily    Reason for Assessment  Reason For Assessment: consult (Fluid edu)  Diagnosis:  (acute systolic congestive heart failure)    Final diagnoses:  [R06.02] SOB (shortness of breath) (Primary)  [I50.9] Acute on chronic congestive heart failure, unspecified heart failure type  [R60.0] Peripheral edema  [E87.6] Hypokalemia  [I50.9] Acute exacerbation of CHF (congestive heart failure)     Past Medical History:   Diagnosis Date    Diabetes mellitus     Hypertension         Nutrition/Diet History  Spiritual, Cultural Beliefs, Hindu Practices, Values that Affect Care: no    Nutrition Risk Screen  MST Score: 0  Have you recently lost weight without trying?: No  Weight loss score: 0  Have you been eating poorly because of a decreased appetite?: No  Appetite score: 0       Weight History:  Wt Readings from Last 10 Encounters:   05/12/25 97.5 kg (214 lb 15.2 oz)   05/12/25 97.5 kg (214 lb 15.2 oz)   01/26/25 117.5 kg (259 lb 0.7 oz)   07/19/22 99.8 kg (220 lb)   02/09/20 104.3 kg (230 lb)   08/06/19 113.4 kg (250 lb)   02/27/19 108.9 kg (240 lb)   02/05/19 108.9 kg (240 lb 1.3 oz)   09/28/18 108.9 kg (240 lb)   06/03/18 106.6 kg (235 lb)        Lab/Procedures/Meds: Pertinent Labs/Meds Reviewed    Medications:Pertinent Medications Reviewed  Scheduled Meds:   buprenorphine-naloxone 8-2 mg  2 Film Sublingual BID    enoxparin  40 mg Subcutaneous Daily    furosemide (LASIX) injection  60 mg Intravenous Q12H    insulin glargine U-100  10 Units Subcutaneous Daily    senna-docusate  1 tablet Oral BID     Labs: Pertinent Labs Reviewed  Clinical Chemistry:  Recent Labs   Lab 05/11/25  2106 05/12/25  0501    140   K 3.1* 3.7    102   CO2 29 33*    * 147*   BUN 23* 24*   CREATININE 0.9 0.9   CALCIUM 7.9* 8.3*   PROT 5.5* 6.3   ALBUMIN 2.4* 2.7*   BILITOT 0.5 0.5   ALKPHOS 145* 148*   AST 9* 10   ALT 5* 6*   ANIONGAP 9 5*   MG  --  1.7     CBC:   Recent Labs   Lab 05/12/25  0501   WBC 8.33   RBC 4.12*   HGB 10.2*   HCT 34.0*      MCV 83   MCH 24.8*   MCHC 30.0*     Cardiac Profile:  Recent Labs   Lab 05/11/25  2106   BNP 1,998*     Inflammatory Labs:  Recent Labs   Lab 05/11/25  2337   CRP 2.00*     Diabetes:  Recent Labs   Lab 05/12/25  0338   POCTGLUCOSE 170*     Thyroid & Parathyroid:  Recent Labs   Lab 05/12/25  0040   TSH 4.416   FREET4 1.06       Monitor and Evaluation  Monitor and Evaluation: Energy intake, Diet order, Food and beverage intake, Protein intake, Carbohydrate intake, Inflammatory profile, Electrolyte and renal panel, Gastrointestinal profile     Discharge Planning  Nutrition Discharge Planning: Therapeutic diet (comments)  Therapeutic diet (comments): Consistent Carbohydrate 1500 Calories; Heart Healthy; Fluid - 1500 mL    Nutrition Risk  Level of Risk/Frequency of Follow-up:  (f/u weekly)     Nutrition Follow-Up  RD Follow-up?: Yes

## 2025-05-12 NOTE — PLAN OF CARE
Problem: Oral Intake Inadequate  Goal: Improved Oral Intake  Outcome: Progressing  Intervention: Promote and Optimize Oral Intake  Flowsheets (Taken 5/12/2025 1425)  Nutrition Interventions: supplemental drinks provided

## 2025-05-12 NOTE — SUBJECTIVE & OBJECTIVE
Interval History:  Patient said he feels slightly better however he is still very volume overloaded.  For daily updates refer to hospital course    Review of Systems   All other systems reviewed and are negative.    Objective:     Vital Signs (Most Recent):  Temp: 97.9 °F (36.6 °C) (05/12/25 1146)  Pulse: 87 (05/12/25 1155)  Resp: (!) 7 (05/12/25 0146)  BP: (!) 163/103 (05/12/25 1155)  SpO2: 100 % (05/12/25 1146) Vital Signs (24h Range):  Temp:  [97.9 °F (36.6 °C)-98.7 °F (37.1 °C)] 97.9 °F (36.6 °C)  Pulse:  [] 87  Resp:  [7-17] 7  SpO2:  [95 %-100 %] 100 %  BP: (147-178)/() 163/103     Weight: 97.5 kg (214 lb 15.2 oz)  Body mass index is 30.84 kg/m².    Intake/Output Summary (Last 24 hours) at 5/12/2025 1309  Last data filed at 5/12/2025 1155  Gross per 24 hour   Intake 601.86 ml   Output 400 ml   Net 201.86 ml         Physical Exam  Constitutional:       Comments: Anasarca   Cardiovascular:      Rate and Rhythm: Normal rate.      Heart sounds: Murmur heard.   Pulmonary:      Breath sounds: No rales.   Abdominal:      General: There is distension.      Tenderness: There is no abdominal tenderness.   Genitourinary:     Comments: Swollen scrotum likely from volume overload  Musculoskeletal:         General: Swelling present.      Comments: Right leg/stump open wound, pressure ulcer unstagable   Skin:     Comments: Wounds per imaging   Neurological:      Mental Status: He is oriented to person, place, and time.               Significant Labs: All pertinent labs within the past 24 hours have been reviewed.    Significant Imaging: I have reviewed all pertinent imaging results/findings within the past 24 hours.

## 2025-05-12 NOTE — CONSULTS
"Chief complaint:  Shortness of Breath (Weakness getting worse X 1 week. EMS patient stated "I feel like I am drowning when I lay down" 97% on RA. EMS placed 4 L NC for comfort ) and Abdominal Pain (Ongoing times a few days )      HPI:  Gideon Ross is a 49 y.o. male presenting with Bilateral buttocks MAD  Right buttock stage 2 pressure ulcer  Right leg/stump open wound, pressure ulcer unstagable  Scrotum swelling with MAD  Bilateral groin MASD with yeast. All wounds are POA. Pt is known to me from prior admits and was seen with the wound nurse today. No other complaints today    PMH:  As per HPI and below:  Past Medical History:   Diagnosis Date    Diabetes mellitus     Hypertension        Social History     Socioeconomic History    Marital status: Significant Other   Tobacco Use    Smoking status: Every Day     Current packs/day: 1.00     Types: Cigarettes   Substance and Sexual Activity    Alcohol use: Yes    Drug use: No     Social Drivers of Health     Financial Resource Strain: Low Risk  (4/21/2025)    Overall Financial Resource Strain (CARDIA)     Difficulty of Paying Living Expenses: Not very hard   Food Insecurity: Food Insecurity Present (4/21/2025)    Hunger Vital Sign     Worried About Running Out of Food in the Last Year: Sometimes true     Ran Out of Food in the Last Year: Sometimes true   Transportation Needs: Unmet Transportation Needs (4/21/2025)    PRAPARE - Transportation     Lack of Transportation (Medical): Yes     Lack of Transportation (Non-Medical): Yes   Physical Activity: Inactive (4/21/2025)    Exercise Vital Sign     Days of Exercise per Week: 0 days     Minutes of Exercise per Session: 10 min   Stress: No Stress Concern Present (4/21/2025)    [employer] - Occupational Stress Questionnaire     Feeling of Stress : Not at all   Housing Stability: Low Risk  (4/21/2025)    Housing Stability Vital Sign     Unable to Pay for Housing in the Last Year: No     Number " "of Times Moved in the Last Year: 0     Homeless in the Last Year: No       Past Surgical History:   Procedure Laterality Date    SKIN GRAFT         No family history on file.    Review of patient's allergies indicates:  No Known Allergies    Medications Ordered Prior to Encounter[1]    ROS: As per HPI and below:  Pertinent items are noted in HPI.      Physical Exam:     Vitals:    25 0034 25 0057 25 0104 25 0146   BP: (!) 162/92  (!) 147/96    Pulse: 85 84 82 85   Resp:    (!) 7   Temp:       TempSrc:       SpO2: 99% 99% 99% 100%   Weight:           BP  Min: 147/96  Max: 178/92  Temp  Av.7 °F (37.1 °C)  Min: 98.7 °F (37.1 °C)  Max: 98.7 °F (37.1 °C)  Pulse  Av.7  Min: 82  Max: 106  Resp  Av.2  Min: 7  Max: 17  SpO2  Av.9 %  Min: 95 %  Max: 100 %  Height  Av' 10" (177.8 cm)  Min: 5' 10" (177.8 cm)  Max: 5' 10" (177.8 cm)  Weight  Av.5 kg (214 lb 15.6 oz)  Min: 97.5 kg (214 lb 15.2 oz)  Max: 97.5 kg (215 lb)    Body mass index is 30.85 kg/m².          General:             Well developed, well nourished, no apparent distress  HEENT:              NCAT, no JVD, mucous membranes moist, EOM intact  Cardiovascular:  Regular rate and rhythm, normal S1, normal S2, No murmurs, rubs, or gallops  Respiratory:        Normal breath sounds, no wheezes, no rales, no rhonchi  Abdomen:           Bowel sounds present, non tender, non distended, no masses, no hepatojugular reflux  Extremities:        No clubbing, no cyanosis, no edema  Vascular:            2+ b/l radial.  Peripheral pulses intact.  No carotid bruits.  Neurological:      No focal deficits  Skin:                   No obvious rashes or erythema, Bilateral buttocks MAD  Right buttock stage 2 pressure ulcer  Right leg/stump open wound, pressure ulcer unstagable  Scrotum swelling with MAD  Bilateral groin MASD with yeast                                            Lab Results   Component Value Date    WBC 8.33 2025    " HGB 10.2 (L) 05/12/2025    HCT 34.0 (L) 05/12/2025    MCV 83 05/12/2025     05/12/2025     Lab Results   Component Value Date    CHOL 122 09/27/2019     Lab Results   Component Value Date    HDL 23 (L) 09/27/2019     Lab Results   Component Value Date    LDLCALC 70.2 09/27/2019     Lab Results   Component Value Date    TRIG 144 09/27/2019     Lab Results   Component Value Date    CHOLHDL 18.9 (L) 09/27/2019     CMP  Recent Labs   Lab 05/12/25  0501   *   CALCIUM 8.3*   ALBUMIN 2.7*   PROT 6.3      K 3.7   CO2 33*      BUN 24*   CREATININE 0.9   ALKPHOS 148*   ALT 6*   AST 10   BILITOT 0.5      Lab Results   Component Value Date    TSH 4.416 05/12/2025         Assessment and Recommendations       Diagnoses:    Bilateral buttocks MAD  Right buttock stage 2 pressure ulcer  Right leg/stump open wound, pressure ulcer unstagable  Scrotum swelling with MAD  Bilateral groin MASD with yeast    Plan:  Triad to the bilateral buttocks, scrotum and left leg daily  Miconazole to the bilateral groin daily    Complexity:    moderate         [1]   No current facility-administered medications on file prior to encounter.     Current Outpatient Medications on File Prior to Encounter   Medication Sig Dispense Refill    buprenorphine-naloxone 8-2 mg (SUBOXONE) 8-2 mg Place 2 Film under the tongue 2 (two) times a day.      isosorbide dinitrate (ISORDIL) 20 MG tablet Take 1 tablet (20 mg total) by mouth 3 (three) times daily. 90 tablet 11    tamsulosin (FLOMAX) 0.4 mg Cap Take 1 capsule (0.4 mg total) by mouth once daily. 30 capsule 11    torsemide (DEMADEX) 20 MG Tab Take 1 tablet (20 mg total) by mouth 2 (two) times a day. 60 tablet 11    insulin aspart U-100 (NOVOLOG) 100 unit/mL (3 mL) InPn pen Inject 0-10 Units into the skin before meals and at bedtime as needed (Hyperglycemia). **MODERATE CORRECTION DOSE**  Blood Glucose  mg/dL                  Pre-meal                2200  151-200                2 units      "               1 unit  201-250                4 units                    2 units  251-300                6 units                    3 units  301-350                8 units                    4 units  >350                     10 units                  5 units  Administer subcutaneously if needed at times designated by monitoring  schedule.  DO NOT HOLD correction dose insulin for patients who are  NPO.    "HIGH ALERT MEDICATION" - Administer with meals or TF/TPN. (Patient not taking: Reported on 2/12/2025) 1 each 0     "

## 2025-05-13 ENCOUNTER — TELEPHONE (OUTPATIENT)
Dept: PSYCHIATRY | Facility: CLINIC | Age: 50
End: 2025-05-13
Payer: MEDICAID

## 2025-05-13 DIAGNOSIS — F11.90 OPIOID USE DISORDER: Primary | ICD-10-CM

## 2025-05-13 LAB
ABSOLUTE EOSINOPHIL (SMH): 0.92 K/UL
ABSOLUTE MONOCYTE (SMH): 1.18 K/UL (ref 0.3–1)
ABSOLUTE NEUTROPHIL COUNT (SMH): 4.8 K/UL (ref 1.8–7.7)
ALBUMIN SERPL-MCNC: 2.7 G/DL (ref 3.5–5.2)
ALP SERPL-CCNC: 141 UNIT/L (ref 55–135)
ALT SERPL-CCNC: 6 UNIT/L (ref 10–44)
ANION GAP (SMH): 8 MMOL/L (ref 8–16)
AORTIC ROOT ANNULUS: 3 CM
AORTIC VALVE CUSP SEPERATION: 2.1 CM
APICAL FOUR CHAMBER EJECTION FRACTION: 31 %
AST SERPL-CCNC: 9 UNIT/L (ref 10–40)
AV INDEX (PROSTH): 0.7
AV MEAN GRADIENT: 2 MMHG
AV PEAK GRADIENT: 4 MMHG
AV VALVE AREA BY VELOCITY RATIO: 2.7 CM²
AV VALVE AREA: 2.6 CM²
AV VELOCITY RATIO: 0.7
BASOPHILS # BLD AUTO: 0.06 K/UL
BASOPHILS NFR BLD AUTO: 0.7 %
BILIRUB SERPL-MCNC: 0.5 MG/DL (ref 0.1–1)
BSA FOR ECHO PROCEDURE: 2.19 M2
BUN SERPL-MCNC: 29 MG/DL (ref 6–20)
CALCIUM SERPL-MCNC: 8.1 MG/DL (ref 8.7–10.5)
CHLORIDE SERPL-SCNC: 101 MMOL/L (ref 95–110)
CO2 SERPL-SCNC: 31 MMOL/L (ref 23–29)
CREAT SERPL-MCNC: 1.1 MG/DL (ref 0.5–1.4)
CV ECHO LV RWT: 0.32 CM
DOP CALC AO PEAK VEL: 1 M/S
DOP CALC AO VTI: 17.4 CM
DOP CALC LVOT AREA: 3.8 CM2
DOP CALC LVOT DIAMETER: 2.2 CM
DOP CALC LVOT PEAK VEL: 0.7 M/S
DOP CALC LVOT STROKE VOLUME: 46 CM3
DOP CALC MV VTI: 24.8 CM
DOP CALCLVOT PEAK VEL VTI: 12.1 CM
E WAVE DECELERATION TIME: 111 MSEC
E/A RATIO: 2.33
E/E' RATIO: 14 M/S
EAG (SMH): 163 MG/DL (ref 68–131)
ECHO LV POSTERIOR WALL: 1 CM (ref 0.6–1.1)
ERYTHROCYTE [DISTWIDTH] IN BLOOD BY AUTOMATED COUNT: 15.9 % (ref 11.5–14.5)
FRACTIONAL SHORTENING: 12.7 % (ref 28–44)
GFR SERPLBLD CREATININE-BSD FMLA CKD-EPI: >60 ML/MIN/1.73/M2
GLUCOSE SERPL-MCNC: 110 MG/DL (ref 70–110)
HBA1C MFR BLD: 7.3 % (ref 4.5–6.2)
HCT VFR BLD AUTO: 31.3 % (ref 40–54)
HGB BLD-MCNC: 9.5 GM/DL (ref 14–18)
IMM GRANULOCYTES # BLD AUTO: 0.03 K/UL (ref 0–0.04)
IMM GRANULOCYTES NFR BLD AUTO: 0.4 % (ref 0–0.5)
INTERVENTRICULAR SEPTUM: 1.1 CM (ref 0.6–1.1)
IVC DIAMETER: 2.77 CM
LEFT ATRIUM AREA SYSTOLIC (APICAL 2 CHAMBER): 16.3 CM2
LEFT ATRIUM AREA SYSTOLIC (APICAL 4 CHAMBER): 19.4 CM2
LEFT ATRIUM VOLUME INDEX MOD: 22 ML/M2
LEFT ATRIUM VOLUME MOD: 48 ML
LEFT INTERNAL DIMENSION IN SYSTOLE: 5.5 CM (ref 2.1–4)
LEFT VENTRICLE DIASTOLIC VOLUME INDEX: 92.09 ML/M2
LEFT VENTRICLE DIASTOLIC VOLUME: 198 ML
LEFT VENTRICLE END DIASTOLIC VOLUME APICAL 4 CHAMBER: 190 ML
LEFT VENTRICLE END SYSTOLIC VOLUME APICAL 2 CHAMBER: 40.9 ML
LEFT VENTRICLE END SYSTOLIC VOLUME APICAL 4 CHAMBER: 53.8 ML
LEFT VENTRICLE MASS INDEX: 132.9 G/M2
LEFT VENTRICLE SYSTOLIC VOLUME INDEX: 68.4 ML/M2
LEFT VENTRICLE SYSTOLIC VOLUME: 147 ML
LEFT VENTRICULAR INTERNAL DIMENSION IN DIASTOLE: 6.3 CM (ref 3.5–6)
LEFT VENTRICULAR MASS: 285.7 G
LV LATERAL E/E' RATIO: 10.3 M/S
LV SEPTAL E/E' RATIO: 23.3 M/S
LVED V (TEICH): 198 ML
LVES V (TEICH): 147 ML
LVOT MG: 1 MMHG
LVOT MV: 0.44 CM/S
LYMPHOCYTES # BLD AUTO: 1.51 K/UL (ref 1–4.8)
MAGNESIUM SERPL-MCNC: 1.8 MG/DL (ref 1.6–2.6)
MCH RBC QN AUTO: 25.1 PG (ref 27–31)
MCHC RBC AUTO-ENTMCNC: 30.4 G/DL (ref 32–36)
MCV RBC AUTO: 83 FL (ref 82–98)
MV MEAN GRADIENT: 2 MMHG
MV PEAK A VEL: 0.4 M/S
MV PEAK E VEL: 0.93 M/S
MV PEAK GRADIENT: 4 MMHG
MV VALVE AREA BY CONTINUITY EQUATION: 1.85 CM2
NUCLEATED RBC (/100WBC) (SMH): 0 /100 WBC
PISA TR MAX VEL: 3.5 M/S
PLATELET # BLD AUTO: 216 K/UL (ref 150–450)
PMV BLD AUTO: 10 FL (ref 9.2–12.9)
POCT GLUCOSE: 154 MG/DL (ref 70–110)
POCT GLUCOSE: 165 MG/DL (ref 70–110)
POTASSIUM SERPL-SCNC: 3.9 MMOL/L (ref 3.5–5.1)
PROT SERPL-MCNC: 6.3 GM/DL (ref 6–8.4)
PV MV: 0.55 M/S
PV PEAK GRADIENT: 3 MMHG
PV PEAK VELOCITY: 0.8 M/S
RA PRESSURE ESTIMATED: 15 MMHG
RBC # BLD AUTO: 3.79 M/UL (ref 4.6–6.2)
RELATIVE EOSINOPHIL (SMH): 10.9 % (ref 0–8)
RELATIVE LYMPHOCYTE (SMH): 17.9 % (ref 18–48)
RELATIVE MONOCYTE (SMH): 14 % (ref 4–15)
RELATIVE NEUTROPHIL (SMH): 56.1 % (ref 38–73)
RIGHT ATRIUM VOLUME AREA LENGTH APICAL 4 CHAMBER: 62 ML
RV TB RVSP: 19 MMHG
RV TISSUE DOPPLER FREE WALL SYSTOLIC VELOCITY 1 (APICAL 4 CHAMBER VIEW): 7.51 CM/S
SODIUM SERPL-SCNC: 140 MMOL/L (ref 136–145)
TDI LATERAL: 0.09 M/S
TDI SEPTAL: 0.04 M/S
TDI: 0.07 M/S
TR MAX PG: 48 MMHG
TV REST PULMONARY ARTERY PRESSURE: 64 MMHG
WBC # BLD AUTO: 8.45 K/UL (ref 3.9–12.7)
Z-SCORE OF LEFT VENTRICULAR DIMENSION IN END DIASTOLE: -1.02
Z-SCORE OF LEFT VENTRICULAR DIMENSION IN END SYSTOLE: 2.03

## 2025-05-13 PROCEDURE — 63600175 PHARM REV CODE 636 W HCPCS

## 2025-05-13 PROCEDURE — 25500020 PHARM REV CODE 255: Performed by: FAMILY MEDICINE

## 2025-05-13 PROCEDURE — 94761 N-INVAS EAR/PLS OXIMETRY MLT: CPT

## 2025-05-13 PROCEDURE — 25000003 PHARM REV CODE 250

## 2025-05-13 PROCEDURE — 83735 ASSAY OF MAGNESIUM: CPT

## 2025-05-13 PROCEDURE — 12000002 HC ACUTE/MED SURGE SEMI-PRIVATE ROOM

## 2025-05-13 PROCEDURE — 36415 COLL VENOUS BLD VENIPUNCTURE: CPT

## 2025-05-13 PROCEDURE — 25000003 PHARM REV CODE 250: Performed by: STUDENT IN AN ORGANIZED HEALTH CARE EDUCATION/TRAINING PROGRAM

## 2025-05-13 PROCEDURE — 11000001 HC ACUTE MED/SURG PRIVATE ROOM

## 2025-05-13 PROCEDURE — 85025 COMPLETE CBC W/AUTO DIFF WBC: CPT

## 2025-05-13 PROCEDURE — S4991 NICOTINE PATCH NONLEGEND: HCPCS | Performed by: STUDENT IN AN ORGANIZED HEALTH CARE EDUCATION/TRAINING PROGRAM

## 2025-05-13 PROCEDURE — 87522 HEPATITIS C REVRS TRNSCRPJ: CPT | Performed by: STUDENT IN AN ORGANIZED HEALTH CARE EDUCATION/TRAINING PROGRAM

## 2025-05-13 PROCEDURE — 27000221 HC OXYGEN, UP TO 24 HOURS

## 2025-05-13 PROCEDURE — 63600175 PHARM REV CODE 636 W HCPCS: Performed by: STUDENT IN AN ORGANIZED HEALTH CARE EDUCATION/TRAINING PROGRAM

## 2025-05-13 PROCEDURE — 99233 SBSQ HOSP IP/OBS HIGH 50: CPT | Mod: ,,, | Performed by: STUDENT IN AN ORGANIZED HEALTH CARE EDUCATION/TRAINING PROGRAM

## 2025-05-13 PROCEDURE — 63600175 PHARM REV CODE 636 W HCPCS: Performed by: FAMILY MEDICINE

## 2025-05-13 PROCEDURE — 36415 COLL VENOUS BLD VENIPUNCTURE: CPT | Performed by: STUDENT IN AN ORGANIZED HEALTH CARE EDUCATION/TRAINING PROGRAM

## 2025-05-13 PROCEDURE — 80053 COMPREHEN METABOLIC PANEL: CPT

## 2025-05-13 RX ORDER — BUPRENORPHINE AND NALOXONE 8; 2 MG/1; MG/1
2 FILM, SOLUBLE BUCCAL; SUBLINGUAL 2 TIMES DAILY
Qty: 120 FILM | Refills: 0 | Status: SHIPPED | OUTPATIENT
Start: 2025-05-13 | End: 2025-06-15

## 2025-05-13 RX ORDER — MORPHINE SULFATE 2 MG/ML
2 INJECTION, SOLUTION INTRAMUSCULAR; INTRAVENOUS EVERY 4 HOURS PRN
Status: DISCONTINUED | OUTPATIENT
Start: 2025-05-13 | End: 2025-05-27 | Stop reason: HOSPADM

## 2025-05-13 RX ORDER — IBUPROFEN 200 MG
1 TABLET ORAL DAILY
Status: DISCONTINUED | OUTPATIENT
Start: 2025-05-13 | End: 2025-05-27 | Stop reason: HOSPADM

## 2025-05-13 RX ADMIN — MORPHINE SULFATE 2 MG: 2 INJECTION, SOLUTION INTRAMUSCULAR; INTRAVENOUS at 06:05

## 2025-05-13 RX ADMIN — FUROSEMIDE 60 MG: 10 INJECTION, SOLUTION INTRAMUSCULAR; INTRAVENOUS at 08:05

## 2025-05-13 RX ADMIN — NICOTINE 1 PATCH: 21 PATCH, EXTENDED RELEASE TRANSDERMAL at 10:05

## 2025-05-13 RX ADMIN — ACETAMINOPHEN 650 MG: 325 TABLET ORAL at 05:05

## 2025-05-13 RX ADMIN — BUPRENORPHINE AND NALOXONE 2 FILM: 8; 2 FILM BUCCAL; SUBLINGUAL at 09:05

## 2025-05-13 RX ADMIN — BUPRENORPHINE AND NALOXONE 2 FILM: 8; 2 FILM BUCCAL; SUBLINGUAL at 08:05

## 2025-05-13 RX ADMIN — IOHEXOL 100 ML: 350 INJECTION, SOLUTION INTRAVENOUS at 11:05

## 2025-05-13 RX ADMIN — ENOXAPARIN SODIUM 40 MG: 40 INJECTION SUBCUTANEOUS at 05:05

## 2025-05-13 RX ADMIN — INSULIN ASPART 1 UNITS: 100 INJECTION, SOLUTION INTRAVENOUS; SUBCUTANEOUS at 10:05

## 2025-05-13 NOTE — PLAN OF CARE
Problem: Adult Inpatient Plan of Care  Goal: Plan of Care Review  Outcome: Ongoing  Goal: Patient-Specific Goal (Individualized)  Outcome: Ongoing  Goal: Absence of Hospital-Acquired Illness or Injury  Outcome: Ongoing  Goal: Optimal Comfort and Wellbeing  Outcome: Ongoing  Goal: Readiness for Transition of Care  Outcome: Ongoing

## 2025-05-13 NOTE — SUBJECTIVE & OBJECTIVE
Interval History:  Seen and examined at bedside during morning rounds, reports no new events overnight.  Reports improved condition however still short of breath at times especially when lying flat.  Wound Care following for right BKA nonhealing stump.  Noted D-dimer from the 11th of 1.14, follow up with a chest CTA ruled out PE.    Review of Systems   All other systems reviewed and are negative.    Objective:     Vital Signs (Most Recent):  Temp: 97.7 °F (36.5 °C) (05/13/25 0822)  Pulse: 82 (05/13/25 0822)  Resp: 16 (05/13/25 0822)  BP: (!) 157/95 (05/13/25 0822)  SpO2: 100 % (05/13/25 0822) Vital Signs (24h Range):  Temp:  [97.6 °F (36.4 °C)-97.9 °F (36.6 °C)] 97.7 °F (36.5 °C)  Pulse:  [77-93] 82  Resp:  [16-20] 16  SpO2:  [98 %-100 %] 100 %  BP: (136-163)/() 157/95     Weight: 97.5 kg (214 lb 15.2 oz)  Body mass index is 30.84 kg/m².    Intake/Output Summary (Last 24 hours) at 5/13/2025 1011  Last data filed at 5/12/2025 2329  Gross per 24 hour   Intake 1080 ml   Output 1050 ml   Net 30 ml         Physical Exam  Constitutional:       Comments: Anasarca   Cardiovascular:      Rate and Rhythm: Normal rate.      Heart sounds: Murmur heard.      Comments: JVD distention.  Pulmonary:      Breath sounds: No rales.   Abdominal:      General: There is distension.      Tenderness: There is no abdominal tenderness.   Genitourinary:     Comments: Swollen scrotum likely from volume overload  Musculoskeletal:         General: Swelling present.      Comments: Left AKA  Right BKA    Right leg/stump open wound, pressure ulcer unstagable   Skin:     Comments: Wounds per imaging   Neurological:      Mental Status: He is oriented to person, place, and time.               Significant Labs: All pertinent labs within the past 24 hours have been reviewed.    Significant Imaging: I have reviewed all pertinent imaging results/findings within the past 24 hours.

## 2025-05-13 NOTE — ASSESSMENT & PLAN NOTE
S/p R BKA and L AKA  Patient's FSGs are uncontrolled due to hyperglycemia on current medication regimen.  Last A1c reviewed-   Lab Results   Component Value Date    HGBA1C 7.4 (H) 05/12/2025     Most recent fingerstick glucose reviewed-   Recent Labs   Lab 05/12/25  1157 05/12/25  1554 05/12/25 2046   POCTGLUCOSE 145* 155* 184*     Current correctional scale  Low  Maintain anti-hyperglycemic dose as follows-   Antihyperglycemics (From admission, onward)      Start     Stop Route Frequency Ordered    05/12/25 1445  insulin glargine U-100 (Lantus) pen 10 Units         -- SubQ Daily 05/12/25 1332    05/12/25 0943  insulin aspart U-100 pen 0-10 Units         -- SubQ Before meals & nightly PRN 05/12/25 0843

## 2025-05-13 NOTE — PT/OT/SLP PROGRESS
Physical Therapy      Patient Name:  Gideon Ross   MRN:  [id number]    Patient not seen today secondary to Nurse/ KEYANNA hold (pain and swelling). Will follow-up 5/14/25.
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
## 2025-05-13 NOTE — TELEPHONE ENCOUNTER
Rec'd VM at 1350. Pt is requesting to speak with Dr. Langston. Pt is currently admitted to [facility].

## 2025-05-13 NOTE — PROGRESS NOTES
[facility]  Department of Cardiology  Progress Note      PATIENT NAME: Gideon Ross  MRN: [id number]  TODAY'S DATE: 05/13/2025  ADMIT DATE: 5/11/2025                          CONSULT REQUESTED BY: Lashay Palmer DO    SUBJECTIVE     PRINCIPAL PROBLEM: Acute systolic congestive heart failure      REASON FOR CONSULT:  Acute on chronic CHF, life vest patient      INTERVAL HISTORY:  5/13/25  Hgb 9.5; Cr 1.1; + net balance; BP elevated;  Pt still has orthopnea; he feels his hands are less swollen today    HPI:  Pt is 49-year-old male w/ PMH HFrEF, DM, HTN, DM, cardiomyopathy, R BKA, left AKA, and substance dependence who presented to ED with c/o shortness of breath, swelling, and orthopnea for past week.    Pt has been receiving IV lasix since admission and is reportedly breathing better today but remains orthopneic, and swelling to upper legs, scrotum and abdomen still present. Still on 4 LPM O2    Review of patient's allergies indicates:  No Known Allergies    Past Medical History:   Diagnosis Date    Diabetes mellitus     Hypertension      Past Surgical History:   Procedure Laterality Date    SKIN GRAFT       Social History[1]     REVIEW OF SYSTEMS  Negative except as mentioned in HPI    OBJECTIVE     VITAL SIGNS (Most Recent)  Temp: 97.7 °F (36.5 °C) (05/13/25 0822)  Pulse: 82 (05/13/25 0822)  Resp: 16 (05/13/25 0822)  BP: (!) 157/95 (05/13/25 0822)  SpO2: 100 % (05/13/25 0822)    VENTILATION STATUS  Resp: 16 (05/13/25 0822)  SpO2: 100 % (05/13/25 0822)           I & O (Last 24H):  Intake/Output Summary (Last 24 hours) at 5/13/2025 0931  Last data filed at 5/12/2025 2329  Gross per 24 hour   Intake 1080 ml   Output 1050 ml   Net 30 ml       WEIGHTS  Wt Readings from Last 3 Encounters:   05/12/25 1239 97.5 kg (214 lb 15.2 oz)   05/11/25 1954 97.5 kg (215 lb)   05/12/25 1048 97.5 kg (214 lb 15.2 oz)   01/26/25 0338 117.5 kg (259 lb 0.7 oz)   01/21/25 0515 117.9 kg (260 lb)   01/15/25 0400 126.1 kg (278 lb)    01/14/25 0400 127.1 kg (280 lb 3.2 oz)   01/09/25 2239 115.4 kg (254 lb 6.4 oz)   01/09/25 0824 108.9 kg (240 lb)       PHYSICAL EXAM    GENERAL: chronically ill middle age male breathing comfortably w/ HOB elevated  HEENT: Normocephalic.    NECK: No JVD.   CARDIAC: Regular rate and rhythm. S1 is normal.S2 is normal.No gallops, clicks or murmurs noted at this time.  CHEST ANATOMY: normal.   LUNGS: O2 via NC, no dyspnea or cough; crackles audible at bases  ABDOMEN: ascites, distended.  +scrotal edema    EXTREMITIES: edematous; right BKA edematous with wound, Left AKA edematous  CENTRAL NERVOUS SYSTEM: AAO x 3  SKIN: No rash     HOME MEDICATIONS:Medications Ordered Prior to Encounter[2]    SCHEDULED MEDS:   acetaminophen  1,000 mg Intravenous Q8H    buprenorphine-naloxone 8-2 mg  2 Film Sublingual BID    enoxparin  40 mg Subcutaneous Daily    furosemide (LASIX) injection  60 mg Intravenous Q12H    insulin glargine U-100  10 Units Subcutaneous Daily    senna-docusate  1 tablet Oral BID       CONTINUOUS INFUSIONS:    PRN MEDS:  Current Facility-Administered Medications:     acetaminophen, 650 mg, Oral, Q6H PRN    bisacodyL, 10 mg, Rectal, Daily PRN    dextrose 50%, 12.5 g, Intravenous, PRN    dextrose 50%, 25 g, Intravenous, PRN    glucagon (human recombinant), 1 mg, Intramuscular, PRN    glucose, 16 g, Oral, PRN    glucose, 24 g, Oral, PRN    insulin aspart U-100, 0-10 Units, Subcutaneous, QID (AC + HS) PRN    melatonin, 6 mg, Oral, Nightly PRN    promethazine, 12.5 mg, Rectal, Q6H PRN    sodium chloride 0.9%, 10 mL, Intravenous, PRN    LABS AND DIAGNOSTICS     CBC LAST 3 DAYS  Recent Labs   Lab 05/11/25  2106 05/12/25  0501 05/13/25  0523   WBC 8.13 8.33 8.45   RBC 3.82* 4.12* 3.79*   HGB 9.5* 10.2* 9.5*   HCT 31.2* 34.0* 31.3*   MCV 82 83 83   MCH 24.9* 24.8* 25.1*   MCHC 30.4* 30.0* 30.4*   RDW 16.1* 16.0* 15.9*    225 216   MPV 10.2 10.0 10.0   NRBC 0 0 0       COAGULATION LAST 3 DAYS  No results for  "input(s): "LABPT", "INR", "APTT" in the last 168 hours.    CHEMISTRY LAST 3 DAYS  Recent Labs   Lab 05/11/25  2106 05/12/25  0501 05/13/25  0523    140 140   K 3.1* 3.7 3.9    102 101   CO2 29 33* 31*   ANIONGAP 9 5* 8   BUN 23* 24* 29*   CREATININE 0.9 0.9 1.1   * 147* 110   CALCIUM 7.9* 8.3* 8.1*   MG  --  1.7 1.8   ALBUMIN 2.4* 2.7* 2.7*   PROT 5.5* 6.3 6.3   ALKPHOS 145* 148* 141*   ALT 5* 6* 6*   AST 9* 10 9*   BILITOT 0.5 0.5 0.5       CARDIAC PROFILE LAST 3 DAYS  Recent Labs   Lab 05/11/25 2106   BNP 1,998*       ENDOCRINE LAST 3 DAYS  Recent Labs   Lab 05/12/25  0040   TSH 4.416       LAST ARTERIAL BLOOD GAS  ABG  No results for input(s): "PH", "PO2", "PCO2", "HCO3", "BE" in the last 168 hours.    LAST 7 DAYS MICROBIOLOGY   Microbiology Results (last 7 days)       Procedure Component Value Units Date/Time    Aerobic culture [[id number]]  (Abnormal) Collected: 05/12/25 0040    Order Status: Completed Specimen: Wound from Leg, Right Updated: 05/13/25 0754     CULTURE, AEROBIC Moderate Staphylococcus aureus    Blood culture [[id number]]  (Normal) Collected: 05/12/25 0004    Order Status: Completed Specimen: Blood Updated: 05/13/25 0105     CULTURE, BLOOD ([facility]) No Growth After 24 Hours    Blood culture [[id number]]  (Normal) Collected: 05/11/25 2331    Order Status: Completed Specimen: Blood Updated: 05/13/25 0011     CULTURE, BLOOD ([facility]) No Growth After 24 Hours    Influenza A & B by Molecular [[id number]]  (Normal) Collected: 05/12/25 0040    Order Status: Completed Specimen: Nasal Swab Updated: 05/12/25 0150     INFLUENZA A MOLECULAR Negative     INFLUENZA B MOLECULAR  Negative            MOST RECENT IMAGING  X-Ray Knee 1 or 2 View Right  Narrative: EXAMINATION:  XR KNEE 1 OR 2 VIEW RIGHT    CLINICAL HISTORY:  pain;stump Wound;    TECHNIQUE:  AP and lateral views of the right knee were performed.    COMPARISON:  None    FINDINGS:  Osteopenia.  Postsurgical changes of below-knee amputation. "  Question slight erosive irregularity at the resection margins.  Soft tissue stump with soft tissue defect evident.  Impression: As above.  Recommend follow-up MRI with and without contrast    Electronically signed by: Doron Maynard  Date:    05/12/2025  Time:    00:31  US Scrotum And Testicles  Narrative: EXAMINATION:  US SCROTUM AND TESTICLES    CLINICAL HISTORY:  Scrotal swelling;    TECHNIQUE:  Sonography of the scrotum and testes.    COMPARISON:  None.    FINDINGS:  Right Testicle:    *Size: 4.2 x 2.6 x 3.0 cm  *Appearance: Normal.  *Flow: Normal arterial and venous flow  *Epididymis: Normal.  *Hydrocele: Small.  *Varicocele: None.  .    Left Testicle:    *Size: 3.1 x 2.3 x 2.4 cm  *Appearance: Normal.  *Flow: Normal arterial and venous flow  *Epididymis: Normal.  *Hydrocele: Small.  *Varicocele: None.  .    Other findings: Bilateral severe scrotal edema.  Impression: No acute testicular abnormality.    Small bilateral hydroceles.    Bilateral severe scrotal edema.    Electronically signed by: Janes Tracey MD  Date:    05/12/2025  Time:    00:27      ECHOCARDIOGRAM RESULTS (last 5)  Results for orders placed during the hospital encounter of 01/09/25    Echo    Interpretation Summary    Left Ventricle: The left ventricle is moderately dilated. Mildly increased wall thickness. There is mild asymmetric hypertrophy. Severe global hypokinesis present. There is severely reduced systolic function with a visually estimated ejection fraction of 25 - 30%.    Right Ventricle: Moderate right ventricular enlargement. Systolic function is mildly reduced.    Left Atrium: Left atrium is mildly dilated.    Tricuspid Valve: There is mild regurgitation.    IVC/SVC: Elevated venous pressure at 15 mmHg.      CURRENT/PREVIOUS VISIT EKG  Results for orders placed or performed during the hospital encounter of 05/11/25   EKG 12-lead    Collection Time: 05/12/25  4:05 AM   Result Value Ref Range    QRS Duration 152 ms    OHS  QTC Calculation 462 ms    Narrative    Test Reason : R06.02,    Vent. Rate :  82 BPM     Atrial Rate :  82 BPM     P-R Int : 112 ms          QRS Dur : 152 ms      QT Int : 396 ms       P-R-T Axes :  48 202  50 degrees    QTcB Int : 462 ms    Normal sinus rhythm  Right bundle branch block  Inferior infarct ,age undetermined  Abnormal ECG  No previous ECGs available    Referred By: AAAREFERRAL SELF           Confirmed By:            ASSESSMENT/PLAN:     Active Hospital Problems    Diagnosis    *Acute systolic congestive heart failure    HTN (hypertension)    Prolonged QT interval    Hypoalbuminemia    Non-healing wound of amputation stump    Opioid use disorder    Diabetes mellitus    Scrotal edema       ASSESSMENT & PLAN:   Acute on chronic HFrEF  Cardiomyopathy  Substance abuse  Right BKA wound  Left AKA    RECOMMENDATIONS:  BNP 1998, + pulmonary vascular congestion on Xray  Troponin slight elevation x 3 w/ flat trend, 2/2 HF exacerbation  Continue IV diuresis with lasix, obtain strict I/O, daily weight; low Na diet, 1.5 L fluid restriction  2D Echocardiogram completed today, await results  Plan unchanged, will obtain ischemic evaluation after recovers from HF, tentatively later this week      Denice Martinez NP  [facility]  Department of Cardiology  Date of Service: 05/13/2025               [1]   Social History  Tobacco Use    Smoking status: Every Day     Current packs/day: 1.00     Types: Cigarettes   Substance Use Topics    Alcohol use: Yes    Drug use: No   [2]   No current facility-administered medications on file prior to encounter.     Current Outpatient Medications on File Prior to Encounter   Medication Sig Dispense Refill    buprenorphine-naloxone 8-2 mg (SUBOXONE) 8-2 mg Place 2 Film under the tongue 2 (two) times a day.      isosorbide dinitrate (ISORDIL) 20 MG tablet Take 1 tablet (20 mg total) by mouth 3 (three) times daily. 90 tablet 11    tamsulosin (FLOMAX) 0.4 mg Cap Take 1  "capsule (0.4 mg total) by mouth once daily. 30 capsule 11    torsemide (DEMADEX) 20 MG Tab Take 1 tablet (20 mg total) by mouth 2 (two) times a day. 60 tablet 11    insulin aspart U-100 (NOVOLOG) 100 unit/mL (3 mL) InPn pen Inject 0-10 Units into the skin before meals and at bedtime as needed (Hyperglycemia). **MODERATE CORRECTION DOSE**  Blood Glucose  mg/dL                  Pre-meal                2200  151-200                2 units                    1 unit  201-250                4 units                    2 units  251-300                6 units                    3 units  301-350                8 units                    4 units  >350                     10 units                  5 units  Administer subcutaneously if needed at times designated by monitoring  schedule.  DO NOT HOLD correction dose insulin for patients who are  NPO.    "HIGH ALERT MEDICATION" - Administer with meals or TF/TPN. (Patient not taking: Reported on 2/12/2025) 1 each 0     "

## 2025-05-13 NOTE — ASSESSMENT & PLAN NOTE
Wound care is following   MRI ordered per recommendation on Xray.  We will continue when he is able to tolerate this

## 2025-05-13 NOTE — ASSESSMENT & PLAN NOTE
Patient's blood pressure range in the last 24 hours was: BP  Min: 136/76  Max: 163/103.The patient's inpatient anti-hypertensive regimen is listed below:  Current Antihypertensives  furosemide injection 60 mg, Every 12 hours, Intravenous    Plan  -will make adjustment as needed

## 2025-05-13 NOTE — ASSESSMENT & PLAN NOTE
Likely from volume overload  Continue Lasix and urology consulted to help with Weber placement, unsuccessful however he is urinating fine.
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
## 2025-05-13 NOTE — PT/OT/SLP PROGRESS
Occupational Therapy      Patient Name:  Gideon Ross   MRN:  [id number]    Patient not seen today secondary to Nurse/ KEYANNA hold (pain and swelling). Will follow-up next service date.    5/13/2025

## 2025-05-13 NOTE — CONSULTS
cleaned and dried and applied Triad to stage 2 ulcer right gluteal, bilateral buttock and swollen scrotum. Bilateral groin cleaned and dried well applied miconazole powder        Right leg open ulcer medial leg 4x1.8x0.2cm pale red, moist no purulent drainage today. Periwound dry patches, scarring above dry skin along scarring.  Distal healed incision skin is very dry and crusty.  Cleaned and dried and applied this layer of Triad. Generalized edema.  Complete skin assessment.

## 2025-05-14 ENCOUNTER — CLINICAL SUPPORT (OUTPATIENT)
Dept: SMOKING CESSATION | Facility: CLINIC | Age: 50
End: 2025-05-14
Payer: MEDICAID

## 2025-05-14 DIAGNOSIS — F17.200 NICOTINE DEPENDENCE: Primary | ICD-10-CM

## 2025-05-14 PROBLEM — L89.312 PRESSURE ULCER OF RIGHT BUTTOCK, STAGE 2: Status: ACTIVE | Noted: 2025-05-14

## 2025-05-14 LAB
ABSOLUTE EOSINOPHIL (SMH): 0.97 K/UL
ABSOLUTE MONOCYTE (SMH): 1.06 K/UL (ref 0.3–1)
ABSOLUTE NEUTROPHIL COUNT (SMH): 5.4 K/UL (ref 1.8–7.7)
ALBUMIN SERPL-MCNC: 2.8 G/DL (ref 3.5–5.2)
ALP SERPL-CCNC: 144 UNIT/L (ref 55–135)
ALT SERPL-CCNC: 6 UNIT/L (ref 10–44)
ANION GAP (SMH): 8 MMOL/L (ref 8–16)
AST SERPL-CCNC: 11 UNIT/L (ref 10–40)
BACTERIA SPEC AEROBE CULT: ABNORMAL
BASOPHILS # BLD AUTO: 0.05 K/UL
BASOPHILS NFR BLD AUTO: 0.6 %
BILIRUB SERPL-MCNC: 0.5 MG/DL (ref 0.1–1)
BUN SERPL-MCNC: 34 MG/DL (ref 6–20)
C-REACTIVE PROTEIN (SMH): 2.2 MG/DL
CALCIUM SERPL-MCNC: 8.4 MG/DL (ref 8.7–10.5)
CHLORIDE SERPL-SCNC: 99 MMOL/L (ref 95–110)
CO2 SERPL-SCNC: 31 MMOL/L (ref 23–29)
CREAT SERPL-MCNC: 1.2 MG/DL (ref 0.5–1.4)
ERYTHROCYTE [DISTWIDTH] IN BLOOD BY AUTOMATED COUNT: 16.2 % (ref 11.5–14.5)
GFR SERPLBLD CREATININE-BSD FMLA CKD-EPI: >60 ML/MIN/1.73/M2
GLUCOSE SERPL-MCNC: 105 MG/DL (ref 70–110)
HCT VFR BLD AUTO: 32.2 % (ref 40–54)
HCV RNA SERPL NAA+PROBE-ACNC: 9040 IU/ML
HCV RNA SERPL NAA+PROBE-LOG IU: 3.96 LOG10 IU/ML
HGB BLD-MCNC: 9.5 GM/DL (ref 14–18)
IMM GRANULOCYTES # BLD AUTO: 0.03 K/UL (ref 0–0.04)
IMM GRANULOCYTES NFR BLD AUTO: 0.3 % (ref 0–0.5)
LYMPHOCYTES # BLD AUTO: 1.23 K/UL (ref 1–4.8)
MAGNESIUM SERPL-MCNC: 1.9 MG/DL (ref 1.6–2.6)
MCH RBC QN AUTO: 24.5 PG (ref 27–31)
MCHC RBC AUTO-ENTMCNC: 29.5 G/DL (ref 32–36)
MCV RBC AUTO: 83 FL (ref 82–98)
NUCLEATED RBC (/100WBC) (SMH): 0 /100 WBC
PLATELET # BLD AUTO: 221 K/UL (ref 150–450)
PMV BLD AUTO: 10.1 FL (ref 9.2–12.9)
POCT GLUCOSE: 100 MG/DL (ref 70–110)
POCT GLUCOSE: 114 MG/DL (ref 70–110)
POCT GLUCOSE: 116 MG/DL (ref 70–110)
POCT GLUCOSE: 123 MG/DL (ref 70–110)
POTASSIUM SERPL-SCNC: 4.3 MMOL/L (ref 3.5–5.1)
PROCALCITONIN SERPL-MCNC: 0.1 NG/ML
PROT SERPL-MCNC: 6.4 GM/DL (ref 6–8.4)
RBC # BLD AUTO: 3.87 M/UL (ref 4.6–6.2)
REF LAB TEST REF RANGE: NORMAL
RELATIVE EOSINOPHIL (SMH): 11 % (ref 0–8)
RELATIVE LYMPHOCYTE (SMH): 14 % (ref 18–48)
RELATIVE MONOCYTE (SMH): 12.1 % (ref 4–15)
RELATIVE NEUTROPHIL (SMH): 62 % (ref 38–73)
SODIUM SERPL-SCNC: 138 MMOL/L (ref 136–145)
WBC # BLD AUTO: 8.78 K/UL (ref 3.9–12.7)

## 2025-05-14 PROCEDURE — 99223 1ST HOSP IP/OBS HIGH 75: CPT | Mod: ,,, | Performed by: NURSE PRACTITIONER

## 2025-05-14 PROCEDURE — 83735 ASSAY OF MAGNESIUM: CPT

## 2025-05-14 PROCEDURE — 94761 N-INVAS EAR/PLS OXIMETRY MLT: CPT

## 2025-05-14 PROCEDURE — 12000002 HC ACUTE/MED SURGE SEMI-PRIVATE ROOM

## 2025-05-14 PROCEDURE — 63600175 PHARM REV CODE 636 W HCPCS: Performed by: NURSE PRACTITIONER

## 2025-05-14 PROCEDURE — 63600175 PHARM REV CODE 636 W HCPCS

## 2025-05-14 PROCEDURE — 36415 COLL VENOUS BLD VENIPUNCTURE: CPT

## 2025-05-14 PROCEDURE — 99233 SBSQ HOSP IP/OBS HIGH 50: CPT | Mod: ,,, | Performed by: STUDENT IN AN ORGANIZED HEALTH CARE EDUCATION/TRAINING PROGRAM

## 2025-05-14 PROCEDURE — 11000001 HC ACUTE MED/SURG PRIVATE ROOM

## 2025-05-14 PROCEDURE — 27000221 HC OXYGEN, UP TO 24 HOURS

## 2025-05-14 PROCEDURE — 99900035 HC TECH TIME PER 15 MIN (STAT)

## 2025-05-14 PROCEDURE — 63600175 PHARM REV CODE 636 W HCPCS: Performed by: STUDENT IN AN ORGANIZED HEALTH CARE EDUCATION/TRAINING PROGRAM

## 2025-05-14 PROCEDURE — 82040 ASSAY OF SERUM ALBUMIN: CPT

## 2025-05-14 PROCEDURE — 25000003 PHARM REV CODE 250: Performed by: STUDENT IN AN ORGANIZED HEALTH CARE EDUCATION/TRAINING PROGRAM

## 2025-05-14 PROCEDURE — 99406 BEHAV CHNG SMOKING 3-10 MIN: CPT | Performed by: CLINIC/CENTER

## 2025-05-14 PROCEDURE — 84145 PROCALCITONIN (PCT): CPT | Performed by: NURSE PRACTITIONER

## 2025-05-14 PROCEDURE — 94799 UNLISTED PULMONARY SVC/PX: CPT

## 2025-05-14 PROCEDURE — S4991 NICOTINE PATCH NONLEGEND: HCPCS | Performed by: STUDENT IN AN ORGANIZED HEALTH CARE EDUCATION/TRAINING PROGRAM

## 2025-05-14 PROCEDURE — 36415 COLL VENOUS BLD VENIPUNCTURE: CPT | Performed by: NURSE PRACTITIONER

## 2025-05-14 PROCEDURE — 97530 THERAPEUTIC ACTIVITIES: CPT

## 2025-05-14 PROCEDURE — 85025 COMPLETE CBC W/AUTO DIFF WBC: CPT

## 2025-05-14 PROCEDURE — 25000003 PHARM REV CODE 250: Performed by: FAMILY MEDICINE

## 2025-05-14 PROCEDURE — 25000003 PHARM REV CODE 250: Performed by: NURSE PRACTITIONER

## 2025-05-14 PROCEDURE — 63600175 PHARM REV CODE 636 W HCPCS: Performed by: FAMILY MEDICINE

## 2025-05-14 PROCEDURE — 99900031 HC PATIENT EDUCATION (STAT)

## 2025-05-14 PROCEDURE — 97166 OT EVAL MOD COMPLEX 45 MIN: CPT

## 2025-05-14 PROCEDURE — 86140 C-REACTIVE PROTEIN: CPT | Performed by: NURSE PRACTITIONER

## 2025-05-14 RX ORDER — IPRATROPIUM BROMIDE AND ALBUTEROL SULFATE 2.5; .5 MG/3ML; MG/3ML
3 SOLUTION RESPIRATORY (INHALATION) EVERY 6 HOURS PRN
Status: DISCONTINUED | OUTPATIENT
Start: 2025-05-14 | End: 2025-05-27 | Stop reason: HOSPADM

## 2025-05-14 RX ORDER — CHLORHEXIDINE GLUCONATE ORAL RINSE 1.2 MG/ML
15 SOLUTION DENTAL 2 TIMES DAILY
Status: DISCONTINUED | OUTPATIENT
Start: 2025-05-14 | End: 2025-05-27 | Stop reason: HOSPADM

## 2025-05-14 RX ORDER — CEFAZOLIN 2 G/1
2 INJECTION, POWDER, FOR SOLUTION INTRAMUSCULAR; INTRAVENOUS
Status: DISCONTINUED | OUTPATIENT
Start: 2025-05-14 | End: 2025-05-27 | Stop reason: HOSPADM

## 2025-05-14 RX ORDER — CEFAZOLIN SODIUM 1 G/3ML
1 INJECTION, POWDER, FOR SOLUTION INTRAMUSCULAR; INTRAVENOUS
Status: DISCONTINUED | OUTPATIENT
Start: 2025-05-14 | End: 2025-05-14

## 2025-05-14 RX ORDER — CEFAZOLIN 2 G/1
2 INJECTION, POWDER, FOR SOLUTION INTRAMUSCULAR; INTRAVENOUS
Status: DISCONTINUED | OUTPATIENT
Start: 2025-05-14 | End: 2025-05-14

## 2025-05-14 RX ADMIN — BUPRENORPHINE AND NALOXONE 2 FILM: 8; 2 FILM BUCCAL; SUBLINGUAL at 09:05

## 2025-05-14 RX ADMIN — MORPHINE SULFATE 2 MG: 2 INJECTION, SOLUTION INTRAMUSCULAR; INTRAVENOUS at 06:05

## 2025-05-14 RX ADMIN — SENNOSIDES AND DOCUSATE SODIUM 1 TABLET: 50; 8.6 TABLET ORAL at 08:05

## 2025-05-14 RX ADMIN — CEFAZOLIN 2 G: 2 INJECTION, POWDER, FOR SOLUTION INTRAMUSCULAR; INTRAVENOUS at 06:05

## 2025-05-14 RX ADMIN — MORPHINE SULFATE 2 MG: 2 INJECTION, SOLUTION INTRAMUSCULAR; INTRAVENOUS at 02:05

## 2025-05-14 RX ADMIN — ENOXAPARIN SODIUM 40 MG: 40 INJECTION SUBCUTANEOUS at 06:05

## 2025-05-14 RX ADMIN — INSULIN GLARGINE 10 UNITS: 100 INJECTION, SOLUTION SUBCUTANEOUS at 08:05

## 2025-05-14 RX ADMIN — CEFAZOLIN 2 G: 2 INJECTION, POWDER, FOR SOLUTION INTRAMUSCULAR; INTRAVENOUS at 11:05

## 2025-05-14 RX ADMIN — MORPHINE SULFATE 2 MG: 2 INJECTION, SOLUTION INTRAMUSCULAR; INTRAVENOUS at 08:05

## 2025-05-14 RX ADMIN — FUROSEMIDE 60 MG: 10 INJECTION, SOLUTION INTRAMUSCULAR; INTRAVENOUS at 08:05

## 2025-05-14 RX ADMIN — NICOTINE 1 PATCH: 21 PATCH, EXTENDED RELEASE TRANSDERMAL at 09:05

## 2025-05-14 RX ADMIN — SENNOSIDES AND DOCUSATE SODIUM 1 TABLET: 50; 8.6 TABLET ORAL at 09:05

## 2025-05-14 RX ADMIN — BUPRENORPHINE AND NALOXONE 2 FILM: 8; 2 FILM BUCCAL; SUBLINGUAL at 08:05

## 2025-05-14 RX ADMIN — CHLORHEXIDINE GLUCONATE 0.12% ORAL RINSE 15 ML: 1.2 LIQUID ORAL at 09:05

## 2025-05-14 NOTE — PROGRESS NOTES
Formerly [facility]  Department of Cardiology  Progress Note      PATIENT NAME: Gideon Ross  MRN: [id number]  TODAY'S DATE: 05/14/2025  ADMIT DATE: 5/11/2025                          CONSULT REQUESTED BY: Pee Bolton MD    SUBJECTIVE     PRINCIPAL PROBLEM: Acute systolic congestive heart failure      REASON FOR CONSULT:  Acute on chronic CHF, life vest patient      INTERVAL HISTORY:  5/14/25  Cr 1.2; difficulty collecting accurate output  Remains edematous and orthopneic  No events noted on telemetry    5/13/25  Hgb 9.5; Cr 1.1; + net balance; BP elevated;  Pt still has orthopnea; he feels his hands are less swollen today    HPI:  Pt is 49-year-old male w/ PMH HFrEF, DM, HTN, DM, cardiomyopathy, R BKA, left AKA, and substance dependence who presented to ED with c/o shortness of breath, swelling, and orthopnea for past week.    Pt has been receiving IV lasix since admission and is reportedly breathing better today but remains orthopneic, and swelling to upper legs, scrotum and abdomen still present. Still on 4 LPM O2    Review of patient's allergies indicates:  No Known Allergies    Past Medical History:   Diagnosis Date    Diabetes mellitus     Hypertension      Past Surgical History:   Procedure Laterality Date    SKIN GRAFT       Social History[1]     REVIEW OF SYSTEMS  Negative except as mentioned in HPI    OBJECTIVE     VITAL SIGNS (Most Recent)  Temp: 97.7 °F (36.5 °C) (05/14/25 0716)  Pulse: 87 (05/14/25 0716)  Resp: 18 (05/14/25 0839)  BP: (!) 147/87 (05/14/25 0716)  SpO2: 100 % (05/14/25 0746)    VENTILATION STATUS  Resp: 18 (05/14/25 0839)  SpO2: 100 % (05/14/25 0746)           I & O (Last 24H):  Intake/Output Summary (Last 24 hours) at 5/14/2025 1218  Last data filed at 5/14/2025 0948  Gross per 24 hour   Intake 480 ml   Output 350 ml   Net 130 ml       WEIGHTS  Wt Readings from Last 3 Encounters:   05/14/25 1045 (!) 138.4 kg (305 lb 1.9 oz)   05/12/25 1239 97.5 kg (214 lb 15.2 oz)   05/11/25  1954 97.5 kg (215 lb)   05/12/25 1048 97.5 kg (214 lb 15.2 oz)   01/26/25 0338 117.5 kg (259 lb 0.7 oz)   01/21/25 0515 117.9 kg (260 lb)   01/15/25 0400 126.1 kg (278 lb)   01/14/25 0400 127.1 kg (280 lb 3.2 oz)   01/09/25 2239 115.4 kg (254 lb 6.4 oz)   01/09/25 0824 108.9 kg (240 lb)       PHYSICAL EXAM    GENERAL: chronically ill middle age male breathing comfortably w/ HOB elevated  HEENT: Normocephalic.    NECK: No JVD.   CARDIAC: Regular rate and rhythm. S1 is normal.S2 is normal.No gallops, clicks or murmurs noted at this time.  CHEST ANATOMY: normal.   LUNGS: O2 via NC, no dyspnea or cough; crackles audible at bases  ABDOMEN: ascites, distended.  +scrotal edema    EXTREMITIES: edematous; right BKA edematous with wound, Left AKA edematous  CENTRAL NERVOUS SYSTEM: AAO x 3  SKIN: No rash     HOME MEDICATIONS:Medications Ordered Prior to Encounter[2]    SCHEDULED MEDS:   buprenorphine-naloxone 8-2 mg  2 Film Sublingual BID    ceFAZolin (Ancef) IV (PEDS and ADULTS)  2 g Intravenous Q6H    chlorhexidine  15 mL Mouth/Throat BID    enoxparin  40 mg Subcutaneous Daily    furosemide (LASIX) injection  60 mg Intravenous Q12H    insulin glargine U-100  10 Units Subcutaneous Daily    nicotine  1 patch Transdermal Daily    senna-docusate  1 tablet Oral BID       CONTINUOUS INFUSIONS:    PRN MEDS:  Current Facility-Administered Medications:     acetaminophen, 650 mg, Oral, Q6H PRN    albuterol-ipratropium, 3 mL, Nebulization, Q6H PRN    bisacodyL, 10 mg, Rectal, Daily PRN    dextrose 50%, 12.5 g, Intravenous, PRN    dextrose 50%, 25 g, Intravenous, PRN    glucagon (human recombinant), 1 mg, Intramuscular, PRN    glucose, 16 g, Oral, PRN    glucose, 24 g, Oral, PRN    insulin aspart U-100, 0-10 Units, Subcutaneous, QID (AC + HS) PRN    melatonin, 6 mg, Oral, Nightly PRN    morphine, 2 mg, Intravenous, Q4H PRN    promethazine, 12.5 mg, Rectal, Q6H PRN    sodium chloride 0.9%, 10 mL, Intravenous, PRN    LABS AND DIAGNOSTICS  "    CBC LAST 3 DAYS  Recent Labs   Lab 05/12/25  0501 05/13/25  0523 05/14/25  0650   WBC 8.33 8.45 8.78   RBC 4.12* 3.79* 3.87*   HGB 10.2* 9.5* 9.5*   HCT 34.0* 31.3* 32.2*   MCV 83 83 83   MCH 24.8* 25.1* 24.5*   MCHC 30.0* 30.4* 29.5*   RDW 16.0* 15.9* 16.2*    216 221   MPV 10.0 10.0 10.1   NRBC 0 0 0       COAGULATION LAST 3 DAYS  No results for input(s): "LABPT", "INR", "APTT" in the last 168 hours.    CHEMISTRY LAST 3 DAYS  Recent Labs   Lab 05/12/25  0501 05/13/25  0523 05/14/25  0650    140 138   K 3.7 3.9 4.3    101 99   CO2 33* 31* 31*   ANIONGAP 5* 8 8   BUN 24* 29* 34*   CREATININE 0.9 1.1 1.2   * 110 105   CALCIUM 8.3* 8.1* 8.4*   MG 1.7 1.8 1.9   ALBUMIN 2.7* 2.7* 2.8*   PROT 6.3 6.3 6.4   ALKPHOS 148* 141* 144*   ALT 6* 6* 6*   AST 10 9* 11   BILITOT 0.5 0.5 0.5       CARDIAC PROFILE LAST 3 DAYS  Recent Labs   Lab 05/11/25  2106   BNP 1,998*       ENDOCRINE LAST 3 DAYS  Recent Labs   Lab 05/12/25  0040   TSH 4.416       LAST ARTERIAL BLOOD GAS  ABG  No results for input(s): "PH", "PO2", "PCO2", "HCO3", "BE" in the last 168 hours.    LAST 7 DAYS MICROBIOLOGY   Microbiology Results (last 7 days)       Procedure Component Value Units Date/Time    Aerobic culture [[id number]]  (Abnormal)  (Susceptibility) Collected: 05/12/25 0040    Order Status: Completed Specimen: Wound from Leg, Right Updated: 05/14/25 0634     CULTURE, AEROBIC Moderate Staphylococcus aureus    Blood culture [[id number]]  (Normal) Collected: 05/12/25 0004    Order Status: Completed Specimen: Blood Updated: 05/14/25 0105     CULTURE, BLOOD ([facility]) No Growth After 48 Hours    Blood culture [[id number]]  (Normal) Collected: 05/11/25 2331    Order Status: Completed Specimen: Blood Updated: 05/14/25 0008     CULTURE, BLOOD ([facility]) No Growth After 48 Hours    Influenza A & B by Molecular [[id number]]  (Normal) Collected: 05/12/25 0040    Order Status: Completed Specimen: Nasal Swab Updated: 05/12/25 0150     INFLUENZA " A MOLECULAR Negative     INFLUENZA B MOLECULAR  Negative            MOST RECENT IMAGING  Echo    Left Ventricle: The left ventricle is moderately dilated. There is   moderate eccentric hypertrophy. Global hypokinesis present. There is   moderately reduced systolic function with a visually estimated ejection   fraction of 30 - 35%. Grade III diastolic dysfunction.    Right Ventricle: Right ventricle was not well visualized due to poor   acoustic window. The right ventricle is dilated    Tricuspid Valve: There is mild regurgitation.    Pulmonary Artery: There is pulmonary hypertension. The estimated   pulmonary artery systolic pressure is 64 mmHg.    IVC/SVC: Elevated venous pressure at 15 mmHg.  CTA Chest Non-Coronary (PE Studies)  Narrative: EXAMINATION:  CTA CHEST NON CORONARY (PE STUDIES)    CLINICAL HISTORY:  Pulmonary embolism (PE) suspected, positive D-dimer;    TECHNIQUE:  Thin axial imaging through the chest was performed with 100 mL Omnipaque 350 IV contrast, with sagittal and coronal reformatted images and MIP reconstructions performed, with images stored in the patient's permanent electronic medical record.    FINDINGS:  Comparison to multiple prior exams.  The exam is limited by poor timing of the contrast bolus, which prevents evaluation of the peripheral pulmonary arteries.  There are no central pulmonary arterial filling defects to suggest pulmonary thromboembolism, with the central pulmonary arteries normal in caliber.    The aorta is normal in caliber, with minimal calcified plaque.  The heart is normal in size, with no pericardial effusion.  There are moderate three-vessel coronary arterial calcifications, with no enlarged mediastinal or hilar lymph nodes.    There are scattered reticular densities in geographic ground-glass opacities in both lungs, with areas of interlobular septal thickening, as well as bandlike and irregular upper lobe airspace opacities.  There is left lower lobe consolidation  and volume loss suggesting atelectasis, with dependent right lower lobe atelectasis, and small low-density bilateral pleural effusions.  The central airways are patent, with scattered bronchial wall thickening.  No central mucous plugging or pneumothorax.    Images of the upper abdomen are unremarkable.  There is extensive diffuse reticular edema throughout the subcutaneous fat.  No acute fractures.  Impression: 1. Limited exam as described, with no central pulmonary thromboembolism.  2. Nonspecific interstitial and airspace opacities throughout both lungs, perhaps reflecting multifocal bronchitis, pneumonia and atelectasis in the appropriate clinical setting.  Concurrent interstitial pulmonary edema-hypervolemia is not excluded.  3. Dependent atelectasis in the lower lobes left greater than right, with small bilateral pleural effusions.  4. Moderate three-vessel coronary arterial calcifications.  5. Diffuse edema throughout the subcutaneous fat suggesting hypervolemia-anasarca.    Electronically signed by: Eliseo Jo  Date:    05/13/2025  Time:    11:43      ECHOCARDIOGRAM RESULTS (last 5)  Results for orders placed during the hospital encounter of 01/09/25    Echo    Interpretation Summary    Left Ventricle: The left ventricle is moderately dilated. Mildly increased wall thickness. There is mild asymmetric hypertrophy. Severe global hypokinesis present. There is severely reduced systolic function with a visually estimated ejection fraction of 25 - 30%.    Right Ventricle: Moderate right ventricular enlargement. Systolic function is mildly reduced.    Left Atrium: Left atrium is mildly dilated.    Tricuspid Valve: There is mild regurgitation.    IVC/SVC: Elevated venous pressure at 15 mmHg.      CURRENT/PREVIOUS VISIT EKG  Results for orders placed or performed during the hospital encounter of 05/11/25   EKG 12-lead    Collection Time: 05/12/25  4:05 AM   Result Value Ref Range    QRS Duration 152 ms    OHS QTC  Calculation 462 ms    Narrative    Test Reason : R06.02,    Vent. Rate :  82 BPM     Atrial Rate :  82 BPM     P-R Int : 112 ms          QRS Dur : 152 ms      QT Int : 396 ms       P-R-T Axes :  48 202  50 degrees    QTcB Int : 462 ms    Normal sinus rhythm  Right bundle branch block  Inferior infarct ,age undetermined  Abnormal ECG  No previous ECGs available    Referred By: AAAREFERRAL SELF           Confirmed By:            ASSESSMENT/PLAN:     Active Hospital Problems    Diagnosis    *Acute systolic congestive heart failure    Pressure ulcer of right buttock, stage 2    HTN (hypertension)    Prolonged QT interval    Hypoalbuminemia    Non-healing wound of amputation stump    Opioid use disorder    Diabetes mellitus    Scrotal edema    Elevated d-dimer       ASSESSMENT & PLAN:   Acute on chronic HFrEF  Cardiomyopathy  Substance abuse  Right BKA wound  Left AKA    RECOMMENDATIONS:    Efx 30-35%; gr III diastolic dysfunction and pulmonary hypertension  Continue IV diuresis with IV lasix 60 mg BID.  Renal function labs stable  Obtain ischemic evaluation after recovers from HF      Denice Martinez NP  [facility]  Department of Cardiology  Date of Service: 05/14/2025               [1]   Social History  Tobacco Use    Smoking status: Every Day     Current packs/day: 1.00     Types: Cigarettes   Substance Use Topics    Alcohol use: Yes    Drug use: No   [2]   No current facility-administered medications on file prior to encounter.     Current Outpatient Medications on File Prior to Encounter   Medication Sig Dispense Refill    isosorbide dinitrate (ISORDIL) 20 MG tablet Take 1 tablet (20 mg total) by mouth 3 (three) times daily. 90 tablet 11    tamsulosin (FLOMAX) 0.4 mg Cap Take 1 capsule (0.4 mg total) by mouth once daily. 30 capsule 11    torsemide (DEMADEX) 20 MG Tab Take 1 tablet (20 mg total) by mouth 2 (two) times a day. 60 tablet 11    insulin aspart U-100 (NOVOLOG) 100 unit/mL (3 mL) InPn pen  "Inject 0-10 Units into the skin before meals and at bedtime as needed (Hyperglycemia). **MODERATE CORRECTION DOSE**  Blood Glucose  mg/dL                  Pre-meal                2200  151-200                2 units                    1 unit  201-250                4 units                    2 units  251-300                6 units                    3 units  301-350                8 units                    4 units  >350                     10 units                  5 units  Administer subcutaneously if needed at times designated by monitoring  schedule.  DO NOT HOLD correction dose insulin for patients who are  NPO.    "HIGH ALERT MEDICATION" - Administer with meals or TF/TPN. (Patient not taking: Reported on 2/12/2025) 1 each 0     "
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
## 2025-05-14 NOTE — SUBJECTIVE & OBJECTIVE
Interval History:  Pt seen and examined, severe orthopnea resulting in near 90 degree positioning comfort.  No chest pain.  Remains with anasarca, severe scrotal edema and penile edema with inversion resulting in difficult to quantify UOP and uncertain urine emptying.    Urology to place Weber tomorrow.  Discussed with ID.  Ancef for MSSA culture from stump.  MRIs pending at this time-he is unable to tolerate lying flat.    Review of Systems   Constitutional:  Negative for chills and fever.   Respiratory:  Positive for shortness of breath. Negative for cough.    Cardiovascular:  Positive for leg swelling. Negative for chest pain.   Gastrointestinal:  Negative for nausea and vomiting.   Genitourinary:  Positive for difficulty urinating, penile swelling and scrotal swelling.   Musculoskeletal:  Negative for arthralgias and myalgias.     Objective:     Vital Signs (Most Recent):  Temp: 97.5 °F (36.4 °C) (05/14/25 1628)  Pulse: 91 (05/14/25 1628)  Resp: 17 (05/14/25 1628)  BP: (!) 163/87 (05/14/25 1628)  SpO2: 99 % (05/14/25 1628) Vital Signs (24h Range):  Temp:  [97.5 °F (36.4 °C)-98.1 °F (36.7 °C)] 97.5 °F (36.4 °C)  Pulse:  [87-91] 91  Resp:  [16-20] 17  SpO2:  [99 %-100 %] 99 %  BP: (124-163)/(76-97) 163/87     Weight: (!) 138.4 kg (305 lb 1.9 oz)  Body mass index is 43.78 kg/m².    Intake/Output Summary (Last 24 hours) at 5/14/2025 1651  Last data filed at 5/14/2025 0948  Gross per 24 hour   Intake 480 ml   Output 350 ml   Net 130 ml         Physical Exam  Vitals and nursing note reviewed.   Constitutional:       Appearance: Normal appearance.   Cardiovascular:      Rate and Rhythm: Normal rate.   Pulmonary:      Breath sounds: Normal breath sounds.      Comments: tachypnea  Abdominal:      General: Abdomen is flat. Bowel sounds are normal.      Palpations: Abdomen is soft.   Genitourinary:     Comments: Scrotal and penile edema inverted penis  Musculoskeletal:      Right lower leg: Edema present.      Left lower  leg: Edema present.      Comments: Diffuse anasarca    Right BKA   Skin:     Comments: See wound care note   Neurological:      General: No focal deficit present.      Mental Status: He is alert and oriented to person, place, and time. Mental status is at baseline.   Psychiatric:         Mood and Affect: Mood normal.         Behavior: Behavior normal.               Significant Labs: All pertinent labs within the past 24 hours have been reviewed.  CBC:   Recent Labs   Lab 05/13/25  0523 05/14/25  0650   WBC 8.45 8.78   HGB 9.5* 9.5*   HCT 31.3* 32.2*    221     CMP:   Recent Labs   Lab 05/13/25  0523 05/14/25  0650    138   K 3.9 4.3    99   CO2 31* 31*    105   BUN 29* 34*   CREATININE 1.1 1.2   CALCIUM 8.1* 8.4*   PROT 6.3 6.4   ALBUMIN 2.7* 2.8*   BILITOT 0.5 0.5   ALKPHOS 141* 144*   AST 9* 11   ALT 6* 6*   ANIONGAP 8 8       Significant Imaging: I have reviewed all pertinent imaging results/findings within the past 24 hours.

## 2025-05-14 NOTE — PLAN OF CARE
SW received a SC from , stating patient requesting to go care home placement at discharge. OT recommending SNF. SW sent SNF referral via [facility], will continue to follow.        05/14/25 1329   Post-Acute Status   Post-Acute Authorization Placement   Post-Acute Placement Status Referrals Sent   Patient choice form signed by patient/caregiver List from System Post-Acute Care   Discharge Plan   Discharge Plan A Skilled Nursing Facility   Discharge Plan B Skilled Nursing Facility

## 2025-05-14 NOTE — PT/OT/SLP PROGRESS
Physical Therapy      Patient Name:  Gideon Ross   MRN:  [id number]    Patient not seen today secondary to Patient fatigue ((after OT eval)). Will follow-up ..

## 2025-05-14 NOTE — PROGRESS NOTES
LOCET completed with [facility] and PASRR completed and successfully uploaded to . Notified REX Rousseau to submit PASRR.  Awaiting form 142.

## 2025-05-14 NOTE — PLAN OF CARE
05/14/25 0746   Patient Assessment/Suction   Level of Consciousness (AVPU) alert   Respiratory Effort Normal;Unlabored   Expansion/Accessory Muscles/Retractions no retractions;no use of accessory muscles   All Lung Fields Breath Sounds diminished   Rhythm/Pattern, Respiratory unlabored;pattern regular;depth regular   Skin Integrity   $ Wound Care Tech Time 15 min   Area Observed Left;Right;Behind ear;Nares   Skin Appearance without discoloration   PRE-TX-O2   Device (Oxygen Therapy) nasal cannula   $ Is the patient on Low Flow Oxygen? Yes   Flow (L/min) (Oxygen Therapy) 3  (decreased from 4L)   SpO2 100 %   Pulse Oximetry Type Intermittent   $ Pulse Oximetry - Multiple Charge Pulse Oximetry - Multiple   Education   $ Education 15 min

## 2025-05-14 NOTE — PT/OT/SLP EVAL
Occupational Therapy   Evaluation    Name: Gideon Ross  MRN: [id number]  Admitting Diagnosis: Acute systolic congestive heart failure  Recent Surgery: Procedure(s) (LRB):  CYSTOSCOPY,WITH URETERAL CATHETER INSERTION (N/A)      Recommendations:     Discharge Recommendations: Moderate Intensity Therapy  Discharge Equipment Recommendations:  to be determined by next level of care  Barriers to discharge:  Inaccessible home environment, Decreased caregiver support    Assessment:     Gideon Ross is a 49 y.o. male with a medical diagnosis of Acute systolic congestive heart failure.  He presents with agreeable for OT eval, reported depression led to letting himself go, decreased support, confinement in the house. Performance deficits affecting function: weakness, impaired endurance, impaired self care skills, impaired functional mobility, impaired balance, decreased lower extremity function, pain, edema.      Rehab Prognosis: Fair and Poor; patient would benefit from acute skilled OT services to address these deficits and reach maximum level of function.       Plan:     Patient to be seen 5 x/week to address the above listed problems via self-care/home management, therapeutic activities, therapeutic exercises  Plan of Care Expires: 06/14/25  Plan of Care Reviewed with: patient    Subjective     Chief Complaint: generalized swelling   Patient/Family Comments/goals: get to a better place since he doesn't have support at home    Occupational Profile:  Living Environment: pt lives with grand man in a [facility]. Pt reported doing cloth baths  Previous level of function: pt mod I prior, pt inde for toileting, self feeding, and dressing, able to make light meals.   Roles and Routines: grandson and limited home manager  Equipment Used at Home: wheelchair (used to have a SC, he said it desappeared)  Assistance upon Discharge: facility    Pain/Comfort:  Pain Rating 1:  (not quantified)  Location - Side 1: Bilateral  Location - Orientation  1:  (generalized discomfor dt swelling)  Pain Addressed 1: Distraction, Cessation of Activity, Reposition    Patients cultural, spiritual, Lutheran conflicts given the current situation: no    Objective:     Communicated with: nurse prior to session.  Patient found supine with telemetry, peripheral IV upon OT entry to room.    General Precautions: Standard, fall  Orthopedic Precautions: N/A  Braces: N/A  Respiratory Status: Nasal cannula, flow 2 L/min    Occupational Performance:    Bed Mobility:    Patient completed Supine to Sit with moderate assistance and 2 persons  Patient completed Sit to Supine with moderate assistance and 2 persons  Scooting at bed level Max A x 2      Activities of Daily Living:  Grooming: stand by assistance for face hygiene  Toileting: stand by assistance to void at bed level    Cognitive/Visual Perceptual:  Cognitive/Psychosocial Skills:     -       Oriented to: Person, Place, Time, and Situation   -       Follows Commands/attention:Follows multistep  commands  -       Communication: clear/fluent  -       Safety awareness/insight to disability: intact   -       Mood/Affect/Coping skills/emotional control: Appropriate to situation, Cooperative, and Isolative    Physical Exam:  Balance:    -       poor balance sitting up in bed required Mod A and BUE support and rails  Upper Extremity Range of Motion:     -       Right Upper Extremity: WFL  -       Left Upper Extremity: WFL  Upper Extremity Strength:    -       Right Upper Extremity: WFL  -       Left Upper Extremity: WFL   Strength:    -       Right Upper Extremity: WFL  -       Left Upper Extremity: WFL  Gross motor coordination:   WFL    AMPAC 6 Click ADL:  AMPAC Total Score: 15    Treatment & Education:  Pt educated on role of OT/POC, importance of OOB/EOB activity, use of call bell, and safety during ADLs, transfers, and functional mobility.     Patient left supine with all lines intact, call button in reach, and bed alarm  on    GOALS:   Multidisciplinary Problems       Occupational Therapy Goals          Problem: Occupational Therapy    Goal Priority Disciplines Outcome Interventions   Occupational Therapy Goal     OT, PT/OT     Description: Goals to be met by: 6/14/2025     Patient will increase functional independence with ADLs by performing:    UE Dressing with Supervision.  LE Dressing with Minimal Assistance.  Grooming while seated with Supervision.  Toileting from bedside commode with Minimal Assistance for hygiene and clothing management.                          DME Justifications:  No DME recommended requiring DME justifications    History:     Past Medical History:   Diagnosis Date    Diabetes mellitus     Hypertension          Past Surgical History:   Procedure Laterality Date    SKIN GRAFT         Time Tracking:     OT Date of Treatment: 05/14/25  OT Start Time: 0911  OT Stop Time: 0934  OT Total Time (min): 23 min    Billable Minutes:Evaluation 8  Therapeutic Activity 15    5/14/2025

## 2025-05-14 NOTE — CARE UPDATE
05/14/25 0030   Patient Assessment/Suction   Level of Consciousness (AVPU) alert   Respiratory Effort Normal;Unlabored   PRE-TX-O2   Device (Oxygen Therapy) high flow nasal cannula   $ Is the patient on Low Flow Oxygen? Yes   Flow (L/min) (Oxygen Therapy) 4   Tobacco Cessation Intervention   Do you use any type of tobacco product? Yes   Are you interested in quitting use of tobacco products? Thinking about quitting  (he is not interested in smoking cessation program)   Are you interested in Nicotine Replacement for symptom relief? No   Respiratory Evaluation   $ Care Plan Tech Time 15 min   $ Respiratory Evaluation Complete   Evaluation For New Orders   Admitting Diagnosis Acute systolic CHF   Cardiac Diagnosis CAD, CHF   Pulmonary Diagnosis NA   Current Surgeries NA   Home Oxygen   Has Home Oxygen? No   Home Aerosol, MDI, DPI, and Other Treatments/Therapies   Home Respiratory Therapy Per Patient/Review of Chart No   Oxygen Care Plan   Oxygen Care Plan Per Protocol   SPO2 Goal (%) 95% cardiac   Rationale SpO2 is <95% (Cardiac Pt.)   Other Oxygen NA   Bronchodilator Care Plan   Rationale No Rationale found   Atelectasis Care Plan   Rationale No Rational Found   Airway Clearance Care Plan   Rationale No rationale found

## 2025-05-14 NOTE — PROGRESS NOTES
Pharmacist Renal Dose Adjustment Note    Gideon Ross is a 49 y.o. male being treated with the medication Cefazolin    Patient Data:    Vital Signs (Most Recent):  Temp: 97.7 °F (36.5 °C) (05/14/25 0716)  Pulse: 87 (05/14/25 0716)  Resp: 18 (05/14/25 0839)  BP: (!) 147/87 (05/14/25 0716)  SpO2: 100 % (05/14/25 0746) Vital Signs (72h Range):  Temp:  [97.5 °F (36.4 °C)-98.7 °F (37.1 °C)]   Pulse:  []   Resp:  [7-20]   BP: (124-178)/()   SpO2:  [95 %-100 %]      Recent Labs   Lab 05/12/25  0501 05/13/25  0523 05/14/25  0650   CREATININE 0.9 1.1 1.2     Serum creatinine: 1.2 mg/dL 05/14/25 0650  Estimated creatinine clearance: 89.1 mL/min    Cefazolin 1g Q8H will be changed to Cefazolin 2g Q8H per renal dose adjustment policy.    Pharmacist's Name: Mikhail Storey  Pharmacist's Extension: [phone]

## 2025-05-14 NOTE — ASSESSMENT & PLAN NOTE
Likely from volume overload  Continue Lasix and urology consulted to help with Weber placement-to be done in OR tomorrow
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
## 2025-05-14 NOTE — ASSESSMENT & PLAN NOTE
S/p R BKA and L AKA  Patient's FSGs are uncontrolled due to hyperglycemia on current medication regimen.  Last A1c reviewed-   Lab Results   Component Value Date    HGBA1C 7.3 (H) 05/12/2025     Most recent fingerstick glucose reviewed-   Recent Labs   Lab 05/13/25  2159 05/14/25  0831 05/14/25  1220   POCTGLUCOSE 154* 123* 116*     Current correctional scale  Low  Maintain anti-hyperglycemic dose as follows-   Antihyperglycemics (From admission, onward)      Start     Stop Route Frequency Ordered    05/12/25 1445  insulin glargine U-100 (Lantus) pen 10 Units         -- SubQ Daily 05/12/25 1332    05/12/25 0943  insulin aspart U-100 pen 0-10 Units         -- SubQ Before meals & nightly PRN 05/12/25 0843

## 2025-05-14 NOTE — PROGRESS NOTES
05/14/25 1030   Tobacco Cessation Intervention   Do you use any type of tobacco product? Yes   Are you interested in quitting use of tobacco products? Not interested   Are you interested in Nicotine Replacement for symptom relief? No   $ Smoking Cessation Charges Smoking Cessation - Intermediate (CTTS)
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
## 2025-05-14 NOTE — PLAN OF CARE
Problem: Adult Inpatient Plan of Care  Goal: Plan of Care Review  Outcome: Progressing  Goal: Patient-Specific Goal (Individualized)  Outcome: Progressing  Goal: Absence of Hospital-Acquired Illness or Injury  Outcome: Progressing  Goal: Optimal Comfort and Wellbeing  Outcome: Progressing  Goal: Readiness for Transition of Care  Outcome: Progressing     Problem: Infection  Goal: Absence of Infection Signs and Symptoms  Outcome: Progressing     Problem: Diabetes Comorbidity  Goal: Blood Glucose Level Within Targeted Range  Outcome: Progressing     Problem: Acute Kidney Injury/Impairment  Goal: Fluid and Electrolyte Balance  Outcome: Progressing  Goal: Improved Oral Intake  Outcome: Progressing  Goal: Effective Renal Function  Outcome: Progressing     Problem: Wound  Goal: Optimal Coping  Outcome: Progressing  Goal: Optimal Functional Ability  Outcome: Progressing  Goal: Absence of Infection Signs and Symptoms  Outcome: Progressing  Goal: Improved Oral Intake  Outcome: Progressing  Goal: Optimal Pain Control and Function  Outcome: Progressing  Goal: Skin Health and Integrity  Outcome: Progressing  Goal: Optimal Wound Healing  Outcome: Progressing     Problem: Skin Injury Risk Increased  Goal: Skin Health and Integrity  Outcome: Progressing     Problem: Oral Intake Inadequate  Goal: Improved Oral Intake  Outcome: Progressing

## 2025-05-14 NOTE — ASSESSMENT & PLAN NOTE
Patient's blood pressure range in the last 24 hours was: BP  Min: 124/86  Max: 163/87.The patient's inpatient anti-hypertensive regimen is listed below:  Current Antihypertensives  furosemide injection 60 mg, Every 12 hours, Intravenous    Plan  -will make adjustment as needed

## 2025-05-14 NOTE — PROGRESS NOTES
Patient with severe penile and scrotal edema  Have been unable to place Weber  Request from primary care for Weber placement  I will put him on the schedule for tomorrow morning in the operating room and do the procedure via cystoscopy

## 2025-05-14 NOTE — PLAN OF CARE
Nurse met  with Patient Gideon to review discharge recommendation of SNF and they are agreeable to plan. As a courtesy SW to send out referrals.     Patient/family provided with a list of facilities in-network with patient's payor plan. Providers that are owned, operated, or affiliated with [facility] are included on the list.     Notified that referrals will be sent to the below listed facilities from in-network list based on proximity to home/family support:   1.[facility]  2.[facility]  3.[facility]  4.[facility]   5. (can send more than 5)    Patient/family instructed to identify preference.    Preferred Facility: (if more than 1, listed in order of descending preference)  1.None     If an additional preferred facility not listed above is identified, additional referral to be sent. If above facilities unable to accept, will send additional referrals to in-network providers.    05/14/25 7536   Post-Acute Status   Post-Acute Authorization Placement   Post-Acute Placement Status Referrals Sent   Patient choice form signed by patient/caregiver List from System Post-Acute Care   Discharge Plan   Discharge Plan A Skilled Nursing Facility   Discharge Plan B Skilled Nursing Facility

## 2025-05-15 ENCOUNTER — ANESTHESIA (OUTPATIENT)
Dept: SURGERY | Facility: HOSPITAL | Age: 50
End: 2025-05-15
Payer: MEDICAID

## 2025-05-15 ENCOUNTER — ANESTHESIA EVENT (OUTPATIENT)
Dept: SURGERY | Facility: HOSPITAL | Age: 50
End: 2025-05-15
Payer: MEDICAID

## 2025-05-15 LAB
ALBUMIN SERPL-MCNC: 2.7 G/DL (ref 3.5–5.2)
ALP SERPL-CCNC: 149 UNIT/L (ref 55–135)
ALT SERPL-CCNC: 6 UNIT/L (ref 10–44)
ANION GAP (SMH): 6 MMOL/L (ref 8–16)
AST SERPL-CCNC: 13 UNIT/L (ref 10–40)
BILIRUB SERPL-MCNC: 0.4 MG/DL (ref 0.1–1)
BUN SERPL-MCNC: 38 MG/DL (ref 6–20)
CALCIUM SERPL-MCNC: 8.6 MG/DL (ref 8.7–10.5)
CHLORIDE SERPL-SCNC: 99 MMOL/L (ref 95–110)
CO2 SERPL-SCNC: 33 MMOL/L (ref 23–29)
CREAT SERPL-MCNC: 1.3 MG/DL (ref 0.5–1.4)
GFR SERPLBLD CREATININE-BSD FMLA CKD-EPI: >60 ML/MIN/1.73/M2
GLUCOSE SERPL-MCNC: 76 MG/DL (ref 70–110)
MAGNESIUM SERPL-MCNC: 2 MG/DL (ref 1.6–2.6)
POCT GLUCOSE: 157 MG/DL (ref 70–110)
POCT GLUCOSE: 159 MG/DL (ref 70–110)
POCT GLUCOSE: 163 MG/DL (ref 70–110)
POCT GLUCOSE: 70 MG/DL (ref 70–110)
POCT GLUCOSE: 76 MG/DL (ref 70–110)
POTASSIUM SERPL-SCNC: 4.3 MMOL/L (ref 3.5–5.1)
PROT SERPL-MCNC: 6.3 GM/DL (ref 6–8.4)
SODIUM SERPL-SCNC: 138 MMOL/L (ref 136–145)

## 2025-05-15 PROCEDURE — 63600175 PHARM REV CODE 636 W HCPCS: Performed by: NURSE PRACTITIONER

## 2025-05-15 PROCEDURE — 99233 SBSQ HOSP IP/OBS HIGH 50: CPT | Mod: ,,, | Performed by: NURSE PRACTITIONER

## 2025-05-15 PROCEDURE — 25000003 PHARM REV CODE 250: Performed by: ANESTHESIOLOGY

## 2025-05-15 PROCEDURE — 63600175 PHARM REV CODE 636 W HCPCS: Performed by: NURSE ANESTHETIST, CERTIFIED REGISTERED

## 2025-05-15 PROCEDURE — 94761 N-INVAS EAR/PLS OXIMETRY MLT: CPT

## 2025-05-15 PROCEDURE — 25000003 PHARM REV CODE 250: Performed by: NURSE PRACTITIONER

## 2025-05-15 PROCEDURE — 63600175 PHARM REV CODE 636 W HCPCS: Mod: JZ | Performed by: ANESTHESIOLOGY

## 2025-05-15 PROCEDURE — 11000001 HC ACUTE MED/SURG PRIVATE ROOM

## 2025-05-15 PROCEDURE — 94640 AIRWAY INHALATION TREATMENT: CPT

## 2025-05-15 PROCEDURE — C1729 CATH, DRAINAGE: HCPCS | Performed by: SPECIALIST

## 2025-05-15 PROCEDURE — 12000002 HC ACUTE/MED SURGE SEMI-PRIVATE ROOM

## 2025-05-15 PROCEDURE — 80053 COMPREHEN METABOLIC PANEL: CPT

## 2025-05-15 PROCEDURE — 25000003 PHARM REV CODE 250: Performed by: FAMILY MEDICINE

## 2025-05-15 PROCEDURE — 25000242 PHARM REV CODE 250 ALT 637 W/ HCPCS: Performed by: NURSE PRACTITIONER

## 2025-05-15 PROCEDURE — 63600175 PHARM REV CODE 636 W HCPCS: Performed by: STUDENT IN AN ORGANIZED HEALTH CARE EDUCATION/TRAINING PROGRAM

## 2025-05-15 PROCEDURE — 71000033 HC RECOVERY, INTIAL HOUR: Performed by: SPECIALIST

## 2025-05-15 PROCEDURE — 25000003 PHARM REV CODE 250: Performed by: STUDENT IN AN ORGANIZED HEALTH CARE EDUCATION/TRAINING PROGRAM

## 2025-05-15 PROCEDURE — 99900031 HC PATIENT EDUCATION (STAT)

## 2025-05-15 PROCEDURE — 63600175 PHARM REV CODE 636 W HCPCS

## 2025-05-15 PROCEDURE — 99900035 HC TECH TIME PER 15 MIN (STAT)

## 2025-05-15 PROCEDURE — 37000008 HC ANESTHESIA 1ST 15 MINUTES: Performed by: SPECIALIST

## 2025-05-15 PROCEDURE — S4991 NICOTINE PATCH NONLEGEND: HCPCS | Performed by: STUDENT IN AN ORGANIZED HEALTH CARE EDUCATION/TRAINING PROGRAM

## 2025-05-15 PROCEDURE — 25000003 PHARM REV CODE 250: Performed by: SPECIALIST

## 2025-05-15 PROCEDURE — 83735 ASSAY OF MAGNESIUM: CPT

## 2025-05-15 PROCEDURE — 36000706: Performed by: SPECIALIST

## 2025-05-15 PROCEDURE — 63600175 PHARM REV CODE 636 W HCPCS: Performed by: FAMILY MEDICINE

## 2025-05-15 PROCEDURE — 36415 COLL VENOUS BLD VENIPUNCTURE: CPT

## 2025-05-15 PROCEDURE — 97162 PT EVAL MOD COMPLEX 30 MIN: CPT

## 2025-05-15 PROCEDURE — C1769 GUIDE WIRE: HCPCS | Performed by: SPECIALIST

## 2025-05-15 PROCEDURE — 25000003 PHARM REV CODE 250: Performed by: NURSE ANESTHETIST, CERTIFIED REGISTERED

## 2025-05-15 PROCEDURE — 97530 THERAPEUTIC ACTIVITIES: CPT

## 2025-05-15 PROCEDURE — 0T7D8ZZ DILATION OF URETHRA, VIA NATURAL OR ARTIFICIAL OPENING ENDOSCOPIC: ICD-10-PCS | Performed by: SPECIALIST

## 2025-05-15 PROCEDURE — 99233 SBSQ HOSP IP/OBS HIGH 50: CPT | Mod: ,,, | Performed by: STUDENT IN AN ORGANIZED HEALTH CARE EDUCATION/TRAINING PROGRAM

## 2025-05-15 RX ORDER — PHENAZOPYRIDINE HYDROCHLORIDE 100 MG/1
200 TABLET, FILM COATED ORAL ONCE
Status: COMPLETED | OUTPATIENT
Start: 2025-05-15 | End: 2025-05-15

## 2025-05-15 RX ORDER — KETAMINE HCL IN 0.9 % NACL 50 MG/5 ML
SYRINGE (ML) INTRAVENOUS
Status: DISCONTINUED | OUTPATIENT
Start: 2025-05-15 | End: 2025-05-15

## 2025-05-15 RX ORDER — OXYCODONE HYDROCHLORIDE 5 MG/1
5 TABLET ORAL
Status: DISCONTINUED | OUTPATIENT
Start: 2025-05-15 | End: 2025-05-15 | Stop reason: HOSPADM

## 2025-05-15 RX ORDER — PROPOFOL 10 MG/ML
VIAL (ML) INTRAVENOUS
Status: DISCONTINUED | OUTPATIENT
Start: 2025-05-15 | End: 2025-05-15

## 2025-05-15 RX ORDER — LIDOCAINE HYDROCHLORIDE 20 MG/ML
JELLY TOPICAL
Status: DISCONTINUED | OUTPATIENT
Start: 2025-05-15 | End: 2025-05-15 | Stop reason: HOSPADM

## 2025-05-15 RX ORDER — DIPHENHYDRAMINE HYDROCHLORIDE 50 MG/ML
12.5 INJECTION, SOLUTION INTRAMUSCULAR; INTRAVENOUS
Status: DISCONTINUED | OUTPATIENT
Start: 2025-05-15 | End: 2025-05-15 | Stop reason: HOSPADM

## 2025-05-15 RX ORDER — HYDROMORPHONE HYDROCHLORIDE 1 MG/ML
0.2 INJECTION, SOLUTION INTRAMUSCULAR; INTRAVENOUS; SUBCUTANEOUS EVERY 5 MIN PRN
Status: DISCONTINUED | OUTPATIENT
Start: 2025-05-15 | End: 2025-05-15 | Stop reason: HOSPADM

## 2025-05-15 RX ORDER — GLUCAGON 1 MG
1 KIT INJECTION
Status: DISCONTINUED | OUTPATIENT
Start: 2025-05-15 | End: 2025-05-15 | Stop reason: HOSPADM

## 2025-05-15 RX ORDER — ONDANSETRON HYDROCHLORIDE 2 MG/ML
4 INJECTION, SOLUTION INTRAVENOUS DAILY PRN
Status: DISCONTINUED | OUTPATIENT
Start: 2025-05-15 | End: 2025-05-15 | Stop reason: HOSPADM

## 2025-05-15 RX ORDER — ONDANSETRON HYDROCHLORIDE 2 MG/ML
INJECTION, SOLUTION INTRAVENOUS
Status: DISCONTINUED | OUTPATIENT
Start: 2025-05-15 | End: 2025-05-15

## 2025-05-15 RX ADMIN — ENOXAPARIN SODIUM 40 MG: 40 INJECTION SUBCUTANEOUS at 05:05

## 2025-05-15 RX ADMIN — DEXTROSE MONOHYDRATE 12.5 G: 25 INJECTION, SOLUTION INTRAVENOUS at 10:05

## 2025-05-15 RX ADMIN — IPRATROPIUM BROMIDE AND ALBUTEROL SULFATE 3 ML: 2.5; .5 SOLUTION RESPIRATORY (INHALATION) at 07:05

## 2025-05-15 RX ADMIN — CEFAZOLIN 2 G: 2 INJECTION, POWDER, FOR SOLUTION INTRAMUSCULAR; INTRAVENOUS at 10:05

## 2025-05-15 RX ADMIN — MORPHINE SULFATE 2 MG: 2 INJECTION, SOLUTION INTRAMUSCULAR; INTRAVENOUS at 11:05

## 2025-05-15 RX ADMIN — PHENAZOPYRIDINE 200 MG: 100 TABLET ORAL at 02:05

## 2025-05-15 RX ADMIN — OXYCODONE HYDROCHLORIDE 5 MG: 5 TABLET ORAL at 01:05

## 2025-05-15 RX ADMIN — Medication 10 MG: at 01:05

## 2025-05-15 RX ADMIN — PROPOFOL 20 MG: 10 INJECTION, EMULSION INTRAVENOUS at 01:05

## 2025-05-15 RX ADMIN — BUPRENORPHINE AND NALOXONE 2 FILM: 8; 2 FILM BUCCAL; SUBLINGUAL at 09:05

## 2025-05-15 RX ADMIN — SENNOSIDES AND DOCUSATE SODIUM 1 TABLET: 50; 8.6 TABLET ORAL at 09:05

## 2025-05-15 RX ADMIN — CEFAZOLIN 2 G: 2 INJECTION, POWDER, FOR SOLUTION INTRAMUSCULAR; INTRAVENOUS at 05:05

## 2025-05-15 RX ADMIN — BUPRENORPHINE AND NALOXONE 2 FILM: 8; 2 FILM BUCCAL; SUBLINGUAL at 11:05

## 2025-05-15 RX ADMIN — PROPOFOL 50 MG: 10 INJECTION, EMULSION INTRAVENOUS at 01:05

## 2025-05-15 RX ADMIN — CEFAZOLIN 2 G: 2 INJECTION, POWDER, FOR SOLUTION INTRAMUSCULAR; INTRAVENOUS at 12:05

## 2025-05-15 RX ADMIN — MORPHINE SULFATE 2 MG: 2 INJECTION, SOLUTION INTRAMUSCULAR; INTRAVENOUS at 10:05

## 2025-05-15 RX ADMIN — HYDROMORPHONE HYDROCHLORIDE 0.2 MG: 1 INJECTION, SOLUTION INTRAMUSCULAR; INTRAVENOUS; SUBCUTANEOUS at 01:05

## 2025-05-15 RX ADMIN — FUROSEMIDE 60 MG: 10 INJECTION, SOLUTION INTRAMUSCULAR; INTRAVENOUS at 10:05

## 2025-05-15 RX ADMIN — NICOTINE 1 PATCH: 21 PATCH, EXTENDED RELEASE TRANSDERMAL at 09:05

## 2025-05-15 RX ADMIN — HYDROMORPHONE HYDROCHLORIDE 0.2 MG: 1 INJECTION, SOLUTION INTRAMUSCULAR; INTRAVENOUS; SUBCUTANEOUS at 02:05

## 2025-05-15 RX ADMIN — CEFAZOLIN 2 G: 2 INJECTION, POWDER, FOR SOLUTION INTRAMUSCULAR; INTRAVENOUS at 11:05

## 2025-05-15 RX ADMIN — FUROSEMIDE 60 MG: 10 INJECTION, SOLUTION INTRAMUSCULAR; INTRAVENOUS at 11:05

## 2025-05-15 RX ADMIN — CHLORHEXIDINE GLUCONATE 0.12% ORAL RINSE 15 ML: 1.2 LIQUID ORAL at 09:05

## 2025-05-15 RX ADMIN — SODIUM CHLORIDE, SODIUM LACTATE, POTASSIUM CHLORIDE, AND CALCIUM CHLORIDE: .6; .31; .03; .02 INJECTION, SOLUTION INTRAVENOUS at 01:05

## 2025-05-15 RX ADMIN — ONDANSETRON 4 MG: 2 INJECTION INTRAMUSCULAR; INTRAVENOUS at 01:05
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
## 2025-05-15 NOTE — TRANSFER OF CARE
"Anesthesia Transfer of Care Note    Patient: Gideon Ross    Procedure(s) Performed: Procedure(s) (LRB):  CYSTOSCOPY,WITH URETERAL CATHETER INSERTION (N/A)    Patient location: PACU    Anesthesia Type: general    Transport from OR: Transported from OR on 2-3 L/min O2 by NC with adequate spontaneous ventilation    Post pain: adequate analgesia    Post assessment: no apparent anesthetic complications    Post vital signs: stable    Level of consciousness: awake    Nausea/Vomiting: no nausea/vomiting    Complications: none    Transfer of care protocol was followed    Last vitals: Visit Vitals  /81 (BP Location: Left arm, Patient Position: Lying)   Pulse 93   Temp 36.9 °C (98.5 °F) (Oral)   Resp 18   Ht 5' 10" (1.778 m)   Wt (!) 138.4 kg (305 lb 1.9 oz)   SpO2 97%   BMI 43.78 kg/m²     "
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
## 2025-05-15 NOTE — PROGRESS NOTES
[facility]  Department of Cardiology  Progress Note      PATIENT NAME: Gideon Ross  MRN: [id number]  TODAY'S DATE: 05/15/2025  ADMIT DATE: 5/11/2025                          CONSULT REQUESTED BY: Pee Bolton MD    SUBJECTIVE     PRINCIPAL PROBLEM: Acute systolic congestive heart failure      REASON FOR CONSULT:  Acute on chronic CHF, life vest patient      INTERVAL HISTORY:  5/15/25  Cr 1.3 today; + fluid balance but output has been difficult to measure accurately  Remains edematous    5/14/25  Cr 1.2; difficulty collecting accurate output  Remains edematous and orthopneic  No events noted on telemetry    5/13/25  Hgb 9.5; Cr 1.1; + net balance; BP elevated;  Pt still has orthopnea; he feels his hands are less swollen today    HPI:  Pt is 49-year-old male w/ PMH HFrEF, DM, HTN, DM, cardiomyopathy, R BKA, left AKA, and substance dependence who presented to ED with c/o shortness of breath, swelling, and orthopnea for past week.    Pt has been receiving IV lasix since admission and is reportedly breathing better today but remains orthopneic, and swelling to upper legs, scrotum and abdomen still present. Still on 4 LPM O2    Review of patient's allergies indicates:  No Known Allergies    Past Medical History:   Diagnosis Date    Diabetes mellitus     Hypertension      Past Surgical History:   Procedure Laterality Date    SKIN GRAFT       Social History[1]     REVIEW OF SYSTEMS  Negative except as mentioned in HPI    OBJECTIVE     VITAL SIGNS (Most Recent)  Temp: 98.5 °F (36.9 °C) (05/15/25 1200)  Pulse: 93 (05/15/25 1200)  Resp: 18 (05/15/25 1200)  BP: 138/81 (05/15/25 1200)  SpO2: 97 % (05/15/25 1200)    VENTILATION STATUS  Resp: 18 (05/15/25 1200)  SpO2: 97 % (05/15/25 1200)           I & O (Last 24H):  Intake/Output Summary (Last 24 hours) at 5/15/2025 1217  Last data filed at 5/14/2025 2000  Gross per 24 hour   Intake 240 ml   Output 600 ml   Net -360 ml       WEIGHTS  Wt Readings from Last 3  Encounters:   05/14/25 1045 (!) 138.4 kg (305 lb 1.9 oz)   05/12/25 1239 97.5 kg (214 lb 15.2 oz)   05/11/25 1954 97.5 kg (215 lb)   05/12/25 1048 97.5 kg (214 lb 15.2 oz)   01/26/25 0338 117.5 kg (259 lb 0.7 oz)   01/21/25 0515 117.9 kg (260 lb)   01/15/25 0400 126.1 kg (278 lb)   01/14/25 0400 127.1 kg (280 lb 3.2 oz)   01/09/25 2239 115.4 kg (254 lb 6.4 oz)   01/09/25 0824 108.9 kg (240 lb)       PHYSICAL EXAM    GENERAL: chronically ill middle age male breathing comfortably w/ HOB elevated  HEENT: Normocephalic.  +pallor  NECK: No JVD.   CARDIAC: Regular rate and rhythm. S1 is normal.S2 is normal.No gallops, clicks or murmurs noted at this time.  CHEST ANATOMY: normal.   LUNGS: O2 via NC, no dyspnea or cough; crackles audible at bases  ABDOMEN: ascites, distended.  +scrotal edema    EXTREMITIES: edematous; right BKA edematous with wound, Left AKA edematous  CENTRAL NERVOUS SYSTEM: AAO x 3  SKIN: No rash     HOME MEDICATIONS:Medications Ordered Prior to Encounter[2]    SCHEDULED MEDS:   buprenorphine-naloxone 8-2 mg  2 Film Sublingual BID    ceFAZolin (Ancef) IV (PEDS and ADULTS)  2 g Intravenous Q6H    chlorhexidine  15 mL Mouth/Throat BID    enoxparin  40 mg Subcutaneous Daily    furosemide (LASIX) injection  60 mg Intravenous Q12H    insulin glargine U-100  10 Units Subcutaneous Daily    nicotine  1 patch Transdermal Daily    senna-docusate  1 tablet Oral BID       CONTINUOUS INFUSIONS:    PRN MEDS:  Current Facility-Administered Medications:     acetaminophen, 650 mg, Oral, Q6H PRN    albuterol-ipratropium, 3 mL, Nebulization, Q6H PRN    bisacodyL, 10 mg, Rectal, Daily PRN    dextrose 50%, 12.5 g, Intravenous, PRN    dextrose 50%, 25 g, Intravenous, PRN    glucagon (human recombinant), 1 mg, Intramuscular, PRN    glucose, 16 g, Oral, PRN    glucose, 24 g, Oral, PRN    insulin aspart U-100, 0-10 Units, Subcutaneous, QID (AC + HS) PRN    melatonin, 6 mg, Oral, Nightly PRN    morphine, 2 mg, Intravenous, Q4H  "PRN    promethazine, 12.5 mg, Rectal, Q6H PRN    sodium chloride 0.9%, 10 mL, Intravenous, PRN    LABS AND DIAGNOSTICS     CBC LAST 3 DAYS  Recent Labs   Lab 05/12/25  0501 05/13/25  0523 05/14/25  0650   WBC 8.33 8.45 8.78   RBC 4.12* 3.79* 3.87*   HGB 10.2* 9.5* 9.5*   HCT 34.0* 31.3* 32.2*   MCV 83 83 83   MCH 24.8* 25.1* 24.5*   MCHC 30.0* 30.4* 29.5*   RDW 16.0* 15.9* 16.2*    216 221   MPV 10.0 10.0 10.1   NRBC 0 0 0       COAGULATION LAST 3 DAYS  No results for input(s): "LABPT", "INR", "APTT" in the last 168 hours.    CHEMISTRY LAST 3 DAYS  Recent Labs   Lab 05/13/25  0523 05/14/25  0650 05/15/25  0433    138 138   K 3.9 4.3 4.3    99 99   CO2 31* 31* 33*   ANIONGAP 8 8 6*   BUN 29* 34* 38*   CREATININE 1.1 1.2 1.3    105 76   CALCIUM 8.1* 8.4* 8.6*   MG 1.8 1.9 2.0   ALBUMIN 2.7* 2.8* 2.7*   PROT 6.3 6.4 6.3   ALKPHOS 141* 144* 149*   ALT 6* 6* 6*   AST 9* 11 13   BILITOT 0.5 0.5 0.4       CARDIAC PROFILE LAST 3 DAYS  Recent Labs   Lab 05/11/25  2106   BNP 1,998*       ENDOCRINE LAST 3 DAYS  Recent Labs   Lab 05/12/25  0040   TSH 4.416       LAST ARTERIAL BLOOD GAS  ABG  No results for input(s): "PH", "PO2", "PCO2", "HCO3", "BE" in the last 168 hours.    LAST 7 DAYS MICROBIOLOGY   Microbiology Results (last 7 days)       Procedure Component Value Units Date/Time    Blood culture [[id number]]  (Normal) Collected: 05/12/25 0004    Order Status: Completed Specimen: Blood Updated: 05/15/25 0103     CULTURE, BLOOD ([facility]) No Growth After 72 Hours    Blood culture [[id number]]  (Normal) Collected: 05/11/25 2331    Order Status: Completed Specimen: Blood Updated: 05/15/25 0011     CULTURE, BLOOD ([facility]) No Growth After 72 Hours    Aerobic culture [[id number]]  (Abnormal)  (Susceptibility) Collected: 05/12/25 0040    Order Status: Completed Specimen: Wound from Leg, Right Updated: 05/14/25 0634     CULTURE, AEROBIC Moderate Staphylococcus aureus    Influenza A & B by Molecular [[id number]]  " (Normal) Collected: 05/12/25 0040    Order Status: Completed Specimen: Nasal Swab Updated: 05/12/25 0150     INFLUENZA A MOLECULAR Negative     INFLUENZA B MOLECULAR  Negative            MOST RECENT IMAGING  Echo    Left Ventricle: The left ventricle is moderately dilated. There is   moderate eccentric hypertrophy. Global hypokinesis present. There is   moderately reduced systolic function with a visually estimated ejection   fraction of 30 - 35%. Grade III diastolic dysfunction.    Right Ventricle: Right ventricle was not well visualized due to poor   acoustic window. The right ventricle is dilated    Tricuspid Valve: There is mild regurgitation.    Pulmonary Artery: There is pulmonary hypertension. The estimated   pulmonary artery systolic pressure is 64 mmHg.    IVC/SVC: Elevated venous pressure at 15 mmHg.  CTA Chest Non-Coronary (PE Studies)  Narrative: EXAMINATION:  CTA CHEST NON CORONARY (PE STUDIES)    CLINICAL HISTORY:  Pulmonary embolism (PE) suspected, positive D-dimer;    TECHNIQUE:  Thin axial imaging through the chest was performed with 100 mL Omnipaque 350 IV contrast, with sagittal and coronal reformatted images and MIP reconstructions performed, with images stored in the patient's permanent electronic medical record.    FINDINGS:  Comparison to multiple prior exams.  The exam is limited by poor timing of the contrast bolus, which prevents evaluation of the peripheral pulmonary arteries.  There are no central pulmonary arterial filling defects to suggest pulmonary thromboembolism, with the central pulmonary arteries normal in caliber.    The aorta is normal in caliber, with minimal calcified plaque.  The heart is normal in size, with no pericardial effusion.  There are moderate three-vessel coronary arterial calcifications, with no enlarged mediastinal or hilar lymph nodes.    There are scattered reticular densities in geographic ground-glass opacities in both lungs, with areas of interlobular septal  thickening, as well as bandlike and irregular upper lobe airspace opacities.  There is left lower lobe consolidation and volume loss suggesting atelectasis, with dependent right lower lobe atelectasis, and small low-density bilateral pleural effusions.  The central airways are patent, with scattered bronchial wall thickening.  No central mucous plugging or pneumothorax.    Images of the upper abdomen are unremarkable.  There is extensive diffuse reticular edema throughout the subcutaneous fat.  No acute fractures.  Impression: 1. Limited exam as described, with no central pulmonary thromboembolism.  2. Nonspecific interstitial and airspace opacities throughout both lungs, perhaps reflecting multifocal bronchitis, pneumonia and atelectasis in the appropriate clinical setting.  Concurrent interstitial pulmonary edema-hypervolemia is not excluded.  3. Dependent atelectasis in the lower lobes left greater than right, with small bilateral pleural effusions.  4. Moderate three-vessel coronary arterial calcifications.  5. Diffuse edema throughout the subcutaneous fat suggesting hypervolemia-anasarca.    Electronically signed by: Eliseo Jo  Date:    05/13/2025  Time:    11:43      ECHOCARDIOGRAM RESULTS (last 5)  Results for orders placed during the hospital encounter of 01/09/25    Echo    Interpretation Summary    Left Ventricle: The left ventricle is moderately dilated. Mildly increased wall thickness. There is mild asymmetric hypertrophy. Severe global hypokinesis present. There is severely reduced systolic function with a visually estimated ejection fraction of 25 - 30%.    Right Ventricle: Moderate right ventricular enlargement. Systolic function is mildly reduced.    Left Atrium: Left atrium is mildly dilated.    Tricuspid Valve: There is mild regurgitation.    IVC/SVC: Elevated venous pressure at 15 mmHg.      CURRENT/PREVIOUS VISIT EKG  Results for orders placed or performed during the hospital encounter of  05/11/25   EKG 12-lead    Collection Time: 05/12/25  4:05 AM   Result Value Ref Range    QRS Duration 152 ms    [facility] QTC Calculation 462 ms    Narrative    Test Reason : R06.02,    Vent. Rate :  82 BPM     Atrial Rate :  82 BPM     P-R Int : 112 ms          QRS Dur : 152 ms      QT Int : 396 ms       P-R-T Axes :  48 202  50 degrees    QTcB Int : 462 ms    Normal sinus rhythm  Right bundle branch block  Inferior infarct ,age undetermined  Abnormal ECG  No previous ECGs available    Referred By: AAAREFERRAL SELF           Confirmed By:            ASSESSMENT/PLAN:     Active Hospital Problems    Diagnosis    *Acute systolic congestive heart failure    Pressure ulcer of right buttock, stage 2    HTN (hypertension)    Prolonged QT interval    Hypoalbuminemia    Non-healing wound of amputation stump    Opioid use disorder    Diabetes mellitus    Scrotal edema    Elevated d-dimer       ASSESSMENT & PLAN:   Acute on chronic HFrEF  Dilated Cardiomyopathy  Substance abuse  Right BKA wound  Left AKA    RECOMMENDATIONS:  Remains fluid overloaded  Efx 30-35%; gr III diastolic dysfunction and pulmonary hypertension  IV diuresis with IV lasix 60 mg BID.  Renal function labs stable  Scheduled for cystoscopy and valle placement today    Obtain ischemic evaluation after recovers from HF      Denice Martinez NP  [facility]  Department of Cardiology  Date of Service: 05/15/2025               [1]   Social History  Tobacco Use    Smoking status: Every Day     Current packs/day: 1.00     Average packs/day: 1 pack/day for 40.4 years (40.4 ttl pk-yrs)     Types: Cigarettes     Start date: 1985   Substance Use Topics    Alcohol use: Yes    Drug use: No   [2]   No current facility-administered medications on file prior to encounter.     Current Outpatient Medications on File Prior to Encounter   Medication Sig Dispense Refill    isosorbide dinitrate (ISORDIL) 20 MG tablet Take 1 tablet (20 mg total) by mouth 3 (three) times  "daily. 90 tablet 11    tamsulosin (FLOMAX) 0.4 mg Cap Take 1 capsule (0.4 mg total) by mouth once daily. 30 capsule 11    torsemide (DEMADEX) 20 MG Tab Take 1 tablet (20 mg total) by mouth 2 (two) times a day. 60 tablet 11    insulin aspart U-100 (NOVOLOG) 100 unit/mL (3 mL) InPn pen Inject 0-10 Units into the skin before meals and at bedtime as needed (Hyperglycemia). **MODERATE CORRECTION DOSE**  Blood Glucose  mg/dL                  Pre-meal                2200  151-200                2 units                    1 unit  201-250                4 units                    2 units  251-300                6 units                    3 units  301-350                8 units                    4 units  >350                     10 units                  5 units  Administer subcutaneously if needed at times designated by monitoring  schedule.  DO NOT HOLD correction dose insulin for patients who are  NPO.    "HIGH ALERT MEDICATION" - Administer with meals or TF/TPN. (Patient not taking: Reported on 2/12/2025) 1 each 0     "

## 2025-05-15 NOTE — ANESTHESIA POSTPROCEDURE EVALUATION
Anesthesia Post Evaluation    Patient: Gideon Ross    Procedure(s) Performed: Procedure(s) (LRB):  CYSTOSCOPY,WITH URETERAL CATHETER INSERTION (N/A)    Final Anesthesia Type: general      Patient location during evaluation: PACU  Patient participation: Yes- Able to Participate  Level of consciousness: awake and alert  Post-procedure vital signs: reviewed and stable  Pain management: adequate  Airway patency: patent    PONV status at discharge: No PONV  Anesthetic complications: no      Cardiovascular status: blood pressure returned to baseline and stable  Respiratory status: unassisted and room air  Hydration status: euvolemic  Follow-up not needed.              Vitals Value Taken Time   /94 05/15/25 14:15   Temp 36.9 °C (98.5 °F) 05/15/25 13:35   Pulse 86 05/15/25 14:22   Resp 18 05/15/25 14:22   SpO2 99 % 05/15/25 14:22   Vitals shown include unfiled device data.      Event Time   Out of Recovery 05/15/2025 14:23:13         Pain/Andre Score: Pain Rating Prior to Med Admin: 2 (5/15/2025  2:00 PM)  Andre Score: 9 (5/15/2025  2:00 PM)

## 2025-05-15 NOTE — SUBJECTIVE & OBJECTIVE
Interval History:  Pt seen and examined, continues with anasarca and dyspnea on exertion as well as orthopnea.  Still unable to currently quantify urine.  Pt going to OR for Weber placement today.    Review of Systems   Constitutional:  Negative for chills and fever.   Respiratory:  Positive for shortness of breath. Negative for cough.    Cardiovascular:  Positive for leg swelling. Negative for chest pain.   Gastrointestinal:  Negative for nausea and vomiting.   Genitourinary:  Positive for difficulty urinating, penile swelling and scrotal swelling.   Musculoskeletal:  Negative for arthralgias and myalgias.     Objective:     Vital Signs (Most Recent):  Temp: 98.5 °F (36.9 °C) (05/15/25 1335)  Pulse: 86 (05/15/25 1415)  Resp: 12 (05/15/25 1415)  BP: (!) 176/94 (05/15/25 1415)  SpO2: 98 % (05/15/25 1415) Vital Signs (24h Range):  Temp:  [97.5 °F (36.4 °C)-98.5 °F (36.9 °C)] 98.5 °F (36.9 °C)  Pulse:  [81-95] 86  Resp:  [12-19] 12  SpO2:  [95 %-99 %] 98 %  BP: (131-181)/() 176/94     Weight: (!) 138.4 kg (305 lb 1.9 oz)  Body mass index is 43.78 kg/m².    Intake/Output Summary (Last 24 hours) at 5/15/2025 1558  Last data filed at 5/15/2025 1336  Gross per 24 hour   Intake 490 ml   Output 600 ml   Net -110 ml         Physical Exam  Vitals and nursing note reviewed.   Constitutional:       Appearance: Normal appearance.   Cardiovascular:      Rate and Rhythm: Normal rate.   Pulmonary:      Breath sounds: Normal breath sounds.      Comments: tachypnea  Abdominal:      General: Abdomen is flat. Bowel sounds are normal.      Palpations: Abdomen is soft.   Genitourinary:     Comments: Scrotal and penile edema inverted penis  Musculoskeletal:      Right lower leg: Edema present.      Left lower leg: Edema present.      Comments: Diffuse anasarca    Right BKA   Skin:     Comments: See wound care note   Neurological:      General: No focal deficit present.      Mental Status: He is alert and oriented to person, place,  and time. Mental status is at baseline.   Psychiatric:         Mood and Affect: Mood normal.         Behavior: Behavior normal.               Significant Labs: All pertinent labs within the past 24 hours have been reviewed.  CBC:   Recent Labs   Lab 05/14/25  0650   WBC 8.78   HGB 9.5*   HCT 32.2*        CMP:   Recent Labs   Lab 05/14/25  0650 05/15/25  0433    138   K 4.3 4.3   CL 99 99   CO2 31* 33*    76   BUN 34* 38*   CREATININE 1.2 1.3   CALCIUM 8.4* 8.6*   PROT 6.4 6.3   ALBUMIN 2.8* 2.7*   BILITOT 0.5 0.4   ALKPHOS 144* 149*   AST 11 13   ALT 6* 6*   ANIONGAP 8 6*       Significant Imaging: I have reviewed all pertinent imaging results/findings within the past 24 hours.

## 2025-05-15 NOTE — OP NOTE
Operative Note         SUMMARY     Surgery Date:  5/15/2025     Assistant:     Indications:  49-year-old male with bilateral amputation  Severe penile and scrotal edema  Nursing staff unable to place a Weber catheter  For cystoscopic intervention      Pre-op Diagnosis:   Urethral stricture  Scrotal edema    Post-op Diagnosis:  Same    Procedure:  Cystoscopy with urethral dilatation and catheter placed    Anesthesia: General    Description of Procedure:   Patient brought to cystoscopy suite.  Placed in the cystoscopy position.  Patient is prepped and draped.  Anesthesia is given.    We prepped and draped the penis  We used flexible cystoscope to enter the penile foreskin  A Glidewire was placed through the urethra  We gently dilate from 6-18 [place]  We placed a 16 [place] tipped catheter  Catheter goes in nicely and we have a clear urine return  We secured the catheter and he goes back to the floor in good condition      Findings/Key Components:          Estimated Blood Loss:    None

## 2025-05-15 NOTE — CONSULTS
[facility] VASCULAR ACCESS NOTE       Bed:3124/3124    18G x 2.5IN [facility].    Indication: PVA  Attempts: 1    Joo Guzmna RN

## 2025-05-15 NOTE — ASSESSMENT & PLAN NOTE
Likely from volume overload  Continue Lasix and urology consulted to help with Weber placement-to be done in OR 5/15

## 2025-05-15 NOTE — ASSESSMENT & PLAN NOTE
S/p R BKA and L AKA  Patient's FSGs are uncontrolled due to hyperglycemia on current medication regimen.  Last A1c reviewed-   Lab Results   Component Value Date    HGBA1C 7.3 (H) 05/12/2025     Most recent fingerstick glucose reviewed-   Recent Labs   Lab 05/14/25  1938 05/15/25  0940 05/15/25  1124 05/15/25  1348   POCTGLUCOSE 114* 70 163* 76     Current correctional scale  Low  Maintain anti-hyperglycemic dose as follows-   Antihyperglycemics (From admission, onward)      Start     Stop Route Frequency Ordered    05/12/25 1445  insulin glargine U-100 (Lantus) pen 10 Units         -- SubQ Daily 05/12/25 1332    05/12/25 0943  insulin aspart U-100 pen 0-10 Units         -- SubQ Before meals & nightly PRN 05/12/25 0843

## 2025-05-15 NOTE — PLAN OF CARE
Problem: Adult Inpatient Plan of Care  Goal: Plan of Care Review  Outcome: Progressing  Goal: Patient-Specific Goal (Individualized)  Outcome: Progressing  Goal: Absence of Hospital-Acquired Illness or Injury  Outcome: Progressing  Goal: Optimal Comfort and Wellbeing  Outcome: Progressing  Goal: Readiness for Transition of Care  Outcome: Progressing     Problem: Infection  Goal: Absence of Infection Signs and Symptoms  Outcome: Progressing     Problem: Diabetes Comorbidity  Goal: Blood Glucose Level Within Targeted Range  Outcome: Progressing     Problem: Acute Kidney Injury/Impairment  Goal: Fluid and Electrolyte Balance  Outcome: Progressing  Goal: Improved Oral Intake  Outcome: Progressing  Goal: Effective Renal Function  Outcome: Progressing     Problem: Wound  Goal: Optimal Coping  Outcome: Progressing  Goal: Optimal Functional Ability  Outcome: Progressing  Goal: Absence of Infection Signs and Symptoms  Outcome: Progressing  Goal: Improved Oral Intake  Outcome: Progressing  Goal: Optimal Pain Control and Function  Outcome: Progressing  Goal: Skin Health and Integrity  Outcome: Progressing  Goal: Optimal Wound Healing  Outcome: Progressing     Problem: Skin Injury Risk Increased  Goal: Skin Health and Integrity  Outcome: Progressing     Problem: Oral Intake Inadequate  Goal: Improved Oral Intake  Outcome: Progressing     Problem: Bariatric Environmental Safety  Goal: Safety Maintained with Care  Outcome: Progressing

## 2025-05-15 NOTE — PT/OT/SLP PROGRESS
Occupational Therapy      Patient Name:  Gideon Ross   MRN:  [id number]    Patient not seen today secondary to procedure. Will follow-up 5/16/2025.    5/15/2025

## 2025-05-15 NOTE — PLAN OF CARE
SW received a in-basket message via EPIC from Bernadine with [facility], stating she will not have any bed availability until 5/19. SW received a call from Janet with [facility] stating, she will not have any beds until 5/27, will put patient on waitlist if patient wishes to come that far. SW to continue to follow when patient is cleared to discharge.        05/15/25 1304   Post-Acute Status   Post-Acute Authorization Placement   Post-Acute Placement Status Pending Bed Availability   Discharge Plan   Discharge Plan A Skilled Nursing Facility   Discharge Plan B Skilled Nursing Facility

## 2025-05-15 NOTE — ANESTHESIA PREPROCEDURE EVALUATION
05/15/2025  Gideon Ross is a 49 y.o., male.    Problem List[1]    Past Surgical History:   Procedure Laterality Date    SKIN GRAFT          Tobacco Use:  The patient  reports that he has been smoking cigarettes. He started smoking about 40 years ago. He has a 40.4 pack-year smoking history. He does not have any smokeless tobacco history on file.     Results for orders placed or performed during the hospital encounter of 05/11/25   EKG 12-lead    Collection Time: 05/12/25  4:05 AM   Result Value Ref Range    QRS Duration 152 ms    [facility] QTC Calculation 462 ms    Narrative    Test Reason : R06.02,    Vent. Rate :  82 BPM     Atrial Rate :  82 BPM     P-R Int : 112 ms          QRS Dur : 152 ms      QT Int : 396 ms       P-R-T Axes :  48 202  50 degrees    QTcB Int : 462 ms    Normal sinus rhythm  Right bundle branch block  Inferior infarct ,age undetermined  Abnormal ECG  No previous ECGs available    Referred By: AAAREFERRAL SELF           Confirmed By:         Imaging Results              US Scrotum And Testicles (Final result)  Result time 05/12/25 00:27:32      Final result by Janes Tracey MD (05/12/25 00:27:32)                   Impression:      No acute testicular abnormality.    Small bilateral hydroceles.    Bilateral severe scrotal edema.      Electronically signed by: Janes Tracey MD  Date:    05/12/2025  Time:    00:27               Narrative:    EXAMINATION:  US SCROTUM AND TESTICLES    CLINICAL HISTORY:  Scrotal swelling;    TECHNIQUE:  Sonography of the scrotum and testes.    COMPARISON:  None.    FINDINGS:  Right Testicle:    *Size: 4.2 x 2.6 x 3.0 cm  *Appearance: Normal.  *Flow: Normal arterial and venous flow  *Epididymis: Normal.  *Hydrocele: Small.  *Varicocele: None.  .    Left Testicle:    *Size: 3.1 x 2.3 x 2.4 cm  *Appearance: Normal.  *Flow: Normal arterial and venous  flow  *Epididymis: Normal.  *Hydrocele: Small.  *Varicocele: None.  .    Other findings: Bilateral severe scrotal edema.                                       X-Ray Knee 1 or 2 View Right (Final result)  Result time 05/12/25 00:31:49   Procedure changed from X-Ray Knee 3 View Right     Final result by Doron Maynard MD (05/12/25 00:31:49)                   Impression:      As above.  Recommend follow-up MRI with and without contrast      Electronically signed by: Doron Maynard  Date:    05/12/2025  Time:    00:31               Narrative:    EXAMINATION:  XR KNEE 1 OR 2 VIEW RIGHT    CLINICAL HISTORY:  pain;stump Wound;    TECHNIQUE:  AP and lateral views of the right knee were performed.    COMPARISON:  None    FINDINGS:  Osteopenia.  Postsurgical changes of below-knee amputation.  Question slight erosive irregularity at the resection margins.  Soft tissue stump with soft tissue defect evident.                                       X-Ray Chest 1 View (Final result)  Result time 05/11/25 21:32:44      Final result by Doron Maynard MD (05/11/25 21:32:44)                   Impression:      As above      Electronically signed by: Doron Maynard  Date:    05/11/2025  Time:    21:32               Narrative:    EXAMINATION:  XR CHEST 1 VIEW    CLINICAL HISTORY:  shortness of breath;    TECHNIQUE:  Single frontal view of the chest was performed.    COMPARISON:  01/16/2025    FINDINGS:  Enlarged cardiac silhouette with pulmonary vascular congestion.  No focal consolidation.                                       Lab Results   Component Value Date    WBC 8.78 05/14/2025    HGB 9.5 (L) 05/14/2025    HCT 32.2 (L) 05/14/2025    MCV 83 05/14/2025     05/14/2025     BMP  Lab Results   Component Value Date     05/15/2025    K 4.3 05/15/2025    CL 99 05/15/2025    CO2 33 (H) 05/15/2025    BUN 38 (H) 05/15/2025    CREATININE 1.3 05/15/2025    CALCIUM 8.6 (L) 05/15/2025    ANIONGAP 6 (L)  05/15/2025    GLU 76 05/15/2025     05/14/2025     05/13/2025       Results for orders placed during the hospital encounter of 05/11/25    Echo    Interpretation Summary    Left Ventricle: The left ventricle is moderately dilated. There is moderate eccentric hypertrophy. Global hypokinesis present. There is moderately reduced systolic function with a visually estimated ejection fraction of 30 - 35%. Grade III diastolic dysfunction.    Right Ventricle: Right ventricle was not well visualized due to poor acoustic window. The right ventricle is dilated    Tricuspid Valve: There is mild regurgitation.    Pulmonary Artery: There is pulmonary hypertension. The estimated pulmonary artery systolic pressure is 64 mmHg.    IVC/SVC: Elevated venous pressure at 15 mmHg.            Pre-op Assessment    I have reviewed the Patient Summary Reports.     I have reviewed the Nursing Notes. I have reviewed the NPO Status.   I have reviewed the Medications.     Review of Systems  Anesthesia Hx:  No problems with previous Anesthesia             Denies Family Hx of Anesthesia complications.    Denies Personal Hx of Anesthesia complications.                    Social:  Non-Smoker       Hematology/Oncology:       -- Anemia:                                  EENT/Dental:  EENT/Dental Normal           Cardiovascular:     Hypertension   CAD (Three-vessel disease noted on CTA)       CHF   PVD (Status post bilateral extremity amputations)    ECG has been reviewed.                            Pulmonary:  Pulmonary Normal                       Renal/:  Chronic Renal Disease, ARF   Patient reports difficulty placing Weber catheter.             Hepatic/GI:  Hepatic/GI Normal                    Musculoskeletal:  Musculoskeletal Normal                Neurological:  Neurology Normal                                      Endocrine:  Diabetes         Morbid Obesity / BMI > 40  Psych:  Psychiatric History (Opioid use disorder)                   Physical Exam  General: Well nourished    Airway:  Mallampati: III   Mouth Opening: Normal  TM Distance: Normal  Tongue: Normal  Neck ROM: Normal ROM    Dental:  Intact    Chest/Lungs:  Clear to auscultation, Normal Respiratory Rate    Heart:  Rate: Normal  Rhythm: Regular Rhythm        Anesthesia Plan  Type of Anesthesia, risks & benefits discussed:    Anesthesia Type: Gen Natural Airway  Intra-op Monitoring Plan: Standard ASA Monitors  Post Op Pain Control Plan: IV/PO Opioids PRN and multimodal analgesia  Induction:  IV  Airway Plan: Video  Informed Consent: Informed consent signed with the Patient and all parties understand the risks and agree with anesthesia plan.  All questions answered.   ASA Score: 3  Anesthesia Plan Notes: General with natural airway.    Place a few pillows in his back  Propofol/ketamine  POM    Ready For Surgery From Anesthesia Perspective.     .           [1]   Patient Active Problem List  Diagnosis    S/P AKA (above knee amputation) unilateral, left    Obesity    S/P below knee amputation, right    Artificial knee joint present    Gait difficulty    Scrotal edema    Elevated d-dimer    Diabetes mellitus    Acute systolic congestive heart failure    MARLENE (acute kidney injury)    Opioid use disorder    Urinary tract infection associated with indwelling urethral catheter    HTN (hypertension)    Prolonged QT interval    Anemia    Hypoalbuminemia    Non-healing wound of amputation stump    Pressure ulcer of right buttock, stage 2
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
## 2025-05-15 NOTE — PT/OT/SLP EVAL
Physical Therapy Evaluation    Patient Name:  Gideon Ross   MRN:  [id number]    Recommendations:     Discharge Recommendations: Moderate Intensity Therapy   Discharge Equipment Recommendations: to be determined by next level of care   Barriers to discharge: high fall risk, increase assist with mobility, generalized swelling,     Assessment:     Gideon Ross is a 49 y.o. male admitted with a medical diagnosis of Acute systolic congestive heart failure.  He presents with the following impairments/functional limitations: weakness, impaired endurance, impaired self care skills, impaired functional mobility, impaired balance, decreased safety awareness, pain, impaired cardiopulmonary response to activity, edema.    Pt found in bed with HOB elevated. Pt agreeable to visit. Pt reports generalized swelling. Pt reports abdominal swelling is limiting his ability to mobilize with increase independence. Pt requires mod A x 2 with HOB elevated. Once at EOB pt able to hold self up with B UE support and improved to R UE support only to use L UE functionally. Pt state EOB x 10 mins with supervision.     Rehab Prognosis: Fair; patient would benefit from acute skilled PT services to address these deficits and reach maximum level of function.    Recent Surgery: Procedure(s) (LRB):  CYSTOSCOPY,WITH URETERAL CATHETER INSERTION (N/A) * Day of Surgery *    Plan:     During this hospitalization, patient to be seen 5 x/week to address the identified rehab impairments via therapeutic activities, therapeutic exercises, neuromuscular re-education and progress toward the following goals:    Plan of Care Expires:  06/15/25    Subjective     Chief Complaint: swelling  Patient/Family Comments/goals: improve swelling and go to SNF  Pain/Comfort:  Pain Rating 1: other (see comments) (discomfort from generalized swelling)    Patients cultural, spiritual, Jain conflicts given the current situation: no    Living Environment:  Pt reports that he  lives with his grandmom in a one story home  Prior to admission, patients level of function was MI wheelchair level.  Equipment used at home: wheelchair.  DME owned (not currently used): none.  Upon discharge, patient will have assistance from self.    Objective:     Communicated with RN prior to session.  Patient found HOB elevated with peripheral IV, telemetry  upon PT entry to room.    General Precautions: Standard, fall  Orthopedic Precautions:N/A   Braces: N/A  Respiratory Status: Nasal cannula, flow 3 L/min    Exams:  RLE ROM: BKA ROM slightly limited due to swelling  RLE Strength: Hip flexion and knee ext 3+/5  LLE ROM: AKA hip flexion limited due to swelling  LLE Strength: 3-/5    Functional Mobility:  Bed Mobility:     Supine to Sit: moderate assistance and of 2 persons  Sit to Supine: moderate assistance and of 2 persons      AM-PAC 6 CLICK MOBILITY  Total Score:8       Treatment & Education:  Pt educated on POC, discharge recommendation, importance of time OOB, midline seated positioning, sitting balance, need for assist with mobility, use of call bell to seek assistance as needed and fall prevention      Patient left HOB elevated with all lines intact, call button in reach, and bed alarm on.    GOALS:   Multidisciplinary Problems       Physical Therapy Goals          Problem: Physical Therapy    Goal Priority Disciplines Outcome Interventions   Physical Therapy Goal     PT, PT/OT Progressing    Description: Goals to be met by: 6/15/25     Patient will increase functional independence with mobility by performin. Supine to sit with Supervision  2. Bed to chair transfer with scoot pivot and Supervision  3. Pt to tolerate sitting EOB x 15 mins with supervision                               DME Justifications:  No DME recommended requiring DME justifications    History:     Past Medical History:   Diagnosis Date    Diabetes mellitus     Hypertension        Past Surgical History:   Procedure Laterality  Date    SKIN GRAFT         Time Tracking:     PT Received On: 05/15/25  PT Start Time: 0850     PT Stop Time: 0910  PT Total Time (min): 20 min     Billable Minutes: Evaluation 10 and Therapeutic Activity 10      05/15/2025

## 2025-05-15 NOTE — ASSESSMENT & PLAN NOTE
Patient's blood pressure range in the last 24 hours was: BP  Min: 131/82  Max: 181/93.The patient's inpatient anti-hypertensive regimen is listed below:  Current Antihypertensives  furosemide injection 60 mg, Every 12 hours, Intravenous    Plan  -will make adjustment as needed

## 2025-05-16 PROBLEM — B18.2 CHRONIC HEPATITIS C WITHOUT HEPATIC COMA: Status: ACTIVE | Noted: 2025-05-16

## 2025-05-16 LAB
ALBUMIN SERPL-MCNC: 2.8 G/DL (ref 3.5–5.2)
ALP SERPL-CCNC: 181 UNIT/L (ref 55–135)
ALT SERPL-CCNC: 4 UNIT/L (ref 10–44)
ANION GAP (SMH): 7 MMOL/L (ref 8–16)
AST SERPL-CCNC: 14 UNIT/L (ref 10–40)
BILIRUB SERPL-MCNC: 0.4 MG/DL (ref 0.1–1)
BUN SERPL-MCNC: 41 MG/DL (ref 6–20)
C-REACTIVE PROTEIN (SMH): 5.3 MG/DL
CALCIUM SERPL-MCNC: 8.6 MG/DL (ref 8.7–10.5)
CHLORIDE SERPL-SCNC: 98 MMOL/L (ref 95–110)
CO2 SERPL-SCNC: 32 MMOL/L (ref 23–29)
CREAT SERPL-MCNC: 1.4 MG/DL (ref 0.5–1.4)
GFR SERPLBLD CREATININE-BSD FMLA CKD-EPI: >60 ML/MIN/1.73/M2
GLUCOSE SERPL-MCNC: 148 MG/DL (ref 70–110)
LACTATE SERPL-SCNC: 0.9 MMOL/L (ref 0.5–1.9)
MAGNESIUM SERPL-MCNC: 2.1 MG/DL (ref 1.6–2.6)
POCT GLUCOSE: 118 MG/DL (ref 70–110)
POCT GLUCOSE: 143 MG/DL (ref 70–110)
POCT GLUCOSE: 151 MG/DL (ref 70–110)
POTASSIUM SERPL-SCNC: 4.6 MMOL/L (ref 3.5–5.1)
PROT SERPL-MCNC: 6.6 GM/DL (ref 6–8.4)
SODIUM SERPL-SCNC: 137 MMOL/L (ref 136–145)

## 2025-05-16 PROCEDURE — 63600175 PHARM REV CODE 636 W HCPCS

## 2025-05-16 PROCEDURE — 99900031 HC PATIENT EDUCATION (STAT)

## 2025-05-16 PROCEDURE — 63600175 PHARM REV CODE 636 W HCPCS: Performed by: STUDENT IN AN ORGANIZED HEALTH CARE EDUCATION/TRAINING PROGRAM

## 2025-05-16 PROCEDURE — 99900035 HC TECH TIME PER 15 MIN (STAT)

## 2025-05-16 PROCEDURE — 80053 COMPREHEN METABOLIC PANEL: CPT

## 2025-05-16 PROCEDURE — 25000003 PHARM REV CODE 250: Performed by: NURSE PRACTITIONER

## 2025-05-16 PROCEDURE — 94761 N-INVAS EAR/PLS OXIMETRY MLT: CPT

## 2025-05-16 PROCEDURE — 83735 ASSAY OF MAGNESIUM: CPT

## 2025-05-16 PROCEDURE — 36415 COLL VENOUS BLD VENIPUNCTURE: CPT

## 2025-05-16 PROCEDURE — 63600175 PHARM REV CODE 636 W HCPCS: Performed by: NURSE PRACTITIONER

## 2025-05-16 PROCEDURE — 83605 ASSAY OF LACTIC ACID: CPT | Performed by: NURSE PRACTITIONER

## 2025-05-16 PROCEDURE — 86140 C-REACTIVE PROTEIN: CPT | Performed by: STUDENT IN AN ORGANIZED HEALTH CARE EDUCATION/TRAINING PROGRAM

## 2025-05-16 PROCEDURE — 99233 SBSQ HOSP IP/OBS HIGH 50: CPT | Mod: ,,, | Performed by: STUDENT IN AN ORGANIZED HEALTH CARE EDUCATION/TRAINING PROGRAM

## 2025-05-16 PROCEDURE — 27000221 HC OXYGEN, UP TO 24 HOURS

## 2025-05-16 PROCEDURE — 25000003 PHARM REV CODE 250: Performed by: STUDENT IN AN ORGANIZED HEALTH CARE EDUCATION/TRAINING PROGRAM

## 2025-05-16 PROCEDURE — S4991 NICOTINE PATCH NONLEGEND: HCPCS | Performed by: STUDENT IN AN ORGANIZED HEALTH CARE EDUCATION/TRAINING PROGRAM

## 2025-05-16 PROCEDURE — 11000001 HC ACUTE MED/SURG PRIVATE ROOM

## 2025-05-16 PROCEDURE — 25000003 PHARM REV CODE 250: Performed by: INTERNAL MEDICINE

## 2025-05-16 PROCEDURE — 25000003 PHARM REV CODE 250

## 2025-05-16 PROCEDURE — 25000003 PHARM REV CODE 250: Performed by: FAMILY MEDICINE

## 2025-05-16 PROCEDURE — 97535 SELF CARE MNGMENT TRAINING: CPT

## 2025-05-16 RX ORDER — DIPHENHYDRAMINE HCL 25 MG
25 CAPSULE ORAL EVERY 6 HOURS PRN
Status: DISCONTINUED | OUTPATIENT
Start: 2025-05-16 | End: 2025-05-17

## 2025-05-16 RX ORDER — ISOSORBIDE DINITRATE 10 MG/1
20 TABLET ORAL 3 TIMES DAILY
Status: DISCONTINUED | OUTPATIENT
Start: 2025-05-16 | End: 2025-05-27 | Stop reason: HOSPADM

## 2025-05-16 RX ORDER — FUROSEMIDE 10 MG/ML
20 INJECTION INTRAMUSCULAR; INTRAVENOUS ONCE
Status: COMPLETED | OUTPATIENT
Start: 2025-05-16 | End: 2025-05-16

## 2025-05-16 RX ORDER — METOLAZONE 2.5 MG/1
5 TABLET ORAL ONCE
Status: COMPLETED | OUTPATIENT
Start: 2025-05-16 | End: 2025-05-16

## 2025-05-16 RX ORDER — MUPIROCIN 20 MG/G
OINTMENT TOPICAL 2 TIMES DAILY
Status: DISPENSED | OUTPATIENT
Start: 2025-05-16 | End: 2025-05-21

## 2025-05-16 RX ORDER — DOBUTAMINE HYDROCHLORIDE 400 MG/100ML
5 INJECTION INTRAVENOUS CONTINUOUS
Status: DISCONTINUED | OUTPATIENT
Start: 2025-05-16 | End: 2025-05-16

## 2025-05-16 RX ORDER — METOLAZONE 2.5 MG/1
5 TABLET ORAL DAILY
Status: DISCONTINUED | OUTPATIENT
Start: 2025-05-16 | End: 2025-05-21

## 2025-05-16 RX ORDER — TAMSULOSIN HYDROCHLORIDE 0.4 MG/1
0.4 CAPSULE ORAL DAILY
Status: DISCONTINUED | OUTPATIENT
Start: 2025-05-17 | End: 2025-05-27 | Stop reason: HOSPADM

## 2025-05-16 RX ORDER — FUROSEMIDE 10 MG/ML
80 INJECTION INTRAMUSCULAR; INTRAVENOUS EVERY 12 HOURS
Status: DISCONTINUED | OUTPATIENT
Start: 2025-05-16 | End: 2025-05-17

## 2025-05-16 RX ADMIN — DIPHENHYDRAMINE HYDROCHLORIDE 25 MG: 25 CAPSULE ORAL at 08:05

## 2025-05-16 RX ADMIN — CHLORHEXIDINE GLUCONATE 0.12% ORAL RINSE 15 ML: 1.2 LIQUID ORAL at 10:05

## 2025-05-16 RX ADMIN — MORPHINE SULFATE 2 MG: 2 INJECTION, SOLUTION INTRAMUSCULAR; INTRAVENOUS at 08:05

## 2025-05-16 RX ADMIN — MORPHINE SULFATE 2 MG: 2 INJECTION, SOLUTION INTRAMUSCULAR; INTRAVENOUS at 04:05

## 2025-05-16 RX ADMIN — MUPIROCIN 1 G: 20 OINTMENT TOPICAL at 08:05

## 2025-05-16 RX ADMIN — METOLAZONE 5 MG: 2.5 TABLET ORAL at 10:05

## 2025-05-16 RX ADMIN — MUPIROCIN 1 G: 20 OINTMENT TOPICAL at 10:05

## 2025-05-16 RX ADMIN — ENOXAPARIN SODIUM 40 MG: 40 INJECTION SUBCUTANEOUS at 06:05

## 2025-05-16 RX ADMIN — CEFAZOLIN 2 G: 2 INJECTION, POWDER, FOR SOLUTION INTRAMUSCULAR; INTRAVENOUS at 10:05

## 2025-05-16 RX ADMIN — SENNOSIDES AND DOCUSATE SODIUM 1 TABLET: 50; 8.6 TABLET ORAL at 10:05

## 2025-05-16 RX ADMIN — DIPHENHYDRAMINE HYDROCHLORIDE 25 MG: 25 CAPSULE ORAL at 03:05

## 2025-05-16 RX ADMIN — MORPHINE SULFATE 2 MG: 2 INJECTION, SOLUTION INTRAMUSCULAR; INTRAVENOUS at 02:05

## 2025-05-16 RX ADMIN — CEFAZOLIN 2 G: 2 INJECTION, POWDER, FOR SOLUTION INTRAMUSCULAR; INTRAVENOUS at 06:05

## 2025-05-16 RX ADMIN — CEFAZOLIN 2 G: 2 INJECTION, POWDER, FOR SOLUTION INTRAMUSCULAR; INTRAVENOUS at 04:05

## 2025-05-16 RX ADMIN — ISOSORBIDE DINITRATE 20 MG: 10 TABLET ORAL at 08:05

## 2025-05-16 RX ADMIN — NICOTINE 1 PATCH: 21 PATCH, EXTENDED RELEASE TRANSDERMAL at 11:05

## 2025-05-16 RX ADMIN — FUROSEMIDE 80 MG: 10 INJECTION, SOLUTION INTRAMUSCULAR; INTRAVENOUS at 08:05

## 2025-05-16 RX ADMIN — CHLORHEXIDINE GLUCONATE 0.12% ORAL RINSE 15 ML: 1.2 LIQUID ORAL at 08:05

## 2025-05-16 RX ADMIN — METOLAZONE 5 MG: 2.5 TABLET ORAL at 08:05

## 2025-05-16 RX ADMIN — SENNOSIDES AND DOCUSATE SODIUM 1 TABLET: 50; 8.6 TABLET ORAL at 08:05

## 2025-05-16 RX ADMIN — FUROSEMIDE 20 MG: 10 INJECTION, SOLUTION INTRAMUSCULAR; INTRAVENOUS at 10:05

## 2025-05-16 RX ADMIN — INSULIN GLARGINE 10 UNITS: 100 INJECTION, SOLUTION SUBCUTANEOUS at 11:05

## 2025-05-16 RX ADMIN — BUPRENORPHINE AND NALOXONE 2 FILM: 8; 2 FILM BUCCAL; SUBLINGUAL at 10:05

## 2025-05-16 RX ADMIN — CEFAZOLIN 2 G: 2 INJECTION, POWDER, FOR SOLUTION INTRAMUSCULAR; INTRAVENOUS at 11:05

## 2025-05-16 NOTE — PT/OT/SLP PROGRESS
Occupational Therapy   Treatment    Name: Gideon Ross  MRN: [id number]  Admitting Diagnosis:  Acute systolic congestive heart failure  1 Day Post-Op    Recommendations:     Discharge Recommendations: Moderate Intensity Therapy  Discharge Equipment Recommendations:  to be determined by next level of care  Barriers to discharge:  Inaccessible home environment, Decreased caregiver support    Assessment:     Gideon Ross is a 49 y.o. male with a medical diagnosis of Acute systolic congestive heart failure.  He presents withdrawn, stating he feels alone. Performance deficits affecting function are weakness, impaired endurance, impaired self care skills, impaired functional mobility, impaired balance, decreased lower extremity function, pain, edema.     Rehab Prognosis:  Fair; patient would benefit from acute skilled OT services to address these deficits and reach maximum level of function.       Plan:     Patient to be seen 5 x/week to address the above listed problems via self-care/home management, therapeutic activities, therapeutic exercises  Plan of Care Expires: 06/14/25  Plan of Care Reviewed with: patient    Subjective     Chief Complaint: overload fluid in his body.   Patient/Family Comments/goals: getting his WC from home  Pain/Comfort:   none    Objective:     Communicated with: nurse prior to session.  Patient found supine with valle catheter, telemetry, peripheral IV upon OT entry to room.    General Precautions: Standard, fall    Orthopedic Precautions:N/A  Braces: N/A  Respiratory Status: Nasal cannula, flow 2 L/min     Occupational Performance: during today's session pt seems withdrawn and low spirits, concerned about his medical status and prescriptions he hasn't been able to . Al concerns communicated to nurse    Bed Mobility:    Patient completed Supine to Sit with maximal assistance  Patient completed Sit to Supine with maximal assistance       Activities of Daily Living:  Grooming: contact  guard assistance and and verbal cues for oral hygiene    Toileting: stand by assistance      Treatment & Education:  Pt educated on role of OT/POC, importance of OOB/EOB activity, use of call bell, and safety during ADLs, transfers, and functional mobility.     Patient left HOB elevated with all lines intact, call button in reach, and bed alarm on    GOALS:   Multidisciplinary Problems       Occupational Therapy Goals          Problem: Occupational Therapy    Goal Priority Disciplines Outcome Interventions   Occupational Therapy Goal     OT, PT/OT     Description: Goals to be met by: 6/14/2025     Patient will increase functional independence with ADLs by performing:    UE Dressing with Supervision.  LE Dressing with Minimal Assistance.  Grooming while seated with Supervision.  Toileting from bedside commode with Minimal Assistance for hygiene and clothing management.                          DME Justifications:  No DME recommended requiring DME justifications    Time Tracking:     OT Date of Treatment: 05/16/25  OT Start Time: 1014  OT Stop Time: 1034  OT Total Time (min): 20 min    Billable Minutes:Self Care/Home Management 20    OT/LEI: OT          5/16/2025

## 2025-05-16 NOTE — ASSESSMENT & PLAN NOTE
Patient's blood pressure range in the last 24 hours was: BP  Min: 142/81  Max: 185/103.The patient's inpatient anti-hypertensive regimen is listed below:  Current Antihypertensives  furosemide injection 80 mg, Every 12 hours, Intravenous  metOLazone tablet 5 mg, Daily, Oral  metOLazone tablet 5 mg, Once, Oral    Plan  -will make adjustment as needed

## 2025-05-16 NOTE — ASSESSMENT & PLAN NOTE
S/p R BKA and L AKA  Patient's FSGs are uncontrolled due to hyperglycemia on current medication regimen.  Last A1c reviewed-   Lab Results   Component Value Date    HGBA1C 7.3 (H) 05/12/2025     Most recent fingerstick glucose reviewed-   Recent Labs   Lab 05/15/25  1603 05/15/25  2044 05/16/25  0807 05/16/25  1128   POCTGLUCOSE 157* 159* 118* 143*     Current correctional scale  Low  Maintain anti-hyperglycemic dose as follows-   Antihyperglycemics (From admission, onward)      Start     Stop Route Frequency Ordered    05/12/25 1445  insulin glargine U-100 (Lantus) pen 10 Units         -- SubQ Daily 05/12/25 1332    05/12/25 0943  insulin aspart U-100 pen 0-10 Units         -- SubQ Before meals & nightly PRN 05/12/25 0843

## 2025-05-16 NOTE — CARE UPDATE
Pt will not tolerate lying flat for MRI for further evaluation of the stump infection.  I discussed with ID-if kidneys are stable tomorrow, plan to obtain contrasted CT of the stump to better evaluate.

## 2025-05-16 NOTE — RESPIRATORY THERAPY
05/15/25 1931   Patient Assessment/Suction   Level of Consciousness (AVPU) alert   Respiratory Effort Normal;Unlabored   Expansion/Accessory Muscles/Retractions no use of accessory muscles   All Lung Fields Breath Sounds diminished;clear   Rhythm/Pattern, Respiratory unlabored;pattern regular;depth regular;no shortness of breath reported   PRE-TX-O2   Device (Oxygen Therapy) nasal cannula   Flow (L/min) (Oxygen Therapy) 3   SpO2 99 %   Pulse Oximetry Type Intermittent   $ Pulse Oximetry - Multiple Charge Pulse Oximetry - Multiple   Pulse 79   Resp 19   Aerosol Therapy   $ Aerosol Therapy Charges Aerosol Treatment   Daily Review of Necessity (SVN) completed   Respiratory Treatment Status (SVN) given   Treatment Route (SVN) mask;oxygen   Patient Position HOB elevated   Post Treatment Assessment (SVN) patient reports breathing improved   Signs of Intolerance (SVN) none   Breath Sounds Post-Respiratory Treatment   Post-treatment Heart Rate (beats/min) 79   Post-treatment Resp Rate (breaths/min) 19   Education   $ Education 15 min;Other (see comment)   Respiratory Evaluation   $ Care Plan Tech Time 15 min

## 2025-05-16 NOTE — PT/OT/SLP PROGRESS
Physical Therapy      Patient Name:  Gideon Ross   MRN:  [id number]    Patient not seen today secondary to Other (Comment) (First attempt pt declined, second attempt pt eating lunch.). Will follow-up 5/19/25.

## 2025-05-16 NOTE — ASSESSMENT & PLAN NOTE
Pt with known hep C status historically   -quantitative RNA pending   -will need outpatient follow-up for definitive hep C treatment

## 2025-05-16 NOTE — PROGRESS NOTES
[facility]  Department of Cardiology  Progress Note      PATIENT NAME: Gideon Ross  MRN: [id number]  TODAY'S DATE: 05/16/2025  ADMIT DATE: 5/11/2025                          CONSULT REQUESTED BY: Pee Bolton MD    SUBJECTIVE     PRINCIPAL PROBLEM: Acute systolic congestive heart failure      REASON FOR CONSULT:  Acute on chronic CHF, life vest patient      INTERVAL HISTORY:  5/16/25  Weber inserted yesterday; more urine output today; -1600 net balance  VSS, remains edematous    5/15/25  Cr 1.3 today; + fluid balance but output has been difficult to measure accurately  Remains edematous    5/14/25  Cr 1.2; difficulty collecting accurate output  Remains edematous and orthopneic  No events noted on telemetry    5/13/25  Hgb 9.5; Cr 1.1; + net balance; BP elevated;  Pt still has orthopnea; he feels his hands are less swollen today    HPI:  Pt is 49-year-old male w/ PMH HFrEF, DM, HTN, DM, cardiomyopathy, R BKA, left AKA, and substance dependence who presented to ED with c/o shortness of breath, swelling, and orthopnea for past week.    Pt has been receiving IV lasix since admission and is reportedly breathing better today but remains orthopneic, and swelling to upper legs, scrotum and abdomen still present. Still on 4 LPM O2    Review of patient's allergies indicates:  No Known Allergies    Past Medical History:   Diagnosis Date    Diabetes mellitus     Hypertension      Past Surgical History:   Procedure Laterality Date    CYSTOSCOPY,WITH URETERAL CATHETER INSERTION N/A 5/15/2025    Procedure: CYSTOSCOPY,WITH URETERAL CATHETER INSERTION;  Surgeon: Yoni Lemus MD;  Location: [facility];  Service: Urology;  Laterality: N/A;    SKIN GRAFT       Social History[1]     REVIEW OF SYSTEMS  Negative except as mentioned in HPI    OBJECTIVE     VITAL SIGNS (Most Recent)  Temp: 97.4 °F (36.3 °C) (05/16/25 1115)  Pulse: 94 (05/16/25 1115)  Resp: 18 (05/16/25 1438)  BP: (!) 150/65 (05/16/25 1115)  SpO2: 99  % (05/16/25 1115)    VENTILATION STATUS  Resp: 18 (05/16/25 1438)  SpO2: 99 % (05/16/25 1115)           I & O (Last 24H):  Intake/Output Summary (Last 24 hours) at 5/16/2025 1509  Last data filed at 5/16/2025 1203  Gross per 24 hour   Intake 640 ml   Output 2700 ml   Net -2060 ml       WEIGHTS  Wt Readings from Last 3 Encounters:   05/14/25 1045 (!) 138.4 kg (305 lb 1.9 oz)   05/12/25 1239 97.5 kg (214 lb 15.2 oz)   05/11/25 1954 97.5 kg (215 lb)   05/12/25 1048 97.5 kg (214 lb 15.2 oz)   01/26/25 0338 117.5 kg (259 lb 0.7 oz)   01/21/25 0515 117.9 kg (260 lb)   01/15/25 0400 126.1 kg (278 lb)   01/14/25 0400 127.1 kg (280 lb 3.2 oz)   01/09/25 2239 115.4 kg (254 lb 6.4 oz)   01/09/25 0824 108.9 kg (240 lb)       PHYSICAL EXAM    GENERAL: chronically ill middle age male breathing comfortably w/ HOB elevated  HEENT: Normocephalic.  +pallor; facial edema  NECK: No JVD.   CARDIAC: Regular rate and rhythm. S1 is normal.S2 is normal.No gallops, clicks or murmurs noted at this time.  CHEST ANATOMY: normal.   LUNGS: O2 via NC, no dyspnea or cough; crackles to bases  ABDOMEN: ascites, obese, .  +scrotal edema    EXTREMITIES: edematous; right BKA edematous with wound, Left AKA edematous  CENTRAL NERVOUS SYSTEM: AAO x 3  SKIN: No rash     HOME MEDICATIONS:Medications Ordered Prior to Encounter[2]    SCHEDULED MEDS:   buprenorphine-naloxone 8-2 mg  2 Film Sublingual BID    ceFAZolin (Ancef) IV (PEDS and ADULTS)  2 g Intravenous Q6H    chlorhexidine  15 mL Mouth/Throat BID    enoxparin  40 mg Subcutaneous Daily    furosemide (LASIX) injection  80 mg Intravenous Q12H    insulin glargine U-100  10 Units Subcutaneous Daily    metOLazone  5 mg Oral Daily    metOLazone  5 mg Oral Once    mupirocin   Nasal BID    nicotine  1 patch Transdermal Daily    senna-docusate  1 tablet Oral BID       CONTINUOUS INFUSIONS:    PRN MEDS:  Current Facility-Administered Medications:     acetaminophen, 650 mg, Oral, Q6H PRN    albuterol-ipratropium,  "3 mL, Nebulization, Q6H PRN    bisacodyL, 10 mg, Rectal, Daily PRN    dextrose 50%, 12.5 g, Intravenous, PRN    dextrose 50%, 25 g, Intravenous, PRN    glucagon (human recombinant), 1 mg, Intramuscular, PRN    glucose, 16 g, Oral, PRN    glucose, 24 g, Oral, PRN    insulin aspart U-100, 0-10 Units, Subcutaneous, QID (AC + HS) PRN    melatonin, 6 mg, Oral, Nightly PRN    morphine, 2 mg, Intravenous, Q4H PRN    promethazine, 12.5 mg, Rectal, Q6H PRN    sodium chloride 0.9%, 10 mL, Intravenous, PRN    LABS AND DIAGNOSTICS     CBC LAST 3 DAYS  Recent Labs   Lab 05/12/25  0501 05/13/25  0523 05/14/25  0650   WBC 8.33 8.45 8.78   RBC 4.12* 3.79* 3.87*   HGB 10.2* 9.5* 9.5*   HCT 34.0* 31.3* 32.2*   MCV 83 83 83   MCH 24.8* 25.1* 24.5*   MCHC 30.0* 30.4* 29.5*   RDW 16.0* 15.9* 16.2*    216 221   MPV 10.0 10.0 10.1   NRBC 0 0 0       COAGULATION LAST 3 DAYS  No results for input(s): "LABPT", "INR", "APTT" in the last 168 hours.    CHEMISTRY LAST 3 DAYS  Recent Labs   Lab 05/14/25  0650 05/15/25  0433 05/16/25  0454    138 137   K 4.3 4.3 4.6   CL 99 99 98   CO2 31* 33* 32*   ANIONGAP 8 6* 7*   BUN 34* 38* 41*   CREATININE 1.2 1.3 1.4    76 148*   CALCIUM 8.4* 8.6* 8.6*   MG 1.9 2.0 2.1   ALBUMIN 2.8* 2.7* 2.8*   PROT 6.4 6.3 6.6   ALKPHOS 144* 149* 181*   ALT 6* 6* 4*   AST 11 13 14   BILITOT 0.5 0.4 0.4       CARDIAC PROFILE LAST 3 DAYS  Recent Labs   Lab 05/11/25  2106   BNP 1,998*       ENDOCRINE LAST 3 DAYS  Recent Labs   Lab 05/12/25  0040   TSH 4.416       LAST ARTERIAL BLOOD GAS  ABG  No results for input(s): "PH", "PO2", "PCO2", "HCO3", "BE" in the last 168 hours.    LAST 7 DAYS MICROBIOLOGY   Microbiology Results (last 7 days)       Procedure Component Value Units Date/Time    Blood culture [[id number]]  (Normal) Collected: 05/12/25 0004    Order Status: Completed Specimen: Blood Updated: 05/15/25 0103     CULTURE, BLOOD (H) No Growth After 72 Hours    Blood culture [[id number]]  (Normal) " Collected: 05/11/25 2331    Order Status: Completed Specimen: Blood Updated: 05/15/25 0011     CULTURE, BLOOD ([facility]) No Growth After 72 Hours    Aerobic culture [[id number]]  (Abnormal)  (Susceptibility) Collected: 05/12/25 0040    Order Status: Completed Specimen: Wound from Leg, Right Updated: 05/14/25 0634     CULTURE, AEROBIC Moderate Staphylococcus aureus    Influenza A & B by Molecular [[id number]]  (Normal) Collected: 05/12/25 0040    Order Status: Completed Specimen: Nasal Swab Updated: 05/12/25 0150     INFLUENZA A MOLECULAR Negative     INFLUENZA B MOLECULAR  Negative            MOST RECENT IMAGING  Echo    Left Ventricle: The left ventricle is moderately dilated. There is   moderate eccentric hypertrophy. Global hypokinesis present. There is   moderately reduced systolic function with a visually estimated ejection   fraction of 30 - 35%. Grade III diastolic dysfunction.    Right Ventricle: Right ventricle was not well visualized due to poor   acoustic window. The right ventricle is dilated    Tricuspid Valve: There is mild regurgitation.    Pulmonary Artery: There is pulmonary hypertension. The estimated   pulmonary artery systolic pressure is 64 mmHg.    IVC/SVC: Elevated venous pressure at 15 mmHg.  CTA Chest Non-Coronary (PE Studies)  Narrative: EXAMINATION:  CTA CHEST NON CORONARY (PE STUDIES)    CLINICAL HISTORY:  Pulmonary embolism (PE) suspected, positive D-dimer;    TECHNIQUE:  Thin axial imaging through the chest was performed with 100 mL Omnipaque 350 IV contrast, with sagittal and coronal reformatted images and MIP reconstructions performed, with images stored in the patient's permanent electronic medical record.    FINDINGS:  Comparison to multiple prior exams.  The exam is limited by poor timing of the contrast bolus, which prevents evaluation of the peripheral pulmonary arteries.  There are no central pulmonary arterial filling defects to suggest pulmonary thromboembolism, with the central  pulmonary arteries normal in caliber.    The aorta is normal in caliber, with minimal calcified plaque.  The heart is normal in size, with no pericardial effusion.  There are moderate three-vessel coronary arterial calcifications, with no enlarged mediastinal or hilar lymph nodes.    There are scattered reticular densities in geographic ground-glass opacities in both lungs, with areas of interlobular septal thickening, as well as bandlike and irregular upper lobe airspace opacities.  There is left lower lobe consolidation and volume loss suggesting atelectasis, with dependent right lower lobe atelectasis, and small low-density bilateral pleural effusions.  The central airways are patent, with scattered bronchial wall thickening.  No central mucous plugging or pneumothorax.    Images of the upper abdomen are unremarkable.  There is extensive diffuse reticular edema throughout the subcutaneous fat.  No acute fractures.  Impression: 1. Limited exam as described, with no central pulmonary thromboembolism.  2. Nonspecific interstitial and airspace opacities throughout both lungs, perhaps reflecting multifocal bronchitis, pneumonia and atelectasis in the appropriate clinical setting.  Concurrent interstitial pulmonary edema-hypervolemia is not excluded.  3. Dependent atelectasis in the lower lobes left greater than right, with small bilateral pleural effusions.  4. Moderate three-vessel coronary arterial calcifications.  5. Diffuse edema throughout the subcutaneous fat suggesting hypervolemia-anasarca.    Electronically signed by: Eliseo Jo  Date:    05/13/2025  Time:    11:43      ECHOCARDIOGRAM RESULTS (last 5)  Results for orders placed during the hospital encounter of 01/09/25    Echo    Interpretation Summary    Left Ventricle: The left ventricle is moderately dilated. Mildly increased wall thickness. There is mild asymmetric hypertrophy. Severe global hypokinesis present. There is severely reduced systolic  function with a visually estimated ejection fraction of 25 - 30%.    Right Ventricle: Moderate right ventricular enlargement. Systolic function is mildly reduced.    Left Atrium: Left atrium is mildly dilated.    Tricuspid Valve: There is mild regurgitation.    IVC/SVC: Elevated venous pressure at 15 mmHg.      CURRENT/PREVIOUS VISIT EKG  Results for orders placed or performed during the hospital encounter of 05/11/25   EKG 12-lead    Collection Time: 05/12/25  4:05 AM   Result Value Ref Range    QRS Duration 152 ms    [facility] QTC Calculation 462 ms    Narrative    Test Reason : R06.02,    Vent. Rate :  82 BPM     Atrial Rate :  82 BPM     P-R Int : 112 ms          QRS Dur : 152 ms      QT Int : 396 ms       P-R-T Axes :  48 202  50 degrees    QTcB Int : 462 ms    Normal sinus rhythm  Right bundle branch block  Inferior infarct ,age undetermined  Abnormal ECG  No previous ECGs available    Referred By: AAAREFERRAL SELF           Confirmed By:            ASSESSMENT/PLAN:     Active Hospital Problems    Diagnosis    *Acute systolic congestive heart failure    Chronic hepatitis C without hepatic coma    Pressure ulcer of right buttock, stage 2    HTN (hypertension)    Prolonged QT interval    Hypoalbuminemia    Non-healing wound of amputation stump    Opioid use disorder    Diabetes mellitus    Scrotal edema    Elevated d-dimer       ASSESSMENT & PLAN:   Acute on chronic HFrEF  Dilated Cardiomyopathy  Substance abuse  Right BKA wound  Left AKA    RECOMMENDATIONS:  Efx 30-35%; gr III diastolic dysfunction and pulmonary hypertension  Remains edematous and orthopneic  Increased Lasix to 80 mg BID today;   [give additional 20 mg IV Lasix this AM since already received 60 mg today]  Added metolazone 5 mg to be given 30 mins prior to lasix administration today and tonight  Cr 1.4 ; GFR>60  If urine output doesn't improve or kidney function worsens, start Dobutamine drip    Plan for ischemic evaluation after recovers from  "HF      Denice Juan FNP-C, CVNP-BC  [facility]  Department of Cardiology  Date of Service: 05/16/2025               [1]   Social History  Tobacco Use    Smoking status: Every Day     Current packs/day: 1.00     Average packs/day: 1 pack/day for 40.4 years (40.4 ttl pk-yrs)     Types: Cigarettes     Start date: 1985   Substance Use Topics    Alcohol use: Yes    Drug use: No   [2]   No current facility-administered medications on file prior to encounter.     Current Outpatient Medications on File Prior to Encounter   Medication Sig Dispense Refill    isosorbide dinitrate (ISORDIL) 20 MG tablet Take 1 tablet (20 mg total) by mouth 3 (three) times daily. 90 tablet 11    tamsulosin (FLOMAX) 0.4 mg Cap Take 1 capsule (0.4 mg total) by mouth once daily. 30 capsule 11    torsemide (DEMADEX) 20 MG Tab Take 1 tablet (20 mg total) by mouth 2 (two) times a day. 60 tablet 11    insulin aspart U-100 (NOVOLOG) 100 unit/mL (3 mL) InPn pen Inject 0-10 Units into the skin before meals and at bedtime as needed (Hyperglycemia). **MODERATE CORRECTION DOSE**  Blood Glucose  mg/dL                  Pre-meal                2200  151-200                2 units                    1 unit  201-250                4 units                    2 units  251-300                6 units                    3 units  301-350                8 units                    4 units  >350                     10 units                  5 units  Administer subcutaneously if needed at times designated by monitoring  schedule.  DO NOT HOLD correction dose insulin for patients who are  NPO.    "HIGH ALERT MEDICATION" - Administer with meals or TF/TPN. (Patient not taking: Reported on 2/12/2025) 1 each 0     "
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
## 2025-05-16 NOTE — RESPIRATORY THERAPY
05/16/25 1045   Patient Assessment/Suction   Level of Consciousness (AVPU) alert   Respiratory Effort Unlabored   PRE-TX-O2   Device (Oxygen Therapy) nasal cannula   $ Is the patient on Low Flow Oxygen? Yes   Flow (L/min) (Oxygen Therapy) 2   SpO2 98 %   Pulse Oximetry Type Intermittent   $ Pulse Oximetry - Multiple Charge Pulse Oximetry - Multiple   Pulse 94   Resp 18
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
## 2025-05-16 NOTE — PROGRESS NOTES
[facility] Medicine  Progress Note    Patient Name: Gideon Ross  MRN: [id number]  Patient Class: IP- Inpatient   Admission Date: 5/11/2025  Length of Stay: 3 days  Attending Physician: Pee Bolton MD  Primary Care Provider: Maria Del Carmen Garcia, DARIN-C        Subjective     Principal Problem:Acute systolic congestive heart failure        HPI:  49-year-old male presented to ED via EMS for eval of shortness of breath. pMHx CHF, DM, HTN, DM, cardiomyopathy, R BKA, left AKA, substance dependence.  Patient is a poor historian.  Patient reported over the last week he has had progressively worsening lower extremity edema, generalized weakness, and shortness of breath.  He reported over the last 4 days he has had associated orthopnea and edema has spread to abdomen, scrotum, and upper arms.  He stated anasarca has made it difficult to carry out ADLs.  Patient also reported difficulty urinating 2/2 anasarca.  Patient was admitted in January of this year with similar symptoms for CHF exacerbation and was treated with Lasix drip, discharged with LifeVest, patient states he has not been wearing it for some time because it was stolen and no one ever brought it back.  Reported taking torsemide, Isordil, tamsulosin since discharge (has these medications at bedside), reported on Suboxone however stated ran out recently.  Patient also noted with draining wound to R stump, he is unable to say how long it has been like that. Echo 01/09/2025 with EF 25-30%. BNP 1998.  Initial troponin 24.2.  Corrected calcium 9.2, albumin 2.4. CXR impression with enlarged cardiac silhouette with pulmonary vascular congestion, no focal consolidation.  Patient given Lasix in ED. Admit to hospital medicine for further eval.    Overview/Hospital Course:  Pt was monitored closely after admission with compensated heart failure.  He was initiated on IV Lasix with cardiology consult.  He had profound scrotal and penile edema  precluding Weber placement and Urology was consulted.  He was found to have nonhealing wound to his amputation stump with MSSA growing and drainage.  He was initiated on IV Abx my MRI was ordered, and ID was consulted.  He was unstable to lay flat to undergo MRI at this time.  He continued with unmeasurable UOP due to penile/scrotal swelling and Urology took him to OR for Weber placement on 05/15.  He was slow diurese and diuretics were adjusted per Cardiology on 05/16.  Unfortunately, Pt lost his LifeVest prior to admission.  His management checked into this and a replacement would not be covered.    Interval History:  Pt seen and examined, continues with anasarca and dyspnea on exertion as well as orthopnea.  1100 mL UOP documented.  Pt with multiple complaints regarding quantity of food and quality of food.  Instructed Pt on appropriate portions and reason for diet restrictions.    Review of Systems   Constitutional:  Negative for chills and fever.   Respiratory:  Positive for shortness of breath. Negative for cough.    Cardiovascular:  Positive for leg swelling. Negative for chest pain.   Gastrointestinal:  Negative for nausea and vomiting.   Genitourinary:  Positive for difficulty urinating, penile swelling and scrotal swelling.   Musculoskeletal:  Negative for arthralgias and myalgias.     Objective:     Vital Signs (Most Recent):  Temp: 97.4 °F (36.3 °C) (05/16/25 1115)  Pulse: 94 (05/16/25 1115)  Resp: 18 (05/16/25 1115)  BP: (!) 150/65 (05/16/25 1115)  SpO2: 99 % (05/16/25 1115) Vital Signs (24h Range):  Temp:  [97.4 °F (36.3 °C)-98.3 °F (36.8 °C)] 97.4 °F (36.3 °C)  Pulse:  [79-94] 94  Resp:  [16-19] 18  SpO2:  [97 %-100 %] 99 %  BP: (142-185)/() 150/65     Weight: (!) 138.4 kg (305 lb 1.9 oz)  Body mass index is 43.78 kg/m².    Intake/Output Summary (Last 24 hours) at 5/16/2025 1407  Last data filed at 5/16/2025 1203  Gross per 24 hour   Intake 640 ml   Output 2700 ml   Net -2060 ml         Physical  "Exam  Vitals and nursing note reviewed.   Constitutional:       Appearance: Normal appearance.   Cardiovascular:      Rate and Rhythm: Normal rate.   Pulmonary:      Effort: Pulmonary effort is normal.      Breath sounds: Normal breath sounds.   Abdominal:      General: Abdomen is flat. Bowel sounds are normal.      Palpations: Abdomen is soft.   Genitourinary:     Comments: Scrotal and penile edema inverted penis  Weber in place with elena colored urine  Musculoskeletal:      Right lower leg: Edema present.      Left lower leg: Edema present.      Comments: Diffuse anasarca    Right BKA stump with non-healing wound   Skin:     Comments: See wound care note   Neurological:      General: No focal deficit present.      Mental Status: He is alert and oriented to person, place, and time. Mental status is at baseline.   Psychiatric:         Mood and Affect: Mood normal.         Behavior: Behavior normal.               Significant Labs: All pertinent labs within the past 24 hours have been reviewed.  CBC:   No results for input(s): "WBC", "HGB", "HCT", "PLT" in the last 48 hours.    CMP:   Recent Labs   Lab 05/15/25  0433 05/16/25  0454    137   K 4.3 4.6   CL 99 98   CO2 33* 32*   GLU 76 148*   BUN 38* 41*   CREATININE 1.3 1.4   CALCIUM 8.6* 8.6*   PROT 6.3 6.6   ALBUMIN 2.7* 2.8*   BILITOT 0.4 0.4   ALKPHOS 149* 181*   AST 13 14   ALT 6* 4*   ANIONGAP 6* 7*       Significant Imaging: I have reviewed all pertinent imaging results/findings within the past 24 hours.      Assessment & Plan  Acute systolic congestive heart failure  Supposed to be wearing LifeVest, patient reported it was stolen and he has not been wearing it  Patient has Systolic (HFrEF) heart failure that is Acute on chronic. On presentation their CHF was decompensated. Evidence of decompensated CHF on presentation includes: edema, weight gain, orthopnea, dyspnea on exertion (ROBLEDO), and shortness of breath. Most recent BNP and echo results are listed " "below.  No results for input(s): "BNP" in the last 72 hours.    Latest ECHO  Results for orders placed during the hospital encounter of 01/09/25    Results for orders placed during the hospital encounter of 05/11/25    Echo    Interpretation Summary    Left Ventricle: The left ventricle is moderately dilated. There is moderate eccentric hypertrophy. Global hypokinesis present. There is moderately reduced systolic function with a visually estimated ejection fraction of 30 - 35%. Grade III diastolic dysfunction.    Right Ventricle: Right ventricle was not well visualized due to poor acoustic window. The right ventricle is dilated    Tricuspid Valve: There is mild regurgitation.    Pulmonary Artery: There is pulmonary hypertension. The estimated pulmonary artery systolic pressure is 64 mmHg.    IVC/SVC: Elevated venous pressure at 15 mmHg.      Current Heart Failure Medications  furosemide injection 80 mg, Every 12 hours, Intravenous  metOLazone tablet 5 mg, Daily, Oral  metOLazone tablet 5 mg, Once, Oral    Plan  - Monitor strict I&Os and daily weights.    - Place on telemetry  - Low sodium diet  - Place on fluid restriction    - Cardiology has been consulted  - Continue IV Lasix and cardiology is following.   -discussed with cardiology who will increase IV Lasix 5/16 and add metolazone.  If volume status not significantly improved tomorrow/kidney function not improve can start Dobutrex at a set rate of 5 mcg/kg/min  - Weber in place  -  Patient lost his LifeVest and will likely need new one before discharge.  This will be an issue, it was lost not stolen so will not be covered for placement.      Diabetes mellitus  S/p R BKA and L AKA  Patient's FSGs are uncontrolled due to hyperglycemia on current medication regimen.  Last A1c reviewed-   Lab Results   Component Value Date    HGBA1C 7.3 (H) 05/12/2025     Most recent fingerstick glucose reviewed-   Recent Labs   Lab 05/15/25  1603 05/15/25  2044 05/16/25  0807 " 05/16/25  1128   POCTGLUCOSE 157* 159* 118* 143*     Current correctional scale  Low  Maintain anti-hyperglycemic dose as follows-   Antihyperglycemics (From admission, onward)      Start     Stop Route Frequency Ordered    05/12/25 1445  insulin glargine U-100 (Lantus) pen 10 Units         -- SubQ Daily 05/12/25 1332    05/12/25 0943  insulin aspart U-100 pen 0-10 Units         -- SubQ Before meals & nightly PRN 05/12/25 0843          Opioid use disorder  Suboxone    HTN (hypertension)  Patient's blood pressure range in the last 24 hours was: BP  Min: 142/81  Max: 185/103.The patient's inpatient anti-hypertensive regimen is listed below:  Current Antihypertensives  furosemide injection 80 mg, Every 12 hours, Intravenous  metOLazone tablet 5 mg, Daily, Oral  metOLazone tablet 5 mg, Once, Oral    Plan  -will make adjustment as needed    Prolonged QT interval  Monitor  Hypoalbuminemia  Dietitian consult  Monitor CMP    Non-healing wound of amputation stump  Wound care is following   MRI ordered per recommendation on Xray.  We will continue when he is able to tolerate this  Scrotal edema  Likely from volume overload  Continue Lasix and urology consulted to help with Weber placement-done in OR 5/15    Elevated d-dimer    Chest CTA ruled out PE.  Pressure ulcer of right buttock, stage 2  Wound care following-appreciate treatment    Chronic hepatitis C without hepatic coma  Pt with known hep C status historically   -quantitative RNA pending   -will need outpatient follow-up for definitive hep C treatment  VTE Risk Mitigation (From admission, onward)           Ordered     enoxaparin injection 40 mg  Daily         05/11/25 2322     IP VTE HIGH RISK PATIENT  Once         05/11/25 2322                    Discharge Planning   KATHERINE: 5/21/2025     Code Status: Full Code   Medical Readiness for Discharge Date:   Discharge Plan A: Skilled Nursing Facility                Please place Justification for SEAN Ramírez  NP  Department of Hospital Medicine   [facility]

## 2025-05-16 NOTE — PLAN OF CARE
SW sent updated SNF clinicals via [employer], to continue to keep updated once a facility officially accepts.        05/16/25 1225   Post-Acute Status   Post-Acute Authorization Placement   Post-Acute Placement Status Pending medical clearance/testing   Discharge Plan   Discharge Plan A Skilled Nursing Facility   Discharge Plan B New Nursing Home placement - [facility] care facility

## 2025-05-16 NOTE — SUBJECTIVE & OBJECTIVE
Interval History:  Pt seen and examined, continues with anasarca and dyspnea on exertion as well as orthopnea.  1100 mL UOP documented.  Pt with multiple complaints regarding quantity of food and quality of food.  Instructed Pt on appropriate portions and reason for diet restrictions.    Review of Systems   Constitutional:  Negative for chills and fever.   Respiratory:  Positive for shortness of breath. Negative for cough.    Cardiovascular:  Positive for leg swelling. Negative for chest pain.   Gastrointestinal:  Negative for nausea and vomiting.   Genitourinary:  Positive for difficulty urinating, penile swelling and scrotal swelling.   Musculoskeletal:  Negative for arthralgias and myalgias.     Objective:     Vital Signs (Most Recent):  Temp: 97.4 °F (36.3 °C) (05/16/25 1115)  Pulse: 94 (05/16/25 1115)  Resp: 18 (05/16/25 1115)  BP: (!) 150/65 (05/16/25 1115)  SpO2: 99 % (05/16/25 1115) Vital Signs (24h Range):  Temp:  [97.4 °F (36.3 °C)-98.3 °F (36.8 °C)] 97.4 °F (36.3 °C)  Pulse:  [79-94] 94  Resp:  [16-19] 18  SpO2:  [97 %-100 %] 99 %  BP: (142-185)/() 150/65     Weight: (!) 138.4 kg (305 lb 1.9 oz)  Body mass index is 43.78 kg/m².    Intake/Output Summary (Last 24 hours) at 5/16/2025 1407  Last data filed at 5/16/2025 1203  Gross per 24 hour   Intake 640 ml   Output 2700 ml   Net -2060 ml         Physical Exam  Vitals and nursing note reviewed.   Constitutional:       Appearance: Normal appearance.   Cardiovascular:      Rate and Rhythm: Normal rate.   Pulmonary:      Effort: Pulmonary effort is normal.      Breath sounds: Normal breath sounds.   Abdominal:      General: Abdomen is flat. Bowel sounds are normal.      Palpations: Abdomen is soft.   Genitourinary:     Comments: Scrotal and penile edema inverted penis  Weber in place with elena colored urine  Musculoskeletal:      Right lower leg: Edema present.      Left lower leg: Edema present.      Comments: Diffuse anasarca    Right BKA stump with  "non-healing wound   Skin:     Comments: See wound care note   Neurological:      General: No focal deficit present.      Mental Status: He is alert and oriented to person, place, and time. Mental status is at baseline.   Psychiatric:         Mood and Affect: Mood normal.         Behavior: Behavior normal.               Significant Labs: All pertinent labs within the past 24 hours have been reviewed.  CBC:   No results for input(s): "WBC", "HGB", "HCT", "PLT" in the last 48 hours.    CMP:   Recent Labs   Lab 05/15/25  0433 05/16/25  0454    137   K 4.3 4.6   CL 99 98   CO2 33* 32*   GLU 76 148*   BUN 38* 41*   CREATININE 1.3 1.4   CALCIUM 8.6* 8.6*   PROT 6.3 6.6   ALBUMIN 2.7* 2.8*   BILITOT 0.4 0.4   ALKPHOS 149* 181*   AST 13 14   ALT 6* 4*   ANIONGAP 6* 7*       Significant Imaging: I have reviewed all pertinent imaging results/findings within the past 24 hours.  " Self

## 2025-05-16 NOTE — ASSESSMENT & PLAN NOTE
Likely from volume overload  Continue Lasix and urology consulted to help with Weber placement-done in OR 5/15

## 2025-05-17 LAB
ALBUMIN SERPL-MCNC: 2.7 G/DL (ref 3.5–5.2)
ALBUMIN SERPL-MCNC: 2.8 G/DL (ref 3.5–5.2)
ALBUMIN SERPL-MCNC: 2.8 G/DL (ref 3.5–5.2)
ALP SERPL-CCNC: 164 UNIT/L (ref 55–135)
ALP SERPL-CCNC: 166 UNIT/L (ref 55–135)
ALP SERPL-CCNC: 170 UNIT/L (ref 55–135)
ALT SERPL-CCNC: <3 UNIT/L (ref 10–44)
ANION GAP (SMH): 4 MMOL/L (ref 8–16)
ANION GAP (SMH): 7 MMOL/L (ref 8–16)
ANION GAP (SMH): 8 MMOL/L (ref 8–16)
AST SERPL-CCNC: 14 UNIT/L (ref 10–40)
BACTERIA BLD CULT: NORMAL
BACTERIA BLD CULT: NORMAL
BILIRUB SERPL-MCNC: 0.4 MG/DL (ref 0.1–1)
BUN SERPL-MCNC: 41 MG/DL (ref 6–20)
BUN SERPL-MCNC: 41 MG/DL (ref 6–20)
BUN SERPL-MCNC: 42 MG/DL (ref 6–20)
CALCIUM SERPL-MCNC: 8.9 MG/DL (ref 8.7–10.5)
CALCIUM SERPL-MCNC: 8.9 MG/DL (ref 8.7–10.5)
CALCIUM SERPL-MCNC: 9.2 MG/DL (ref 8.7–10.5)
CHLORIDE SERPL-SCNC: 94 MMOL/L (ref 95–110)
CHLORIDE SERPL-SCNC: 95 MMOL/L (ref 95–110)
CHLORIDE SERPL-SCNC: 96 MMOL/L (ref 95–110)
CO2 SERPL-SCNC: 34 MMOL/L (ref 23–29)
CO2 SERPL-SCNC: 35 MMOL/L (ref 23–29)
CO2 SERPL-SCNC: 36 MMOL/L (ref 23–29)
CREAT SERPL-MCNC: 1.4 MG/DL (ref 0.5–1.4)
CREAT SERPL-MCNC: 1.4 MG/DL (ref 0.5–1.4)
CREAT SERPL-MCNC: 1.5 MG/DL (ref 0.5–1.4)
GFR SERPLBLD CREATININE-BSD FMLA CKD-EPI: 57 ML/MIN/1.73/M2
GFR SERPLBLD CREATININE-BSD FMLA CKD-EPI: >60 ML/MIN/1.73/M2
GFR SERPLBLD CREATININE-BSD FMLA CKD-EPI: >60 ML/MIN/1.73/M2
GLUCOSE SERPL-MCNC: 123 MG/DL (ref 70–110)
GLUCOSE SERPL-MCNC: 125 MG/DL (ref 70–110)
GLUCOSE SERPL-MCNC: 132 MG/DL (ref 70–110)
MAGNESIUM SERPL-MCNC: 2.1 MG/DL (ref 1.6–2.6)
OHS QRS DURATION: 152 MS
OHS QTC CALCULATION: 462 MS
PHOSPHATE SERPL-MCNC: 3.5 MG/DL (ref 2.7–4.5)
POCT GLUCOSE: 119 MG/DL (ref 70–110)
POCT GLUCOSE: 123 MG/DL (ref 70–110)
POCT GLUCOSE: 128 MG/DL (ref 70–110)
POTASSIUM SERPL-SCNC: 4.4 MMOL/L (ref 3.5–5.1)
POTASSIUM SERPL-SCNC: 4.5 MMOL/L (ref 3.5–5.1)
POTASSIUM SERPL-SCNC: 4.6 MMOL/L (ref 3.5–5.1)
PROT SERPL-MCNC: 6.4 GM/DL (ref 6–8.4)
PROT SERPL-MCNC: 6.5 GM/DL (ref 6–8.4)
PROT SERPL-MCNC: 6.7 GM/DL (ref 6–8.4)
SODIUM SERPL-SCNC: 136 MMOL/L (ref 136–145)
SODIUM SERPL-SCNC: 136 MMOL/L (ref 136–145)
SODIUM SERPL-SCNC: 137 MMOL/L (ref 136–145)

## 2025-05-17 PROCEDURE — 99900035 HC TECH TIME PER 15 MIN (STAT)

## 2025-05-17 PROCEDURE — 83735 ASSAY OF MAGNESIUM: CPT

## 2025-05-17 PROCEDURE — 25000003 PHARM REV CODE 250: Performed by: STUDENT IN AN ORGANIZED HEALTH CARE EDUCATION/TRAINING PROGRAM

## 2025-05-17 PROCEDURE — 80053 COMPREHEN METABOLIC PANEL: CPT

## 2025-05-17 PROCEDURE — 99900031 HC PATIENT EDUCATION (STAT)

## 2025-05-17 PROCEDURE — 84100 ASSAY OF PHOSPHORUS: CPT

## 2025-05-17 PROCEDURE — 63600175 PHARM REV CODE 636 W HCPCS: Performed by: STUDENT IN AN ORGANIZED HEALTH CARE EDUCATION/TRAINING PROGRAM

## 2025-05-17 PROCEDURE — 94761 N-INVAS EAR/PLS OXIMETRY MLT: CPT

## 2025-05-17 PROCEDURE — 25000003 PHARM REV CODE 250: Performed by: INTERNAL MEDICINE

## 2025-05-17 PROCEDURE — 93005 ELECTROCARDIOGRAM TRACING: CPT | Performed by: GENERAL PRACTICE

## 2025-05-17 PROCEDURE — 99233 SBSQ HOSP IP/OBS HIGH 50: CPT | Mod: ,,, | Performed by: INTERNAL MEDICINE

## 2025-05-17 PROCEDURE — 63600175 PHARM REV CODE 636 W HCPCS: Performed by: NURSE PRACTITIONER

## 2025-05-17 PROCEDURE — 25000003 PHARM REV CODE 250: Performed by: NURSE PRACTITIONER

## 2025-05-17 PROCEDURE — 11000001 HC ACUTE MED/SURG PRIVATE ROOM

## 2025-05-17 PROCEDURE — 99223 1ST HOSP IP/OBS HIGH 75: CPT | Mod: ,,, | Performed by: INTERNAL MEDICINE

## 2025-05-17 PROCEDURE — 36410 VNPNXR 3YR/> PHY/QHP DX/THER: CPT

## 2025-05-17 PROCEDURE — 93010 ELECTROCARDIOGRAM REPORT: CPT | Mod: ,,, | Performed by: GENERAL PRACTICE

## 2025-05-17 PROCEDURE — 36415 COLL VENOUS BLD VENIPUNCTURE: CPT

## 2025-05-17 PROCEDURE — C1751 CATH, INF, PER/CENT/MIDLINE: HCPCS

## 2025-05-17 PROCEDURE — 27000221 HC OXYGEN, UP TO 24 HOURS

## 2025-05-17 PROCEDURE — 25000003 PHARM REV CODE 250: Performed by: FAMILY MEDICINE

## 2025-05-17 PROCEDURE — 63600175 PHARM REV CODE 636 W HCPCS

## 2025-05-17 PROCEDURE — S4991 NICOTINE PATCH NONLEGEND: HCPCS | Performed by: STUDENT IN AN ORGANIZED HEALTH CARE EDUCATION/TRAINING PROGRAM

## 2025-05-17 PROCEDURE — 25000003 PHARM REV CODE 250

## 2025-05-17 RX ORDER — DIPHENHYDRAMINE HCL 25 MG
50 CAPSULE ORAL EVERY 8 HOURS PRN
Status: DISCONTINUED | OUTPATIENT
Start: 2025-05-17 | End: 2025-05-27 | Stop reason: HOSPADM

## 2025-05-17 RX ORDER — DOBUTAMINE HYDROCHLORIDE 400 MG/100ML
2.5 INJECTION INTRAVENOUS CONTINUOUS
Status: DISCONTINUED | OUTPATIENT
Start: 2025-05-17 | End: 2025-05-20

## 2025-05-17 RX ADMIN — CHLORHEXIDINE GLUCONATE 0.12% ORAL RINSE 15 ML: 1.2 LIQUID ORAL at 08:05

## 2025-05-17 RX ADMIN — MUPIROCIN 1 G: 20 OINTMENT TOPICAL at 08:05

## 2025-05-17 RX ADMIN — ISOSORBIDE DINITRATE 20 MG: 10 TABLET ORAL at 10:05

## 2025-05-17 RX ADMIN — NICOTINE 1 PATCH: 21 PATCH, EXTENDED RELEASE TRANSDERMAL at 10:05

## 2025-05-17 RX ADMIN — DIPHENHYDRAMINE HYDROCHLORIDE 50 MG: 25 CAPSULE ORAL at 10:05

## 2025-05-17 RX ADMIN — METOLAZONE 5 MG: 2.5 TABLET ORAL at 10:05

## 2025-05-17 RX ADMIN — TAMSULOSIN HYDROCHLORIDE 0.4 MG: 0.4 CAPSULE ORAL at 10:05

## 2025-05-17 RX ADMIN — BUPRENORPHINE AND NALOXONE 2 FILM: 8; 2 FILM BUCCAL; SUBLINGUAL at 08:05

## 2025-05-17 RX ADMIN — DOBUTAMINE HYDROCHLORIDE 2.5 MCG/KG/MIN: 400 INJECTION INTRAVENOUS at 12:05

## 2025-05-17 RX ADMIN — CEFAZOLIN 2 G: 2 INJECTION, POWDER, FOR SOLUTION INTRAMUSCULAR; INTRAVENOUS at 05:05

## 2025-05-17 RX ADMIN — MORPHINE SULFATE 2 MG: 2 INJECTION, SOLUTION INTRAMUSCULAR; INTRAVENOUS at 12:05

## 2025-05-17 RX ADMIN — FUROSEMIDE 80 MG: 10 INJECTION, SOLUTION INTRAMUSCULAR; INTRAVENOUS at 10:05

## 2025-05-17 RX ADMIN — CHLORHEXIDINE GLUCONATE 0.12% ORAL RINSE 15 ML: 1.2 LIQUID ORAL at 10:05

## 2025-05-17 RX ADMIN — ISOSORBIDE DINITRATE 20 MG: 10 TABLET ORAL at 08:05

## 2025-05-17 RX ADMIN — FUROSEMIDE 10 MG/HR: 10 INJECTION, SOLUTION INTRAMUSCULAR; INTRAVENOUS at 04:05

## 2025-05-17 RX ADMIN — ISOSORBIDE DINITRATE 20 MG: 10 TABLET ORAL at 03:05

## 2025-05-17 RX ADMIN — SENNOSIDES AND DOCUSATE SODIUM 1 TABLET: 50; 8.6 TABLET ORAL at 08:05

## 2025-05-17 RX ADMIN — MORPHINE SULFATE 2 MG: 2 INJECTION, SOLUTION INTRAMUSCULAR; INTRAVENOUS at 06:05

## 2025-05-17 RX ADMIN — CEFAZOLIN 2 G: 2 INJECTION, POWDER, FOR SOLUTION INTRAMUSCULAR; INTRAVENOUS at 10:05

## 2025-05-17 RX ADMIN — INSULIN GLARGINE 10 UNITS: 100 INJECTION, SOLUTION SUBCUTANEOUS at 10:05

## 2025-05-17 RX ADMIN — MORPHINE SULFATE 2 MG: 2 INJECTION, SOLUTION INTRAMUSCULAR; INTRAVENOUS at 05:05

## 2025-05-17 RX ADMIN — CEFAZOLIN 2 G: 2 INJECTION, POWDER, FOR SOLUTION INTRAMUSCULAR; INTRAVENOUS at 11:05

## 2025-05-17 RX ADMIN — MUPIROCIN 1 G: 20 OINTMENT TOPICAL at 10:05

## 2025-05-17 RX ADMIN — SENNOSIDES AND DOCUSATE SODIUM 1 TABLET: 50; 8.6 TABLET ORAL at 10:05

## 2025-05-17 RX ADMIN — MORPHINE SULFATE 2 MG: 2 INJECTION, SOLUTION INTRAMUSCULAR; INTRAVENOUS at 11:05

## 2025-05-17 RX ADMIN — BUPRENORPHINE AND NALOXONE 2 FILM: 8; 2 FILM BUCCAL; SUBLINGUAL at 10:05

## 2025-05-17 RX ADMIN — ENOXAPARIN SODIUM 40 MG: 40 INJECTION SUBCUTANEOUS at 05:05

## 2025-05-17 NOTE — SUBJECTIVE & OBJECTIVE
Interval History:  Patient seen and examined.  Overnight patient is noted to have Suboxone medications at bedside and turned over to staff.  Patient is sitting in high [place]'s talking on the phone. Seems to be in good spirits. Denies any chest pain. Denies shortness of breath at rest on 2 L nasal cannula.  Patient reports family will bring LifeVest tomorrow. Placed [employer] cardiac monitoring at bedside. He remains edematous with positive 400 cc urine output for the past 24 hours.  Started on Lasix and dobutamine drip with serial labs per Cardiology recommendations.      Review of Systems   Constitutional:  Positive for activity change. Negative for appetite change, chills, diaphoresis, fatigue and fever.   Respiratory:  Positive for shortness of breath. Negative for cough.    Cardiovascular:  Positive for leg swelling. Negative for chest pain.   Gastrointestinal:  Negative for abdominal distention, abdominal pain, nausea and vomiting.   Genitourinary:  Positive for penile swelling and scrotal swelling. Negative for difficulty urinating.   Musculoskeletal:  Negative for back pain.   Skin:  Positive for wound (right BKA).   Neurological:  Positive for weakness. Negative for dizziness, seizures, syncope, facial asymmetry, speech difficulty, light-headedness, numbness and headaches.     Objective:     Vital Signs (Most Recent):  Temp: 97.9 °F (36.6 °C) (05/17/25 1232)  Pulse: 91 (05/17/25 1232)  Resp: 18 (05/17/25 1253)  BP: (!) 174/83 (05/17/25 1232)  SpO2: 96 % (05/17/25 1232) Vital Signs (24h Range):  Temp:  [97.5 °F (36.4 °C)-98.8 °F (37.1 °C)] 97.9 °F (36.6 °C)  Pulse:  [] 91  Resp:  [10-19] 18  SpO2:  [94 %-99 %] 96 %  BP: (129-184)/(60-83) 174/83     Weight: 135.7 kg (299 lb 2.6 oz)  Body mass index is 42.93 kg/m².    Intake/Output Summary (Last 24 hours) at 5/17/2025 1358  Last data filed at 5/17/2025 0900  Gross per 24 hour   Intake 3440 ml   Output 1600 ml   Net 1840 ml         Physical Exam  Vitals and  nursing note reviewed.   Constitutional:       Appearance: He is obese. He is ill-appearing.   Eyes:      Pupils: Pupils are equal, round, and reactive to light.   Cardiovascular:      Rate and Rhythm: Normal rate and regular rhythm.      Pulses: Normal pulses.      Comments: Zoll cardiac monitoring at bedside  Pulmonary:      Effort: Pulmonary effort is normal. No respiratory distress.      Breath sounds: No wheezing.   Abdominal:      General: Bowel sounds are normal. There is no distension.      Palpations: Abdomen is soft.      Tenderness: There is no abdominal tenderness. There is no guarding.   Musculoskeletal:         General: Swelling present.      Right upper arm: Edema present.      Left upper arm: Edema present.      Right upper leg: Swelling and edema present.      Left upper leg: Swelling and edema present.      Right Lower Extremity: Right leg is amputated below knee.      Left Lower Extremity: Left leg is amputated above knee.   Skin:     General: Skin is warm and dry.      Capillary Refill: Capillary refill takes less than 2 seconds.      Findings: Wound (right BKA nonhealing wound) present.      Comments: Multiple scratches to upper extremities   Neurological:      Mental Status: He is alert and oriented to person, place, and time.      GCS: GCS eye subscore is 4. GCS verbal subscore is 5. GCS motor subscore is 6.               Significant Labs: All pertinent labs within the past 24 hours have been reviewed.  A1C:   Recent Labs   Lab 01/10/25  0721 05/12/25  0040 05/12/25  0501   HGBA1C 7.5* 7.4* 7.3*       Bilirubin:   Recent Labs   Lab 05/13/25  0523 05/14/25  0650 05/15/25  0433 05/16/25  0454 05/17/25  0507   BILITOT 0.5 0.5 0.4 0.4 0.4     BMP:   Recent Labs   Lab 05/17/25  0507   *      K 4.6   CL 96   CO2 36*   BUN 42*   CREATININE 1.5*   CALCIUM 8.9   MG 2.1       CMP:   Recent Labs   Lab 05/16/25  0454 05/17/25  0507    136   K 4.6 4.6   CL 98 96   CO2 32* 36*   *  123*   BUN 41* 42*   CREATININE 1.4 1.5*   CALCIUM 8.6* 8.9   PROT 6.6 6.5   ALBUMIN 2.8* 2.8*   BILITOT 0.4 0.4   ALKPHOS 181* 170*   AST 14 14   ALT 4* <3*   ANIONGAP 7* 4*       Lactic Acid:   Recent Labs   Lab 05/16/25  1126   LACTATE 0.9       Magnesium:   Recent Labs   Lab 05/16/25  0454 05/17/25  0507   MG 2.1 2.1     POCT Glucose:   Recent Labs   Lab 05/16/25  2006 05/17/25  0829 05/17/25  1227   POCTGLUCOSE 151* 119* 123*       TSH:   Recent Labs   Lab 05/12/25  0040   TSH 4.416         Significant Imaging: I have reviewed all pertinent imaging results/findings within the past 24 hours.

## 2025-05-17 NOTE — NURSING
HR has been between 100-120 since starting Dobutamine drip. Cardiology Bhumi Sanchez NP was notified and stated it was normal. Will continue to monitor tele.
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
## 2025-05-17 NOTE — ASSESSMENT & PLAN NOTE
S/p R BKA and L AKA  Patient's FSGs are uncontrolled due to hyperglycemia on current medication regimen.  Last A1c reviewed-   Lab Results   Component Value Date    HGBA1C 7.3 (H) 05/12/2025     Most recent fingerstick glucose reviewed-   Recent Labs   Lab 05/16/25 2006 05/17/25  0829 05/17/25  1227   POCTGLUCOSE 151* 119* 123*     Current correctional scale  Low  Maintain anti-hyperglycemic dose as follows-   Antihyperglycemics (From admission, onward)      Start     Stop Route Frequency Ordered    05/12/25 1445  insulin glargine U-100 (Lantus) pen 10 Units         -- SubQ Daily 05/12/25 1332    05/12/25 0943  insulin aspart U-100 pen 0-10 Units         -- SubQ Before meals & nightly PRN 05/12/25 0843

## 2025-05-17 NOTE — ASSESSMENT & PLAN NOTE
Patient's blood pressure range in the last 24 hours was: BP  Min: 129/67  Max: 184/77.The patient's inpatient anti-hypertensive regimen is listed below:  Current Antihypertensives  isosorbide dinitrate tablet 20 mg, 3 times daily, Oral  metOLazone tablet 5 mg, Daily, Oral  furosemide (Lasix) 200 mg in 0.9% NaCl SolP 100 mL continuous infusion (conc: 2 mg/mL), Continuous, Intravenous    Plan  -will make adjustment as needed

## 2025-05-17 NOTE — NURSING
Pt asked for a nebulizer treatment and when respiratory arrived, he refused. Pt educated on asking again if he felt like he needed it in the future.
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
## 2025-05-17 NOTE — PROGRESS NOTES
Formerly [facility], later [facility]  Department of Cardiology  Progress Note      PATIENT NAME: Gideon Ross  MRN: [id number]  TODAY'S DATE: 05/17/2025  ADMIT DATE: 5/11/2025                          CONSULT REQUESTED BY: Don Whelan DO    SUBJECTIVE     PRINCIPAL PROBLEM: Acute systolic congestive heart failure      REASON FOR CONSULT:  Acute on chronic CHF, life vest patient      INTERVAL HISTORY:    5/17/25  Patient seen sittingup in bed. Noted to be significantly volume overloaded. Creatinine bumped slightly today.     5/16/25  Weber inserted yesterday; more urine output today; -1600 net balance  VSS, remains edematous    5/15/25  Cr 1.3 today; + fluid balance but output has been difficult to measure accurately  Remains edematous    5/14/25  Cr 1.2; difficulty collecting accurate output  Remains edematous and orthopneic  No events noted on telemetry    5/13/25  Hgb 9.5; Cr 1.1; + net balance; BP elevated;  Pt still has orthopnea; he feels his hands are less swollen today    HPI:  Pt is 49-year-old male w/ PMH HFrEF, DM, HTN, DM, cardiomyopathy, R BKA, left AKA, and substance dependence who presented to ED with c/o shortness of breath, swelling, and orthopnea for past week.    Pt has been receiving IV lasix since admission and is reportedly breathing better today but remains orthopneic, and swelling to upper legs, scrotum and abdomen still present. Still on 4 LPM O2    Review of patient's allergies indicates:  No Known Allergies    Past Medical History:   Diagnosis Date    Diabetes mellitus     Hypertension      Past Surgical History:   Procedure Laterality Date    CYSTOSCOPY,WITH URETERAL CATHETER INSERTION N/A 5/15/2025    Procedure: CYSTOSCOPY,WITH URETERAL CATHETER INSERTION;  Surgeon: Yoni Lemus MD;  Location: [facility];  Service: Urology;  Laterality: N/A;    SKIN GRAFT       Social History[1]     REVIEW OF SYSTEMS  Negative except as mentioned in HPI    OBJECTIVE     VITAL SIGNS (Most Recent)  Temp:  97.6 °F (36.4 °C) (05/17/25 0828)  Pulse: 89 (05/17/25 0828)  Resp: 12 (05/17/25 0828)  BP: (!) 184/77 (05/17/25 0828)  SpO2: 96 % (05/17/25 0828)    VENTILATION STATUS  Resp: 12 (05/17/25 0828)  SpO2: 96 % (05/17/25 0828)  Oxygen Concentration (%):  [28] 28        I & O (Last 24H):  Intake/Output Summary (Last 24 hours) at 5/17/2025 1058  Last data filed at 5/17/2025 0525  Gross per 24 hour   Intake 3300 ml   Output 2800 ml   Net 500 ml       WEIGHTS  Wt Readings from Last 3 Encounters:   05/16/25 2005 135.7 kg (299 lb 2.6 oz)   05/14/25 1045 (!) 138.4 kg (305 lb 1.9 oz)   05/12/25 1239 97.5 kg (214 lb 15.2 oz)   05/11/25 1954 97.5 kg (215 lb)   05/12/25 1048 97.5 kg (214 lb 15.2 oz)   01/26/25 0338 117.5 kg (259 lb 0.7 oz)   01/21/25 0515 117.9 kg (260 lb)   01/15/25 0400 126.1 kg (278 lb)   01/14/25 0400 127.1 kg (280 lb 3.2 oz)   01/09/25 2239 115.4 kg (254 lb 6.4 oz)   01/09/25 0824 108.9 kg (240 lb)       PHYSICAL EXAM    GENERAL: chronically ill middle age male breathing comfortably w/ HOB elevated  HEENT: Normocephalic.  +pallor; facial edema  NECK: No JVD.   CARDIAC: Regular rate and rhythm. S1 is normal.S2 is normal.No gallops, clicks or murmurs noted at this time.  CHEST ANATOMY: normal.   LUNGS: O2 via NC, no dyspnea or cough; crackles to bases,  ABDOMEN: ascites, obese, .  +scrotal edema    EXTREMITIES: edematous; right BKA edematous with wound, Left AKA edematous  CENTRAL NERVOUS SYSTEM: AAO x 3  SKIN: No rash     HOME MEDICATIONS:Medications Ordered Prior to Encounter[2]    SCHEDULED MEDS:   buprenorphine-naloxone 8-2 mg  2 Film Sublingual BID    ceFAZolin (Ancef) IV (PEDS and ADULTS)  2 g Intravenous Q6H    chlorhexidine  15 mL Mouth/Throat BID    enoxparin  40 mg Subcutaneous Daily    insulin glargine U-100  10 Units Subcutaneous Daily    isosorbide dinitrate  20 mg Oral TID    metOLazone  5 mg Oral Daily    mupirocin   Nasal BID    nicotine  1 patch Transdermal Daily    senna-docusate  1 tablet  "Oral BID    tamsulosin  0.4 mg Oral Daily       CONTINUOUS INFUSIONS:   DOBUTamine IV infusion (non-titrating)  2.5 mcg/kg/min Intravenous Continuous        furosemide (LASIX) 2 mg/mL continuous infusion (non-titrating)  10 mg/hr Intravenous Continuous           PRN MEDS:  Current Facility-Administered Medications:     acetaminophen, 650 mg, Oral, Q6H PRN    albuterol-ipratropium, 3 mL, Nebulization, Q6H PRN    bisacodyL, 10 mg, Rectal, Daily PRN    dextrose 50%, 12.5 g, Intravenous, PRN    dextrose 50%, 25 g, Intravenous, PRN    diphenhydrAMINE, 50 mg, Oral, Q8H PRN    glucagon (human recombinant), 1 mg, Intramuscular, PRN    glucose, 16 g, Oral, PRN    glucose, 24 g, Oral, PRN    insulin aspart U-100, 0-10 Units, Subcutaneous, QID (AC + HS) PRN    melatonin, 6 mg, Oral, Nightly PRN    morphine, 2 mg, Intravenous, Q4H PRN    promethazine, 12.5 mg, Rectal, Q6H PRN    sodium chloride 0.9%, 10 mL, Intravenous, PRN    LABS AND DIAGNOSTICS     CBC LAST 3 DAYS  Recent Labs   Lab 05/12/25  0501 05/13/25  0523 05/14/25  0650   WBC 8.33 8.45 8.78   RBC 4.12* 3.79* 3.87*   HGB 10.2* 9.5* 9.5*   HCT 34.0* 31.3* 32.2*   MCV 83 83 83   MCH 24.8* 25.1* 24.5*   MCHC 30.0* 30.4* 29.5*   RDW 16.0* 15.9* 16.2*    216 221   MPV 10.0 10.0 10.1   NRBC 0 0 0       COAGULATION LAST 3 DAYS  No results for input(s): "LABPT", "INR", "APTT" in the last 168 hours.    CHEMISTRY LAST 3 DAYS  Recent Labs   Lab 05/15/25  0433 05/16/25  0454 05/17/25  0507    137 136   K 4.3 4.6 4.6   CL 99 98 96   CO2 33* 32* 36*   ANIONGAP 6* 7* 4*   BUN 38* 41* 42*   CREATININE 1.3 1.4 1.5*   GLU 76 148* 123*   CALCIUM 8.6* 8.6* 8.9   MG 2.0 2.1 2.1   ALBUMIN 2.7* 2.8* 2.8*   PROT 6.3 6.6 6.5   ALKPHOS 149* 181* 170*   ALT 6* 4* <3*   AST 13 14 14   BILITOT 0.4 0.4 0.4       CARDIAC PROFILE LAST 3 DAYS  Recent Labs   Lab 05/11/25  2106   BNP 1,998*       ENDOCRINE LAST 3 DAYS  Recent Labs   Lab 05/12/25  0040   TSH 4.416       LAST ARTERIAL BLOOD " "GAS  ABG  No results for input(s): "PH", "PO2", "PCO2", "HCO3", "BE" in the last 168 hours.    LAST 7 DAYS MICROBIOLOGY   Microbiology Results (last 7 days)       Procedure Component Value Units Date/Time    Blood culture [[id number]]  (Normal) Collected: 05/12/25 0004    Order Status: Completed Specimen: Blood Updated: 05/17/25 0103     CULTURE, BLOOD ([facility]) No Growth After 5 Days    Blood culture [[id number]]  (Normal) Collected: 05/11/25 2331    Order Status: Completed Specimen: Blood Updated: 05/17/25 0030     CULTURE, BLOOD ([facility]) No Growth After 5 Days    Aerobic culture [[id number]]  (Abnormal)  (Susceptibility) Collected: 05/12/25 0040    Order Status: Completed Specimen: Wound from Leg, Right Updated: 05/14/25 0634     CULTURE, AEROBIC Moderate Staphylococcus aureus    Influenza A & B by Molecular [[id number]]  (Normal) Collected: 05/12/25 0040    Order Status: Completed Specimen: Nasal Swab Updated: 05/12/25 0150     INFLUENZA A MOLECULAR Negative     INFLUENZA B MOLECULAR  Negative            MOST RECENT IMAGING  Echo    Left Ventricle: The left ventricle is moderately dilated. There is   moderate eccentric hypertrophy. Global hypokinesis present. There is   moderately reduced systolic function with a visually estimated ejection   fraction of 30 - 35%. Grade III diastolic dysfunction.    Right Ventricle: Right ventricle was not well visualized due to poor   acoustic window. The right ventricle is dilated    Tricuspid Valve: There is mild regurgitation.    Pulmonary Artery: There is pulmonary hypertension. The estimated   pulmonary artery systolic pressure is 64 mmHg.    IVC/SVC: Elevated venous pressure at 15 mmHg.  CTA Chest Non-Coronary (PE Studies)  Narrative: EXAMINATION:  CTA CHEST NON CORONARY (PE STUDIES)    CLINICAL HISTORY:  Pulmonary embolism (PE) suspected, positive D-dimer;    TECHNIQUE:  Thin axial imaging through the chest was performed with 100 mL Omnipaque 350 IV contrast, with sagittal and " coronal reformatted images and MIP reconstructions performed, with images stored in the patient's permanent electronic medical record.    FINDINGS:  Comparison to multiple prior exams.  The exam is limited by poor timing of the contrast bolus, which prevents evaluation of the peripheral pulmonary arteries.  There are no central pulmonary arterial filling defects to suggest pulmonary thromboembolism, with the central pulmonary arteries normal in caliber.    The aorta is normal in caliber, with minimal calcified plaque.  The heart is normal in size, with no pericardial effusion.  There are moderate three-vessel coronary arterial calcifications, with no enlarged mediastinal or hilar lymph nodes.    There are scattered reticular densities in geographic ground-glass opacities in both lungs, with areas of interlobular septal thickening, as well as bandlike and irregular upper lobe airspace opacities.  There is left lower lobe consolidation and volume loss suggesting atelectasis, with dependent right lower lobe atelectasis, and small low-density bilateral pleural effusions.  The central airways are patent, with scattered bronchial wall thickening.  No central mucous plugging or pneumothorax.    Images of the upper abdomen are unremarkable.  There is extensive diffuse reticular edema throughout the subcutaneous fat.  No acute fractures.  Impression: 1. Limited exam as described, with no central pulmonary thromboembolism.  2. Nonspecific interstitial and airspace opacities throughout both lungs, perhaps reflecting multifocal bronchitis, pneumonia and atelectasis in the appropriate clinical setting.  Concurrent interstitial pulmonary edema-hypervolemia is not excluded.  3. Dependent atelectasis in the lower lobes left greater than right, with small bilateral pleural effusions.  4. Moderate three-vessel coronary arterial calcifications.  5. Diffuse edema throughout the subcutaneous fat suggesting  hypervolemia-anasarca.    Electronically signed by: Eliseo Jo  Date:    05/13/2025  Time:    11:43      ECHOCARDIOGRAM RESULTS (last 5)  Results for orders placed during the hospital encounter of 01/09/25    Echo    Interpretation Summary    Left Ventricle: The left ventricle is moderately dilated. Mildly increased wall thickness. There is mild asymmetric hypertrophy. Severe global hypokinesis present. There is severely reduced systolic function with a visually estimated ejection fraction of 25 - 30%.    Right Ventricle: Moderate right ventricular enlargement. Systolic function is mildly reduced.    Left Atrium: Left atrium is mildly dilated.    Tricuspid Valve: There is mild regurgitation.    IVC/SVC: Elevated venous pressure at 15 mmHg.      CURRENT/PREVIOUS VISIT EKG  Results for orders placed or performed during the hospital encounter of 05/11/25   EKG 12-lead    Collection Time: 05/12/25  4:05 AM   Result Value Ref Range    QRS Duration 152 ms    [facility] QTC Calculation 462 ms    Narrative    Test Reason : R06.02,    Vent. Rate :  82 BPM     Atrial Rate :  82 BPM     P-R Int : 112 ms          QRS Dur : 152 ms      QT Int : 396 ms       P-R-T Axes :  48 202  50 degrees    QTcB Int : 462 ms    Normal sinus rhythm  Right bundle branch block  Inferior infarct ,age undetermined  Abnormal ECG  No previous ECGs available    Referred By: AAAREFERRAL SELF           Confirmed By:            ASSESSMENT/PLAN:     Active Hospital Problems    Diagnosis    *Acute systolic congestive heart failure    Chronic hepatitis C without hepatic coma    Pressure ulcer of right buttock, stage 2    HTN (hypertension)    Prolonged QT interval    Hypoalbuminemia    Non-healing wound of amputation stump    Opioid use disorder    Diabetes mellitus    Scrotal edema    Elevated d-dimer       ASSESSMENT & PLAN:   Acute on chronic HFrEF  Dilated Cardiomyopathy  Substance abuse  Right BKA wound  Left AKA    RECOMMENDATIONS:  Start furosemide drip at  10mg/hr.   Start dobutamine drip at 2.5mcg/kg/min.   Strict I&O.   Trend labs.       Bhumi Sanchez NP  [facility]  Department of Cardiology  Date of Service: 05/17/2025    Patient has been getting pulse doses of intravenous Lasix boluses with suboptimal results on urinary output patient continues to have shortness of breath distended neck veins and generalized edema  I have personally interviewed and examined the patient, I have reviewed the Nurse Practitioner's history and physical, assessment, and plan. I have personally evaluated the patient at bedside and agree with the findings and made appropriate changes as necessary in recommendations.  Recommend to maintain on continuous drip of Lasix at 10 milligrams/hour as well as dobutamine drip at 2.5 micrograms/kilos per minute for 24 hour and re-evaluate, monitor electrolytes carefully as well as a renal function.  Recommend to add spironolactone 25 mg to his regimen    Jairo Corcoran MD  Department of Cardiology  [facility]  05/17/2025 11:00 AM             [1]   Social History  Tobacco Use    Smoking status: Every Day     Current packs/day: 1.00     Average packs/day: 1 pack/day for 40.4 years (40.4 ttl pk-yrs)     Types: Cigarettes     Start date: 1985   Substance Use Topics    Alcohol use: Yes    Drug use: No   [2]   No current facility-administered medications on file prior to encounter.     Current Outpatient Medications on File Prior to Encounter   Medication Sig Dispense Refill    isosorbide dinitrate (ISORDIL) 20 MG tablet Take 1 tablet (20 mg total) by mouth 3 (three) times daily. 90 tablet 11    tamsulosin (FLOMAX) 0.4 mg Cap Take 1 capsule (0.4 mg total) by mouth once daily. 30 capsule 11    torsemide (DEMADEX) 20 MG Tab Take 1 tablet (20 mg total) by mouth 2 (two) times a day. 60 tablet 11    insulin aspart U-100 (NOVOLOG) 100 unit/mL (3 mL) InPn pen Inject 0-10 Units into the skin before meals and at bedtime as needed  "(Hyperglycemia). **MODERATE CORRECTION DOSE**  Blood Glucose  mg/dL                  Pre-meal                2200  151-200                2 units                    1 unit  201-250                4 units                    2 units  251-300                6 units                    3 units  301-350                8 units                    4 units  >350                     10 units                  5 units  Administer subcutaneously if needed at times designated by monitoring  schedule.  DO NOT HOLD correction dose insulin for patients who are  NPO.    "HIGH ALERT MEDICATION" - Administer with meals or TF/TPN. (Patient not taking: Reported on 2/12/2025) 1 each 0     "

## 2025-05-17 NOTE — CARE UPDATE
05/17/25 0745   Patient Assessment/Suction   Level of Consciousness (AVPU) alert   Respiratory Effort Normal;Unlabored   Expansion/Accessory Muscles/Retractions no use of accessory muscles;no retractions;expansion symmetric   All Lung Fields Breath Sounds diminished   Rhythm/Pattern, Respiratory unlabored;pattern regular;depth regular;no shortness of breath reported   Cough Frequency no cough   Skin Integrity   $ Wound Care Tech Time 15 min   Area Observed Left;Right;Behind ear;Cheek;Upper lip;Nares   Skin Appearance without discoloration   PRE-TX-O2   Device (Oxygen Therapy) nasal cannula   $ Is the patient on Low Flow Oxygen? Yes   Flow (L/min) (Oxygen Therapy) 2   Oxygen Concentration (%) 28   SpO2 (!) 94 %   Pulse Oximetry Type Intermittent   $ Pulse Oximetry - Multiple Charge Pulse Oximetry - Multiple   Pulse 73   Resp 18   Aerosol Therapy   $ Aerosol Therapy Charges PRN treatment not required   Daily Review of Necessity (SVN) completed   Respiratory Treatment Status (SVN) PRN treatment not required   Ready to Wean/Extubation Screen   FIO2<=50 (chart decimal) 0.28   Education   $ Education Oxygen;15 min

## 2025-05-17 NOTE — NURSING
"Upon entering room for bedside report, pt was resting with eyes closed and respirations were even and unlabored. Pt had stack of 40 suboxone on bedside table. Pt awakened and stated "the pharmacy here delivered them to me." Educated pt about policy and that nursing would have to deliever pts Rx to pharmacy for safe keeping. Pt then pulled 50 more suboxone from his bag and handed them over to PARVEEN. Radha RN at bedside. MEGA Batista RN notified security.    "

## 2025-05-18 LAB
ABSOLUTE EOSINOPHIL (SMH): 0.86 K/UL
ABSOLUTE MONOCYTE (SMH): 1.19 K/UL (ref 0.3–1)
ABSOLUTE NEUTROPHIL COUNT (SMH): 6.8 K/UL (ref 1.8–7.7)
ALBUMIN SERPL-MCNC: 2.7 G/DL (ref 3.5–5.2)
ALBUMIN SERPL-MCNC: 2.7 G/DL (ref 3.5–5.2)
ALBUMIN SERPL-MCNC: 2.8 G/DL (ref 3.5–5.2)
ALBUMIN SERPL-MCNC: 2.8 G/DL (ref 3.5–5.2)
ALP SERPL-CCNC: 153 UNIT/L (ref 55–135)
ALP SERPL-CCNC: 154 UNIT/L (ref 55–135)
ALP SERPL-CCNC: 160 UNIT/L (ref 55–135)
ALP SERPL-CCNC: 160 UNIT/L (ref 55–135)
ALT SERPL-CCNC: <3 UNIT/L (ref 10–44)
ANION GAP (SMH): 6 MMOL/L (ref 8–16)
ANION GAP (SMH): 7 MMOL/L (ref 8–16)
ANION GAP (SMH): 9 MMOL/L (ref 8–16)
AST SERPL-CCNC: 12 UNIT/L (ref 10–40)
AST SERPL-CCNC: 13 UNIT/L (ref 10–40)
AST SERPL-CCNC: 14 UNIT/L (ref 10–40)
AST SERPL-CCNC: 14 UNIT/L (ref 10–40)
BASOPHILS # BLD AUTO: 0.05 K/UL
BASOPHILS NFR BLD AUTO: 0.5 %
BILIRUB SERPL-MCNC: 0.4 MG/DL (ref 0.1–1)
BILIRUB SERPL-MCNC: 0.5 MG/DL (ref 0.1–1)
BUN SERPL-MCNC: 40 MG/DL (ref 6–20)
BUN SERPL-MCNC: 40 MG/DL (ref 6–20)
BUN SERPL-MCNC: 41 MG/DL (ref 6–20)
BUN SERPL-MCNC: 41 MG/DL (ref 6–20)
BUN SERPL-MCNC: 42 MG/DL (ref 6–20)
CALCIUM SERPL-MCNC: 9 MG/DL (ref 8.7–10.5)
CALCIUM SERPL-MCNC: 9 MG/DL (ref 8.7–10.5)
CALCIUM SERPL-MCNC: 9.1 MG/DL (ref 8.7–10.5)
CALCIUM SERPL-MCNC: 9.1 MG/DL (ref 8.7–10.5)
CALCIUM SERPL-MCNC: 9.2 MG/DL (ref 8.7–10.5)
CHLORIDE SERPL-SCNC: 91 MMOL/L (ref 95–110)
CHLORIDE SERPL-SCNC: 92 MMOL/L (ref 95–110)
CHLORIDE SERPL-SCNC: 93 MMOL/L (ref 95–110)
CHLORIDE SERPL-SCNC: 93 MMOL/L (ref 95–110)
CHLORIDE SERPL-SCNC: 94 MMOL/L (ref 95–110)
CO2 SERPL-SCNC: 36 MMOL/L (ref 23–29)
CO2 SERPL-SCNC: 37 MMOL/L (ref 23–29)
CO2 SERPL-SCNC: 38 MMOL/L (ref 23–29)
CREAT SERPL-MCNC: 1.4 MG/DL (ref 0.5–1.4)
CREAT SERPL-MCNC: 1.5 MG/DL (ref 0.5–1.4)
CREAT SERPL-MCNC: 1.5 MG/DL (ref 0.5–1.4)
ERYTHROCYTE [DISTWIDTH] IN BLOOD BY AUTOMATED COUNT: 16 % (ref 11.5–14.5)
GFR SERPLBLD CREATININE-BSD FMLA CKD-EPI: 57 ML/MIN/1.73/M2
GFR SERPLBLD CREATININE-BSD FMLA CKD-EPI: 57 ML/MIN/1.73/M2
GFR SERPLBLD CREATININE-BSD FMLA CKD-EPI: >60 ML/MIN/1.73/M2
GLUCOSE SERPL-MCNC: 110 MG/DL (ref 70–110)
GLUCOSE SERPL-MCNC: 121 MG/DL (ref 70–110)
GLUCOSE SERPL-MCNC: 123 MG/DL (ref 70–110)
GLUCOSE SERPL-MCNC: 127 MG/DL (ref 70–110)
GLUCOSE SERPL-MCNC: 168 MG/DL (ref 70–110)
HCT VFR BLD AUTO: 28.6 % (ref 40–54)
HGB BLD-MCNC: 8.9 GM/DL (ref 14–18)
IMM GRANULOCYTES # BLD AUTO: 0.04 K/UL (ref 0–0.04)
IMM GRANULOCYTES NFR BLD AUTO: 0.4 % (ref 0–0.5)
LYMPHOCYTES # BLD AUTO: 1.11 K/UL (ref 1–4.8)
MAGNESIUM SERPL-MCNC: 2 MG/DL (ref 1.6–2.6)
MAGNESIUM SERPL-MCNC: 2.1 MG/DL (ref 1.6–2.6)
MCH RBC QN AUTO: 24.9 PG (ref 27–31)
MCHC RBC AUTO-ENTMCNC: 31.1 G/DL (ref 32–36)
MCV RBC AUTO: 80 FL (ref 82–98)
NUCLEATED RBC (/100WBC) (SMH): 0 /100 WBC
PLATELET # BLD AUTO: 234 K/UL (ref 150–450)
PMV BLD AUTO: 10.4 FL (ref 9.2–12.9)
POCT GLUCOSE: 100 MG/DL (ref 70–110)
POCT GLUCOSE: 118 MG/DL (ref 70–110)
POCT GLUCOSE: 153 MG/DL (ref 70–110)
POTASSIUM SERPL-SCNC: 4.3 MMOL/L (ref 3.5–5.1)
POTASSIUM SERPL-SCNC: 4.3 MMOL/L (ref 3.5–5.1)
POTASSIUM SERPL-SCNC: 4.4 MMOL/L (ref 3.5–5.1)
POTASSIUM SERPL-SCNC: 4.6 MMOL/L (ref 3.5–5.1)
POTASSIUM SERPL-SCNC: 4.6 MMOL/L (ref 3.5–5.1)
PROT SERPL-MCNC: 6.3 GM/DL (ref 6–8.4)
PROT SERPL-MCNC: 6.3 GM/DL (ref 6–8.4)
PROT SERPL-MCNC: 6.4 GM/DL (ref 6–8.4)
PROT SERPL-MCNC: 6.7 GM/DL (ref 6–8.4)
RBC # BLD AUTO: 3.58 M/UL (ref 4.6–6.2)
RELATIVE EOSINOPHIL (SMH): 8.6 % (ref 0–8)
RELATIVE LYMPHOCYTE (SMH): 11.1 % (ref 18–48)
RELATIVE MONOCYTE (SMH): 11.9 % (ref 4–15)
RELATIVE NEUTROPHIL (SMH): 67.5 % (ref 38–73)
SODIUM SERPL-SCNC: 136 MMOL/L (ref 136–145)
SODIUM SERPL-SCNC: 137 MMOL/L (ref 136–145)
WBC # BLD AUTO: 10.04 K/UL (ref 3.9–12.7)

## 2025-05-18 PROCEDURE — 63600175 PHARM REV CODE 636 W HCPCS: Performed by: NURSE PRACTITIONER

## 2025-05-18 PROCEDURE — 83735 ASSAY OF MAGNESIUM: CPT

## 2025-05-18 PROCEDURE — 99900031 HC PATIENT EDUCATION (STAT)

## 2025-05-18 PROCEDURE — 99900035 HC TECH TIME PER 15 MIN (STAT)

## 2025-05-18 PROCEDURE — 25000242 PHARM REV CODE 250 ALT 637 W/ HCPCS: Performed by: NURSE PRACTITIONER

## 2025-05-18 PROCEDURE — 63600175 PHARM REV CODE 636 W HCPCS: Performed by: STUDENT IN AN ORGANIZED HEALTH CARE EDUCATION/TRAINING PROGRAM

## 2025-05-18 PROCEDURE — 25000003 PHARM REV CODE 250: Performed by: INTERNAL MEDICINE

## 2025-05-18 PROCEDURE — 85025 COMPLETE CBC W/AUTO DIFF WBC: CPT

## 2025-05-18 PROCEDURE — 27000221 HC OXYGEN, UP TO 24 HOURS

## 2025-05-18 PROCEDURE — 63600175 PHARM REV CODE 636 W HCPCS

## 2025-05-18 PROCEDURE — 36415 COLL VENOUS BLD VENIPUNCTURE: CPT

## 2025-05-18 PROCEDURE — 25000003 PHARM REV CODE 250: Performed by: NURSE PRACTITIONER

## 2025-05-18 PROCEDURE — 99233 SBSQ HOSP IP/OBS HIGH 50: CPT | Mod: ,,, | Performed by: INTERNAL MEDICINE

## 2025-05-18 PROCEDURE — 11000001 HC ACUTE MED/SURG PRIVATE ROOM

## 2025-05-18 PROCEDURE — 94640 AIRWAY INHALATION TREATMENT: CPT

## 2025-05-18 PROCEDURE — 25000003 PHARM REV CODE 250

## 2025-05-18 PROCEDURE — 99232 SBSQ HOSP IP/OBS MODERATE 35: CPT | Mod: FS,,, | Performed by: INTERNAL MEDICINE

## 2025-05-18 PROCEDURE — 94761 N-INVAS EAR/PLS OXIMETRY MLT: CPT

## 2025-05-18 PROCEDURE — 84460 ALANINE AMINO (ALT) (SGPT): CPT

## 2025-05-18 PROCEDURE — 80053 COMPREHEN METABOLIC PANEL: CPT

## 2025-05-18 PROCEDURE — 63600175 PHARM REV CODE 636 W HCPCS: Performed by: FAMILY MEDICINE

## 2025-05-18 RX ORDER — SPIRONOLACTONE 25 MG/1
25 TABLET ORAL DAILY
Status: DISCONTINUED | OUTPATIENT
Start: 2025-05-18 | End: 2025-05-26

## 2025-05-18 RX ADMIN — MORPHINE SULFATE 2 MG: 2 INJECTION, SOLUTION INTRAMUSCULAR; INTRAVENOUS at 11:05

## 2025-05-18 RX ADMIN — CEFAZOLIN 2 G: 2 INJECTION, POWDER, FOR SOLUTION INTRAMUSCULAR; INTRAVENOUS at 11:05

## 2025-05-18 RX ADMIN — CEFAZOLIN 2 G: 2 INJECTION, POWDER, FOR SOLUTION INTRAMUSCULAR; INTRAVENOUS at 04:05

## 2025-05-18 RX ADMIN — SPIRONOLACTONE 25 MG: 25 TABLET, FILM COATED ORAL at 04:05

## 2025-05-18 RX ADMIN — ISOSORBIDE DINITRATE 20 MG: 10 TABLET ORAL at 09:05

## 2025-05-18 RX ADMIN — METOLAZONE 5 MG: 2.5 TABLET ORAL at 09:05

## 2025-05-18 RX ADMIN — MUPIROCIN 1 G: 20 OINTMENT TOPICAL at 09:05

## 2025-05-18 RX ADMIN — BUPRENORPHINE AND NALOXONE 2 FILM: 8; 2 FILM BUCCAL; SUBLINGUAL at 09:05

## 2025-05-18 RX ADMIN — ISOSORBIDE DINITRATE 20 MG: 10 TABLET ORAL at 04:05

## 2025-05-18 RX ADMIN — FUROSEMIDE 10 MG/HR: 10 INJECTION, SOLUTION INTRAMUSCULAR; INTRAVENOUS at 07:05

## 2025-05-18 RX ADMIN — MORPHINE SULFATE 2 MG: 2 INJECTION, SOLUTION INTRAMUSCULAR; INTRAVENOUS at 05:05

## 2025-05-18 RX ADMIN — MORPHINE SULFATE 2 MG: 2 INJECTION, SOLUTION INTRAMUSCULAR; INTRAVENOUS at 04:05

## 2025-05-18 RX ADMIN — ENOXAPARIN SODIUM 40 MG: 40 INJECTION SUBCUTANEOUS at 04:05

## 2025-05-18 RX ADMIN — INSULIN GLARGINE 10 UNITS: 100 INJECTION, SOLUTION SUBCUTANEOUS at 09:05

## 2025-05-18 RX ADMIN — IPRATROPIUM BROMIDE AND ALBUTEROL SULFATE 3 ML: 2.5; .5 SOLUTION RESPIRATORY (INHALATION) at 04:05

## 2025-05-18 RX ADMIN — INSULIN ASPART 2 UNITS: 100 INJECTION, SOLUTION INTRAVENOUS; SUBCUTANEOUS at 05:05

## 2025-05-18 RX ADMIN — CEFAZOLIN 2 G: 2 INJECTION, POWDER, FOR SOLUTION INTRAMUSCULAR; INTRAVENOUS at 05:05

## 2025-05-18 RX ADMIN — TAMSULOSIN HYDROCHLORIDE 0.4 MG: 0.4 CAPSULE ORAL at 09:05
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
## 2025-05-18 NOTE — PROGRESS NOTES
[facility]  Department of Cardiology  Progress Note      PATIENT NAME: Gideon Ross  MRN: [id number]  TODAY'S DATE: 05/18/2025  ADMIT DATE: 5/11/2025                          CONSULT REQUESTED BY: Don Whelan DO    SUBJECTIVE     PRINCIPAL PROBLEM: Acute systolic congestive heart failure      REASON FOR CONSULT:  Acute on chronic CHF, life vest patient      INTERVAL HISTORY:    05/18/2025    Seen sitting up in bed. Feels better. Breathing stable. UOP has been good.       5/17/25  Patient seen sittingup in bed. Noted to be significantly volume overloaded. Creatinine bumped slightly today.     5/16/25  Weber inserted yesterday; more urine output today; -1600 net balance  VSS, remains edematous    5/15/25  Cr 1.3 today; + fluid balance but output has been difficult to measure accurately  Remains edematous    5/14/25  Cr 1.2; difficulty collecting accurate output  Remains edematous and orthopneic  No events noted on telemetry    5/13/25  Hgb 9.5; Cr 1.1; + net balance; BP elevated;  Pt still has orthopnea; he feels his hands are less swollen today    HPI:  Pt is 49-year-old male w/ PMH HFrEF, DM, HTN, DM, cardiomyopathy, R BKA, left AKA, and substance dependence who presented to ED with c/o shortness of breath, swelling, and orthopnea for past week.    Pt has been receiving IV lasix since admission and is reportedly breathing better today but remains orthopneic, and swelling to upper legs, scrotum and abdomen still present. Still on 4 LPM O2    Review of patient's allergies indicates:  No Known Allergies    Past Medical History:   Diagnosis Date    Diabetes mellitus     Hypertension      Past Surgical History:   Procedure Laterality Date    CYSTOSCOPY,WITH URETERAL CATHETER INSERTION N/A 5/15/2025    Procedure: CYSTOSCOPY,WITH URETERAL CATHETER INSERTION;  Surgeon: Yoni Lemus MD;  Location: [facility] OR;  Service: Urology;  Laterality: N/A;    SKIN GRAFT       Social History[1]     REVIEW OF  SYSTEMS  Negative except as mentioned in HPI    OBJECTIVE     VITAL SIGNS (Most Recent)  Temp: 97.9 °F (36.6 °C) (05/18/25 0904)  Pulse: (!) 119 (05/18/25 0904)  Resp: 16 (05/18/25 0904)  BP: (!) 174/78 (05/18/25 0906)  SpO2: 95 % (05/18/25 0904)    VENTILATION STATUS  Resp: 16 (05/18/25 0904)  SpO2: 95 % (05/18/25 0904)  Oxygen Concentration (%):  [28] 28        I & O (Last 24H):  Intake/Output Summary (Last 24 hours) at 5/18/2025 1046  Last data filed at 5/18/2025 0700  Gross per 24 hour   Intake 517.98 ml   Output 13447 ml   Net -96267.02 ml       WEIGHTS  Wt Readings from Last 3 Encounters:   05/16/25 2005 135.7 kg (299 lb 2.6 oz)   05/14/25 1045 (!) 138.4 kg (305 lb 1.9 oz)   05/12/25 1239 97.5 kg (214 lb 15.2 oz)   05/11/25 1954 97.5 kg (215 lb)   05/12/25 1048 97.5 kg (214 lb 15.2 oz)   01/26/25 0338 117.5 kg (259 lb 0.7 oz)   01/21/25 0515 117.9 kg (260 lb)   01/15/25 0400 126.1 kg (278 lb)   01/14/25 0400 127.1 kg (280 lb 3.2 oz)   01/09/25 2239 115.4 kg (254 lb 6.4 oz)   01/09/25 0824 108.9 kg (240 lb)       PHYSICAL EXAM    GENERAL: chronically ill middle age male breathing comfortably w/ HOB elevated  HEENT: Normocephalic.  +pallor; facial edema  NECK: No JVD.   CARDIAC: Regular rate and rhythm. S1 is normal.S2 is normal.No gallops, clicks or murmurs noted at this time.  CHEST ANATOMY: normal.   LUNGS: O2 via NC, no dyspnea or cough; crackles to bases,  ABDOMEN: ascites, obese, .  +scrotal edema    EXTREMITIES: edematous; right BKA edematous with wound, Left AKA edematous  CENTRAL NERVOUS SYSTEM: AAO x 3  SKIN: No rash     HOME MEDICATIONS:Medications Ordered Prior to Encounter[2]    SCHEDULED MEDS:   buprenorphine-naloxone 8-2 mg  2 Film Sublingual BID    ceFAZolin (Ancef) IV (PEDS and ADULTS)  2 g Intravenous Q6H    chlorhexidine  15 mL Mouth/Throat BID    enoxparin  40 mg Subcutaneous Daily    insulin glargine U-100  10 Units Subcutaneous Daily    isosorbide dinitrate  20 mg Oral TID    metOLazone  5  "mg Oral Daily    mupirocin   Nasal BID    nicotine  1 patch Transdermal Daily    senna-docusate  1 tablet Oral BID    tamsulosin  0.4 mg Oral Daily       CONTINUOUS INFUSIONS:   DOBUTamine IV infusion (non-titrating)  2.5 mcg/kg/min Intravenous Continuous 5.1 mL/hr at 05/17/25 1831 2.5 mcg/kg/min at 05/17/25 1831    furosemide (LASIX) 2 mg/mL continuous infusion (non-titrating)  5 mg/hr Intravenous Continuous 2.5 mL/hr at 05/18/25 1040 5 mg/hr at 05/18/25 1040       PRN MEDS:  Current Facility-Administered Medications:     acetaminophen, 650 mg, Oral, Q6H PRN    albuterol-ipratropium, 3 mL, Nebulization, Q6H PRN    bisacodyL, 10 mg, Rectal, Daily PRN    dextrose 50%, 12.5 g, Intravenous, PRN    dextrose 50%, 25 g, Intravenous, PRN    diphenhydrAMINE, 50 mg, Oral, Q8H PRN    glucagon (human recombinant), 1 mg, Intramuscular, PRN    glucose, 16 g, Oral, PRN    glucose, 24 g, Oral, PRN    insulin aspart U-100, 0-10 Units, Subcutaneous, QID (AC + HS) PRN    melatonin, 6 mg, Oral, Nightly PRN    morphine, 2 mg, Intravenous, Q4H PRN    promethazine, 12.5 mg, Rectal, Q6H PRN    sodium chloride 0.9%, 10 mL, Intravenous, PRN    LABS AND DIAGNOSTICS     CBC LAST 3 DAYS  Recent Labs   Lab 05/13/25  0523 05/14/25  0650 05/18/25  0608   WBC 8.45 8.78 10.04   RBC 3.79* 3.87* 3.58*   HGB 9.5* 9.5* 8.9*   HCT 31.3* 32.2* 28.6*   MCV 83 83 80*   MCH 25.1* 24.5* 24.9*   MCHC 30.4* 29.5* 31.1*   RDW 15.9* 16.2* 16.0*    221 234   MPV 10.0 10.1 10.4   NRBC 0 0 0       COAGULATION LAST 3 DAYS  No results for input(s): "LABPT", "INR", "APTT" in the last 168 hours.    CHEMISTRY LAST 3 DAYS  Recent Labs   Lab 05/18/25  0037 05/18/25  0608 05/19/25  0803    137 136   K 4.6 4.4 4.3   CL 94* 93* 93*   CO2 36* 37* 37*   ANIONGAP 7* 7* 6*   BUN 42* 41* 41*   CREATININE 1.5* 1.5* 1.4   * 168* 127*   CALCIUM 9.0 9.0 9.1   MG 2.0 2.0 2.0   ALBUMIN 2.7* 2.7* 2.8*   PROT 6.4 6.3 6.7   ALKPHOS 160* 154* 160*   ALT <3* <3* <3* " "  AST 12 13 14   BILITOT 0.5 0.4 0.4       CARDIAC PROFILE LAST 3 DAYS  Recent Labs   Lab 05/11/25  2106   BNP 1,998*       ENDOCRINE LAST 3 DAYS  Recent Labs   Lab 05/12/25 0040   TSH 4.416       LAST ARTERIAL BLOOD GAS  ABG  No results for input(s): "PH", "PO2", "PCO2", "HCO3", "BE" in the last 168 hours.    LAST 7 DAYS MICROBIOLOGY   Microbiology Results (last 7 days)       Procedure Component Value Units Date/Time    Blood culture [[id number]]  (Normal) Collected: 05/12/25 0004    Order Status: Completed Specimen: Blood Updated: 05/17/25 0103     CULTURE, BLOOD ([facility]) No Growth After 5 Days    Blood culture [[id number]]  (Normal) Collected: 05/11/25 2331    Order Status: Completed Specimen: Blood Updated: 05/17/25 0030     CULTURE, BLOOD ([facility]) No Growth After 5 Days    Aerobic culture [[id number]]  (Abnormal)  (Susceptibility) Collected: 05/12/25 0040    Order Status: Completed Specimen: Wound from Leg, Right Updated: 05/14/25 0634     CULTURE, AEROBIC Moderate Staphylococcus aureus    Influenza A & B by Molecular [[id number]]  (Normal) Collected: 05/12/25 0040    Order Status: Completed Specimen: Nasal Swab Updated: 05/12/25 0150     INFLUENZA A MOLECULAR Negative     INFLUENZA B MOLECULAR  Negative            MOST RECENT IMAGING  US Abdomen Complete  Narrative: CLINICAL HISTORY:  ([id number] y/o  (1975) M    HCV infection.  Anasarca.  Evaluate liver, spleen and for ascites;    TECHNIQUE:  (A#[id number], exam time 5/18/2025 8:46)    US ABDOMEN COMPLETE [id number]    Complete abdominal ultrasound, images obtained in grayscale and color. Additional Doppler images of the portal vein were obtained.    COMPARISON:  CT from 05/13/2025    FINDINGS:  Please note this examination is technically suboptimal due to patient related factors  including body edema, habitus as well as overlying bowel gas.    The LIVER is enlarged in size at 21.7  Cm (sagittal right lobe). Hepatic parenchyma has normal echogenicity with normal " delineation of the periportal triads. No definite intrahepatic masses are noted. The portal vein is patent with hepatopetal flow, noting mild pulsatility.    The BILIARY SYSTEM is normal in caliber; the common hepatic duct measures 5-6 mm.  There is sludge with no shadowing stones seen in the GALL BLADDER.  The gallbladder wall is top normal in thickness, with no pericholecystic fluid.    Minimal trace upper abdominal ascites is seen.    The PANCREATIC head and body are partially visualized but grossly normal in appearance. The pancreatic duct is not dilated.    The right KIDNEY is normal in size at 12.5 x 5.7 x 5.3 cm and echogenicity/texture. No stones or hydronephrosis seen. No solid masses are noted    The left KIDNEY is normal in size at 12.6 x 6.3 x 6.3 cm and echogenicity/texture. No stones or hydronephrosis seen. No solid masses are noted.    The SPLEEN is enlarged measuring 13.7 cm and is homogeneous in echotexture.    The AORTA and IVC are partially visualized and unremarkable.    Trace pleural effusion is seen on the right.  Impression: 1.  Hepatosplenomegaly.    2.  Trace pleural effusion and ascites.    3.  Minimally pulsatile portal venous flow (consider correlation for right heart strain/failure in this age group).    4.  Diffuse body wall edema (anasarca), consider correlation for edema and or hypoproteinemic states.    5.  Sludge in the gallbladder, without finding to specifically suggest acute cholecystitis.    .    Electronically signed by: Manuel Snow  Date:    05/18/2025  Time:    08:52      ECHOCARDIOGRAM RESULTS (last 5)  Results for orders placed during the hospital encounter of 01/09/25    Echo    Interpretation Summary    Left Ventricle: The left ventricle is moderately dilated. Mildly increased wall thickness. There is mild asymmetric hypertrophy. Severe global hypokinesis present. There is severely reduced systolic function with a visually estimated ejection fraction of 25 - 30%.     Right Ventricle: Moderate right ventricular enlargement. Systolic function is mildly reduced.    Left Atrium: Left atrium is mildly dilated.    Tricuspid Valve: There is mild regurgitation.    IVC/SVC: Elevated venous pressure at 15 mmHg.      CURRENT/PREVIOUS VISIT EKG  Results for orders placed or performed during the hospital encounter of 05/11/25   EKG 12-lead    Collection Time: 05/17/25  2:41 PM   Result Value Ref Range    QRS Duration 120 ms    [facility] QTC Calculation 442 ms    Narrative    Test Reason : I50.9,    Vent. Rate : 112 BPM     Atrial Rate : 112 BPM     P-R Int : 160 ms          QRS Dur : 120 ms      QT Int : 324 ms       P-R-T Axes :  27 135  34 degrees    QTcB Int : 442 ms    Sinus tachycardia  Low voltage QRS  Right bundle branch block  Left posterior fascicular block   Bifascicular block   Abnormal ECG  When compared with ECG of 12-May-2025 04:05,  Left posterior fascicular block is now Present  Criteria for Inferior infarct are no longer Present    Referred By: AAAREFERRAL SELF           Confirmed By:            ASSESSMENT/PLAN:     Active Hospital Problems    Diagnosis    *Acute systolic congestive heart failure    Chronic hepatitis C without hepatic coma    Pressure ulcer of right buttock, stage 2    HTN (hypertension)    Prolonged QT interval    Hypoalbuminemia    Non-healing wound of amputation stump    Opioid use disorder    Diabetes mellitus    Scrotal edema    Elevated d-dimer       ASSESSMENT & PLAN:   Acute on chronic HFrEF  Dilated Cardiomyopathy  Substance abuse  Right BKA wound  Left AKA    RECOMMENDATIONS:  Continue furosemide drip at 10mg/hr.   Continue dobutamine drip at 2.5mcg/kg/min. he is mildly tachycardic in SR in the 100-110s. Monitor for ventricular ectopy.   Strict I&O.   Trend labs.       Bhumi Sanchez NP  [facility]  Department of Cardiology  Date of Service: 05/18/2025        Diuresing very well continue on present therapy to include dobutamine as well as  Lasgonzalo palmerip add spironolactone 25 mg to his regimen.  Watch for electrolyte abnormalities supplements has been attempt to keep potassium greater than 4.0 and magnesium greater than 2 point 0  I have personally interviewed and examined the patient, I have reviewed the Nurse Practitioner's history and physical, assessment, and plan. I have personally evaluated the patient at bedside and agree with the findings and made appropriate changes as necessary in recommendations.    Jairo Corcoran MD  Department of Cardiology  Formerly [facility], later [facility]  05/18/2025 11:00 AM             [1]   Social History  Tobacco Use    Smoking status: Every Day     Current packs/day: 1.00     Average packs/day: 1 pack/day for 40.4 years (40.4 ttl pk-yrs)     Types: Cigarettes     Start date: 1985   Substance Use Topics    Alcohol use: Yes    Drug use: No   [2]   No current facility-administered medications on file prior to encounter.     Current Outpatient Medications on File Prior to Encounter   Medication Sig Dispense Refill    isosorbide dinitrate (ISORDIL) 20 MG tablet Take 1 tablet (20 mg total) by mouth 3 (three) times daily. 90 tablet 11    tamsulosin (FLOMAX) 0.4 mg Cap Take 1 capsule (0.4 mg total) by mouth once daily. 30 capsule 11    torsemide (DEMADEX) 20 MG Tab Take 1 tablet (20 mg total) by mouth 2 (two) times a day. 60 tablet 11    insulin aspart U-100 (NOVOLOG) 100 unit/mL (3 mL) InPn pen Inject 0-10 Units into the skin before meals and at bedtime as needed (Hyperglycemia). **MODERATE CORRECTION DOSE**  Blood Glucose  mg/dL                  Pre-meal                2200  151-200                2 units                    1 unit  201-250                4 units                    2 units  251-300                6 units                    3 units  301-350                8 units                    4 units  >350                     10 units                  5 units  Administer subcutaneously if needed at times designated by  "monitoring  schedule.  DO NOT HOLD correction dose insulin for patients who are  NPO.    "HIGH ALERT MEDICATION" - Administer with meals or TF/TPN. (Patient not taking: Reported on 2/12/2025) 1 each 0     "

## 2025-05-18 NOTE — ASSESSMENT & PLAN NOTE
Patient's blood pressure range in the last 24 hours was: BP  Min: 143/65  Max: 188/84.The patient's inpatient anti-hypertensive regimen is listed below:  Current Antihypertensives  isosorbide dinitrate tablet 20 mg, 3 times daily, Oral  metOLazone tablet 5 mg, Daily, Oral  furosemide (Lasix) 200 mg in 0.9% NaCl SolP 100 mL continuous infusion (conc: 2 mg/mL), Continuous, Intravenous    Plan  -will make adjustment as needed

## 2025-05-18 NOTE — RESPIRATORY THERAPY
05/17/25 1921   Patient Assessment/Suction   Level of Consciousness (AVPU) alert   Respiratory Effort Normal;Unlabored   Expansion/Accessory Muscles/Retractions no use of accessory muscles   All Lung Fields Breath Sounds clear;diminished   Rhythm/Pattern, Respiratory unlabored;pattern regular;depth regular;no shortness of breath reported   Skin Integrity   $ Wound Care Tech Time 15 min   Area Observed Left;Right;Cheek;Upper lip;Behind ear;Nares   Skin Appearance without discoloration   PRE-TX-O2   Device (Oxygen Therapy) nasal cannula   Flow (L/min) (Oxygen Therapy) 2   SpO2 96 %   Pulse Oximetry Type Intermittent   $ Pulse Oximetry - Multiple Charge Pulse Oximetry - Multiple   Pulse (!) 118   Resp 17   Aerosol Therapy   $ Aerosol Therapy Charges PRN treatment not required   Daily Review of Necessity (SVN) completed   Respiratory Treatment Status (SVN) PRN treatment not required   Education   $ Education 15 min;Oxygen;Other (see comment)  (pox)   Respiratory Evaluation   $ Care Plan Tech Time 15 min

## 2025-05-18 NOTE — PLAN OF CARE
Problem: Adult Inpatient Plan of Care  Goal: Plan of Care Review  Outcome: Progressing  Goal: Patient-Specific Goal (Individualized)  Outcome: Progressing  Goal: Absence of Hospital-Acquired Illness or Injury  Outcome: Progressing  Goal: Optimal Comfort and Wellbeing  Outcome: Progressing  Goal: Readiness for Transition of Care  Outcome: Progressing     Problem: Infection  Goal: Absence of Infection Signs and Symptoms  Outcome: Progressing     Problem: Diabetes Comorbidity  Goal: Blood Glucose Level Within Targeted Range  Outcome: Progressing     Problem: Acute Kidney Injury/Impairment  Goal: Fluid and Electrolyte Balance  Outcome: Progressing  Goal: Improved Oral Intake  Outcome: Progressing  Goal: Effective Renal Function  Outcome: Progressing     Problem: Wound  Goal: Optimal Coping  Outcome: Progressing  Goal: Optimal Functional Ability  Outcome: Progressing  Goal: Absence of Infection Signs and Symptoms  Outcome: Progressing  Goal: Improved Oral Intake  Outcome: Progressing  Goal: Optimal Pain Control and Function  Outcome: Progressing  Goal: Skin Health and Integrity  Outcome: Progressing  Goal: Optimal Wound Healing  Outcome: Progressing     Problem: Skin Injury Risk Increased  Goal: Skin Health and Integrity  Outcome: Progressing     Problem: Oral Intake Inadequate  Goal: Improved Oral Intake  Outcome: Progressing     Problem: Bariatric Environmental Safety  Goal: Safety Maintained with Care  Outcome: Progressing     Problem: Fall Injury Risk  Goal: Absence of Fall and Fall-Related Injury  Outcome: Progressing
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
## 2025-05-18 NOTE — ASSESSMENT & PLAN NOTE
S/p R BKA and L AKA  Patient's FSGs are uncontrolled due to hyperglycemia on current medication regimen.  Last A1c reviewed-   Lab Results   Component Value Date    HGBA1C 7.3 (H) 05/12/2025     Most recent fingerstick glucose reviewed-   Recent Labs   Lab 05/17/25  1227 05/17/25  1652 05/18/25  0902   POCTGLUCOSE 123* 128* 118*     Current correctional scale  Low  Maintain anti-hyperglycemic dose as follows-   Antihyperglycemics (From admission, onward)      Start     Stop Route Frequency Ordered    05/12/25 1445  insulin glargine U-100 (Lantus) pen 10 Units         -- SubQ Daily 05/12/25 1332    05/12/25 0943  insulin aspart U-100 pen 0-10 Units         -- SubQ Before meals & nightly PRN 05/12/25 0843

## 2025-05-18 NOTE — SUBJECTIVE & OBJECTIVE
Interval History:  Patient seen and examined.  He has slight more tachycardic in the !10s following initiation of Dobutrex infusion. States he feels better today.He is on room air. He denies any pain or shortness of breath but does request diabetic diet change to heart healthy diet. Lasix and Dobutrex infusing. 24 hour urine output noted at negative 8 L and an additional 2 L out this a.m. Decreased Lasix drip and half. He states he has his LifeVest at bedside however family did not bring the batteries.  Patient updated on plan of care.    Review of Systems   Constitutional:  Positive for activity change. Negative for appetite change, chills, diaphoresis, fatigue and fever.   Respiratory:  Negative for cough and shortness of breath.    Cardiovascular:  Negative for chest pain.   Gastrointestinal:  Positive for abdominal distention (ascites). Negative for abdominal pain, diarrhea, nausea and vomiting.   Genitourinary:  Positive for scrotal swelling. Negative for difficulty urinating and hematuria.   Musculoskeletal:  Positive for arthralgias and gait problem. Negative for back pain.   Skin:  Positive for wound (right stump).   Neurological:  Positive for weakness. Negative for dizziness, seizures, syncope, facial asymmetry, speech difficulty, light-headedness, numbness and headaches.     Objective:     Vital Signs (Most Recent):  Temp: 97.9 °F (36.6 °C) (05/18/25 0904)  Pulse: (!) 119 (05/18/25 0904)  Resp: 16 (05/18/25 0904)  BP: (!) 174/78 (05/18/25 0906)  SpO2: 95 % (05/18/25 0904) Vital Signs (24h Range):  Temp:  [97.8 °F (36.6 °C)-98.4 °F (36.9 °C)] 97.9 °F (36.6 °C)  Pulse:  [] 119  Resp:  [10-20] 16  SpO2:  [93 %-100 %] 95 %  BP: (143-188)/(65-84) 174/78     Weight: 135.7 kg (299 lb 2.6 oz)  Body mass index is 42.93 kg/m².    Intake/Output Summary (Last 24 hours) at 5/18/2025 1107  Last data filed at 5/18/2025 1045  Gross per 24 hour   Intake 598.52 ml   Output 54218 ml   Net -21092.48 ml         Physical  Exam  Vitals and nursing note reviewed.   Constitutional:       Appearance: He is not ill-appearing.   Eyes:      Pupils: Pupils are equal, round, and reactive to light.   Cardiovascular:      Rate and Rhythm: Regular rhythm. Tachycardia present.      Pulses: Normal pulses.      Comments: LifeVest at bedside but no battery; Zoll pacer pads in place  Pulmonary:      Effort: Pulmonary effort is normal. No respiratory distress.      Breath sounds: Examination of the right-lower field reveals decreased breath sounds. Examination of the left-lower field reveals decreased breath sounds. Decreased breath sounds present. No wheezing.   Abdominal:      General: Bowel sounds are normal. There is distension (ascites).      Palpations: Abdomen is soft.      Tenderness: There is no abdominal tenderness.   Musculoskeletal:         General: Swelling present.      Right upper leg: Swelling present.      Left upper leg: Swelling present.      Right lower leg: Pitting Edema present.      Left lower leg: Pitting Edema present.      Right Lower Extremity: Right leg is amputated below knee.      Left Lower Extremity: Left leg is amputated above knee.   Skin:     General: Skin is warm and dry.      Capillary Refill: Capillary refill takes less than 2 seconds.      Findings: Wound (right stump) present.      Comments: Multiple scratches to extremities   Neurological:      Mental Status: He is alert and oriented to person, place, and time.      GCS: GCS eye subscore is 4. GCS verbal subscore is 5. GCS motor subscore is 6.      Motor: Weakness present.               Significant Labs: All pertinent labs within the past 24 hours have been reviewed.  A1C:   Recent Labs   Lab 01/10/25  0721 05/12/25  0040 05/12/25  0501   HGBA1C 7.5* 7.4* 7.3*       Bilirubin:   Recent Labs   Lab 05/17/25  1530 05/17/25  1944 05/18/25  0037 05/18/25  0608 05/19/25  0803   BILITOT 0.4 0.4 0.5 0.4 0.4       BMP:   Recent Labs   Lab 05/19/25  0803   *   NA  136   K 4.3   CL 93*   CO2 37*   BUN 41*   CREATININE 1.4   CALCIUM 9.1   MG 2.0     CBC:   Recent Labs   Lab 05/18/25  0608   WBC 10.04   HGB 8.9*   HCT 28.6*        CMP:   Recent Labs   Lab 05/18/25  0037 05/18/25  0608 05/19/25  0803    137 136   K 4.6 4.4 4.3   CL 94* 93* 93*   CO2 36* 37* 37*   * 168* 127*   BUN 42* 41* 41*   CREATININE 1.5* 1.5* 1.4   CALCIUM 9.0 9.0 9.1   PROT 6.4 6.3 6.7   ALBUMIN 2.7* 2.7* 2.8*   BILITOT 0.5 0.4 0.4   ALKPHOS 160* 154* 160*   AST 12 13 14   ALT <3* <3* <3*   ANIONGAP 7* 7* 6*     Lactic Acid:   Recent Labs   Lab 05/16/25  1126   LACTATE 0.9       Magnesium:   Recent Labs   Lab 05/18/25  0037 05/18/25  0608 05/19/25  0803   MG 2.0 2.0 2.0     POCT Glucose:   Recent Labs   Lab 05/17/25  1227 05/17/25  1652 05/18/25  0902   POCTGLUCOSE 123* 128* 118*     TSH:   Recent Labs   Lab 05/12/25  0040   TSH 4.416         Significant Imaging: I have reviewed all pertinent imaging results/findings within the past 24 hours.    US Abdomen Complete  Order: [id number]   Status: Final result       Next appt: 05/27/2025 at 02:40 PM in Family Medicine (Maria Del Carmen Garcia, DARIN-C)    Test Result Released: Yes (not seen)    0 Result Notes  Details    Reading Physician Reading Date Result Priority   Manuel Snow MD  [phone]  5/18/2025 Routine     Narrative & Impression  CLINICAL HISTORY:  ([id number])48 y/o  (1975) M     HCV infection.  Anasarca.  Evaluate liver, spleen and for ascites;     TECHNIQUE:  (A#[id number], exam time 5/18/2025 8:46)     US ABDOMEN COMPLETE [id number]     Complete abdominal ultrasound, images obtained in grayscale and color. Additional Doppler images of the portal vein were obtained.     COMPARISON:  CT from 05/13/2025     FINDINGS:  Please note this examination is technically suboptimal due to patient related factors  including body edema, habitus as well as overlying bowel gas.     The LIVER is enlarged in size at 21.7  Cm (sagittal  right lobe). Hepatic parenchyma has normal echogenicity with normal delineation of the periportal triads. No definite intrahepatic masses are noted. The portal vein is patent with hepatopetal flow, noting mild pulsatility.     The BILIARY SYSTEM is normal in caliber; the common hepatic duct measures 5-6 mm.  There is sludge with no shadowing stones seen in the GALL BLADDER.  The gallbladder wall is top normal in thickness, with no pericholecystic fluid.     Minimal trace upper abdominal ascites is seen.     The PANCREATIC head and body are partially visualized but grossly normal in appearance. The pancreatic duct is not dilated.     The right KIDNEY is normal in size at 12.5 x 5.7 x 5.3 cm and echogenicity/texture. No stones or hydronephrosis seen. No solid masses are noted     The left KIDNEY is normal in size at 12.6 x 6.3 x 6.3 cm and echogenicity/texture. No stones or hydronephrosis seen. No solid masses are noted.     The SPLEEN is enlarged measuring 13.7 cm and is homogeneous in echotexture.     The AORTA and IVC are partially visualized and unremarkable.     Trace pleural effusion is seen on the right.     Impression:     1.  Hepatosplenomegaly.     2.  Trace pleural effusion and ascites.     3.  Minimally pulsatile portal venous flow (consider correlation for right heart strain/failure in this age group).     4.  Diffuse body wall edema (anasarca), consider correlation for edema and or hypoproteinemic states.     5.  Sludge in the gallbladder, without finding to specifically suggest acute cholecystitis.     .        Electronically signed by:Manuel Snow  Date:                                            05/18/2025  Time:                                           08:52        Exam Ended: 05/18/25 08:46 CDT Last Resulted: 05/18/25 08:52 CDT

## 2025-05-18 NOTE — ASSESSMENT & PLAN NOTE
Wound care is following   MRI ordered per recommendation on Xray.  We will continue when he is able to tolerate this  If unable to complete MRI ordered CT with contrast when MARLENE improves

## 2025-05-19 LAB
ABSOLUTE EOSINOPHIL (SMH): 1.2 K/UL
ABSOLUTE MONOCYTE (SMH): 1.28 K/UL (ref 0.3–1)
ABSOLUTE NEUTROPHIL COUNT (SMH): 7.4 K/UL (ref 1.8–7.7)
ANION GAP (SMH): 4 MMOL/L (ref 8–16)
ANION GAP (SMH): 6 MMOL/L (ref 8–16)
ANION GAP (SMH): 7 MMOL/L (ref 8–16)
ANION GAP (SMH): 8 MMOL/L (ref 8–16)
BASOPHILS # BLD AUTO: 0.09 K/UL
BASOPHILS NFR BLD AUTO: 0.8 %
BUN SERPL-MCNC: 40 MG/DL (ref 6–20)
BUN SERPL-MCNC: 42 MG/DL (ref 6–20)
BUN SERPL-MCNC: 42 MG/DL (ref 6–20)
BUN SERPL-MCNC: 43 MG/DL (ref 6–20)
CALCIUM SERPL-MCNC: 9 MG/DL (ref 8.7–10.5)
CALCIUM SERPL-MCNC: 9.2 MG/DL (ref 8.7–10.5)
CALCIUM SERPL-MCNC: 9.3 MG/DL (ref 8.7–10.5)
CHLORIDE SERPL-SCNC: 87 MMOL/L (ref 95–110)
CHLORIDE SERPL-SCNC: 88 MMOL/L (ref 95–110)
CHLORIDE SERPL-SCNC: 88 MMOL/L (ref 95–110)
CHLORIDE SERPL-SCNC: 89 MMOL/L (ref 95–110)
CHLORIDE SERPL-SCNC: 89 MMOL/L (ref 95–110)
CHLORIDE SERPL-SCNC: 90 MMOL/L (ref 95–110)
CO2 SERPL-SCNC: 38 MMOL/L (ref 23–29)
CO2 SERPL-SCNC: 39 MMOL/L (ref 23–29)
CO2 SERPL-SCNC: 39 MMOL/L (ref 23–29)
CO2 SERPL-SCNC: 40 MMOL/L (ref 23–29)
CO2 SERPL-SCNC: 40 MMOL/L (ref 23–29)
CO2 SERPL-SCNC: 41 MMOL/L (ref 23–29)
CREAT SERPL-MCNC: 1.5 MG/DL (ref 0.5–1.4)
ERYTHROCYTE [DISTWIDTH] IN BLOOD BY AUTOMATED COUNT: 16.1 % (ref 11.5–14.5)
GFR SERPLBLD CREATININE-BSD FMLA CKD-EPI: 57 ML/MIN/1.73/M2
GLUCOSE SERPL-MCNC: 118 MG/DL (ref 70–110)
GLUCOSE SERPL-MCNC: 122 MG/DL (ref 70–110)
GLUCOSE SERPL-MCNC: 126 MG/DL (ref 70–110)
GLUCOSE SERPL-MCNC: 129 MG/DL (ref 70–110)
GLUCOSE SERPL-MCNC: 152 MG/DL (ref 70–110)
GLUCOSE SERPL-MCNC: 155 MG/DL (ref 70–110)
HCT VFR BLD AUTO: 30.4 % (ref 40–54)
HGB BLD-MCNC: 9.5 GM/DL (ref 14–18)
IMM GRANULOCYTES # BLD AUTO: 0.04 K/UL (ref 0–0.04)
IMM GRANULOCYTES NFR BLD AUTO: 0.4 % (ref 0–0.5)
LYMPHOCYTES # BLD AUTO: 1.18 K/UL (ref 1–4.8)
MAGNESIUM SERPL-MCNC: 2 MG/DL (ref 1.6–2.6)
MAGNESIUM SERPL-MCNC: 2 MG/DL (ref 1.6–2.6)
MAGNESIUM SERPL-MCNC: 2.1 MG/DL (ref 1.6–2.6)
MCH RBC QN AUTO: 24.5 PG (ref 27–31)
MCHC RBC AUTO-ENTMCNC: 31.3 G/DL (ref 32–36)
MCV RBC AUTO: 79 FL (ref 82–98)
NUCLEATED RBC (/100WBC) (SMH): 0 /100 WBC
PLATELET # BLD AUTO: 272 K/UL (ref 150–450)
PMV BLD AUTO: 9.7 FL (ref 9.2–12.9)
POCT GLUCOSE: 101 MG/DL (ref 70–110)
POCT GLUCOSE: 147 MG/DL (ref 70–110)
POCT GLUCOSE: 150 MG/DL (ref 70–110)
POCT GLUCOSE: 152 MG/DL (ref 70–110)
POTASSIUM SERPL-SCNC: 4.2 MMOL/L (ref 3.5–5.1)
POTASSIUM SERPL-SCNC: 4.3 MMOL/L (ref 3.5–5.1)
POTASSIUM SERPL-SCNC: 4.4 MMOL/L (ref 3.5–5.1)
POTASSIUM SERPL-SCNC: 4.4 MMOL/L (ref 3.5–5.1)
RBC # BLD AUTO: 3.87 M/UL (ref 4.6–6.2)
RELATIVE EOSINOPHIL (SMH): 10.7 % (ref 0–8)
RELATIVE LYMPHOCYTE (SMH): 10.6 % (ref 18–48)
RELATIVE MONOCYTE (SMH): 11.5 % (ref 4–15)
RELATIVE NEUTROPHIL (SMH): 66 % (ref 38–73)
SODIUM SERPL-SCNC: 133 MMOL/L (ref 136–145)
SODIUM SERPL-SCNC: 134 MMOL/L (ref 136–145)
SODIUM SERPL-SCNC: 135 MMOL/L (ref 136–145)
WBC # BLD AUTO: 11.17 K/UL (ref 3.9–12.7)

## 2025-05-19 PROCEDURE — 99900035 HC TECH TIME PER 15 MIN (STAT)

## 2025-05-19 PROCEDURE — 63600175 PHARM REV CODE 636 W HCPCS: Performed by: STUDENT IN AN ORGANIZED HEALTH CARE EDUCATION/TRAINING PROGRAM

## 2025-05-19 PROCEDURE — 63600175 PHARM REV CODE 636 W HCPCS

## 2025-05-19 PROCEDURE — 99233 SBSQ HOSP IP/OBS HIGH 50: CPT | Mod: ,,, | Performed by: INTERNAL MEDICINE

## 2025-05-19 PROCEDURE — 25000003 PHARM REV CODE 250: Performed by: NURSE PRACTITIONER

## 2025-05-19 PROCEDURE — 25000003 PHARM REV CODE 250

## 2025-05-19 PROCEDURE — 25000003 PHARM REV CODE 250: Performed by: INTERNAL MEDICINE

## 2025-05-19 PROCEDURE — 25000003 PHARM REV CODE 250: Performed by: STUDENT IN AN ORGANIZED HEALTH CARE EDUCATION/TRAINING PROGRAM

## 2025-05-19 PROCEDURE — 85025 COMPLETE CBC W/AUTO DIFF WBC: CPT

## 2025-05-19 PROCEDURE — 83735 ASSAY OF MAGNESIUM: CPT

## 2025-05-19 PROCEDURE — 80053 COMPREHEN METABOLIC PANEL: CPT

## 2025-05-19 PROCEDURE — 97530 THERAPEUTIC ACTIVITIES: CPT | Mod: CQ

## 2025-05-19 PROCEDURE — 99900031 HC PATIENT EDUCATION (STAT)

## 2025-05-19 PROCEDURE — 25500020 PHARM REV CODE 255: Performed by: FAMILY MEDICINE

## 2025-05-19 PROCEDURE — 94761 N-INVAS EAR/PLS OXIMETRY MLT: CPT

## 2025-05-19 PROCEDURE — 63600175 PHARM REV CODE 636 W HCPCS: Performed by: NURSE PRACTITIONER

## 2025-05-19 PROCEDURE — 36415 COLL VENOUS BLD VENIPUNCTURE: CPT

## 2025-05-19 PROCEDURE — 11000001 HC ACUTE MED/SURG PRIVATE ROOM

## 2025-05-19 PROCEDURE — 82947 ASSAY GLUCOSE BLOOD QUANT: CPT

## 2025-05-19 PROCEDURE — S4991 NICOTINE PATCH NONLEGEND: HCPCS | Performed by: STUDENT IN AN ORGANIZED HEALTH CARE EDUCATION/TRAINING PROGRAM

## 2025-05-19 RX ADMIN — BUPRENORPHINE AND NALOXONE 2 FILM: 8; 2 FILM BUCCAL; SUBLINGUAL at 08:05

## 2025-05-19 RX ADMIN — ENOXAPARIN SODIUM 40 MG: 40 INJECTION SUBCUTANEOUS at 04:05

## 2025-05-19 RX ADMIN — TAMSULOSIN HYDROCHLORIDE 0.4 MG: 0.4 CAPSULE ORAL at 08:05

## 2025-05-19 RX ADMIN — MORPHINE SULFATE 2 MG: 2 INJECTION, SOLUTION INTRAMUSCULAR; INTRAVENOUS at 01:05

## 2025-05-19 RX ADMIN — METOLAZONE 5 MG: 2.5 TABLET ORAL at 08:05

## 2025-05-19 RX ADMIN — FUROSEMIDE 10 MG/HR: 10 INJECTION, SOLUTION INTRAMUSCULAR; INTRAVENOUS at 04:05

## 2025-05-19 RX ADMIN — ISOSORBIDE DINITRATE 20 MG: 10 TABLET ORAL at 08:05

## 2025-05-19 RX ADMIN — ISOSORBIDE DINITRATE 20 MG: 10 TABLET ORAL at 04:05

## 2025-05-19 RX ADMIN — CEFAZOLIN 2 G: 2 INJECTION, POWDER, FOR SOLUTION INTRAMUSCULAR; INTRAVENOUS at 11:05

## 2025-05-19 RX ADMIN — CHLORHEXIDINE GLUCONATE 0.12% ORAL RINSE 15 ML: 1.2 LIQUID ORAL at 08:05

## 2025-05-19 RX ADMIN — IOHEXOL 100 ML: 350 INJECTION, SOLUTION INTRAVENOUS at 10:05

## 2025-05-19 RX ADMIN — MORPHINE SULFATE 2 MG: 2 INJECTION, SOLUTION INTRAMUSCULAR; INTRAVENOUS at 05:05

## 2025-05-19 RX ADMIN — NICOTINE 1 PATCH: 21 PATCH, EXTENDED RELEASE TRANSDERMAL at 10:05

## 2025-05-19 RX ADMIN — MUPIROCIN 1 G: 20 OINTMENT TOPICAL at 08:05

## 2025-05-19 RX ADMIN — INSULIN GLARGINE 10 UNITS: 100 INJECTION, SOLUTION SUBCUTANEOUS at 08:05

## 2025-05-19 RX ADMIN — DOBUTAMINE HYDROCHLORIDE 2.5 MCG/KG/MIN: 400 INJECTION INTRAVENOUS at 02:05

## 2025-05-19 RX ADMIN — CEFAZOLIN 2 G: 2 INJECTION, POWDER, FOR SOLUTION INTRAMUSCULAR; INTRAVENOUS at 04:05

## 2025-05-19 RX ADMIN — MORPHINE SULFATE 2 MG: 2 INJECTION, SOLUTION INTRAMUSCULAR; INTRAVENOUS at 06:05

## 2025-05-19 RX ADMIN — SPIRONOLACTONE 25 MG: 25 TABLET, FILM COATED ORAL at 08:05
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
## 2025-05-19 NOTE — PLAN OF CARE
Problem: Adult Inpatient Plan of Care  Goal: Plan of Care Review  Outcome: Ongoing  Goal: Patient-Specific Goal (Individualized)  Outcome: Ongoing  Goal: Absence of Hospital-Acquired Illness or Injury  Outcome: Ongoing  Goal: Optimal Comfort and Wellbeing  Outcome: Ongoing  Goal: Readiness for Transition of Care  Outcome: Ongoing     Problem: Infection  Goal: Absence of Infection Signs and Symptoms  Outcome: Ongoing     Problem: Diabetes Comorbidity  Goal: Blood Glucose Level Within Targeted Range  Outcome: Ongoing

## 2025-05-19 NOTE — SUBJECTIVE & OBJECTIVE
Interval History:  Pt seen and examined.  He is net -15.9 L. improved shortness a breath, though continues with orthopnea.  No chest pain.  Discussed with Cardiology at bedside-no imminent plans for ischemic workup.  We will decrease Lasix drip dosing today.    Review of Systems   Constitutional:  Negative for chills and fever.   Respiratory:  Positive for shortness of breath (Continues with orthopnea). Negative for cough.    Cardiovascular:  Negative for chest pain.   Gastrointestinal:  Positive for abdominal distention. Negative for nausea and vomiting.     Objective:     Vital Signs (Most Recent):  Temp: 98.5 °F (36.9 °C) (05/19/25 1247)  Pulse: (!) 111 (05/19/25 1247)  Resp: 18 (05/19/25 1304)  BP: (!) 147/71 (05/19/25 1247)  SpO2: (!) 90 % (05/19/25 1247) Vital Signs (24h Range):  Temp:  [97.5 °F (36.4 °C)-98.5 °F (36.9 °C)] 98.5 °F (36.9 °C)  Pulse:  [] 111  Resp:  [14-20] 18  SpO2:  [90 %-98 %] 90 %  BP: (130-163)/(63-79) 147/71     Weight: 135.7 kg (299 lb 2.6 oz)  Body mass index is 42.93 kg/m².    Intake/Output Summary (Last 24 hours) at 5/19/2025 1415  Last data filed at 5/19/2025 1351  Gross per 24 hour   Intake --   Output 80004 ml   Net -58298 ml         Physical Exam  Vitals and nursing note reviewed.   Constitutional:       Appearance: He is obese. He is ill-appearing (appears chronically ill).   Cardiovascular:      Rate and Rhythm: Tachycardia present.   Pulmonary:      Effort: Pulmonary effort is normal. No respiratory distress.      Breath sounds: Normal breath sounds.   Abdominal:      General: Abdomen is flat. Bowel sounds are normal. There is distension.      Palpations: Abdomen is soft.   Musculoskeletal:      Cervical back: Normal range of motion and neck supple.      Right lower leg: Edema present.      Left lower leg: Edema present.      Comments: Improved anasarca from last physical exam done by me-5/16   Skin:     Comments: Right BKA wound without pilar drainage, surrounding  erythema noted.  Scratch marks all over his upper extremities and chest   Neurological:      General: No focal deficit present.      Mental Status: He is alert.               Significant Labs: All pertinent labs within the past 24 hours have been reviewed.  CBC:   Recent Labs   Lab 05/18/25  0608 05/19/25  0610   WBC 10.04 11.17   HGB 8.9* 9.5*   HCT 28.6* 30.4*    272     CMP:   Recent Labs   Lab 05/18/25  0608 05/18/25  1309 05/18/25  1810 05/19/25  0803 05/19/25  0921 05/19/25  1156    137   < > 136 134* 135*   K 4.4 4.3   < > 4.3 4.3 4.3   CL 93* 92*   < > 93* 89* 88*   CO2 37* 38*   < > 37* 41* 40*   * 110   < > 127* 129* 122*   BUN 41* 40*   < > 41* 40* 40*   CREATININE 1.5* 1.4   < > 1.4 1.5* 1.5*   CALCIUM 9.0 9.1   < > 9.1 9.3 9.2   PROT 6.3 6.3  --  6.7  --   --    ALBUMIN 2.7* 2.8*  --  2.8*  --   --    BILITOT 0.4 0.4  --  0.4  --   --    ALKPHOS 154* 153*  --  160*  --   --    AST 13 14  --  14  --   --    ALT <3* <3*  --  <3*  --   --    ANIONGAP 7* 7*   < > 6* 4* 7*    < > = values in this interval not displayed.       Significant Imaging: I have reviewed all pertinent imaging results/findings within the past 24 hours.

## 2025-05-19 NOTE — ASSESSMENT & PLAN NOTE
Wound care is following : continue the ancef for now  -CT done without evidence of abscess or osteo
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
## 2025-05-19 NOTE — ASSESSMENT & PLAN NOTE
S/p R BKA and L AKA  Patient's FSGs are uncontrolled due to hyperglycemia on current medication regimen.  Last A1c reviewed-   Lab Results   Component Value Date    HGBA1C 7.3 (H) 05/12/2025     Most recent fingerstick glucose reviewed-   Recent Labs   Lab 05/18/25  1639 05/19/25  0820 05/19/25  1357   POCTGLUCOSE 153* 101 147*     Current correctional scale  Low  Maintain anti-hyperglycemic dose as follows-   Antihyperglycemics (From admission, onward)      Start     Stop Route Frequency Ordered    05/12/25 1445  insulin glargine U-100 (Lantus) pen 10 Units         -- SubQ Daily 05/12/25 1332    05/12/25 0943  insulin aspart U-100 pen 0-10 Units         -- SubQ Before meals & nightly PRN 05/12/25 0843

## 2025-05-19 NOTE — PROGRESS NOTES
[facility]  Department of Cardiology  Progress Note      PATIENT NAME: Gideon Ross  MRN: [id number]  TODAY'S DATE: 05/19/2025  ADMIT DATE: 5/11/2025                          CONSULT REQUESTED BY: Don Whelan DO    SUBJECTIVE     PRINCIPAL PROBLEM: Acute systolic congestive heart failure      REASON FOR CONSULT:  Acute on chronic CHF, life vest patient      INTERVAL HISTORY:        05/19/2025    Seen sitting up in bed. Feels better. Breathing stable. UOP has been good.       5/17/25  Patient seen sittingup in bed. Noted to be significantly volume overloaded. Creatinine bumped slightly today.     5/16/25  Weber inserted yesterday; more urine output today; -1600 net balance  VSS, remains edematous    5/15/25  Cr 1.3 today; + fluid balance but output has been difficult to measure accurately  Remains edematous    5/14/25  Cr 1.2; difficulty collecting accurate output  Remains edematous and orthopneic  No events noted on telemetry    5/13/25  Hgb 9.5; Cr 1.1; + net balance; BP elevated;  Pt still has orthopnea; he feels his hands are less swollen today    HPI:  Pt is 49-year-old male w/ PMH HFrEF, DM, HTN, DM, cardiomyopathy, R BKA, left AKA, and substance dependence who presented to ED with c/o shortness of breath, swelling, and orthopnea for past week.    Pt has been receiving IV lasix since admission and is reportedly breathing better today but remains orthopneic, and swelling to upper legs, scrotum and abdomen still present. Still on 4 LPM O2    Review of patient's allergies indicates:  No Known Allergies    Past Medical History:   Diagnosis Date    Diabetes mellitus     Hypertension      Past Surgical History:   Procedure Laterality Date    CYSTOSCOPY,WITH URETERAL CATHETER INSERTION N/A 5/15/2025    Procedure: CYSTOSCOPY,WITH URETERAL CATHETER INSERTION;  Surgeon: Yoni Lemus MD;  Location: [facility] OR;  Service: Urology;  Laterality: N/A;    SKIN GRAFT       Social History[1]     REVIEW OF  SYSTEMS  Negative except as mentioned in HPI    OBJECTIVE     VITAL SIGNS (Most Recent)  Temp: 98.4 °F (36.9 °C) (05/19/25 0811)  Pulse: (!) 119 (05/19/25 0811)  Resp: 14 (05/19/25 0811)  BP: (!) 144/73 (05/19/25 0811)  SpO2: 97 % (05/19/25 0811)    VENTILATION STATUS  Resp: 14 (05/19/25 0811)  SpO2: 97 % (05/19/25 0811)           I & O (Last 24H):  Intake/Output Summary (Last 24 hours) at 5/19/2025 0949  Last data filed at 5/19/2025 0637  Gross per 24 hour   Intake 80.54 ml   Output 00980 ml   Net -43327.46 ml       WEIGHTS  Wt Readings from Last 3 Encounters:   05/16/25 2005 135.7 kg (299 lb 2.6 oz)   05/14/25 1045 (!) 138.4 kg (305 lb 1.9 oz)   05/12/25 1239 97.5 kg (214 lb 15.2 oz)   05/11/25 1954 97.5 kg (215 lb)   05/12/25 1048 97.5 kg (214 lb 15.2 oz)   01/26/25 0338 117.5 kg (259 lb 0.7 oz)   01/21/25 0515 117.9 kg (260 lb)   01/15/25 0400 126.1 kg (278 lb)   01/14/25 0400 127.1 kg (280 lb 3.2 oz)   01/09/25 2239 115.4 kg (254 lb 6.4 oz)   01/09/25 0824 108.9 kg (240 lb)       PHYSICAL EXAM    GENERAL: chronically ill middle age male sitting up in bed generalized edema noted  HEENT: Normocephalic.  +pallor;  NECK: No JVD.   CARDIAC: Regular rate and rhythm. S1 is normal.S2 is normal.No gallops, clicks or murmurs noted at this time.  CHEST ANATOMY: normal.   LUNGS: O2 via NC, no dyspnea or cough; crackles to bases,  ABDOMEN: ascites, obese, .   EXTREMITIES: edematous; right BKA edematous with wound, Left AKA edematous improving  CENTRAL NERVOUS SYSTEM: AAO x 3  SKIN: No rash     HOME MEDICATIONS:Medications Ordered Prior to Encounter[2]    SCHEDULED MEDS:   buprenorphine-naloxone 8-2 mg  2 Film Sublingual BID    ceFAZolin (Ancef) IV (PEDS and ADULTS)  2 g Intravenous Q6H    chlorhexidine  15 mL Mouth/Throat BID    enoxparin  40 mg Subcutaneous Daily    insulin glargine U-100  10 Units Subcutaneous Daily    isosorbide dinitrate  20 mg Oral TID    metOLazone  5 mg Oral Daily    mupirocin   Nasal BID    nicotine  " 1 patch Transdermal Daily    senna-docusate  1 tablet Oral BID    spironolactone  25 mg Oral Daily    tamsulosin  0.4 mg Oral Daily       CONTINUOUS INFUSIONS:   DOBUTamine IV infusion (non-titrating)  2.5 mcg/kg/min Intravenous Continuous 5.1 mL/hr at 05/17/25 1831 2.5 mcg/kg/min at 05/17/25 1831    furosemide (LASIX) 2 mg/mL continuous infusion (non-titrating)  10 mg/hr Intravenous Continuous 5 mL/hr at 05/19/25 0439 10 mg/hr at 05/19/25 0439       PRN MEDS:  Current Facility-Administered Medications:     acetaminophen, 650 mg, Oral, Q6H PRN    albuterol-ipratropium, 3 mL, Nebulization, Q6H PRN    bisacodyL, 10 mg, Rectal, Daily PRN    dextrose 50%, 12.5 g, Intravenous, PRN    dextrose 50%, 25 g, Intravenous, PRN    diphenhydrAMINE, 50 mg, Oral, Q8H PRN    glucagon (human recombinant), 1 mg, Intramuscular, PRN    glucose, 16 g, Oral, PRN    glucose, 24 g, Oral, PRN    insulin aspart U-100, 0-10 Units, Subcutaneous, QID (AC + HS) PRN    melatonin, 6 mg, Oral, Nightly PRN    morphine, 2 mg, Intravenous, Q4H PRN    promethazine, 12.5 mg, Rectal, Q6H PRN    sodium chloride 0.9%, 10 mL, Intravenous, PRN    LABS AND DIAGNOSTICS     CBC LAST 3 DAYS  Recent Labs   Lab 05/14/25  0650 05/18/25  0608 05/19/25  0610   WBC 8.78 10.04 11.17   RBC 3.87* 3.58* 3.87*   HGB 9.5* 8.9* 9.5*   HCT 32.2* 28.6* 30.4*   MCV 83 80* 79*   MCH 24.5* 24.9* 24.5*   MCHC 29.5* 31.1* 31.3*   RDW 16.2* 16.0* 16.1*    234 272   MPV 10.1 10.4 9.7   NRBC 0 0 0       COAGULATION LAST 3 DAYS  No results for input(s): "LABPT", "INR", "APTT" in the last 168 hours.    CHEMISTRY LAST 3 DAYS  Recent Labs   Lab 05/18/25  0608 05/18/25  1309 05/18/25  1810 05/18/25  2327 05/19/25  0610 05/19/25  0803    137   < > 135* 135* 136   K 4.4 4.3   < > 4.4 4.4 4.3   CL 93* 92*   < > 90* 89* 93*   CO2 37* 38*   < > 39* 38* 37*   ANIONGAP 7* 7*   < > 6* 8 6*   BUN 41* 40*   < > 40* 42* 41*   CREATININE 1.5* 1.4   < > 1.5* 1.5* 1.4   * 110   < > " "152* 126* 127*   CALCIUM 9.0 9.1   < > 9.0 9.0 9.1   MG 2.0 2.0   < > 2.0 2.0 2.0   ALBUMIN 2.7* 2.8*  --   --   --  2.8*   PROT 6.3 6.3  --   --   --  6.7   ALKPHOS 154* 153*  --   --   --  160*   ALT <3* <3*  --   --   --  <3*   AST 13 14  --   --   --  14   BILITOT 0.4 0.4  --   --   --  0.4    < > = values in this interval not displayed.       CARDIAC PROFILE LAST 3 DAYS  No results for input(s): "BNP", "CPK", "CPKMB", "LDH", "TROPONINI" in the last 168 hours.      ENDOCRINE LAST 3 DAYS  No results for input(s): "TSH", "PROCAL" in the last 168 hours.      LAST ARTERIAL BLOOD GAS  ABG  No results for input(s): "PH", "PO2", "PCO2", "HCO3", "BE" in the last 168 hours.    LAST 7 DAYS MICROBIOLOGY   Microbiology Results (last 7 days)       Procedure Component Value Units Date/Time    Blood culture [[id number]]  (Normal) Collected: 05/12/25 0004    Order Status: Completed Specimen: Blood Updated: 05/17/25 0103     CULTURE, BLOOD ([facility]) No Growth After 5 Days    Blood culture [[id number]]  (Normal) Collected: 05/11/25 2331    Order Status: Completed Specimen: Blood Updated: 05/17/25 0030     CULTURE, BLOOD ([facility]) No Growth After 5 Days    Aerobic culture [[id number]]  (Abnormal)  (Susceptibility) Collected: 05/12/25 0040    Order Status: Completed Specimen: Wound from Leg, Right Updated: 05/14/25 0634     CULTURE, AEROBIC Moderate Staphylococcus aureus            MOST RECENT IMAGING  US Abdomen Complete  Narrative: CLINICAL HISTORY:  ([id number] y/o  (1975) M    HCV infection.  Anasarca.  Evaluate liver, spleen and for ascites;    TECHNIQUE:  (A#[id number], exam time 5/18/2025 8:46)    US ABDOMEN COMPLETE [id number]    Complete abdominal ultrasound, images obtained in grayscale and color. Additional Doppler images of the portal vein were obtained.    COMPARISON:  CT from 05/13/2025    FINDINGS:  Please note this examination is technically suboptimal due to patient related factors  including body edema, habitus as well " as overlying bowel gas.    The LIVER is enlarged in size at 21.7  Cm (sagittal right lobe). Hepatic parenchyma has normal echogenicity with normal delineation of the periportal triads. No definite intrahepatic masses are noted. The portal vein is patent with hepatopetal flow, noting mild pulsatility.    The BILIARY SYSTEM is normal in caliber; the common hepatic duct measures 5-6 mm.  There is sludge with no shadowing stones seen in the GALL BLADDER.  The gallbladder wall is top normal in thickness, with no pericholecystic fluid.    Minimal trace upper abdominal ascites is seen.    The PANCREATIC head and body are partially visualized but grossly normal in appearance. The pancreatic duct is not dilated.    The right KIDNEY is normal in size at 12.5 x 5.7 x 5.3 cm and echogenicity/texture. No stones or hydronephrosis seen. No solid masses are noted    The left KIDNEY is normal in size at 12.6 x 6.3 x 6.3 cm and echogenicity/texture. No stones or hydronephrosis seen. No solid masses are noted.    The SPLEEN is enlarged measuring 13.7 cm and is homogeneous in echotexture.    The AORTA and IVC are partially visualized and unremarkable.    Trace pleural effusion is seen on the right.  Impression: 1.  Hepatosplenomegaly.    2.  Trace pleural effusion and ascites.    3.  Minimally pulsatile portal venous flow (consider correlation for right heart strain/failure in this age group).    4.  Diffuse body wall edema (anasarca), consider correlation for edema and or hypoproteinemic states.    5.  Sludge in the gallbladder, without finding to specifically suggest acute cholecystitis.    .    Electronically signed by: Manuel Snow  Date:    05/18/2025  Time:    08:52      ECHOCARDIOGRAM RESULTS (last 5)  Results for orders placed during the hospital encounter of 01/09/25    Echo    Interpretation Summary    Left Ventricle: The left ventricle is moderately dilated. Mildly increased wall thickness. There is mild asymmetric  hypertrophy. Severe global hypokinesis present. There is severely reduced systolic function with a visually estimated ejection fraction of 25 - 30%.    Right Ventricle: Moderate right ventricular enlargement. Systolic function is mildly reduced.    Left Atrium: Left atrium is mildly dilated.    Tricuspid Valve: There is mild regurgitation.    IVC/SVC: Elevated venous pressure at 15 mmHg.      CURRENT/PREVIOUS VISIT EKG  Results for orders placed or performed during the hospital encounter of 05/11/25   EKG 12-lead    Collection Time: 05/17/25  2:41 PM   Result Value Ref Range    QRS Duration 120 ms    OHS QTC Calculation 442 ms    Narrative    Test Reason : I50.9,    Vent. Rate : 112 BPM     Atrial Rate : 112 BPM     P-R Int : 160 ms          QRS Dur : 120 ms      QT Int : 324 ms       P-R-T Axes :  27 135  34 degrees    QTcB Int : 442 ms    Sinus tachycardia  Low voltage QRS  Right bundle branch block  Left posterior fascicular block   Bifascicular block   Abnormal ECG  When compared with ECG of 12-May-2025 04:05,  Left posterior fascicular block is now Present  Criteria for Inferior infarct are no longer Present    Referred By: AAAREFERRAL SELF           Confirmed By:            ASSESSMENT/PLAN:     Active Hospital Problems    Diagnosis    *Acute systolic congestive heart failure    Chronic hepatitis C without hepatic coma    Pressure ulcer of right buttock, stage 2    HTN (hypertension)    Prolonged QT interval    Hypoalbuminemia    Non-healing wound of amputation stump    Opioid use disorder    Diabetes mellitus    Scrotal edema    Elevated d-dimer       ASSESSMENT & PLAN:   Acute on chronic HFrEF  Dilated Cardiomyopathy  Substance abuse  Right BKA wound  Left AKA    RECOMMENDATIONS:  Continue furosemide drip, but decrease to 5 mg/hr.   Continue dobutamine drip at 2.5mcg/kg/min.   Strict I&O.   Trend labs.       Bhumi Sanchez NP  [facility]  Department of Cardiology  Date of Service: 05/19/2025       Patient is a spinal fairly well to intravenous inotrope therapy continue the same for another 24 hours 2.5 micrograms/kilos per minute of dobutamine decrease the Lasix to 5 mg an hour.  Monitor electrolytes carefully and for arrhythmias.  Discuss this with the hospitalist team nursing staff as well as the patient in detail  I have personally interviewed and examined the patient, I have reviewed the Nurse Practitioner's history and physical, assessment, and plan. I have personally evaluated the patient at bedside and agree with the findings and made appropriate changes as necessary in recommendations.          Jairo Corcoran MD FACC,FACP, FSCAI  Section Head   Department of Cardiology  [facility]  MICHAEL Yeung  05/19/2025 11:00 AM             [1]   Social History  Tobacco Use    Smoking status: Every Day     Current packs/day: 1.00     Average packs/day: 1 pack/day for 40.4 years (40.4 ttl pk-yrs)     Types: Cigarettes     Start date: 1985   Substance Use Topics    Alcohol use: Yes    Drug use: No   [2]   No current facility-administered medications on file prior to encounter.     Current Outpatient Medications on File Prior to Encounter   Medication Sig Dispense Refill    isosorbide dinitrate (ISORDIL) 20 MG tablet Take 1 tablet (20 mg total) by mouth 3 (three) times daily. 90 tablet 11    tamsulosin (FLOMAX) 0.4 mg Cap Take 1 capsule (0.4 mg total) by mouth once daily. 30 capsule 11    torsemide (DEMADEX) 20 MG Tab Take 1 tablet (20 mg total) by mouth 2 (two) times a day. 60 tablet 11    insulin aspart U-100 (NOVOLOG) 100 unit/mL (3 mL) InPn pen Inject 0-10 Units into the skin before meals and at bedtime as needed (Hyperglycemia). **MODERATE CORRECTION DOSE**  Blood Glucose  mg/dL                  Pre-meal                2200  151-200                2 units                    1 unit  201-250                4 units                    2 units  251-300                6 units           "          3 units  301-350                8 units                    4 units  >350                     10 units                  5 units  Administer subcutaneously if needed at times designated by monitoring  schedule.  DO NOT HOLD correction dose insulin for patients who are  NPO.    "HIGH ALERT MEDICATION" - Administer with meals or TF/TPN. (Patient not taking: Reported on 2/12/2025) 1 each 0     "

## 2025-05-19 NOTE — CONSULTS
Right stump, open non draining chronic wound, patient states he has been rubbing and scratching wounds on leg.  Wound bed red moist, had thick tan film covering, multiple scratches, scars are dry and patient is scratching.  cleaned well and applied Triad covered with foam dressing    Distal stump dry ulcer with scarring     Posterior scrotum     Right buttock pink healing wound multiple scarrs from scratching applied Triad , moisturizing lotions to scratches non wound.     Buttock     Left thigh         Right shoulder    Right should and arm scratches     abdomen    Left arm scratches, spoke with nurse regarding scratching/ itching.  Using moisturizing lotion. Complete skin assessment

## 2025-05-19 NOTE — RESPIRATORY THERAPY
05/18/25 2013   Patient Assessment/Suction   Level of Consciousness (AVPU) alert   Respiratory Effort Normal;Unlabored   Expansion/Accessory Muscles/Retractions no use of accessory muscles   Rhythm/Pattern, Respiratory pattern regular;unlabored;depth regular;no shortness of breath reported   Skin Integrity   $ Wound Care Tech Time 15 min   Area Observed Left;Right;Cheek;Behind ear;Upper lip;Nares   Skin Appearance without discoloration   PRE-TX-O2   Device (Oxygen Therapy) nasal cannula   Flow (L/min) (Oxygen Therapy) 2   SpO2 98 %   Pulse Oximetry Type Intermittent   $ Pulse Oximetry - Multiple Charge Pulse Oximetry - Multiple   Pulse 81   Resp 20   Aerosol Therapy   $ Aerosol Therapy Charges PRN treatment not required   Education   $ Education 15 min;Oxygen;Other (see comment)   Respiratory Evaluation   $ Care Plan Tech Time 15 min

## 2025-05-19 NOTE — PLAN OF CARE
Problem: Wound  Goal: Improved Oral Intake  Outcome: Progressing  Intervention: Promote and Optimize Oral Intake  Flowsheets (Taken 5/19/2025 0345)  Nutrition Interventions:   supplemental drinks provided   diet adjusted

## 2025-05-19 NOTE — PROGRESS NOTES
"[facility]  Adult Nutrition   Progress Note (Follow-Up)    SUMMARY     Recommendations  Nutrition Goal Status: new  Communication of RD Recs: reviewed with RN    Nutrition Goals:  PO intake will meet >75% of estimated needs by RD follow up., Lab values to trend toward normal range by RD follow up., and Oral supplement consumption of >75% by RD follow up.    Nutrition Interventions: Carbohydrate modified diet, Fat modified diet, Cholesterol modified diet, Fluid modified diet, and Sodium modified diet      Nutrition Diagnosis   Nutrition PES Problem: Increased nutrient needs  Nutrition PES Etiology: Wound healing  Nutrition PES Signs and Symptoms:  (patient with impaired healing due to diabetes)  Nutrition PES Status: Improving      Dietitian Rounds Brief  Patient eating most of meals. Pt followed by WC RN for stump and buttocks wounds. Continue Mario BID and clarify diet order. Glucose labs trending to target range, pt needs consistent carbohydrate diet. Last BM 5/16/25. RD to follow and monitor for labs, intake, and status change PRN.    Nutrition Related Social Determinants of Health: SDOH: None Identified    Malnutrition Assessment   No evidence of malnutrition at this time.              Diet order:   Current Diet Order: Heart Healthy Consistent Carbohydrate; 2000 Calories (up to 75 gm per meal); Fluid - 1000mL   Oral Nutrition Supplement: Mario BID             Evaluation of Received Nutrient/Fluid Intake  Energy Calories Required: meeting needs  Protein Required: meeting needs  Fluid Required: meeting needs  Tolerance: tolerating     % Intake of Estimated Energy Needs: 75 - 100 %  % Meal Intake: 75 - 100 %      Intake/Output Summary (Last 24 hours) at 5/19/2025 1547  Last data filed at 5/19/2025 1351  Gross per 24 hour   Intake 440 ml   Output 76416 ml   Net -11739 ml        Anthropometrics  Height: 5' 10" (177.8 cm)  Height (inches): 70 in  Height Method: Stated  Weight: 135.7 kg (299 lb 2.6 " oz)  Weight (lb): 299.17 lb  Weight Method: Bed Scale  Ideal Body Weight (IBW), Male: 166 lb  % Ideal Body Weight, Male (lb): 129.49 %  BMI (Calculated): 42.9  BMI Grade: 30 - 34.9- obesity - grade I       Estimated/Assessed Needs  Weight Used For Calorie Calculations: 97.5 kg (214 lb 15.2 oz)  Energy Calorie Requirements (kcal): 0198-8945 kcal daily  Energy Need Method: Kcal/kg  Protein Requirements: 117-147 gm daily  Weight Used For Protein Calculations: 97.5 kg (214 lb 15.2 oz) (1.2-1.5 gm/kg)  Fluid Requirements (mL): 1 mL/kcal or per MD  Estimated Fluid Requirement Method: RDA Method  RDA Method (mL): 1950  CHO Requirement: 244 gm daily    Reason for Assessment  Reason For Assessment: RD follow-up  Diagnosis:  (acute systolic congestive heart failure)  General Information Comments: Patient  diet clarified to Heart Healthy 1000 mL fluid restariction + consistant carbohydrate 2000 kcal.    Final diagnoses:  [R06.02] SOB (shortness of breath) (Primary)  [I50.9] Acute on chronic congestive heart failure, unspecified heart failure type  [R60.0] Peripheral edema  [E87.6] Hypokalemia  [I50.9] Acute exacerbation of CHF (congestive heart failure)  [I50.9] CHF (congestive heart failure)     Past Medical History:   Diagnosis Date    Diabetes mellitus     Hypertension         Nutrition/Diet History  Spiritual, Cultural Beliefs, Adventism Practices, Values that Affect Care: no    Nutrition Risk Screen          Wound 01/09/25 2239 Pressure Injury Buttocks-Wound Image: Images linked       Wound 01/09/25 1500 Incontinence associated dermatitis Buttocks-Wound Image: Images linked       Wound 05/12/25 1104 Ulceration Right Leg-Wound Image: Images linked  MST Score: 0  Have you recently lost weight without trying?: No  Weight loss score: 0  Have you been eating poorly because of a decreased appetite?: No  Appetite score: 0       Weight History:  Wt Readings from Last 10 Encounters:   05/16/25 135.7 kg (299 lb 2.6 oz)   05/12/25  97.5 kg (214 lb 15.2 oz)   01/26/25 117.5 kg (259 lb 0.7 oz)   07/19/22 99.8 kg (220 lb)   02/09/20 104.3 kg (230 lb)   08/06/19 113.4 kg (250 lb)   02/27/19 108.9 kg (240 lb)   02/05/19 108.9 kg (240 lb 1.3 oz)   09/28/18 108.9 kg (240 lb)   06/03/18 106.6 kg (235 lb)        Lab/Procedures/Meds:   Pertinent Labs Reviewed: reviewed  Pertinent Medications Reviewed: reviewed  Medications:Pertinent Medications Reviewed  Scheduled Meds:   buprenorphine-naloxone 8-2 mg  2 Film Sublingual BID    ceFAZolin (Ancef) IV (PEDS and ADULTS)  2 g Intravenous Q6H    chlorhexidine  15 mL Mouth/Throat BID    enoxparin  40 mg Subcutaneous Daily    insulin glargine U-100  10 Units Subcutaneous Daily    isosorbide dinitrate  20 mg Oral TID    metOLazone  5 mg Oral Daily    mupirocin   Nasal BID    nicotine  1 patch Transdermal Daily    senna-docusate  1 tablet Oral BID    spironolactone  25 mg Oral Daily    tamsulosin  0.4 mg Oral Daily     Continuous Infusions:   DOBUTamine IV infusion (non-titrating)  2.5 mcg/kg/min Intravenous Continuous 5.1 mL/hr at 05/19/25 1406 2.5 mcg/kg/min at 05/19/25 1406    furosemide (LASIX) 2 mg/mL continuous infusion (non-titrating)  5 mg/hr Intravenous Continuous 2.5 mL/hr at 05/19/25 1117 5 mg/hr at 05/19/25 1117     PRN Meds:.  Current Facility-Administered Medications:     acetaminophen, 650 mg, Oral, Q6H PRN    albuterol-ipratropium, 3 mL, Nebulization, Q6H PRN    bisacodyL, 10 mg, Rectal, Daily PRN    dextrose 50%, 12.5 g, Intravenous, PRN    dextrose 50%, 25 g, Intravenous, PRN    diphenhydrAMINE, 50 mg, Oral, Q8H PRN    glucagon (human recombinant), 1 mg, Intramuscular, PRN    glucose, 16 g, Oral, PRN    glucose, 24 g, Oral, PRN    insulin aspart U-100, 0-10 Units, Subcutaneous, QID (AC + HS) PRN    melatonin, 6 mg, Oral, Nightly PRN    morphine, 2 mg, Intravenous, Q4H PRN    promethazine, 12.5 mg, Rectal, Q6H PRN    sodium chloride 0.9%, 10 mL, Intravenous, PRN    Labs: Pertinent Labs  "Reviewed  Clinical Chemistry:  Recent Labs   Lab 05/17/25  1530 05/17/25  1944 05/18/25  0608 05/18/25  1309 05/18/25  1810 05/19/25  0803 05/19/25  0921 05/19/25  1156      < > 137 137   < > 136   < > 135*   K 4.4   < > 4.4 4.3   < > 4.3   < > 4.3   CL 95   < > 93* 92*   < > 93*   < > 88*   CO2 35*   < > 37* 38*   < > 37*   < > 40*   *   < > 168* 110   < > 127*   < > 122*   BUN 41*   < > 41* 40*   < > 41*   < > 40*   CREATININE 1.4   < > 1.5* 1.4   < > 1.4   < > 1.5*   CALCIUM 8.9   < > 9.0 9.1   < > 9.1   < > 9.2   PROT 6.4   < > 6.3 6.3  --  6.7  --   --    ALBUMIN 2.7*   < > 2.7* 2.8*  --  2.8*  --   --    BILITOT 0.4   < > 0.4 0.4  --  0.4  --   --    ALKPHOS 164*   < > 154* 153*  --  160*  --   --    AST 14   < > 13 14  --  14  --   --    ALT <3*   < > <3* <3*  --  <3*  --   --    ANIONGAP 7*   < > 7* 7*   < > 6*   < > 7*   MG 2.1   < > 2.0 2.0   < > 2.0   < > 2.1   PHOS 3.5  --   --   --   --   --   --   --     < > = values in this interval not displayed.     CBC:   Recent Labs   Lab 05/19/25  0610   WBC 11.17   RBC 3.87*   HGB 9.5*   HCT 30.4*      MCV 79*   MCH 24.5*   MCHC 31.3*     Lipid Panel:  No results for input(s): "CHOL", "HDL", "LDLCALC", "TRIG", "CHOLHDL" in the last 168 hours.  Cardiac Profile:  No results for input(s): "BNP", "CPK", "CPKMB", "TROPONINI", "CKTOTAL" in the last 168 hours.  Inflammatory Labs:  Recent Labs   Lab 05/14/25  0650 05/16/25  0454   CRP 2.20* 5.30*     Diabetes:  Recent Labs   Lab 05/18/25  1639 05/19/25  0820 05/19/25  1357   POCTGLUCOSE 153* 101 147*     Thyroid & Parathyroid:  No results for input(s): "TSH", "FREET4", "Q0GEBHU", "X6FBCNW", "THYROIDAB" in the last 168 hours.    Monitor and Evaluation  Monitor and Evaluation: Inflammatory profile, Electrolyte and renal panel     Discharge Planning  Nutrition Discharge Planning: Therapeutic diet (comments), Oral supplement regimen (comments)  Therapeutic diet (comments): Heart Healthy Consistent " Carbohydrate; 2000 Calories (up to 75 gm per meal); Fluid - 1000mL    Nutrition Risk  Level of Risk/Frequency of Follow-up:  (1 x / week)     Nutrition Follow-Up  RD Follow-up?: Yes

## 2025-05-19 NOTE — PT/OT/SLP PROGRESS
Physical Therapy Treatment    Patient Name:  Gideon Ross   MRN:  [id number]    Recommendations:     Discharge Recommendations: Moderate Intensity Therapy  Discharge Equipment Recommendations: to be determined by next level of care  Barriers to discharge: medical status    Assessment:     Gideon Ross is a 49 y.o. male admitted with a medical diagnosis of Acute systolic congestive heart failure.  He presents with the following impairments/functional limitations: weakness, impaired endurance, impaired self care skills, impaired functional mobility, impaired balance, decreased safety awareness, pain, impaired cardiopulmonary response to activity, edema.    Pt agreeable to visit. Nurse present adjusting IV meds. Pt reports that he was able to transfer to wheelchair without assist yesterday.    Pt performed long sitting in bed with supervision. Pt transferred to EOB with supervision with RLE hanging off the bed and LLE on the bed. Extra time EOB as pt wanted something to drink first. Pt drank apple juice while sitting EOB.  Pt positioned wheelchair facing bed. Pt performed posterior scoot into wheelchair from bed with supervision. Bed elevated.    Pt wanting to sit up in wheelchair, but upon learning that therapist would put a chair alarm belt on him he requested back to bed. Supervision for anterior scoot transfer back to bed with bed lower than wheelchair.    Rehab Prognosis: Fair; patient would benefit from acute skilled PT services to address these deficits and reach maximum level of function.    Recent Surgery: Procedure(s) (LRB):  CYSTOSCOPY,WITH URETERAL CATHETER INSERTION (N/A) 4 Days Post-Op    Plan:     During this hospitalization, patient to be seen 5 x/week to address the identified rehab impairments via therapeutic activities, therapeutic exercises, neuromuscular re-education and progress toward the following goals:    Plan of Care Expires:  06/15/25    Subjective     Chief Complaint: pt reports feeling  better now that the fluid is coming off  Patient/Family Comments/goals: to go outside  Pain/Comfort:  Pain Rating 1: 0/10      Objective:     Communicated with nurse prior to session.  Patient found HOB elevated with valle catheter, telemetry, peripheral IV, bed alarm, oxygen upon PT entry to room.     General Precautions: Standard, fall  Orthopedic Precautions: N/A  Braces: N/A  Respiratory Status: Nasal cannula, flow 2 L/min     Functional Mobility:  Bed Mobility:   Long sitting: supervision  Transfers:     Bed to Chair: supervision with  no AD  using  posterior scoot      AM-PAC 6 CLICK MOBILITY          Treatment & Education:  Pt educated on importance of time OOB, importance of intermittent mobility, safe techniques for transfers/ambulation, discharge recommendations/options, and use of call light for assistance and fall prevention.      Patient left HOB elevated with all lines intact, call button in reach, bed alarm on, and nurse notified..    GOALS:   Multidisciplinary Problems       Physical Therapy Goals          Problem: Physical Therapy    Goal Priority Disciplines Outcome Interventions   Physical Therapy Goal     PT, PT/OT Progressing    Description: Goals to be met by: 6/15/25     Patient will increase functional independence with mobility by performin. Supine to sit with Supervision  2. Bed to chair transfer with scoot pivot and Supervision  3. Pt to tolerate sitting EOB x 15 mins with supervision                               DME Justifications:  Gideon Ross has a mobility limitation that significantly impairs his ability to participate in one or more mobility related activities of daily living (MRADLs) such as toileting, feeding, dressing, grooming, and bathing in customary locations in the home.  The mobility limitation cannot be sufficiently resolved by the use of a cane or walker.   The use of a manual wheelchair will significantly improve the patients ability to participate in MRADLS  and the patient will use it on regular basis in the home.  Gideon Ross has expressed his willingness to use a manual wheelchair in the home. Patients upper body strength is sufficient for propulsion.  He also has a caregiver who is available, willing, and able to provide assistance with the wheelchair when needed.      Time Tracking:     PT Received On: 05/19/25  PT Start Time: 1041     PT Stop Time: 1109  PT Total Time (min): 28 min     Billable Minutes: Therapeutic Activity 28    Treatment Type: Treatment  PT/PTA: PTA     Number of PTA visits since last PT visit: 1 05/19/2025

## 2025-05-19 NOTE — PROGRESS NOTES
[facility]  Progress Note    Patient Name: Gideon Ross  MRN: [id number]  Patient Class: IP- Inpatient   Admission Date: 5/11/2025  Length of Stay: 6 days  Attending Physician: Don Whelan DO  Primary Care Provider: Maria Del Carmen Garcia FNP-C        Subjective     Principal Problem:Acute systolic congestive heart failure        HPI:  49-year-old male presented to ED via EMS for eval of shortness of breath. pMHx CHF, DM, HTN, DM, cardiomyopathy, R BKA, left AKA, substance dependence.  Patient is a poor historian.  Patient reported over the last week he has had progressively worsening lower extremity edema, generalized weakness, and shortness of breath.  He reported over the last 4 days he has had associated orthopnea and edema has spread to abdomen, scrotum, and upper arms.  He stated anasarca has made it difficult to carry out ADLs.  Patient also reported difficulty urinating 2/2 anasarca.  Patient was admitted in January of this year with similar symptoms for CHF exacerbation and was treated with Lasix drip, discharged with LifeVest, patient states he has not been wearing it for some time because it was stolen and no one ever brought it back.  Reported taking torsemide, Isordil, tamsulosin since discharge (has these medications at bedside), reported on Suboxone however stated ran out recently.  Patient also noted with draining wound to R stump, he is unable to say how long it has been like that. Echo 01/09/2025 with EF 25-30%. BNP 1998.  Initial troponin 24.2.  Corrected calcium 9.2, albumin 2.4. CXR impression with enlarged cardiac silhouette with pulmonary vascular congestion, no focal consolidation.  Patient given Lasix in ED. Admit to hospital medicine for further eval.    Overview/Hospital Course:  Pt was monitored closely after admission with compensated heart failure.  He was initiated on IV Lasix with cardiology consult.  He had profound scrotal and penile edema  precluding Weber placement and Urology was consulted.  He was found to have nonhealing wound to his amputation stump with MSSA growing and drainage.  He was initiated on IV Abx my MRI was ordered, and ID was consulted.  He was unstable to lay flat to undergo MRI at this time.  He continued with unmeasurable UOP due to penile/scrotal swelling and Urology took him to OR for Weber placement on 05/15.  He was slow diurese and diuretics were adjusted per Cardiology on 05/16.  Unfortunately, Pt lost his LifeVest prior to admission.  His management checked into this and a replacement would not be covered.  He states his family member will be bringing his life vest tomorrow 05/18.  He was started on Lasix and Dobutrex drip with serial labs per Cardiology recommendations.  He had brisk diuresis.  Lasix drip was decreased.  He underwent CT scan of the knee which was negative for any pilar findings concerning for osteomyelitis or abscess.    Interval History:  Pt seen and examined.  He is net -15.9 L. improved shortness a breath, though continues with orthopnea.  No chest pain.  Discussed with Cardiology at bedside-no imminent plans for ischemic workup.  We will decrease Lasix drip dosing today.    Review of Systems   Constitutional:  Negative for chills and fever.   Respiratory:  Positive for shortness of breath (Continues with orthopnea). Negative for cough.    Cardiovascular:  Negative for chest pain.   Gastrointestinal:  Positive for abdominal distention. Negative for nausea and vomiting.     Objective:     Vital Signs (Most Recent):  Temp: 98.5 °F (36.9 °C) (05/19/25 1247)  Pulse: (!) 111 (05/19/25 1247)  Resp: 18 (05/19/25 1304)  BP: (!) 147/71 (05/19/25 1247)  SpO2: (!) 90 % (05/19/25 1247) Vital Signs (24h Range):  Temp:  [97.5 °F (36.4 °C)-98.5 °F (36.9 °C)] 98.5 °F (36.9 °C)  Pulse:  [] 111  Resp:  [14-20] 18  SpO2:  [90 %-98 %] 90 %  BP: (130-163)/(63-79) 147/71     Weight: 135.7 kg (299 lb 2.6 oz)  Body mass  index is 42.93 kg/m².    Intake/Output Summary (Last 24 hours) at 5/19/2025 1415  Last data filed at 5/19/2025 1351  Gross per 24 hour   Intake --   Output 83228 ml   Net -60505 ml         Physical Exam  Vitals and nursing note reviewed.   Constitutional:       Appearance: He is obese. He is ill-appearing (appears chronically ill).   Cardiovascular:      Rate and Rhythm: Tachycardia present.   Pulmonary:      Effort: Pulmonary effort is normal. No respiratory distress.      Breath sounds: Normal breath sounds.   Abdominal:      General: Abdomen is flat. Bowel sounds are normal. There is distension.      Palpations: Abdomen is soft.   Musculoskeletal:      Cervical back: Normal range of motion and neck supple.      Right lower leg: Edema present.      Left lower leg: Edema present.      Comments: Improved anasarca from last physical exam done by me-5/16   Skin:     Comments: Right BKA wound without pilar drainage, surrounding erythema noted.  Scratch marks all over his upper extremities and chest   Neurological:      General: No focal deficit present.      Mental Status: He is alert.               Significant Labs: All pertinent labs within the past 24 hours have been reviewed.  CBC:   Recent Labs   Lab 05/18/25  0608 05/19/25  0610   WBC 10.04 11.17   HGB 8.9* 9.5*   HCT 28.6* 30.4*    272     CMP:   Recent Labs   Lab 05/18/25  0608 05/18/25  1309 05/18/25  1810 05/19/25  0803 05/19/25  0921 05/19/25  1156    137   < > 136 134* 135*   K 4.4 4.3   < > 4.3 4.3 4.3   CL 93* 92*   < > 93* 89* 88*   CO2 37* 38*   < > 37* 41* 40*   * 110   < > 127* 129* 122*   BUN 41* 40*   < > 41* 40* 40*   CREATININE 1.5* 1.4   < > 1.4 1.5* 1.5*   CALCIUM 9.0 9.1   < > 9.1 9.3 9.2   PROT 6.3 6.3  --  6.7  --   --    ALBUMIN 2.7* 2.8*  --  2.8*  --   --    BILITOT 0.4 0.4  --  0.4  --   --    ALKPHOS 154* 153*  --  160*  --   --    AST 13 14  --  14  --   --    ALT <3* <3*  --  <3*  --   --    ANIONGAP 7* 7*   < > 6*  "4* 7*    < > = values in this interval not displayed.       Significant Imaging: I have reviewed all pertinent imaging results/findings within the past 24 hours.      Assessment & Plan  Acute systolic congestive heart failure  Supposed to be wearing LifeVest, patient reported it was stolen and he has not been wearing it  Patient has Systolic (HFrEF) heart failure that is Acute on chronic. On presentation their CHF was decompensated. Evidence of decompensated CHF on presentation includes: edema, weight gain, orthopnea, dyspnea on exertion (ROBLEDO), and shortness of breath. Most recent BNP and echo results are listed below.  No results for input(s): "BNP" in the last 72 hours.    Latest ECHO  Results for orders placed during the hospital encounter of 01/09/25    Results for orders placed during the hospital encounter of 05/11/25    Echo    Interpretation Summary    Left Ventricle: The left ventricle is moderately dilated. There is moderate eccentric hypertrophy. Global hypokinesis present. There is moderately reduced systolic function with a visually estimated ejection fraction of 30 - 35%. Grade III diastolic dysfunction.    Right Ventricle: Right ventricle was not well visualized due to poor acoustic window. The right ventricle is dilated    Tricuspid Valve: There is mild regurgitation.    Pulmonary Artery: There is pulmonary hypertension. The estimated pulmonary artery systolic pressure is 64 mmHg.    IVC/SVC: Elevated venous pressure at 15 mmHg.      Current Heart Failure Medications  isosorbide dinitrate tablet 20 mg, 3 times daily, Oral  metOLazone tablet 5 mg, Daily, Oral  furosemide (Lasix) 200 mg in 0.9% NaCl SolP 100 mL continuous infusion (conc: 2 mg/mL), Continuous, Intravenous  DOBUtamine 1000 mg in D5W 250 mL infusion, Continuous, Intravenous  spironolactone tablet 25 mg, Daily, Oral    Plan  - Monitor strict I&Os and daily weights.    - Place on telemetry  - Low sodium diet  - Place on fluid restriction  "   - Cardiology has been consulted  - Continue IV Lasix and cardiology is following.   -discussed with cardiology who will increase IV Lasix 5/16 and add metolazone.  If volume status not significantly improved tomorrow/kidney function not improve can start Dobutrex at a set rate of 5 mcg/kg/min  - Weber in place  -  Patient lost his LifeVest and will likely need new one before discharge.  This will be an issue, it was lost not stolen so will not be covered for placement.  -patient reports family member will bring LifeVest on 05/18; [facility] cardiac monitoring at bedside  -started on Dobutrex and Lasix drip per Cardiology recommendation with serial labs. Lasix dosing halved.    Diabetes mellitus  S/p R BKA and L AKA  Patient's FSGs are uncontrolled due to hyperglycemia on current medication regimen.  Last A1c reviewed-   Lab Results   Component Value Date    HGBA1C 7.3 (H) 05/12/2025     Most recent fingerstick glucose reviewed-   Recent Labs   Lab 05/18/25  1639 05/19/25  0820 05/19/25  1357   POCTGLUCOSE 153* 101 147*     Current correctional scale  Low  Maintain anti-hyperglycemic dose as follows-   Antihyperglycemics (From admission, onward)      Start     Stop Route Frequency Ordered    05/12/25 1445  insulin glargine U-100 (Lantus) pen 10 Units         -- SubQ Daily 05/12/25 1332    05/12/25 0943  insulin aspart U-100 pen 0-10 Units         -- SubQ Before meals & nightly PRN 05/12/25 0843          Opioid use disorder  Suboxone    HTN (hypertension)  Patient's blood pressure range in the last 24 hours was: BP  Min: 130/79  Max: 163/67.The patient's inpatient anti-hypertensive regimen is listed below:  Current Antihypertensives  isosorbide dinitrate tablet 20 mg, 3 times daily, Oral  metOLazone tablet 5 mg, Daily, Oral  furosemide (Lasix) 200 mg in 0.9% NaCl SolP 100 mL continuous infusion (conc: 2 mg/mL), Continuous, Intravenous  spironolactone tablet 25 mg, Daily, Oral    Plan  -will make adjustment as  needed    Prolonged QT interval  Monitor  Hypoalbuminemia  Dietitian consult  Monitor CMP    Non-healing wound of amputation stump  Wound care is following : continue the ancef for now  -CT done without evidence of abscess or osteo  Scrotal edema  Likely from volume overload  Continue Lasix and urology consulted to help with Weber placement-done in OR 5/15    Elevated d-dimer    Chest CTA ruled out PE.  Pressure ulcer of right buttock, stage 2  Wound care following-appreciate treatment    Chronic hepatitis C without hepatic coma  Pt with known hep C status historically   -quantitative RNA pending   -will need outpatient follow-up for definitive hep C treatment  VTE Risk Mitigation (From admission, onward)           Ordered     enoxaparin injection 40 mg  Daily         05/11/25 2322     IP VTE HIGH RISK PATIENT  Once         05/11/25 2322                    Discharge Planning   KATHERINE:      Code Status: Full Code   Medical Readiness for Discharge Date:   Discharge Plan A: Skilled Nursing Facility                Please place Justification for DME        Hannah Ramírez NP  Department of Hospital Medicine   [facility]

## 2025-05-19 NOTE — ASSESSMENT & PLAN NOTE
Patient's blood pressure range in the last 24 hours was: BP  Min: 130/79  Max: 163/67.The patient's inpatient anti-hypertensive regimen is listed below:  Current Antihypertensives  isosorbide dinitrate tablet 20 mg, 3 times daily, Oral  metOLazone tablet 5 mg, Daily, Oral  furosemide (Lasix) 200 mg in 0.9% NaCl SolP 100 mL continuous infusion (conc: 2 mg/mL), Continuous, Intravenous  spironolactone tablet 25 mg, Daily, Oral    Plan  -will make adjustment as needed

## 2025-05-19 NOTE — PT/OT/SLP PROGRESS
Occupational Therapy      Patient Name:  Gideon Ross   MRN:  [id number]    Patient not seen today secondary to Other (Comment) (therapist unavailable). Will follow-up next service date.    5/19/2025

## 2025-05-20 LAB
ABSOLUTE EOSINOPHIL (SMH): 1.37 K/UL
ABSOLUTE MONOCYTE (SMH): 1.3 K/UL (ref 0.3–1)
ABSOLUTE NEUTROPHIL COUNT (SMH): 5.6 K/UL (ref 1.8–7.7)
ANION GAP (SMH): 7 MMOL/L (ref 8–16)
ANION GAP (SMH): 8 MMOL/L (ref 8–16)
ANION GAP (SMH): 9 MMOL/L (ref 8–16)
BASOPHILS # BLD AUTO: 0.05 K/UL
BASOPHILS NFR BLD AUTO: 0.5 %
BUN SERPL-MCNC: 40 MG/DL (ref 6–20)
BUN SERPL-MCNC: 41 MG/DL (ref 6–20)
BUN SERPL-MCNC: 41 MG/DL (ref 6–20)
CALCIUM SERPL-MCNC: 8.8 MG/DL (ref 8.7–10.5)
CALCIUM SERPL-MCNC: 8.9 MG/DL (ref 8.7–10.5)
CALCIUM SERPL-MCNC: 9 MG/DL (ref 8.7–10.5)
CHLORIDE SERPL-SCNC: 87 MMOL/L (ref 95–110)
CHLORIDE SERPL-SCNC: 88 MMOL/L (ref 95–110)
CHLORIDE SERPL-SCNC: 88 MMOL/L (ref 95–110)
CO2 SERPL-SCNC: 38 MMOL/L (ref 23–29)
CO2 SERPL-SCNC: 39 MMOL/L (ref 23–29)
CO2 SERPL-SCNC: 40 MMOL/L (ref 23–29)
CREAT SERPL-MCNC: 1.5 MG/DL (ref 0.5–1.4)
ERYTHROCYTE [DISTWIDTH] IN BLOOD BY AUTOMATED COUNT: 16.2 % (ref 11.5–14.5)
GFR SERPLBLD CREATININE-BSD FMLA CKD-EPI: 57 ML/MIN/1.73/M2
GLUCOSE SERPL-MCNC: 127 MG/DL (ref 70–110)
GLUCOSE SERPL-MCNC: 154 MG/DL (ref 70–110)
GLUCOSE SERPL-MCNC: 96 MG/DL (ref 70–110)
HCT VFR BLD AUTO: 31.1 % (ref 40–54)
HGB BLD-MCNC: 9.6 GM/DL (ref 14–18)
IMM GRANULOCYTES # BLD AUTO: 0.02 K/UL (ref 0–0.04)
IMM GRANULOCYTES NFR BLD AUTO: 0.2 % (ref 0–0.5)
LYMPHOCYTES # BLD AUTO: 1.44 K/UL (ref 1–4.8)
MAGNESIUM SERPL-MCNC: 2.1 MG/DL (ref 1.6–2.6)
MAGNESIUM SERPL-MCNC: 2.2 MG/DL (ref 1.6–2.6)
MCH RBC QN AUTO: 24.6 PG (ref 27–31)
MCHC RBC AUTO-ENTMCNC: 30.9 G/DL (ref 32–36)
MCV RBC AUTO: 80 FL (ref 82–98)
NUCLEATED RBC (/100WBC) (SMH): 0 /100 WBC
PLATELET # BLD AUTO: 279 K/UL (ref 150–450)
PMV BLD AUTO: 9.9 FL (ref 9.2–12.9)
POCT GLUCOSE: 106 MG/DL (ref 70–110)
POCT GLUCOSE: 133 MG/DL (ref 70–110)
POCT GLUCOSE: 134 MG/DL (ref 70–110)
POCT GLUCOSE: 165 MG/DL (ref 70–110)
POTASSIUM SERPL-SCNC: 4.2 MMOL/L (ref 3.5–5.1)
POTASSIUM SERPL-SCNC: 4.4 MMOL/L (ref 3.5–5.1)
POTASSIUM SERPL-SCNC: 4.7 MMOL/L (ref 3.5–5.1)
RBC # BLD AUTO: 3.9 M/UL (ref 4.6–6.2)
RELATIVE EOSINOPHIL (SMH): 14 % (ref 0–8)
RELATIVE LYMPHOCYTE (SMH): 14.8 % (ref 18–48)
RELATIVE MONOCYTE (SMH): 13.3 % (ref 4–15)
RELATIVE NEUTROPHIL (SMH): 57.2 % (ref 38–73)
SODIUM SERPL-SCNC: 134 MMOL/L (ref 136–145)
SODIUM SERPL-SCNC: 135 MMOL/L (ref 136–145)
SODIUM SERPL-SCNC: 135 MMOL/L (ref 136–145)
WBC # BLD AUTO: 9.76 K/UL (ref 3.9–12.7)

## 2025-05-20 PROCEDURE — 25000003 PHARM REV CODE 250: Performed by: NURSE PRACTITIONER

## 2025-05-20 PROCEDURE — 36415 COLL VENOUS BLD VENIPUNCTURE: CPT

## 2025-05-20 PROCEDURE — 25000003 PHARM REV CODE 250

## 2025-05-20 PROCEDURE — 25000003 PHARM REV CODE 250: Performed by: FAMILY MEDICINE

## 2025-05-20 PROCEDURE — 83735 ASSAY OF MAGNESIUM: CPT

## 2025-05-20 PROCEDURE — 63600175 PHARM REV CODE 636 W HCPCS: Performed by: STUDENT IN AN ORGANIZED HEALTH CARE EDUCATION/TRAINING PROGRAM

## 2025-05-20 PROCEDURE — 80048 BASIC METABOLIC PNL TOTAL CA: CPT

## 2025-05-20 PROCEDURE — 25000003 PHARM REV CODE 250: Performed by: INTERNAL MEDICINE

## 2025-05-20 PROCEDURE — 99233 SBSQ HOSP IP/OBS HIGH 50: CPT | Mod: FS,,, | Performed by: INTERNAL MEDICINE

## 2025-05-20 PROCEDURE — 63600175 PHARM REV CODE 636 W HCPCS

## 2025-05-20 PROCEDURE — 94761 N-INVAS EAR/PLS OXIMETRY MLT: CPT

## 2025-05-20 PROCEDURE — 63600175 PHARM REV CODE 636 W HCPCS: Performed by: NURSE PRACTITIONER

## 2025-05-20 PROCEDURE — 97542 WHEELCHAIR MNGMENT TRAINING: CPT | Mod: CQ

## 2025-05-20 PROCEDURE — 85025 COMPLETE CBC W/AUTO DIFF WBC: CPT

## 2025-05-20 PROCEDURE — 11000001 HC ACUTE MED/SURG PRIVATE ROOM

## 2025-05-20 PROCEDURE — 27000221 HC OXYGEN, UP TO 24 HOURS

## 2025-05-20 PROCEDURE — 99900035 HC TECH TIME PER 15 MIN (STAT)

## 2025-05-20 RX ORDER — FUROSEMIDE 40 MG/1
40 TABLET ORAL 2 TIMES DAILY
Status: DISCONTINUED | OUTPATIENT
Start: 2025-05-20 | End: 2025-05-20

## 2025-05-20 RX ORDER — TORSEMIDE 20 MG/1
20 TABLET ORAL 2 TIMES DAILY
Status: DISCONTINUED | OUTPATIENT
Start: 2025-05-20 | End: 2025-05-26

## 2025-05-20 RX ADMIN — SPIRONOLACTONE 25 MG: 25 TABLET, FILM COATED ORAL at 09:05

## 2025-05-20 RX ADMIN — BUPRENORPHINE AND NALOXONE 2 FILM: 8; 2 FILM BUCCAL; SUBLINGUAL at 09:05

## 2025-05-20 RX ADMIN — ISOSORBIDE DINITRATE 20 MG: 10 TABLET ORAL at 04:05

## 2025-05-20 RX ADMIN — ENOXAPARIN SODIUM 40 MG: 40 INJECTION SUBCUTANEOUS at 04:05

## 2025-05-20 RX ADMIN — SACUBITRIL AND VALSARTAN 1 TABLET: 24; 26 TABLET, FILM COATED ORAL at 09:05

## 2025-05-20 RX ADMIN — TORSEMIDE 20 MG: 20 TABLET ORAL at 04:05

## 2025-05-20 RX ADMIN — CHLORHEXIDINE GLUCONATE 0.12% ORAL RINSE 15 ML: 1.2 LIQUID ORAL at 09:05

## 2025-05-20 RX ADMIN — MORPHINE SULFATE 2 MG: 2 INJECTION, SOLUTION INTRAMUSCULAR; INTRAVENOUS at 09:05

## 2025-05-20 RX ADMIN — CEFAZOLIN 2 G: 2 INJECTION, POWDER, FOR SOLUTION INTRAMUSCULAR; INTRAVENOUS at 11:05

## 2025-05-20 RX ADMIN — ISOSORBIDE DINITRATE 20 MG: 10 TABLET ORAL at 09:05

## 2025-05-20 RX ADMIN — CEFAZOLIN 2 G: 2 INJECTION, POWDER, FOR SOLUTION INTRAMUSCULAR; INTRAVENOUS at 05:05

## 2025-05-20 RX ADMIN — INSULIN GLARGINE 10 UNITS: 100 INJECTION, SOLUTION SUBCUTANEOUS at 09:05

## 2025-05-20 RX ADMIN — SENNOSIDES AND DOCUSATE SODIUM 1 TABLET: 50; 8.6 TABLET ORAL at 09:05

## 2025-05-20 RX ADMIN — TAMSULOSIN HYDROCHLORIDE 0.4 MG: 0.4 CAPSULE ORAL at 09:05

## 2025-05-20 RX ADMIN — DIPHENHYDRAMINE HYDROCHLORIDE 50 MG: 25 CAPSULE ORAL at 11:05

## 2025-05-20 RX ADMIN — CEFAZOLIN 2 G: 2 INJECTION, POWDER, FOR SOLUTION INTRAMUSCULAR; INTRAVENOUS at 04:05

## 2025-05-20 RX ADMIN — INSULIN ASPART 1 UNITS: 100 INJECTION, SOLUTION INTRAVENOUS; SUBCUTANEOUS at 09:05

## 2025-05-20 RX ADMIN — METOLAZONE 5 MG: 2.5 TABLET ORAL at 09:05

## 2025-05-20 RX ADMIN — MUPIROCIN 1 G: 20 OINTMENT TOPICAL at 09:05

## 2025-05-20 NOTE — PROGRESS NOTES
[facility]  Department of Cardiology  Progress Note      PATIENT NAME: Gideon Ross  MRN: [id number]  TODAY'S DATE: 05/20/2025  ADMIT DATE: 5/11/2025                          CONSULT REQUESTED BY: Don Whelan DO    SUBJECTIVE     PRINCIPAL PROBLEM: Acute systolic congestive heart failure      REASON FOR CONSULT:  Acute on chronic CHF, life vest patient      INTERVAL HISTORY:        05/20/2025    Doing well  Diuresed well.        5/19/2025    Seen sitting up in bed. Feels better. Breathing stable. UOP has been good.       5/17/25  Patient seen sittingup in bed. Noted to be significantly volume overloaded. Creatinine bumped slightly today.     5/16/25  Weber inserted yesterday; more urine output today; -1600 net balance  VSS, remains edematous    5/15/25  Cr 1.3 today; + fluid balance but output has been difficult to measure accurately  Remains edematous    5/14/25  Cr 1.2; difficulty collecting accurate output  Remains edematous and orthopneic  No events noted on telemetry    5/13/25  Hgb 9.5; Cr 1.1; + net balance; BP elevated;  Pt still has orthopnea; he feels his hands are less swollen today    HPI:  Pt is 49-year-old male w/ PMH HFrEF, DM, HTN, DM, cardiomyopathy, R BKA, left AKA, and substance dependence who presented to ED with c/o shortness of breath, swelling, and orthopnea for past week.    Pt has been receiving IV lasix since admission and is reportedly breathing better today but remains orthopneic, and swelling to upper legs, scrotum and abdomen still present. Still on 4 LPM O2    Review of patient's allergies indicates:  No Known Allergies    Past Medical History:   Diagnosis Date    Diabetes mellitus     Hypertension      Past Surgical History:   Procedure Laterality Date    CYSTOSCOPY,WITH URETERAL CATHETER INSERTION N/A 5/15/2025    Procedure: CYSTOSCOPY,WITH URETERAL CATHETER INSERTION;  Surgeon: Yoni Lemus MD;  Location: [facility];  Service: Urology;  Laterality: N/A;     SKIN GRAFT       Social History[1]     REVIEW OF SYSTEMS  Negative except as mentioned in HPI    OBJECTIVE     VITAL SIGNS (Most Recent)  Temp: 98.3 °F (36.8 °C) (05/20/25 0836)  Pulse: (!) 113 (05/20/25 0836)  Resp: 16 (05/20/25 0836)  BP: (!) 144/79 (05/20/25 0836)  SpO2: 98 % (05/20/25 0836)    VENTILATION STATUS  Resp: 16 (05/20/25 0836)  SpO2: 98 % (05/20/25 0836)           I & O (Last 24H):  Intake/Output Summary (Last 24 hours) at 5/20/2025 1004  Last data filed at 5/20/2025 0626  Gross per 24 hour   Intake 840.45 ml   Output 9800 ml   Net -8959.55 ml       WEIGHTS  Wt Readings from Last 3 Encounters:   05/16/25 2005 135.7 kg (299 lb 2.6 oz)   05/14/25 1045 (!) 138.4 kg (305 lb 1.9 oz)   05/12/25 1239 97.5 kg (214 lb 15.2 oz)   05/11/25 1954 97.5 kg (215 lb)   05/12/25 1048 97.5 kg (214 lb 15.2 oz)   01/26/25 0338 117.5 kg (259 lb 0.7 oz)   01/21/25 0515 117.9 kg (260 lb)   01/15/25 0400 126.1 kg (278 lb)   01/14/25 0400 127.1 kg (280 lb 3.2 oz)   01/09/25 2239 115.4 kg (254 lb 6.4 oz)   01/09/25 0824 108.9 kg (240 lb)       PHYSICAL EXAM    GENERAL: chronically ill middle age male sitting up in bed generalized edema noted  HEENT: Normocephalic.  +pallor;  NECK: No JVD.   CARDIAC: Regular rate and rhythm. S1 is normal.S2 is normal.No gallops, clicks or murmurs noted at this time.  CHEST ANATOMY: normal.   LUNGS: O2 via NC, no dyspnea or cough; crackles to bases,  ABDOMEN: ascites, obese, .   EXTREMITIES: edematous; right BKA edematous with wound, Left AKA edematous improving  CENTRAL NERVOUS SYSTEM: AAO x 3  SKIN: No rash     HOME MEDICATIONS:Medications Ordered Prior to Encounter[2]    SCHEDULED MEDS:   buprenorphine-naloxone 8-2 mg  2 Film Sublingual BID    ceFAZolin (Ancef) IV (PEDS and ADULTS)  2 g Intravenous Q6H    chlorhexidine  15 mL Mouth/Throat BID    enoxparin  40 mg Subcutaneous Daily    insulin glargine U-100  10 Units Subcutaneous Daily    isosorbide dinitrate  20 mg Oral TID    metOLazone  5 mg  "Oral Daily    mupirocin   Nasal BID    nicotine  1 patch Transdermal Daily    senna-docusate  1 tablet Oral BID    spironolactone  25 mg Oral Daily    tamsulosin  0.4 mg Oral Daily       CONTINUOUS INFUSIONS:   DOBUTamine IV infusion (non-titrating)  2.5 mcg/kg/min Intravenous Continuous 5.1 mL/hr at 05/19/25 1833 2.5 mcg/kg/min at 05/19/25 1833    furosemide (LASIX) 2 mg/mL continuous infusion (non-titrating)  5 mg/hr Intravenous Continuous 2.5 mL/hr at 05/19/25 1833 5 mg/hr at 05/19/25 1833       PRN MEDS:  Current Facility-Administered Medications:     acetaminophen, 650 mg, Oral, Q6H PRN    albuterol-ipratropium, 3 mL, Nebulization, Q6H PRN    bisacodyL, 10 mg, Rectal, Daily PRN    dextrose 50%, 12.5 g, Intravenous, PRN    dextrose 50%, 25 g, Intravenous, PRN    diphenhydrAMINE, 50 mg, Oral, Q8H PRN    glucagon (human recombinant), 1 mg, Intramuscular, PRN    glucose, 16 g, Oral, PRN    glucose, 24 g, Oral, PRN    insulin aspart U-100, 0-10 Units, Subcutaneous, QID (AC + HS) PRN    melatonin, 6 mg, Oral, Nightly PRN    morphine, 2 mg, Intravenous, Q4H PRN    promethazine, 12.5 mg, Rectal, Q6H PRN    sodium chloride 0.9%, 10 mL, Intravenous, PRN    LABS AND DIAGNOSTICS     CBC LAST 3 DAYS  Recent Labs   Lab 05/18/25  0608 05/19/25  0610 05/20/25  0614   WBC 10.04 11.17 9.76   RBC 3.58* 3.87* 3.90*   HGB 8.9* 9.5* 9.6*   HCT 28.6* 30.4* 31.1*   MCV 80* 79* 80*   MCH 24.9* 24.5* 24.6*   MCHC 31.1* 31.3* 30.9*   RDW 16.0* 16.1* 16.2*    272 279   MPV 10.4 9.7 9.9   NRBC 0 0 0       COAGULATION LAST 3 DAYS  No results for input(s): "LABPT", "INR", "APTT" in the last 168 hours.    CHEMISTRY LAST 3 DAYS  Recent Labs   Lab 05/18/25  0608 05/18/25  1309 05/18/25  1810 05/19/25  0803 05/19/25  0921 05/19/25  2013 05/20/25  0003 05/20/25  0614    137   < > 136   < > 133* 134* 135*   K 4.4 4.3   < > 4.3   < > 4.2 4.2 4.4   CL 93* 92*   < > 93*   < > 87* 87* 88*   CO2 37* 38*   < > 37*   < > 39* 40* 39* " "  ANIONGAP 7* 7*   < > 6*   < > 7* 7* 8   BUN 41* 40*   < > 41*   < > 42* 41* 41*   CREATININE 1.5* 1.4   < > 1.4   < > 1.5* 1.5* 1.5*   * 110   < > 127*   < > 155* 154* 96   CALCIUM 9.0 9.1   < > 9.1   < > 9.0 9.0 8.8   MG 2.0 2.0   < > 2.0   < > 2.1 2.1 2.2   ALBUMIN 2.7* 2.8*  --  2.8*  --   --   --   --    PROT 6.3 6.3  --  6.7  --   --   --   --    ALKPHOS 154* 153*  --  160*  --   --   --   --    ALT <3* <3*  --  <3*  --   --   --   --    AST 13 14  --  14  --   --   --   --    BILITOT 0.4 0.4  --  0.4  --   --   --   --     < > = values in this interval not displayed.       CARDIAC PROFILE LAST 3 DAYS  No results for input(s): "BNP", "CPK", "CPKMB", "LDH", "TROPONINI" in the last 168 hours.      ENDOCRINE LAST 3 DAYS  No results for input(s): "TSH", "PROCAL" in the last 168 hours.      LAST ARTERIAL BLOOD GAS  ABG  No results for input(s): "PH", "PO2", "PCO2", "HCO3", "BE" in the last 168 hours.    LAST 7 DAYS MICROBIOLOGY   Microbiology Results (last 7 days)       Procedure Component Value Units Date/Time    Blood culture [[id number]]  (Normal) Collected: 05/12/25 0004    Order Status: Completed Specimen: Blood Updated: 05/17/25 0103     CULTURE, BLOOD ([facility]) No Growth After 5 Days    Blood culture [[id number]]  (Normal) Collected: 05/11/25 2331    Order Status: Completed Specimen: Blood Updated: 05/17/25 0030     CULTURE, BLOOD ([facility]) No Growth After 5 Days    Aerobic culture [[id number]]  (Abnormal)  (Susceptibility) Collected: 05/12/25 0040    Order Status: Completed Specimen: Wound from Leg, Right Updated: 05/14/25 0634     CULTURE, AEROBIC Moderate Staphylococcus aureus            MOST RECENT IMAGING  CT Knee With Contrast Right  Narrative: EXAMINATION:  CT KNEE WITH CONTRAST RIGHT    CLINICAL HISTORY:  Osteomyelitis suspected, knee, xray done;concern for osteo at stump infection site;    TECHNIQUE:  Right knee CT with 100 mL IV Omnipaque 350    COMPARISON:  Right knee radiographs " 05/11/2025    FINDINGS:  No aggressive osseous destruction.  Postsurgical changes of right BKA are evident.    Small right knee joint effusion is present with very little if any synovial enhancement.  Extensive subcutaneous edema occurs about the visualized right leg.  No rim enhancing fluid collection is present to suggest abscess.  Little if any intraluminal opacification occurs in distal right superficial femoral and right popliteal artery.  Scattered arterial vascular calcifications are noted.  Small collateral arterial structures do opacify.  Heterogeneous opacification of the visualize veins is evident.  Impression: 1. No aggressive osseous destruction to suggest osteomyelitis.  This would be best assessed with MRI.  2. No rim enhancing fluid collection to suggest abscess.  3. Extensive subcutaneous edema with postsurgical changes of BKA.  4. Small right knee joint effusion with little if any synovial enhancement, nonspecific.  5. Little if any intraluminal opacification of distal right SFA and popliteal artery, with small collateral arterial branches opacifying.    Electronically signed by: Fredy Trejo  Date:    05/19/2025  Time:    10:55      ECHOCARDIOGRAM RESULTS (last 5)  Results for orders placed during the hospital encounter of 01/09/25    Echo    Interpretation Summary    Left Ventricle: The left ventricle is moderately dilated. Mildly increased wall thickness. There is mild asymmetric hypertrophy. Severe global hypokinesis present. There is severely reduced systolic function with a visually estimated ejection fraction of 25 - 30%.    Right Ventricle: Moderate right ventricular enlargement. Systolic function is mildly reduced.    Left Atrium: Left atrium is mildly dilated.    Tricuspid Valve: There is mild regurgitation.    IVC/SVC: Elevated venous pressure at 15 mmHg.      CURRENT/PREVIOUS VISIT EKG  Results for orders placed or performed during the hospital encounter of 05/11/25   EKG 12-lead     Collection Time: 05/17/25  2:41 PM   Result Value Ref Range    QRS Duration 120 ms    OHS QTC Calculation 442 ms    Narrative    Test Reason : I50.9,    Vent. Rate : 112 BPM     Atrial Rate : 112 BPM     P-R Int : 160 ms          QRS Dur : 120 ms      QT Int : 324 ms       P-R-T Axes :  27 135  34 degrees    QTcB Int : 442 ms    Sinus tachycardia  Low voltage QRS  Right bundle branch block  Left posterior fascicular block   Bifascicular block   Abnormal ECG  When compared with ECG of 12-May-2025 04:05,  Left posterior fascicular block is now Present  Criteria for Inferior infarct are no longer Present    Referred By: AAAREFERRAL SELF           Confirmed By:            ASSESSMENT/PLAN:     Active Hospital Problems    Diagnosis    *Acute systolic congestive heart failure    Chronic hepatitis C without hepatic coma    Pressure ulcer of right buttock, stage 2    HTN (hypertension)    Prolonged QT interval    Hypoalbuminemia    Non-healing wound of amputation stump    Opioid use disorder    Diabetes mellitus    Scrotal edema    Elevated d-dimer       ASSESSMENT & PLAN:   Acute on chronic HFrEF  Dilated Cardiomyopathy  Substance abuse  Right BKA wound  Left AKA    RECOMMENDATIONS:      AVEL Curiel  Add Torsemide 20 mg PO BID  Aldactone 25 mg daily  Continue Isordil 20 mg TID  Will follow                    [facility]  Department of Cardiology  Date of Service: 05/20/2025  +      Patient has diuresed very well.  Heart failure symptoms of clinically improved patient feels much better overall.  Recommend the following   1. Discontinue dobutamine  2. Discontinue Lasix to  3. Start torsemide 20 mg p.o. b.i.d.  4. Maintain on spironolactone 25 minute daily this has could be increased to b.i.d. regimen as tolerate  5. Maintain on magnesium   6. As patient will need continued into institutional therapy recommend to start on Entresto 24/26 mg p.o. b.i.d..  Because of significant volume she had anticipate transient  hypotensive response initially.  7. Patient education regarding salt and fluid restriction     I have personally interviewed and examined the patient, I have reviewed the Nurse Practitioner's history and physical, assessment, and plan. I have personally evaluated the patient at bedside and agree with the findings and made appropriate changes as necessary in recommendations.          Jairo Corcoran MD FACC,FACP, [facility]  Section Head   Department of Cardiology  [facility]  MICHAEL Yeung  05/20/2025 11:00 AM             [1]   Social History  Tobacco Use    Smoking status: Every Day     Current packs/day: 1.00     Average packs/day: 1 pack/day for 40.4 years (40.4 ttl pk-yrs)     Types: Cigarettes     Start date: 1985   Substance Use Topics    Alcohol use: Yes    Drug use: No   [2]   No current facility-administered medications on file prior to encounter.     Current Outpatient Medications on File Prior to Encounter   Medication Sig Dispense Refill    isosorbide dinitrate (ISORDIL) 20 MG tablet Take 1 tablet (20 mg total) by mouth 3 (three) times daily. 90 tablet 11    tamsulosin (FLOMAX) 0.4 mg Cap Take 1 capsule (0.4 mg total) by mouth once daily. 30 capsule 11    torsemide (DEMADEX) 20 MG Tab Take 1 tablet (20 mg total) by mouth 2 (two) times a day. 60 tablet 11    insulin aspart U-100 (NOVOLOG) 100 unit/mL (3 mL) InPn pen Inject 0-10 Units into the skin before meals and at bedtime as needed (Hyperglycemia). **MODERATE CORRECTION DOSE**  Blood Glucose  mg/dL                  Pre-meal                2200  151-200                2 units                    1 unit  201-250                4 units                    2 units  251-300                6 units                    3 units  301-350                8 units                    4 units  >350                     10 units                  5 units  Administer subcutaneously if needed at times designated by monitoring  schedule.  DO NOT HOLD  "correction dose insulin for patients who are  NPO.    "HIGH ALERT MEDICATION" - Administer with meals or TF/TPN. (Patient not taking: Reported on 2/12/2025) 1 each 0     "

## 2025-05-20 NOTE — ASSESSMENT & PLAN NOTE
Wound care is following : continue the ancef for now  -CT done without evidence of abscess or osteo  -PER ID: Continue cefazolin in the hospital   Use either cefadroxil 1 g b.i.d. p.o. or cephalexin 1 g q.i.d. p.o. to complete 14 day course of therapy through 05/28/2025

## 2025-05-20 NOTE — PLAN OF CARE
Problem: Physical Therapy  Goal: Physical Therapy Goal  Description: Goals to be met by: 6/15/25     Patient will increase functional independence with mobility by performin. Supine to sit with Supervision  2. Bed to chair transfer with scoot pivot and Supervision  3. Pt to tolerate sitting EOB x 15 mins with supervision          Outcome: Progressing
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
## 2025-05-20 NOTE — PT/OT/SLP PROGRESS
Occupational Therapy      Patient Name:  Gideon Ross   MRN:  [id number]    Patient not seen today secondary to Other (Comment) (meeting with CM and therapist unable to return). Will follow-up 5/21/25.    5/20/2025

## 2025-05-20 NOTE — ASSESSMENT & PLAN NOTE
Patient's blood pressure range in the last 24 hours was: BP  Min: 126/69  Max: 150/78.The patient's inpatient anti-hypertensive regimen is listed below:  Current Antihypertensives  isosorbide dinitrate tablet 20 mg, 3 times daily, Oral  metOLazone tablet 5 mg, Daily, Oral  spironolactone tablet 25 mg, Daily, Oral  furosemide tablet 40 mg, 2 times daily, Oral    Plan  -will make adjustment as needed

## 2025-05-20 NOTE — RESPIRATORY THERAPY
05/20/25 0824   Patient Assessment/Suction   Level of Consciousness (AVPU) alert   Respiratory Effort Unlabored   All Lung Fields Breath Sounds   (fairly clear, diminished)   PRE-TX-O2   Device (Oxygen Therapy) nasal cannula   $ Is the patient on Low Flow Oxygen? Yes   Flow (L/min) (Oxygen Therapy) 2   SpO2 96 %   Pulse Oximetry Type Intermittent   $ Pulse Oximetry - Multiple Charge Pulse Oximetry - Multiple   Pulse (!) 111   Resp 18   Aerosol Therapy   $ Aerosol Therapy Charges PRN treatment not required

## 2025-05-20 NOTE — PLAN OF CARE
[facility] following up with SNF referrals this date.      Patient considered at the following facilities:  -[facility]    Patient pending acceptance at the following facilities:  -[facility]: left voicemail for admissions  -[facility]: left message with   -[facility]: admissions gone for day    Patient denied by the following facilities:  -[facility]: Medicaid only max capacity  -[facility]: Unable to meet care needs of patient  -[facility]: no bed available  -[facility]: Unable to meet care needs of patient  -[facility]: Unable to meet care needs of patient  -[facility]: Unable to meet care needs of patient, HAVE THE MAX AMOUNT OF MEDICAID ONLY RESIDENTS  - [facility]: Facility does not accept patient's insurance   - [facility]: [place] substance abuse  -[facility]: Facility does not accept patient's insurance  -[facility]: Unable to meet care needs of patient  -[facility]: no contract for SNF with maxim only  -[facility]: Medicaid only  -[facility]: Medicaid max  -[facility]: Unable to meet care needs of patient  -[facility]: substance abuse (opioid), Unable to meet care needs of patient  -[facility]: Unable to meet care needs of patient  -[facility]: Unable to meet care needs of patient, wounds  -[facility]: Facility does not accept patient's insurance  -[facility]: No male beds  -[facility]: Unable to meet care needs of patient  -[facility]: Unable to meet care needs of patient  -[facility]: Facility does not accept patient's insurance    LOCET/142 Status:  -LOCET: done 5/14/2025  -PASRR: submitted 5/14/2025  -142: approved 5/15/2025

## 2025-05-20 NOTE — SUBJECTIVE & OBJECTIVE
Interval History:  Pt seen and examined.  He is net -11.9L today.  He continues to feel better.  Discussed with Cardiology-will take him off the Lasix and Dobutrex strips today.  Updated Case Management, can pursue placement.    Review of Systems   Constitutional:  Negative for chills and fever.   Respiratory:  Positive for shortness of breath (Continues with orthopnea). Negative for cough.    Cardiovascular:  Negative for chest pain.   Gastrointestinal:  Positive for abdominal distention. Negative for nausea and vomiting.     Objective:     Vital Signs (Most Recent):  Temp: 98.3 °F (36.8 °C) (05/20/25 0836)  Pulse: (!) 113 (05/20/25 0836)  Resp: 16 (05/20/25 0836)  BP: (!) 144/79 (05/20/25 0836)  SpO2: 98 % (05/20/25 0836) Vital Signs (24h Range):  Temp:  [97.6 °F (36.4 °C)-98.5 °F (36.9 °C)] 98.3 °F (36.8 °C)  Pulse:  [] 113  Resp:  [16-18] 16  SpO2:  [90 %-100 %] 98 %  BP: (126-150)/(69-79) 144/79     Weight: 135.7 kg (299 lb 2.6 oz)  Body mass index is 42.93 kg/m².    Intake/Output Summary (Last 24 hours) at 5/20/2025 1144  Last data filed at 5/20/2025 1011  Gross per 24 hour   Intake 600.45 ml   Output 64026 ml   Net -28526.55 ml         Physical Exam  Vitals and nursing note reviewed.   Constitutional:       Appearance: He is obese. He is ill-appearing (appears chronically ill).   Cardiovascular:      Rate and Rhythm: Tachycardia present.   Pulmonary:      Effort: Pulmonary effort is normal. No respiratory distress.      Breath sounds: Normal breath sounds.   Abdominal:      General: Abdomen is flat. Bowel sounds are normal. There is distension.      Palpations: Abdomen is soft.   Musculoskeletal:      Cervical back: Normal range of motion and neck supple.      Right lower leg: Edema present.      Left lower leg: Edema present.      Comments: Continued improvement in anasarca   Skin:     Comments: Right BKA wound without pilar drainage, surrounding erythema noted.  Scratch marks all over his upper  extremities and chest   Neurological:      General: No focal deficit present.      Mental Status: He is alert.               Significant Labs: All pertinent labs within the past 24 hours have been reviewed.  CBC:   Recent Labs   Lab 05/19/25  0610 05/20/25  0614   WBC 11.17 9.76   HGB 9.5* 9.6*   HCT 30.4* 31.1*    279     CMP:   Recent Labs   Lab 05/18/25  1309 05/18/25  1810 05/19/25  0803 05/19/25  0921 05/19/25 2013 05/20/25  0003 05/20/25  0614      < > 136   < > 133* 134* 135*   K 4.3   < > 4.3   < > 4.2 4.2 4.4   CL 92*   < > 93*   < > 87* 87* 88*   CO2 38*   < > 37*   < > 39* 40* 39*      < > 127*   < > 155* 154* 96   BUN 40*   < > 41*   < > 42* 41* 41*   CREATININE 1.4   < > 1.4   < > 1.5* 1.5* 1.5*   CALCIUM 9.1   < > 9.1   < > 9.0 9.0 8.8   PROT 6.3  --  6.7  --   --   --   --    ALBUMIN 2.8*  --  2.8*  --   --   --   --    BILITOT 0.4  --  0.4  --   --   --   --    ALKPHOS 153*  --  160*  --   --   --   --    AST 14  --  14  --   --   --   --    ALT <3*  --  <3*  --   --   --   --    ANIONGAP 7*   < > 6*   < > 7* 7* 8    < > = values in this interval not displayed.       Significant Imaging: I have reviewed all pertinent imaging results/findings within the past 24 hours.

## 2025-05-20 NOTE — ASSESSMENT & PLAN NOTE
S/p R BKA and L AKA  Patient's FSGs are uncontrolled due to hyperglycemia on current medication regimen.  Last A1c reviewed-   Lab Results   Component Value Date    HGBA1C 7.3 (H) 05/12/2025     Most recent fingerstick glucose reviewed-   Recent Labs   Lab 05/19/25  1357 05/19/25  1630 05/19/25  2033 05/20/25  0838   POCTGLUCOSE 147* 150* 152* 133*     Current correctional scale  Low  Maintain anti-hyperglycemic dose as follows-   Antihyperglycemics (From admission, onward)      Start     Stop Route Frequency Ordered    05/12/25 1445  insulin glargine U-100 (Lantus) pen 10 Units         -- SubQ Daily 05/12/25 1332    05/12/25 0943  insulin aspart U-100 pen 0-10 Units         -- SubQ Before meals & nightly PRN 05/12/25 0843

## 2025-05-20 NOTE — PLAN OF CARE
REX received a phone call from Chayo at [facility] regarding patient's referral. Chayo reported [facility] could accept the patient for [facility], but are not in contract with insurance for SNF.     REX spoke with patient at bedside regarding the USP acceptance. Patient reported he is interested in USP placement more than SNF. Patient thanked REX for the update.

## 2025-05-20 NOTE — RESPIRATORY THERAPY
05/19/25 1945   Patient Assessment/Suction   Level of Consciousness (AVPU) alert   Respiratory Effort Normal;Unlabored   Expansion/Accessory Muscles/Retractions no use of accessory muscles   All Lung Fields Breath Sounds diminished   Rhythm/Pattern, Respiratory unlabored;pattern regular;depth regular;no shortness of breath reported   Skin Integrity   $ Wound Care Tech Time 15 min   Area Observed Left;Right;Cheek;Behind ear;Upper lip;Nares   Skin Appearance without discoloration   PRE-TX-O2   Device (Oxygen Therapy) room air   SpO2 95 %   Pulse Oximetry Type Intermittent   $ Pulse Oximetry - Multiple Charge Pulse Oximetry - Multiple   Pulse (!) 112   Resp 18   Aerosol Therapy   $ Aerosol Therapy Charges PRN treatment not required   Respiratory Treatment Status (SVN) PRN treatment not required   Education   $ Education 15 min;Oxygen;Other (see comment)   Respiratory Evaluation   $ Care Plan Tech Time 15 min        Airway  Performed by: Radha Dasilva  Authorized by: Thea Dailey CRNA     Final Airway Type:  Endotracheal airway  Final Endotracheal Airway*:  ETT  ETT Size (mm)*:  7.5  Cuff*:  Regular  Technique Used for Successful ETT Placement:  Direct laryngoscopy  Devices/Methods Used in Placement*:  Bag Valve  Intubation Procedure*:  ETCO2 and Preoxygenation  Insertion Site:  Oral  Blade Type*:  MAC  Blade Size*:  3  Cuff Volume (mL):  8  Measured from*:  Teeth  Secured at (cm)*:  22  Placement Verified by: auscultation and capnometry    Glottic View*:  1 - full view of glottis  Attempts*:  1   Patient Identified, Procedure confirmed, Emergency equipment available and Safety protocols followed  Location:  OR  Urgency:  Elective  Difficult Airway: No    Indications for Airway Management:  Anesthesia  Spontaneous Ventilation: absent    Sedation Level:  Deep  MILS Maintained Throughout: No    Mask Difficulty Assessment:  1 - vent by mask  Performed By:  DOROTHY  Start Time: 1/12/2023 2:57 PM

## 2025-05-20 NOTE — PT/OT/SLP PROGRESS
Physical Therapy Treatment    Patient Name:  Gideon Ross   MRN:  [id number]    Recommendations:     Discharge Recommendations: Moderate Intensity Therapy  Discharge Equipment Recommendations: wheelchair  Barriers to discharge: medical status    Assessment:     Gideon Ross is a 49 y.o. male admitted with a medical diagnosis of Acute systolic congestive heart failure.  He presents with the following impairments/functional limitations: weakness, impaired endurance, impaired self care skills, impaired functional mobility, impaired balance, decreased safety awareness, pain, impaired cardiopulmonary response to activity, edema.    Pt agreeable to visit. Pt performed posterior scoot from bed to wheelchair with supervision.    Pt propelled wheelchair using bilateral upper extremities x 60' with supervision and verbal cuing for obstacle avoidance.    Pt left up in wheelchair with alarm on and nurse informed.    Rehab Prognosis: Fair; patient would benefit from acute skilled PT services to address these deficits and reach maximum level of function.    Recent Surgery: Procedure(s) (LRB):  CYSTOSCOPY,WITH URETERAL CATHETER INSERTION (N/A) 5 Days Post-Op    Plan:     During this hospitalization, patient to be seen 5 x/week to address the identified rehab impairments via therapeutic activities, therapeutic exercises, neuromuscular re-education and progress toward the following goals:    Plan of Care Expires:  06/15/25    Subjective     Chief Complaint: pt upset that the nursing staff will not let him go outside  Patient/Family Comments/goals: to go outside  Pain/Comfort:  Pain Rating 1: 0/10      Objective:     Communicated with nurse prior to session.  Patient found HOB elevated with valle catheter, telemetry, bed alarm upon PT entry to room.     General Precautions: Standard, fall  Orthopedic Precautions: N/A  Braces: N/A  Respiratory Status: Room air     Functional Mobility:  Bed Mobility:     Supine to Sit:  supervision  Transfers:     Bed to Chair: supervision with  no AD  using  posterior scoot  Wheelchair Propulsion:  Pt propelled Standard wheelchair x 60 feet on Level tile with  Bilateral upper extremity with Supervision or Set-up Assistance.       AM-PAC 6 CLICK MOBILITY          Treatment & Education:  Pt educated on importance of time OOB, importance of intermittent mobility, safe techniques for transfers/ambulation, discharge recommendations/options, and use of call light for assistance and fall prevention.      Patient left up in chair with all lines intact, call button in reach, chair alarm on, and nurse notified..    GOALS:   Multidisciplinary Problems       Physical Therapy Goals          Problem: Physical Therapy    Goal Priority Disciplines Outcome Interventions   Physical Therapy Goal     PT, PT/OT Progressing    Description: Goals to be met by: 6/15/25     Patient will increase functional independence with mobility by performin. Supine to sit with Supervision  2. Bed to chair transfer with scoot pivot and Supervision  3. Pt to tolerate sitting EOB x 15 mins with supervision                               DME Justifications:  Gideon Ross has a mobility limitation that significantly impairs his ability to participate in one or more mobility related activities of daily living (MRADLs) such as toileting, feeding, dressing, grooming, and bathing in customary locations in the home.  The mobility limitation cannot be sufficiently resolved by the use of a cane or walker.   The use of a manual wheelchair will significantly improve the patients ability to participate in MRADLS and the patient will use it on regular basis in the home.  Gideon Ross has expressed his willingness to use a manual wheelchair in the home. Patients upper body strength is sufficient for propulsion.  He also has a caregiver who is available, willing, and able to provide assistance with the wheelchair when needed.      Time  Tracking:     PT Received On: 05/20/25  PT Start Time: 1131     PT Stop Time: 1145  PT Total Time (min): 14 min     Billable Minutes: Train/Wheelchair Management 14    Treatment Type: Treatment  PT/PTA: PTA     Number of PTA visits since last PT visit: 2     05/20/2025

## 2025-05-20 NOTE — PLAN OF CARE
SW met with patient at bedside to speak with him regarding inpatient rehab. Patient reported he was in agreement to rehab. Patient reported he is seeking placement because he does not have any where else to go. Patient reported he was living with his grandmother prior to being admitted, but had no help or really any where to stay in the home. Patient reported he is pretty much homeless and just wants some place to live. REX informed patient that nursing facilities were still being explored at this time.      05/20/25 1431   Post-Acute Status   Post-Acute Authorization Placement   Post-Acute Placement Status Referrals Sent   Discharge Delays None known at this time   Discharge Plan   Discharge Plan A Rehab   Discharge Plan B Skilled Nursing Facility

## 2025-05-20 NOTE — PLAN OF CARE
SW sent out updated clinicals to the facilities that referrals were sent due to the changes in medication and treatment.      05/20/25 1225   Post-Acute Status   Post-Acute Authorization Placement   Post-Acute Placement Status Referrals Sent   Discharge Delays None known at this time   Discharge Plan   Discharge Plan A Skilled Nursing Facility   Discharge Plan B Skilled Nursing Facility
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
## 2025-05-20 NOTE — PLAN OF CARE
SW extended the referrals for SNF placement due to local facilities denying patient.      05/20/25 0907   Post-Acute Status   Post-Acute Authorization Placement   Post-Acute Placement Status Referrals Sent   Discharge Delays None known at this time   Discharge Plan   Discharge Plan A Skilled Nursing Facility   Discharge Plan B Skilled Nursing Facility

## 2025-05-20 NOTE — PLAN OF CARE
Problem: Infection  Goal: Absence of Infection Signs and Symptoms  Outcome: Progressing     Problem: Diabetes Comorbidity  Goal: Blood Glucose Level Within Targeted Range  Outcome: Progressing     Problem: Skin Injury Risk Increased  Goal: Skin Health and Integrity  Outcome: Progressing

## 2025-05-21 LAB
ABSOLUTE EOSINOPHIL (SMH): 1.55 K/UL
ABSOLUTE MONOCYTE (SMH): 1.26 K/UL (ref 0.3–1)
ABSOLUTE NEUTROPHIL COUNT (SMH): 5 K/UL (ref 1.8–7.7)
ANION GAP (SMH): 5 MMOL/L (ref 8–16)
BASOPHILS # BLD AUTO: 0.07 K/UL
BASOPHILS NFR BLD AUTO: 0.7 %
BUN SERPL-MCNC: 42 MG/DL (ref 6–20)
CALCIUM SERPL-MCNC: 8.2 MG/DL (ref 8.7–10.5)
CHLORIDE SERPL-SCNC: 91 MMOL/L (ref 95–110)
CO2 SERPL-SCNC: 38 MMOL/L (ref 23–29)
CREAT SERPL-MCNC: 1.4 MG/DL (ref 0.5–1.4)
ERYTHROCYTE [DISTWIDTH] IN BLOOD BY AUTOMATED COUNT: 16.2 % (ref 11.5–14.5)
GFR SERPLBLD CREATININE-BSD FMLA CKD-EPI: >60 ML/MIN/1.73/M2
GLUCOSE SERPL-MCNC: 151 MG/DL (ref 70–110)
HCT VFR BLD AUTO: 30.6 % (ref 40–54)
HGB BLD-MCNC: 9.4 GM/DL (ref 14–18)
IMM GRANULOCYTES # BLD AUTO: 0.04 K/UL (ref 0–0.04)
IMM GRANULOCYTES NFR BLD AUTO: 0.4 % (ref 0–0.5)
LYMPHOCYTES # BLD AUTO: 1.55 K/UL (ref 1–4.8)
MAGNESIUM SERPL-MCNC: 2.3 MG/DL (ref 1.6–2.6)
MAGNESIUM SERPL-MCNC: 2.4 MG/DL (ref 1.6–2.6)
MCH RBC QN AUTO: 24.7 PG (ref 27–31)
MCHC RBC AUTO-ENTMCNC: 30.7 G/DL (ref 32–36)
MCV RBC AUTO: 80 FL (ref 82–98)
NUCLEATED RBC (/100WBC) (SMH): 0 /100 WBC
PLATELET # BLD AUTO: 282 K/UL (ref 150–450)
PMV BLD AUTO: 9.4 FL (ref 9.2–12.9)
POCT GLUCOSE: 135 MG/DL (ref 70–110)
POCT GLUCOSE: 136 MG/DL (ref 70–110)
POCT GLUCOSE: 152 MG/DL (ref 70–110)
POCT GLUCOSE: 191 MG/DL (ref 70–110)
POTASSIUM SERPL-SCNC: 4.3 MMOL/L (ref 3.5–5.1)
RBC # BLD AUTO: 3.81 M/UL (ref 4.6–6.2)
RELATIVE EOSINOPHIL (SMH): 16.4 % (ref 0–8)
RELATIVE LYMPHOCYTE (SMH): 16.4 % (ref 18–48)
RELATIVE MONOCYTE (SMH): 13.3 % (ref 4–15)
RELATIVE NEUTROPHIL (SMH): 52.8 % (ref 38–73)
SODIUM SERPL-SCNC: 134 MMOL/L (ref 136–145)
WBC # BLD AUTO: 9.48 K/UL (ref 3.9–12.7)

## 2025-05-21 PROCEDURE — 27000221 HC OXYGEN, UP TO 24 HOURS

## 2025-05-21 PROCEDURE — 99233 SBSQ HOSP IP/OBS HIGH 50: CPT | Mod: 95,,, | Performed by: INTERNAL MEDICINE

## 2025-05-21 PROCEDURE — 25000003 PHARM REV CODE 250: Performed by: NURSE PRACTITIONER

## 2025-05-21 PROCEDURE — S4991 NICOTINE PATCH NONLEGEND: HCPCS | Performed by: STUDENT IN AN ORGANIZED HEALTH CARE EDUCATION/TRAINING PROGRAM

## 2025-05-21 PROCEDURE — 63600175 PHARM REV CODE 636 W HCPCS: Performed by: NURSE PRACTITIONER

## 2025-05-21 PROCEDURE — 36415 COLL VENOUS BLD VENIPUNCTURE: CPT

## 2025-05-21 PROCEDURE — 80048 BASIC METABOLIC PNL TOTAL CA: CPT | Performed by: NURSE PRACTITIONER

## 2025-05-21 PROCEDURE — 25000003 PHARM REV CODE 250: Performed by: INTERNAL MEDICINE

## 2025-05-21 PROCEDURE — 85025 COMPLETE CBC W/AUTO DIFF WBC: CPT

## 2025-05-21 PROCEDURE — 63600175 PHARM REV CODE 636 W HCPCS: Performed by: STUDENT IN AN ORGANIZED HEALTH CARE EDUCATION/TRAINING PROGRAM

## 2025-05-21 PROCEDURE — 83735 ASSAY OF MAGNESIUM: CPT

## 2025-05-21 PROCEDURE — 11000001 HC ACUTE MED/SURG PRIVATE ROOM

## 2025-05-21 PROCEDURE — 63600175 PHARM REV CODE 636 W HCPCS

## 2025-05-21 PROCEDURE — 99900035 HC TECH TIME PER 15 MIN (STAT)

## 2025-05-21 PROCEDURE — 97530 THERAPEUTIC ACTIVITIES: CPT

## 2025-05-21 PROCEDURE — 25000003 PHARM REV CODE 250: Performed by: FAMILY MEDICINE

## 2025-05-21 PROCEDURE — 25000003 PHARM REV CODE 250: Performed by: STUDENT IN AN ORGANIZED HEALTH CARE EDUCATION/TRAINING PROGRAM

## 2025-05-21 PROCEDURE — 25000003 PHARM REV CODE 250

## 2025-05-21 PROCEDURE — 94761 N-INVAS EAR/PLS OXIMETRY MLT: CPT

## 2025-05-21 RX ORDER — METOLAZONE 2.5 MG/1
2.5 TABLET ORAL DAILY
Status: DISCONTINUED | OUTPATIENT
Start: 2025-05-22 | End: 2025-05-23

## 2025-05-21 RX ADMIN — SACUBITRIL AND VALSARTAN 1 TABLET: 24; 26 TABLET, FILM COATED ORAL at 08:05

## 2025-05-21 RX ADMIN — MORPHINE SULFATE 2 MG: 2 INJECTION, SOLUTION INTRAMUSCULAR; INTRAVENOUS at 01:05

## 2025-05-21 RX ADMIN — NICOTINE 1 PATCH: 21 PATCH, EXTENDED RELEASE TRANSDERMAL at 12:05

## 2025-05-21 RX ADMIN — CEFAZOLIN 2 G: 2 INJECTION, POWDER, FOR SOLUTION INTRAMUSCULAR; INTRAVENOUS at 06:05

## 2025-05-21 RX ADMIN — BUPRENORPHINE AND NALOXONE 2 FILM: 8; 2 FILM BUCCAL; SUBLINGUAL at 08:05

## 2025-05-21 RX ADMIN — CEFAZOLIN 2 G: 2 INJECTION, POWDER, FOR SOLUTION INTRAMUSCULAR; INTRAVENOUS at 12:05

## 2025-05-21 RX ADMIN — SACUBITRIL AND VALSARTAN 1 TABLET: 24; 26 TABLET, FILM COATED ORAL at 09:05

## 2025-05-21 RX ADMIN — CHLORHEXIDINE GLUCONATE 0.12% ORAL RINSE 15 ML: 1.2 LIQUID ORAL at 09:05

## 2025-05-21 RX ADMIN — INSULIN GLARGINE 10 UNITS: 100 INJECTION, SOLUTION SUBCUTANEOUS at 09:05

## 2025-05-21 RX ADMIN — TORSEMIDE 20 MG: 20 TABLET ORAL at 06:05

## 2025-05-21 RX ADMIN — SPIRONOLACTONE 25 MG: 25 TABLET, FILM COATED ORAL at 09:05

## 2025-05-21 RX ADMIN — MORPHINE SULFATE 2 MG: 2 INJECTION, SOLUTION INTRAMUSCULAR; INTRAVENOUS at 07:05

## 2025-05-21 RX ADMIN — NICOTINE 1 PATCH: 21 PATCH, EXTENDED RELEASE TRANSDERMAL at 10:05

## 2025-05-21 RX ADMIN — METOLAZONE 5 MG: 2.5 TABLET ORAL at 09:05

## 2025-05-21 RX ADMIN — ISOSORBIDE DINITRATE 20 MG: 10 TABLET ORAL at 06:05

## 2025-05-21 RX ADMIN — ISOSORBIDE DINITRATE 20 MG: 10 TABLET ORAL at 08:05

## 2025-05-21 RX ADMIN — TORSEMIDE 20 MG: 20 TABLET ORAL at 09:05

## 2025-05-21 RX ADMIN — CEFAZOLIN 2 G: 2 INJECTION, POWDER, FOR SOLUTION INTRAMUSCULAR; INTRAVENOUS at 05:05

## 2025-05-21 RX ADMIN — TAMSULOSIN HYDROCHLORIDE 0.4 MG: 0.4 CAPSULE ORAL at 09:05

## 2025-05-21 RX ADMIN — ENOXAPARIN SODIUM 40 MG: 40 INJECTION SUBCUTANEOUS at 06:05

## 2025-05-21 RX ADMIN — CEFAZOLIN 2 G: 2 INJECTION, POWDER, FOR SOLUTION INTRAMUSCULAR; INTRAVENOUS at 10:05

## 2025-05-21 RX ADMIN — CHLORHEXIDINE GLUCONATE 0.12% ORAL RINSE 15 ML: 1.2 LIQUID ORAL at 08:05
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
## 2025-05-21 NOTE — ASSESSMENT & PLAN NOTE
S/p R BKA and L AKA  Patient's FSGs are uncontrolled due to hyperglycemia on current medication regimen.  Last A1c reviewed-   Lab Results   Component Value Date    HGBA1C 7.3 (H) 05/12/2025     Most recent fingerstick glucose reviewed-   Recent Labs   Lab 05/20/25  1207 05/20/25  1747 05/20/25 2029 05/21/25  0843   POCTGLUCOSE 134* 106 165* 135*     Current correctional scale  Low  Maintain anti-hyperglycemic dose as follows-   Antihyperglycemics (From admission, onward)      Start     Stop Route Frequency Ordered    05/12/25 1445  insulin glargine U-100 (Lantus) pen 10 Units         -- SubQ Daily 05/12/25 1332    05/12/25 0943  insulin aspart U-100 pen 0-10 Units         -- SubQ Before meals & nightly PRN 05/12/25 0843

## 2025-05-21 NOTE — ASSESSMENT & PLAN NOTE
Patient's blood pressure range in the last 24 hours was: BP  Min: 105/54  Max: 147/81.The patient's inpatient anti-hypertensive regimen is listed below:  Current Antihypertensives  isosorbide dinitrate tablet 20 mg, 3 times daily, Oral  metOLazone tablet 5 mg, Daily, Oral  spironolactone tablet 25 mg, Daily, Oral  torsemide tablet 20 mg, 2 times daily, Oral    Plan  -will make adjustment as needed

## 2025-05-21 NOTE — NURSING
"Patient refuse TB skin test states"My doctor told me to never take a skin test again,I have been treated with medication and can not catch it"  asked the patient what he was treated with and he said that he can't recall the medicine.Notified NP Hannah and case management Onelia.    "

## 2025-05-21 NOTE — SUBJECTIVE & OBJECTIVE
Interval History:  Pt seen and examined.  He continues to feel better.  Transitioned to oral diuresis meds.  Pending placement.    Review of Systems   All other systems reviewed and are negative.    Objective:     Vital Signs (Most Recent):  Temp: 98.4 °F (36.9 °C) (05/21/25 0837)  Pulse: 93 (05/21/25 0837)  Resp: 18 (05/21/25 0748)  BP: (!) 147/81 (05/21/25 0837)  SpO2: 96 % (05/21/25 0837) Vital Signs (24h Range):  Temp:  [97.3 °F (36.3 °C)-98.5 °F (36.9 °C)] 98.4 °F (36.9 °C)  Pulse:  [] 93  Resp:  [14-18] 18  SpO2:  [92 %-98 %] 96 %  BP: (105-147)/(54-81) 147/81     Weight: 135.7 kg (299 lb 2.6 oz)  Body mass index is 42.93 kg/m².    Intake/Output Summary (Last 24 hours) at 5/21/2025 1007  Last data filed at 5/21/2025 0933  Gross per 24 hour   Intake 1340 ml   Output 6950 ml   Net -5610 ml         Physical Exam  Vitals and nursing note reviewed.   Constitutional:       Appearance: He is obese. He is ill-appearing (appears chronically ill).   Cardiovascular:      Rate and Rhythm: Tachycardia present.   Pulmonary:      Effort: Pulmonary effort is normal. No respiratory distress.      Breath sounds: Normal breath sounds.   Abdominal:      General: Abdomen is flat. Bowel sounds are normal.      Palpations: Abdomen is soft.   Musculoskeletal:      Cervical back: Normal range of motion and neck supple.      Right lower leg: Edema present.      Left lower leg: Edema present.      Comments: Continued improvement in anasarca   Skin:     Comments: Right BKA wound without pilar drainage, surrounding erythema noted.  Scratch marks all over his upper extremities and chest   Neurological:      General: No focal deficit present.      Mental Status: He is alert.               Significant Labs: All pertinent labs within the past 24 hours have been reviewed.  CBC:   Recent Labs   Lab 05/20/25  0614 05/21/25  0449   WBC 9.76 9.48   HGB 9.6* 9.4*   HCT 31.1* 30.6*    282     CMP:   Recent Labs   Lab 05/20/25  0686  05/20/25  1222 05/21/25  0449   * 135* 134*   K 4.4 4.7 4.3   CL 88* 88* 91*   CO2 39* 38* 38*   GLU 96 127* 151*   BUN 41* 40* 42*   CREATININE 1.5* 1.5* 1.4   CALCIUM 8.8 8.9 8.2*   ANIONGAP 8 9 5*       Significant Imaging: I have reviewed all pertinent imaging results/findings within the past 24 hours.

## 2025-05-21 NOTE — PROGRESS NOTES
[facility]  Department of Cardiology  Progress Note      PATIENT NAME: Gideon Ross  MRN: [id number]  TODAY'S DATE: 05/21/2025  ADMIT DATE: 5/11/2025                          CONSULT REQUESTED BY: Don Whelan DO    SUBJECTIVE     PRINCIPAL PROBLEM: Acute systolic congestive heart failure      REASON FOR CONSULT:  Acute on chronic CHF, life vest patient      INTERVAL HISTORY:        05/21/2025    Mr. Ross is feeling better. Seems dry today. Cr 1.4.    5/20/2025    Doing well  Diuresed well.        5/19/2025    Seen sitting up in bed. Feels better. Breathing stable. UOP has been good.       5/17/25  Patient seen sittingup in bed. Noted to be significantly volume overloaded. Creatinine bumped slightly today.     5/16/25  Weber inserted yesterday; more urine output today; -1600 net balance  VSS, remains edematous    5/15/25  Cr 1.3 today; + fluid balance but output has been difficult to measure accurately  Remains edematous    5/14/25  Cr 1.2; difficulty collecting accurate output  Remains edematous and orthopneic  No events noted on telemetry    5/13/25  Hgb 9.5; Cr 1.1; + net balance; BP elevated;  Pt still has orthopnea; he feels his hands are less swollen today    HPI:  Pt is 49-year-old male w/ PMH HFrEF, DM, HTN, DM, cardiomyopathy, R BKA, left AKA, and substance dependence who presented to ED with c/o shortness of breath, swelling, and orthopnea for past week.    Pt has been receiving IV lasix since admission and is reportedly breathing better today but remains orthopneic, and swelling to upper legs, scrotum and abdomen still present. Still on 4 LPM O2    Review of patient's allergies indicates:  No Known Allergies    Past Medical History:   Diagnosis Date    Diabetes mellitus     Hypertension      Past Surgical History:   Procedure Laterality Date    CYSTOSCOPY,WITH URETERAL CATHETER INSERTION N/A 5/15/2025    Procedure: CYSTOSCOPY,WITH URETERAL CATHETER INSERTION;  Surgeon: Mariah  MD Yoni;  Location: [facility];  Service: Urology;  Laterality: N/A;    SKIN GRAFT       Social History[1]     REVIEW OF SYSTEMS  Negative except as mentioned in HPI    OBJECTIVE     VITAL SIGNS (Most Recent)  Temp: 98.4 °F (36.9 °C) (05/21/25 0837)  Pulse: 93 (05/21/25 0837)  Resp: 18 (05/21/25 0748)  BP: (!) 147/81 (05/21/25 0837)  SpO2: 96 % (05/21/25 0837)    VENTILATION STATUS  Resp: 18 (05/21/25 0748)  SpO2: 96 % (05/21/25 0837)           I & O (Last 24H):  Intake/Output Summary (Last 24 hours) at 5/21/2025 1013  Last data filed at 5/21/2025 0933  Gross per 24 hour   Intake 1340 ml   Output 4800 ml   Net -3460 ml       WEIGHTS  Wt Readings from Last 3 Encounters:   05/16/25 2005 135.7 kg (299 lb 2.6 oz)   05/14/25 1045 (!) 138.4 kg (305 lb 1.9 oz)   05/12/25 1239 97.5 kg (214 lb 15.2 oz)   05/11/25 1954 97.5 kg (215 lb)   05/12/25 1048 97.5 kg (214 lb 15.2 oz)   01/26/25 0338 117.5 kg (259 lb 0.7 oz)   01/21/25 0515 117.9 kg (260 lb)   01/15/25 0400 126.1 kg (278 lb)   01/14/25 0400 127.1 kg (280 lb 3.2 oz)   01/09/25 2239 115.4 kg (254 lb 6.4 oz)   01/09/25 0824 108.9 kg (240 lb)       PHYSICAL EXAM    GENERAL: chronically ill middle age male sitting up in bed fatigued looking  NECK: No JVD.   CARDIAC: Regular rate and rhythm. S1 is normal.S2 is normal.No gallops, clicks or murmurs noted at this time.  CHEST ANATOMY: normal.   LUNGS: Diminished  ABDOMEN: obese soft .   EXTREMITIES: edematous; right BKA edematous with wound, Left AKA edematous improving  CENTRAL NERVOUS SYSTEM: AAO x 3  SKIN: No rash     HOME MEDICATIONS:Medications Ordered Prior to Encounter[2]    SCHEDULED MEDS:   buprenorphine-naloxone 8-2 mg  2 Film Sublingual BID    ceFAZolin (Ancef) IV (PEDS and ADULTS)  2 g Intravenous Q6H    chlorhexidine  15 mL Mouth/Throat BID    enoxparin  40 mg Subcutaneous Daily    insulin glargine U-100  10 Units Subcutaneous Daily    isosorbide dinitrate  20 mg Oral TID    metOLazone  5 mg Oral Daily    nicotine  " 1 patch Transdermal Daily    sacubitriL-valsartan  1 tablet Oral BID    senna-docusate  1 tablet Oral BID    spironolactone  25 mg Oral Daily    tamsulosin  0.4 mg Oral Daily    torsemide  20 mg Oral BID loop       CONTINUOUS INFUSIONS:        PRN MEDS:  Current Facility-Administered Medications:     acetaminophen, 650 mg, Oral, Q6H PRN    albuterol-ipratropium, 3 mL, Nebulization, Q6H PRN    bisacodyL, 10 mg, Rectal, Daily PRN    dextrose 50%, 12.5 g, Intravenous, PRN    dextrose 50%, 25 g, Intravenous, PRN    diphenhydrAMINE, 50 mg, Oral, Q8H PRN    glucagon (human recombinant), 1 mg, Intramuscular, PRN    glucose, 16 g, Oral, PRN    glucose, 24 g, Oral, PRN    insulin aspart U-100, 0-10 Units, Subcutaneous, QID (AC + HS) PRN    melatonin, 6 mg, Oral, Nightly PRN    morphine, 2 mg, Intravenous, Q4H PRN    promethazine, 12.5 mg, Rectal, Q6H PRN    sodium chloride 0.9%, 10 mL, Intravenous, PRN    tuberculin, 5 Units, Intradermal, vaccine x 1 dose **AND** [START ON 5/22/2025] POCT TB Skin Test Read, , , Once    LABS AND DIAGNOSTICS     CBC LAST 3 DAYS  Recent Labs   Lab 05/19/25  0610 05/20/25  0614 05/21/25  0449   WBC 11.17 9.76 9.48   RBC 3.87* 3.90* 3.81*   HGB 9.5* 9.6* 9.4*   HCT 30.4* 31.1* 30.6*   MCV 79* 80* 80*   MCH 24.5* 24.6* 24.7*   MCHC 31.3* 30.9* 30.7*   RDW 16.1* 16.2* 16.2*    279 282   MPV 9.7 9.9 9.4   NRBC 0 0 0       COAGULATION LAST 3 DAYS  No results for input(s): "LABPT", "INR", "APTT" in the last 168 hours.    CHEMISTRY LAST 3 DAYS  Recent Labs   Lab 05/18/25  0608 05/18/25  1309 05/18/25  1810 05/19/25  0803 05/19/25  0921 05/20/25  0614 05/20/25  1222 05/20/25  1903 05/21/25  0051 05/21/25  0449    137   < > 136   < > 135* 135*  --   --  134*   K 4.4 4.3   < > 4.3   < > 4.4 4.7  --   --  4.3   CL 93* 92*   < > 93*   < > 88* 88*  --   --  91*   CO2 37* 38*   < > 37*   < > 39* 38*  --   --  38*   ANIONGAP 7* 7*   < > 6*   < > 8 9  --   --  5*   BUN 41* 40*   < > 41*   < > 41* " "40*  --   --  42*   CREATININE 1.5* 1.4   < > 1.4   < > 1.5* 1.5*  --   --  1.4   * 110   < > 127*   < > 96 127*  --   --  151*   CALCIUM 9.0 9.1   < > 9.1   < > 8.8 8.9  --   --  8.2*   MG 2.0 2.0   < > 2.0   < > 2.2 2.2 2.2 2.3 2.3   ALBUMIN 2.7* 2.8*  --  2.8*  --   --   --   --   --   --    PROT 6.3 6.3  --  6.7  --   --   --   --   --   --    ALKPHOS 154* 153*  --  160*  --   --   --   --   --   --    ALT <3* <3*  --  <3*  --   --   --   --   --   --    AST 13 14  --  14  --   --   --   --   --   --    BILITOT 0.4 0.4  --  0.4  --   --   --   --   --   --     < > = values in this interval not displayed.       CARDIAC PROFILE LAST 3 DAYS  No results for input(s): "BNP", "CPK", "CPKMB", "LDH", "TROPONINI" in the last 168 hours.      ENDOCRINE LAST 3 DAYS  No results for input(s): "TSH", "PROCAL" in the last 168 hours.      LAST ARTERIAL BLOOD GAS  ABG  No results for input(s): "PH", "PO2", "PCO2", "HCO3", "BE" in the last 168 hours.    LAST 7 DAYS MICROBIOLOGY   Microbiology Results (last 7 days)       Procedure Component Value Units Date/Time    Blood culture [[id number]]  (Normal) Collected: 05/12/25 0004    Order Status: Completed Specimen: Blood Updated: 05/17/25 0103     CULTURE, BLOOD ([facility]) No Growth After 5 Days    Blood culture [[id number]]  (Normal) Collected: 05/11/25 2331    Order Status: Completed Specimen: Blood Updated: 05/17/25 0030     CULTURE, BLOOD ([facility]) No Growth After 5 Days            MOST RECENT IMAGING  CT Knee With Contrast Right  Narrative: EXAMINATION:  CT KNEE WITH CONTRAST RIGHT    CLINICAL HISTORY:  Osteomyelitis suspected, knee, xray done;concern for osteo at stump infection site;    TECHNIQUE:  Right knee CT with 100 mL IV Omnipaque 350    COMPARISON:  Right knee radiographs 05/11/2025    FINDINGS:  No aggressive osseous destruction.  Postsurgical changes of right BKA are evident.    Small right knee joint effusion is present with very little if any synovial enhancement.  " Extensive subcutaneous edema occurs about the visualized right leg.  No rim enhancing fluid collection is present to suggest abscess.  Little if any intraluminal opacification occurs in distal right superficial femoral and right popliteal artery.  Scattered arterial vascular calcifications are noted.  Small collateral arterial structures do opacify.  Heterogeneous opacification of the visualize veins is evident.  Impression: 1. No aggressive osseous destruction to suggest osteomyelitis.  This would be best assessed with MRI.  2. No rim enhancing fluid collection to suggest abscess.  3. Extensive subcutaneous edema with postsurgical changes of BKA.  4. Small right knee joint effusion with little if any synovial enhancement, nonspecific.  5. Little if any intraluminal opacification of distal right SFA and popliteal artery, with small collateral arterial branches opacifying.    Electronically signed by: Fredy Trejo  Date:    05/19/2025  Time:    10:55      ECHOCARDIOGRAM RESULTS (last 5)  Results for orders placed during the hospital encounter of 01/09/25    Echo    Interpretation Summary    Left Ventricle: The left ventricle is moderately dilated. Mildly increased wall thickness. There is mild asymmetric hypertrophy. Severe global hypokinesis present. There is severely reduced systolic function with a visually estimated ejection fraction of 25 - 30%.    Right Ventricle: Moderate right ventricular enlargement. Systolic function is mildly reduced.    Left Atrium: Left atrium is mildly dilated.    Tricuspid Valve: There is mild regurgitation.    IVC/SVC: Elevated venous pressure at 15 mmHg.      CURRENT/PREVIOUS VISIT EKG  Results for orders placed or performed during the hospital encounter of 05/11/25   EKG 12-lead    Collection Time: 05/17/25  2:41 PM   Result Value Ref Range    QRS Duration 120 ms    [facility] QTC Calculation 442 ms    Narrative    Test Reason : I50.9,    Vent. Rate : 112 BPM     Atrial Rate : 112 BPM      P-R Int : 160 ms          QRS Dur : 120 ms      QT Int : 324 ms       P-R-T Axes :  27 135  34 degrees    QTcB Int : 442 ms    Sinus tachycardia  Low voltage QRS  Right bundle branch block  Left posterior fascicular block   Bifascicular block   Abnormal ECG  When compared with ECG of 12-May-2025 04:05,  Left posterior fascicular block is now Present  Criteria for Inferior infarct are no longer Present    Referred By: AAAREFERRAL SELF           Confirmed By:            ASSESSMENT/PLAN:     Active Hospital Problems    Diagnosis    *Acute systolic congestive heart failure    Chronic hepatitis C without hepatic coma    Pressure ulcer of right buttock, stage 2    HTN (hypertension)    Prolonged QT interval    Hypoalbuminemia    Non-healing wound of amputation stump    Opioid use disorder    Diabetes mellitus    Scrotal edema    Elevated d-dimer       ASSESSMENT & PLAN:   Acute on chronic HFrEF  Dilated Cardiomyopathy  Substance abuse  Right BKA wound  Left AKA    RECOMMENDATIONS:    Ok to drink more fluids today  From a cardiac standpoint Continue current regimen  Entresto 24/26 mg PO BID  Aldactone 25 mg daily  Isordil and Torsemide at present dosing  Decrease Metolazone to 2.5 mg daily   Accurate Weight                  [facility]  Department of Cardiology  Date of Service: 05/21/2025  +       Patient is a diuresing very well despite being off intravenous inotropic therapy and intravenous Lasix. Patient feels a little fatigued today probably secondary to significant volume shifts encouraged him to hydrate orally for today    Recommend to cut down metolazone to 2.5 mg   Continue on Entresto, 24/26 mg p.o. b.I.d. and continue on Aldactone  I have personally interviewed and examined the patient, I have reviewed the Nurse Practitioner's history and physical, assessment, and plan. I have personally evaluated the patient at bedside and agree with the findings and made appropriate changes as necessary in  "recommendations.          Jairo Corcoran MD FACC,FACP, FSCAI  Section Head   Department of Cardiology  [facility]  MICHAEL Yeung  05/21/2025 11:00 AM             [1]   Social History  Tobacco Use    Smoking status: Every Day     Current packs/day: 1.00     Average packs/day: 1 pack/day for 40.4 years (40.4 ttl pk-yrs)     Types: Cigarettes     Start date: 1985   Substance Use Topics    Alcohol use: Yes    Drug use: No   [2]   No current facility-administered medications on file prior to encounter.     Current Outpatient Medications on File Prior to Encounter   Medication Sig Dispense Refill    isosorbide dinitrate (ISORDIL) 20 MG tablet Take 1 tablet (20 mg total) by mouth 3 (three) times daily. 90 tablet 11    tamsulosin (FLOMAX) 0.4 mg Cap Take 1 capsule (0.4 mg total) by mouth once daily. 30 capsule 11    torsemide (DEMADEX) 20 MG Tab Take 1 tablet (20 mg total) by mouth 2 (two) times a day. 60 tablet 11    insulin aspart U-100 (NOVOLOG) 100 unit/mL (3 mL) InPn pen Inject 0-10 Units into the skin before meals and at bedtime as needed (Hyperglycemia). **MODERATE CORRECTION DOSE**  Blood Glucose  mg/dL                  Pre-meal                2200  151-200                2 units                    1 unit  201-250                4 units                    2 units  251-300                6 units                    3 units  301-350                8 units                    4 units  >350                     10 units                  5 units  Administer subcutaneously if needed at times designated by monitoring  schedule.  DO NOT HOLD correction dose insulin for patients who are  NPO.    "HIGH ALERT MEDICATION" - Administer with meals or TF/TPN. (Patient not taking: Reported on 2/12/2025) 1 each 0     "
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
## 2025-05-21 NOTE — PLAN OF CARE
REX received a phone call from Dasha at [facility] [phone] regarding the SNF referral. Dasha requested a drug screen for patient. REX sent the drug screen results via EPIC.

## 2025-05-21 NOTE — RESPIRATORY THERAPY
05/21/25 0748   Patient Assessment/Suction   Level of Consciousness (AVPU) alert   Respiratory Effort Unlabored   PRE-TX-O2   Device (Oxygen Therapy) nasal cannula   $ Is the patient on Low Flow Oxygen? Yes   Flow (L/min) (Oxygen Therapy) 2   SpO2 (!) 93 %  (on room air at this time)   SpO2: Pre-Ductal (Right Hand) (!) 18 %   Pulse Oximetry Type Intermittent   $ Pulse Oximetry - Multiple Charge Pulse Oximetry - Multiple   Pulse 98   Resp 18   Aerosol Therapy   $ Aerosol Therapy Charges PRN treatment not required   Respiratory Evaluation   $ Care Plan Tech Time 15 min

## 2025-05-21 NOTE — PT/OT/SLP PROGRESS
Occupational Therapy      Patient Name:  Gideon Ross   MRN:  [id number]    Patient not seen today secondary to patient declined and stated he was tired. Will follow-up 5/22/2025.    5/21/2025

## 2025-05-21 NOTE — PLAN OF CARE
SW following up with SNF referrals this date.      Patient pending acceptance at the following facilities:  -[facility], [phone]: left voicemail for [place] with admissions  -[facility], [phone]: no answer or voicemail  -[facility], [phone]: in meeting, left voicemail for Makenna  -[facility]onika [facility] at [facility], [phone]: left voicemail for admissions  -[facility], [phone]: left message with    -[facility], [phone]: resent referral due to wrong fax number  -[facility], [phone]: left message for admissions  -[facility], [phone]: left voicemail for admissions  -[facility], [phone]: left voicemail for admissions  -[facility], [phone]: left voicemail for admissions     Patient denied by the following facilities:  -[facility]: Medicaid only max capacity  -[facility]: Unable to meet care needs of patient  -[facility]: no bed available  -[facility]: Unable to meet care needs of patient  -[facility]: Unable to meet care needs of patient  -[facility]: Unable to meet care needs of patient, HAVE THE MAX AMOUNT OF MEDICAID ONLY RESIDENTS  - [facility]: Facility does not accept patient's insurance   - [facility]: [place] MAXIM substance abuse  -[facility]: Facility does not accept patient's insurance  -[facility]: Unable to meet care needs of patient  -[facility]: no contract for SNF with [facility] only  -[facility]: Medicaid only  -[facility]: Medicaid max  -[facility]: Unable to meet care needs of patient  -[facility]: substance abuse (opioid), Unable to meet care needs of patient  -[facility]: Unable to meet  care needs of patient  -[facility]: Unable to meet care needs of patient, wounds  -[facility]: Facility does not accept patient's insurance  -[facility]: No male beds  -[facility]: Unable to meet care needs of patient  -[facility]: Unable to meet care needs of patient  -[facility]: Facility does not accept patient's insurance  -[facility]: Facility states patient does not meet clinical criteria  -[facility]: care exceeds capacity  -[facility]: Facility does not accept patient's insurance  -[facility]: Unable to meet care needs of patient  -[facility]: Unable to meet care needs of patient  -[facility]: Facility does not accept patient's insurance  -[facility]: Facility does not accept patient's insurance  -[facility]: Facility does not accept patient's insurance  -[facility]: Facility does not accept patient's insurance  -[facility]: Unable to meet care needs of patient  -[facility]: Facility does not accept patient's insurance, unable to meet care needs of patient  -[facility]: Facility does not accept patient's insurance, unable to meet care needs of patient  -[facility]: Facility does not accept patient's insurance, unable to meet care needs of patient  -[facility]: At Medicaid bed max capacity  -[facility]: Facility does not accept patient's insurance   -[facility]: Medicaid only [facility]  -[facility]: nothing available for the rest of the week  -[facility]: facility full  -[facility]: Facility does not accept patient's insurance  -[facility]: facility full  -[facility]: Unable to meet care needs of patient  -[facility]: Unable to  accept    LOCET/142 Status:  -LOCET: done 5/14/2025  -PASRR: submitted 5/14/2025  -142: approved 5/15/2025

## 2025-05-21 NOTE — PLAN OF CARE
REX placed a call to Anthony Collins, [phone], with [facility] Krishna RODRIGUEZ regarding SNF placement coverage. REX did not receive an answer, but left a voicemail requesting a return call.

## 2025-05-21 NOTE — PLAN OF CARE
REX extended the SNF referral at this time.      05/21/25 2800   Post-Acute Status   Post-Acute Authorization Placement   Post-Acute Placement Status Referrals Sent   Discharge Delays None known at this time   Discharge Plan   Discharge Plan A Skilled Nursing Facility   Discharge Plan B Skilled Nursing Facility

## 2025-05-21 NOTE — PT/OT/SLP PROGRESS
Physical Therapy Treatment    Patient Name:  Gideon Ross   MRN:  [id number]    Recommendations:     Discharge Recommendations: Moderate Intensity Therapy  Discharge Equipment Recommendations: wheelchair  Barriers to discharge: medical status, increased fall risk    Assessment:     Gideon Ross is a 49 y.o. male admitted with a medical diagnosis of Acute systolic congestive heart failure.  He presents with the following impairments/functional limitations: weakness, impaired endurance, impaired functional mobility, gait instability, impaired balance, decreased lower extremity function, decreased safety awareness, impaired cardiopulmonary response to activity Patient agreed to PT session. Patient utilized posterior scoot from EOB to WC. Patient able to self propell WC using B UE for 150ft x 2, stopping during trials for rest. Patient demonstrated both right and left turning. Patient returned to his room and was left UIC; nurse informed and chair alarm placed.     Rehab Prognosis: Good; patient would benefit from acute skilled PT services to address these deficits and reach maximum level of function.    Recent Surgery: Procedure(s) (LRB):  CYSTOSCOPY,WITH URETERAL CATHETER INSERTION (N/A) 6 Days Post-Op    Plan:     During this hospitalization, patient to be seen 5 x/week to address the identified rehab impairments via gait training, therapeutic activities, therapeutic exercises, neuromuscular re-education and progress toward the following goals:    Plan of Care Expires:  06/15/25    Subjective     Chief Complaint: I am tired  Patient/Family Comments/goals: I want to go to a place. I don't have anywhere to go.  Pain/Comfort:         Objective:     Communicated with nurse prior to session.  Patient found HOB elevated with valle catheter, telemetry, bed alarm upon PT entry to room.     General Precautions: Standard, fall  Orthopedic Precautions: N/A  Braces: N/A  Respiratory Status: Nasal cannula, flow 2 L/min      Functional Mobility:  Bed Mobility:     Supine to Sit: supervision  Sit to Supine: supervision  Transfers:     Bed to Chair: supervision with  no AD  using  Scoot Pivot  Wheelchair Propulsion:  Pt propelled Standard wheelchair x 150ft x 2 feet on Level tile with  Bilateral upper extremity with Supervision or Set-up Assistance.       AM-PAC 6 CLICK MOBILITY  Turning over in bed (including adjusting bedclothes, sheets and blankets)?: 4  Sitting down on and standing up from a chair with arms (e.g., wheelchair, bedside commode, etc.): 1  Moving from lying on back to sitting on the side of the bed?: 4  Moving to and from a bed to a chair (including a wheelchair)?: 3  Need to walk in hospital room?: 1  Climbing 3-5 steps with a railing?: 1  Basic Mobility Total Score: 14       Treatment & Education:  Pt educated on POC, discharge recommendation, need for assist with mobility, use of call bell to seek assistance as needed and fall prevention      Patient left up in chair with all lines intact, call button in reach, chair alarm on, and nurse notified..    GOALS:   Multidisciplinary Problems       Physical Therapy Goals          Problem: Physical Therapy    Goal Priority Disciplines Outcome Interventions   Physical Therapy Goal     PT, PT/OT Progressing    Description: Goals to be met by: 6/15/25     Patient will increase functional independence with mobility by performin. Supine to sit with Supervision  2. Bed to chair transfer with scoot pivot and Supervision  3. Pt to tolerate sitting EOB x 15 mins with supervision                               DME Justifications:  No DME recommended requiring DME justifications    Time Tracking:     PT Received On: 25  PT Start Time: 1144     PT Stop Time: 1203  PT Total Time (min): 19 min     Billable Minutes: Therapeutic Activity 19    Treatment Type: Treatment  PT/PTA: PT     Number of PTA visits since last PT visit: 0     2025

## 2025-05-22 LAB
ABSOLUTE EOSINOPHIL (SMH): 1.6 K/UL
ABSOLUTE MONOCYTE (SMH): 1.19 K/UL (ref 0.3–1)
ABSOLUTE NEUTROPHIL COUNT (SMH): 4.9 K/UL (ref 1.8–7.7)
ANION GAP (SMH): 8 MMOL/L (ref 8–16)
BASOPHILS # BLD AUTO: 0.07 K/UL
BASOPHILS NFR BLD AUTO: 0.8 %
BUN SERPL-MCNC: 44 MG/DL (ref 6–20)
CALCIUM SERPL-MCNC: 8.4 MG/DL (ref 8.7–10.5)
CHLORIDE SERPL-SCNC: 93 MMOL/L (ref 95–110)
CO2 SERPL-SCNC: 34 MMOL/L (ref 23–29)
CREAT SERPL-MCNC: 1.5 MG/DL (ref 0.5–1.4)
ERYTHROCYTE [DISTWIDTH] IN BLOOD BY AUTOMATED COUNT: 16.2 % (ref 11.5–14.5)
GFR SERPLBLD CREATININE-BSD FMLA CKD-EPI: 57 ML/MIN/1.73/M2
GLUCOSE SERPL-MCNC: 132 MG/DL (ref 70–110)
HCT VFR BLD AUTO: 32.4 % (ref 40–54)
HGB BLD-MCNC: 9.8 GM/DL (ref 14–18)
IMM GRANULOCYTES # BLD AUTO: 0.03 K/UL (ref 0–0.04)
IMM GRANULOCYTES NFR BLD AUTO: 0.3 % (ref 0–0.5)
LYMPHOCYTES # BLD AUTO: 1.47 K/UL (ref 1–4.8)
MAGNESIUM SERPL-MCNC: 2.4 MG/DL (ref 1.6–2.6)
MCH RBC QN AUTO: 24.4 PG (ref 27–31)
MCHC RBC AUTO-ENTMCNC: 30.2 G/DL (ref 32–36)
MCV RBC AUTO: 81 FL (ref 82–98)
NUCLEATED RBC (/100WBC) (SMH): 0 /100 WBC
PLATELET # BLD AUTO: 327 K/UL (ref 150–450)
PMV BLD AUTO: 9.5 FL (ref 9.2–12.9)
POCT GLUCOSE: 130 MG/DL (ref 70–110)
POCT GLUCOSE: 171 MG/DL (ref 70–110)
POCT GLUCOSE: 236 MG/DL (ref 70–110)
POCT GLUCOSE: 286 MG/DL (ref 70–110)
POTASSIUM SERPL-SCNC: 4.7 MMOL/L (ref 3.5–5.1)
RBC # BLD AUTO: 4.02 M/UL (ref 4.6–6.2)
RELATIVE EOSINOPHIL (SMH): 17.3 % (ref 0–8)
RELATIVE LYMPHOCYTE (SMH): 15.9 % (ref 18–48)
RELATIVE MONOCYTE (SMH): 12.9 % (ref 4–15)
RELATIVE NEUTROPHIL (SMH): 52.8 % (ref 38–73)
SODIUM SERPL-SCNC: 135 MMOL/L (ref 136–145)
WBC # BLD AUTO: 9.24 K/UL (ref 3.9–12.7)

## 2025-05-22 PROCEDURE — 63600175 PHARM REV CODE 636 W HCPCS: Performed by: NURSE PRACTITIONER

## 2025-05-22 PROCEDURE — 97542 WHEELCHAIR MNGMENT TRAINING: CPT | Mod: CQ

## 2025-05-22 PROCEDURE — 63600175 PHARM REV CODE 636 W HCPCS: Performed by: STUDENT IN AN ORGANIZED HEALTH CARE EDUCATION/TRAINING PROGRAM

## 2025-05-22 PROCEDURE — 99900031 HC PATIENT EDUCATION (STAT)

## 2025-05-22 PROCEDURE — 25000003 PHARM REV CODE 250

## 2025-05-22 PROCEDURE — 25000003 PHARM REV CODE 250: Performed by: NURSE PRACTITIONER

## 2025-05-22 PROCEDURE — 94761 N-INVAS EAR/PLS OXIMETRY MLT: CPT

## 2025-05-22 PROCEDURE — 99232 SBSQ HOSP IP/OBS MODERATE 35: CPT | Mod: 95,,, | Performed by: INTERNAL MEDICINE

## 2025-05-22 PROCEDURE — 99900035 HC TECH TIME PER 15 MIN (STAT)

## 2025-05-22 PROCEDURE — 25000003 PHARM REV CODE 250: Performed by: STUDENT IN AN ORGANIZED HEALTH CARE EDUCATION/TRAINING PROGRAM

## 2025-05-22 PROCEDURE — 85025 COMPLETE CBC W/AUTO DIFF WBC: CPT

## 2025-05-22 PROCEDURE — 36415 COLL VENOUS BLD VENIPUNCTURE: CPT

## 2025-05-22 PROCEDURE — 83735 ASSAY OF MAGNESIUM: CPT

## 2025-05-22 PROCEDURE — S4991 NICOTINE PATCH NONLEGEND: HCPCS | Performed by: STUDENT IN AN ORGANIZED HEALTH CARE EDUCATION/TRAINING PROGRAM

## 2025-05-22 PROCEDURE — 63600175 PHARM REV CODE 636 W HCPCS

## 2025-05-22 PROCEDURE — 80048 BASIC METABOLIC PNL TOTAL CA: CPT | Performed by: NURSE PRACTITIONER

## 2025-05-22 PROCEDURE — 25000003 PHARM REV CODE 250: Performed by: INTERNAL MEDICINE

## 2025-05-22 PROCEDURE — 11000001 HC ACUTE MED/SURG PRIVATE ROOM

## 2025-05-22 RX ADMIN — ENOXAPARIN SODIUM 40 MG: 40 INJECTION SUBCUTANEOUS at 04:05

## 2025-05-22 RX ADMIN — MORPHINE SULFATE 2 MG: 2 INJECTION, SOLUTION INTRAMUSCULAR; INTRAVENOUS at 09:05

## 2025-05-22 RX ADMIN — INSULIN ASPART 2 UNITS: 100 INJECTION, SOLUTION INTRAVENOUS; SUBCUTANEOUS at 12:05

## 2025-05-22 RX ADMIN — INSULIN GLARGINE 10 UNITS: 100 INJECTION, SOLUTION SUBCUTANEOUS at 08:05

## 2025-05-22 RX ADMIN — CEFAZOLIN 2 G: 2 INJECTION, POWDER, FOR SOLUTION INTRAMUSCULAR; INTRAVENOUS at 05:05

## 2025-05-22 RX ADMIN — METOLAZONE 2.5 MG: 2.5 TABLET ORAL at 08:05

## 2025-05-22 RX ADMIN — CEFAZOLIN 2 G: 2 INJECTION, POWDER, FOR SOLUTION INTRAMUSCULAR; INTRAVENOUS at 10:05

## 2025-05-22 RX ADMIN — ISOSORBIDE DINITRATE 20 MG: 10 TABLET ORAL at 04:05

## 2025-05-22 RX ADMIN — SACUBITRIL AND VALSARTAN 1 TABLET: 24; 26 TABLET, FILM COATED ORAL at 09:05

## 2025-05-22 RX ADMIN — TORSEMIDE 20 MG: 20 TABLET ORAL at 08:05

## 2025-05-22 RX ADMIN — MORPHINE SULFATE 2 MG: 2 INJECTION, SOLUTION INTRAMUSCULAR; INTRAVENOUS at 01:05

## 2025-05-22 RX ADMIN — SACUBITRIL AND VALSARTAN 1 TABLET: 24; 26 TABLET, FILM COATED ORAL at 08:05

## 2025-05-22 RX ADMIN — MORPHINE SULFATE 2 MG: 2 INJECTION, SOLUTION INTRAMUSCULAR; INTRAVENOUS at 02:05

## 2025-05-22 RX ADMIN — NICOTINE 1 PATCH: 21 PATCH, EXTENDED RELEASE TRANSDERMAL at 09:05

## 2025-05-22 RX ADMIN — ISOSORBIDE DINITRATE 20 MG: 10 TABLET ORAL at 09:05

## 2025-05-22 RX ADMIN — BUPRENORPHINE AND NALOXONE 2 FILM: 8; 2 FILM BUCCAL; SUBLINGUAL at 09:05

## 2025-05-22 RX ADMIN — MORPHINE SULFATE 2 MG: 2 INJECTION, SOLUTION INTRAMUSCULAR; INTRAVENOUS at 10:05

## 2025-05-22 RX ADMIN — SPIRONOLACTONE 25 MG: 25 TABLET, FILM COATED ORAL at 08:05

## 2025-05-22 RX ADMIN — ISOSORBIDE DINITRATE 20 MG: 10 TABLET ORAL at 08:05

## 2025-05-22 RX ADMIN — CEFAZOLIN 2 G: 2 INJECTION, POWDER, FOR SOLUTION INTRAMUSCULAR; INTRAVENOUS at 04:05

## 2025-05-22 RX ADMIN — CHLORHEXIDINE GLUCONATE 0.12% ORAL RINSE 15 ML: 1.2 LIQUID ORAL at 09:05

## 2025-05-22 RX ADMIN — BUPRENORPHINE AND NALOXONE 2 FILM: 8; 2 FILM BUCCAL; SUBLINGUAL at 08:05

## 2025-05-22 RX ADMIN — MORPHINE SULFATE 2 MG: 2 INJECTION, SOLUTION INTRAMUSCULAR; INTRAVENOUS at 05:05

## 2025-05-22 RX ADMIN — TAMSULOSIN HYDROCHLORIDE 0.4 MG: 0.4 CAPSULE ORAL at 08:05

## 2025-05-22 RX ADMIN — TORSEMIDE 20 MG: 20 TABLET ORAL at 04:05

## 2025-05-22 NOTE — PLAN OF CARE
Problem: Physical Therapy  Goal: Physical Therapy Goal  Description: Goals to be met by: 6/15/25     Patient will increase functional independence with mobility by performin. Supine to sit with mod IND  2. Bed to chair transfer with scoot pivot and mod IND  3. Pt to self propel WC with bilateral UE 150ft mod IND.    Goals edited 25        Outcome: Progressing

## 2025-05-22 NOTE — PLAN OF CARE
SW met with patient at bedside to discuss his acceptance at [facility]. Patient reported he is willing to go anywhere that can accept him. Patient asked SW if placement was for him to live there and SW explained that initially it is for SNF, but he can start the residential process upon admission to the facility. Patient started to cry and thanked the SW.      05/22/25 2234   Post-Acute Status   Post-Acute Authorization Placement   Post-Acute Placement Status Referrals Sent   Discharge Delays (!) Post-Acute Set-up   Discharge Plan   Discharge Plan A Skilled Nursing Facility   Discharge Plan B Skilled Nursing Facility

## 2025-05-22 NOTE — PROGRESS NOTES
Formerly [facility], [facility]–1983  Department of Cardiology  Progress Note      PATIENT NAME: Gideon Ross  MRN: [id number]  TODAY'S DATE: 05/22/2025  ADMIT DATE: 5/11/2025                          CONSULT REQUESTED BY: Don Whelan DO    SUBJECTIVE     PRINCIPAL PROBLEM: Acute systolic congestive heart failure      REASON FOR CONSULT:  Acute on chronic CHF, life vest patient      INTERVAL HISTORY:        05/22/2025    Sitting up in his wheelchair. Doing well. Cr 1.5.    5/21/25    Mr. Ross is feeling better. Seems dry today. Cr 1.4.    5/20/2025    Doing well  Diuresed well.        5/19/2025    Seen sitting up in bed. Feels better. Breathing stable. UOP has been good.       5/17/25  Patient seen sittingup in bed. Noted to be significantly volume overloaded. Creatinine bumped slightly today.     5/16/25  Weber inserted yesterday; more urine output today; -1600 net balance  VSS, remains edematous    5/15/25  Cr 1.3 today; + fluid balance but output has been difficult to measure accurately  Remains edematous    5/14/25  Cr 1.2; difficulty collecting accurate output  Remains edematous and orthopneic  No events noted on telemetry    5/13/25  Hgb 9.5; Cr 1.1; + net balance; BP elevated;  Pt still has orthopnea; he feels his hands are less swollen today    HPI:  Pt is 49-year-old male w/ PMH HFrEF, DM, HTN, DM, cardiomyopathy, R BKA, left AKA, and substance dependence who presented to ED with c/o shortness of breath, swelling, and orthopnea for past week.    Pt has been receiving IV lasix since admission and is reportedly breathing better today but remains orthopneic, and swelling to upper legs, scrotum and abdomen still present. Still on 4 LPM O2    Review of patient's allergies indicates:  No Known Allergies    Past Medical History:   Diagnosis Date    Diabetes mellitus     Hypertension      Past Surgical History:   Procedure Laterality Date    CYSTOSCOPY,WITH URETERAL CATHETER INSERTION N/A 5/15/2025    Procedure:  CYSTOSCOPY,WITH URETERAL CATHETER INSERTION;  Surgeon: Yoni Lemus MD;  Location: [facility];  Service: Urology;  Laterality: N/A;    SKIN GRAFT       Social History[1]     REVIEW OF SYSTEMS  Negative except as mentioned in HPI    OBJECTIVE     VITAL SIGNS (Most Recent)  Temp: 97.8 °F (36.6 °C) (05/22/25 1200)  Pulse: 93 (05/22/25 1200)  Resp: 18 (05/22/25 1443)  BP: 135/70 (05/22/25 1200)  SpO2: 97 % (05/22/25 1200)    VENTILATION STATUS  Resp: 18 (05/22/25 1443)  SpO2: 97 % (05/22/25 1200)           I & O (Last 24H):  Intake/Output Summary (Last 24 hours) at 5/22/2025 1447  Last data filed at 5/22/2025 1215  Gross per 24 hour   Intake 1110 ml   Output 4300 ml   Net -3190 ml       WEIGHTS  Wt Readings from Last 3 Encounters:   05/16/25 2005 135.7 kg (299 lb 2.6 oz)   05/14/25 1045 (!) 138.4 kg (305 lb 1.9 oz)   05/12/25 1239 97.5 kg (214 lb 15.2 oz)   05/11/25 1954 97.5 kg (215 lb)   05/12/25 1048 97.5 kg (214 lb 15.2 oz)   01/26/25 0338 117.5 kg (259 lb 0.7 oz)   01/21/25 0515 117.9 kg (260 lb)   01/15/25 0400 126.1 kg (278 lb)   01/14/25 0400 127.1 kg (280 lb 3.2 oz)   01/09/25 2239 115.4 kg (254 lb 6.4 oz)   01/09/25 0824 108.9 kg (240 lb)       PHYSICAL EXAM    GENERAL: chronically ill middle age male sitting up in his wheel chair  NECK: No JVD.   CARDIAC: Regular rate and rhythm. S1 is normal.S2 is normal.No gallops, clicks or murmurs noted at this time.  CHEST ANATOMY: normal.   LUNGS: Diminished  ABDOMEN: obese soft .   EXTREMITIES: edematous; right BKA edematous with wound, Left AKA edematous improving  CENTRAL NERVOUS SYSTEM: AAO x 3  SKIN: No rash     HOME MEDICATIONS:Medications Ordered Prior to Encounter[2]    SCHEDULED MEDS:   buprenorphine-naloxone 8-2 mg  2 Film Sublingual BID    ceFAZolin (Ancef) IV (PEDS and ADULTS)  2 g Intravenous Q6H    chlorhexidine  15 mL Mouth/Throat BID    enoxparin  40 mg Subcutaneous Daily    insulin glargine U-100  10 Units Subcutaneous Daily    isosorbide dinitrate  " 20 mg Oral TID    metOLazone  2.5 mg Oral Daily    nicotine  1 patch Transdermal Daily    sacubitriL-valsartan  1 tablet Oral BID    senna-docusate  1 tablet Oral BID    spironolactone  25 mg Oral Daily    tamsulosin  0.4 mg Oral Daily    torsemide  20 mg Oral BID loop       CONTINUOUS INFUSIONS:        PRN MEDS:  Current Facility-Administered Medications:     acetaminophen, 650 mg, Oral, Q6H PRN    albuterol-ipratropium, 3 mL, Nebulization, Q6H PRN    bisacodyL, 10 mg, Rectal, Daily PRN    dextrose 50%, 12.5 g, Intravenous, PRN    dextrose 50%, 25 g, Intravenous, PRN    diphenhydrAMINE, 50 mg, Oral, Q8H PRN    glucagon (human recombinant), 1 mg, Intramuscular, PRN    glucose, 16 g, Oral, PRN    glucose, 24 g, Oral, PRN    insulin aspart U-100, 0-10 Units, Subcutaneous, QID (AC + HS) PRN    melatonin, 6 mg, Oral, Nightly PRN    morphine, 2 mg, Intravenous, Q4H PRN    promethazine, 12.5 mg, Rectal, Q6H PRN    sodium chloride 0.9%, 10 mL, Intravenous, PRN    tuberculin, 5 Units, Intradermal, vaccine x 1 dose **AND** POCT TB Skin Test Read, , , Once    LABS AND DIAGNOSTICS     CBC LAST 3 DAYS  Recent Labs   Lab 05/20/25  0614 05/21/25  0449 05/22/25  0540   WBC 9.76 9.48 9.24   RBC 3.90* 3.81* 4.02*   HGB 9.6* 9.4* 9.8*   HCT 31.1* 30.6* 32.4*   MCV 80* 80* 81*   MCH 24.6* 24.7* 24.4*   MCHC 30.9* 30.7* 30.2*   RDW 16.2* 16.2* 16.2*    282 327   MPV 9.9 9.4 9.5   NRBC 0 0 0       COAGULATION LAST 3 DAYS  No results for input(s): "LABPT", "INR", "APTT" in the last 168 hours.    CHEMISTRY LAST 3 DAYS  Recent Labs   Lab 05/18/25  0608 05/18/25  1309 05/18/25  1810 05/19/25  0803 05/19/25  0921 05/20/25  1222 05/20/25  1903 05/21/25  0449 05/21/25  0959 05/22/25  0540 05/22/25  0844 05/22/25  1206    137   < > 136   < > 135*  --  134*  --  135*  --   --    K 4.4 4.3   < > 4.3   < > 4.7  --  4.3  --  4.7  --   --    CL 93* 92*   < > 93*   < > 88*  --  91*  --  93*  --   --    CO2 37* 38*   < > 37*   < > 38*  " "--  38*  --  34*  --   --    ANIONGAP 7* 7*   < > 6*   < > 9  --  5*  --  8  --   --    BUN 41* 40*   < > 41*   < > 40*  --  42*  --  44*  --   --    CREATININE 1.5* 1.4   < > 1.4   < > 1.5*  --  1.4  --  1.5*  --   --    * 110   < > 127*   < > 127*  --  151*  --  132*  --   --    CALCIUM 9.0 9.1   < > 9.1   < > 8.9  --  8.2*  --  8.4*  --   --    MG 2.0 2.0   < > 2.0   < > 2.2   < > 2.3   < > 2.4 2.4 2.4   ALBUMIN 2.7* 2.8*  --  2.8*  --   --   --   --   --   --   --   --    PROT 6.3 6.3  --  6.7  --   --   --   --   --   --   --   --    ALKPHOS 154* 153*  --  160*  --   --   --   --   --   --   --   --    ALT <3* <3*  --  <3*  --   --   --   --   --   --   --   --    AST 13 14  --  14  --   --   --   --   --   --   --   --    BILITOT 0.4 0.4  --  0.4  --   --   --   --   --   --   --   --     < > = values in this interval not displayed.       CARDIAC PROFILE LAST 3 DAYS  No results for input(s): "BNP", "CPK", "CPKMB", "LDH", "TROPONINI" in the last 168 hours.      ENDOCRINE LAST 3 DAYS  No results for input(s): "TSH", "PROCAL" in the last 168 hours.      LAST ARTERIAL BLOOD GAS  ABG  No results for input(s): "PH", "PO2", "PCO2", "HCO3", "BE" in the last 168 hours.    LAST 7 DAYS MICROBIOLOGY   Microbiology Results (last 7 days)       Procedure Component Value Units Date/Time    Blood culture [[id number]]  (Normal) Collected: 05/12/25 0004    Order Status: Completed Specimen: Blood Updated: 05/17/25 0103     CULTURE, BLOOD ([facility]) No Growth After 5 Days    Blood culture [[id number]]  (Normal) Collected: 05/11/25 2331    Order Status: Completed Specimen: Blood Updated: 05/17/25 0030     CULTURE, BLOOD ([facility]) No Growth After 5 Days            MOST RECENT IMAGING  CT Knee With Contrast Right  Narrative: EXAMINATION:  CT KNEE WITH CONTRAST RIGHT    CLINICAL HISTORY:  Osteomyelitis suspected, knee, xray done;concern for osteo at stump infection site;    TECHNIQUE:  Right knee CT with 100 mL IV Omnipaque " 350    COMPARISON:  Right knee radiographs 05/11/2025    FINDINGS:  No aggressive osseous destruction.  Postsurgical changes of right BKA are evident.    Small right knee joint effusion is present with very little if any synovial enhancement.  Extensive subcutaneous edema occurs about the visualized right leg.  No rim enhancing fluid collection is present to suggest abscess.  Little if any intraluminal opacification occurs in distal right superficial femoral and right popliteal artery.  Scattered arterial vascular calcifications are noted.  Small collateral arterial structures do opacify.  Heterogeneous opacification of the visualize veins is evident.  Impression: 1. No aggressive osseous destruction to suggest osteomyelitis.  This would be best assessed with MRI.  2. No rim enhancing fluid collection to suggest abscess.  3. Extensive subcutaneous edema with postsurgical changes of BKA.  4. Small right knee joint effusion with little if any synovial enhancement, nonspecific.  5. Little if any intraluminal opacification of distal right SFA and popliteal artery, with small collateral arterial branches opacifying.    Electronically signed by: Fredy Trejo  Date:    05/19/2025  Time:    10:55      ECHOCARDIOGRAM RESULTS (last 5)  Results for orders placed during the hospital encounter of 01/09/25    Echo    Interpretation Summary    Left Ventricle: The left ventricle is moderately dilated. Mildly increased wall thickness. There is mild asymmetric hypertrophy. Severe global hypokinesis present. There is severely reduced systolic function with a visually estimated ejection fraction of 25 - 30%.    Right Ventricle: Moderate right ventricular enlargement. Systolic function is mildly reduced.    Left Atrium: Left atrium is mildly dilated.    Tricuspid Valve: There is mild regurgitation.    IVC/SVC: Elevated venous pressure at 15 mmHg.      CURRENT/PREVIOUS VISIT EKG  Results for orders placed or performed during the hospital  encounter of 05/11/25   EKG 12-lead    Collection Time: 05/17/25  2:41 PM   Result Value Ref Range    QRS Duration 120 ms    [facility] QTC Calculation 442 ms    Narrative    Test Reason : I50.9,    Vent. Rate : 112 BPM     Atrial Rate : 112 BPM     P-R Int : 160 ms          QRS Dur : 120 ms      QT Int : 324 ms       P-R-T Axes :  27 135  34 degrees    QTcB Int : 442 ms    Sinus tachycardia  Low voltage QRS  Right bundle branch block  Left posterior fascicular block   Bifascicular block   Abnormal ECG  When compared with ECG of 12-May-2025 04:05,  Left posterior fascicular block is now Present  Criteria for Inferior infarct are no longer Present    Referred By: AAAREFERRAL SELF           Confirmed By:            ASSESSMENT/PLAN:     Active Hospital Problems    Diagnosis    *Acute systolic congestive heart failure    Chronic hepatitis C without hepatic coma    Pressure ulcer of right buttock, stage 2    HTN (hypertension)    Prolonged QT interval    Hypoalbuminemia    Non-healing wound of amputation stump    Opioid use disorder    Diabetes mellitus    Scrotal edema    Elevated d-dimer       ASSESSMENT & PLAN:   Acute on chronic HFrEF  Dilated Cardiomyopathy  Substance abuse  Right BKA wound  Left AKA    RECOMMENDATIONS:      Patient can go home on current regimen.  Continue Entresto 24/26 mg PO BID  Continue Metalazone 2.5 daily  Continue Torsemide 20 mg PO BID  Continue Aldactone 25 mg daily  Continue Isordil 20 mg po TID  Life Vest placement.                [facility]  Department of Cardiology  Date of Service: 05/22/2025  +      Patient is maintaining steady state at this time.  Continue on Entresto, torsemide Aldactone and low dose of metolazone.    Blood pressure has been relatively stable.  Renal function is stable at 1.5 at baseline  Recommend LifeVest   Awaiting placement for further physical and a metabolic therapy     I have personally interviewed and examined the patient, I have reviewed the Nurse  "Practitioner's history and physical, assessment, and plan. I have personally evaluated the patient at bedside and agree with the findings and made appropriate changes as necessary in recommendations.          Jairo Corcoran MD FACC,FACP, [facility]  Section Head   Department of Cardiology  [facility]  MICHAEL Yeung  05/22/2025 11:00 AM             [1]   Social History  Tobacco Use    Smoking status: Every Day     Current packs/day: 1.00     Average packs/day: 1 pack/day for 40.4 years (40.4 ttl pk-yrs)     Types: Cigarettes     Start date: 1985   Substance Use Topics    Alcohol use: Yes    Drug use: No   [2]   No current facility-administered medications on file prior to encounter.     Current Outpatient Medications on File Prior to Encounter   Medication Sig Dispense Refill    isosorbide dinitrate (ISORDIL) 20 MG tablet Take 1 tablet (20 mg total) by mouth 3 (three) times daily. 90 tablet 11    tamsulosin (FLOMAX) 0.4 mg Cap Take 1 capsule (0.4 mg total) by mouth once daily. 30 capsule 11    torsemide (DEMADEX) 20 MG Tab Take 1 tablet (20 mg total) by mouth 2 (two) times a day. 60 tablet 11    insulin aspart U-100 (NOVOLOG) 100 unit/mL (3 mL) InPn pen Inject 0-10 Units into the skin before meals and at bedtime as needed (Hyperglycemia). **MODERATE CORRECTION DOSE**  Blood Glucose  mg/dL                  Pre-meal                2200  151-200                2 units                    1 unit  201-250                4 units                    2 units  251-300                6 units                    3 units  301-350                8 units                    4 units  >350                     10 units                  5 units  Administer subcutaneously if needed at times designated by monitoring  schedule.  DO NOT HOLD correction dose insulin for patients who are  NPO.    "HIGH ALERT MEDICATION" - Administer with meals or TF/TPN. (Patient not taking: Reported on 2/12/2025) 1 each 0     "

## 2025-05-22 NOTE — PLAN OF CARE
Contacted [facility] @ [phone] to verify that PSR picked up previous lifevest . Candy Martinez PSR picked lifevest up from facility .     SC sent to Caridad Sullivan requesting a lifevest order .     Lifevest order sent to [facility] for review.        05/22/25 1536   Post-Acute Status   Post-Acute Authorization MARIA D

## 2025-05-22 NOTE — SUBJECTIVE & OBJECTIVE
Interval History:  Pt seen and examined.  He continues to feel better.  No new events overnight..  Pending placement.    Review of Systems   All other systems reviewed and are negative.    Objective:     Vital Signs (Most Recent):  Temp: 97.8 °F (36.6 °C) (05/22/25 0814)  Pulse: 96 (05/22/25 0814)  Resp: 18 (05/22/25 1022)  BP: 138/76 (05/22/25 0814)  SpO2: 97 % (05/22/25 0814) Vital Signs (24h Range):  Temp:  [97.8 °F (36.6 °C)-98.7 °F (37.1 °C)] 97.8 °F (36.6 °C)  Pulse:  [84-97] 96  Resp:  [16-20] 18  SpO2:  [93 %-99 %] 97 %  BP: (112-145)/(61-85) 138/76     Weight: 135.7 kg (299 lb 2.6 oz)  Body mass index is 42.93 kg/m².    Intake/Output Summary (Last 24 hours) at 5/22/2025 1058  Last data filed at 5/22/2025 1029  Gross per 24 hour   Intake 810 ml   Output 5800 ml   Net -4990 ml         Physical Exam  Vitals and nursing note reviewed.   Constitutional:       Appearance: He is obese. He is ill-appearing (appears chronically ill).   Pulmonary:      Effort: Pulmonary effort is normal. No respiratory distress.      Breath sounds: Normal breath sounds.   Abdominal:      General: Abdomen is flat. Bowel sounds are normal.      Palpations: Abdomen is soft.   Musculoskeletal:      Cervical back: Normal range of motion and neck supple.      Comments: Right BKA  Left AKA   Neurological:      General: No focal deficit present.      Mental Status: He is alert.               Significant Labs: All pertinent labs within the past 24 hours have been reviewed.  CBC:   Recent Labs   Lab 05/21/25  0449 05/22/25  0540   WBC 9.48 9.24   HGB 9.4* 9.8*   HCT 30.6* 32.4*    327     CMP:   Recent Labs   Lab 05/20/25  1222 05/21/25  0449 05/22/25  0540   * 134* 135*   K 4.7 4.3 4.7   CL 88* 91* 93*   CO2 38* 38* 34*   * 151* 132*   BUN 40* 42* 44*   CREATININE 1.5* 1.4 1.5*   CALCIUM 8.9 8.2* 8.4*   ANIONGAP 9 5* 8       Significant Imaging: I have reviewed all pertinent imaging results/findings within the past 24  hours.

## 2025-05-22 NOTE — PT/OT/SLP PROGRESS
"Physical Therapy Treatment    Patient Name:  Gideon Ross   MRN:  [id number]    Recommendations:     Discharge Recommendations: Moderate Intensity Therapy  Discharge Equipment Recommendations: wheelchair  Barriers to discharge: decreased functional mobility, decreased activity tolerance    Assessment:     Gideon Ross is a 49 y.o. male admitted with a medical diagnosis of Acute systolic congestive heart failure.  He presents with the following impairments/functional limitations: weakness, impaired endurance, impaired functional mobility, gait instability, impaired balance, decreased lower extremity function, decreased safety awareness, impaired cardiopulmonary response to activity     Pt was found in supine with HOB elevated. Pt was agreeable to treatment.     Pt performed a posterior scoot from bed to wheel chair with supervision/set up assist. Pt propelled himself 150 ft x 4 with modified independence stopping occasionally due to fatigue.     Pt was returned to room in seated on the wheel chair, all lines intact.     Rehab Prognosis: Fair; patient would benefit from acute skilled PT services to address these deficits and reach maximum level of function.    Recent Surgery: Procedure(s) (LRB):  CYSTOSCOPY,WITH URETERAL CATHETER INSERTION (N/A) 7 Days Post-Op    Plan:     During this hospitalization, patient to be seen 5 x/week to address the identified rehab impairments via gait training, therapeutic activities, therapeutic exercises, neuromuscular re-education and progress toward the following goals:    Plan of Care Expires:  06/15/25    Subjective     Chief Complaints: "I want to be outside"   Patient/Family Comments/goals: Get stronger.   Pain/Comfort:  Pain Rating 1: 0/10      Objective:     Communicated with nurse prior to session.  Patient found HOB elevated with valle catheter, telemetry, bed alarm upon PT entry to room.     General Precautions: Standard, fall  Orthopedic Precautions: N/A  Braces: " N/A  Respiratory Status: Room air     Functional Mobility:  Bed Mobility:     Scooting: supervision  Wheelchair Propulsion:  Pt propelled Standard wheelchair x 150 x 4 feet on Level tile with  Bilateral upper extremity with Modified Independent.       AM-PAC 6 CLICK MOBILITY          Treatment & Education:  Pt educated on importance of time OOB, importance of intermittent mobility, safe techniques for transfers/ambulation, discharge recommendations/options, and use of call light for assistance and fall prevention.     Patient left up in wheelchair with all lines intact, call button in reach, chair alarm on, and nurse notified..    GOALS:   Multidisciplinary Problems       Physical Therapy Goals          Problem: Physical Therapy    Goal Priority Disciplines Outcome Interventions   Physical Therapy Goal     PT, PT/OT Progressing    Description: Goals to be met by: 6/15/25     Patient will increase functional independence with mobility by performin. Supine to sit with mod IND  2. Bed to chair transfer with scoot pivot and mod IND  3. Pt to self propel WC with bilateral UE 150ft mod IND.    Goals edited 25                                 Time Tracking:     PT Received On: 25  PT Start Time: 956     PT Stop Time: 1012  PT Total Time (min): 16 min     Billable Minutes: Train/Wheelchair Management 16    Treatment Type: Treatment        Number of PTA visits since last PT visit: 1     2025

## 2025-05-22 NOTE — PLAN OF CARE
REX and TN spoke with patient at bedside regarding his LifeVest. Patient reported [employer] was the name of the company that picked up his LifeVest. Patient reported he told them he did not have the batteries for it and they picked it up. [employer] spoke with patient about the importance of wearing the LifeVest and he agreed to wear the LifeVest. Patient reported the only reason he was not compliant with the LifeVest is because he did not have the batteries for it. TN told the patient she would contact [employer] to follow up about his LifeVest and reason for them to pick it up.

## 2025-05-22 NOTE — ASSESSMENT & PLAN NOTE
S/p R BKA and L AKA  Patient's FSGs are uncontrolled due to hyperglycemia on current medication regimen.  Last A1c reviewed-   Lab Results   Component Value Date    HGBA1C 7.3 (H) 05/12/2025     Most recent fingerstick glucose reviewed-   Recent Labs   Lab 05/21/25  1236 05/21/25  1658 05/21/25 2003 05/22/25  0816   POCTGLUCOSE 136* 152* 191* 130*     Current correctional scale  Low  Maintain anti-hyperglycemic dose as follows-   Antihyperglycemics (From admission, onward)      Start     Stop Route Frequency Ordered    05/12/25 1445  insulin glargine U-100 (Lantus) pen 10 Units         -- SubQ Daily 05/12/25 1332    05/12/25 0943  insulin aspart U-100 pen 0-10 Units         -- SubQ Before meals & nightly PRN 05/12/25 0843
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
## 2025-05-22 NOTE — PLAN OF CARE
[facility] received a call from Dasha with [facility] regarding referral. Dasha reported everything looked good for admission, but she needed to meet with the team regarding the LifeVest. Dasha reported she would contact [facility] after consulting the team.

## 2025-05-22 NOTE — ASSESSMENT & PLAN NOTE
Patient's blood pressure range in the last 24 hours was: BP  Min: 112/66  Max: 145/85.The patient's inpatient anti-hypertensive regimen is listed below:  Current Antihypertensives  isosorbide dinitrate tablet 20 mg, 3 times daily, Oral  spironolactone tablet 25 mg, Daily, Oral  torsemide tablet 20 mg, 2 times daily, Oral  metOLazone tablet 2.5 mg, Daily, Oral    Plan  -will make adjustment as needed

## 2025-05-22 NOTE — PT/OT/SLP PROGRESS
Occupational Therapy      Patient Name:  Gideon Ross   MRN:  [id number]    Patient not seen today secondary to Other (Comment) (therapist unavailable). Will follow-up next service date.    5/22/2025

## 2025-05-22 NOTE — RESPIRATORY THERAPY
05/22/25 0756   Patient Assessment/Suction   Level of Consciousness (AVPU) alert   Respiratory Effort Unlabored   PRE-TX-O2   Device (Oxygen Therapy) room air  (o2 on SB at this time.)   SpO2 (!) 94 %   Pulse Oximetry Type Intermittent   $ Pulse Oximetry - Multiple Charge Pulse Oximetry - Multiple   Pulse 95   Resp 18   Aerosol Therapy   $ Aerosol Therapy Charges PRN treatment not required

## 2025-05-22 NOTE — PLAN OF CARE
Patient is medically cleared , awaiting placement .       05/22/25 1223   Discharge Reassessment   Did the patient's condition or plan change since previous assessment? No   Discharge Plan discussed with: Patient   Discharge Plan A Skilled Nursing Facility

## 2025-05-23 ENCOUNTER — PATIENT MESSAGE (OUTPATIENT)
Dept: ADMINISTRATIVE | Facility: HOSPITAL | Age: 50
End: 2025-05-23
Payer: MEDICAID

## 2025-05-23 LAB
ABSOLUTE EOSINOPHIL (SMH): 1.43 K/UL
ABSOLUTE MONOCYTE (SMH): 1.25 K/UL (ref 0.3–1)
ABSOLUTE NEUTROPHIL COUNT (SMH): 5.3 K/UL (ref 1.8–7.7)
ANION GAP (SMH): 7 MMOL/L (ref 8–16)
BASOPHILS # BLD AUTO: 0.08 K/UL
BASOPHILS NFR BLD AUTO: 0.8 %
BUN SERPL-MCNC: 47 MG/DL (ref 6–20)
CALCIUM SERPL-MCNC: 8.4 MG/DL (ref 8.7–10.5)
CHLORIDE SERPL-SCNC: 95 MMOL/L (ref 95–110)
CO2 SERPL-SCNC: 33 MMOL/L (ref 23–29)
CREAT SERPL-MCNC: 1.4 MG/DL (ref 0.5–1.4)
ERYTHROCYTE [DISTWIDTH] IN BLOOD BY AUTOMATED COUNT: 16.2 % (ref 11.5–14.5)
GFR SERPLBLD CREATININE-BSD FMLA CKD-EPI: >60 ML/MIN/1.73/M2
GLUCOSE SERPL-MCNC: 136 MG/DL (ref 70–110)
HCT VFR BLD AUTO: 31.8 % (ref 40–54)
HGB BLD-MCNC: 9.7 GM/DL (ref 14–18)
IMM GRANULOCYTES # BLD AUTO: 0.06 K/UL (ref 0–0.04)
IMM GRANULOCYTES NFR BLD AUTO: 0.6 % (ref 0–0.5)
LYMPHOCYTES # BLD AUTO: 1.34 K/UL (ref 1–4.8)
MAGNESIUM SERPL-MCNC: 2.4 MG/DL (ref 1.6–2.6)
MAGNESIUM SERPL-MCNC: 2.5 MG/DL (ref 1.6–2.6)
MAGNESIUM SERPL-MCNC: 2.5 MG/DL (ref 1.6–2.6)
MCH RBC QN AUTO: 24.5 PG (ref 27–31)
MCHC RBC AUTO-ENTMCNC: 30.5 G/DL (ref 32–36)
MCV RBC AUTO: 80 FL (ref 82–98)
NUCLEATED RBC (/100WBC) (SMH): 0 /100 WBC
PLATELET # BLD AUTO: 328 K/UL (ref 150–450)
PMV BLD AUTO: 8.9 FL (ref 9.2–12.9)
POCT GLUCOSE: 142 MG/DL (ref 70–110)
POCT GLUCOSE: 149 MG/DL (ref 70–110)
POCT GLUCOSE: 163 MG/DL (ref 70–110)
POTASSIUM SERPL-SCNC: 4.8 MMOL/L (ref 3.5–5.1)
RBC # BLD AUTO: 3.96 M/UL (ref 4.6–6.2)
RELATIVE EOSINOPHIL (SMH): 15.1 % (ref 0–8)
RELATIVE LYMPHOCYTE (SMH): 14.1 % (ref 18–48)
RELATIVE MONOCYTE (SMH): 13.2 % (ref 4–15)
RELATIVE NEUTROPHIL (SMH): 56.2 % (ref 38–73)
SODIUM SERPL-SCNC: 135 MMOL/L (ref 136–145)
WBC # BLD AUTO: 9.47 K/UL (ref 3.9–12.7)

## 2025-05-23 PROCEDURE — 36415 COLL VENOUS BLD VENIPUNCTURE: CPT | Performed by: NURSE PRACTITIONER

## 2025-05-23 PROCEDURE — 94761 N-INVAS EAR/PLS OXIMETRY MLT: CPT

## 2025-05-23 PROCEDURE — 25000003 PHARM REV CODE 250: Performed by: NURSE PRACTITIONER

## 2025-05-23 PROCEDURE — S4991 NICOTINE PATCH NONLEGEND: HCPCS | Performed by: STUDENT IN AN ORGANIZED HEALTH CARE EDUCATION/TRAINING PROGRAM

## 2025-05-23 PROCEDURE — 36415 COLL VENOUS BLD VENIPUNCTURE: CPT

## 2025-05-23 PROCEDURE — 63600175 PHARM REV CODE 636 W HCPCS

## 2025-05-23 PROCEDURE — 25000003 PHARM REV CODE 250

## 2025-05-23 PROCEDURE — 99900035 HC TECH TIME PER 15 MIN (STAT)

## 2025-05-23 PROCEDURE — 63600175 PHARM REV CODE 636 W HCPCS: Performed by: STUDENT IN AN ORGANIZED HEALTH CARE EDUCATION/TRAINING PROGRAM

## 2025-05-23 PROCEDURE — 25000003 PHARM REV CODE 250: Performed by: FAMILY MEDICINE

## 2025-05-23 PROCEDURE — 11000001 HC ACUTE MED/SURG PRIVATE ROOM

## 2025-05-23 PROCEDURE — 85025 COMPLETE CBC W/AUTO DIFF WBC: CPT

## 2025-05-23 PROCEDURE — 83735 ASSAY OF MAGNESIUM: CPT

## 2025-05-23 PROCEDURE — 99233 SBSQ HOSP IP/OBS HIGH 50: CPT | Mod: 95,,, | Performed by: INTERNAL MEDICINE

## 2025-05-23 PROCEDURE — 25000003 PHARM REV CODE 250: Performed by: STUDENT IN AN ORGANIZED HEALTH CARE EDUCATION/TRAINING PROGRAM

## 2025-05-23 PROCEDURE — 25000003 PHARM REV CODE 250: Performed by: INTERNAL MEDICINE

## 2025-05-23 PROCEDURE — 63600175 PHARM REV CODE 636 W HCPCS: Performed by: NURSE PRACTITIONER

## 2025-05-23 PROCEDURE — 97535 SELF CARE MNGMENT TRAINING: CPT

## 2025-05-23 PROCEDURE — 80048 BASIC METABOLIC PNL TOTAL CA: CPT | Performed by: NURSE PRACTITIONER

## 2025-05-23 RX ADMIN — ISOSORBIDE DINITRATE 20 MG: 10 TABLET ORAL at 03:05

## 2025-05-23 RX ADMIN — CEFAZOLIN 2 G: 2 INJECTION, POWDER, FOR SOLUTION INTRAMUSCULAR; INTRAVENOUS at 04:05

## 2025-05-23 RX ADMIN — MORPHINE SULFATE 2 MG: 2 INJECTION, SOLUTION INTRAMUSCULAR; INTRAVENOUS at 09:05

## 2025-05-23 RX ADMIN — CHLORHEXIDINE GLUCONATE 0.12% ORAL RINSE 15 ML: 1.2 LIQUID ORAL at 09:05

## 2025-05-23 RX ADMIN — MORPHINE SULFATE 2 MG: 2 INJECTION, SOLUTION INTRAMUSCULAR; INTRAVENOUS at 04:05

## 2025-05-23 RX ADMIN — SACUBITRIL AND VALSARTAN 1 TABLET: 24; 26 TABLET, FILM COATED ORAL at 09:05

## 2025-05-23 RX ADMIN — SENNOSIDES AND DOCUSATE SODIUM 1 TABLET: 50; 8.6 TABLET ORAL at 09:05

## 2025-05-23 RX ADMIN — TAMSULOSIN HYDROCHLORIDE 0.4 MG: 0.4 CAPSULE ORAL at 09:05

## 2025-05-23 RX ADMIN — BUPRENORPHINE AND NALOXONE 2 FILM: 8; 2 FILM BUCCAL; SUBLINGUAL at 09:05

## 2025-05-23 RX ADMIN — ISOSORBIDE DINITRATE 20 MG: 10 TABLET ORAL at 09:05

## 2025-05-23 RX ADMIN — MORPHINE SULFATE 2 MG: 2 INJECTION, SOLUTION INTRAMUSCULAR; INTRAVENOUS at 12:05

## 2025-05-23 RX ADMIN — TORSEMIDE 20 MG: 20 TABLET ORAL at 04:05

## 2025-05-23 RX ADMIN — NICOTINE 1 PATCH: 21 PATCH, EXTENDED RELEASE TRANSDERMAL at 10:05

## 2025-05-23 RX ADMIN — CEFAZOLIN 2 G: 2 INJECTION, POWDER, FOR SOLUTION INTRAMUSCULAR; INTRAVENOUS at 09:05

## 2025-05-23 RX ADMIN — METOLAZONE 2.5 MG: 2.5 TABLET ORAL at 09:05

## 2025-05-23 RX ADMIN — TORSEMIDE 20 MG: 20 TABLET ORAL at 09:05

## 2025-05-23 RX ADMIN — INSULIN GLARGINE 10 UNITS: 100 INJECTION, SOLUTION SUBCUTANEOUS at 09:05

## 2025-05-23 RX ADMIN — CEFAZOLIN 2 G: 2 INJECTION, POWDER, FOR SOLUTION INTRAMUSCULAR; INTRAVENOUS at 10:05

## 2025-05-23 RX ADMIN — SPIRONOLACTONE 25 MG: 25 TABLET, FILM COATED ORAL at 09:05

## 2025-05-23 RX ADMIN — ENOXAPARIN SODIUM 40 MG: 40 INJECTION SUBCUTANEOUS at 04:05

## 2025-05-23 RX ADMIN — INSULIN ASPART 1 UNITS: 100 INJECTION, SOLUTION INTRAVENOUS; SUBCUTANEOUS at 09:05

## 2025-05-23 NOTE — ASSESSMENT & PLAN NOTE
S/p R BKA and L AKA  Patient's FSGs are uncontrolled due to hyperglycemia on current medication regimen.  Last A1c reviewed-   Lab Results   Component Value Date    HGBA1C 7.3 (H) 05/12/2025     Most recent fingerstick glucose reviewed-   Recent Labs   Lab 05/22/25  1126 05/22/25  1743 05/22/25 2012 05/23/25  0955   POCTGLUCOSE 171* 286* 236* 142*     Current correctional scale  Low  Maintain anti-hyperglycemic dose as follows-   Antihyperglycemics (From admission, onward)      Start     Stop Route Frequency Ordered    05/12/25 1445  insulin glargine U-100 (Lantus) pen 10 Units         -- SubQ Daily 05/12/25 1332    05/12/25 0943  insulin aspart U-100 pen 0-10 Units         -- SubQ Before meals & nightly PRN 05/12/25 0843

## 2025-05-23 NOTE — PLAN OF CARE
Problem: Adult Inpatient Plan of Care  Goal: Plan of Care Review  Outcome: Progressing  Flowsheets (Taken 5/22/2025 1917)  Plan of Care Reviewed With: patient  Goal: Patient-Specific Goal (Individualized)  Outcome: Progressing  Goal: Absence of Hospital-Acquired Illness or Injury  Outcome: Progressing  Intervention: Identify and Manage Fall Risk  Flowsheets (Taken 5/22/2025 1917)  Safety Promotion/Fall Prevention:   assistive device/personal item within reach   side rails raised x 2

## 2025-05-23 NOTE — PLAN OF CARE
Contacted [facility] @ [phone] to check status of referral . Per Hallie , this order is still in review . Once claim is reopened and they are able to verify that the previous retrieved Lifevest was not missing any components , they will notify CM . Per Hallie , we should hear back by the end of today with decision.        05/23/25 1240   Post-Acute Status   Post-Acute Authorization [facility]

## 2025-05-23 NOTE — CARE UPDATE
05/22/25 2045   Patient Assessment/Suction   Level of Consciousness (AVPU) alert   Respiratory Effort Normal;Unlabored   Expansion/Accessory Muscles/Retractions no use of accessory muscles   Rhythm/Pattern, Respiratory pattern regular;no shortness of breath reported   Cough Frequency no cough   PRE-TX-O2   Device (Oxygen Therapy) room air   SpO2 97 %   Pulse Oximetry Type Intermittent   $ Pulse Oximetry - Multiple Charge Pulse Oximetry - Multiple   Pulse 92   Resp 18   Positioning HOB elevated 30 degrees   Aerosol Therapy   $ Aerosol Therapy Charges PRN treatment not required   Education   $ Education Bronchodilator;Other (see comment);15 min

## 2025-05-23 NOTE — PROGRESS NOTES
[facility]  Department of Cardiology  Progress Note      PATIENT NAME: Gideon Ross  MRN: [id number]  TODAY'S DATE: 05/23/2025  ADMIT DATE: 5/11/2025                          CONSULT REQUESTED BY: Don Whelan DO    SUBJECTIVE     PRINCIPAL PROBLEM: Acute systolic congestive heart failure      REASON FOR CONSULT:  Acute on chronic CHF, life vest patient      INTERVAL HISTORY  05/23/2025  -49 liters net balance since admission  Much improvement in peripheral edema  No orthopnea in flat position; no O2; VSS      5/22/25  Sitting up in his wheelchair. Doing well. Cr 1.5.    5/21/25    Mr. Ross is feeling better. Seems dry today. Cr 1.4.    5/20/2025    Doing well  Diuresed well.        5/19/2025    Seen sitting up in bed. Feels better. Breathing stable. UOP has been good.       5/17/25  Patient seen sittingup in bed. Noted to be significantly volume overloaded. Creatinine bumped slightly today.     5/16/25  Weber inserted yesterday; more urine output today; -1600 net balance  VSS, remains edematous    5/15/25  Cr 1.3 today; + fluid balance but output has been difficult to measure accurately  Remains edematous    5/14/25  Cr 1.2; difficulty collecting accurate output  Remains edematous and orthopneic  No events noted on telemetry    5/13/25  Hgb 9.5; Cr 1.1; + net balance; BP elevated;  Pt still has orthopnea; he feels his hands are less swollen today    HPI:  Pt is 49-year-old male w/ PMH HFrEF, DM, HTN, DM, cardiomyopathy, R BKA, left AKA, and substance dependence who presented to ED with c/o shortness of breath, swelling, and orthopnea for past week.    Pt has been receiving IV lasix since admission and is reportedly breathing better today but remains orthopneic, and swelling to upper legs, scrotum and abdomen still present. Still on 4 LPM O2    Review of patient's allergies indicates:  No Known Allergies    Past Medical History:   Diagnosis Date    Diabetes mellitus     Hypertension      Past  Surgical History:   Procedure Laterality Date    CYSTOSCOPY,WITH URETERAL CATHETER INSERTION N/A 5/15/2025    Procedure: CYSTOSCOPY,WITH URETERAL CATHETER INSERTION;  Surgeon: Yoni Lemus MD;  Location: [facility];  Service: Urology;  Laterality: N/A;    SKIN GRAFT       Social History[1]     REVIEW OF SYSTEMS  Negative except as mentioned in HPI    OBJECTIVE     VITAL SIGNS (Most Recent)  Temp: 98.2 °F (36.8 °C) (05/23/25 1109)  Pulse: 97 (05/23/25 1144)  Resp: 18 (05/23/25 1249)  BP: 114/71 (05/23/25 1109)  SpO2: 96 % (05/23/25 1144)    VENTILATION STATUS  Resp: 18 (05/23/25 1249)  SpO2: 96 % (05/23/25 1144)           I & O (Last 24H):  Intake/Output Summary (Last 24 hours) at 5/23/2025 1608  Last data filed at 5/23/2025 1100  Gross per 24 hour   Intake 790 ml   Output 2600 ml   Net -1810 ml       WEIGHTS  Wt Readings from Last 3 Encounters:   05/16/25 2005 135.7 kg (299 lb 2.6 oz)   05/14/25 1045 (!) 138.4 kg (305 lb 1.9 oz)   05/12/25 1239 97.5 kg (214 lb 15.2 oz)   05/11/25 1954 97.5 kg (215 lb)   05/12/25 1048 97.5 kg (214 lb 15.2 oz)   01/26/25 0338 117.5 kg (259 lb 0.7 oz)   01/21/25 0515 117.9 kg (260 lb)   01/15/25 0400 126.1 kg (278 lb)   01/14/25 0400 127.1 kg (280 lb 3.2 oz)   01/09/25 2239 115.4 kg (254 lb 6.4 oz)   01/09/25 0824 108.9 kg (240 lb)       PHYSICAL EXAM    GENERAL: chronically ill disheveled middle age male breathing comfortably  NECK: No JVD.   CARDIAC: Regular rate and rhythm. S1 is normal.S2 is normal.No gallops, clicks or murmurs noted at this time.  CHEST ANATOMY: normal.   LUNGS: Diminished  ABDOMEN: obese soft .   EXTREMITIES:  right BKA improved edema;  Left AKA edematous improving  CENTRAL NERVOUS SYSTEM: AAO x 3  SKIN: No rash     HOME MEDICATIONS:Medications Ordered Prior to Encounter[2]    SCHEDULED MEDS:   buprenorphine-naloxone 8-2 mg  2 Film Sublingual BID    ceFAZolin (Ancef) IV (PEDS and ADULTS)  2 g Intravenous Q6H    chlorhexidine  15 mL Mouth/Throat BID     "enoxparin  40 mg Subcutaneous Daily    insulin glargine U-100  10 Units Subcutaneous Daily    isosorbide dinitrate  20 mg Oral TID    metOLazone  2.5 mg Oral Daily    nicotine  1 patch Transdermal Daily    sacubitriL-valsartan  1 tablet Oral BID    senna-docusate  1 tablet Oral BID    spironolactone  25 mg Oral Daily    tamsulosin  0.4 mg Oral Daily    torsemide  20 mg Oral BID loop       CONTINUOUS INFUSIONS:        PRN MEDS:  Current Facility-Administered Medications:     acetaminophen, 650 mg, Oral, Q6H PRN    albuterol-ipratropium, 3 mL, Nebulization, Q6H PRN    bisacodyL, 10 mg, Rectal, Daily PRN    dextrose 50%, 12.5 g, Intravenous, PRN    dextrose 50%, 25 g, Intravenous, PRN    diphenhydrAMINE, 50 mg, Oral, Q8H PRN    glucagon (human recombinant), 1 mg, Intramuscular, PRN    glucose, 16 g, Oral, PRN    glucose, 24 g, Oral, PRN    insulin aspart U-100, 0-10 Units, Subcutaneous, QID (AC + HS) PRN    melatonin, 6 mg, Oral, Nightly PRN    morphine, 2 mg, Intravenous, Q4H PRN    promethazine, 12.5 mg, Rectal, Q6H PRN    sodium chloride 0.9%, 10 mL, Intravenous, PRN    tuberculin, 5 Units, Intradermal, vaccine x 1 dose **AND** POCT TB Skin Test Read, , , Once    LABS AND DIAGNOSTICS     CBC LAST 3 DAYS  Recent Labs   Lab 05/21/25  0449 05/22/25  0540 05/23/25  0457   WBC 9.48 9.24 9.47   RBC 3.81* 4.02* 3.96*   HGB 9.4* 9.8* 9.7*   HCT 30.6* 32.4* 31.8*   MCV 80* 81* 80*   MCH 24.7* 24.4* 24.5*   MCHC 30.7* 30.2* 30.5*   RDW 16.2* 16.2* 16.2*    327 328   MPV 9.4 9.5 8.9*   NRBC 0 0 0       COAGULATION LAST 3 DAYS  No results for input(s): "LABPT", "INR", "APTT" in the last 168 hours.    CHEMISTRY LAST 3 DAYS  Recent Labs   Lab 05/18/25  0608 05/18/25  1309 05/18/25  1810 05/19/25  0803 05/19/25  0921 05/21/25  0449 05/21/25  0959 05/22/25  0540 05/22/25  0844 05/23/25  0457 05/23/25  0827 05/23/25  1153    137   < > 136   < > 134*  --  135*  --  135*  --   --    K 4.4 4.3   < > 4.3   < > 4.3  --  4.7  " "--  4.8  --   --    CL 93* 92*   < > 93*   < > 91*  --  93*  --  95  --   --    CO2 37* 38*   < > 37*   < > 38*  --  34*  --  33*  --   --    ANIONGAP 7* 7*   < > 6*   < > 5*  --  8  --  7*  --   --    BUN 41* 40*   < > 41*   < > 42*  --  44*  --  47*  --   --    CREATININE 1.5* 1.4   < > 1.4   < > 1.4  --  1.5*  --  1.4  --   --    * 110   < > 127*   < > 151*  --  132*  --  136*  --   --    CALCIUM 9.0 9.1   < > 9.1   < > 8.2*  --  8.4*  --  8.4*  --   --    MG 2.0 2.0   < > 2.0   < > 2.3   < > 2.4   < > 2.4 2.4 2.5   ALBUMIN 2.7* 2.8*  --  2.8*  --   --   --   --   --   --   --   --    PROT 6.3 6.3  --  6.7  --   --   --   --   --   --   --   --    ALKPHOS 154* 153*  --  160*  --   --   --   --   --   --   --   --    ALT <3* <3*  --  <3*  --   --   --   --   --   --   --   --    AST 13 14  --  14  --   --   --   --   --   --   --   --    BILITOT 0.4 0.4  --  0.4  --   --   --   --   --   --   --   --     < > = values in this interval not displayed.       CARDIAC PROFILE LAST 3 DAYS  No results for input(s): "BNP", "CPK", "CPKMB", "LDH", "TROPONINI" in the last 168 hours.      ENDOCRINE LAST 3 DAYS  No results for input(s): "TSH", "PROCAL" in the last 168 hours.      LAST ARTERIAL BLOOD GAS  ABG  No results for input(s): "PH", "PO2", "PCO2", "HCO3", "BE" in the last 168 hours.    LAST 7 DAYS MICROBIOLOGY   Microbiology Results (last 7 days)       Procedure Component Value Units Date/Time    Blood culture [[id number]]  (Normal) Collected: 05/12/25 0004    Order Status: Completed Specimen: Blood Updated: 05/17/25 0103     CULTURE, BLOOD ([facility]) No Growth After 5 Days    Blood culture [[id number]]  (Normal) Collected: 05/11/25 2331    Order Status: Completed Specimen: Blood Updated: 05/17/25 0030     CULTURE, BLOOD ([facility]) No Growth After 5 Days            MOST RECENT IMAGING  CT Knee With Contrast Right  Narrative: EXAMINATION:  CT KNEE WITH CONTRAST RIGHT    CLINICAL HISTORY:  Osteomyelitis suspected, knee, xray " done;concern for osteo at stump infection site;    TECHNIQUE:  Right knee CT with 100 mL IV Omnipaque 350    COMPARISON:  Right knee radiographs 05/11/2025    FINDINGS:  No aggressive osseous destruction.  Postsurgical changes of right BKA are evident.    Small right knee joint effusion is present with very little if any synovial enhancement.  Extensive subcutaneous edema occurs about the visualized right leg.  No rim enhancing fluid collection is present to suggest abscess.  Little if any intraluminal opacification occurs in distal right superficial femoral and right popliteal artery.  Scattered arterial vascular calcifications are noted.  Small collateral arterial structures do opacify.  Heterogeneous opacification of the visualize veins is evident.  Impression: 1. No aggressive osseous destruction to suggest osteomyelitis.  This would be best assessed with MRI.  2. No rim enhancing fluid collection to suggest abscess.  3. Extensive subcutaneous edema with postsurgical changes of BKA.  4. Small right knee joint effusion with little if any synovial enhancement, nonspecific.  5. Little if any intraluminal opacification of distal right SFA and popliteal artery, with small collateral arterial branches opacifying.    Electronically signed by: Fredy Trejo  Date:    05/19/2025  Time:    10:55      ECHOCARDIOGRAM RESULTS (last 5)  Results for orders placed during the hospital encounter of 01/09/25    Echo    Interpretation Summary    Left Ventricle: The left ventricle is moderately dilated. Mildly increased wall thickness. There is mild asymmetric hypertrophy. Severe global hypokinesis present. There is severely reduced systolic function with a visually estimated ejection fraction of 25 - 30%.    Right Ventricle: Moderate right ventricular enlargement. Systolic function is mildly reduced.    Left Atrium: Left atrium is mildly dilated.    Tricuspid Valve: There is mild regurgitation.    IVC/SVC: Elevated venous pressure  at 15 mmHg.      CURRENT/PREVIOUS VISIT EKG  Results for orders placed or performed during the hospital encounter of 05/11/25   EKG 12-lead    Collection Time: 05/17/25  2:41 PM   Result Value Ref Range    QRS Duration 120 ms    [facility] QTC Calculation 442 ms    Narrative    Test Reason : I50.9,    Vent. Rate : 112 BPM     Atrial Rate : 112 BPM     P-R Int : 160 ms          QRS Dur : 120 ms      QT Int : 324 ms       P-R-T Axes :  27 135  34 degrees    QTcB Int : 442 ms    Sinus tachycardia  Low voltage QRS  Right bundle branch block  Left posterior fascicular block   Bifascicular block   Abnormal ECG  When compared with ECG of 12-May-2025 04:05,  Left posterior fascicular block is now Present  Criteria for Inferior infarct are no longer Present    Referred By: AAAREFERRAL SELF           Confirmed By:            ASSESSMENT/PLAN:     Active Hospital Problems    Diagnosis    *Acute systolic congestive heart failure    Chronic hepatitis C without hepatic coma    Pressure ulcer of right buttock, stage 2    HTN (hypertension)    Prolonged QT interval    Hypoalbuminemia    Non-healing wound of amputation stump    Opioid use disorder    Diabetes mellitus    Scrotal edema    Elevated d-dimer       ASSESSMENT & PLAN:   Acute on chronic HFrEF  Dilated Cardiomyopathy  Substance abuse  Right BKA wound  Left AKA    RECOMMENDATIONS:  -49 liters since admission  VSS; kidney function is normal  Patient can discharge to SNF on current regimen:  Entresto 24/26 mg PO BID, Metalazone 2.5 daily, Torsemide 20 mg PO BID, Aldactone 25 mg daily and Isordil 20 mg po TID  Awaiting Life Vest placement for DCM (Efx 30-35%)  Outpatient cardiology follow up in 2 weeks        Denice WEBSTER-C, CVNP-BC  [facility]  Department of Cardiology  Date of Service: 05/23/2025 05/23/2025:   Evaluation is as above  Patient appears to be more depressed today continues to diurese heart failure symptoms of clinically improved he is on room  air appears very comfortable  Recommend the following   1. Continue torsemide 20 mg p.o. b.i.d. for 1 more week and then change to torsemide 20 mg daily if patient maintains adequate fluid restriction  2. Continue spironolactone 25 mg daily  3. Discontinue metolazone  4. Continue on sacubitril 24/26 mg p.o. b.i.d.  5. Continue on isosorbide mononitrate 20 mg   LifeVest is to be maintain for 3 months from the time of initial evaluate  Follow-up echo to be done at that point if this has improvement in LV dysfunction LifeVest can be safely discontinue  However if the LV dysfunction below 30% is noted then he will need an internal defibrillator at that point and still has a LifeVest discontinue  He is clinically stable to transferred to skilled nursing facility      05/22/2025  Patient is maintaining steady state at this time.  Continue on Entresto, torsemide Aldactone and low dose of metolazone.    Blood pressure has been relatively stable.  Renal function is stable at 1.5 at baseline  Recommend LifeVest   Awaiting placement for further physical and a metabolic therapy     I have personally interviewed and examined the patient, I have reviewed the Nurse Practitioner's history and physical, assessment, and plan. I have personally evaluated the patient at bedside and agree with the findings and made appropriate changes as necessary in recommendations.          Jairo Corcoran MD FACC,FACP, [facility]  Section Head   Department of Cardiology  [facility] [place]  [place], [place]  05/23/2025 11:00 AM             [1]   Social History  Tobacco Use    Smoking status: Every Day     Current packs/day: 1.00     Average packs/day: 1 pack/day for 40.4 years (40.4 ttl pk-yrs)     Types: Cigarettes     Start date: 1985   Substance Use Topics    Alcohol use: Yes    Drug use: No   [2]   No current facility-administered medications on file prior to encounter.     Current Outpatient Medications on File Prior to Encounter  "  Medication Sig Dispense Refill    isosorbide dinitrate (ISORDIL) 20 MG tablet Take 1 tablet (20 mg total) by mouth 3 (three) times daily. 90 tablet 11    tamsulosin (FLOMAX) 0.4 mg Cap Take 1 capsule (0.4 mg total) by mouth once daily. 30 capsule 11    torsemide (DEMADEX) 20 MG Tab Take 1 tablet (20 mg total) by mouth 2 (two) times a day. 60 tablet 11    insulin aspart U-100 (NOVOLOG) 100 unit/mL (3 mL) InPn pen Inject 0-10 Units into the skin before meals and at bedtime as needed (Hyperglycemia). **MODERATE CORRECTION DOSE**  Blood Glucose  mg/dL                  Pre-meal                2200  151-200                2 units                    1 unit  201-250                4 units                    2 units  251-300                6 units                    3 units  301-350                8 units                    4 units  >350                     10 units                  5 units  Administer subcutaneously if needed at times designated by monitoring  schedule.  DO NOT HOLD correction dose insulin for patients who are  NPO.    "HIGH ALERT MEDICATION" - Administer with meals or TF/TPN. (Patient not taking: Reported on 2/12/2025) 1 each 0     "
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
## 2025-05-23 NOTE — PT/OT/SLP PROGRESS
Occupational Therapy   Treatment    Name: Gideon Ross  MRN: [id number]  Admitting Diagnosis:  Acute systolic congestive heart failure  8 Days Post-Op    Recommendations:     Discharge Recommendations: Moderate Intensity Therapy  Discharge Equipment Recommendations:  to be determined by next level of care  Barriers to discharge:  Inaccessible home environment, Decreased caregiver support    Assessment:     Gideon Ross is a 49 y.o. male with a medical diagnosis of Acute systolic congestive heart failure.  He presents sad stating he has a lot going at him at the same time and expressing sadness regarding not being able to contact his grandmother. Performance deficits affecting function are weakness, impaired endurance, impaired self care skills, impaired functional mobility, impaired balance, decreased lower extremity function, pain, edema.     Rehab Prognosis:  Fair; patient would benefit from acute skilled OT services to address these deficits and reach maximum level of function.       Plan:     Patient to be seen 5 x/week to address the above listed problems via self-care/home management, therapeutic activities, therapeutic exercises  Plan of Care Expires: 06/14/25  Plan of Care Reviewed with: patient    Subjective     Chief Complaint: pt emotional today regarding grandmother and issues going at him all at once  Patient/Family Comments/goals: pt grateful he is going to SNF  Pain/Comfort:   None reported    Objective:     Communicated with: nurse prior to session.  Patient found HOB elevated with telemetry, peripheral IV upon OT entry to room.    General Precautions: Standard, fall    Orthopedic Precautions:N/A  Braces: N/A  Respiratory Status: Room air     Occupational Performance: pt required increased time for self expression this date, pt emotional and expressing feeling overwhelmed.       Activities of Daily Living:  Grooming: contact guard assistance sitting up in bed.       Treatment & Education:  Pt educated  on role of OT/POC, importance of OOB/EOB activity, use of call bell, and safety during ADLs, transfers, and functional mobility.     Patient left HOB elevated with all lines intact, call button in reach, bed alarm on, and blood drawn staff present    GOALS:   Multidisciplinary Problems       Occupational Therapy Goals          Problem: Occupational Therapy    Goal Priority Disciplines Outcome Interventions   Occupational Therapy Goal     OT, PT/OT     Description: Goals to be met by: 6/14/2025     Patient will increase functional independence with ADLs by performing:    UE Dressing with Supervision.  LE Dressing with Minimal Assistance.  Grooming while seated with Supervision.  Toileting from bedside commode with Minimal Assistance for hygiene and clothing management.                          DME Justifications:  No DME recommended requiring DME justifications    Time Tracking:     OT Date of Treatment: 05/23/25  OT Start Time: 1138  OT Stop Time: 1148  OT Total Time (min): 10 min    Billable Minutes:Self Care/Home Management 10    OT/LEI: OT          5/23/2025

## 2025-05-23 NOTE — ASSESSMENT & PLAN NOTE
"Awaiting new life vest to be delivered.  Patient has Systolic (HFrEF) heart failure that is Acute on chronic. On presentation their CHF was decompensated. Evidence of decompensated CHF on presentation includes: edema, weight gain, orthopnea, dyspnea on exertion (ROBLEDO), and shortness of breath. Most recent BNP and echo results are listed below.  No results for input(s): "BNP" in the last 72 hours.    Latest ECHO  Results for orders placed during the hospital encounter of 01/09/25    Results for orders placed during the hospital encounter of 05/11/25    Echo    Interpretation Summary    Left Ventricle: The left ventricle is moderately dilated. There is moderate eccentric hypertrophy. Global hypokinesis present. There is moderately reduced systolic function with a visually estimated ejection fraction of 30 - 35%. Grade III diastolic dysfunction.    Right Ventricle: Right ventricle was not well visualized due to poor acoustic window. The right ventricle is dilated    Tricuspid Valve: There is mild regurgitation.    Pulmonary Artery: There is pulmonary hypertension. The estimated pulmonary artery systolic pressure is 64 mmHg.    IVC/SVC: Elevated venous pressure at 15 mmHg.      Current Heart Failure Medications  isosorbide dinitrate tablet 20 mg, 3 times daily, Oral  spironolactone tablet 25 mg, Daily, Oral  torsemide tablet 20 mg, 2 times daily, Oral  sacubitriL-valsartan 24-26 mg per tablet 1 tablet, 2 times daily, Oral  metOLazone tablet 2.5 mg, Daily, Oral    Plan  - Monitor strict I&Os and daily weights.    - Place on telemetry  - Low sodium diet  - Place on fluid restriction    - Cardiology has been consulted  -transitioned off Lasix drip and Dobutrex drip on 05/20-on oral Lasix    "

## 2025-05-23 NOTE — PT/OT/SLP PROGRESS
Physical Therapy      Patient Name:  Gideon Ross   MRN:  [id number]    Patient not seen today secondary to Other (Comment) (nurse reports pt wheeled himself to [facility] earlier this morning. pt declined secondary to spilling coffee on himself.). Will follow-up 05/26/25.

## 2025-05-23 NOTE — ASSESSMENT & PLAN NOTE
Patient's blood pressure range in the last 24 hours was: BP  Min: 112/65  Max: 135/70.The patient's inpatient anti-hypertensive regimen is listed below:  Current Antihypertensives  isosorbide dinitrate tablet 20 mg, 3 times daily, Oral  spironolactone tablet 25 mg, Daily, Oral  torsemide tablet 20 mg, 2 times daily, Oral  metOLazone tablet 2.5 mg, Daily, Oral    Plan  -will make adjustment as needed

## 2025-05-23 NOTE — SUBJECTIVE & OBJECTIVE
Interval History:  Pt seen and examined.  He continues to feel better.  No new events overnight..  We will likely go to [facility], awaiting LifeVest to be delivered.    Review of Systems   All other systems reviewed and are negative.    Objective:     Vital Signs (Most Recent):  Temp: 98.3 °F (36.8 °C) (05/23/25 0348)  Pulse: 90 (05/23/25 0348)  Resp: 18 (05/23/25 0943)  BP: 125/79 (05/23/25 0348)  SpO2: (!) 94 % (05/23/25 0348) Vital Signs (24h Range):  Temp:  [97.8 °F (36.6 °C)-98.5 °F (36.9 °C)] 98.3 °F (36.8 °C)  Pulse:  [] 90  Resp:  [17-19] 18  SpO2:  [90 %-98 %] 94 %  BP: (112-135)/(65-79) 125/79     Weight: 135.7 kg (299 lb 2.6 oz)  Body mass index is 42.93 kg/m².    Intake/Output Summary (Last 24 hours) at 5/23/2025 1021  Last data filed at 5/23/2025 0616  Gross per 24 hour   Intake 700 ml   Output 3700 ml   Net -3000 ml         Physical Exam  Vitals and nursing note reviewed.   Constitutional:       Appearance: He is obese. He is ill-appearing (appears chronically ill).   Pulmonary:      Effort: Pulmonary effort is normal. No respiratory distress.      Breath sounds: Normal breath sounds.   Abdominal:      General: Abdomen is flat. Bowel sounds are normal.      Palpations: Abdomen is soft.   Musculoskeletal:      Cervical back: Normal range of motion and neck supple.      Comments: Right BKA  Left AKA   Neurological:      General: No focal deficit present.      Mental Status: He is alert.               Significant Labs: All pertinent labs within the past 24 hours have been reviewed.  CBC:   Recent Labs   Lab 05/22/25  0540 05/23/25 0457   WBC 9.24 9.47   HGB 9.8* 9.7*   HCT 32.4* 31.8*    328     CMP:   Recent Labs   Lab 05/22/25  0540 05/23/25 0457   * 135*   K 4.7 4.8   CL 93* 95   CO2 34* 33*   * 136*   BUN 44* 47*   CREATININE 1.5* 1.4   CALCIUM 8.4* 8.4*   ANIONGAP 8 7*       Significant Imaging: I have reviewed all pertinent imaging results/findings within the  past 24 hours.

## 2025-05-23 NOTE — PLAN OF CARE
Contacted Hallie @ [email or web address] for status update , who states that it is still in the queue waiting for review.       Referral for wearable defibrillator sent to [employer] for review.          05/23/25 1612   Post-Acute Status   Post-Acute Authorization [facility]

## 2025-05-23 NOTE — PROGRESS NOTES
05/23/25 1011        Wound 01/09/25 2239 Pressure Injury Buttocks   Date First Assessed/Time First Assessed: 01/09/25 2239   Present on Original Admission: Yes  Primary Wound Type: Pressure Injury  Location: Buttocks   Wound Image    Pressure Injury Stage 2   Dressing Appearance Open to air   Drainage Amount None   Appearance Pink;Red   Tissue loss description Partial thickness   Periwound Area Denuded;[place]   Care   (sitting in chair would not return to bed to assess skin.  leaned to side for assessment.)        Wound 05/12/25 1104 Ulceration Right Leg   Date First Assessed/Time First Assessed: 05/12/25 1104   Present on Original Admission: Yes  Primary Wound Type: Ulceration  Side: Right  Location: Leg   Wound Image     Dressing Appearance Open to air   Drainage Amount Small   Drainage Characteristics/Odor Bleeding controlled   Appearance Red;Dry   Tissue loss description Partial thickness   Red (%), Wound Tissue Color 100 %   Periwound Area Redness;Dry   Care Cleansed with:;Soap and water   Dressing Applied  (Triad covered with foam and tegaderm)        Wound 05/16/25 1529 Abrasion(s) Right Antecubital   Date First Assessed/Time First Assessed: 05/16/25 1529   Present on Original Admission: No  Primary Wound Type: (c) Abrasion(s)  Side: Right  Location: Antecubital   Wound Image    Dressing Appearance Open to air   Appearance Dry  (scratches abrasion)   Care Soap and water  (applied moisturizing lotion)     Notified nurse patient in wheelchair,  would not return to bed to allow for complete skin assessment.

## 2025-05-24 LAB
ABSOLUTE EOSINOPHIL (SMH): 1.31 K/UL
ABSOLUTE MONOCYTE (SMH): 1.35 K/UL (ref 0.3–1)
ABSOLUTE NEUTROPHIL COUNT (SMH): 4.8 K/UL (ref 1.8–7.7)
ANION GAP (SMH): 5 MMOL/L (ref 8–16)
BASOPHILS # BLD AUTO: 0.09 K/UL
BASOPHILS NFR BLD AUTO: 1 %
BUN SERPL-MCNC: 49 MG/DL (ref 6–20)
CALCIUM SERPL-MCNC: 8.6 MG/DL (ref 8.7–10.5)
CHLORIDE SERPL-SCNC: 98 MMOL/L (ref 95–110)
CO2 SERPL-SCNC: 30 MMOL/L (ref 23–29)
CREAT SERPL-MCNC: 1.3 MG/DL (ref 0.5–1.4)
ERYTHROCYTE [DISTWIDTH] IN BLOOD BY AUTOMATED COUNT: 16.4 % (ref 11.5–14.5)
GFR SERPLBLD CREATININE-BSD FMLA CKD-EPI: >60 ML/MIN/1.73/M2
GLUCOSE SERPL-MCNC: 109 MG/DL (ref 70–110)
HCT VFR BLD AUTO: 33.9 % (ref 40–54)
HGB BLD-MCNC: 10.1 GM/DL (ref 14–18)
IMM GRANULOCYTES # BLD AUTO: 0.04 K/UL (ref 0–0.04)
IMM GRANULOCYTES NFR BLD AUTO: 0.4 % (ref 0–0.5)
LYMPHOCYTES # BLD AUTO: 1.9 K/UL (ref 1–4.8)
MAGNESIUM SERPL-MCNC: 2.4 MG/DL (ref 1.6–2.6)
MAGNESIUM SERPL-MCNC: 2.5 MG/DL (ref 1.6–2.6)
MCH RBC QN AUTO: 24.5 PG (ref 27–31)
MCHC RBC AUTO-ENTMCNC: 29.8 G/DL (ref 32–36)
MCV RBC AUTO: 82 FL (ref 82–98)
NUCLEATED RBC (/100WBC) (SMH): 0 /100 WBC
PLATELET # BLD AUTO: 330 K/UL (ref 150–450)
PMV BLD AUTO: 8.9 FL (ref 9.2–12.9)
POCT GLUCOSE: 109 MG/DL (ref 70–110)
POCT GLUCOSE: 172 MG/DL (ref 70–110)
POCT GLUCOSE: 184 MG/DL (ref 70–110)
POCT GLUCOSE: 206 MG/DL (ref 70–110)
POTASSIUM SERPL-SCNC: 5 MMOL/L (ref 3.5–5.1)
RBC # BLD AUTO: 4.13 M/UL (ref 4.6–6.2)
RELATIVE EOSINOPHIL (SMH): 13.9 % (ref 0–8)
RELATIVE LYMPHOCYTE (SMH): 20.1 % (ref 18–48)
RELATIVE MONOCYTE (SMH): 14.3 % (ref 4–15)
RELATIVE NEUTROPHIL (SMH): 50.3 % (ref 38–73)
SODIUM SERPL-SCNC: 133 MMOL/L (ref 136–145)
WBC # BLD AUTO: 9.44 K/UL (ref 3.9–12.7)

## 2025-05-24 PROCEDURE — 25000003 PHARM REV CODE 250

## 2025-05-24 PROCEDURE — 25000003 PHARM REV CODE 250: Performed by: NURSE PRACTITIONER

## 2025-05-24 PROCEDURE — 11000001 HC ACUTE MED/SURG PRIVATE ROOM

## 2025-05-24 PROCEDURE — 83735 ASSAY OF MAGNESIUM: CPT

## 2025-05-24 PROCEDURE — 85025 COMPLETE CBC W/AUTO DIFF WBC: CPT

## 2025-05-24 PROCEDURE — 94761 N-INVAS EAR/PLS OXIMETRY MLT: CPT

## 2025-05-24 PROCEDURE — 63600175 PHARM REV CODE 636 W HCPCS: Performed by: NURSE PRACTITIONER

## 2025-05-24 PROCEDURE — 36415 COLL VENOUS BLD VENIPUNCTURE: CPT

## 2025-05-24 PROCEDURE — 63600175 PHARM REV CODE 636 W HCPCS

## 2025-05-24 PROCEDURE — 25000003 PHARM REV CODE 250: Performed by: STUDENT IN AN ORGANIZED HEALTH CARE EDUCATION/TRAINING PROGRAM

## 2025-05-24 PROCEDURE — S4991 NICOTINE PATCH NONLEGEND: HCPCS | Performed by: STUDENT IN AN ORGANIZED HEALTH CARE EDUCATION/TRAINING PROGRAM

## 2025-05-24 PROCEDURE — 27000221 HC OXYGEN, UP TO 24 HOURS

## 2025-05-24 PROCEDURE — 63600175 PHARM REV CODE 636 W HCPCS: Performed by: STUDENT IN AN ORGANIZED HEALTH CARE EDUCATION/TRAINING PROGRAM

## 2025-05-24 PROCEDURE — 80048 BASIC METABOLIC PNL TOTAL CA: CPT | Performed by: NURSE PRACTITIONER

## 2025-05-24 PROCEDURE — 25000003 PHARM REV CODE 250: Performed by: INTERNAL MEDICINE

## 2025-05-24 PROCEDURE — 99900035 HC TECH TIME PER 15 MIN (STAT)

## 2025-05-24 RX ADMIN — MORPHINE SULFATE 2 MG: 2 INJECTION, SOLUTION INTRAMUSCULAR; INTRAVENOUS at 09:05

## 2025-05-24 RX ADMIN — SACUBITRIL AND VALSARTAN 1 TABLET: 24; 26 TABLET, FILM COATED ORAL at 09:05

## 2025-05-24 RX ADMIN — ISOSORBIDE DINITRATE 20 MG: 10 TABLET ORAL at 04:05

## 2025-05-24 RX ADMIN — BUPRENORPHINE AND NALOXONE 2 FILM: 8; 2 FILM BUCCAL; SUBLINGUAL at 09:05

## 2025-05-24 RX ADMIN — SPIRONOLACTONE 25 MG: 25 TABLET, FILM COATED ORAL at 09:05

## 2025-05-24 RX ADMIN — MORPHINE SULFATE 2 MG: 2 INJECTION, SOLUTION INTRAMUSCULAR; INTRAVENOUS at 05:05

## 2025-05-24 RX ADMIN — MORPHINE SULFATE 2 MG: 2 INJECTION, SOLUTION INTRAMUSCULAR; INTRAVENOUS at 10:05

## 2025-05-24 RX ADMIN — ENOXAPARIN SODIUM 40 MG: 40 INJECTION SUBCUTANEOUS at 04:05

## 2025-05-24 RX ADMIN — TAMSULOSIN HYDROCHLORIDE 0.4 MG: 0.4 CAPSULE ORAL at 09:05

## 2025-05-24 RX ADMIN — CEFAZOLIN 2 G: 2 INJECTION, POWDER, FOR SOLUTION INTRAMUSCULAR; INTRAVENOUS at 01:05

## 2025-05-24 RX ADMIN — MORPHINE SULFATE 2 MG: 2 INJECTION, SOLUTION INTRAMUSCULAR; INTRAVENOUS at 01:05

## 2025-05-24 RX ADMIN — TORSEMIDE 20 MG: 20 TABLET ORAL at 09:05

## 2025-05-24 RX ADMIN — ISOSORBIDE DINITRATE 20 MG: 10 TABLET ORAL at 09:05

## 2025-05-24 RX ADMIN — INSULIN ASPART 2 UNITS: 100 INJECTION, SOLUTION INTRAVENOUS; SUBCUTANEOUS at 10:05

## 2025-05-24 RX ADMIN — NICOTINE 1 PATCH: 21 PATCH, EXTENDED RELEASE TRANSDERMAL at 09:05

## 2025-05-24 RX ADMIN — TORSEMIDE 20 MG: 20 TABLET ORAL at 04:05

## 2025-05-24 RX ADMIN — CEFAZOLIN 2 G: 2 INJECTION, POWDER, FOR SOLUTION INTRAMUSCULAR; INTRAVENOUS at 04:05

## 2025-05-24 RX ADMIN — CEFAZOLIN 2 G: 2 INJECTION, POWDER, FOR SOLUTION INTRAMUSCULAR; INTRAVENOUS at 05:05

## 2025-05-24 RX ADMIN — CEFAZOLIN 2 G: 2 INJECTION, POWDER, FOR SOLUTION INTRAMUSCULAR; INTRAVENOUS at 10:05

## 2025-05-24 NOTE — SUBJECTIVE & OBJECTIVE
Interval History:  Pt seen and examined.  He continues to feel better.  No new events overnight..  We will likely go to [facility], awaiting LifeVest to be delivered.    Review of Systems   All other systems reviewed and are negative.    Objective:     Vital Signs (Most Recent):  Temp: 98.1 °F (36.7 °C) (05/24/25 0837)  Pulse: 99 (05/24/25 0837)  Resp: 18 (05/24/25 0910)  BP: (!) 146/92 (05/24/25 0837)  SpO2: 96 % (05/24/25 0837) Vital Signs (24h Range):  Temp:  [98.1 °F (36.7 °C)-98.3 °F (36.8 °C)] 98.1 °F (36.7 °C)  Pulse:  [] 99  Resp:  [16-18] 18  SpO2:  [93 %-98 %] 96 %  BP: (114-146)/(69-92) 146/92     Weight: 135.6 kg (299 lb)  Body mass index is 42.9 kg/m².    Intake/Output Summary (Last 24 hours) at 5/24/2025 1011  Last data filed at 5/24/2025 0911  Gross per 24 hour   Intake 1210 ml   Output 1700 ml   Net -490 ml         Physical Exam  Vitals and nursing note reviewed.   Constitutional:       Appearance: He is obese. Ill appearance: appears chronically ill.   Pulmonary:      Effort: Pulmonary effort is normal. No respiratory distress.      Breath sounds: Normal breath sounds.   Abdominal:      General: Abdomen is flat. Bowel sounds are normal.      Palpations: Abdomen is soft.   Musculoskeletal:      Cervical back: Normal range of motion and neck supple.      Comments: Right BKA  Left AKA   Neurological:      General: No focal deficit present.      Mental Status: He is alert.               Significant Labs: All pertinent labs within the past 24 hours have been reviewed.  CBC:   Recent Labs   Lab 05/23/25 0457 05/24/25  0758   WBC 9.47 9.44   HGB 9.7* 10.1*   HCT 31.8* 33.9*    330     CMP:   Recent Labs   Lab 05/23/25  0457 05/24/25  0758   * 133*   K 4.8 5.0   CL 95 98   CO2 33* 30*   * 109   BUN 47* 49*   CREATININE 1.4 1.3   CALCIUM 8.4* 8.6*   ANIONGAP 7* 5*       Significant Imaging: I have reviewed all pertinent imaging results/findings within the past 24  hours.

## 2025-05-24 NOTE — ASSESSMENT & PLAN NOTE
"Awaiting new life vest to be delivered.  Patient has Systolic (HFrEF) heart failure that is Acute on chronic. On presentation their CHF was decompensated. Evidence of decompensated CHF on presentation includes: edema, weight gain, orthopnea, dyspnea on exertion (ROBLEDO), and shortness of breath. Most recent BNP and echo results are listed below.  No results for input(s): "BNP" in the last 72 hours.    Latest ECHO  Results for orders placed during the hospital encounter of 01/09/25    Results for orders placed during the hospital encounter of 05/11/25    Echo    Interpretation Summary    Left Ventricle: The left ventricle is moderately dilated. There is moderate eccentric hypertrophy. Global hypokinesis present. There is moderately reduced systolic function with a visually estimated ejection fraction of 30 - 35%. Grade III diastolic dysfunction.    Right Ventricle: Right ventricle was not well visualized due to poor acoustic window. The right ventricle is dilated    Tricuspid Valve: There is mild regurgitation.    Pulmonary Artery: There is pulmonary hypertension. The estimated pulmonary artery systolic pressure is 64 mmHg.    IVC/SVC: Elevated venous pressure at 15 mmHg.      Current Heart Failure Medications  isosorbide dinitrate tablet 20 mg, 3 times daily, Oral  spironolactone tablet 25 mg, Daily, Oral  torsemide tablet 20 mg, 2 times daily, Oral  sacubitriL-valsartan 24-26 mg per tablet 1 tablet, 2 times daily, Oral    Plan  - Monitor strict I&Os and daily weights.    - Place on telemetry  - Low sodium diet  - Place on fluid restriction    - Cardiology has been consulted  -transitioned off Lasix drip and Dobutrex drip on 05/20-on oral Lasix    "

## 2025-05-24 NOTE — ASSESSMENT & PLAN NOTE
Patient's blood pressure range in the last 24 hours was: BP  Min: 114/71  Max: 146/92.The patient's inpatient anti-hypertensive regimen is listed below:  Current Antihypertensives  isosorbide dinitrate tablet 20 mg, 3 times daily, Oral  spironolactone tablet 25 mg, Daily, Oral  torsemide tablet 20 mg, 2 times daily, Oral    Plan  -will make adjustment as needed

## 2025-05-24 NOTE — ASSESSMENT & PLAN NOTE
S/p R BKA and L AKA  Patient's FSGs are uncontrolled due to hyperglycemia on current medication regimen.  Last A1c reviewed-   Lab Results   Component Value Date    HGBA1C 7.3 (H) 05/12/2025     Most recent fingerstick glucose reviewed-   Recent Labs   Lab 05/23/25  1111 05/23/25 2028 05/24/25  0836   POCTGLUCOSE 149* 163* 109     Current correctional scale  Low  Maintain anti-hyperglycemic dose as follows-   Antihyperglycemics (From admission, onward)      Start     Stop Route Frequency Ordered    05/12/25 1445  insulin glargine U-100 (Lantus) pen 10 Units         -- SubQ Daily 05/12/25 1332    05/12/25 0943  insulin aspart U-100 pen 0-10 Units         -- SubQ Before meals & nightly PRN 05/12/25 0843
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
## 2025-05-25 LAB
ANION GAP (SMH): 8 MMOL/L (ref 8–16)
BUN SERPL-MCNC: 50 MG/DL (ref 6–20)
CALCIUM SERPL-MCNC: 8.5 MG/DL (ref 8.7–10.5)
CHLORIDE SERPL-SCNC: 98 MMOL/L (ref 95–110)
CO2 SERPL-SCNC: 29 MMOL/L (ref 23–29)
CREAT SERPL-MCNC: 1.2 MG/DL (ref 0.5–1.4)
GFR SERPLBLD CREATININE-BSD FMLA CKD-EPI: >60 ML/MIN/1.73/M2
GLUCOSE SERPL-MCNC: 144 MG/DL (ref 70–110)
MAGNESIUM SERPL-MCNC: 2.3 MG/DL (ref 1.6–2.6)
MAGNESIUM SERPL-MCNC: 2.4 MG/DL (ref 1.6–2.6)
POCT GLUCOSE: 155 MG/DL (ref 70–110)
POCT GLUCOSE: 157 MG/DL (ref 70–110)
POCT GLUCOSE: 183 MG/DL (ref 70–110)
POCT GLUCOSE: 187 MG/DL (ref 70–110)
POCT GLUCOSE: 204 MG/DL (ref 70–110)
POTASSIUM SERPL-SCNC: 5.1 MMOL/L (ref 3.5–5.1)
SODIUM SERPL-SCNC: 135 MMOL/L (ref 136–145)

## 2025-05-25 PROCEDURE — 63600175 PHARM REV CODE 636 W HCPCS: Performed by: STUDENT IN AN ORGANIZED HEALTH CARE EDUCATION/TRAINING PROGRAM

## 2025-05-25 PROCEDURE — 25000003 PHARM REV CODE 250: Performed by: STUDENT IN AN ORGANIZED HEALTH CARE EDUCATION/TRAINING PROGRAM

## 2025-05-25 PROCEDURE — S4991 NICOTINE PATCH NONLEGEND: HCPCS | Performed by: STUDENT IN AN ORGANIZED HEALTH CARE EDUCATION/TRAINING PROGRAM

## 2025-05-25 PROCEDURE — 94761 N-INVAS EAR/PLS OXIMETRY MLT: CPT

## 2025-05-25 PROCEDURE — 11000001 HC ACUTE MED/SURG PRIVATE ROOM

## 2025-05-25 PROCEDURE — 63600175 PHARM REV CODE 636 W HCPCS

## 2025-05-25 PROCEDURE — 99900035 HC TECH TIME PER 15 MIN (STAT)

## 2025-05-25 PROCEDURE — 25000003 PHARM REV CODE 250: Performed by: NURSE PRACTITIONER

## 2025-05-25 PROCEDURE — 25000003 PHARM REV CODE 250: Performed by: INTERNAL MEDICINE

## 2025-05-25 PROCEDURE — 63600175 PHARM REV CODE 636 W HCPCS: Performed by: NURSE PRACTITIONER

## 2025-05-25 PROCEDURE — 99900031 HC PATIENT EDUCATION (STAT)

## 2025-05-25 PROCEDURE — 36415 COLL VENOUS BLD VENIPUNCTURE: CPT | Performed by: NURSE PRACTITIONER

## 2025-05-25 PROCEDURE — 36415 COLL VENOUS BLD VENIPUNCTURE: CPT

## 2025-05-25 PROCEDURE — 83735 ASSAY OF MAGNESIUM: CPT

## 2025-05-25 PROCEDURE — 25000003 PHARM REV CODE 250

## 2025-05-25 PROCEDURE — 80048 BASIC METABOLIC PNL TOTAL CA: CPT | Performed by: NURSE PRACTITIONER

## 2025-05-25 PROCEDURE — 27000221 HC OXYGEN, UP TO 24 HOURS

## 2025-05-25 RX ADMIN — BUPRENORPHINE AND NALOXONE 2 FILM: 8; 2 FILM BUCCAL; SUBLINGUAL at 09:05

## 2025-05-25 RX ADMIN — SACUBITRIL AND VALSARTAN 1 TABLET: 24; 26 TABLET, FILM COATED ORAL at 09:05

## 2025-05-25 RX ADMIN — CEFAZOLIN 2 G: 2 INJECTION, POWDER, FOR SOLUTION INTRAMUSCULAR; INTRAVENOUS at 10:05

## 2025-05-25 RX ADMIN — TAMSULOSIN HYDROCHLORIDE 0.4 MG: 0.4 CAPSULE ORAL at 09:05

## 2025-05-25 RX ADMIN — ISOSORBIDE DINITRATE 20 MG: 10 TABLET ORAL at 10:05

## 2025-05-25 RX ADMIN — CEFAZOLIN 2 G: 2 INJECTION, POWDER, FOR SOLUTION INTRAMUSCULAR; INTRAVENOUS at 06:05

## 2025-05-25 RX ADMIN — ISOSORBIDE DINITRATE 20 MG: 10 TABLET ORAL at 09:05

## 2025-05-25 RX ADMIN — NICOTINE 1 PATCH: 21 PATCH, EXTENDED RELEASE TRANSDERMAL at 09:05

## 2025-05-25 RX ADMIN — MORPHINE SULFATE 2 MG: 2 INJECTION, SOLUTION INTRAMUSCULAR; INTRAVENOUS at 05:05

## 2025-05-25 RX ADMIN — ISOSORBIDE DINITRATE 20 MG: 10 TABLET ORAL at 05:05

## 2025-05-25 RX ADMIN — MORPHINE SULFATE 2 MG: 2 INJECTION, SOLUTION INTRAMUSCULAR; INTRAVENOUS at 01:05

## 2025-05-25 RX ADMIN — CEFAZOLIN 2 G: 2 INJECTION, POWDER, FOR SOLUTION INTRAMUSCULAR; INTRAVENOUS at 12:05

## 2025-05-25 RX ADMIN — INSULIN ASPART 2 UNITS: 100 INJECTION, SOLUTION INTRAVENOUS; SUBCUTANEOUS at 10:05

## 2025-05-25 RX ADMIN — ENOXAPARIN SODIUM 40 MG: 40 INJECTION SUBCUTANEOUS at 05:05

## 2025-05-25 RX ADMIN — CEFAZOLIN 2 G: 2 INJECTION, POWDER, FOR SOLUTION INTRAMUSCULAR; INTRAVENOUS at 05:05

## 2025-05-25 RX ADMIN — MORPHINE SULFATE 2 MG: 2 INJECTION, SOLUTION INTRAMUSCULAR; INTRAVENOUS at 10:05

## 2025-05-25 RX ADMIN — TORSEMIDE 20 MG: 20 TABLET ORAL at 05:05

## 2025-05-25 RX ADMIN — MORPHINE SULFATE 2 MG: 2 INJECTION, SOLUTION INTRAMUSCULAR; INTRAVENOUS at 02:05

## 2025-05-25 RX ADMIN — MORPHINE SULFATE 2 MG: 2 INJECTION, SOLUTION INTRAMUSCULAR; INTRAVENOUS at 09:05

## 2025-05-25 RX ADMIN — SPIRONOLACTONE 25 MG: 25 TABLET, FILM COATED ORAL at 09:05

## 2025-05-25 RX ADMIN — TORSEMIDE 20 MG: 20 TABLET ORAL at 09:05

## 2025-05-25 NOTE — ASSESSMENT & PLAN NOTE
"Received a life vest yesterday  Patient has Systolic (HFrEF) heart failure that is Acute on chronic. On presentation their CHF was decompensated. Evidence of decompensated CHF on presentation includes: edema, weight gain, orthopnea, dyspnea on exertion (ROBLEDO), and shortness of breath. Most recent BNP and echo results are listed below.  No results for input(s): "BNP" in the last 72 hours.    Latest ECHO  Results for orders placed during the hospital encounter of 01/09/25    Results for orders placed during the hospital encounter of 05/11/25    Echo    Interpretation Summary    Left Ventricle: The left ventricle is moderately dilated. There is moderate eccentric hypertrophy. Global hypokinesis present. There is moderately reduced systolic function with a visually estimated ejection fraction of 30 - 35%. Grade III diastolic dysfunction.    Right Ventricle: Right ventricle was not well visualized due to poor acoustic window. The right ventricle is dilated    Tricuspid Valve: There is mild regurgitation.    Pulmonary Artery: There is pulmonary hypertension. The estimated pulmonary artery systolic pressure is 64 mmHg.    IVC/SVC: Elevated venous pressure at 15 mmHg.      Current Heart Failure Medications  isosorbide dinitrate tablet 20 mg, 3 times daily, Oral  spironolactone tablet 25 mg, Daily, Oral  torsemide tablet 20 mg, 2 times daily, Oral  sacubitriL-valsartan 24-26 mg per tablet 1 tablet, 2 times daily, Oral    Plan  - Monitor strict I&Os and daily weights.    - Place on telemetry  - Low sodium diet  - Place on fluid restriction    - Cardiology has been consulted  -transitioned off Lasix drip and Dobutrex drip on 05/20-on oral Lasix    "

## 2025-05-25 NOTE — SUBJECTIVE & OBJECTIVE
Interval History:  Pt seen and examined.  He continues to feel better.  No new events overnight..  We will likely go to [facility], received LifeVest yesterday.    Review of Systems   All other systems reviewed and are negative.    Objective:     Vital Signs (Most Recent):  Temp: 97.9 °F (36.6 °C) (05/25/25 0837)  Pulse: 92 (05/25/25 0837)  Resp: 16 (05/25/25 0837)  BP: (!) 142/84 (05/25/25 0837)  SpO2: 98 % (05/25/25 0837) Vital Signs (24h Range):  Temp:  [97.8 °F (36.6 °C)-99 °F (37.2 °C)] 97.9 °F (36.6 °C)  Pulse:  [86-99] 92  Resp:  [16-19] 16  SpO2:  [94 %-99 %] 98 %  BP: (110-151)/(65-90) 142/84     Weight: 135.6 kg (299 lb)  Body mass index is 42.9 kg/m².    Intake/Output Summary (Last 24 hours) at 5/25/2025 0920  Last data filed at 5/25/2025 0614  Gross per 24 hour   Intake 720 ml   Output 3500 ml   Net -2780 ml         Physical Exam  Vitals and nursing note reviewed.   Constitutional:       Appearance: He is obese. Ill appearance: appears chronically ill.   Pulmonary:      Effort: Pulmonary effort is normal. No respiratory distress.      Breath sounds: Normal breath sounds.   Abdominal:      General: Abdomen is flat. Bowel sounds are normal.      Palpations: Abdomen is soft.   Musculoskeletal:      Cervical back: Normal range of motion and neck supple.      Comments: Right BKA  Left AKA   Neurological:      General: No focal deficit present.      Mental Status: He is alert.               Significant Labs: All pertinent labs within the past 24 hours have been reviewed.  CBC:   Recent Labs   Lab 05/24/25  0758   WBC 9.44   HGB 10.1*   HCT 33.9*        CMP:   Recent Labs   Lab 05/24/25  0758 05/25/25  0434   * 135*   K 5.0 5.1   CL 98 98   CO2 30* 29    144*   BUN 49* 50*   CREATININE 1.3 1.2   CALCIUM 8.6* 8.5*   ANIONGAP 5* 8       Significant Imaging: I have reviewed all pertinent imaging results/findings within the past 24 hours.

## 2025-05-25 NOTE — ASSESSMENT & PLAN NOTE
S/p R BKA and L AKA  Patient's FSGs are uncontrolled due to hyperglycemia on current medication regimen.  Last A1c reviewed-   Lab Results   Component Value Date    HGBA1C 7.3 (H) 05/12/2025     Most recent fingerstick glucose reviewed-   Recent Labs   Lab 05/24/25  1243 05/24/25  1732 05/24/25  2037 05/25/25  0834   POCTGLUCOSE 172* 184* 206* 183*     Current correctional scale  Low  Maintain anti-hyperglycemic dose as follows-   Antihyperglycemics (From admission, onward)      Start     Stop Route Frequency Ordered    05/12/25 1445  insulin glargine U-100 (Lantus) pen 10 Units         -- SubQ Daily 05/12/25 1332    05/12/25 0943  insulin aspart U-100 pen 0-10 Units         -- SubQ Before meals & nightly PRN 05/12/25 0843

## 2025-05-25 NOTE — ASSESSMENT & PLAN NOTE
Patient's blood pressure range in the last 24 hours was: BP  Min: 110/71  Max: 151/90.The patient's inpatient anti-hypertensive regimen is listed below:  Current Antihypertensives  isosorbide dinitrate tablet 20 mg, 3 times daily, Oral  spironolactone tablet 25 mg, Daily, Oral  torsemide tablet 20 mg, 2 times daily, Oral    Plan  -will make adjustment as needed
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
## 2025-05-26 LAB
ANION GAP (SMH): 5 MMOL/L (ref 8–16)
BUN SERPL-MCNC: 51 MG/DL (ref 6–20)
CALCIUM SERPL-MCNC: 9 MG/DL (ref 8.7–10.5)
CHLORIDE SERPL-SCNC: 98 MMOL/L (ref 95–110)
CO2 SERPL-SCNC: 31 MMOL/L (ref 23–29)
CREAT SERPL-MCNC: 1.3 MG/DL (ref 0.5–1.4)
GFR SERPLBLD CREATININE-BSD FMLA CKD-EPI: >60 ML/MIN/1.73/M2
GLUCOSE SERPL-MCNC: 182 MG/DL (ref 70–110)
MAGNESIUM SERPL-MCNC: 2.1 MG/DL (ref 1.6–2.6)
MAGNESIUM SERPL-MCNC: 2.1 MG/DL (ref 1.6–2.6)
MAGNESIUM SERPL-MCNC: 2.2 MG/DL (ref 1.6–2.6)
MAGNESIUM SERPL-MCNC: 2.3 MG/DL (ref 1.6–2.6)
POCT GLUCOSE: 137 MG/DL (ref 70–110)
POCT GLUCOSE: 167 MG/DL (ref 70–110)
POCT GLUCOSE: 202 MG/DL (ref 70–110)
POCT GLUCOSE: 208 MG/DL (ref 70–110)
POTASSIUM SERPL-SCNC: 5.8 MMOL/L (ref 3.5–5.1)
SODIUM SERPL-SCNC: 134 MMOL/L (ref 136–145)

## 2025-05-26 PROCEDURE — 99900035 HC TECH TIME PER 15 MIN (STAT)

## 2025-05-26 PROCEDURE — 82310 ASSAY OF CALCIUM: CPT | Performed by: NURSE PRACTITIONER

## 2025-05-26 PROCEDURE — 36415 COLL VENOUS BLD VENIPUNCTURE: CPT

## 2025-05-26 PROCEDURE — 83735 ASSAY OF MAGNESIUM: CPT

## 2025-05-26 PROCEDURE — 36410 VNPNXR 3YR/> PHY/QHP DX/THER: CPT

## 2025-05-26 PROCEDURE — 63600175 PHARM REV CODE 636 W HCPCS: Performed by: STUDENT IN AN ORGANIZED HEALTH CARE EDUCATION/TRAINING PROGRAM

## 2025-05-26 PROCEDURE — 94761 N-INVAS EAR/PLS OXIMETRY MLT: CPT

## 2025-05-26 PROCEDURE — 36415 COLL VENOUS BLD VENIPUNCTURE: CPT | Performed by: NURSE PRACTITIONER

## 2025-05-26 PROCEDURE — S4991 NICOTINE PATCH NONLEGEND: HCPCS | Performed by: STUDENT IN AN ORGANIZED HEALTH CARE EDUCATION/TRAINING PROGRAM

## 2025-05-26 PROCEDURE — 63600175 PHARM REV CODE 636 W HCPCS: Performed by: NURSE PRACTITIONER

## 2025-05-26 PROCEDURE — 25000003 PHARM REV CODE 250

## 2025-05-26 PROCEDURE — 27000221 HC OXYGEN, UP TO 24 HOURS

## 2025-05-26 PROCEDURE — 25000003 PHARM REV CODE 250: Performed by: NURSE PRACTITIONER

## 2025-05-26 PROCEDURE — 25000003 PHARM REV CODE 250: Performed by: INTERNAL MEDICINE

## 2025-05-26 PROCEDURE — 25000003 PHARM REV CODE 250: Performed by: FAMILY MEDICINE

## 2025-05-26 PROCEDURE — 25000003 PHARM REV CODE 250: Performed by: STUDENT IN AN ORGANIZED HEALTH CARE EDUCATION/TRAINING PROGRAM

## 2025-05-26 PROCEDURE — 99900031 HC PATIENT EDUCATION (STAT)

## 2025-05-26 PROCEDURE — 63600175 PHARM REV CODE 636 W HCPCS

## 2025-05-26 PROCEDURE — 11000001 HC ACUTE MED/SURG PRIVATE ROOM

## 2025-05-26 RX ORDER — TORSEMIDE 20 MG/1
40 TABLET ORAL 2 TIMES DAILY
Status: DISCONTINUED | OUTPATIENT
Start: 2025-05-26 | End: 2025-05-27 | Stop reason: HOSPADM

## 2025-05-26 RX ADMIN — TORSEMIDE 40 MG: 20 TABLET ORAL at 05:05

## 2025-05-26 RX ADMIN — CEFAZOLIN 2 G: 2 INJECTION, POWDER, FOR SOLUTION INTRAMUSCULAR; INTRAVENOUS at 10:05

## 2025-05-26 RX ADMIN — ENOXAPARIN SODIUM 40 MG: 40 INJECTION SUBCUTANEOUS at 05:05

## 2025-05-26 RX ADMIN — INSULIN GLARGINE 10 UNITS: 100 INJECTION, SOLUTION SUBCUTANEOUS at 09:05

## 2025-05-26 RX ADMIN — SODIUM POLYSTYRENE SULFONATE 30 G: 15 SUSPENSION ORAL; RECTAL at 09:05

## 2025-05-26 RX ADMIN — MORPHINE SULFATE 2 MG: 2 INJECTION, SOLUTION INTRAMUSCULAR; INTRAVENOUS at 08:05

## 2025-05-26 RX ADMIN — SACUBITRIL AND VALSARTAN 1 TABLET: 24; 26 TABLET, FILM COATED ORAL at 08:05

## 2025-05-26 RX ADMIN — SENNOSIDES AND DOCUSATE SODIUM 1 TABLET: 50; 8.6 TABLET ORAL at 09:05

## 2025-05-26 RX ADMIN — BUPRENORPHINE AND NALOXONE 2 FILM: 8; 2 FILM BUCCAL; SUBLINGUAL at 09:05

## 2025-05-26 RX ADMIN — SPIRONOLACTONE 25 MG: 25 TABLET, FILM COATED ORAL at 09:05

## 2025-05-26 RX ADMIN — TAMSULOSIN HYDROCHLORIDE 0.4 MG: 0.4 CAPSULE ORAL at 09:05

## 2025-05-26 RX ADMIN — SACUBITRIL AND VALSARTAN 1 TABLET: 24; 26 TABLET, FILM COATED ORAL at 09:05

## 2025-05-26 RX ADMIN — TORSEMIDE 20 MG: 20 TABLET ORAL at 09:05

## 2025-05-26 RX ADMIN — CEFAZOLIN 2 G: 2 INJECTION, POWDER, FOR SOLUTION INTRAMUSCULAR; INTRAVENOUS at 04:05

## 2025-05-26 RX ADMIN — NICOTINE 1 PATCH: 21 PATCH, EXTENDED RELEASE TRANSDERMAL at 10:05

## 2025-05-26 RX ADMIN — MORPHINE SULFATE 2 MG: 2 INJECTION, SOLUTION INTRAMUSCULAR; INTRAVENOUS at 03:05

## 2025-05-26 RX ADMIN — BUPRENORPHINE AND NALOXONE 2 FILM: 8; 2 FILM BUCCAL; SUBLINGUAL at 08:05

## 2025-05-26 RX ADMIN — MORPHINE SULFATE 2 MG: 2 INJECTION, SOLUTION INTRAMUSCULAR; INTRAVENOUS at 09:05

## 2025-05-26 RX ADMIN — CEFAZOLIN 2 G: 2 INJECTION, POWDER, FOR SOLUTION INTRAMUSCULAR; INTRAVENOUS at 09:05

## 2025-05-26 RX ADMIN — ISOSORBIDE DINITRATE 20 MG: 10 TABLET ORAL at 08:05

## 2025-05-26 RX ADMIN — CEFAZOLIN 2 G: 2 INJECTION, POWDER, FOR SOLUTION INTRAMUSCULAR; INTRAVENOUS at 05:05

## 2025-05-26 RX ADMIN — INSULIN ASPART 1 UNITS: 100 INJECTION, SOLUTION INTRAVENOUS; SUBCUTANEOUS at 08:05

## 2025-05-26 NOTE — SUBJECTIVE & OBJECTIVE
"Interval History:  Pt seen and examined.  He continues to feel better.  No new events overnight..  We will likely go to [facility], received LifeVest.  Pending authorization    Review of Systems   All other systems reviewed and are negative.    Objective:     Vital Signs (Most Recent):  Temp: 97.5 °F (36.4 °C) (05/26/25 0853)  Pulse: 95 (05/26/25 0853)  Resp: 18 (05/26/25 0925)  BP: (!) 146/80 (05/26/25 0853)  SpO2: 95 % (05/26/25 0853) Vital Signs (24h Range):  Temp:  [97.5 °F (36.4 °C)-98.9 °F (37.2 °C)] 97.5 °F (36.4 °C)  Pulse:  [] 95  Resp:  [16-19] 18  SpO2:  [95 %-100 %] 95 %  BP: (133-152)/(79-89) 146/80     Weight: 135.6 kg (299 lb)  Body mass index is 42.9 kg/m².    Intake/Output Summary (Last 24 hours) at 5/26/2025 0957  Last data filed at 5/26/2025 0411  Gross per 24 hour   Intake 240 ml   Output 4550 ml   Net -4310 ml         Physical Exam  Vitals and nursing note reviewed.   Constitutional:       Appearance: He is obese. Ill appearance: appears chronically ill.   Pulmonary:      Effort: Pulmonary effort is normal. No respiratory distress.      Breath sounds: Normal breath sounds.   Abdominal:      General: Abdomen is flat. Bowel sounds are normal.      Palpations: Abdomen is soft.   Musculoskeletal:      Cervical back: Normal range of motion and neck supple.      Comments: Right BKA  Left AKA   Neurological:      General: No focal deficit present.      Mental Status: He is alert.               Significant Labs: All pertinent labs within the past 24 hours have been reviewed.  CBC:   No results for input(s): "WBC", "HGB", "HCT", "PLT" in the last 48 hours.    CMP:   Recent Labs   Lab 05/25/25  0434 05/26/25  0426   * 134*   K 5.1 5.8*   CL 98 98   CO2 29 31*   * 182*   BUN 50* 51*   CREATININE 1.2 1.3   CALCIUM 8.5* 9.0   ANIONGAP 8 5*       Significant Imaging: I have reviewed all pertinent imaging results/findings within the past 24 hours.  "
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
## 2025-05-26 NOTE — PLAN OF CARE
Problem: Wound  Goal: Improved Oral Intake  Outcome: Progressing  Intervention: Promote and Optimize Oral Intake  Flowsheets (Taken 5/26/2025 1638)  Nutrition Interventions:   supplemental foods provided   food preferences provided
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
## 2025-05-26 NOTE — PLAN OF CARE
REX placed a call to [facility] at [phone] and Dasha with [facility] at [phone] regarding patient being ready for discharge. REX left a voicemail requesting a call back.

## 2025-05-26 NOTE — PT/OT/SLP PROGRESS
Physical Therapy      Patient Name:  Gideon Ross   MRN:  [id number]    Patient not seen today secondary to Patient unwilling to participate. Will follow-up 5/27/25.

## 2025-05-26 NOTE — ASSESSMENT & PLAN NOTE
Patient's blood pressure range in the last 24 hours was: BP  Min: 133/79  Max: 152/81.The patient's inpatient anti-hypertensive regimen is listed below:  Current Antihypertensives  isosorbide dinitrate tablet 20 mg, 3 times daily, Oral  spironolactone tablet 25 mg, Daily, Oral  torsemide tablet 20 mg, 2 times daily, Oral    Plan  -will make adjustment as needed

## 2025-05-26 NOTE — ASSESSMENT & PLAN NOTE
S/p R BKA and L AKA  Patient's FSGs are uncontrolled due to hyperglycemia on current medication regimen.  Last A1c reviewed-   Lab Results   Component Value Date    HGBA1C 7.3 (H) 05/12/2025     Most recent fingerstick glucose reviewed-   Recent Labs   Lab 05/25/25  1658 05/25/25  1909 05/25/25  2153 05/26/25  0854   POCTGLUCOSE 157* 187* 204* 202*     Current correctional scale  Low  Maintain anti-hyperglycemic dose as follows-   Antihyperglycemics (From admission, onward)      Start     Stop Route Frequency Ordered    05/12/25 1445  insulin glargine U-100 (Lantus) pen 10 Units         -- SubQ Daily 05/12/25 1332    05/12/25 0943  insulin aspart U-100 pen 0-10 Units         -- SubQ Before meals & nightly PRN 05/12/25 0843

## 2025-05-26 NOTE — ASSESSMENT & PLAN NOTE
On diuresis. Urology was consulted.  Weber placed    He will need follow up with Urology post discharge for evaluation of indwelling Weber

## 2025-05-26 NOTE — CARE UPDATE
05/26/25 0717   Patient Assessment/Suction   Level of Consciousness (AVPU) alert   Respiratory Effort Unlabored;Normal   Expansion/Accessory Muscles/Retractions no use of accessory muscles;no retractions;expansion symmetric   All Lung Fields Breath Sounds clear   Rhythm/Pattern, Respiratory shallow;unlabored;pattern regular;depth regular;chest wiggle adequate;no shortness of breath reported   Cough Frequency no cough   PRE-TX-O2   Device (Oxygen Therapy) room air   $ Is the patient on Low Flow Oxygen? Yes   SpO2 98 %   Pulse Oximetry Type Intermittent   $ Pulse Oximetry - Multiple Charge Pulse Oximetry - Multiple   Pulse 85   Resp 19   Aerosol Therapy   $ Aerosol Therapy Charges PRN treatment not required   Education   $ Education 15 min;Oxygen

## 2025-05-26 NOTE — PLAN OF CARE
REX placed a call to [facility] [phone] to follow up with patient's admission. REX was informed admission was not in today, but left a voicemail requesting a return call.

## 2025-05-26 NOTE — PT/OT/SLP PROGRESS
Occupational Therapy      Patient Name:  Gideon Ross   MRN:  [id number]    Patient not seen today secondary to Patient unwilling to participate in the AM, unable to return in the PM. Will follow-up next service date.    5/26/2025

## 2025-05-26 NOTE — ASSESSMENT & PLAN NOTE
"Received a life vest.  Patient has Systolic (HFrEF) heart failure that is Acute on chronic. On presentation their CHF was decompensated. Evidence of decompensated CHF on presentation includes: edema, weight gain, orthopnea, dyspnea on exertion (ROBLEDO), and shortness of breath. Most recent BNP and echo results are listed below.  No results for input(s): "BNP" in the last 72 hours.    Latest ECHO  Results for orders placed during the hospital encounter of 01/09/25    Results for orders placed during the hospital encounter of 05/11/25    Echo    Interpretation Summary    Left Ventricle: The left ventricle is moderately dilated. There is moderate eccentric hypertrophy. Global hypokinesis present. There is moderately reduced systolic function with a visually estimated ejection fraction of 30 - 35%. Grade III diastolic dysfunction.    Right Ventricle: Right ventricle was not well visualized due to poor acoustic window. The right ventricle is dilated    Tricuspid Valve: There is mild regurgitation.    Pulmonary Artery: There is pulmonary hypertension. The estimated pulmonary artery systolic pressure is 64 mmHg.    IVC/SVC: Elevated venous pressure at 15 mmHg.      Current Heart Failure Medications  isosorbide dinitrate tablet 20 mg, 3 times daily, Oral  spironolactone tablet 25 mg, Daily, Oral  torsemide tablet 20 mg, 2 times daily, Oral  sacubitriL-valsartan 24-26 mg per tablet 1 tablet, 2 times daily, Oral    Plan  - Monitor strict I&Os and daily weights.    - Place on telemetry  - Low sodium diet  - Place on fluid restriction    - Cardiology has been consulted  -transitioned off Lasix drip and Dobutrex drip on 05/20-on oral Lasix    "

## 2025-05-26 NOTE — PLAN OF CARE
Lifevest was delivered over the weekend to bedside.        05/26/25 0953   Post-Acute Status   Post-Acute Authorization HME   HME Status Set-up Complete/Auth obtained

## 2025-05-27 VITALS
OXYGEN SATURATION: 98 % | HEIGHT: 70 IN | HEART RATE: 91 BPM | BODY MASS INDEX: 42.8 KG/M2 | WEIGHT: 299 LBS | TEMPERATURE: 98 F | DIASTOLIC BLOOD PRESSURE: 71 MMHG | RESPIRATION RATE: 16 BRPM | SYSTOLIC BLOOD PRESSURE: 141 MMHG

## 2025-05-27 PROBLEM — T87.89 NON-HEALING WOUND OF AMPUTATION STUMP: Status: RESOLVED | Noted: 2025-05-11 | Resolved: 2025-05-27

## 2025-05-27 PROBLEM — F11.90 OPIOID USE DISORDER: Status: RESOLVED | Noted: 2025-01-10 | Resolved: 2025-05-27

## 2025-05-27 PROBLEM — E88.09 HYPOALBUMINEMIA: Status: RESOLVED | Noted: 2025-05-11 | Resolved: 2025-05-27

## 2025-05-27 PROBLEM — I50.21 ACUTE SYSTOLIC CONGESTIVE HEART FAILURE: Status: RESOLVED | Noted: 2025-01-10 | Resolved: 2025-05-27

## 2025-05-27 PROBLEM — R79.89 ELEVATED D-DIMER: Status: RESOLVED | Noted: 2025-01-09 | Resolved: 2025-05-27

## 2025-05-27 PROBLEM — N50.89 SCROTAL EDEMA: Status: RESOLVED | Noted: 2025-01-09 | Resolved: 2025-05-27

## 2025-05-27 LAB
ANION GAP (SMH): 6 MMOL/L (ref 8–16)
BUN SERPL-MCNC: 53 MG/DL (ref 6–20)
CALCIUM SERPL-MCNC: 8.4 MG/DL (ref 8.7–10.5)
CHLORIDE SERPL-SCNC: 98 MMOL/L (ref 95–110)
CO2 SERPL-SCNC: 30 MMOL/L (ref 23–29)
CREAT SERPL-MCNC: 1.4 MG/DL (ref 0.5–1.4)
GFR SERPLBLD CREATININE-BSD FMLA CKD-EPI: >60 ML/MIN/1.73/M2
GLUCOSE SERPL-MCNC: 174 MG/DL (ref 70–110)
MAGNESIUM SERPL-MCNC: 2.1 MG/DL (ref 1.6–2.6)
MAGNESIUM SERPL-MCNC: 2.1 MG/DL (ref 1.6–2.6)
MAGNESIUM SERPL-MCNC: 2.2 MG/DL (ref 1.6–2.6)
POCT GLUCOSE: 137 MG/DL (ref 70–110)
POTASSIUM SERPL-SCNC: 5.1 MMOL/L (ref 3.5–5.1)
SODIUM SERPL-SCNC: 134 MMOL/L (ref 136–145)

## 2025-05-27 PROCEDURE — 25000003 PHARM REV CODE 250

## 2025-05-27 PROCEDURE — 63600175 PHARM REV CODE 636 W HCPCS: Performed by: NURSE PRACTITIONER

## 2025-05-27 PROCEDURE — 25000003 PHARM REV CODE 250: Performed by: NURSE PRACTITIONER

## 2025-05-27 PROCEDURE — 27000221 HC OXYGEN, UP TO 24 HOURS

## 2025-05-27 PROCEDURE — 94761 N-INVAS EAR/PLS OXIMETRY MLT: CPT

## 2025-05-27 PROCEDURE — 36415 COLL VENOUS BLD VENIPUNCTURE: CPT | Performed by: NURSE PRACTITIONER

## 2025-05-27 PROCEDURE — 99900035 HC TECH TIME PER 15 MIN (STAT)

## 2025-05-27 PROCEDURE — 25000003 PHARM REV CODE 250: Performed by: FAMILY MEDICINE

## 2025-05-27 PROCEDURE — 63600175 PHARM REV CODE 636 W HCPCS: Performed by: STUDENT IN AN ORGANIZED HEALTH CARE EDUCATION/TRAINING PROGRAM

## 2025-05-27 PROCEDURE — 80048 BASIC METABOLIC PNL TOTAL CA: CPT | Performed by: NURSE PRACTITIONER

## 2025-05-27 PROCEDURE — 36410 VNPNXR 3YR/> PHY/QHP DX/THER: CPT

## 2025-05-27 PROCEDURE — 83735 ASSAY OF MAGNESIUM: CPT

## 2025-05-27 PROCEDURE — 36415 COLL VENOUS BLD VENIPUNCTURE: CPT

## 2025-05-27 PROCEDURE — 99900031 HC PATIENT EDUCATION (STAT)

## 2025-05-27 PROCEDURE — 97530 THERAPEUTIC ACTIVITIES: CPT | Mod: CQ

## 2025-05-27 PROCEDURE — 25000003 PHARM REV CODE 250: Performed by: STUDENT IN AN ORGANIZED HEALTH CARE EDUCATION/TRAINING PROGRAM

## 2025-05-27 PROCEDURE — 25000003 PHARM REV CODE 250: Performed by: INTERNAL MEDICINE

## 2025-05-27 RX ORDER — CEPHALEXIN 500 MG/1
500 CAPSULE ORAL EVERY 6 HOURS
Qty: 4 CAPSULE | Refills: 0 | Status: SHIPPED | OUTPATIENT
Start: 2025-05-27 | End: 2025-05-28

## 2025-05-27 RX ADMIN — MORPHINE SULFATE 2 MG: 2 INJECTION, SOLUTION INTRAMUSCULAR; INTRAVENOUS at 12:05

## 2025-05-27 RX ADMIN — SACUBITRIL AND VALSARTAN 1 TABLET: 24; 26 TABLET, FILM COATED ORAL at 09:05

## 2025-05-27 RX ADMIN — BUPRENORPHINE AND NALOXONE 2 FILM: 8; 2 FILM BUCCAL; SUBLINGUAL at 09:05

## 2025-05-27 RX ADMIN — SENNOSIDES AND DOCUSATE SODIUM 1 TABLET: 50; 8.6 TABLET ORAL at 09:05

## 2025-05-27 RX ADMIN — CEFAZOLIN 2 G: 2 INJECTION, POWDER, FOR SOLUTION INTRAMUSCULAR; INTRAVENOUS at 04:05

## 2025-05-27 RX ADMIN — CEFAZOLIN 2 G: 2 INJECTION, POWDER, FOR SOLUTION INTRAMUSCULAR; INTRAVENOUS at 09:05

## 2025-05-27 RX ADMIN — TORSEMIDE 40 MG: 20 TABLET ORAL at 09:05

## 2025-05-27 RX ADMIN — ISOSORBIDE DINITRATE 20 MG: 10 TABLET ORAL at 09:05

## 2025-05-27 RX ADMIN — MORPHINE SULFATE 2 MG: 2 INJECTION, SOLUTION INTRAMUSCULAR; INTRAVENOUS at 04:05

## 2025-05-27 RX ADMIN — TAMSULOSIN HYDROCHLORIDE 0.4 MG: 0.4 CAPSULE ORAL at 09:05

## 2025-05-27 RX ADMIN — CHLORHEXIDINE GLUCONATE 0.12% ORAL RINSE 15 ML: 1.2 LIQUID ORAL at 09:05

## 2025-05-27 RX ADMIN — MORPHINE SULFATE 2 MG: 2 INJECTION, SOLUTION INTRAMUSCULAR; INTRAVENOUS at 09:05

## 2025-05-27 RX ADMIN — INSULIN GLARGINE 10 UNITS: 100 INJECTION, SOLUTION SUBCUTANEOUS at 09:05
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
## 2025-05-27 NOTE — PLAN OF CARE
REX spoke with Anthony Collins [phone] at [facility] in regards to the SNF auth being expedited. Anthony reported she was going to contact [facility] regarding the auth being submitted. Anthony reported Dandre is in network with [facility].

## 2025-05-27 NOTE — PLAN OF CARE
Per Franky ESCOBAR with [facility], patient not eligible for new wheelchair at this time.  Per Franky, patient received wheelchair 2020 and they will submit for authorization.  Per Franky, if approved they will deliver wheelchair to patient's home address.       05/27/25 1212   Post-Acute Status   Post-Acute Authorization [facility] Status Pending payor review

## 2025-05-27 NOTE — PLAN OF CARE
05/27/25 1111   Discharge Reassessment   Assessment Type Discharge Planning Reassessment   Did the patient's condition or plan change since previous assessment? No   Discharge Plan discussed with: Patient   Communicated KATHERINE with patient/caregiver Yes   Discharge Plan A Home   Discharge Plan B Home   DME Needed Upon Discharge  wheelchair   Transition of Care Barriers Underinsured;Transportation;Mobility        Yes...

## 2025-05-27 NOTE — PLAN OF CARE
New order for wheelchair for home use received and sent to [facility] for review.        05/27/25 4774   Post-Acute Status   Post-Acute Authorization E   E Status Referrals Sent

## 2025-05-27 NOTE — PLAN OF CARE
REX spoke with Estrella [phone] regarding patient being admitted. Estrella reported she would have to update the clinical team about the lifevest plan then submit for auth. REX provided Estrella with patient's phone number for her to contact him for long term Medicaid information and application. Estrella reported she would meet with patient in person, if his phone was disconnected.      05/27/25 0939   Post-Acute Status   Post-Acute Authorization Placement   Post-Acute Placement Status Pending payor review/awaiting authorization (if required)   Discharge Delays None known at this time   Discharge Plan   Discharge Plan A Skilled Nursing Facility   Discharge Plan B Skilled Nursing Facility

## 2025-05-27 NOTE — PLAN OF CARE
REX placed a call to [facility] [phone] at [facility] to follow up on patient being admitted. REX did not receive an answer, but left a voicemail requesting a return call.

## 2025-05-27 NOTE — PT/OT/SLP PROGRESS
Occupational Therapy      Patient Name:  Gideon Ross   MRN:  [id number]    Patient not seen today secondary to Nurse/ KEYANNA hold. Will follow-up next service date.    5/27/2025

## 2025-05-27 NOTE — PT/OT/SLP PROGRESS
Physical Therapy Treatment    Patient Name:  Gideon Ross   MRN:  [id number]    Recommendations:     Discharge Recommendations: Low Intensity Therapy  Discharge Equipment Recommendations: wheelchair  Barriers to discharge: None    Assessment:     Gideon Ross is a 49 y.o. male admitted with a medical diagnosis of Acute systolic congestive heart failure.  He presents with the following impairments/functional limitations: weakness, impaired endurance, impaired functional mobility, gait instability, impaired balance, decreased lower extremity function, decreased safety awareness, impaired cardiopulmonary response to activity.    Pt reports feeling very tired and fatigued for the last couple of days. He reports that he has not been sleeping well at night. Pt also reports skin irritation and itching, thinking that he might be allergic to something.    Pt agreeable to sit EOB. Pt performed bed mobility modified independent. Pt sat EOB with supervision ~ 15 minutes.     and nurse entered.  discussed discharge disposition with pt. Pt requesting new wheelchair. Nurse informed about pt's complaints of itching and skin irritation.    Pt request to remain sitting EOB, bed alarm on.    Rehab Prognosis: Fair; patient would benefit from acute skilled PT services to address these deficits and reach maximum level of function.    Recent Surgery: Procedure(s) (LRB):  CYSTOSCOPY,WITH URETERAL CATHETER INSERTION (N/A) 12 Days Post-Op    Plan:     During this hospitalization, patient to be seen 3 x/week to address the identified rehab impairments via gait training, therapeutic activities, therapeutic exercises, neuromuscular re-education and progress toward the following goals:    Plan of Care Expires:  06/15/25    Subjective     Chief Complaint: feeling fatigued and itchy  Patient/Family Comments/goals: pt wanting new wheelchair  Pain/Comfort:  Pain Rating 1: 0/10      Objective:     Communicated with nurse prior  to session.  Patient found HOB elevated with telemetry, bed alarm, valle catheter upon PT entry to room.     General Precautions: Standard, fall  Orthopedic Precautions: N/A  Braces: N/A  Respiratory Status: Room air     Functional Mobility:  Bed Mobility:     Supine to Sit: modified independence  Balance: sitting EOB ~ 15 minutes supervision      AM-PAC 6 CLICK MOBILITY          Treatment & Education:  Pt educated on importance of time OOB, importance of intermittent mobility, safe techniques for transfers/ambulation, discharge recommendations/options, and use of call light for assistance and fall prevention.      Patient left sitting edge of bed with all lines intact, call button in reach, bed alarm on, and nurse notified..    GOALS:   Multidisciplinary Problems       Physical Therapy Goals          Problem: Physical Therapy    Goal Priority Disciplines Outcome Interventions   Physical Therapy Goal     PT, PT/OT Progressing    Description: Goals to be met by: 6/15/25     Patient will increase functional independence with mobility by performin. Supine to sit with mod IND  2. Bed to chair transfer with scoot pivot and mod IND  3. Pt to self propel WC with bilateral UE 150ft mod IND.    Goals edited 25                             DME Justifications:  Gideon Ross has a mobility limitation that significantly impairs his ability to participate in one or more mobility related activities of daily living (MRADLs) such as toileting, feeding, dressing, grooming, and bathing in customary locations in the home.  The mobility limitation cannot be sufficiently resolved by the use of a cane or walker.   The use of a manual wheelchair will significantly improve the patients ability to participate in MRADLS and the patient will use it on regular basis in the home.  Gideon Ross has expressed his willingness to use a manual wheelchair in the home. Patients upper body strength is sufficient for propulsion.  He also  has a caregiver who is available, willing, and able to provide assistance with the wheelchair when needed.      Time Tracking:     PT Received On: 05/27/25  PT Start Time: 1040     PT Stop Time: 1103  PT Total Time (min): 23 min     Billable Minutes: Therapeutic Activity 23    Treatment Type: Treatment  PT/PTA: PTA     Number of PTA visits since last PT visit: 1 05/27/2025

## 2025-05-27 NOTE — NURSING
Pt left via SPD in wheelchair with all belongings including suboxone that was sealed in a bag from pharmacy.

## 2025-05-27 NOTE — PLAN OF CARE
Spoke with Franky at [facility], she states patient has received a wheelchair in 2020 and can get another once auth is approved by insurance, she states once approved it will be delivered to his home.        05/27/25 1214   Post-Acute Status   Post-Acute Authorization E   E Status (!) Pending Delivery

## 2025-05-27 NOTE — PLAN OF CARE
Patient cleared for discharge to home.    ADT 30 entered for wheelchair van transport home.        05/27/25 1219   Final Note   Assessment Type Final Discharge Note   Anticipated Discharge Disposition Home   Hospital Resources/Appts/Education Provided Appointments scheduled and added to AVS

## 2025-05-27 NOTE — PLAN OF CARE
SW met with patient at bedside to discuss the discharge plan. Radha, floor nurse, and therapy were in the room during the discussion. SW discussed with patient that due to him being at his baseline with therapy and being able to get around outside and on the floor. Patient asked if he was no longer going to be admitted to [place] now. SW discussed that he could continue the care home placement process outpatient and that the contact information would be provided to him. Patient reported he does not have any where to stay. SW discussed patient's living arrangements prior to being hospitalized. Patient reported there are seven people in the home and the home being a hoarder house. SW asked patient about him staying with his mother and he reported his mother is living with her grandmother too. SW informed patient that outpatient case management has been referred. SW asked patient about transportation and he reported he would need Medicaid bus to bring him home. SW asked patient about a family member to contact and let them know about discharge. Patient reported no because they don't answer the phone.     SW attempted to contact patient's mother, Jolatna, [phone] to update her about patient's discharge. SW received a voicemail for [facility] and did not leave a voicemail due to the mailbox not being for the right name.

## 2025-05-27 NOTE — CARE UPDATE
05/27/25 0724   Patient Assessment/Suction   Level of Consciousness (AVPU) alert   Respiratory Effort Unlabored;Normal   Expansion/Accessory Muscles/Retractions no use of accessory muscles;no retractions;expansion symmetric   All Lung Fields Breath Sounds clear   Rhythm/Pattern, Respiratory unlabored;pattern regular;depth regular;chest wiggle adequate;no shortness of breath reported   Cough Frequency no cough   PRE-TX-O2   Device (Oxygen Therapy) room air   $ Is the patient on Low Flow Oxygen? Yes   SpO2 97 %   Pulse Oximetry Type Intermittent   $ Pulse Oximetry - Multiple Charge Pulse Oximetry - Multiple   Pulse 95   Resp 19   Aerosol Therapy   $ Aerosol Therapy Charges PRN treatment not required   Education   $ Education 15 min;Oxygen

## 2025-05-28 LAB
OHS QRS DURATION: 120 MS
OHS QTC CALCULATION: 442 MS

## 2025-05-29 ENCOUNTER — PATIENT MESSAGE (OUTPATIENT)
Dept: CASE MANAGEMENT | Facility: HOSPITAL | Age: 50
End: 2025-05-29

## 2025-05-29 ENCOUNTER — TELEPHONE (OUTPATIENT)
Dept: FAMILY MEDICINE | Facility: CLINIC | Age: 50
End: 2025-05-29
Payer: MEDICAID

## 2025-05-30 ENCOUNTER — PATIENT MESSAGE (OUTPATIENT)
Dept: CASE MANAGEMENT | Facility: HOSPITAL | Age: 50
End: 2025-05-30

## 2025-06-02 ENCOUNTER — PATIENT MESSAGE (OUTPATIENT)
Facility: CLINIC | Age: 50
End: 2025-06-02

## 2025-06-02 ENCOUNTER — PATIENT OUTREACH (OUTPATIENT)
Dept: ADMINISTRATIVE | Facility: OTHER | Age: 50
End: 2025-06-02
Payer: MEDICAID

## 2025-06-09 ENCOUNTER — OFFICE VISIT (OUTPATIENT)
Facility: CLINIC | Age: 50
End: 2025-06-09
Payer: MEDICAID

## 2025-06-09 DIAGNOSIS — I10 HYPERTENSION, UNSPECIFIED TYPE: ICD-10-CM

## 2025-06-09 DIAGNOSIS — Z89.511 S/P BELOW KNEE AMPUTATION, RIGHT: ICD-10-CM

## 2025-06-09 DIAGNOSIS — Z89.612 S/P AKA (ABOVE KNEE AMPUTATION) UNILATERAL, LEFT: ICD-10-CM

## 2025-06-09 DIAGNOSIS — I50.21 ACUTE SYSTOLIC CONGESTIVE HEART FAILURE: Primary | ICD-10-CM

## 2025-06-09 DIAGNOSIS — N50.89 SCROTAL EDEMA: ICD-10-CM

## 2025-06-09 DIAGNOSIS — T87.89 NON-HEALING WOUND OF AMPUTATION STUMP: ICD-10-CM

## 2025-06-09 DIAGNOSIS — E11.59 TYPE 2 DIABETES MELLITUS WITH OTHER CIRCULATORY COMPLICATION, UNSPECIFIED WHETHER LONG TERM INSULIN USE: ICD-10-CM

## 2025-06-09 PROCEDURE — 98015 SYNCH AUDIO-ONLY EST HIGH 40: CPT | Mod: 93,,,

## 2025-06-09 PROCEDURE — 3051F HG A1C>EQUAL 7.0%<8.0%: CPT | Mod: CPTII,93,,

## 2025-06-09 RX ORDER — ACETAMINOPHEN 500 MG
1 TABLET ORAL DAILY
Qty: 1 EACH | Refills: 0 | Status: SHIPPED | OUTPATIENT
Start: 2025-06-09
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
## 2025-06-10 ENCOUNTER — OFFICE VISIT (OUTPATIENT)
Facility: CLINIC | Age: 50
End: 2025-06-10
Payer: MEDICAID

## 2025-06-10 DIAGNOSIS — F11.20 OPIOID USE DISORDER, SEVERE, ON MAINTENANCE THERAPY, DEPENDENCE: Primary | ICD-10-CM

## 2025-06-10 DIAGNOSIS — F32.A DEPRESSION, UNSPECIFIED DEPRESSION TYPE: ICD-10-CM

## 2025-06-10 DIAGNOSIS — F41.9 ANXIETY: ICD-10-CM

## 2025-06-10 DIAGNOSIS — F11.90 OPIOID USE DISORDER: ICD-10-CM

## 2025-06-10 PROCEDURE — G2211 COMPLEX E/M VISIT ADD ON: HCPCS | Mod: 93,,, | Performed by: NEUROMUSCULOSKELETAL MEDICINE & OMM

## 2025-06-10 PROCEDURE — 3051F HG A1C>EQUAL 7.0%<8.0%: CPT | Mod: CPTII,93,, | Performed by: NEUROMUSCULOSKELETAL MEDICINE & OMM

## 2025-06-10 PROCEDURE — 1159F MED LIST DOCD IN RCRD: CPT | Mod: CPTII,93,, | Performed by: NEUROMUSCULOSKELETAL MEDICINE & OMM

## 2025-06-10 PROCEDURE — 1160F RVW MEDS BY RX/DR IN RCRD: CPT | Mod: CPTII,93,, | Performed by: NEUROMUSCULOSKELETAL MEDICINE & OMM

## 2025-06-10 PROCEDURE — 98009 SYNCH AUDIO-ONLY NEW LOW 30: CPT | Mod: 93,,, | Performed by: NEUROMUSCULOSKELETAL MEDICINE & OMM

## 2025-06-10 RX ORDER — BUPRENORPHINE AND NALOXONE 8; 2 MG/1; MG/1
2 FILM, SOLUBLE BUCCAL; SUBLINGUAL 2 TIMES DAILY
Qty: 120 FILM | Refills: 0 | Status: SHIPPED | OUTPATIENT
Start: 2025-06-10 | End: 2025-07-10

## 2025-06-10 NOTE — PROGRESS NOTES
Addiction Medicine  Virtual Encounter    Date of Service: 6/10/2025    Name:  Gideon Ross   MRN:  [id number]   :  1975   Address:  [address]  [place] [address]   Phone:  [phone]      The patient location is:  Home  The patient State: [place]; Provider licensed in [place] (#[id number]), [place] (#[id number]), [place] (#[id number]), and [place] (#[id number]).  The patient phone number is: [phone]   Visit type: Virtual visit with synchronous audio and video  Each patient to whom is provided medical services by telemedicine is:  (1) informed of the relationship between the physician and patient and the respective role of any other health care provider with respect to management of the patient; and (2) notified that he or she may decline to receive medical services by telemedicine and may withdraw from such care at any time.  Crisis Disclaimer: Patient was informed that due to the virtual nature of the visit, that if a crisis develops, protocols will be implemented to ensure patient safety, including but not limited to: 1) Initiating a welfare check with local Law Enforcement, 2) Calling 1/National Crisis Hotline, and/or 3) Initiating PEC/CEC procedures.      Subjective     06/10/2025     History of Present Illness    Patient presents today for follow-up of opioid use disorder. He is currently taking Suboxone 24 mg daily, administered as three 8 mg films, down from previous four films daily due to insurance coverage issues. He reports occasional using dreams and morning episodes of perceived withdrawal symptoms. He experiences regular cravings and thoughts about using substances. He reports ongoing depression and anxiety, though symptoms are currently well-managed without medications. He has moved in with his mother since hospital discharge. His mother assists with medication management and self-care activities. He reports doing well with this arrangement. He reports decreased fluid  retention with reduced swelling and good catheter output. He expresses feeling positive about his health status.             Objective     Physical Exam:  No acute distress. Normal appearance. No scleral icterus. Conjunctivae normal. Pulmonary effort is normal. Nor respiratory distress. Alert and oriented x 3. Attention and perception appear normal. Mood and affect appear normal. Speech normal. Patient is cooperative.       Assessment / Plan     F11.20 Opioid use disorder, severe, on maintenance therapy, dependence  F32.A Depression, unspecified depression type  F41.9 Anxiety  F11.90 Opioid use disorder    Current dosage may be insufficient due to long-term fentanyl use history.  Overall health has improved since recent hospitalization.  Depression and anxiety appear well-managed without medications.  Noted improvement in fluid retention and catheter output.    PLAN :  Plan to increase Suboxone to 32 mg daily (4 x 8 mg films) pending insurance authorization  Continue current Suboxone dose of 24 mg daily (3 x 8 mg films) if authorization denied  Patient to continue to follow with other healthcare providers.  Virtual follow up in 1 month  In-person follow up in 2 months                   SHO Langston DO    Board Certified, Addiction Medicine  Board Certified, Neuromusculoskeletal Medicine    Portions of this documentation may have been dictated using voice recognition software and may contain dictation related errors in spelling / grammar / syntax not discovered on text review.         Subsequent patient encounter.  Clinic encounter.  Added Complexity: Visit today included increased complexity associated with the care of the episodic problem(s) addressed and managing the longitudinal care of the patient due to the serious and/or complex managed problem(s).  [place] SYNCHRONOUS AUDIO-ONLY VISIT, NEW, LOW MDM 30+ MIN [78778]  I spent at total of at least 10 minutes of direct synchronized audio communication with the patient  on the day of the encounter.

## 2025-06-10 NOTE — ASSESSMENT & PLAN NOTE
Hx of left AKA and Right BKA.  Patient is wheelchair bound.  Discussed skin barrier precautions to prevent skin breakdown.

## 2025-06-10 NOTE — ASSESSMENT & PLAN NOTE
Recent A1c 7.3  Patient has not been on insulin at home for at least a year.  Unable to tolerate Metformin per patient.  Start Jardiance 10mg daily, Rx sent. SERBs of medication discussed, patient voiced understanding.  Check blood glucose and keep log. Fasting AM blood glucose goal under 130. 2 hr post-meal glucose goal under 180. Exercise 5 days weekly for at least 30 minutes. Avoid foods with high sugar content such as sodas, juices, candies. Well-balanced diet including non-starchy vegetables, lean proteins, and whole grains.   Recommend routine diabetic eye exams, foot exams, and dental visits.  Follow-up with PCP.

## 2025-06-10 NOTE — ASSESSMENT & PLAN NOTE
Patient unable to provide home BP reading at today's audio visit.  Instructed to continue current prescription therapy.  Sent order for BP monitor to patient's pharmacy.  Instructed patient to check BP daily and keep log to bring to future apts.  Cardiac diet- low salt, low sodium.  Follow-up with Cardiology and PCP.

## 2025-06-10 NOTE — ASSESSMENT & PLAN NOTE
Recently treated with antibiotic therapy during hospitalization for non-healing wound to right BKA stump.  Completed antibiotic course on discharge.  Healing well per patient, wound now almost resolved.  Continue daily dressing changes as instructed.  Follow-up with wound care as instructed.

## 2025-06-12 ENCOUNTER — TELEPHONE (OUTPATIENT)
Dept: CARDIOLOGY | Facility: CLINIC | Age: 50
End: 2025-06-12
Payer: MEDICAID

## 2025-06-12 NOTE — TELEPHONE ENCOUNTER
Called to informed the patient that he will not be able to be seen because the pt is not in network  he was ask to call his insurance to see if who was in network

## 2025-06-12 NOTE — TELEPHONE ENCOUNTER
----- Message from Erasmo Mariee sent at 6/9/2025  4:15 PM CDT -----  Please schedule patient appointment from referral

## 2025-06-13 NOTE — CONSULTS
Received message from staff that patient reports side effects of feeling \"high, disoriented, and blurred vision in his right eye for 2 hours\" after taking Lyrica with increased dosing from 75 mg nightly to 75 mg TID. He has only taken the increased dose for one day. Initially he was going to decrease back to his previous dose and see if the side effects resolved; however, he would like to try 75 mg BID. Will change to BID with the hope that he will tolerate it and we can continue to up-titrate to address his anxiety. He was instructed by staff that side effects often resolve within a couple weeks after starting or increasing a medication.   [facility]  Department of Cardiology  Consult Note      PATIENT NAME: Gideon Ross  MRN: [id number]  TODAY'S DATE: 05/12/2025  ADMIT DATE: 5/11/2025                          CONSULT REQUESTED BY: Meron Marin MD    SUBJECTIVE     PRINCIPAL PROBLEM: Acute systolic congestive heart failure      REASON FOR CONSULT:  Acute on chronic CHF, life vest patient      HPI:      Pt is 49-year-old male w/ PMH HFrEF, DM, HTN, DM, cardiomyopathy, R BKA, left AKA, and substance dependence who presented to ED with c/o shortness of breath, swelling, and orthopnea for past week.    Pt has been receiving IV lasix since admission and is reportedly breathing better today but remains orthopneic, and swelling to upper legs, scrotum and abdomen still present. Still on 4 LPM O2    Review of patient's allergies indicates:  No Known Allergies    Past Medical History:   Diagnosis Date    Diabetes mellitus     Hypertension      Past Surgical History:   Procedure Laterality Date    SKIN GRAFT       Social History[1]     REVIEW OF SYSTEMS  Negative except as mentioned in HPI    OBJECTIVE     VITAL SIGNS (Most Recent)  Temp: 98.7 °F (37.1 °C) (05/11/25 1954)  Pulse: 85 (05/12/25 0146)  Resp: (!) 7 (05/12/25 0146)  BP: (!) 147/96 (05/12/25 0104)  SpO2: 100 % (05/12/25 0146)    VENTILATION STATUS  Resp: (!) 7 (05/12/25 0146)  SpO2: 100 % (05/12/25 0146)           I & O (Last 24H):  Intake/Output Summary (Last 24 hours) at 5/12/2025 0826  Last data filed at 5/12/2025 0539  Gross per 24 hour   Intake 501.86 ml   Output --   Net 501.86 ml       WEIGHTS  Wt Readings from Last 3 Encounters:   05/11/25 1954 97.5 kg (215 lb)   01/26/25 0338 117.5 kg (259 lb 0.7 oz)   01/21/25 0515 117.9 kg (260 lb)   01/15/25 0400 126.1 kg (278 lb)   01/14/25 0400 127.1 kg (280 lb 3.2 oz)   01/09/25 2239 115.4 kg (254 lb 6.4 oz)   01/09/25 0824 108.9 kg (240 lb)   07/19/22 1911 99.8 kg (220 lb)       PHYSICAL EXAM    GENERAL: chronically ill middle age male  "breathing comfortably w/ HOB elevated  HEENT: Normocephalic.    NECK: No JVD.   CARDIAC: Regular rate and rhythm. S1 is normal.S2 is normal.No gallops, clicks or murmurs noted at this time.  CHEST ANATOMY: normal.   LUNGS: O2 via NC, no dyspnea or cough; crackles audible   ABDOMEN: ascites, distended.  +scrotal edema    EXTREMITIES: edematous; right BKA edematous with wound, Left AKA edematous  CENTRAL NERVOUS SYSTEM: AAO x 3  SKIN: No rash     HOME MEDICATIONS:Medications Ordered Prior to Encounter[2]    SCHEDULED MEDS:   buprenorphine-naloxone 8-2 mg  2 Film Sublingual BID    enoxparin  40 mg Subcutaneous Daily    furosemide (LASIX) injection  40 mg Intravenous Q12H    piperacillin-tazobactam (Zosyn) IV (PEDS and ADULTS) (extended infusion is not appropriate)  4.5 g Intravenous Q8H    vancomycin (VANCOCIN) IV (PEDS and ADULTS)  1,750 mg Intravenous Q12H       CONTINUOUS INFUSIONS:    PRN MEDS:  Current Facility-Administered Medications:     acetaminophen, 650 mg, Oral, Q6H PRN    bisacodyL, 10 mg, Rectal, Daily PRN    dextrose 50%, 12.5 g, Intravenous, PRN    glucagon (human recombinant), 1 mg, Intramuscular, PRN    insulin aspart U-100, 0-5 Units, Subcutaneous, Q6H PRN    ketorolac, 15 mg, Intravenous, Q6H PRN    melatonin, 6 mg, Oral, Nightly PRN    promethazine, 12.5 mg, Rectal, Q6H PRN    sodium chloride 0.9%, 10 mL, Intravenous, PRN    Pharmacy to dose Vancomycin consult, , , Once **AND** vancomycin - pharmacy to dose, , Intravenous, pharmacy to manage frequency    LABS AND DIAGNOSTICS     CBC LAST 3 DAYS  Recent Labs   Lab 05/11/25  2106 05/12/25  0501   WBC 8.13 8.33   RBC 3.82* 4.12*   HGB 9.5* 10.2*   HCT 31.2* 34.0*   MCV 82 83   MCH 24.9* 24.8*   MCHC 30.4* 30.0*   RDW 16.1* 16.0*    225   MPV 10.2 10.0   NRBC 0 0       COAGULATION LAST 3 DAYS  No results for input(s): "LABPT", "INR", "APTT" in the last 168 hours.    CHEMISTRY LAST 3 DAYS  Recent Labs   Lab 05/11/25  2106 05/12/25  0501    " "140   K 3.1* 3.7    102   CO2 29 33*   ANIONGAP 9 5*   BUN 23* 24*   CREATININE 0.9 0.9   * 147*   CALCIUM 7.9* 8.3*   MG  --  1.7   ALBUMIN 2.4* 2.7*   PROT 5.5* 6.3   ALKPHOS 145* 148*   ALT 5* 6*   AST 9* 10   BILITOT 0.5 0.5       CARDIAC PROFILE LAST 3 DAYS  Recent Labs   Lab 05/11/25  2106   BNP 1,998*       ENDOCRINE LAST 3 DAYS  Recent Labs   Lab 05/12/25  0040   TSH 4.416       LAST ARTERIAL BLOOD GAS  ABG  No results for input(s): "PH", "PO2", "PCO2", "HCO3", "BE" in the last 168 hours.    LAST 7 DAYS MICROBIOLOGY   Microbiology Results (last 7 days)       Procedure Component Value Units Date/Time    Blood culture [[id number]]  (Normal) Collected: 05/12/25 0004    Order Status: Completed Specimen: Blood Updated: 05/12/25 0702     CULTURE, BLOOD ([facility]) No Growth After 6 Hours    Blood culture [[id number]]  (Normal) Collected: 05/11/25 2331    Order Status: Completed Specimen: Blood Updated: 05/12/25 0603     CULTURE, BLOOD ([facility]) No Growth After 6 Hours    Influenza A & B by Molecular [[id number]]  (Normal) Collected: 05/12/25 0040    Order Status: Completed Specimen: Nasal Swab Updated: 05/12/25 0150     INFLUENZA A MOLECULAR Negative     INFLUENZA B MOLECULAR  Negative    Aerobic culture [[id number]] Collected: 05/12/25 0040    Order Status: Sent Specimen: Wound from Leg, Right Updated: 05/12/25 0111            MOST RECENT IMAGING  X-Ray Knee 1 or 2 View Right  Narrative: EXAMINATION:  XR KNEE 1 OR 2 VIEW RIGHT    CLINICAL HISTORY:  pain;stump Wound;    TECHNIQUE:  AP and lateral views of the right knee were performed.    COMPARISON:  None    FINDINGS:  Osteopenia.  Postsurgical changes of below-knee amputation.  Question slight erosive irregularity at the resection margins.  Soft tissue stump with soft tissue defect evident.  Impression: As above.  Recommend follow-up MRI with and without contrast    Electronically signed by: Doron Maynard  Date:    05/12/2025  Time:    00:31  US Scrotum " And Testicles  Narrative: EXAMINATION:  US SCROTUM AND TESTICLES    CLINICAL HISTORY:  Scrotal swelling;    TECHNIQUE:  Sonography of the scrotum and testes.    COMPARISON:  None.    FINDINGS:  Right Testicle:    *Size: 4.2 x 2.6 x 3.0 cm  *Appearance: Normal.  *Flow: Normal arterial and venous flow  *Epididymis: Normal.  *Hydrocele: Small.  *Varicocele: None.  .    Left Testicle:    *Size: 3.1 x 2.3 x 2.4 cm  *Appearance: Normal.  *Flow: Normal arterial and venous flow  *Epididymis: Normal.  *Hydrocele: Small.  *Varicocele: None.  .    Other findings: Bilateral severe scrotal edema.  Impression: No acute testicular abnormality.    Small bilateral hydroceles.    Bilateral severe scrotal edema.    Electronically signed by: Janes Tracey MD  Date:    05/12/2025  Time:    00:27      ECHOCARDIOGRAM RESULTS (last 5)  Results for orders placed during the hospital encounter of 01/09/25    Echo    Interpretation Summary    Left Ventricle: The left ventricle is moderately dilated. Mildly increased wall thickness. There is mild asymmetric hypertrophy. Severe global hypokinesis present. There is severely reduced systolic function with a visually estimated ejection fraction of 25 - 30%.    Right Ventricle: Moderate right ventricular enlargement. Systolic function is mildly reduced.    Left Atrium: Left atrium is mildly dilated.    Tricuspid Valve: There is mild regurgitation.    IVC/SVC: Elevated venous pressure at 15 mmHg.      CURRENT/PREVIOUS VISIT EKG  Results for orders placed or performed during the hospital encounter of 05/11/25   EKG 12-lead    Collection Time: 05/12/25  4:05 AM   Result Value Ref Range    QRS Duration 152 ms    [facility]HS QTC Calculation 462 ms    Narrative    Test Reason : R06.02,    Vent. Rate :  82 BPM     Atrial Rate :  82 BPM     P-R Int : 112 ms          QRS Dur : 152 ms      QT Int : 396 ms       P-R-T Axes :  48 202  50 degrees    QTcB Int : 462 ms    Normal sinus rhythm  Right bundle branch  block  Inferior infarct ,age undetermined  Abnormal ECG  No previous ECGs available    Referred By: AAAREFERRAL SELF           Confirmed By:            ASSESSMENT/PLAN:     Active Hospital Problems    Diagnosis    *Acute systolic congestive heart failure    HTN (hypertension)    Prolonged QT interval    Hypoalbuminemia    Non-healing wound of amputation stump    Opioid use disorder    Diabetes mellitus       ASSESSMENT & PLAN:   Acute on chronic HFrEF  Cardiomyopathy  Substance abuse  Right BKA wound  Left AKA    RECOMMENDATIONS:  BNP 1998, + pulmonary vascular congestion on Xray  Troponin slight elevation x 3 w/ flat trend, 2/2 HF exacerbation  Continue IV diuresis with lasix, obtain strict I/O, daily weight; low Na diet, 1.5 L fluid restriction  2D Echocardiogram completed today, await results  Ischemic evaluation after recovers from HF, tentatively later this week      Denice Martinez NP  [facility]  Department of Cardiology  Date of Service: 05/12/2025               [1]   Social History  Tobacco Use    Smoking status: Every Day     Current packs/day: 1.00     Types: Cigarettes   Substance Use Topics    Alcohol use: Yes    Drug use: No   [2]   No current facility-administered medications on file prior to encounter.     Current Outpatient Medications on File Prior to Encounter   Medication Sig Dispense Refill    buprenorphine-naloxone 8-2 mg (SUBOXONE) 8-2 mg Place 2 Film under the tongue 2 (two) times a day.      isosorbide dinitrate (ISORDIL) 20 MG tablet Take 1 tablet (20 mg total) by mouth 3 (three) times daily. 90 tablet 11    tamsulosin (FLOMAX) 0.4 mg Cap Take 1 capsule (0.4 mg total) by mouth once daily. 30 capsule 11    torsemide (DEMADEX) 20 MG Tab Take 1 tablet (20 mg total) by mouth 2 (two) times a day. 60 tablet 11    insulin aspart U-100 (NOVOLOG) 100 unit/mL (3 mL) InPn pen Inject 0-10 Units into the skin before meals and at bedtime as needed (Hyperglycemia). **MODERATE CORRECTION  "DOSE**  Blood Glucose  mg/dL                  Pre-meal                2200  151-200                2 units                    1 unit  201-250                4 units                    2 units  251-300                6 units                    3 units  301-350                8 units                    4 units  >350                     10 units                  5 units  Administer subcutaneously if needed at times designated by monitoring  schedule.  DO NOT HOLD correction dose insulin for patients who are  NPO.    "HIGH ALERT MEDICATION" - Administer with meals or TF/TPN. (Patient not taking: Reported on 2/12/2025) 1 each 0     "

## 2025-07-10 ENCOUNTER — OFFICE VISIT (OUTPATIENT)
Dept: INFECTIOUS DISEASES | Facility: CLINIC | Age: 50
End: 2025-07-10
Payer: MEDICAID

## 2025-07-10 VITALS
OXYGEN SATURATION: 96 % | WEIGHT: 200 LBS | DIASTOLIC BLOOD PRESSURE: 66 MMHG | BODY MASS INDEX: 28.63 KG/M2 | HEIGHT: 70 IN | SYSTOLIC BLOOD PRESSURE: 138 MMHG | TEMPERATURE: 98 F

## 2025-07-10 DIAGNOSIS — R16.2 HEPATOSPLENOMEGALY: ICD-10-CM

## 2025-07-10 DIAGNOSIS — E11.59 TYPE 2 DIABETES MELLITUS WITH OTHER CIRCULATORY COMPLICATION, UNSPECIFIED WHETHER LONG TERM INSULIN USE: ICD-10-CM

## 2025-07-10 DIAGNOSIS — B18.2 CHRONIC HEPATITIS C WITHOUT HEPATIC COMA: Primary | ICD-10-CM

## 2025-07-10 PROCEDURE — 99213 OFFICE O/P EST LOW 20 MIN: CPT | Mod: PBBFAC,PN | Performed by: INTERNAL MEDICINE

## 2025-07-10 PROCEDURE — 99999 PR PBB SHADOW E&M-EST. PATIENT-LVL III: CPT | Mod: PBBFAC,,, | Performed by: INTERNAL MEDICINE

## 2025-07-10 NOTE — PATIENT INSTRUCTIONS
I have ordered labs and abdominal ultrasound for you   I will plan to see you again in 4 weeks to follow up on labs   Plan to start you on 3 grew HCV at that time after reviewing your results

## 2025-07-10 NOTE — PROGRESS NOTES
Subjective:      Reason for consult:  HCV    HPI: Gideon Ross is a 49 y.o. male with history of diabetes mellitus, hypertension and PVD status post left AKA and right BKA.  Also with CHF secondary to diastolic dysfunction complicated by ascites and hepatosplenomegaly documented on abdominal ultrasound 05/18/2025.  He had HCV screen during hospitalization 05/11/2025 that was reactive with HCV PCR 9040. Sent to me for HCV treatment.  States he is aware of HCV for at least 2-3 years ago or more.  Risk factor is heroin use.  He smokes it and denies IV or snorting it.  Last use was January 2025.  Has homemade tattoos which he made himself.  He did not share needles while making them.  Never received any blood transfusion.  Never worked in the hospital establishment.        Review of patient's allergies indicates:   Allergen Reactions    Metformin      Patient reports intolerance to Metformin due to adverse effects on liver in the past.     Past Medical History:   Diagnosis Date    Diabetes mellitus     Hypertension      Past Surgical History:   Procedure Laterality Date    CYSTOSCOPY,WITH URETERAL CATHETER INSERTION N/A 5/15/2025    Procedure: CYSTOSCOPY,WITH URETERAL CATHETER INSERTION;  Surgeon: Yoni Lemus MD;  Location: [facility];  Service: Urology;  Laterality: N/A;    SKIN GRAFT        Social History     Tobacco Use    Smoking status: Every Day     Current packs/day: 1.00     Average packs/day: 1 pack/day for 40.5 years (40.5 ttl pk-yrs)     Types: Cigarettes     Start date: 1985    Smokeless tobacco: Not on file   Substance Use Topics    Alcohol use: Yes     No family history on file.    Pertinent medications noted:     Review of Systems  10 system review unremarkable.  As in HPI    Outdoor activities:  He lives with his mother.  He smokes cigarette.  Denies any alcohol use. Used to smoke heroin but last use was in January 2025.  Currently on Suboxone.  Travel:  No recent travel  Implants:   "None  Antibiotic History:  None at this time      Objective:      Blood pressure 138/66, pulse (P) 91, temperature 98.1 °F (36.7 °C), temperature source Temporal, height 5' 10" (1.778 m), weight 90.7 kg (200 lb), SpO2 96%. Body mass index is 28.7 kg/m².  Physical Exam      General:  Middle-aged man who is in wheelchair.  In no acute distress   Chest:  Bilateral enlarged breast but no gross cancer, status  CVS: S1 and 2 heard, no murmurs appreciated   Respiratory: Clear to auscultation  Abdomen:  Full, soft, nontender, no palpable organomegaly   Skin: Tattoos.  No rash appreciated.  No palmar erythema or stigmata of chronic liver disease.    Extremities: Evidence of left AKA and right BKA  Musculoskeletal: No joint or bony abnormalities appreciated  Psychiatric: Normal mood, speech,  demeanor     Wound:  None    VAD:  None      General Labs reviewed:  Lab Results   Component Value Date    WBC 9.44 05/24/2025    RBC 4.13 (L) 05/24/2025    HGB 10.1 (L) 05/24/2025    HCT 33.9 (L) 05/24/2025    MCV 82 05/24/2025    MCH 24.5 (L) 05/24/2025    MCHC 29.8 (L) 05/24/2025    RDW 16.4 (H) 05/24/2025     05/24/2025    MPV 8.9 (L) 05/24/2025    GRAN 6.9 01/26/2025    GRAN 57.4 01/26/2025    LYMPH 1.9 01/26/2025    LYMPH 15.4 (L) 01/26/2025    MONO 1.5 (H) 01/26/2025    MONO 12.2 01/26/2025    EOS 1.7 (H) 01/26/2025    BASO 0.08 01/26/2025    EOSINOPHIL 13.8 (H) 01/26/2025    BASOPHIL 0.7 01/26/2025     CMP  Sodium   Date Value Ref Range Status   05/27/2025 134 (L) 136 - 145 mmol/L Final     Potassium   Date Value Ref Range Status   05/27/2025 5.1 3.5 - 5.1 mmol/L Final     Chloride   Date Value Ref Range Status   05/27/2025 98 95 - 110 mmol/L Final     CO2   Date Value Ref Range Status   05/27/2025 30 (H) 23 - 29 mmol/L Final     Glucose   Date Value Ref Range Status   05/27/2025 174 (H) 70 - 110 mg/dL Final     BUN   Date Value Ref Range Status   05/27/2025 53 (H) 6 - 20 mg/dL Final     Creatinine   Date Value Ref Range " Status   05/27/2025 1.4 0.5 - 1.4 mg/dL Final     Calcium   Date Value Ref Range Status   05/27/2025 8.4 (L) 8.7 - 10.5 mg/dL Final     Protein Total   Date Value Ref Range Status   05/19/2025 6.7 6.0 - 8.4 gm/dL Final     Albumin   Date Value Ref Range Status   05/19/2025 2.8 (L) 3.5 - 5.2 g/dL Final     Bilirubin Total   Date Value Ref Range Status   05/19/2025 0.4 0.1 - 1.0 mg/dL Final     Comment:     For infants and newborns, interpretation of results should be based   on gestational age, weight and in agreement with clinical   observations.    Premature Infant recommended reference ranges:   0-24 hours:  <8.0 mg/dL   24-48 hours: <12.0 mg/dL   3-5 days:    <15.0 mg/dL   6-29 days:   <15.0 mg/dL     ALP   Date Value Ref Range Status   05/19/2025 160 (H) 55 - 135 unit/L Final     AST   Date Value Ref Range Status   05/19/2025 14 10 - 40 unit/L Final     ALT   Date Value Ref Range Status   05/19/2025 <3 (L) 10 - 44 unit/L Final     Anion Gap   Date Value Ref Range Status   05/27/2025 6 (L) 8 - 16 mmol/L Final     eGFR   Date Value Ref Range Status   05/27/2025 >60 >60 mL/min/1.73/m2 Final   01/26/2025 49.4 (A) >60 mL/min/1.73 m^2 Final           Micro:  Microbiology Results (last 7 days)       ** No results found for the last 168 hours. **          Imaging Reviewed:          Assessment:     1. Chronic HCV infection.  He was counseled and educated.  Take lab and also abdominal ultrasound.    2. Hepatosplenomegaly and ascites.  This appears to be related to chronic CHF from diastolic heart failure.  Repeat abdominal ultrasound.    3. Chronic CHF secondary to diastolic dysfunction.  Management as per his PCP.  Need to cardiologist.    4. History of heroin abuse.  Has been drug free since January.  He does smoke marijuana.  He is on Suboxone.    5. PVD.     Thank you for this consult.  I will plan to see again in 4 weeks to review his labs and to initiate HCV therapy.  Call with questions.  Problem List Items  Addressed This Visit          Endocrine    Diabetes mellitus       GI    Chronic hepatitis C without hepatic coma - Primary    Relevant Orders    COMPREHENSIVE METABOLIC PANEL    CBC Auto Differential    HCV Genotyping Non-Reflex    HCV FIBROSURE    Hepatitis B Core Antibody, Total    Hepatitis A Antibody, Total    Hepatitis B surface antigen    Hepatitis B Surface Ab, Qualitative    AFP TUMOR MARKER    US Abdomen Complete     Other Visit Diagnoses         Hepatosplenomegaly        Relevant Orders    US Abdomen Complete             Plan:     As in assessment      Chronic hepatitis C without hepatic coma  -     COMPREHENSIVE METABOLIC PANEL; Future; Expected date: 07/10/2025  -     CBC Auto Differential; Future; Expected date: 07/10/2025  -     HCV Genotyping Non-Reflex; Future; Expected date: 07/10/2025  -     HCV FIBROSURE; Future; Expected date: 07/10/2025  -     Hepatitis B Core Antibody, Total; Future; Expected date: 07/10/2025  -     Hepatitis A Antibody, Total; Future; Expected date: 07/10/2025  -     Hepatitis B surface antigen; Future; Expected date: 07/10/2025  -     Hepatitis B Surface Ab, Qualitative; Future; Expected date: 07/10/2025  -     AFP TUMOR MARKER; Future; Expected date: 07/10/2025  -     US Abdomen Complete; Future; Expected date: 07/10/2025    Hepatosplenomegaly  -     US Abdomen Complete; Future; Expected date: 07/10/2025    Type 2 diabetes mellitus with other circulatory complication, unspecified whether long term insulin use        This note was created using Dragon voice recognition software that occasionally misinterpreted phrases or words.

## 2025-07-15 ENCOUNTER — OFFICE VISIT (OUTPATIENT)
Facility: CLINIC | Age: 50
End: 2025-07-15
Payer: MEDICAID

## 2025-07-15 DIAGNOSIS — F11.90 OPIOID USE DISORDER: Primary | ICD-10-CM

## 2025-07-15 DIAGNOSIS — I50.21 ACUTE SYSTOLIC CONGESTIVE HEART FAILURE: ICD-10-CM

## 2025-07-15 RX ORDER — TORSEMIDE 20 MG/1
20 TABLET ORAL 2 TIMES DAILY
Qty: 60 TABLET | Refills: 11 | Status: SHIPPED | OUTPATIENT
Start: 2025-07-15 | End: 2026-07-15

## 2025-07-15 RX ORDER — BUPRENORPHINE AND NALOXONE 8; 2 MG/1; MG/1
2 FILM, SOLUBLE BUCCAL; SUBLINGUAL 2 TIMES DAILY
Qty: 120 FILM | Refills: 0 | Status: SHIPPED | OUTPATIENT
Start: 2025-07-15 | End: 2025-08-14

## 2025-07-15 NOTE — PROGRESS NOTES
Addiction Medicine  Virtual Encounter    Date of Service: 7/15/2025    Name:  Gideon Ross   MRN:  [id number]   :  1975   Address:  [facility] [address]  [place] [address]   Phone:  [phone]      The patient location is:  Home  The patient State: [place]; Provider licensed in [place] (#[id number]), [place] (#[id number]), [place] (#[id number]), and [place] (#[id number]).  The patient phone number is: [phone]   Visit type: Virtual visit with synchronous audio and video  Each patient to whom is provided medical services by telemedicine is:  (1) informed of the relationship between the physician and patient and the respective role of any other health care provider with respect to management of the patient; and (2) notified that he or she may decline to receive medical services by telemedicine and may withdraw from such care at any time.  Crisis Disclaimer: Patient was informed that due to the virtual nature of the visit, that if a crisis develops, protocols will be implemented to ensure patient safety, including but not limited to: 1) Initiating a welfare check with local Law Enforcement, 2) Calling 1/National Crisis Hotline, and/or 3) Initiating PEC/CEC procedures.      Subjective     07/15/2025     History of Present Illness    Patient presents today for follow-up for opioid use disorder He reports doing well in managing opioid use disorder and denies any current substance use. Living with his mother has been beneficial to his health, with supervision helping prevent potential relapse. He feels optimistic about his health journey and reports improvement. He takes buprenorphine four films daily, with consideration to reduce to three films occasionally to maintain extra supply if needed. His mother assists in regulating his fluid intake for heart failure management. He is currently out of Torsemide 20mg twice daily due to unexpected cancellation by another provider. A temporary refill has been  provided. He is working to establish primary care and has an upcoming appointment scheduled for early August. He recently saw an infectious disease physician for hepatitis workup. A previous appointment was missed due to Medicaid transport not arriving but has been rescheduled.           Objective     Physical Exam:  No acute distress. Normal appearance. No scleral icterus. Conjunctivae normal. Pulmonary effort is normal. Nor respiratory distress. Alert and oriented x 3. Attention and perception appear normal. Mood and affect appear normal. Speech normal. Patient is cooperative.       Assessment / Plan     I50.21 Acute systolic congestive heart failure  F11.90 Opioid use disorder    Following up for opioid use disorder, patient reports doing well and denies substance use.  Awaiting hepatitis workup results from infectious disease physician.  Mother's involvement beneficial for fluid intake regulation and relapse prevention.    PLAN:  Ordered urine drug screen for next in-person visit  Continue buprenorphine therapy at current dose (4 films daily)  Torsemide 20 mg twice daily for heart failure management  Follow up in-person for urine drug screen and general follow-up             SHO Langston DO    Board Certified, Addiction Medicine  Board Certified, Neuromusculoskeletal Medicine    Portions of this documentation may have been dictated using voice recognition software and may contain dictation related errors in spelling / grammar / syntax not discovered on text review.         Subsequent patient encounter.  Virtual encounter.  Added Complexity: Visit today included increased complexity associated with the care of the episodic problem(s) addressed and managing the longitudinal care of the patient due to the serious and/or complex managed problem(s).  91380: [place] SYNCHRONOUS AUDIO-ONLY VISIT, NEW, LOW MDM 30+ MIN  I spent at total of at least 10 minutes of direct synchronized audio communication with the patient on the  day of the encounter.

## (undated) DEVICE — GLOVE SENSICARE PI GRN 6.5

## (undated) DEVICE — CATH FOLEY 16F COUNCIL STA LOC

## (undated) DEVICE — PACK CYSTOSCOPY III SMH

## (undated) DEVICE — SCRUB DYNA-HEX LIQ 4% CHG 4OZ

## (undated) DEVICE — SET IRR CYSTO TUR Y-TYPE 90IN

## (undated) DEVICE — GLOVE SURG BIOGEL LATEX SZ 7.5

## (undated) DEVICE — TUBING SUC UNIV W/CONN 12FT

## (undated) DEVICE — SOL NACL IRR 3000ML

## (undated) DEVICE — GUIDEWIRE ZIPWIRE .035 D 150CM

## (undated) DEVICE — SOL IRRI STRL WATER 1000ML